# Patient Record
Sex: FEMALE | Race: WHITE | NOT HISPANIC OR LATINO | URBAN - METROPOLITAN AREA
[De-identification: names, ages, dates, MRNs, and addresses within clinical notes are randomized per-mention and may not be internally consistent; named-entity substitution may affect disease eponyms.]

---

## 2017-05-01 ENCOUNTER — OUTPATIENT (OUTPATIENT)
Dept: OUTPATIENT SERVICES | Facility: HOSPITAL | Age: 82
LOS: 1 days | End: 2017-05-01
Payer: MEDICAID

## 2017-05-01 DIAGNOSIS — Z96.651 PRESENCE OF RIGHT ARTIFICIAL KNEE JOINT: Chronic | ICD-10-CM

## 2017-05-01 DIAGNOSIS — Z98.89 OTHER SPECIFIED POSTPROCEDURAL STATES: Chronic | ICD-10-CM

## 2017-05-04 DIAGNOSIS — R69 ILLNESS, UNSPECIFIED: ICD-10-CM

## 2017-07-01 PROCEDURE — G9001: CPT

## 2017-09-17 ENCOUNTER — TRANSCRIPTION ENCOUNTER (OUTPATIENT)
Age: 82
End: 2017-09-17

## 2017-09-20 ENCOUNTER — INPATIENT (INPATIENT)
Facility: HOSPITAL | Age: 82
LOS: 4 days | Discharge: HOME | DRG: 690 | End: 2017-09-25
Attending: INTERNAL MEDICINE | Admitting: INTERNAL MEDICINE
Payer: MEDICARE

## 2017-09-20 VITALS
HEART RATE: 70 BPM | TEMPERATURE: 99 F | RESPIRATION RATE: 22 BRPM | OXYGEN SATURATION: 92 % | DIASTOLIC BLOOD PRESSURE: 70 MMHG | SYSTOLIC BLOOD PRESSURE: 131 MMHG

## 2017-09-20 DIAGNOSIS — I48.91 UNSPECIFIED ATRIAL FIBRILLATION: ICD-10-CM

## 2017-09-20 DIAGNOSIS — N39.0 URINARY TRACT INFECTION, SITE NOT SPECIFIED: ICD-10-CM

## 2017-09-20 DIAGNOSIS — E11.9 TYPE 2 DIABETES MELLITUS WITHOUT COMPLICATIONS: ICD-10-CM

## 2017-09-20 DIAGNOSIS — E03.9 HYPOTHYROIDISM, UNSPECIFIED: ICD-10-CM

## 2017-09-20 DIAGNOSIS — R07.81 PLEURODYNIA: ICD-10-CM

## 2017-09-20 DIAGNOSIS — Z96.651 PRESENCE OF RIGHT ARTIFICIAL KNEE JOINT: Chronic | ICD-10-CM

## 2017-09-20 DIAGNOSIS — J40 BRONCHITIS, NOT SPECIFIED AS ACUTE OR CHRONIC: ICD-10-CM

## 2017-09-20 DIAGNOSIS — Z98.89 OTHER SPECIFIED POSTPROCEDURAL STATES: Chronic | ICD-10-CM

## 2017-09-20 LAB
ALBUMIN SERPL ELPH-MCNC: 4.1 G/DL — SIGNIFICANT CHANGE UP (ref 3.3–5)
ALP SERPL-CCNC: 66 U/L — SIGNIFICANT CHANGE UP (ref 40–120)
ALT FLD-CCNC: 17 U/L RC — SIGNIFICANT CHANGE UP (ref 10–45)
ANION GAP SERPL CALC-SCNC: 13 MMOL/L — SIGNIFICANT CHANGE UP (ref 5–17)
APPEARANCE UR: ABNORMAL
AST SERPL-CCNC: 20 U/L — SIGNIFICANT CHANGE UP (ref 10–40)
BACTERIA # UR AUTO: ABNORMAL /HPF
BASE EXCESS BLDV CALC-SCNC: 0.4 MMOL/L — SIGNIFICANT CHANGE UP (ref -2–2)
BASOPHILS # BLD AUTO: 0 K/UL — SIGNIFICANT CHANGE UP (ref 0–0.2)
BASOPHILS NFR BLD AUTO: 0 % — SIGNIFICANT CHANGE UP (ref 0–2)
BILIRUB SERPL-MCNC: 0.5 MG/DL — SIGNIFICANT CHANGE UP (ref 0.2–1.2)
BILIRUB UR-MCNC: NEGATIVE — SIGNIFICANT CHANGE UP
BUN SERPL-MCNC: 11 MG/DL — SIGNIFICANT CHANGE UP (ref 7–23)
CA-I SERPL-SCNC: 1.27 MMOL/L — SIGNIFICANT CHANGE UP (ref 1.12–1.3)
CALCIUM SERPL-MCNC: 10.4 MG/DL — SIGNIFICANT CHANGE UP (ref 8.4–10.5)
CHLORIDE BLDV-SCNC: 103 MMOL/L — SIGNIFICANT CHANGE UP (ref 96–108)
CHLORIDE SERPL-SCNC: 98 MMOL/L — SIGNIFICANT CHANGE UP (ref 96–108)
CO2 BLDV-SCNC: 27 MMOL/L — SIGNIFICANT CHANGE UP (ref 22–30)
CO2 SERPL-SCNC: 26 MMOL/L — SIGNIFICANT CHANGE UP (ref 22–31)
COLOR SPEC: YELLOW — SIGNIFICANT CHANGE UP
CREAT SERPL-MCNC: 0.92 MG/DL — SIGNIFICANT CHANGE UP (ref 0.5–1.3)
DIFF PNL FLD: ABNORMAL
EOSINOPHIL # BLD AUTO: 0 K/UL — SIGNIFICANT CHANGE UP (ref 0–0.5)
EOSINOPHIL NFR BLD AUTO: 0.6 % — SIGNIFICANT CHANGE UP (ref 0–6)
EPI CELLS # UR: SIGNIFICANT CHANGE UP /HPF
GAS PNL BLDV: 133 MMOL/L — LOW (ref 136–145)
GAS PNL BLDV: SIGNIFICANT CHANGE UP
GAS PNL BLDV: SIGNIFICANT CHANGE UP
GLUCOSE BLDV-MCNC: 136 MG/DL — HIGH (ref 70–99)
GLUCOSE SERPL-MCNC: 138 MG/DL — HIGH (ref 70–99)
GLUCOSE UR QL: NEGATIVE — SIGNIFICANT CHANGE UP
HCO3 BLDV-SCNC: 26 MMOL/L — SIGNIFICANT CHANGE UP (ref 21–29)
HCT VFR BLD CALC: 45.3 % — HIGH (ref 34.5–45)
HCT VFR BLDA CALC: 47 % — SIGNIFICANT CHANGE UP (ref 39–50)
HGB BLD CALC-MCNC: 15.5 G/DL — SIGNIFICANT CHANGE UP (ref 11.5–15.5)
HGB BLD-MCNC: 15.3 G/DL — SIGNIFICANT CHANGE UP (ref 11.5–15.5)
HOROWITZ INDEX BLDV+IHG-RTO: SIGNIFICANT CHANGE UP
KETONES UR-MCNC: NEGATIVE — SIGNIFICANT CHANGE UP
LACTATE BLDV-MCNC: 2.3 MMOL/L — HIGH (ref 0.7–2)
LEUKOCYTE ESTERASE UR-ACNC: ABNORMAL
LYMPHOCYTES # BLD AUTO: 0.4 K/UL — LOW (ref 1–3.3)
LYMPHOCYTES # BLD AUTO: 5.4 % — LOW (ref 13–44)
MCHC RBC-ENTMCNC: 33.1 PG — SIGNIFICANT CHANGE UP (ref 27–34)
MCHC RBC-ENTMCNC: 33.8 GM/DL — SIGNIFICANT CHANGE UP (ref 32–36)
MCV RBC AUTO: 97.7 FL — SIGNIFICANT CHANGE UP (ref 80–100)
MONOCYTES # BLD AUTO: 0.7 K/UL — SIGNIFICANT CHANGE UP (ref 0–0.9)
MONOCYTES NFR BLD AUTO: 9.1 % — SIGNIFICANT CHANGE UP (ref 2–14)
NEUTROPHILS # BLD AUTO: 6.6 K/UL — SIGNIFICANT CHANGE UP (ref 1.8–7.4)
NEUTROPHILS NFR BLD AUTO: 84.8 % — HIGH (ref 43–77)
NITRITE UR-MCNC: NEGATIVE — SIGNIFICANT CHANGE UP
PCO2 BLDV: 46 MMHG — SIGNIFICANT CHANGE UP (ref 35–50)
PH BLDV: 7.36 — SIGNIFICANT CHANGE UP (ref 7.35–7.45)
PH UR: 7.5 — SIGNIFICANT CHANGE UP (ref 5–8)
PLATELET # BLD AUTO: 190 K/UL — SIGNIFICANT CHANGE UP (ref 150–400)
PO2 BLDV: 33 MMHG — SIGNIFICANT CHANGE UP (ref 25–45)
POTASSIUM BLDV-SCNC: 4 MMOL/L — SIGNIFICANT CHANGE UP (ref 3.5–5)
POTASSIUM SERPL-MCNC: 4.4 MMOL/L — SIGNIFICANT CHANGE UP (ref 3.5–5.3)
POTASSIUM SERPL-SCNC: 4.4 MMOL/L — SIGNIFICANT CHANGE UP (ref 3.5–5.3)
PROT SERPL-MCNC: 7.7 G/DL — SIGNIFICANT CHANGE UP (ref 6–8.3)
PROT UR-MCNC: 100 MG/DL
RBC # BLD: 4.64 M/UL — SIGNIFICANT CHANGE UP (ref 3.8–5.2)
RBC # FLD: 12.1 % — SIGNIFICANT CHANGE UP (ref 10.3–14.5)
RBC CASTS # UR COMP ASSIST: ABNORMAL /HPF (ref 0–2)
SAO2 % BLDV: 56 % — LOW (ref 67–88)
SODIUM SERPL-SCNC: 137 MMOL/L — SIGNIFICANT CHANGE UP (ref 135–145)
SP GR SPEC: 1.01 — SIGNIFICANT CHANGE UP (ref 1.01–1.02)
UROBILINOGEN FLD QL: 2
WBC # BLD: 7.8 K/UL — SIGNIFICANT CHANGE UP (ref 3.8–10.5)
WBC # FLD AUTO: 7.8 K/UL — SIGNIFICANT CHANGE UP (ref 3.8–10.5)
WBC UR QL: >50 /HPF (ref 0–5)

## 2017-09-20 PROCEDURE — 99223 1ST HOSP IP/OBS HIGH 75: CPT

## 2017-09-20 PROCEDURE — 70450 CT HEAD/BRAIN W/O DYE: CPT | Mod: 26

## 2017-09-20 PROCEDURE — 71250 CT THORAX DX C-: CPT | Mod: 26

## 2017-09-20 PROCEDURE — 93010 ELECTROCARDIOGRAM REPORT: CPT

## 2017-09-20 PROCEDURE — 99284 EMERGENCY DEPT VISIT MOD MDM: CPT | Mod: 25

## 2017-09-20 PROCEDURE — 72125 CT NECK SPINE W/O DYE: CPT | Mod: 26

## 2017-09-20 RX ORDER — AZITHROMYCIN 500 MG/1
TABLET, FILM COATED ORAL
Qty: 0 | Refills: 0 | Status: DISCONTINUED | OUTPATIENT
Start: 2017-09-20 | End: 2017-09-22

## 2017-09-20 RX ORDER — PREGABALIN 225 MG/1
1000 CAPSULE ORAL DAILY
Qty: 0 | Refills: 0 | Status: DISCONTINUED | OUTPATIENT
Start: 2017-09-20 | End: 2017-09-25

## 2017-09-20 RX ORDER — DEXTROSE 50 % IN WATER 50 %
25 SYRINGE (ML) INTRAVENOUS ONCE
Qty: 0 | Refills: 0 | Status: DISCONTINUED | OUTPATIENT
Start: 2017-09-20 | End: 2017-09-25

## 2017-09-20 RX ORDER — GABAPENTIN 400 MG/1
100 CAPSULE ORAL
Qty: 0 | Refills: 0 | Status: DISCONTINUED | OUTPATIENT
Start: 2017-09-20 | End: 2017-09-25

## 2017-09-20 RX ORDER — SODIUM CHLORIDE 9 MG/ML
1000 INJECTION, SOLUTION INTRAVENOUS
Qty: 0 | Refills: 0 | Status: DISCONTINUED | OUTPATIENT
Start: 2017-09-20 | End: 2017-09-25

## 2017-09-20 RX ORDER — CEFTRIAXONE 500 MG/1
1 INJECTION, POWDER, FOR SOLUTION INTRAMUSCULAR; INTRAVENOUS ONCE
Qty: 0 | Refills: 0 | Status: COMPLETED | OUTPATIENT
Start: 2017-09-20 | End: 2017-09-20

## 2017-09-20 RX ORDER — CHOLECALCIFEROL (VITAMIN D3) 125 MCG
1000 CAPSULE ORAL DAILY
Qty: 0 | Refills: 0 | Status: DISCONTINUED | OUTPATIENT
Start: 2017-09-20 | End: 2017-09-25

## 2017-09-20 RX ORDER — INFLUENZA VIRUS VACCINE 15; 15; 15; 15 UG/.5ML; UG/.5ML; UG/.5ML; UG/.5ML
0.5 SUSPENSION INTRAMUSCULAR ONCE
Qty: 0 | Refills: 0 | Status: DISCONTINUED | OUTPATIENT
Start: 2017-09-20 | End: 2017-09-25

## 2017-09-20 RX ORDER — DEXTROSE 50 % IN WATER 50 %
1 SYRINGE (ML) INTRAVENOUS ONCE
Qty: 0 | Refills: 0 | Status: DISCONTINUED | OUTPATIENT
Start: 2017-09-20 | End: 2017-09-25

## 2017-09-20 RX ORDER — GLUCAGON INJECTION, SOLUTION 0.5 MG/.1ML
1 INJECTION, SOLUTION SUBCUTANEOUS ONCE
Qty: 0 | Refills: 0 | Status: DISCONTINUED | OUTPATIENT
Start: 2017-09-20 | End: 2017-09-25

## 2017-09-20 RX ORDER — AZITHROMYCIN 500 MG/1
500 TABLET, FILM COATED ORAL ONCE
Qty: 0 | Refills: 0 | Status: COMPLETED | OUTPATIENT
Start: 2017-09-20 | End: 2017-09-20

## 2017-09-20 RX ORDER — METOPROLOL TARTRATE 50 MG
25 TABLET ORAL DAILY
Qty: 0 | Refills: 0 | Status: DISCONTINUED | OUTPATIENT
Start: 2017-09-20 | End: 2017-09-25

## 2017-09-20 RX ORDER — AZITHROMYCIN 500 MG/1
500 TABLET, FILM COATED ORAL EVERY 24 HOURS
Qty: 0 | Refills: 0 | Status: DISCONTINUED | OUTPATIENT
Start: 2017-09-21 | End: 2017-09-22

## 2017-09-20 RX ORDER — DEXTROSE 50 % IN WATER 50 %
12.5 SYRINGE (ML) INTRAVENOUS ONCE
Qty: 0 | Refills: 0 | Status: DISCONTINUED | OUTPATIENT
Start: 2017-09-20 | End: 2017-09-25

## 2017-09-20 RX ORDER — CEFTRIAXONE 500 MG/1
1 INJECTION, POWDER, FOR SOLUTION INTRAMUSCULAR; INTRAVENOUS EVERY 24 HOURS
Qty: 0 | Refills: 0 | Status: DISCONTINUED | OUTPATIENT
Start: 2017-09-21 | End: 2017-09-24

## 2017-09-20 RX ORDER — SODIUM CHLORIDE 9 MG/ML
1000 INJECTION INTRAMUSCULAR; INTRAVENOUS; SUBCUTANEOUS
Qty: 0 | Refills: 0 | Status: DISCONTINUED | OUTPATIENT
Start: 2017-09-20 | End: 2017-09-21

## 2017-09-20 RX ORDER — ENOXAPARIN SODIUM 100 MG/ML
40 INJECTION SUBCUTANEOUS EVERY 24 HOURS
Qty: 0 | Refills: 0 | Status: DISCONTINUED | OUTPATIENT
Start: 2017-09-20 | End: 2017-09-25

## 2017-09-20 RX ORDER — ACETAMINOPHEN 500 MG
650 TABLET ORAL EVERY 6 HOURS
Qty: 0 | Refills: 0 | Status: DISCONTINUED | OUTPATIENT
Start: 2017-09-20 | End: 2017-09-25

## 2017-09-20 RX ORDER — INSULIN LISPRO 100/ML
VIAL (ML) SUBCUTANEOUS
Qty: 0 | Refills: 0 | Status: DISCONTINUED | OUTPATIENT
Start: 2017-09-20 | End: 2017-09-24

## 2017-09-20 RX ORDER — LEVOTHYROXINE SODIUM 125 MCG
75 TABLET ORAL DAILY
Qty: 0 | Refills: 0 | Status: DISCONTINUED | OUTPATIENT
Start: 2017-09-20 | End: 2017-09-25

## 2017-09-20 RX ADMIN — AZITHROMYCIN 250 MILLIGRAM(S): 500 TABLET, FILM COATED ORAL at 16:30

## 2017-09-20 RX ADMIN — SODIUM CHLORIDE 75 MILLILITER(S): 9 INJECTION INTRAMUSCULAR; INTRAVENOUS; SUBCUTANEOUS at 17:55

## 2017-09-20 RX ADMIN — SODIUM CHLORIDE 75 MILLILITER(S): 9 INJECTION INTRAMUSCULAR; INTRAVENOUS; SUBCUTANEOUS at 22:07

## 2017-09-20 RX ADMIN — Medication 650 MILLIGRAM(S): at 16:30

## 2017-09-20 RX ADMIN — CEFTRIAXONE 100 GRAM(S): 500 INJECTION, POWDER, FOR SOLUTION INTRAMUSCULAR; INTRAVENOUS at 12:29

## 2017-09-20 RX ADMIN — GABAPENTIN 100 MILLIGRAM(S): 400 CAPSULE ORAL at 22:07

## 2017-09-20 NOTE — ED PROVIDER NOTE - CONSTITUTIONAL, MLM
normal... Patient obese, well nourished, awake, alert, oriented to person, place, time/situation and in mild distress.

## 2017-09-20 NOTE — H&P ADULT - NSHPLABSRESULTS_GEN_ALL_CORE
Labs and diagnostic data personally reviewed. Pertinent findings include:  - WNL CBC including no leukocytosis, WNL CMP  - VBG lactate = 2.3  - UA/micro = turbid urine, 10-25 RBCs, >50WBCs, mod bacteria, large leuk esterase, neg nitrite  - ECG: atrial fibrillation, normal rate  - CT Head as reported = no acute hemorrhage, infarct, or fracture  - CT C-Spine as reported = no acute fracture or traumatic subluxation  - CT Chest = no e/o acute traumatic injury

## 2017-09-20 NOTE — H&P ADULT - PROBLEM SELECTOR PLAN 5
- holding home antihyperglycemics and initiate ISS w/ serial BGMs while hospitalized  - continue home Gabapentin for neuropathy

## 2017-09-20 NOTE — ED ADULT NURSE NOTE - OBJECTIVE STATEMENT
Pt came from assisted living c/o fall. According to the patient's son, pt was with somebody when she fell. Pt denies LOC. Pt denies hitting her head. Pt able to verbalize wants and needs. Son at bedside. Pt came from assisted living c/o fall. According to the patient's son, pt was with somebody when she fell. Pt denies LOC. Pt denies hitting her head. Pt able to verbalize wants and needs. Son at bedside. Pt able to turn to sides and able to move all extremities. Pt very anxious. Emotional support offered. Pt straight cath and 600ml cloudy urine taken out from pt. Pt tolerated straight cath properly. Pt c/o generalized weakness for weeks. Pt stated she is presently taking antibiotics for her bronchitis.  No c/o SOB.

## 2017-09-20 NOTE — H&P ADULT - NEGATIVE OPHTHALMOLOGIC SYMPTOMS
no blurred vision L/no pain R/no loss of vision L/no loss of vision R/no blurred vision R/no pain L/no diplopia

## 2017-09-20 NOTE — ED PROVIDER NOTE - OBJECTIVE STATEMENT
87 year old female w/ h/o afib, htn, dm presents from assisted living s/p fall. Patient was in shower this morning when she fell off shower chair. She states that she has been very weak for the past few days and was diagnoses with bronchitis in assisted living after having symptoms of productive cough. She describes needing to be showered because of urinating on her self. Fall in showered witnesses, patient states she was not able to get up on her own, typically uses wheel chair. She is currently c/o right rib pain. She denies head injury, loc, chest pain abdominal pain, fevers. 87 year old female w/ h/o afib, htn, dm presents from assisted living s/p fall. Patient was in shower this morning when she fell off shower chair. She states that she has been very weak for the past few days and was diagnoses with bronchitis in assisted living after having symptoms of productive cough. She describes needing to be showered because of urinating on her self. Fall in showered witnesses, patient states she was not able to get up on her own, typically uses wheel chair. She is currently c/o right rib pain. She denies head injury, loc, chest pain abdominal pain, fevers.  PMD Ángel Hernandez ([facility])

## 2017-09-20 NOTE — H&P ADULT - PROBLEM SELECTOR PLAN 4
- in Afib, but rate controlled at this time  - not on AC given episode of vaginal bleeding in 2015  - continue home beta blocker

## 2017-09-20 NOTE — H&P ADULT - NSHPSOCIALHISTORY_GEN_ALL_CORE
Patient lives at an assisted living facility. At baseline, pt has been using a wheelchair to get around, and relies on assistance to transfer from one position to another. She is accompanied today by her son/grandson.

## 2017-09-20 NOTE — H&P ADULT - HISTORY OF PRESENT ILLNESS
87yoF w/ PMHx significant for Afib (not on AC since 2015 d/t vaginal bleeding), HTN, NIDDM, hypothyroidism, and recurrent UTIs, who presents from her assisted living facility after having fallen from her shower seat, a witnessed fall where the patient "slid from the seat onto the floor;" no associated LOC, pt did not hit her head, and thereafter pt only complaining of right sided rib pain. Of note, pt had to shower at the time bc she has previously urinated on herself As per the pt, she has been feeling very weak for the past few days, and has been suffering from a productive cough for weeks, which had been labeled as bronchitis and treated w/ Bactrim for the past few days. Patient denies any fever/chills, chest pain, palpitations, HA, vision changes, focal neurologic deficits, abd pain, n/v/d/c, or dysuria, but does have episodes of what she describes as an "overactive bladder." At baseline, pt has been using a wheelchair to get around, and relies on assistance to transfer from one position to another.    ED Presenting Vitals: 99.3F, 70bpm, 131/70, 22br/m, 92% on RA  In the ED pt was given a dose of IV Ceftriaxone 1g x1 after UCx/BCx were drawn

## 2017-09-20 NOTE — ED ADULT NURSE NOTE - PMH
Anemia, Iron Deficiency    Atrial fibrillation    Degenerative Joint Disease    Diabetes mellitus, type II    H/O: GI Bleed    Hiatal hernia    Hypertension    Hypothyroidism    Obesity    Overactive Bladder    Peripheral Neuropathy    Spinal stenosis of lumbar region    Uterine Polyp

## 2017-09-20 NOTE — H&P ADULT - NEGATIVE ENMT SYMPTOMS
no hearing difficulty/no nasal discharge/no sinus symptoms/no post-nasal discharge/no nasal congestion

## 2017-09-20 NOTE — H&P ADULT - PMH
Atrial fibrillation    Degenerative Joint Disease    Diabetes mellitus, type II    H/O: GI Bleed    Hiatal hernia    Hypertension    Hypothyroidism    Obesity    Overactive Bladder    Peripheral Neuropathy    Spinal stenosis of lumbar region    Uterine Polyp

## 2017-09-20 NOTE — ED ADULT NURSE REASSESSMENT NOTE - NS ED NURSE REASSESS COMMENT FT1
Report received from change of shift RN Gerard. Patient resting comfortably admitted to hospital. OK to click RTM. Comfort and safety provided.

## 2017-09-20 NOTE — ED PROVIDER NOTE - ATTENDING CONTRIBUTION TO CARE
attending Pollack: 87yM h/o a fib not on AC, HTN, DM BIBEMS after witnessed fall in shower at assisted living facility, reportedly with no head trauma or LOC. Reports several days of "bronchitis" currently on abx (cannot recall name) and feeling generally weak and unwell. Now with mild R sided ribcage pain. Denies headache, neck pain, nausea/vomiting, diarrhea. On exam, awake and alert, oriented, NCAT, no midline spinal tenderness, equal breath sounds bilaterally, abdomen soft/NT, stable pelvis, FROM bilateral hips, mild R anterior lower thoracic tenderness. Will obtain labs, urinalysis, CT head/Cspine/Chest and reassess.

## 2017-09-20 NOTE — H&P ADULT - PROBLEM SELECTOR PLAN 2
- pt complaining of constant cough productive of sputum despite ongoing treatment w/ PO Bactrim  - will add IV Azithromycin to above Ceftriaxone for broader coverage given symptoms despite no imaging e/o of infiltrate and continue to reassess

## 2017-09-20 NOTE — ED ADULT NURSE REASSESSMENT NOTE - NS ED NURSE REASSESS COMMENT FT1
Right AC PIV red, swollen, and painful after starting azithromycin. IV removed, catheter in tact. Ice pack applied at patients request. Dr. Malone notified. Patient and family educated about infiltrated IVs.  Patient has bed assignment, calling report now.

## 2017-09-20 NOTE — H&P ADULT - ASSESSMENT
87yoF w/ PMHx significant for Afib (not on AC since 2015 d/t vaginal bleeding), HTN, NIDDM, hypothyroidism, and recurrent UTIs, who presents from her assisted living facility after having fallen from her shower seat, a witnessed fall where the patient "slid from the seat onto the floor;" no associated LOC, pt did not hit her head, and thereafter pt only complaining of right sided rib pain. After workup, patient admitted for urinary tract infection.

## 2017-09-20 NOTE — H&P ADULT - PROBLEM SELECTOR PLAN 1
- positive UA/microscopy in the setting of episode of incontinence   - afebrile, no leukocytosis  - s/p 1 dose of Ceftriaxone in the ED, will continue 1g Q24H  - f/u UCx and BCx sent by ED

## 2017-09-20 NOTE — ED PROVIDER NOTE - PROGRESS NOTE DETAILS
attending Kira: patient's IV infiltrated. IV removed. Pt and family very upset about pain and localized swelling from infiltration. Ice pack applied for pain control. New IV placed. attending Kira: patient's IV infiltrated. IV removed. Pt and family very upset about pain and localized swelling from infiltration. Ice pack applied for pain control. New IV placed. Patient's son Googling "nosocomial infections" at bedside.

## 2017-09-20 NOTE — H&P ADULT - NSHPPHYSICALEXAM_GEN_ALL_CORE
Vital Signs Last 24 Hrs  T(F): 99.3 (20 Sep 2017 11:07), Max: 99.3 (20 Sep 2017 11:07)  HR: 72 (20 Sep 2017 12:20) (70 - 72)  BP: 127/70 (20 Sep 2017 12:20) (127/70 - 131/70)  RR: 18 (20 Sep 2017 12:20) (18 - 22)  SpO2: 96% (20 Sep 2017 12:20) (92% - 96%)    GEN: elderly female. sitting up in stretcher, in NAD  SKIN: intact, no e/o rash  EYES: PERRL, anicteric  HEAD: NC/AT  NECK: supple, no e/o elevated JVP appreciated  RESPI: no accessory muscle use, B/L air entry, CTAB  CARDIO: no murmurs appreciated on auscultation, no LE edema B/L  ABD: soft, NT to deep palpation in all quadrants, ND, +BS  NEURO: A&Ox3  EXT: pt able to move all extremities spontaneously   VASC: peripheral pulses palpated B/L

## 2017-09-21 LAB
HBA1C BLD-MCNC: 6.3 % — HIGH (ref 4–5.6)
LACTATE SERPL-SCNC: 1.6 MMOL/L — SIGNIFICANT CHANGE UP (ref 0.7–2)
RAPID RVP RESULT: DETECTED
RV+EV RNA SPEC QL NAA+PROBE: DETECTED

## 2017-09-21 PROCEDURE — 93010 ELECTROCARDIOGRAM REPORT: CPT

## 2017-09-21 RX ORDER — ONDANSETRON 8 MG/1
4 TABLET, FILM COATED ORAL ONCE
Qty: 0 | Refills: 0 | Status: COMPLETED | OUTPATIENT
Start: 2017-09-21 | End: 2017-09-21

## 2017-09-21 RX ORDER — LANOLIN ALCOHOL/MO/W.PET/CERES
3 CREAM (GRAM) TOPICAL ONCE
Qty: 0 | Refills: 0 | Status: COMPLETED | OUTPATIENT
Start: 2017-09-21 | End: 2017-09-21

## 2017-09-21 RX ORDER — FLUTICASONE PROPIONATE 50 MCG
1 SPRAY, SUSPENSION NASAL
Qty: 0 | Refills: 0 | Status: DISCONTINUED | OUTPATIENT
Start: 2017-09-21 | End: 2017-09-25

## 2017-09-21 RX ADMIN — Medication 1000 UNIT(S): at 11:29

## 2017-09-21 RX ADMIN — PREGABALIN 1000 MICROGRAM(S): 225 CAPSULE ORAL at 11:29

## 2017-09-21 RX ADMIN — ENOXAPARIN SODIUM 40 MILLIGRAM(S): 100 INJECTION SUBCUTANEOUS at 17:48

## 2017-09-21 RX ADMIN — Medication 25 MILLIGRAM(S): at 05:54

## 2017-09-21 RX ADMIN — GABAPENTIN 100 MILLIGRAM(S): 400 CAPSULE ORAL at 05:56

## 2017-09-21 RX ADMIN — GABAPENTIN 100 MILLIGRAM(S): 400 CAPSULE ORAL at 17:48

## 2017-09-21 RX ADMIN — Medication 650 MILLIGRAM(S): at 02:05

## 2017-09-21 RX ADMIN — Medication 650 MILLIGRAM(S): at 01:05

## 2017-09-21 RX ADMIN — Medication 75 MICROGRAM(S): at 05:54

## 2017-09-21 RX ADMIN — ONDANSETRON 4 MILLIGRAM(S): 8 TABLET, FILM COATED ORAL at 16:30

## 2017-09-21 RX ADMIN — Medication 3 MILLIGRAM(S): at 21:50

## 2017-09-21 RX ADMIN — CEFTRIAXONE 100 GRAM(S): 500 INJECTION, POWDER, FOR SOLUTION INTRAMUSCULAR; INTRAVENOUS at 15:34

## 2017-09-21 RX ADMIN — AZITHROMYCIN 250 MILLIGRAM(S): 500 TABLET, FILM COATED ORAL at 16:30

## 2017-09-21 NOTE — CONSULT NOTE ADULT - ASSESSMENT
S/P mechanical fall  UACS - upper airway cough syndrome  Doubt pneumonia, yet CT equivoal in LLL - probably atelctasis, cannot exclude infectior/o UTI    REC:    Flonase trial  5 days azithromycin in addition to abx for possible urinary; tract infection  check PCR viral, and legionella ag

## 2017-09-21 NOTE — CONSULT NOTE ADULT - SUBJECTIVE AND OBJECTIVE BOX
PULMONARY CONSULT  Yury Moe MD  [phone]    HPI:  87yoF w/ PMHx significant for Afib (not on AC since  d/t vaginal bleeding), HTN, NIDDM, hypothyroidism, and recurrent UTIs, who presents from her assisted living facility after having fallen from her shower seat, a witnessed fall where the patient "slid from the seat onto the floor;" no associated LOC, pt did not hit her head, and thereafter pt only complaining of right sided rib pain. Of note, pt had to shower at the time bc she has previously urinated on herself As per the pt, she has been feeling very weak for the past few days, and has been suffering from a productive cough for weeks, which had been labeled as bronchitis and treated w/ Bactrim for the past few days. Patient denies any fever/chills, chest pain, palpitations, HA, vision changes, focal neurologic deficits, abd pain, n/v/d/c, or dysuria, but does have episodes of what she describes as an "overactive bladder." At baseline, pt has been using a wheelchair to get around, and relies on assistance to transfer from one position to another.    Pt has history of 1ppd cigarette smoking, DC'd age 50. Sher history of COPD/Asthma. She c/o of "allergies" and chronic nasal congestion and "post nasal drip" - latter associated with cough prod white sputum. Sher difficulty with po intake and denises fever, chills  She uses a nasal inhaler occasionally.      ED Presenting Vitals: 99.3F, 70bpm, 131/70, 22br/m, 92% on RA  In the ED pt was given a dose of IV Ceftriaxone 1g x1 after UCx/BCx were drawn (20 Sep 2017 15:51)    PAST MEDICAL & SURGICAL HISTORY:  Spinal stenosis of lumbar region  Hiatal hernia  Diabetes mellitus, type II  Atrial fibrillation  H/O: GI Bleed  Overactive Bladder  Uterine Polyp  Peripheral Neuropathy  Degenerative Joint Disease  Obesity  Hypothyroidism  Hypertension  S/P rotator cuff repair: right  S/P knee replacement, right    Allergies    aspirin (Unknown)    Intolerances    FAMILY HISTORY:  No pertinent family history in first degree relatives    Review of Systems:  · Negative General Symptoms	no fever; no chills	  · General Symptoms	malaise; fatigue; weakness	  · Negative Skin Symptoms	no rash; no itching; no dryness	  · Negative Ophthalmologic Symptoms	no diplopia; no blurred vision L; no blurred vision R; no pain L; no pain R; no loss of vision L; no loss of vision R	  · Negative ENMT Symptoms	no hearing difficulty; no sinus symptoms; no nasal congestion; no nasal discharge; no post-nasal discharge	  · Respiratory and Thorax	negative	  · Cardiovascular	negative	  · Negative Gastrointestinal Symptoms	no nausea; no vomiting; no diarrhea; no constipation; no change in bowel habits	  · Negative General Genitourinary Symptoms	no hematuria; no flank pain L; no flank pain R; no dysuria	  · General Genitourinary Symptoms	incontinence	  · Negative Musculoskeletal Symptoms	no arthralgia; no arthritis; no neck pain	  · Negative Neurological Symptoms	no syncope; no loss of sensation; no headache; no loss of consciousness; no confusion	  · Psychiatric	negative	  · Hematology/Lymphatics	negative	      Social history:       Medications:  MEDICATIONS  (STANDING):  gabapentin 100 milliGRAM(s) Oral two times a day  metoprolol succinate ER 25 milliGRAM(s) Oral daily  levothyroxine 75 MICROGram(s) Oral daily  cholecalciferol 1000 Unit(s) Oral daily  cyanocobalamin 1000 MICROGram(s) Oral daily  enoxaparin Injectable 40 milliGRAM(s) SubCutaneous every 24 hours  insulin lispro (HumaLOG) corrective regimen sliding scale   SubCutaneous three times a day before meals  dextrose 5%. 1000 milliLiter(s) (50 mL/Hr) IV Continuous <Continuous>  dextrose 50% Injectable 12.5 Gram(s) IV Push once  dextrose 50% Injectable 25 Gram(s) IV Push once  dextrose 50% Injectable 25 Gram(s) IV Push once  azithromycin  IVPB 500 milliGRAM(s) IV Intermittent every 24 hours  sodium chloride 0.9%. 1000 milliLiter(s) (75 mL/Hr) IV Continuous <Continuous>  cefTRIAXone   IVPB 1 Gram(s) IV Intermittent every 24 hours  azithromycin  IVPB      influenza   Vaccine 0.5 milliLiter(s) IntraMuscular once    MEDICATIONS  (PRN):  dextrose Gel 1 Dose(s) Oral once PRN Blood Glucose LESS THAN 70 milliGRAM(s)/deciliter  glucagon  Injectable 1 milliGRAM(s) IntraMuscular once PRN Glucose LESS THAN 70 milligrams/deciliter  acetaminophen   Tablet. 650 milliGRAM(s) Oral every 6 hours PRN Moderate Pain (4 - 6)  guaiFENesin   Syrup  (Sugar-Free) 100 milliGRAM(s) Oral every 6 hours PRN Cough    Vital Signs Last 24 Hrs  T(C): 36.4 (21 Sep 2017 08:32), Max: 36.8 (20 Sep 2017 23:37)  T(F): 97.6 (21 Sep 2017 08:32), Max: 98.2 (20 Sep 2017 23:37)  HR: 68 (21 Sep 2017 08:32) (68 - 116)  BP: 104/75 (21 Sep 2017 08:32) (104/60 - 128/78)  BP(mean): --  RR: 18 (21 Sep 2017 08:32) (18 - 18)  SpO2: 93% (21 Sep 2017 08:32) (92% - 96%)           @ 07:01  -  - @ 07:00  --------------------------------------------------------  IN: 420 mL / OUT: 0 mL / NET: 420 mL      LABS:                        15.3   7.8   )-----------( 190      ( 20 Sep 2017 11:09 )             45.3     09-20    137  |  98  |  11  ----------------------------<  138<H>  4.4   |  26  |  0.92    Ca    10.4      20 Sep 2017 11:09    TPro  7.7  /  Alb  4.1  /  TBili  0.5  /  DBili  x   /  AST  20  /  ALT  17  /  AlkPhos  66  09-20        Urinalysis Basic - ( 20 Sep 2017 11:09 )    Color: Yellow / Appearance: Turbid / S.014 / pH: x  Gluc: x / Ketone: Negative  / Bili: Negative / Urobili: 2   Blood: x / Protein: 100 mg/dL / Nitrite: Negative   Leuk Esterase: Large / RBC: 10-25 /HPF / WBC >50 /HPF   Sq Epi: x / Non Sq Epi: OCC /HPF / Bacteria: Moderate /HPF      CULTURES:    Physical Examination:    General: No acute distress.      HEENT: Pupils equal, reactive to light.  Symmetric.    PULM: Clear to auscultation bilaterally, no significant sputum production    CVS: Regular rate and rhythm, no murmurs, rubs, or gallops    ABD: Soft, nondistended, nontender, normoactive bowel sounds, no masses    EXT: No edema, nontender    SKIN: Warm and well perfused, no rashes noted.    NEURO: Alert, oriented, interactive, nonfocal    RADIOLOGY REVIEWED PERSONALLY  CXR:    CT chest: L>R basilar atelectasis, cannot exclude focal LLL consolidation    TTE: NA

## 2017-09-22 ENCOUNTER — TRANSCRIPTION ENCOUNTER (OUTPATIENT)
Age: 82
End: 2017-09-22

## 2017-09-22 DIAGNOSIS — R11.0 NAUSEA: ICD-10-CM

## 2017-09-22 LAB
-  AMIKACIN: SIGNIFICANT CHANGE UP
-  AMPICILLIN/SULBACTAM: SIGNIFICANT CHANGE UP
-  AMPICILLIN: SIGNIFICANT CHANGE UP
-  AZTREONAM: SIGNIFICANT CHANGE UP
-  CEFAZOLIN: SIGNIFICANT CHANGE UP
-  CEFEPIME: SIGNIFICANT CHANGE UP
-  CEFOXITIN: SIGNIFICANT CHANGE UP
-  CEFTAZIDIME: SIGNIFICANT CHANGE UP
-  CEFTRIAXONE: SIGNIFICANT CHANGE UP
-  CIPROFLOXACIN: SIGNIFICANT CHANGE UP
-  ERTAPENEM: SIGNIFICANT CHANGE UP
-  GENTAMICIN: SIGNIFICANT CHANGE UP
-  IMIPENEM: SIGNIFICANT CHANGE UP
-  LEVOFLOXACIN: SIGNIFICANT CHANGE UP
-  MEROPENEM: SIGNIFICANT CHANGE UP
-  NITROFURANTOIN: SIGNIFICANT CHANGE UP
-  PIPERACILLIN/TAZOBACTAM: SIGNIFICANT CHANGE UP
-  TOBRAMYCIN: SIGNIFICANT CHANGE UP
-  TRIMETHOPRIM/SULFAMETHOXAZOLE: SIGNIFICANT CHANGE UP
ANION GAP SERPL CALC-SCNC: 12 MMOL/L — SIGNIFICANT CHANGE UP (ref 5–17)
BUN SERPL-MCNC: 10 MG/DL — SIGNIFICANT CHANGE UP (ref 7–23)
CALCIUM SERPL-MCNC: 10.1 MG/DL — SIGNIFICANT CHANGE UP (ref 8.4–10.5)
CHLORIDE SERPL-SCNC: 102 MMOL/L — SIGNIFICANT CHANGE UP (ref 96–108)
CO2 SERPL-SCNC: 23 MMOL/L — SIGNIFICANT CHANGE UP (ref 22–31)
CREAT SERPL-MCNC: 0.67 MG/DL — SIGNIFICANT CHANGE UP (ref 0.5–1.3)
CULTURE RESULTS: SIGNIFICANT CHANGE UP
GLUCOSE SERPL-MCNC: 127 MG/DL — HIGH (ref 70–99)
HCT VFR BLD CALC: 43.3 % — SIGNIFICANT CHANGE UP (ref 34.5–45)
HGB BLD-MCNC: 14.8 G/DL — SIGNIFICANT CHANGE UP (ref 11.5–15.5)
MCHC RBC-ENTMCNC: 33.4 PG — SIGNIFICANT CHANGE UP (ref 27–34)
MCHC RBC-ENTMCNC: 34.2 GM/DL — SIGNIFICANT CHANGE UP (ref 32–36)
MCV RBC AUTO: 97.6 FL — SIGNIFICANT CHANGE UP (ref 80–100)
METHOD TYPE: SIGNIFICANT CHANGE UP
ORGANISM # SPEC MICROSCOPIC CNT: SIGNIFICANT CHANGE UP
ORGANISM # SPEC MICROSCOPIC CNT: SIGNIFICANT CHANGE UP
PLATELET # BLD AUTO: 186 K/UL — SIGNIFICANT CHANGE UP (ref 150–400)
POTASSIUM SERPL-MCNC: 4.5 MMOL/L — SIGNIFICANT CHANGE UP (ref 3.5–5.3)
POTASSIUM SERPL-SCNC: 4.5 MMOL/L — SIGNIFICANT CHANGE UP (ref 3.5–5.3)
RBC # BLD: 4.44 M/UL — SIGNIFICANT CHANGE UP (ref 3.8–5.2)
RBC # FLD: 12.1 % — SIGNIFICANT CHANGE UP (ref 10.3–14.5)
SODIUM SERPL-SCNC: 137 MMOL/L — SIGNIFICANT CHANGE UP (ref 135–145)
SPECIMEN SOURCE: SIGNIFICANT CHANGE UP
WBC # BLD: 4.6 K/UL — SIGNIFICANT CHANGE UP (ref 3.8–10.5)
WBC # FLD AUTO: 4.6 K/UL — SIGNIFICANT CHANGE UP (ref 3.8–10.5)

## 2017-09-22 RX ORDER — AZITHROMYCIN 500 MG/1
500 TABLET, FILM COATED ORAL DAILY
Qty: 0 | Refills: 0 | Status: DISCONTINUED | OUTPATIENT
Start: 2017-09-22 | End: 2017-09-23

## 2017-09-22 RX ADMIN — Medication 650 MILLIGRAM(S): at 05:20

## 2017-09-22 RX ADMIN — Medication 1 SPRAY(S): at 17:14

## 2017-09-22 RX ADMIN — Medication 650 MILLIGRAM(S): at 23:10

## 2017-09-22 RX ADMIN — Medication 650 MILLIGRAM(S): at 22:51

## 2017-09-22 RX ADMIN — Medication 650 MILLIGRAM(S): at 06:20

## 2017-09-22 RX ADMIN — AZITHROMYCIN 500 MILLIGRAM(S): 500 TABLET, FILM COATED ORAL at 13:06

## 2017-09-22 RX ADMIN — ENOXAPARIN SODIUM 40 MILLIGRAM(S): 100 INJECTION SUBCUTANEOUS at 17:14

## 2017-09-22 RX ADMIN — Medication 75 MICROGRAM(S): at 05:18

## 2017-09-22 RX ADMIN — PREGABALIN 1000 MICROGRAM(S): 225 CAPSULE ORAL at 13:07

## 2017-09-22 RX ADMIN — GABAPENTIN 100 MILLIGRAM(S): 400 CAPSULE ORAL at 17:14

## 2017-09-22 RX ADMIN — Medication 1000 UNIT(S): at 13:06

## 2017-09-22 RX ADMIN — Medication 650 MILLIGRAM(S): at 16:30

## 2017-09-22 RX ADMIN — CEFTRIAXONE 100 GRAM(S): 500 INJECTION, POWDER, FOR SOLUTION INTRAMUSCULAR; INTRAVENOUS at 14:05

## 2017-09-22 RX ADMIN — GABAPENTIN 100 MILLIGRAM(S): 400 CAPSULE ORAL at 05:17

## 2017-09-22 RX ADMIN — Medication 25 MILLIGRAM(S): at 05:17

## 2017-09-22 RX ADMIN — Medication 1 SPRAY(S): at 05:17

## 2017-09-22 RX ADMIN — Medication 650 MILLIGRAM(S): at 15:30

## 2017-09-22 NOTE — PHYSICAL THERAPY INITIAL EVALUATION ADULT - GENERAL OBSERVATIONS, REHAB EVAL
Pt received supine in bed with PCA for hygiene management, NAD. Pt alert and agreeable to PT eval. Pt's son & daughter arriving to room mid-PT session.

## 2017-09-22 NOTE — PHYSICAL THERAPY INITIAL EVALUATION ADULT - PERTINENT HX OF CURRENT PROBLEM, REHAB EVAL
87yoF w/ PMHx significant for Afib (not on AC since 2015 d/t vaginal bleeding), HTN, NIDDM, hypothyroidism, and recurrent UTIs, who presents from her assisted living facility after having fallen from her shower seat, a witnessed fall where the patient "slid from the seat onto the floor;" no associated LOC, pt did not hit her head, and thereafter pt only complaining of right sided rib pain. 87yoF w/ PMHx significant for Afib (not on AC since 2015 d/t vaginal bleeding), HTN, NIDDM, hypothyroidism, and recurrent UTIs, who presents from her assisted living facility after having fallen from her shower seat, a witnessed fall where the patient "slid from the seat onto the floor;" no associated LOC, pt did not hit her head, and thereafter pt only complaining of right sided rib pain. EKG: afib with PVCs.

## 2017-09-22 NOTE — PHYSICAL THERAPY INITIAL EVALUATION ADULT - TRANSFER SAFETY CONCERNS NOTED: SIT/STAND, REHAB EVAL
TBA stepping too close to front of assistive device/inability to maintain weight-bearing restrictions w/o assist/decreased weight-shifting ability/losing balance/decreased step length/TBA

## 2017-09-22 NOTE — DISCHARGE NOTE ADULT - MEDICATION SUMMARY - MEDICATIONS TO TAKE
I will START or STAY ON the medications listed below when I get home from the hospital:    acetaminophen 325 mg oral tablet  -- 2 tab(s) by mouth every 6 hours, As needed, Moderate Pain (4 - 6)  -- Indication: For Rib pain on right side    gabapentin 100 mg oral capsule  -- 1 cap(s) by mouth 2 times a day  -- Indication: For Neuropathy     metFORMIN 1000 mg oral tablet  -- 1 tab(s) by mouth 2 times a day  -- Indication: For Diabetes mellitus, type II    cetirizine 10 mg oral tablet  -- 1 tab(s) by mouth once a day, As Needed  -- Indication: For Antihistamine     Toprol-XL 25 mg oral tablet, extended release  -- 1 tab(s) by mouth once a day  -- Indication: For Hypertension     nystatin 100,000 units/g topical powder  -- Apply on skin to affected area 2 times a day, As Needed - for rash  -- Indication: For Antifungal     guaiFENesin 200 mg/5 mL oral liquid  -- 5 milliliter(s) by mouth every 6 hours, As needed, Cough  -- Indication: For Cough syrup    Cranberry oral tablet  -- Indication: For Supplement     Slow Fe (as elemental iron) 45 mg oral tablet, extended release  -- 1 tab(s) by mouth once a day  -- Indication: For Supplement     docusate sodium 100 mg oral capsule  -- 1 cap(s) by mouth 3 times a day  -- Indication: For Stool softner    lactulose 10 g/15 mL oral syrup  -- 30 milliliter(s) by mouth once a day, As needed, Constipation  -- Indication: For Stool softner    polyethylene glycol 3350 oral powder for reconstitution  -- 17 gram(s) by mouth once a day  -- Indication: For Stool softner    senna oral tablet  -- 2 tab(s) by mouth once a day (at bedtime)  -- Indication: For Stool softner    fluticasone 50 mcg/inh nasal spray  -- 1 spray(s) into nose 2 times a day  -- Indication: For Nasal spray     levothyroxine 75 mcg (0.075 mg) oral tablet  -- 1 tab(s) by mouth once a day  -- Indication: For Hypothyroidism    Centrum Silver Therapeutic Multiple Vitamins with Minerals oral tablet  -- 1 tab(s) by mouth once a day  -- Indication: For Supplement     Vitamin B12 1000 mcg oral tablet  -- 1 tab(s) by mouth once a day  -- Indication: For Supplement     cholecalciferol oral tablet  -- 1000 unit(s) by mouth once a day  -- Indication: For Supplement

## 2017-09-22 NOTE — DISCHARGE NOTE ADULT - HOSPITAL COURSE
87yr old female w/ PMHx significant for Afib (not on AC since 2015 d/t vaginal bleeding), HTN, NIDDM, hypothyroidism, and recurrent UTIs, who presents from her assisted living facility after having fallen from her shower seat, a witnessed fall where the patient "slid from the seat onto the floor;" no associated LOC, pt did not hit her head, and thereafter pt only complaining of right sided rib pain. After workup, patient admitted for urinary tract infection, urine cx with ecoli, completed 5 day course of IV abx. Continue Flonase for Bronchitis, pulm followed. Pt with  Rib pain on right side - no acute fractures as per CT imaging will continue with pain control w/ Acetaminophen. Patient cleared for discharge to [facility]. Will follow up with PMD when d/c from rehab.

## 2017-09-22 NOTE — PHYSICAL THERAPY INITIAL EVALUATION ADULT - ADDITIONAL COMMENTS
Pt lives at [facility] assisted living [facility]. Pt states that she is non-ambulatory, self-mobilizes in w/c PTA. Pt states that she sometimes completes bed to w/c transfers independently (with no AD), other times needs assist. Pt owns shower chair, w/c, raised toilet seat.

## 2017-09-22 NOTE — DISCHARGE NOTE ADULT - ADDITIONAL INSTRUCTIONS
Please follow up with Dr. Oconnor if symptoms return  Please follow up with PMD within 1-2 weeks of D/C from Rehab.

## 2017-09-22 NOTE — DISCHARGE NOTE ADULT - PLAN OF CARE
Continue off antibiotics Urine culture with Ecoli  Completed 5 days of Ceftriaxone   Please follow up with Urology if symptoms return   HOME CARE INSTRUCTIONS  Drink enough water and fluids to keep your urine clear or pale yellow.  Avoid caffeine, tea, and carbonated beverages. They tend to irritate your bladder.  Empty your bladder often. Avoid holding urine for long periods of time.  Empty your bladder before and after sexual intercourse.  After a bowel movement, women should cleanse from front to back. Use each tissue only once.  SEEK MEDICAL CARE IF:  You have back pain.  You develop a fever.  Your symptoms do not begin to resolve within 3 days.  SEEK IMMEDIATE MEDICAL CARE IF:  You have severe back pain or lower abdominal pain.  You develop chills.  You have nausea or vomiting.  You have continued burning or discomfort with urination. Continue to observe off antibiotics  Continue Flonase twice daily No acute fractures as per CT imaging  Please continue pain control w/ Acetaminophen Chronic Afib, but rate controlled at this time  Continue beta blocker  Atrial fibrillation is the most common heart rhythm problem.  The condition puts you at risk for has stroke and heart attack  It helps if you control your blood pressure, not drink more than 1-2 alcohol drinks per day, cut down on caffeine, getting treatment for over active thyroid gland, and get regular exercise  Call your doctor if you feel your heart racing or beating unusually, chest tightness or pain, lightheaded, faint, shortness of breath especially with exercise  It is important to take your heart medication as prescribed  You may be on anticoagulation which is very important to take as directed - you may need blood work to monitor drug levels HgA1C this admission.  Make sure you get your HgA1c checked every three months.  If you take oral diabetes medications, check your blood glucose two times a day.  If you take insulin, check your blood glucose before meals and at bedtime.  It's important not to skip any meals.  Keep a log of your blood glucose results and always take it with you to your doctor appointments.  Keep a list of your current medications including injectables and over the counter medications and bring this medication list with you to all your doctor appointments.  If you have not seen your ophthalmologist this year call for appointment.  Check your feet daily for redness, sores, or openings. Do not self treat. If no improvement in two days call your primary care physician for an appointment.  Low blood sugar (hypoglycemia) is a blood sugar below 70mg/dl. Check your blood sugar if you feel signs/symptoms of hypoglycemia. If your blood sugar is below 70 take 15 grams of carbohydrates (ex 4 oz of apple juice, 3-4 glucose tablets, or 4-6 oz of regular soda) wait 15 minutes and repeat blood sugar to make sure it comes up above 70.  If your blood sugar is above 70 and you are due for a meal, have a meal.  If you are not due for a meal have a snack.  This snack helps keeps your blood sugar at a safe range. Continue Levothyroxine  You do not make enough thyroid hormone  signs & symptoms of low levels of thyroid hormone - tired, getting cold easily, coarse or thin hair, constipation, shortness of breath, swelling, irregular periods  your doctor will do thyroid hormone blood tests at least once a year to monitor if medication dose is adequate  take your thyroid medicine as directed by your doctor & on empty stomach

## 2017-09-22 NOTE — DISCHARGE NOTE ADULT - CARE PROVIDER_API CALL
Li Oconnor), Urology  [address] [place] [address]  Phone: [phone]  Fax: [phone]

## 2017-09-22 NOTE — DISCHARGE NOTE ADULT - PATIENT PORTAL LINK FT
“You can access the [facility] Patient Portal, offered by [facility], by registering with the following website: [email or web address]”

## 2017-09-22 NOTE — DISCHARGE NOTE ADULT - SECONDARY DIAGNOSIS.
Bronchitis Rib pain on right side Chronic atrial fibrillation Diabetes mellitus, type II Acquired hypothyroidism

## 2017-09-22 NOTE — DISCHARGE NOTE ADULT - CARE PLAN
Principal Discharge DX:	UTI (urinary tract infection)  Goal:	Continue off antibiotics  Instructions for follow-up, activity and diet:	Urine culture with Ecoli  Completed 5 days of Ceftriaxone   Please follow up with Urology if symptoms return   HOME CARE INSTRUCTIONS  Drink enough water and fluids to keep your urine clear or pale yellow.  Avoid caffeine, tea, and carbonated beverages. They tend to irritate your bladder.  Empty your bladder often. Avoid holding urine for long periods of time.  Empty your bladder before and after sexual intercourse.  After a bowel movement, women should cleanse from front to back. Use each tissue only once.  SEEK MEDICAL CARE IF:  You have back pain.  You develop a fever.  Your symptoms do not begin to resolve within 3 days.  SEEK IMMEDIATE MEDICAL CARE IF:  You have severe back pain or lower abdominal pain.  You develop chills.  You have nausea or vomiting.  You have continued burning or discomfort with urination.  Secondary Diagnosis:	Bronchitis  Instructions for follow-up, activity and diet:	Continue to observe off antibiotics  Continue Flonase twice daily  Secondary Diagnosis:	Rib pain on right side  Instructions for follow-up, activity and diet:	No acute fractures as per CT imaging  Please continue pain control w/ Acetaminophen  Secondary Diagnosis:	Chronic atrial fibrillation  Instructions for follow-up, activity and diet:	Chronic Afib, but rate controlled at this time  Continue beta blocker  Atrial fibrillation is the most common heart rhythm problem.  The condition puts you at risk for has stroke and heart attack  It helps if you control your blood pressure, not drink more than 1-2 alcohol drinks per day, cut down on caffeine, getting treatment for over active thyroid gland, and get regular exercise  Call your doctor if you feel your heart racing or beating unusually, chest tightness or pain, lightheaded, faint, shortness of breath especially with exercise  It is important to take your heart medication as prescribed  You may be on anticoagulation which is very important to take as directed - you may need blood work to monitor drug levels  Secondary Diagnosis:	Diabetes mellitus, type II  Instructions for follow-up, activity and diet:	HgA1C this admission.  Make sure you get your HgA1c checked every three months.  If you take oral diabetes medications, check your blood glucose two times a day.  If you take insulin, check your blood glucose before meals and at bedtime.  It's important not to skip any meals.  Keep a log of your blood glucose results and always take it with you to your doctor appointments.  Keep a list of your current medications including injectables and over the counter medications and bring this medication list with you to all your doctor appointments.  If you have not seen your ophthalmologist this year call for appointment.  Check your feet daily for redness, sores, or openings. Do not self treat. If no improvement in two days call your primary care physician for an appointment.  Low blood sugar (hypoglycemia) is a blood sugar below 70mg/dl. Check your blood sugar if you feel signs/symptoms of hypoglycemia. If your blood sugar is below 70 take 15 grams of carbohydrates (ex 4 oz of apple juice, 3-4 glucose tablets, or 4-6 oz of regular soda) wait 15 minutes and repeat blood sugar to make sure it comes up above 70.  If your blood sugar is above 70 and you are due for a meal, have a meal.  If you are not due for a meal have a snack.  This snack helps keeps your blood sugar at a safe range.  Secondary Diagnosis:	Acquired hypothyroidism  Instructions for follow-up, activity and diet:	Continue Levothyroxine  You do not make enough thyroid hormone  signs & symptoms of low levels of thyroid hormone - tired, getting cold easily, coarse or thin hair, constipation, shortness of breath, swelling, irregular periods  your doctor will do thyroid hormone blood tests at least once a year to monitor if medication dose is adequate  take your thyroid medicine as directed by your doctor & on empty stomach

## 2017-09-22 NOTE — DISCHARGE NOTE ADULT - MEDICATION SUMMARY - MEDICATIONS TO STOP TAKING
I will STOP taking the medications listed below when I get home from the hospital:    nitrofurantoin macrocrystals 50 mg oral capsule  -- 1 cap(s) by mouth once a day

## 2017-09-23 LAB
ALBUMIN SERPL ELPH-MCNC: 3.8 G/DL — SIGNIFICANT CHANGE UP (ref 3.3–5)
ALP SERPL-CCNC: 59 U/L — SIGNIFICANT CHANGE UP (ref 40–120)
ALT FLD-CCNC: 20 U/L RC — SIGNIFICANT CHANGE UP (ref 10–45)
ANION GAP SERPL CALC-SCNC: 13 MMOL/L — SIGNIFICANT CHANGE UP (ref 5–17)
AST SERPL-CCNC: 22 U/L — SIGNIFICANT CHANGE UP (ref 10–40)
BASOPHILS # BLD AUTO: 0 K/UL — SIGNIFICANT CHANGE UP (ref 0–0.2)
BASOPHILS NFR BLD AUTO: 1 % — SIGNIFICANT CHANGE UP (ref 0–2)
BILIRUB SERPL-MCNC: 0.4 MG/DL — SIGNIFICANT CHANGE UP (ref 0.2–1.2)
BUN SERPL-MCNC: 9 MG/DL — SIGNIFICANT CHANGE UP (ref 7–23)
CALCIUM SERPL-MCNC: 9.7 MG/DL — SIGNIFICANT CHANGE UP (ref 8.4–10.5)
CHLORIDE SERPL-SCNC: 97 MMOL/L — SIGNIFICANT CHANGE UP (ref 96–108)
CK MB CFR SERPL CALC: 1.3 NG/ML — SIGNIFICANT CHANGE UP (ref 0–3.8)
CK SERPL-CCNC: 25 U/L — SIGNIFICANT CHANGE UP (ref 25–170)
CO2 SERPL-SCNC: 23 MMOL/L — SIGNIFICANT CHANGE UP (ref 22–31)
CREAT SERPL-MCNC: 0.6 MG/DL — SIGNIFICANT CHANGE UP (ref 0.5–1.3)
EOSINOPHIL # BLD AUTO: 0.1 K/UL — SIGNIFICANT CHANGE UP (ref 0–0.5)
EOSINOPHIL NFR BLD AUTO: 1.6 % — SIGNIFICANT CHANGE UP (ref 0–6)
GLUCOSE SERPL-MCNC: 141 MG/DL — HIGH (ref 70–99)
HCT VFR BLD CALC: 42.5 % — SIGNIFICANT CHANGE UP (ref 34.5–45)
HGB BLD-MCNC: 14.9 G/DL — SIGNIFICANT CHANGE UP (ref 11.5–15.5)
LYMPHOCYTES # BLD AUTO: 0.8 K/UL — LOW (ref 1–3.3)
LYMPHOCYTES # BLD AUTO: 17.3 % — SIGNIFICANT CHANGE UP (ref 13–44)
MCHC RBC-ENTMCNC: 33.7 PG — SIGNIFICANT CHANGE UP (ref 27–34)
MCHC RBC-ENTMCNC: 35 GM/DL — SIGNIFICANT CHANGE UP (ref 32–36)
MCV RBC AUTO: 96.3 FL — SIGNIFICANT CHANGE UP (ref 80–100)
MONOCYTES # BLD AUTO: 0.6 K/UL — SIGNIFICANT CHANGE UP (ref 0–0.9)
MONOCYTES NFR BLD AUTO: 11.6 % — SIGNIFICANT CHANGE UP (ref 2–14)
NEUTROPHILS # BLD AUTO: 3.3 K/UL — SIGNIFICANT CHANGE UP (ref 1.8–7.4)
NEUTROPHILS NFR BLD AUTO: 68.4 % — SIGNIFICANT CHANGE UP (ref 43–77)
PLATELET # BLD AUTO: 174 K/UL — SIGNIFICANT CHANGE UP (ref 150–400)
POTASSIUM SERPL-MCNC: 4.6 MMOL/L — SIGNIFICANT CHANGE UP (ref 3.5–5.3)
POTASSIUM SERPL-SCNC: 4.6 MMOL/L — SIGNIFICANT CHANGE UP (ref 3.5–5.3)
PROT SERPL-MCNC: 6.8 G/DL — SIGNIFICANT CHANGE UP (ref 6–8.3)
RBC # BLD: 4.42 M/UL — SIGNIFICANT CHANGE UP (ref 3.8–5.2)
RBC # FLD: 12.1 % — SIGNIFICANT CHANGE UP (ref 10.3–14.5)
SODIUM SERPL-SCNC: 133 MMOL/L — LOW (ref 135–145)
TROPONIN T SERPL-MCNC: <0.01 NG/ML — SIGNIFICANT CHANGE UP (ref 0–0.06)
WBC # BLD: 4.8 K/UL — SIGNIFICANT CHANGE UP (ref 3.8–10.5)
WBC # FLD AUTO: 4.8 K/UL — SIGNIFICANT CHANGE UP (ref 3.8–10.5)

## 2017-09-23 PROCEDURE — 93970 EXTREMITY STUDY: CPT | Mod: 26

## 2017-09-23 PROCEDURE — 74000: CPT | Mod: 26

## 2017-09-23 RX ORDER — SENNA PLUS 8.6 MG/1
2 TABLET ORAL AT BEDTIME
Qty: 0 | Refills: 0 | Status: DISCONTINUED | OUTPATIENT
Start: 2017-09-23 | End: 2017-09-25

## 2017-09-23 RX ORDER — LANOLIN ALCOHOL/MO/W.PET/CERES
3 CREAM (GRAM) TOPICAL ONCE
Qty: 0 | Refills: 0 | Status: COMPLETED | OUTPATIENT
Start: 2017-09-23 | End: 2017-09-24

## 2017-09-23 RX ORDER — DOCUSATE SODIUM 100 MG
100 CAPSULE ORAL THREE TIMES A DAY
Qty: 0 | Refills: 0 | Status: DISCONTINUED | OUTPATIENT
Start: 2017-09-23 | End: 2017-09-25

## 2017-09-23 RX ORDER — POLYETHYLENE GLYCOL 3350 17 G/17G
17 POWDER, FOR SOLUTION ORAL DAILY
Qty: 0 | Refills: 0 | Status: DISCONTINUED | OUTPATIENT
Start: 2017-09-23 | End: 2017-09-23

## 2017-09-23 RX ORDER — POLYETHYLENE GLYCOL 3350 17 G/17G
17 POWDER, FOR SOLUTION ORAL ONCE
Qty: 0 | Refills: 0 | Status: COMPLETED | OUTPATIENT
Start: 2017-09-23 | End: 2017-09-23

## 2017-09-23 RX ORDER — POLYETHYLENE GLYCOL 3350 17 G/17G
17 POWDER, FOR SOLUTION ORAL DAILY
Qty: 0 | Refills: 0 | Status: DISCONTINUED | OUTPATIENT
Start: 2017-09-23 | End: 2017-09-25

## 2017-09-23 RX ORDER — SODIUM CHLORIDE 9 MG/ML
1000 INJECTION INTRAMUSCULAR; INTRAVENOUS; SUBCUTANEOUS
Qty: 0 | Refills: 0 | Status: DISCONTINUED | OUTPATIENT
Start: 2017-09-23 | End: 2017-09-25

## 2017-09-23 RX ADMIN — SODIUM CHLORIDE 100 MILLILITER(S): 9 INJECTION INTRAMUSCULAR; INTRAVENOUS; SUBCUTANEOUS at 12:18

## 2017-09-23 RX ADMIN — Medication 100 MILLIGRAM(S): at 21:40

## 2017-09-23 RX ADMIN — SENNA PLUS 2 TABLET(S): 8.6 TABLET ORAL at 21:40

## 2017-09-23 RX ADMIN — GABAPENTIN 100 MILLIGRAM(S): 400 CAPSULE ORAL at 05:19

## 2017-09-23 RX ADMIN — Medication 1 SPRAY(S): at 05:19

## 2017-09-23 RX ADMIN — Medication 75 MICROGRAM(S): at 05:19

## 2017-09-23 RX ADMIN — GABAPENTIN 100 MILLIGRAM(S): 400 CAPSULE ORAL at 17:17

## 2017-09-23 RX ADMIN — Medication 1000 UNIT(S): at 12:18

## 2017-09-23 RX ADMIN — ENOXAPARIN SODIUM 40 MILLIGRAM(S): 100 INJECTION SUBCUTANEOUS at 17:17

## 2017-09-23 RX ADMIN — Medication 650 MILLIGRAM(S): at 23:49

## 2017-09-23 RX ADMIN — CEFTRIAXONE 100 GRAM(S): 500 INJECTION, POWDER, FOR SOLUTION INTRAMUSCULAR; INTRAVENOUS at 14:49

## 2017-09-23 RX ADMIN — Medication 1 SPRAY(S): at 17:17

## 2017-09-23 RX ADMIN — Medication 25 MILLIGRAM(S): at 05:19

## 2017-09-23 RX ADMIN — POLYETHYLENE GLYCOL 3350 17 GRAM(S): 17 POWDER, FOR SOLUTION ORAL at 18:52

## 2017-09-23 RX ADMIN — PREGABALIN 1000 MICROGRAM(S): 225 CAPSULE ORAL at 12:18

## 2017-09-23 RX ADMIN — Medication 650 MILLIGRAM(S): at 21:40

## 2017-09-24 RX ORDER — LACTULOSE 10 G/15ML
20 SOLUTION ORAL DAILY
Qty: 0 | Refills: 0 | Status: DISCONTINUED | OUTPATIENT
Start: 2017-09-24 | End: 2017-09-25

## 2017-09-24 RX ORDER — MINERAL OIL
30 OIL (ML) MISCELLANEOUS ONCE
Qty: 0 | Refills: 0 | Status: COMPLETED | OUTPATIENT
Start: 2017-09-24 | End: 2017-09-24

## 2017-09-24 RX ORDER — INSULIN LISPRO 100/ML
VIAL (ML) SUBCUTANEOUS
Qty: 0 | Refills: 0 | Status: DISCONTINUED | OUTPATIENT
Start: 2017-09-24 | End: 2017-09-25

## 2017-09-24 RX ADMIN — CEFTRIAXONE 100 GRAM(S): 500 INJECTION, POWDER, FOR SOLUTION INTRAMUSCULAR; INTRAVENOUS at 14:34

## 2017-09-24 RX ADMIN — LACTULOSE 20 GRAM(S): 10 SOLUTION ORAL at 12:37

## 2017-09-24 RX ADMIN — ENOXAPARIN SODIUM 40 MILLIGRAM(S): 100 INJECTION SUBCUTANEOUS at 17:55

## 2017-09-24 RX ADMIN — SODIUM CHLORIDE 100 MILLILITER(S): 9 INJECTION INTRAMUSCULAR; INTRAVENOUS; SUBCUTANEOUS at 17:56

## 2017-09-24 RX ADMIN — Medication 25 MILLIGRAM(S): at 05:49

## 2017-09-24 RX ADMIN — Medication 100 MILLIGRAM(S): at 05:49

## 2017-09-24 RX ADMIN — SENNA PLUS 2 TABLET(S): 8.6 TABLET ORAL at 21:06

## 2017-09-24 RX ADMIN — POLYETHYLENE GLYCOL 3350 17 GRAM(S): 17 POWDER, FOR SOLUTION ORAL at 14:37

## 2017-09-24 RX ADMIN — Medication 1 SPRAY(S): at 17:55

## 2017-09-24 RX ADMIN — Medication 100 MILLIGRAM(S): at 14:34

## 2017-09-24 RX ADMIN — Medication 75 MICROGRAM(S): at 05:49

## 2017-09-24 RX ADMIN — PREGABALIN 1000 MICROGRAM(S): 225 CAPSULE ORAL at 11:28

## 2017-09-24 RX ADMIN — Medication 100 MILLIGRAM(S): at 21:06

## 2017-09-24 RX ADMIN — Medication 3 MILLIGRAM(S): at 21:06

## 2017-09-24 RX ADMIN — Medication 1 SPRAY(S): at 05:49

## 2017-09-24 RX ADMIN — GABAPENTIN 100 MILLIGRAM(S): 400 CAPSULE ORAL at 17:55

## 2017-09-24 RX ADMIN — GABAPENTIN 100 MILLIGRAM(S): 400 CAPSULE ORAL at 05:49

## 2017-09-24 RX ADMIN — Medication 1000 UNIT(S): at 11:28

## 2017-09-25 VITALS
OXYGEN SATURATION: 93 % | HEART RATE: 65 BPM | SYSTOLIC BLOOD PRESSURE: 142 MMHG | TEMPERATURE: 98 F | RESPIRATION RATE: 20 BRPM | DIASTOLIC BLOOD PRESSURE: 81 MMHG

## 2017-09-25 LAB
CULTURE RESULTS: SIGNIFICANT CHANGE UP
CULTURE RESULTS: SIGNIFICANT CHANGE UP
SPECIMEN SOURCE: SIGNIFICANT CHANGE UP
SPECIMEN SOURCE: SIGNIFICANT CHANGE UP

## 2017-09-25 RX ORDER — FLUTICASONE PROPIONATE 50 MCG
1 SPRAY, SUSPENSION NASAL
Qty: 0 | Refills: 0 | COMMUNITY
Start: 2017-09-25

## 2017-09-25 RX ORDER — DOCUSATE SODIUM 100 MG
1 CAPSULE ORAL
Qty: 0 | Refills: 0 | COMMUNITY
Start: 2017-09-25

## 2017-09-25 RX ORDER — SENNA PLUS 8.6 MG/1
2 TABLET ORAL
Qty: 0 | Refills: 0 | COMMUNITY
Start: 2017-09-25

## 2017-09-25 RX ORDER — ACETAMINOPHEN 500 MG
2 TABLET ORAL
Qty: 0 | Refills: 0 | COMMUNITY
Start: 2017-09-25

## 2017-09-25 RX ORDER — POLYETHYLENE GLYCOL 3350 17 G/17G
17 POWDER, FOR SOLUTION ORAL
Qty: 0 | Refills: 0 | COMMUNITY
Start: 2017-09-25

## 2017-09-25 RX ORDER — LACTULOSE 10 G/15ML
30 SOLUTION ORAL
Qty: 0 | Refills: 0 | COMMUNITY
Start: 2017-09-25

## 2017-09-25 RX ORDER — NITROFURANTOIN MACROCRYSTAL 50 MG
1 CAPSULE ORAL
Qty: 0 | Refills: 0 | COMMUNITY

## 2017-09-25 RX ADMIN — GABAPENTIN 100 MILLIGRAM(S): 400 CAPSULE ORAL at 07:07

## 2017-09-25 RX ADMIN — LACTULOSE 20 GRAM(S): 10 SOLUTION ORAL at 09:19

## 2017-09-25 RX ADMIN — Medication 25 MILLIGRAM(S): at 07:07

## 2017-09-25 RX ADMIN — ENOXAPARIN SODIUM 40 MILLIGRAM(S): 100 INJECTION SUBCUTANEOUS at 17:56

## 2017-09-25 RX ADMIN — Medication 650 MILLIGRAM(S): at 03:11

## 2017-09-25 RX ADMIN — Medication 1 SPRAY(S): at 17:56

## 2017-09-25 RX ADMIN — Medication 1000 UNIT(S): at 12:07

## 2017-09-25 RX ADMIN — Medication 650 MILLIGRAM(S): at 10:26

## 2017-09-25 RX ADMIN — Medication 650 MILLIGRAM(S): at 02:49

## 2017-09-25 RX ADMIN — Medication 100 MILLIGRAM(S): at 07:07

## 2017-09-25 RX ADMIN — PREGABALIN 1000 MICROGRAM(S): 225 CAPSULE ORAL at 12:07

## 2017-09-25 RX ADMIN — Medication 75 MICROGRAM(S): at 07:07

## 2017-09-25 RX ADMIN — Medication 650 MILLIGRAM(S): at 09:26

## 2017-09-25 RX ADMIN — GABAPENTIN 100 MILLIGRAM(S): 400 CAPSULE ORAL at 17:56

## 2017-09-25 RX ADMIN — POLYETHYLENE GLYCOL 3350 17 GRAM(S): 17 POWDER, FOR SOLUTION ORAL at 12:08

## 2017-09-25 NOTE — PROGRESS NOTE ADULT - ATTENDING COMMENTS
SAMRA Jang MD([facility])  [phone]
SAMRA Jang MD([facility])  [phone]
Pt eval-  dc planning    S Laine ANDRE([facility])  [phone]
Pt leonides  dc planning    S Laine ANDRE([facility])  [phone]
[facility])  [phone]

## 2017-09-25 NOTE — PROGRESS NOTE ADULT - PROBLEM SELECTOR PLAN 2
- pt complaining of constant cough productive of sputum despite ongoing treatment w/ PO Bactrim  - will add IV Azithromycin to above Ceftriaxone for broader coverage   Pulm c/s f/u recs
RVP+rhino/entero  cont ceft   dc zithro  Pulm c/s appreciated
RVP+rhino/entero  cont ceft   dc zithro  Pulm c/s appreciated
RVP+rhino/entero  off abx
RVP+rhino/entero  cont ceft   dc zithro  Pulm c/s appreciated

## 2017-09-25 NOTE — PROGRESS NOTE ADULT - PROBLEM SELECTOR PROBLEM 5
Diabetes mellitus, type II

## 2017-09-25 NOTE — PROGRESS NOTE ADULT - ASSESSMENT
87yoF w/ PMHx significant for Afib (not on AC since 2015 d/t vaginal bleeding), HTN, NIDDM, hypothyroidism, and recurrent UTIs, who presents from her assisted living facility after having fallen from her shower seat, a witnessed fall where the patient "slid from the seat onto the floor;" no associated LOC, pt did not hit her head, and thereafter pt only complaining of right sided rib pain. After workup, patient admitted for urinary tract infection.
Rhino + bronchitis  UACS  E Coli UTI  No clinical suspicion for pneumonia at this time    REC    Continue ceftriaxone for UTI  AGree with  DC azithromycin  Continue flonase
Rhino + bronchitis  UACS  E Coli UTI  No clinical suspicion for pneumonia at this time    REC    Continue ceftriaxone for UTI  COnsider DC azithromycin  Continue flonase
Rhino + bronchitis  UACS  E Coli UTI  No clinical suspicion for pneumonia at this time    REC    Observe off antibiotics  Clear for DC pulm POV to BONG  Continue flonase
87yoF w/ PMHx significant for Afib (not on AC since 2015 d/t vaginal bleeding), HTN, NIDDM, hypothyroidism, and recurrent UTIs, who presents from her assisted living facility after having fallen from her shower seat, a witnessed fall where the patient "slid from the seat onto the floor;" no associated LOC, pt did not hit her head, and thereafter pt only complaining of right sided rib pain. After workup, patient admitted for urinary tract infection.

## 2017-09-25 NOTE — PROGRESS NOTE ADULT - PROBLEM SELECTOR PLAN 6
- continue home Levothyroxine

## 2017-09-25 NOTE — PROGRESS NOTE ADULT - SUBJECTIVE AND OBJECTIVE BOX
Follow-up Pulm Progress Note  Yury Moe MD  [phone]    No new respiratory events overnight.   AFebrile on ceftriaxone  c/o weakness, some nausea   c/o runny nose  Urine: + E Coli     Vital Signs Last 24 Hrs  T(C): 36.7 (23 Sep 2017 07:14), Max: 36.9 (22 Sep 2017 14:59)  T(F): 98.1 (23 Sep 2017 07:14), Max: 98.5 (22 Sep 2017 22:13)  HR: 71 (23 Sep 2017 07:14) (71 - 80)  BP: 145/82 (23 Sep 2017 07:14) (144/83 - 146/84)  BP(mean): --  RR: 18 (23 Sep 2017 07:14) (18 - 18)  SpO2: 92% (23 Sep 2017 07:14) (92% - 98%)                         14.9   4.8   )-----------( 174      ( 23 Sep 2017 12:07 )             42.5     09-23    133<L>  |  97  |  9   ----------------------------<  141<H>  4.6   |  23  |  0.60    Ca    9.7      23 Sep 2017 12:07    TPro  6.8  /  Alb  3.8  /  TBili  0.4  /  DBili  x   /  AST  22  /  ALT  20  /  AlkPhos  59  09-23    Physical Examination:  PULM: Without wheeze or rhonchi  CVS: Regular rate and rhythm, no murmurs, rubs, or gallops  ABD: Soft, non-tender  EXT:  No clubbing, cyanosis, or edema    RADIOLOGY REVIEWED  CXR:    CT chest:    TTE:
Follow-up Pulm Progress Note  Yury Moe MD  [phone]    No new respiratory events overnight.   AFebrile off antibiotics  Depressed, co neuropathic pain in LE  Without SOB    Vital Signs Last 24 Hrs  T(C): 36.4 (25 Sep 2017 08:17), Max: 36.6 (24 Sep 2017 16:12)  T(F): 97.6 (25 Sep 2017 08:17), Max: 97.8 (24 Sep 2017 16:12)  HR: 62 (25 Sep 2017 08:17) (62 - 78)  BP: 124/77 (25 Sep 2017 08:17) (124/77 - 160/99)  BP(mean): --  RR: 18 (25 Sep 2017 08:17) (18 - 18)  SpO2: 97% (25 Sep 2017 08:17) (94% - 98%)      PULM: Without wheeze or rhonchi  CVS: Regular rate and rhythm, no murmurs, rubs, or gallops  ABD: Soft, non-tender  EXT:  No clubbing, cyanosis, or edema    RADIOLOGY REVIEWED  CXR:    CT chest:    TTE:
Follow-up Pulm Progress Note  Yury Moe MD  [phone]    No new respiratory events overnight.   c/o weakness, feels tired. No SOB  AFebrile on ceftriaxone and azithromycin  PCR + rhino/entero  Urine: + E Coli       Medications:  Vital Signs Last 24 Hrs  T(C): 36.4 (22 Sep 2017 07:50), Max: 37 (21 Sep 2017 21:07)  T(F): 97.6 (22 Sep 2017 07:50), Max: 98.6 (21 Sep 2017 21:07)  HR: 64 (22 Sep 2017 12:16) (64 - 88)  BP: 128/62 (22 Sep 2017 12:16) (110/73 - 164/90)  BP(mean): --  RR: 18 (22 Sep 2017 07:50) (18 - 18)  SpO2: 94% (22 Sep 2017 12:16) (94% - 96%)      09-21 @ 07:01  -  09-22 @ 07:00  --------------------------------------------------------  IN: 360 mL / OUT: 0 mL / NET: 360 mL        LABS:                        14.8   4.6   )-----------( 186      ( 22 Sep 2017 08:51 )             43.3     09-22    137  |  102  |  10  ----------------------------<  127<H>  4.5   |  23  |  0.67    Ca    10.1      22 Sep 2017 08:51    CULTURES:    Physical Examination:  PULM: Without wheeze or rhonchi  CVS: Regular rate and rhythm, no murmurs, rubs, or gallops  ABD: Soft, non-tender  EXT:  No clubbing, cyanosis, or edema    RADIOLOGY REVIEWED  CXR:    CT chest:    TTE:
Patient is a 87y old  Female who presents with a chief complaint of s/p fall from shower chair, weakness, productive cough (20 Sep 2017 15:51)      SUBJECTIVE / OVERNIGHT EVENTS: no eevnts/complaints, cough present with mucoid sputum    MEDICATIONS  (STANDING):  gabapentin 100 milliGRAM(s) Oral two times a day  metoprolol succinate ER 25 milliGRAM(s) Oral daily  levothyroxine 75 MICROGram(s) Oral daily  cholecalciferol 1000 Unit(s) Oral daily  cyanocobalamin 1000 MICROGram(s) Oral daily  enoxaparin Injectable 40 milliGRAM(s) SubCutaneous every 24 hours  insulin lispro (HumaLOG) corrective regimen sliding scale   SubCutaneous three times a day before meals  dextrose 5%. 1000 milliLiter(s) (50 mL/Hr) IV Continuous <Continuous>  dextrose 50% Injectable 12.5 Gram(s) IV Push once  dextrose 50% Injectable 25 Gram(s) IV Push once  dextrose 50% Injectable 25 Gram(s) IV Push once  azithromycin  IVPB 500 milliGRAM(s) IV Intermittent every 24 hours  cefTRIAXone   IVPB 1 Gram(s) IV Intermittent every 24 hours  azithromycin  IVPB      influenza   Vaccine 0.5 milliLiter(s) IntraMuscular once  fluticasone propionate 50 MICROgram(s)/spray Nasal Spray 1 Spray(s) Both Nostrils two times a day    MEDICATIONS  (PRN):  dextrose Gel 1 Dose(s) Oral once PRN Blood Glucose LESS THAN 70 milliGRAM(s)/deciliter  glucagon  Injectable 1 milliGRAM(s) IntraMuscular once PRN Glucose LESS THAN 70 milligrams/deciliter  acetaminophen   Tablet. 650 milliGRAM(s) Oral every 6 hours PRN Moderate Pain (4 - 6)  guaiFENesin   Syrup  (Sugar-Free) 100 milliGRAM(s) Oral every 6 hours PRN Cough  melatonin 3 milliGRAM(s) Oral once PRN Insomnia      Vital Signs Last 24 Hrs  T(C): 36.6 (21 Sep 2017 14:57), Max: 36.8 (20 Sep 2017 23:37)  T(F): 97.8 (21 Sep 2017 14:57), Max: 98.2 (20 Sep 2017 23:37)  HR: 82 (21 Sep 2017 14:57) (68 - 88)  BP: 145/81 (21 Sep 2017 14:57) (104/75 - 145/81)  BP(mean): --  RR: 18 (21 Sep 2017 14:57) (18 - 18)  SpO2: 94% (21 Sep 2017 14:57) (93% - 96%)  CAPILLARY BLOOD GLUCOSE  133 (21 Sep 2017 16:52)  126 (21 Sep 2017 12:12)  127 (21 Sep 2017 08:32)  129 (20 Sep 2017 21:43)        I&O's Summary    20 Sep 2017 07:01  -  21 Sep 2017 07:00  --------------------------------------------------------  IN: 420 mL / OUT: 0 mL / NET: 420 mL        PHYSICAL EXAM:  GENERAL: NAD, obese  HEAD:  Atraumatic, Normocephalic  EYES: EOMI, PERRLA, conjunctiva and sclera clear  NECK: Supple, No JVD  CHEST/LUNG: Clear to auscultation bilaterally; No wheeze  HEART: Regular rate and rhythm; No murmurs, rubs, or gallops  ABDOMEN: Soft, Nontender, Nondistended; Bowel sounds present  EXTREMITIES:  2+ Peripheral Pulses, No clubbing, cyanosis, or edema  PSYCH: AAOx3  NEUROLOGY: non-focal  SKIN: No rashes or lesions    LABS:                        15.3   7.8   )-----------( 190      ( 20 Sep 2017 11:09 )             45.3     09-20    137  |  98  |  11  ----------------------------<  138<H>  4.4   |  26  |  0.92    Ca    10.4      20 Sep 2017 11:09    TPro  7.7  /  Alb  4.1  /  TBili  0.5  /  DBili  x   /  AST  20  /  ALT  17  /  AlkPhos  66  09-20          Urinalysis Basic - ( 20 Sep 2017 11:09 )    Color: Yellow / Appearance: Turbid / S.014 / pH: x  Gluc: x / Ketone: Negative  / Bili: Negative / Urobili: 2   Blood: x / Protein: 100 mg/dL / Nitrite: Negative   Leuk Esterase: Large / RBC: 10-25 /HPF / WBC >50 /HPF   Sq Epi: x / Non Sq Epi: OCC /HPF / Bacteria: Moderate /HPF        RADIOLOGY & ADDITIONAL TESTS:yes    Imaging Personally Reviewed:    Consultant(s) Notes Reviewed:  yes    Care Discussed with Consultants/Other Providers:
Patient is a 87y old  Female who presents with a chief complaint of s/p fall from shower chair, weakness, productive cough (22 Sep 2017 09:32)      SUBJECTIVE / OVERNIGHT EVENTS: events noted, was nauseous and dizzi feeling since yesterday, po intake minimal    MEDICATIONS  (STANDING):  gabapentin 100 milliGRAM(s) Oral two times a day  metoprolol succinate ER 25 milliGRAM(s) Oral daily  levothyroxine 75 MICROGram(s) Oral daily  cholecalciferol 1000 Unit(s) Oral daily  cyanocobalamin 1000 MICROGram(s) Oral daily  enoxaparin Injectable 40 milliGRAM(s) SubCutaneous every 24 hours  insulin lispro (HumaLOG) corrective regimen sliding scale   SubCutaneous three times a day before meals  dextrose 5%. 1000 milliLiter(s) (50 mL/Hr) IV Continuous <Continuous>  dextrose 50% Injectable 12.5 Gram(s) IV Push once  dextrose 50% Injectable 25 Gram(s) IV Push once  dextrose 50% Injectable 25 Gram(s) IV Push once  cefTRIAXone   IVPB 1 Gram(s) IV Intermittent every 24 hours  influenza   Vaccine 0.5 milliLiter(s) IntraMuscular once  fluticasone propionate 50 MICROgram(s)/spray Nasal Spray 1 Spray(s) Both Nostrils two times a day  azithromycin   Tablet 500 milliGRAM(s) Oral daily    MEDICATIONS  (PRN):  dextrose Gel 1 Dose(s) Oral once PRN Blood Glucose LESS THAN 70 milliGRAM(s)/deciliter  glucagon  Injectable 1 milliGRAM(s) IntraMuscular once PRN Glucose LESS THAN 70 milligrams/deciliter  acetaminophen   Tablet. 650 milliGRAM(s) Oral every 6 hours PRN Moderate Pain (4 - 6)  guaiFENesin   Syrup  (Sugar-Free) 100 milliGRAM(s) Oral every 6 hours PRN Cough      Vital Signs Last 24 Hrs  T(C): 36.4 (22 Sep 2017 16:38), Max: 37 (21 Sep 2017 21:07)  T(F): 97.5 (22 Sep 2017 16:38), Max: 98.6 (21 Sep 2017 21:07)  HR: 73 (22 Sep 2017 16:38) (64 - 88)  BP: 144/83 (22 Sep 2017 16:38) (110/73 - 164/90)  BP(mean): --  RR: 18 (22 Sep 2017 16:38) (18 - 18)  SpO2: 98% (22 Sep 2017 16:38) (94% - 98%)  CAPILLARY BLOOD GLUCOSE  126 (22 Sep 2017 16:55)  144 (22 Sep 2017 12:00)  128 (22 Sep 2017 07:50)  109 (21 Sep 2017 21:07)        I&O's Summary    21 Sep 2017 07:01  -  22 Sep 2017 07:00  --------------------------------------------------------  IN: 360 mL / OUT: 0 mL / NET: 360 mL    22 Sep 2017 07:01  -  22 Sep 2017 20:12  --------------------------------------------------------  IN: 350 mL / OUT: 0 mL / NET: 350 mL        PHYSICAL EXAM:  GENERAL: NAD, well-developed  HEAD:  Atraumatic, Normocephalic  EYES: EOMI, PERRLA, conjunctiva and sclera clear  NECK: Supple, No JVD  CHEST/LUNG: Clear to auscultation bilaterally; No wheeze  HEART: Regular rate and rhythm; No murmurs, rubs, or gallops  ABDOMEN: Soft, Nontender, Nondistended; Bowel sounds present  EXTREMITIES:  2+ Peripheral Pulses, No clubbing, cyanosis, or edema  PSYCH: AAOx3  NEUROLOGY: non-focal  SKIN: No rashes or lesions    LABS:                        14.8   4.6   )-----------( 186      ( 22 Sep 2017 08:51 )             43.3     09-22    137  |  102  |  10  ----------------------------<  127<H>  4.5   |  23  |  0.67    Ca    10.1      22 Sep 2017 08:51                RADIOLOGY & ADDITIONAL TESTS:yes    Imaging Personally Reviewed:    Consultant(s) Notes Reviewed:  yes    Care Discussed with Consultants/Other Providers:
Patient is a 87y old  Female who presents with a chief complaint of s/p fall from shower chair, weakness, productive cough (22 Sep 2017 09:32)      SUBJECTIVE / OVERNIGHT EVENTS:feels well    MEDICATIONS  (STANDING):  gabapentin 100 milliGRAM(s) Oral two times a day  metoprolol succinate ER 25 milliGRAM(s) Oral daily  levothyroxine 75 MICROGram(s) Oral daily  cholecalciferol 1000 Unit(s) Oral daily  cyanocobalamin 1000 MICROGram(s) Oral daily  enoxaparin Injectable 40 milliGRAM(s) SubCutaneous every 24 hours  dextrose 5%. 1000 milliLiter(s) (50 mL/Hr) IV Continuous <Continuous>  dextrose 50% Injectable 12.5 Gram(s) IV Push once  dextrose 50% Injectable 25 Gram(s) IV Push once  dextrose 50% Injectable 25 Gram(s) IV Push once  influenza   Vaccine 0.5 milliLiter(s) IntraMuscular once  fluticasone propionate 50 MICROgram(s)/spray Nasal Spray 1 Spray(s) Both Nostrils two times a day  docusate sodium 100 milliGRAM(s) Oral three times a day  senna 2 Tablet(s) Oral at bedtime  polyethylene glycol 3350 17 Gram(s) Oral daily  insulin lispro (HumaLOG) corrective regimen sliding scale   SubCutaneous Before meals and at bedtime    MEDICATIONS  (PRN):  dextrose Gel 1 Dose(s) Oral once PRN Blood Glucose LESS THAN 70 milliGRAM(s)/deciliter  glucagon  Injectable 1 milliGRAM(s) IntraMuscular once PRN Glucose LESS THAN 70 milligrams/deciliter  acetaminophen   Tablet. 650 milliGRAM(s) Oral every 6 hours PRN Moderate Pain (4 - 6)  guaiFENesin   Syrup  (Sugar-Free) 100 milliGRAM(s) Oral every 6 hours PRN Cough  lactulose Syrup 20 Gram(s) Oral daily PRN Constipation      Vital Signs Last 24 Hrs  T(C): 36.6 (25 Sep 2017 16:28), Max: 36.6 (25 Sep 2017 16:28)  T(F): 97.8 (25 Sep 2017 16:28), Max: 97.8 (25 Sep 2017 16:28)  HR: 62 (25 Sep 2017 16:28) (62 - 78)  BP: 134/70 (25 Sep 2017 16:28) (124/77 - 160/99)  BP(mean): --  RR: 20 (25 Sep 2017 16:28) (18 - 20)  SpO2: 93% (25 Sep 2017 16:28) (93% - 98%)  CAPILLARY BLOOD GLUCOSE  123 (25 Sep 2017 17:44)  112 (25 Sep 2017 12:04)  129 (25 Sep 2017 08:17)  118 (24 Sep 2017 21:07)        I&O's Summary    24 Sep 2017 07:01  -  25 Sep 2017 07:00  --------------------------------------------------------  IN: 2560 mL / OUT: 0 mL / NET: 2560 mL    25 Sep 2017 07:01  -  25 Sep 2017 19:07  --------------------------------------------------------  IN: 480 mL / OUT: 0 mL / NET: 480 mL        PHYSICAL EXAM:  GENERAL: NAD, well-developed, obese  HEAD:  Atraumatic, Normocephalic  EYES: EOMI, PERRLA, conjunctiva and sclera clear  NECK: Supple, No JVD  CHEST/LUNG: Clear to auscultation bilaterally; No wheeze  HEART: Regular rate and rhythm; No murmurs, rubs, or gallops  ABDOMEN: Soft, Nontender, Nondistended; Bowel sounds present  EXTREMITIES:  2+ Peripheral Pulses, No clubbing, cyanosis, or edema  PSYCH: AAOx3  NEUROLOGY: non-focal  SKIN: No rashes or lesions    LABS:                    RADIOLOGY & ADDITIONAL TESTS:yes    Imaging Personally Reviewed:    Consultant(s) Notes Reviewed:  yes    Care Discussed with Consultants/Other Providers:
Patient is a 87y old  Female who presents with a chief complaint of s/p fall from shower chair, weakness, productive cough (22 Sep 2017 09:32)      SUBJECTIVE / OVERNIGHT EVENTS:improved nausea and dizziness, tolerating po intae well, no BM yet    MEDICATIONS  (STANDING):  gabapentin 100 milliGRAM(s) Oral two times a day  metoprolol succinate ER 25 milliGRAM(s) Oral daily  levothyroxine 75 MICROGram(s) Oral daily  cholecalciferol 1000 Unit(s) Oral daily  cyanocobalamin 1000 MICROGram(s) Oral daily  enoxaparin Injectable 40 milliGRAM(s) SubCutaneous every 24 hours  dextrose 5%. 1000 milliLiter(s) (50 mL/Hr) IV Continuous <Continuous>  dextrose 50% Injectable 12.5 Gram(s) IV Push once  dextrose 50% Injectable 25 Gram(s) IV Push once  dextrose 50% Injectable 25 Gram(s) IV Push once  influenza   Vaccine 0.5 milliLiter(s) IntraMuscular once  fluticasone propionate 50 MICROgram(s)/spray Nasal Spray 1 Spray(s) Both Nostrils two times a day  sodium chloride 0.9%. 1000 milliLiter(s) (100 mL/Hr) IV Continuous <Continuous>  docusate sodium 100 milliGRAM(s) Oral three times a day  senna 2 Tablet(s) Oral at bedtime  polyethylene glycol 3350 17 Gram(s) Oral daily  insulin lispro (HumaLOG) corrective regimen sliding scale   SubCutaneous Before meals and at bedtime  mineral oil 30 milliLiter(s) Oral once    MEDICATIONS  (PRN):  dextrose Gel 1 Dose(s) Oral once PRN Blood Glucose LESS THAN 70 milliGRAM(s)/deciliter  glucagon  Injectable 1 milliGRAM(s) IntraMuscular once PRN Glucose LESS THAN 70 milligrams/deciliter  acetaminophen   Tablet. 650 milliGRAM(s) Oral every 6 hours PRN Moderate Pain (4 - 6)  guaiFENesin   Syrup  (Sugar-Free) 100 milliGRAM(s) Oral every 6 hours PRN Cough  melatonin 3 milliGRAM(s) Oral once PRN Insomnia  lactulose Syrup 20 Gram(s) Oral daily PRN Constipation      Vital Signs Last 24 Hrs  T(C): 36.6 (24 Sep 2017 16:12), Max: 36.7 (23 Sep 2017 22:02)  T(F): 97.8 (24 Sep 2017 16:12), Max: 98.1 (23 Sep 2017 22:02)  HR: 70 (24 Sep 2017 16:12) (63 - 75)  BP: 145/80 (24 Sep 2017 16:12) (145/77 - 152/59)  BP(mean): --  RR: 18 (24 Sep 2017 16:12) (18 - 18)  SpO2: 94% (24 Sep 2017 16:12) (94% - 97%)  CAPILLARY BLOOD GLUCOSE  129 (24 Sep 2017 17:02)  107 (24 Sep 2017 12:13)  131 (24 Sep 2017 08:20)  122 (23 Sep 2017 22:02)        I&O's Summary    23 Sep 2017 07:01  -  24 Sep 2017 07:00  --------------------------------------------------------  IN: 2180 mL / OUT: 0 mL / NET: 2180 mL    24 Sep 2017 07:01  -  24 Sep 2017 19:01  --------------------------------------------------------  IN: 1900 mL / OUT: 0 mL / NET: 1900 mL        PHYSICAL EXAM:  GENERAL: NAD, well-developed  HEAD:  Atraumatic, Normocephalic  EYES: EOMI, PERRLA, conjunctiva and sclera clear  NECK: Supple, No JVD  CHEST/LUNG: Clear to auscultation bilaterally; No wheeze  HEART: Regular rate and rhythm; No murmurs, rubs, or gallops  ABDOMEN: Soft, Nontender, Nondistended; Bowel sounds present  EXTREMITIES:  2+ Peripheral Pulses, No clubbing, cyanosis, or edema  PSYCH: AAOx3  NEUROLOGY: non-focal  SKIN: No rashes or lesions    LABS:                        14.9   4.8   )-----------( 174      ( 23 Sep 2017 12:07 )             42.5     09-23    133<L>  |  97  |  9   ----------------------------<  141<H>  4.6   |  23  |  0.60    Ca    9.7      23 Sep 2017 12:07    TPro  6.8  /  Alb  3.8  /  TBili  0.4  /  DBili  x   /  AST  22  /  ALT  20  /  AlkPhos  59  09-23      CARDIAC MARKERS ( 23 Sep 2017 12:07 )  x     / <0.01 ng/mL / 25 U/L / x     / 1.3 ng/mL          RADIOLOGY & ADDITIONAL TESTS:yes    Imaging Personally Reviewed:    Consultant(s) Notes Reviewed:  yes    Care Discussed with Consultants/Other Providers:
Patient is a 87y old  Female who presents with a chief complaint of s/p fall from shower chair, weakness, productive cough (22 Sep 2017 09:32)      SUBJECTIVE / OVERNIGHT EVENTS: c/o nausea, dizziness, unable to eat, cough better    MEDICATIONS  (STANDING):  gabapentin 100 milliGRAM(s) Oral two times a day  metoprolol succinate ER 25 milliGRAM(s) Oral daily  levothyroxine 75 MICROGram(s) Oral daily  cholecalciferol 1000 Unit(s) Oral daily  cyanocobalamin 1000 MICROGram(s) Oral daily  enoxaparin Injectable 40 milliGRAM(s) SubCutaneous every 24 hours  insulin lispro (HumaLOG) corrective regimen sliding scale   SubCutaneous three times a day before meals  dextrose 5%. 1000 milliLiter(s) (50 mL/Hr) IV Continuous <Continuous>  dextrose 50% Injectable 12.5 Gram(s) IV Push once  dextrose 50% Injectable 25 Gram(s) IV Push once  dextrose 50% Injectable 25 Gram(s) IV Push once  cefTRIAXone   IVPB 1 Gram(s) IV Intermittent every 24 hours  influenza   Vaccine 0.5 milliLiter(s) IntraMuscular once  fluticasone propionate 50 MICROgram(s)/spray Nasal Spray 1 Spray(s) Both Nostrils two times a day  sodium chloride 0.9%. 1000 milliLiter(s) (100 mL/Hr) IV Continuous <Continuous>  docusate sodium 100 milliGRAM(s) Oral three times a day  senna 2 Tablet(s) Oral at bedtime    MEDICATIONS  (PRN):  dextrose Gel 1 Dose(s) Oral once PRN Blood Glucose LESS THAN 70 milliGRAM(s)/deciliter  glucagon  Injectable 1 milliGRAM(s) IntraMuscular once PRN Glucose LESS THAN 70 milligrams/deciliter  acetaminophen   Tablet. 650 milliGRAM(s) Oral every 6 hours PRN Moderate Pain (4 - 6)  guaiFENesin   Syrup  (Sugar-Free) 100 milliGRAM(s) Oral every 6 hours PRN Cough  polyethylene glycol 3350 17 Gram(s) Oral daily PRN Constipation      Vital Signs Last 24 Hrs  T(C): 36.7 (23 Sep 2017 07:14), Max: 36.9 (22 Sep 2017 22:13)  T(F): 98.1 (23 Sep 2017 07:14), Max: 98.5 (22 Sep 2017 22:13)  HR: 71 (23 Sep 2017 07:14) (71 - 80)  BP: 145/82 (23 Sep 2017 07:14) (145/82 - 146/84)  BP(mean): --  RR: 18 (23 Sep 2017 07:14) (18 - 18)  SpO2: 92% (23 Sep 2017 07:14) (92% - 93%)  CAPILLARY BLOOD GLUCOSE  123 (23 Sep 2017 12:14)  124 (23 Sep 2017 07:59)  115 (22 Sep 2017 21:16)        I&O's Summary    22 Sep 2017 07:01  -  23 Sep 2017 07:00  --------------------------------------------------------  IN: 350 mL / OUT: 0 mL / NET: 350 mL    23 Sep 2017 07:01  -  23 Sep 2017 16:55  --------------------------------------------------------  IN: 500 mL / OUT: 0 mL / NET: 500 mL        PHYSICAL EXAM:  GENERAL: NAD, obese  HEAD:  Atraumatic, Normocephalic  EYES: EOMI, PERRLA, conjunctiva and sclera clear  NECK: Supple, No JVD  CHEST/LUNG: Clear to auscultation bilaterally; No wheeze  HEART: Regular rate and rhythm; No murmurs, rubs, or gallops  ABDOMEN: Soft, Nontender, Nondistended; Bowel sounds present  EXTREMITIES:  2+ Peripheral Pulses, No clubbing, cyanosis, or edema  PSYCH: AAOx3  NEUROLOGY: non-focal  SKIN: No rashes or lesions    LABS:                        14.9   4.8   )-----------( 174      ( 23 Sep 2017 12:07 )             42.5     09-23    133<L>  |  97  |  9   ----------------------------<  141<H>  4.6   |  23  |  0.60    Ca    9.7      23 Sep 2017 12:07    TPro  6.8  /  Alb  3.8  /  TBili  0.4  /  DBili  x   /  AST  22  /  ALT  20  /  AlkPhos  59  09-23      CARDIAC MARKERS ( 23 Sep 2017 12:07 )  x     / <0.01 ng/mL / 25 U/L / x     / 1.3 ng/mL          RADIOLOGY & ADDITIONAL TESTS:    Imaging Personally Reviewed:    Consultant(s) Notes Reviewed:      Care Discussed with Consultants/Other Providers:

## 2017-09-25 NOTE — PROGRESS NOTE ADULT - PROBLEM SELECTOR PLAN 3
- no acute fractures as per CT imaging  - pain control w/ Acetaminophen for now w/ reassessment

## 2017-09-25 NOTE — PROGRESS NOTE ADULT - PROBLEM SELECTOR PLAN 1
- positive UA/microscopy in the setting of episode of incontinence   - afebrile, no leukocytosis  - s/p 1 dose of Ceftriaxone in the ED, will continue 1g Q24H  - f/u UCx and BCx sent by ED
elvin liu  dc ceft 5/5 course completed
elvin liu  dc ceft 5/5 course completed
ucx ecoli  cont ceftriaxone
ucx ecoli  cont ceftriaxone 4/5

## 2017-09-25 NOTE — PROGRESS NOTE ADULT - PROBLEM SELECTOR PLAN 4
- in Afib, but rate controlled at this time  - continue home beta blocker
- in Afib, but rate controlled at this time  - not on AC given episode of vaginal bleeding in 2015  - continue home beta blocker

## 2017-09-25 NOTE — PROGRESS NOTE ADULT - PROVIDER SPECIALTY LIST ADULT
Internal Medicine
Pulmonology
Internal Medicine

## 2017-10-19 PROCEDURE — 82550 ASSAY OF CK (CPK): CPT

## 2017-10-19 PROCEDURE — 87633 RESP VIRUS 12-25 TARGETS: CPT

## 2017-10-19 PROCEDURE — 81001 URINALYSIS AUTO W/SCOPE: CPT

## 2017-10-19 PROCEDURE — 72125 CT NECK SPINE W/O DYE: CPT

## 2017-10-19 PROCEDURE — 99285 EMERGENCY DEPT VISIT HI MDM: CPT | Mod: 25

## 2017-10-19 PROCEDURE — 87486 CHLMYD PNEUM DNA AMP PROBE: CPT

## 2017-10-19 PROCEDURE — 93970 EXTREMITY STUDY: CPT

## 2017-10-19 PROCEDURE — 74018 RADEX ABDOMEN 1 VIEW: CPT

## 2017-10-19 PROCEDURE — 85014 HEMATOCRIT: CPT

## 2017-10-19 PROCEDURE — 84132 ASSAY OF SERUM POTASSIUM: CPT

## 2017-10-19 PROCEDURE — 82947 ASSAY GLUCOSE BLOOD QUANT: CPT

## 2017-10-19 PROCEDURE — 97162 PT EVAL MOD COMPLEX 30 MIN: CPT

## 2017-10-19 PROCEDURE — 87581 M.PNEUMON DNA AMP PROBE: CPT

## 2017-10-19 PROCEDURE — 97110 THERAPEUTIC EXERCISES: CPT

## 2017-10-19 PROCEDURE — 82803 BLOOD GASES ANY COMBINATION: CPT

## 2017-10-19 PROCEDURE — 51701 INSERT BLADDER CATHETER: CPT

## 2017-10-19 PROCEDURE — 82435 ASSAY OF BLOOD CHLORIDE: CPT

## 2017-10-19 PROCEDURE — 96374 THER/PROPH/DIAG INJ IV PUSH: CPT | Mod: XU

## 2017-10-19 PROCEDURE — 83605 ASSAY OF LACTIC ACID: CPT

## 2017-10-19 PROCEDURE — 87040 BLOOD CULTURE FOR BACTERIA: CPT

## 2017-10-19 PROCEDURE — 82330 ASSAY OF CALCIUM: CPT

## 2017-10-19 PROCEDURE — 85027 COMPLETE CBC AUTOMATED: CPT

## 2017-10-19 PROCEDURE — 97116 GAIT TRAINING THERAPY: CPT

## 2017-10-19 PROCEDURE — 84484 ASSAY OF TROPONIN QUANT: CPT

## 2017-10-19 PROCEDURE — 70450 CT HEAD/BRAIN W/O DYE: CPT

## 2017-10-19 PROCEDURE — 84295 ASSAY OF SERUM SODIUM: CPT

## 2017-10-19 PROCEDURE — 87798 DETECT AGENT NOS DNA AMP: CPT

## 2017-10-19 PROCEDURE — 80048 BASIC METABOLIC PNL TOTAL CA: CPT

## 2017-10-19 PROCEDURE — 82553 CREATINE MB FRACTION: CPT

## 2017-10-19 PROCEDURE — 93005 ELECTROCARDIOGRAM TRACING: CPT

## 2017-10-19 PROCEDURE — 83036 HEMOGLOBIN GLYCOSYLATED A1C: CPT

## 2017-10-19 PROCEDURE — 87086 URINE CULTURE/COLONY COUNT: CPT

## 2017-10-19 PROCEDURE — 71250 CT THORAX DX C-: CPT

## 2017-10-19 PROCEDURE — 80053 COMPREHEN METABOLIC PANEL: CPT

## 2017-10-19 PROCEDURE — 87186 SC STD MICRODIL/AGAR DIL: CPT

## 2017-10-19 PROCEDURE — 94640 AIRWAY INHALATION TREATMENT: CPT

## 2018-10-28 ENCOUNTER — INPATIENT (INPATIENT)
Facility: HOSPITAL | Age: 83
LOS: 4 days | Discharge: NURSING, REHAB OR LONG-TERM CARE | DRG: 445 | End: 2018-11-02
Attending: INTERNAL MEDICINE | Admitting: INTERNAL MEDICINE
Payer: MEDICARE

## 2018-10-28 VITALS
TEMPERATURE: 98 F | DIASTOLIC BLOOD PRESSURE: 87 MMHG | SYSTOLIC BLOOD PRESSURE: 158 MMHG | RESPIRATION RATE: 20 BRPM | WEIGHT: 250 LBS | HEART RATE: 97 BPM | OXYGEN SATURATION: 93 % | HEIGHT: 70 IN

## 2018-10-28 DIAGNOSIS — Z98.89 OTHER SPECIFIED POSTPROCEDURAL STATES: Chronic | ICD-10-CM

## 2018-10-28 DIAGNOSIS — Z96.651 PRESENCE OF RIGHT ARTIFICIAL KNEE JOINT: Chronic | ICD-10-CM

## 2018-10-28 LAB
ALBUMIN SERPL ELPH-MCNC: 4.2 G/DL — SIGNIFICANT CHANGE UP (ref 3.3–5)
ALP SERPL-CCNC: 74 U/L — SIGNIFICANT CHANGE UP (ref 40–120)
ALT FLD-CCNC: 12 U/L — SIGNIFICANT CHANGE UP (ref 10–45)
ANION GAP SERPL CALC-SCNC: 16 MMOL/L — SIGNIFICANT CHANGE UP (ref 5–17)
APPEARANCE UR: ABNORMAL
AST SERPL-CCNC: 12 U/L — SIGNIFICANT CHANGE UP (ref 10–40)
BACTERIA # UR AUTO: ABNORMAL
BASOPHILS # BLD AUTO: 0 K/UL — SIGNIFICANT CHANGE UP (ref 0–0.2)
BASOPHILS NFR BLD AUTO: 0.2 % — SIGNIFICANT CHANGE UP (ref 0–2)
BILIRUB SERPL-MCNC: 0.6 MG/DL — SIGNIFICANT CHANGE UP (ref 0.2–1.2)
BILIRUB UR-MCNC: NEGATIVE — SIGNIFICANT CHANGE UP
BUN SERPL-MCNC: 18 MG/DL — SIGNIFICANT CHANGE UP (ref 7–23)
CALCIUM SERPL-MCNC: 11.2 MG/DL — HIGH (ref 8.4–10.5)
CHLORIDE SERPL-SCNC: 100 MMOL/L — SIGNIFICANT CHANGE UP (ref 96–108)
CO2 SERPL-SCNC: 24 MMOL/L — SIGNIFICANT CHANGE UP (ref 22–31)
COLOR SPEC: YELLOW — SIGNIFICANT CHANGE UP
COMMENT - URINE: SIGNIFICANT CHANGE UP
CREAT SERPL-MCNC: 0.72 MG/DL — SIGNIFICANT CHANGE UP (ref 0.5–1.3)
DIFF PNL FLD: ABNORMAL
EOSINOPHIL # BLD AUTO: 0 K/UL — SIGNIFICANT CHANGE UP (ref 0–0.5)
EOSINOPHIL NFR BLD AUTO: 0.2 % — SIGNIFICANT CHANGE UP (ref 0–6)
EPI CELLS # UR: 2 /HPF — SIGNIFICANT CHANGE UP
GAS PNL BLDV: SIGNIFICANT CHANGE UP
GAS PNL BLDV: SIGNIFICANT CHANGE UP
GLUCOSE SERPL-MCNC: 179 MG/DL — HIGH (ref 70–99)
GLUCOSE UR QL: NEGATIVE — SIGNIFICANT CHANGE UP
HCT VFR BLD CALC: 46.6 % — HIGH (ref 34.5–45)
HGB BLD-MCNC: 15.4 G/DL — SIGNIFICANT CHANGE UP (ref 11.5–15.5)
KETONES UR-MCNC: SIGNIFICANT CHANGE UP
LEUKOCYTE ESTERASE UR-ACNC: ABNORMAL
LYMPHOCYTES # BLD AUTO: 1 K/UL — SIGNIFICANT CHANGE UP (ref 1–3.3)
LYMPHOCYTES # BLD AUTO: 7.4 % — LOW (ref 13–44)
MCHC RBC-ENTMCNC: 31.7 PG — SIGNIFICANT CHANGE UP (ref 27–34)
MCHC RBC-ENTMCNC: 33.1 GM/DL — SIGNIFICANT CHANGE UP (ref 32–36)
MCV RBC AUTO: 95.8 FL — SIGNIFICANT CHANGE UP (ref 80–100)
MONOCYTES # BLD AUTO: 0.7 K/UL — SIGNIFICANT CHANGE UP (ref 0–0.9)
MONOCYTES NFR BLD AUTO: 5.2 % — SIGNIFICANT CHANGE UP (ref 2–14)
NEUTROPHILS # BLD AUTO: 11.5 K/UL — HIGH (ref 1.8–7.4)
NEUTROPHILS NFR BLD AUTO: 87 % — HIGH (ref 43–77)
NITRITE UR-MCNC: POSITIVE
PH UR: 7 — SIGNIFICANT CHANGE UP (ref 5–8)
PLATELET # BLD AUTO: 174 K/UL — SIGNIFICANT CHANGE UP (ref 150–400)
POTASSIUM SERPL-MCNC: 4.8 MMOL/L — SIGNIFICANT CHANGE UP (ref 3.5–5.3)
POTASSIUM SERPL-SCNC: 4.8 MMOL/L — SIGNIFICANT CHANGE UP (ref 3.5–5.3)
PROT SERPL-MCNC: 7.9 G/DL — SIGNIFICANT CHANGE UP (ref 6–8.3)
PROT UR-MCNC: ABNORMAL
RBC # BLD: 4.86 M/UL — SIGNIFICANT CHANGE UP (ref 3.8–5.2)
RBC # FLD: 12.7 % — SIGNIFICANT CHANGE UP (ref 10.3–14.5)
RBC CASTS # UR COMP ASSIST: 20 /HPF — HIGH (ref 0–4)
SODIUM SERPL-SCNC: 140 MMOL/L — SIGNIFICANT CHANGE UP (ref 135–145)
SP GR SPEC: >1.05 (ref 1.01–1.02)
TROPONIN T, HIGH SENSITIVITY RESULT: 13 NG/L — SIGNIFICANT CHANGE UP (ref 0–51)
TROPONIN T, HIGH SENSITIVITY RESULT: 14 NG/L — SIGNIFICANT CHANGE UP (ref 0–51)
UROBILINOGEN FLD QL: NEGATIVE — SIGNIFICANT CHANGE UP
WBC # BLD: 13.2 K/UL — HIGH (ref 3.8–10.5)
WBC # FLD AUTO: 13.2 K/UL — HIGH (ref 3.8–10.5)
WBC UR QL: 1810 /HPF — HIGH (ref 0–5)

## 2018-10-28 PROCEDURE — 74174 CTA ABD&PLVS W/CONTRAST: CPT | Mod: 26

## 2018-10-28 PROCEDURE — 99285 EMERGENCY DEPT VISIT HI MDM: CPT | Mod: 25

## 2018-10-28 PROCEDURE — 93010 ELECTROCARDIOGRAM REPORT: CPT

## 2018-10-28 RX ORDER — ONDANSETRON 8 MG/1
4 TABLET, FILM COATED ORAL ONCE
Qty: 0 | Refills: 0 | Status: COMPLETED | OUTPATIENT
Start: 2018-10-28 | End: 2018-10-28

## 2018-10-28 RX ORDER — FAMOTIDINE 10 MG/ML
20 INJECTION INTRAVENOUS ONCE
Qty: 0 | Refills: 0 | Status: COMPLETED | OUTPATIENT
Start: 2018-10-28 | End: 2018-10-28

## 2018-10-28 RX ORDER — ACETAMINOPHEN 500 MG
1000 TABLET ORAL ONCE
Qty: 0 | Refills: 0 | Status: COMPLETED | OUTPATIENT
Start: 2018-10-28 | End: 2018-10-28

## 2018-10-28 RX ORDER — SODIUM CHLORIDE 9 MG/ML
500 INJECTION INTRAMUSCULAR; INTRAVENOUS; SUBCUTANEOUS ONCE
Qty: 0 | Refills: 0 | Status: COMPLETED | OUTPATIENT
Start: 2018-10-28 | End: 2018-10-28

## 2018-10-28 RX ORDER — MORPHINE SULFATE 50 MG/1
4 CAPSULE, EXTENDED RELEASE ORAL ONCE
Qty: 0 | Refills: 0 | Status: DISCONTINUED | OUTPATIENT
Start: 2018-10-28 | End: 2018-10-28

## 2018-10-28 RX ORDER — PIPERACILLIN AND TAZOBACTAM 4; .5 G/20ML; G/20ML
3.38 INJECTION, POWDER, LYOPHILIZED, FOR SOLUTION INTRAVENOUS ONCE
Qty: 0 | Refills: 0 | Status: COMPLETED | OUTPATIENT
Start: 2018-10-28 | End: 2018-10-28

## 2018-10-28 RX ORDER — SODIUM CHLORIDE 9 MG/ML
1000 INJECTION INTRAMUSCULAR; INTRAVENOUS; SUBCUTANEOUS ONCE
Qty: 0 | Refills: 0 | Status: COMPLETED | OUTPATIENT
Start: 2018-10-28 | End: 2018-10-28

## 2018-10-28 RX ADMIN — SODIUM CHLORIDE 500 MILLILITER(S): 9 INJECTION INTRAMUSCULAR; INTRAVENOUS; SUBCUTANEOUS at 19:43

## 2018-10-28 RX ADMIN — SODIUM CHLORIDE 1000 MILLILITER(S): 9 INJECTION INTRAMUSCULAR; INTRAVENOUS; SUBCUTANEOUS at 16:47

## 2018-10-28 RX ADMIN — Medication 1000 MILLIGRAM(S): at 17:02

## 2018-10-28 RX ADMIN — Medication 400 MILLIGRAM(S): at 16:47

## 2018-10-28 RX ADMIN — MORPHINE SULFATE 4 MILLIGRAM(S): 50 CAPSULE, EXTENDED RELEASE ORAL at 19:43

## 2018-10-28 RX ADMIN — FAMOTIDINE 20 MILLIGRAM(S): 10 INJECTION INTRAVENOUS at 16:48

## 2018-10-28 RX ADMIN — PIPERACILLIN AND TAZOBACTAM 200 GRAM(S): 4; .5 INJECTION, POWDER, LYOPHILIZED, FOR SOLUTION INTRAVENOUS at 22:21

## 2018-10-28 RX ADMIN — SODIUM CHLORIDE 1000 MILLILITER(S): 9 INJECTION INTRAMUSCULAR; INTRAVENOUS; SUBCUTANEOUS at 19:41

## 2018-10-28 RX ADMIN — SODIUM CHLORIDE 500 MILLILITER(S): 9 INJECTION INTRAMUSCULAR; INTRAVENOUS; SUBCUTANEOUS at 20:45

## 2018-10-28 RX ADMIN — ONDANSETRON 4 MILLIGRAM(S): 8 TABLET, FILM COATED ORAL at 16:48

## 2018-10-28 RX ADMIN — SODIUM CHLORIDE 500 MILLILITER(S): 9 INJECTION INTRAMUSCULAR; INTRAVENOUS; SUBCUTANEOUS at 18:57

## 2018-10-28 RX ADMIN — SODIUM CHLORIDE 500 MILLILITER(S): 9 INJECTION INTRAMUSCULAR; INTRAVENOUS; SUBCUTANEOUS at 19:05

## 2018-10-28 RX ADMIN — MORPHINE SULFATE 4 MILLIGRAM(S): 50 CAPSULE, EXTENDED RELEASE ORAL at 20:43

## 2018-10-28 NOTE — ED PROVIDER NOTE - PHYSICAL EXAMINATION
GEN: Obese Female in NAD, A&O x3.   EYES: Sclera white w/o injection, PERRLA.   ENT: Head NCAT. Nose without deformity, turbinates without edema or erythema, no DC. No auricular TTP. Mouth and pharynx without erythema or exudates, mucous membranes dry, uvula midline, no tonsillar enlargement. Neck supple FROM.   RESP: No chest wall tenderness, CTA b/l, no wheezes, rales, or rhonchi.   CARDIAC: RRR, clear distinct S1, S2, no S3, S4, murmurs, gallops, or rubs.   ABD: Abdomen protuberant, NABS all 4 quadrants, soft, small umbilical hernia non-tender easily reducible, TTP RUQ mild TTP epigastric area, no rebound or guarding. No CVAT b/l.  VASC: 2+ carotid, radial, and dorsalis pedis pulses b/l. No edema or tenderness of the lower extremities.  SKIN: No rashes on the trunk. GEN: Obese Female in NAD, A&O x3.   EYES: Sclera white w/o injection, PERRLA.   ENT: Head NCAT. Nose without deformity, turbinates without edema or erythema, no DC. No auricular TTP. Mouth and pharynx without erythema or exudates, mucous membranes dry, uvula midline, no tonsillar enlargement. Neck supple FROM.   RESP: No chest wall tenderness, CTA b/l, no wheezes, rales, or rhonchi.   CARDIAC: Irregular rhythm, normal rate, clear distinct S1, S2, no S3, S4, murmurs, gallops, or rubs.   ABD: Abdomen protuberant, NABS all 4 quadrants, soft, small umbilical hernia non-tender easily reducible, TTP RUQ mild TTP epigastric area, no rebound or guarding. No CVAT b/l.  VASC: 2+ carotid, radial, and dorsalis pedis pulses b/l. No edema or tenderness of the lower extremities.  SKIN: No rashes on the trunk.

## 2018-10-28 NOTE — ED ADULT NURSE REASSESSMENT NOTE - NS ED NURSE REASSESS COMMENT FT1
Straight cath performed with 2 RN's present while using sterile technique as per MD order. Bladder drained 300cc clear yellow urine.

## 2018-10-28 NOTE — ED ADULT NURSE NOTE - OBJECTIVE STATEMENT
Patient   is  alert  and oriented  x3.  Color is  good  and  skin warm to touch.   She  is  c/o mid  abdominal  pain  and  nausea.  She  denies  vomiting  or  constipation.

## 2018-10-28 NOTE — ED PROVIDER NOTE - OBJECTIVE STATEMENT
87 y/o F w/ PMHx of Afib (non-compliant with Eliquis), HTN, T2DM (on metformin), hypothyroidism, hiatal hernia, peripheral neuropathy, and recurrent UTIs on chronic daily Nitrofurantoin, who presents from her assisted living facility with son c/o upper abdo pain since last night. Pain began after eating spicy chinese food and ice cream, constant, dull with intermittent sharpness, pt unable to rate on scale of 0-10 but states it is severe, radiates occasional up patients sternum to the throat, a/w nausea and lethargy. Pt states she was feeling better this morning and tried to eat a small breakfast with worsening pain. Pt denies f/c, SOB, palpitations, CP, vomiting, diarrhea, constipation, melena, hematochezia, dysuria, hematuria, rash, prior episodes. Pt is passing gas, still has gallbladder.  PCP/Cardio: Dr. Ángel Hernandez [phone]  GI: Dr. Ivan Reyes [phone] -- Seen previously for reflux has not seen in many year

## 2018-10-28 NOTE — ED ADULT NURSE NOTE - NSIMPLEMENTINTERV_GEN_ALL_ED
Implemented All Fall with Harm Risk Interventions:  [place] to call system. Call bell, personal items and telephone within reach. Instruct patient to call for assistance. Room bathroom lighting operational. Non-slip footwear when patient is off stretcher. Physically safe environment: no spills, clutter or unnecessary equipment. Stretcher in lowest position, wheels locked, appropriate side rails in place. Provide visual cue, wrist band, yellow gown, etc. Monitor gait and stability. Monitor for mental status changes and reorient to person, place, and time. Review medications for side effects contributing to fall risk. Reinforce activity limits and safety measures with patient and family. Provide visual clues: red socks.

## 2018-10-28 NOTE — ED ADULT NURSE NOTE - ED STAT RN HANDOFF DETAILS
report received from Courtney HERRERA. Pt currently resting in bed comfortably, VSS, pending CT results

## 2018-10-28 NOTE — ED PROVIDER NOTE - CARE PLAN
Assessment and plan of treatment:	DO NOT TAKE METFORMIN for 48 hours as you were given IV contrast. Principal Discharge DX:	Acute cholecystitis  Assessment and plan of treatment:	DO NOT TAKE METFORMIN for 48 hours as you were given IV contrast.

## 2018-10-28 NOTE — ED PROVIDER NOTE - MEDICAL DECISION MAKING DETAILS
Attending Jose Rafael Long DO: 89 yo female with complex past medical history presents with epigastric and ruq pain. See HPI. PE: well appearing but uncomfortable due to pain, ab soft, +RUQ and epigastric ttp, no rebound or guarding. A/P: Will obtain labs, analgesia, ct ab/pel, ekg, reevaluate.

## 2018-10-28 NOTE — ED PROVIDER NOTE - PROGRESS NOTE DETAILS
attending Kira: pt signed out to me by Dr. Long at usual time of shift change pending CTA. CTA read still not up- study performed >3 hours ago. Prelim read from radiology resident obtained via phone- prelim negative for mesenteric ischemia. Will repeat vbg and wait for final CT read. attending Kira: surgery consulted for possible acute cholecystitis on CT. Zosyn and RUQ US ordered. attending Kira: pt was at ultrasound but refused study. Lengthy discussion regarding need for study.  Pt has capacity to refuse. Pt and son very frustrated about long wait. pt does not want ultrasound until being seen by surgical team. Surgery made aware. attending Kira: evaluated by surgical resident. pt now agreeable to ultrasound. US tech aware. JOSEPH Pate MD : pt endorsed to me pending US and surg eval - surg saw - pt and family declining surgical intervention at this time. pt has capacity for decision making at this time. surg will continue to follow. will admit to med.

## 2018-10-28 NOTE — ED ADULT NURSE REASSESSMENT NOTE - NS ED NURSE REASSESS COMMENT FT1
RN provided comfort care, a pillow, cleaned patient and changed sheets. RN and MD educated family member and pt on the plan of care. Pt refused ultrasound screaming 'leave me alone, I want to go home, I do not want any more tests'. MD aware.

## 2018-10-29 DIAGNOSIS — E11.9 TYPE 2 DIABETES MELLITUS WITHOUT COMPLICATIONS: ICD-10-CM

## 2018-10-29 DIAGNOSIS — E83.52 HYPERCALCEMIA: ICD-10-CM

## 2018-10-29 DIAGNOSIS — K81.0 ACUTE CHOLECYSTITIS: ICD-10-CM

## 2018-10-29 DIAGNOSIS — E03.9 HYPOTHYROIDISM, UNSPECIFIED: ICD-10-CM

## 2018-10-29 DIAGNOSIS — R82.90 UNSPECIFIED ABNORMAL FINDINGS IN URINE: ICD-10-CM

## 2018-10-29 DIAGNOSIS — I48.91 UNSPECIFIED ATRIAL FIBRILLATION: ICD-10-CM

## 2018-10-29 DIAGNOSIS — I10 ESSENTIAL (PRIMARY) HYPERTENSION: ICD-10-CM

## 2018-10-29 DIAGNOSIS — G62.9 POLYNEUROPATHY, UNSPECIFIED: ICD-10-CM

## 2018-10-29 DIAGNOSIS — Z29.9 ENCOUNTER FOR PROPHYLACTIC MEASURES, UNSPECIFIED: ICD-10-CM

## 2018-10-29 LAB
24R-OH-CALCIDIOL SERPL-MCNC: 35 NG/ML — SIGNIFICANT CHANGE UP (ref 30–80)
ANION GAP SERPL CALC-SCNC: 13 MMOL/L — SIGNIFICANT CHANGE UP (ref 5–17)
APTT BLD: 26.8 SEC — LOW (ref 27.5–37.4)
BLD GP AB SCN SERPL QL: NEGATIVE — SIGNIFICANT CHANGE UP
BUN SERPL-MCNC: 13 MG/DL — SIGNIFICANT CHANGE UP (ref 7–23)
CALCIUM SERPL-MCNC: 9.5 MG/DL — SIGNIFICANT CHANGE UP (ref 8.4–10.5)
CALCIUM SERPL-MCNC: 9.9 MG/DL — SIGNIFICANT CHANGE UP (ref 8.4–10.5)
CHLORIDE SERPL-SCNC: 101 MMOL/L — SIGNIFICANT CHANGE UP (ref 96–108)
CO2 SERPL-SCNC: 23 MMOL/L — SIGNIFICANT CHANGE UP (ref 22–31)
CREAT SERPL-MCNC: 0.64 MG/DL — SIGNIFICANT CHANGE UP (ref 0.5–1.3)
GLUCOSE BLDC GLUCOMTR-MCNC: 153 MG/DL — HIGH (ref 70–99)
GLUCOSE BLDC GLUCOMTR-MCNC: 168 MG/DL — HIGH (ref 70–99)
GLUCOSE SERPL-MCNC: 190 MG/DL — HIGH (ref 70–99)
HCT VFR BLD CALC: 43.5 % — SIGNIFICANT CHANGE UP (ref 34.5–45)
HGB BLD-MCNC: 14.5 G/DL — SIGNIFICANT CHANGE UP (ref 11.5–15.5)
INR BLD: 1.17 RATIO — HIGH (ref 0.88–1.16)
LIDOCAIN IGE QN: 12 U/L — SIGNIFICANT CHANGE UP (ref 7–60)
MCHC RBC-ENTMCNC: 32.6 PG — SIGNIFICANT CHANGE UP (ref 27–34)
MCHC RBC-ENTMCNC: 33.3 GM/DL — SIGNIFICANT CHANGE UP (ref 32–36)
MCV RBC AUTO: 97.8 FL — SIGNIFICANT CHANGE UP (ref 80–100)
PLATELET # BLD AUTO: 239 K/UL — SIGNIFICANT CHANGE UP (ref 150–400)
POTASSIUM SERPL-MCNC: 4 MMOL/L — SIGNIFICANT CHANGE UP (ref 3.5–5.3)
POTASSIUM SERPL-SCNC: 4 MMOL/L — SIGNIFICANT CHANGE UP (ref 3.5–5.3)
PROTHROM AB SERPL-ACNC: 13.3 SEC — HIGH (ref 10–13.1)
PTH-INTACT FLD-MCNC: 93 PG/ML — HIGH (ref 15–65)
RBC # BLD: 4.45 M/UL — SIGNIFICANT CHANGE UP (ref 3.8–5.2)
RBC # FLD: 13.7 % — SIGNIFICANT CHANGE UP (ref 10.3–14.5)
RH IG SCN BLD-IMP: POSITIVE — SIGNIFICANT CHANGE UP
SODIUM SERPL-SCNC: 137 MMOL/L — SIGNIFICANT CHANGE UP (ref 135–145)
WBC # BLD: 16.96 K/UL — HIGH (ref 3.8–10.5)
WBC # FLD AUTO: 16.96 K/UL — HIGH (ref 3.8–10.5)

## 2018-10-29 PROCEDURE — 99223 1ST HOSP IP/OBS HIGH 75: CPT

## 2018-10-29 PROCEDURE — 76705 ECHO EXAM OF ABDOMEN: CPT | Mod: 26,RT

## 2018-10-29 RX ORDER — DEXTROSE 50 % IN WATER 50 %
25 SYRINGE (ML) INTRAVENOUS ONCE
Qty: 0 | Refills: 0 | Status: DISCONTINUED | OUTPATIENT
Start: 2018-10-29 | End: 2018-11-02

## 2018-10-29 RX ORDER — LEVOTHYROXINE SODIUM 125 MCG
75 TABLET ORAL DAILY
Qty: 0 | Refills: 0 | Status: DISCONTINUED | OUTPATIENT
Start: 2018-10-29 | End: 2018-11-02

## 2018-10-29 RX ORDER — GABAPENTIN 400 MG/1
200 CAPSULE ORAL
Qty: 0 | Refills: 0 | Status: DISCONTINUED | OUTPATIENT
Start: 2018-10-29 | End: 2018-11-02

## 2018-10-29 RX ORDER — DEXTROSE 50 % IN WATER 50 %
12.5 SYRINGE (ML) INTRAVENOUS ONCE
Qty: 0 | Refills: 0 | Status: DISCONTINUED | OUTPATIENT
Start: 2018-10-29 | End: 2018-11-02

## 2018-10-29 RX ORDER — SODIUM CHLORIDE 9 MG/ML
1000 INJECTION, SOLUTION INTRAVENOUS
Qty: 0 | Refills: 0 | Status: DISCONTINUED | OUTPATIENT
Start: 2018-10-29 | End: 2018-11-02

## 2018-10-29 RX ORDER — GLUCAGON INJECTION, SOLUTION 0.5 MG/.1ML
1 INJECTION, SOLUTION SUBCUTANEOUS ONCE
Qty: 0 | Refills: 0 | Status: DISCONTINUED | OUTPATIENT
Start: 2018-10-29 | End: 2018-11-02

## 2018-10-29 RX ORDER — PIPERACILLIN AND TAZOBACTAM 4; .5 G/20ML; G/20ML
3.38 INJECTION, POWDER, LYOPHILIZED, FOR SOLUTION INTRAVENOUS EVERY 8 HOURS
Qty: 0 | Refills: 0 | Status: DISCONTINUED | OUTPATIENT
Start: 2018-10-29 | End: 2018-11-02

## 2018-10-29 RX ORDER — INFLUENZA VIRUS VACCINE 15; 15; 15; 15 UG/.5ML; UG/.5ML; UG/.5ML; UG/.5ML
0.5 SUSPENSION INTRAMUSCULAR ONCE
Qty: 0 | Refills: 0 | Status: DISCONTINUED | OUTPATIENT
Start: 2018-10-29 | End: 2018-11-02

## 2018-10-29 RX ORDER — METOPROLOL TARTRATE 50 MG
0.5 TABLET ORAL
Qty: 0 | Refills: 0 | COMMUNITY

## 2018-10-29 RX ORDER — INSULIN LISPRO 100/ML
VIAL (ML) SUBCUTANEOUS EVERY 6 HOURS
Qty: 0 | Refills: 0 | Status: DISCONTINUED | OUTPATIENT
Start: 2018-10-29 | End: 2018-11-02

## 2018-10-29 RX ORDER — ACETAMINOPHEN 500 MG
650 TABLET ORAL EVERY 6 HOURS
Qty: 0 | Refills: 0 | Status: DISCONTINUED | OUTPATIENT
Start: 2018-10-29 | End: 2018-11-02

## 2018-10-29 RX ORDER — SODIUM CHLORIDE 9 MG/ML
1000 INJECTION INTRAMUSCULAR; INTRAVENOUS; SUBCUTANEOUS
Qty: 0 | Refills: 0 | Status: DISCONTINUED | OUTPATIENT
Start: 2018-10-29 | End: 2018-10-31

## 2018-10-29 RX ORDER — METOPROLOL TARTRATE 50 MG
25 TABLET ORAL DAILY
Qty: 0 | Refills: 0 | Status: DISCONTINUED | OUTPATIENT
Start: 2018-10-29 | End: 2018-11-02

## 2018-10-29 RX ORDER — DEXTROSE 50 % IN WATER 50 %
15 SYRINGE (ML) INTRAVENOUS ONCE
Qty: 0 | Refills: 0 | Status: DISCONTINUED | OUTPATIENT
Start: 2018-10-29 | End: 2018-11-02

## 2018-10-29 RX ORDER — PREGABALIN 225 MG/1
1000 CAPSULE ORAL DAILY
Qty: 0 | Refills: 0 | Status: DISCONTINUED | OUTPATIENT
Start: 2018-10-29 | End: 2018-11-02

## 2018-10-29 RX ORDER — CETIRIZINE HYDROCHLORIDE 10 MG/1
1 TABLET ORAL
Qty: 0 | Refills: 0 | COMMUNITY

## 2018-10-29 RX ADMIN — Medication 650 MILLIGRAM(S): at 22:41

## 2018-10-29 RX ADMIN — Medication 75 MICROGRAM(S): at 05:36

## 2018-10-29 RX ADMIN — GABAPENTIN 200 MILLIGRAM(S): 400 CAPSULE ORAL at 05:36

## 2018-10-29 RX ADMIN — SODIUM CHLORIDE 50 MILLILITER(S): 9 INJECTION INTRAMUSCULAR; INTRAVENOUS; SUBCUTANEOUS at 05:36

## 2018-10-29 RX ADMIN — PIPERACILLIN AND TAZOBACTAM 25 GRAM(S): 4; .5 INJECTION, POWDER, LYOPHILIZED, FOR SOLUTION INTRAVENOUS at 13:04

## 2018-10-29 RX ADMIN — GABAPENTIN 200 MILLIGRAM(S): 400 CAPSULE ORAL at 22:01

## 2018-10-29 RX ADMIN — Medication 650 MILLIGRAM(S): at 22:00

## 2018-10-29 RX ADMIN — PIPERACILLIN AND TAZOBACTAM 25 GRAM(S): 4; .5 INJECTION, POWDER, LYOPHILIZED, FOR SOLUTION INTRAVENOUS at 05:36

## 2018-10-29 RX ADMIN — Medication 25 MILLIGRAM(S): at 05:36

## 2018-10-29 RX ADMIN — Medication 650 MILLIGRAM(S): at 07:31

## 2018-10-29 RX ADMIN — Medication 650 MILLIGRAM(S): at 05:35

## 2018-10-29 RX ADMIN — PIPERACILLIN AND TAZOBACTAM 25 GRAM(S): 4; .5 INJECTION, POWDER, LYOPHILIZED, FOR SOLUTION INTRAVENOUS at 22:00

## 2018-10-29 NOTE — H&P ADULT - NSHPREVIEWOFSYSTEMS_GEN_ALL_CORE
Patient declined to provide review of systems because she is angry that she does not have a bed yet.

## 2018-10-29 NOTE — H&P ADULT - NSHPOUTPATIENTPROVIDERS_GEN_ALL_CORE
PCP/cardiologist: Dr. Ángel Hernandez  Endocrinologist: Dr. Christopher Infante  OB Gyn: Dr. Yanet Nelson  Gastroenterologist/Hepatologist: Dr. Ivan Reyes

## 2018-10-29 NOTE — H&P ADULT - PROBLEM SELECTOR PLAN 2
It is unclear why this patient is not on anticoagulation given her CHADSVASC score of 5.   Start home dose of metoprolol tartrate.  Patient is currently rate controlled.

## 2018-10-29 NOTE — H&P ADULT - HISTORY OF PRESENT ILLNESS
This patient is an 87yoF with PMH of afib, htn, T2DM, hypothyroidism, neuropathy, recurrent UTIs who presents to ED from assisted living facility for complaint of abdominal pain. History obtained from previous documentation from ED and surgical team because patient declined to speak to me, yelling that she was angry that she did not have a bed yet. Epigastric abdominal pain started after patient ate spicy Chinese food and ice cream. Pain was constant and radiated upwards. Pain was described as dullness, with intermittent sharpness, and severe. She denied any fever, chills, N/V, diarrhea or constipation. Pt was passing gas.     In the ED T 98.2, HR 97, /87, RR 20, SpO2 93% RA   Patient was given acetaminophen 1g, zosyn 3.375g IV x1, 2L NS. Pt also given famotidine 20mg IVP, morphine 4mg IVP, zofran 4mg IVP.    Patient declined to provide review of systems, family history, social history, and declined physical exam because she is angry that she does not have a bed yet. This patient is an 88yoF with PMH of afib, htn, T2DM, hypothyroidism, neuropathy, recurrent UTIs who presents to ED from assisted living facility for complaint of abdominal pain. History obtained from previous documentation from ED and surgical team because patient declined to speak to me, yelling that she was angry that she did not have a bed yet. Epigastric abdominal pain started after patient ate spicy Chinese food and ice cream. Pain was constant and radiated upwards. Pain was described as dullness, with intermittent sharpness, and severe. She denied any fever, chills, N/V, diarrhea or constipation. Pt was passing gas.     In the ED T 98.2, HR 97, /87, RR 20, SpO2 93% RA   Patient was given acetaminophen 1g, zosyn 3.375g IV x1, 2L NS. Pt also given famotidine 20mg IVP, morphine 4mg IVP, zofran 4mg IVP.    Patient declined to provide review of systems, family history, social history, and declined physical exam because she is angry that she does not have a bed yet.

## 2018-10-29 NOTE — H&P ADULT - PROBLEM SELECTOR PLAN 4
Patient has abnormal UA, and has history of chronic UTIs, with evidence of bladder wall thickening on CT which may represent cystitis.   Patient takes nitrofurantoin 50mg PO qd at home for prophylaxis.  Will hold nitrofurantoin for now while patient is receiving zosyn. Patient takes metformin 1000mg BID at home for T2DM. Will hold oral hypoglycemic agent.  Monitor fingerstick glucose. Start sliding scale insulin q6h while NPO.

## 2018-10-29 NOTE — H&P ADULT - NSHPLABSRESULTS_GEN_ALL_CORE
Labs, EKG and imaging personally reviewed by me.   EKG atrial fibrillation HR 90s, PVCs  LABS:                        15.4   13.2  )-----------( 174      ( 28 Oct 2018 16:39 )             46.6     Hgb Trend: 15.4<--  10-28    140  |  100  |  18  ----------------------------<  179<H>  4.8   |  24  |  0.72    Ca    11.2<H>      28 Oct 2018 16:39    TPro  7.9  /  Alb  4.2  /  TBili  0.6  /  DBili  x   /  AST  12  /  ALT  12  /  AlkPhos  74  10-28    Creatinine Trend: 0.72<--    Urinalysis Basic - ( 28 Oct 2018 22:49 )    Color: Yellow / Appearance: Turbid / SG: >1.050 / pH: x  Gluc: x / Ketone: Trace  / Bili: Negative / Urobili: Negative   Blood: x / Protein: 100 mg/dL / Nitrite: Positive   Leuk Esterase: Large / RBC: 20 /hpf / WBC 1810 /hpf   Sq Epi: x / Non Sq Epi: 2 /hpf / Bacteria: Many    Venous Blood Gas:  10-28 @ 22:12  7.31/58/22/28/28  VBG Lactate: 2.8  Venous Blood Gas:  10-28 @ 16:39  7.36/55/22/31/29  VBG Lactate: 2.8    < from: US Abdomen Upper Quadrant Right (10.29.18 @ 00:59) >  FINDINGS:  Liver: 17.4 cm. Diffusely increased echodensity, likely fatty infiltration.  Bile ducts: Normal caliber. Visualized common bile duct measures 4 mm.   Gallbladder: Cholelithiasis. Gallbladder wall thickening (0.6 cm). Trace pericholecystic fluid. Positive sonographic Snyder's sign.  Pancreas: Poorly visualized due to bowel gas.  Right kidney: 10.6 cm. No hydronephrosis. A 2.1 x 2.0 x 2.0 cm cyst in the interpolar region.  Ascites: None.  IVC: Visualized portions are within normal limits.    IMPRESSION: Sonographic findings of acute cholecystitis. Poorly visualized pancreas.  < end of copied text >    < from: CT Angio Abdomen and Pelvis w/ IV Cont (10.28.18 @ 18:51) >    FINDINGS: Artifact from the patient's arms degrading images.    LOWER CHEST: Subsegmental atelectasis, especially at the left lung base.   Stable heart size. Coronary artery calcification.    LIVER: Heterogeneous arterial enhancement near the gallbladder fossa, which normalizes on the delayed phase.  BILE DUCTS: Normal caliber.  GALLBLADDER: Distended gallbladder with suggestion of wall edema.   SPLEEN: Within normal limits.  PANCREAS: Within normal limits.    ADRENALS: Unremarkable.  KIDNEYS/URETERS: No hydronephrosis, hydroureter or significant perinephric stranding. No obvious radiopaque urinary tract stone. Right renal cysts measuring up to 2.8 x 2.3 cm. Subcentimeter hypodense foci in the kidneys, too small to characterize.  BLADDER: Partially distended. Bladder wall thickening/enhancement.  REPRODUCTIVE ORGANS: Small calcification in the uterus, which may be related to fibroid. No adnexal mass.    BOWEL: Again noted, large hiatal hernia. No bowel obstruction. The appendix not visualized, but no secondary signs of appendicitis. No significant bowel wall thickening or inflammatory change. Colon diverticulosis.  PERITONEUM: No drainable fluid collection or free air.  VESSELS: Atherosclerotic change of the abdominal aorta and its branches with ostial atheromatous plaques and ostial narrowing. Patent mesenteric vessels although distal branches is beyond resolution of routine CTA technique.   RETROPERITONEUM: No lymphadenopathy.    ABDOMINAL WALL/SOFT TISSUES: Right buttock injection granuloma. Small fat-containing umbilical hernia.  BONES: Leftward curvature and degenerative changes of the spine.     IMPRESSION:     Patent mesenteric vessels although distal branches is beyond resolution of routine CTA technique. No secondary signs of mesenteric ischemia.    Distended gallbladder with suggestion of wall edema. Recommend clinical correlation and additional imaging if there is clinical suspicion for acute cholecystitis.    Nonspecific wall thickening/enhancement of the urinary bladder. Recommend correlation with urinalysis to assess urinary tract infection.    Additional findings as described.    < end of copied text >

## 2018-10-29 NOTE — H&P ADULT - PROBLEM SELECTOR PLAN 6
Patient takes metoprolol tartrate 25mg PO qd for htn.   BP is currently WNL.  Will start home dose of metoprolol. Start home dose of levothyroxine 75ug qd.

## 2018-10-29 NOTE — ED ADULT NURSE REASSESSMENT NOTE - NS ED NURSE REASSESS COMMENT FT1
Pt a&ox3, denies pain, states 'I hate you all, got to hell, I want a more comfortable bed'. RN offered patient more pillows and blankets to become more comfortable in bed. Pt screamed profanities at nurse and stated 'this is a horrible hospital, you are horrible, everyone is horrible'. Miladis Perkins RN and Kimi Huerta RN provided pillows, blankets, socks, a boost in bed, and explained plan of care to patient. Pt currently sleeping in bed comfortably with son at bedside.

## 2018-10-29 NOTE — H&P ADULT - PROBLEM SELECTOR PLAN 1
Patient has CT and US evidence of acute cholecystitis. Per surgical team, patient had declined surgical intervention.   Will continue non-operative management at this time.  Will continue zosyn 3.375g IV q8h. Monitor VS q4h  Will start IVNS at 50 cc/hr.   Trend CBC and BMP  Appreciate Green Team Surgery recommendations.   Will perform assessment for medical optimization if patient elects to pursue surgical intervention and permits evaluation.

## 2018-10-29 NOTE — H&P ADULT - PROBLEM SELECTOR PLAN 3
Patient takes metformin 1000mg BID at home for T2DM. Will hold oral hypoglycemic agent.  Monitor fingerstick glucose. Start sliding scale insulin q6h while NPO. Calcium elevated at 11.2.   Repeat calcium level. Check PTH. Calcium elevated at 11.2.   Repeat calcium level. Check PTH and vitamin D level

## 2018-10-29 NOTE — H&P ADULT - PROBLEM SELECTOR PLAN 5
Start home dose of levothyroxine 75ug qd. Patient has abnormal UA, and has history of chronic UTIs, with evidence of bladder wall thickening on CT which may represent cystitis.   Patient takes nitrofurantoin 50mg PO qd at home for prophylaxis.  Will hold nitrofurantoin for now while patient is receiving zosyn.

## 2018-10-29 NOTE — H&P ADULT - PROBLEM SELECTOR PLAN 8
Venodyne boots for now.   Hold pharmacologic prophylaxis for now given that the patient's decision regarding surgery may change in response to changes in clinical status. Patient takes gabapentin 100mg 2 capsules BID at home.   Will start gabapentin at home dose as tolerated.

## 2018-10-29 NOTE — H&P ADULT - PROBLEM SELECTOR PLAN 9
Venodyne boots for now.   Hold pharmacologic prophylaxis for now given that the patient's decision regarding surgery may change in response to changes in clinical status.

## 2018-10-29 NOTE — H&P ADULT - PROBLEM SELECTOR PLAN 7
Patient takes gabapentin 100mg 2 capsules BID at home.   Will start gabapentin at home dose as tolerated. Patient takes metoprolol tartrate 25mg PO qd for htn.   BP is currently WNL.  Will start home dose of metoprolol.

## 2018-10-30 ENCOUNTER — TRANSCRIPTION ENCOUNTER (OUTPATIENT)
Age: 83
End: 2018-10-30

## 2018-10-30 DIAGNOSIS — Z01.810 ENCOUNTER FOR PREPROCEDURAL CARDIOVASCULAR EXAMINATION: ICD-10-CM

## 2018-10-30 LAB
-  AMIKACIN: SIGNIFICANT CHANGE UP
-  AMOXICILLIN/CLAVULANIC ACID: SIGNIFICANT CHANGE UP
-  AMPICILLIN/SULBACTAM: SIGNIFICANT CHANGE UP
-  AMPICILLIN: SIGNIFICANT CHANGE UP
-  AZTREONAM: SIGNIFICANT CHANGE UP
-  CEFAZOLIN: SIGNIFICANT CHANGE UP
-  CEFEPIME: SIGNIFICANT CHANGE UP
-  CEFOXITIN: SIGNIFICANT CHANGE UP
-  CEFTRIAXONE: SIGNIFICANT CHANGE UP
-  CIPROFLOXACIN: SIGNIFICANT CHANGE UP
-  ERTAPENEM: SIGNIFICANT CHANGE UP
-  GENTAMICIN: SIGNIFICANT CHANGE UP
-  IMIPENEM: SIGNIFICANT CHANGE UP
-  LEVOFLOXACIN: SIGNIFICANT CHANGE UP
-  MEROPENEM: SIGNIFICANT CHANGE UP
-  NITROFURANTOIN: SIGNIFICANT CHANGE UP
-  PIPERACILLIN/TAZOBACTAM: SIGNIFICANT CHANGE UP
-  TIGECYCLINE: SIGNIFICANT CHANGE UP
-  TOBRAMYCIN: SIGNIFICANT CHANGE UP
-  TRIMETHOPRIM/SULFAMETHOXAZOLE: SIGNIFICANT CHANGE UP
ANION GAP SERPL CALC-SCNC: 12 MMOL/L — SIGNIFICANT CHANGE UP (ref 5–17)
BASOPHILS # BLD AUTO: 0.03 K/UL — SIGNIFICANT CHANGE UP (ref 0–0.2)
BASOPHILS NFR BLD AUTO: 0.1 % — SIGNIFICANT CHANGE UP (ref 0–2)
BUN SERPL-MCNC: 14 MG/DL — SIGNIFICANT CHANGE UP (ref 7–23)
CALCIUM SERPL-MCNC: 9.7 MG/DL — SIGNIFICANT CHANGE UP (ref 8.4–10.5)
CHLORIDE SERPL-SCNC: 97 MMOL/L — SIGNIFICANT CHANGE UP (ref 96–108)
CO2 SERPL-SCNC: 25 MMOL/L — SIGNIFICANT CHANGE UP (ref 22–31)
CREAT SERPL-MCNC: 0.69 MG/DL — SIGNIFICANT CHANGE UP (ref 0.5–1.3)
CULTURE RESULTS: SIGNIFICANT CHANGE UP
EOSINOPHIL # BLD AUTO: 0.06 K/UL — SIGNIFICANT CHANGE UP (ref 0–0.5)
EOSINOPHIL NFR BLD AUTO: 0.3 % — SIGNIFICANT CHANGE UP (ref 0–6)
GLUCOSE BLDC GLUCOMTR-MCNC: 130 MG/DL — HIGH (ref 70–99)
GLUCOSE BLDC GLUCOMTR-MCNC: 134 MG/DL — HIGH (ref 70–99)
GLUCOSE BLDC GLUCOMTR-MCNC: 142 MG/DL — HIGH (ref 70–99)
GLUCOSE BLDC GLUCOMTR-MCNC: 146 MG/DL — HIGH (ref 70–99)
GLUCOSE BLDC GLUCOMTR-MCNC: 150 MG/DL — HIGH (ref 70–99)
GLUCOSE BLDC GLUCOMTR-MCNC: 158 MG/DL — HIGH (ref 70–99)
GLUCOSE BLDC GLUCOMTR-MCNC: 168 MG/DL — HIGH (ref 70–99)
GLUCOSE SERPL-MCNC: 151 MG/DL — HIGH (ref 70–99)
HBA1C BLD-MCNC: 6.1 % — HIGH (ref 4–5.6)
HCT VFR BLD CALC: 41.7 % — SIGNIFICANT CHANGE UP (ref 34.5–45)
HGB BLD-MCNC: 14.3 G/DL — SIGNIFICANT CHANGE UP (ref 11.5–15.5)
IMM GRANULOCYTES NFR BLD AUTO: 0.6 % — SIGNIFICANT CHANGE UP (ref 0–1.5)
LYMPHOCYTES # BLD AUTO: 0.93 K/UL — LOW (ref 1–3.3)
LYMPHOCYTES # BLD AUTO: 4.6 % — LOW (ref 13–44)
MANUAL SMEAR VERIFICATION: SIGNIFICANT CHANGE UP
MCHC RBC-ENTMCNC: 32.6 PG — SIGNIFICANT CHANGE UP (ref 27–34)
MCHC RBC-ENTMCNC: 34.3 GM/DL — SIGNIFICANT CHANGE UP (ref 32–36)
MCV RBC AUTO: 95.2 FL — SIGNIFICANT CHANGE UP (ref 80–100)
METHOD TYPE: SIGNIFICANT CHANGE UP
MONOCYTES # BLD AUTO: 1.61 K/UL — HIGH (ref 0–0.9)
MONOCYTES NFR BLD AUTO: 8 % — SIGNIFICANT CHANGE UP (ref 2–14)
NEUTROPHILS # BLD AUTO: 17.43 K/UL — HIGH (ref 1.8–7.4)
NEUTROPHILS NFR BLD AUTO: 86.4 % — HIGH (ref 43–77)
ORGANISM # SPEC MICROSCOPIC CNT: SIGNIFICANT CHANGE UP
ORGANISM # SPEC MICROSCOPIC CNT: SIGNIFICANT CHANGE UP
PLAT MORPH BLD: NORMAL — SIGNIFICANT CHANGE UP
PLATELET # BLD AUTO: 191 K/UL — SIGNIFICANT CHANGE UP (ref 150–400)
POTASSIUM SERPL-MCNC: 3.7 MMOL/L — SIGNIFICANT CHANGE UP (ref 3.5–5.3)
POTASSIUM SERPL-SCNC: 3.7 MMOL/L — SIGNIFICANT CHANGE UP (ref 3.5–5.3)
RBC # BLD: 4.38 M/UL — SIGNIFICANT CHANGE UP (ref 3.8–5.2)
RBC # FLD: 14 % — SIGNIFICANT CHANGE UP (ref 10.3–14.5)
RBC BLD AUTO: SIGNIFICANT CHANGE UP
SODIUM SERPL-SCNC: 134 MMOL/L — LOW (ref 135–145)
SPECIMEN SOURCE: SIGNIFICANT CHANGE UP
WBC # BLD: 20.19 K/UL — HIGH (ref 3.8–10.5)
WBC # FLD AUTO: 20.19 K/UL — HIGH (ref 3.8–10.5)

## 2018-10-30 RX ORDER — SODIUM CHLORIDE 9 MG/ML
1000 INJECTION, SOLUTION INTRAVENOUS
Qty: 0 | Refills: 0 | Status: DISCONTINUED | OUTPATIENT
Start: 2018-10-30 | End: 2018-11-02

## 2018-10-30 RX ORDER — LANOLIN ALCOHOL/MO/W.PET/CERES
3 CREAM (GRAM) TOPICAL ONCE
Qty: 0 | Refills: 0 | Status: COMPLETED | OUTPATIENT
Start: 2018-10-30 | End: 2018-10-30

## 2018-10-30 RX ORDER — SIMETHICONE 80 MG/1
80 TABLET, CHEWABLE ORAL ONCE
Qty: 0 | Refills: 0 | Status: COMPLETED | OUTPATIENT
Start: 2018-10-30 | End: 2018-10-30

## 2018-10-30 RX ADMIN — Medication 650 MILLIGRAM(S): at 21:50

## 2018-10-30 RX ADMIN — Medication 75 MICROGRAM(S): at 06:52

## 2018-10-30 RX ADMIN — SODIUM CHLORIDE 50 MILLILITER(S): 9 INJECTION, SOLUTION INTRAVENOUS at 21:18

## 2018-10-30 RX ADMIN — GABAPENTIN 200 MILLIGRAM(S): 400 CAPSULE ORAL at 10:05

## 2018-10-30 RX ADMIN — PIPERACILLIN AND TAZOBACTAM 25 GRAM(S): 4; .5 INJECTION, POWDER, LYOPHILIZED, FOR SOLUTION INTRAVENOUS at 06:52

## 2018-10-30 RX ADMIN — Medication 3 MILLIGRAM(S): at 21:15

## 2018-10-30 RX ADMIN — SIMETHICONE 80 MILLIGRAM(S): 80 TABLET, CHEWABLE ORAL at 22:18

## 2018-10-30 RX ADMIN — Medication 650 MILLIGRAM(S): at 22:20

## 2018-10-30 RX ADMIN — PIPERACILLIN AND TAZOBACTAM 25 GRAM(S): 4; .5 INJECTION, POWDER, LYOPHILIZED, FOR SOLUTION INTRAVENOUS at 15:03

## 2018-10-30 RX ADMIN — PREGABALIN 1000 MICROGRAM(S): 225 CAPSULE ORAL at 11:38

## 2018-10-30 RX ADMIN — GABAPENTIN 200 MILLIGRAM(S): 400 CAPSULE ORAL at 21:16

## 2018-10-30 RX ADMIN — Medication 1 TABLET(S): at 11:37

## 2018-10-30 RX ADMIN — Medication 25 MILLIGRAM(S): at 06:52

## 2018-10-30 RX ADMIN — PIPERACILLIN AND TAZOBACTAM 25 GRAM(S): 4; .5 INJECTION, POWDER, LYOPHILIZED, FOR SOLUTION INTRAVENOUS at 21:16

## 2018-10-30 RX ADMIN — SODIUM CHLORIDE 50 MILLILITER(S): 9 INJECTION, SOLUTION INTRAVENOUS at 03:30

## 2018-10-30 NOTE — DISCHARGE NOTE ADULT - MEDICATION SUMMARY - MEDICATIONS TO TAKE
I will START or STAY ON the medications listed below when I get home from the hospital:    Flagyl 500 mg oral tablet  -- 1 tab(s) by mouth 3 times a day  x 14 days  -- Indication: For Cholecystitis    gabapentin 100 mg oral capsule  -- 2 cap(s) by mouth 2 times a day  -- Indication: For neuropathy    insulin lispro 100 units/mL subcutaneous solution  -- sliding  scale   1 Unit(s) if Glucose 151 - 200  2 Unit(s) if Glucose 201 - 250  3 Unit(s) if Glucose 251 - 300  4 Unit(s) if Glucose 301 - 350  5 Unit(s) if Glucose 351 - 400  6 Unit(s) if Glucose Greater Than 400  -- Indication: For Diabetes mellitus, type II    Lopressor  -- 25 milligram(s) by mouth once a day  -- Indication: For High BP    cranberry oral tablet  -- 2 tab(s) by mouth 2 times a day  -- Indication: For supplement    Slow Release Iron (as elemental iron) 45 mg oral tablet, extended release  -- 1 tab(s) by mouth once a day  -- Indication: For Anemia    ciprofloxacin 500 mg oral tablet  -- 1 tab(s) by mouth every 12 hours  x 14 days  -- Indication: For Cholecystitis    levothyroxine 75 mcg (0.075 mg) oral tablet  -- 1 tab(s) by mouth once a day  -- Indication: For Hypothyroidism    nitrofurantoin macrocrystals 50 mg oral capsule  -- 1 cap(s) by mouth once a day  -- Indication: For UTI prevention    Centrum Silver oral tablet  -- 1 tab(s) by mouth once a day  -- Indication: For supplement    cyanocobalamin 1000 mcg oral tablet  -- 1 tab(s) by mouth once a day  -- Indication: For supplement    Vitamin D3 1000 intl units oral tablet  -- 1 tab(s) by mouth once a day  -- Indication: For supplement

## 2018-10-30 NOTE — DISCHARGE NOTE ADULT - PLAN OF CARE
gall bladder inflammation resolves Cipro and flagyl for 2 week course cont metoprolol tartrate.  Patient is currently rate controlled. HgA1C this admission  6.2  Make sure you get your HgA1c checked every three months.  If you take oral diabetes medications, check your blood glucose two times a day.  If you take insulin, check your blood glucose before meals and at bedtime.  It's important not to skip any meals.  Keep a log of your blood glucose results and always take it with you to your doctor appointments.  Keep a list of your current medications including injectables and over the counter medications and bring this medication list with you to all your doctor appointments.  If you have not seen your ophthalmologist this year call for appointment.  Check your feet daily for redness, sores, or openings. Do not self treat. If no improvement in two days call your primary care physician for an appointment.  Low blood sugar (hypoglycemia) is a blood sugar below 70mg/dl. Check your blood sugar if you feel signs/symptoms of hypoglycemia. If your blood sugar is below 70 take 15 grams of carbohydrates (ex 4 oz of apple juice, 3-4 glucose tablets, or 4-6 oz of regular soda) wait 15 minutes and repeat blood sugar to make sure it comes up above 70.  If your blood sugar is above 70 and you are due for a meal, have a meal.  If you are not due for a meal have a snack.  This snack helps keeps your blood sugar at a safe range. Oupt follow up Patient has abnormal UA, and has history of chronic UTIs, with evidence of bladder wall thickening on CT which may represent cystitis.   Patient takes nitrofurantoin 50mg PO qd at home for prophylaxis. can resume on DC. continue metoprolol continue  Synthroid

## 2018-10-30 NOTE — DISCHARGE NOTE ADULT - SECONDARY DIAGNOSIS.
Chronic atrial fibrillation Diabetes mellitus, type II Hypercalcemia Abnormal urinalysis Essential hypertension Hypothyroidism

## 2018-10-30 NOTE — DISCHARGE NOTE ADULT - MEDICATION SUMMARY - MEDICATIONS TO STOP TAKING
I will STOP taking the medications listed below when I get home from the hospital:    ZyrTEC 10 mg oral tablet  -- 1 tab(s) by mouth once a day    metFORMIN 1000 mg oral tablet  -- 1 tab(s) by mouth 2 times a day

## 2018-10-30 NOTE — DISCHARGE NOTE ADULT - HOSPITAL COURSE
87yoF with PMH of afib, htn, T2DM, hypothyroidism, neuropathy, recurrent UTIs who presents to ED from assisted living facility for complaint of abdominal pain, found to have acute cholecystitis.  : Patient has CT and US evidence of acute cholecystitis. GI, surgery on board; Kept NPO & IV Abx Zosyn. ;  patient continued to change her mind regarding surgical intervention with son present during decision making  regarding options including surgery and drainage and they are refusing both. they are aware of risk of worsening infection, perforation and possible readmission with septic shock that could lead to death.  pt refused endoscopic drainage, son in agreement; as leukocytosis improving and afebrile,  advanced  diet to low fat diet: Changed zosyn to Cipro and flagyl for 2 week course; PT recs STR, Discharged to [facility] with GI and surgery follow up.

## 2018-10-30 NOTE — PROGRESS NOTE ADULT - SUBJECTIVE AND OBJECTIVE BOX
RUQ abdominal soreness.  Afebrile.  No other complaints.    PAST MEDICAL & SURGICAL HISTORY:  Spinal stenosis of lumbar region  Hiatal hernia  Diabetes mellitus, type II  Atrial fibrillation  H/O: GI Bleed  Overactive Bladder  Uterine Polyp  Peripheral Neuropathy  Degenerative Joint Disease  Obesity  Hypothyroidism  Hypertension  S/P rotator cuff repair: right  S/P knee replacement, right      MEDICATIONS  (STANDING):  cyanocobalamin 1000 MICROGram(s) Oral daily  dextrose 5%. 1000 milliLiter(s) (50 mL/Hr) IV Continuous <Continuous>  dextrose 50% Injectable 12.5 Gram(s) IV Push once  dextrose 50% Injectable 25 Gram(s) IV Push once  dextrose 50% Injectable 25 Gram(s) IV Push once  gabapentin 200 milliGRAM(s) Oral two times a day  influenza   Vaccine 0.5 milliLiter(s) IntraMuscular once  insulin lispro (HumaLOG) corrective regimen sliding scale   SubCutaneous every 6 hours  levothyroxine 75 MICROGram(s) Oral daily  metoprolol tartrate 25 milliGRAM(s) Oral daily  multivitamin 1 Tablet(s) Oral daily  piperacillin/tazobactam IVPB. 3.375 Gram(s) IV Intermittent every 8 hours  sodium chloride 0.9%. 1000 milliLiter(s) (50 mL/Hr) IV Continuous <Continuous>    MEDICATIONS  (PRN):  acetaminophen   Tablet .. 650 milliGRAM(s) Oral every 6 hours PRN Mild Pain (1 - 3), Moderate Pain (4 - 6), Severe Pain (7 - 10)  dextrose 40% Gel 15 Gram(s) Oral once PRN Blood Glucose LESS THAN 70 milliGRAM(s)/deciliter  glucagon  Injectable 1 milliGRAM(s) IntraMuscular once PRN Glucose LESS THAN 70 milligrams/deciliter      Allergies    aspirin (Unknown)    Intolerances        Review of Systems:    General:  No wt loss, fevers, chills, night sweats,fatigue,   CV:  No pain, palpitatioins, hypo/hypertension  Resp:  No dyspnea, cough, tachypnea, wheezing  GI:see HPI  :  No pain, bleeding, incontinence, nocturia  Muscle:  No pain, weakness  Neuro:  No weakness, tingling, memory problems  Psych:  No fatigue, insomnia, mood problems, depression  Endocrine:  No polyuria, polydypsia, cold/heat intolerance  Heme:  No petechiae, ecchymosis, easy bruisability  Skin:  No rash, tattoos, scars, edema      Vital Signs Last 24 Hrs  T(C): 37.2 (30 Oct 2018 04:43), Max: 37.2 (30 Oct 2018 04:43)  T(F): 99 (30 Oct 2018 04:43), Max: 99 (30 Oct 2018 04:43)  HR: 96 (30 Oct 2018 04:43) (80 - 96)  BP: 133/84 (30 Oct 2018 04:43) (112/71 - 133/84)  BP(mean): --  RR: 20 (30 Oct 2018 04:43) (20 - 20)  SpO2: 93% (30 Oct 2018 04:43) (93% - 96%)  PHYSICAL EXAM:    Constitutional: NAD, well-developed, obese  Neck: No LAD, supple  Respiratory: CTA and P  Cardiovascular: S1 and S2, RRR, no M  Gastrointestinal: BS+, soft, TTP in upper abdomen  Extremities: No peripheral edema, neg clubing, cyanosis  Vascular: 2+ peripheral pulses  Neurological: A/O x 3, no focal deficits  Psychiatric: Normal mood, normal affect  Skin: No rashes        LABS:                        14.3   20.19 )-----------( 191      ( 30 Oct 2018 08:01 )             41.7     10-30    134<L>  |  97  |  14  ----------------------------<  151<H>  3.7   |  25  |  0.69    Ca    9.7      30 Oct 2018 06:42    TPro  7.9  /  Alb  4.2  /  TBili  0.6  /  DBili  x   /  AST  12  /  ALT  12  /  AlkPhos  74  10-28    LIVER FUNCTIONS - ( 28 Oct 2018 16:39 )  Alb: 4.2 g/dL / Pro: 7.9 g/dL / ALK PHOS: 74 U/L / ALT: 12 U/L / AST: 12 U/L / GGT: x           PT/INR - ( 29 Oct 2018 09:29 )   PT: 13.3 sec;   INR: 1.17 ratio         PTT - ( 29 Oct 2018 09:29 )  PTT:26.8 sec  Urinalysis Basic - ( 28 Oct 2018 22:49 )    Color: Yellow / Appearance: Turbid / SG: >1.050 / pH: x  Gluc: x / Ketone: Trace  / Bili: Negative / Urobili: Negative   Blood: x / Protein: 100 mg/dL / Nitrite: Positive   Leuk Esterase: Large / RBC: 20 /hpf / WBC 1810 /hpf   Sq Epi: x / Non Sq Epi: 2 /hpf / Bacteria: Many                    RADIOLOGY & ADDITIONAL TESTS:

## 2018-10-30 NOTE — PROGRESS NOTE ADULT - ASSESSMENT
88 yo F w/ acute cholecystitis  Agree w/ IV antibiotics and bowel rest  Discussed options with patient including medical mgmt, cholecystectomy, IR cholecystostomy, endoscopic cholecystoduodenostomy.  Patient agreeable to discuss again with surgery for cholecystectomy.  Will follow  [phone]

## 2018-10-30 NOTE — PROGRESS NOTE ADULT - ASSESSMENT
ASSESSMENT: Patient is a 88y old f with acute cholecystitis    PLAN:    - NPO  - IV abx  - Recommended OR for laparoscopic cholecystectomy for definitive treatment of cholecystitis.  Patient and son (Jose Kay, HCP) are currently refusing surgical intervention, and would prefer non-operative management  - Rec. medical assessment for Optimization for OR and risk stratification  - Surgery will follow if patient and son agrees to cholecystectomy.      Mary Lara, PGY-1   Green Team Surgery, #[id number]

## 2018-10-30 NOTE — DISCHARGE NOTE ADULT - CARE PROVIDER_API CALL
Carlos Gonzáles), Internal Medicine  [address] [place] [address]  Phone: [phone]  Fax: [phone]    Young Mccoy), ColonRectal Surgery; Surgery  [address] [facility]  [address] [place] [id number]  Phone: [phone]  Fax: [phone]

## 2018-10-30 NOTE — DISCHARGE NOTE ADULT - CARE PLAN
Principal Discharge DX:	Acute cholecystitis  Goal:	gall bladder inflammation resolves  Assessment and plan of treatment:	Cipro and flagyl for 2 week course  Secondary Diagnosis:	Chronic atrial fibrillation  Assessment and plan of treatment:	cont metoprolol tartrate.  Patient is currently rate controlled.  Secondary Diagnosis:	Diabetes mellitus, type II  Assessment and plan of treatment:	HgA1C this admission  6.2  Make sure you get your HgA1c checked every three months.  If you take oral diabetes medications, check your blood glucose two times a day.  If you take insulin, check your blood glucose before meals and at bedtime.  It's important not to skip any meals.  Keep a log of your blood glucose results and always take it with you to your doctor appointments.  Keep a list of your current medications including injectables and over the counter medications and bring this medication list with you to all your doctor appointments.  If you have not seen your ophthalmologist this year call for appointment.  Check your feet daily for redness, sores, or openings. Do not self treat. If no improvement in two days call your primary care physician for an appointment.  Low blood sugar (hypoglycemia) is a blood sugar below 70mg/dl. Check your blood sugar if you feel signs/symptoms of hypoglycemia. If your blood sugar is below 70 take 15 grams of carbohydrates (ex 4 oz of apple juice, 3-4 glucose tablets, or 4-6 oz of regular soda) wait 15 minutes and repeat blood sugar to make sure it comes up above 70.  If your blood sugar is above 70 and you are due for a meal, have a meal.  If you are not due for a meal have a snack.  This snack helps keeps your blood sugar at a safe range.  Secondary Diagnosis:	Hypercalcemia  Assessment and plan of treatment:	Oupt follow up  Secondary Diagnosis:	Abnormal urinalysis  Assessment and plan of treatment:	Patient has abnormal UA, and has history of chronic UTIs, with evidence of bladder wall thickening on CT which may represent cystitis.   Patient takes nitrofurantoin 50mg PO qd at home for prophylaxis. can resume on DC.  Secondary Diagnosis:	Essential hypertension  Assessment and plan of treatment:	continue metoprolol  Secondary Diagnosis:	Hypothyroidism  Assessment and plan of treatment:	continue  Synthroid

## 2018-10-30 NOTE — PROGRESS NOTE ADULT - ASSESSMENT
This patient is an 87yoF with PMH of afib, htn, T2DM, hypothyroidism, neuropathy, recurrent UTIs who presents to ED from assisted living facility for complaint of abdominal pain, found to have acute cholecystitis, declining surgical intervention.

## 2018-10-30 NOTE — DISCHARGE NOTE ADULT - PATIENT PORTAL LINK FT
You can access the [facility] Patient Portal, offered by [facility], by registering with the following website: [email or web address]

## 2018-10-30 NOTE — CONSULT NOTE ADULT - PROBLEM SELECTOR RECOMMENDATION 9
-For cholecystectomy- patient is considered atleast INTERMEDIATE RISK for MODERATE risk procedure. She would be considered lower risk for percutaneous drainage  -Recommendation is to proceed with any of the procedures as patient is not interested in further risk stratification procedures such as stress testing.   -She also does not want to be on anticoagulation for stroke prevention in setting of atrial fibrillation  -She is otherwise medically optimized from cardiac perspective.     Husam Burrell D.O.  [phone]

## 2018-10-30 NOTE — PROGRESS NOTE ADULT - PROBLEM SELECTOR PLAN 1
Patient has CT and US evidence of acute cholecystitis. Per surgical team, patient had declined surgical intervention.   this AM-pt and son agreeable for Sx  surgery team aware  cont ivf, iv zosyn, npo  cards cs appreciated-pt medically optimized for OR  pt intermediate risk for mod risk procedure

## 2018-10-30 NOTE — PROGRESS NOTE ADULT - SUBJECTIVE AND OBJECTIVE BOX
Surgery Progress Note    S: Patient seen and examined. No acute events overnight. Patient reports that pain has resolved. Denies nausea or vomiting. Passing flatus.     O:  Vital Signs Last 24 Hrs  T(C): 37.2 (30 Oct 2018 04:43), Max: 37.2 (30 Oct 2018 04:43)  T(F): 99 (30 Oct 2018 04:43), Max: 99 (30 Oct 2018 04:43)  HR: 96 (30 Oct 2018 04:43) (80 - 96)  BP: 133/84 (30 Oct 2018 04:43) (112/71 - 133/84)  BP(mean): --  RR: 20 (30 Oct 2018 04:43) (20 - 20)  SpO2: 93% (30 Oct 2018 04:43) (93% - 96%)    I&O's Detail      MEDICATIONS  (STANDING):  cyanocobalamin 1000 MICROGram(s) Oral daily  dextrose 5% + sodium chloride 0.9%. 1000 milliLiter(s) (50 mL/Hr) IV Continuous <Continuous>  dextrose 5%. 1000 milliLiter(s) (50 mL/Hr) IV Continuous <Continuous>  dextrose 50% Injectable 12.5 Gram(s) IV Push once  dextrose 50% Injectable 25 Gram(s) IV Push once  dextrose 50% Injectable 25 Gram(s) IV Push once  gabapentin 200 milliGRAM(s) Oral two times a day  influenza   Vaccine 0.5 milliLiter(s) IntraMuscular once  insulin lispro (HumaLOG) corrective regimen sliding scale   SubCutaneous every 6 hours  levothyroxine 75 MICROGram(s) Oral daily  metoprolol tartrate 25 milliGRAM(s) Oral daily  multivitamin 1 Tablet(s) Oral daily  piperacillin/tazobactam IVPB. 3.375 Gram(s) IV Intermittent every 8 hours  sodium chloride 0.9%. 1000 milliLiter(s) (50 mL/Hr) IV Continuous <Continuous>    MEDICATIONS  (PRN):  acetaminophen   Tablet .. 650 milliGRAM(s) Oral every 6 hours PRN Mild Pain (1 - 3), Moderate Pain (4 - 6), Severe Pain (7 - 10)  dextrose 40% Gel 15 Gram(s) Oral once PRN Blood Glucose LESS THAN 70 milliGRAM(s)/deciliter  glucagon  Injectable 1 milliGRAM(s) IntraMuscular once PRN Glucose LESS THAN 70 milligrams/deciliter                            14.3   20.19 )-----------( 191      ( 30 Oct 2018 08:01 )             41.7       10-30    134<L>  |  97  |  14  ----------------------------<  151<H>  3.7   |  25  |  0.69    Ca    9.7      30 Oct 2018 06:42    TPro  7.9  /  Alb  4.2  /  TBili  0.6  /  DBili  x   /  AST  12  /  ALT  12  /  AlkPhos  74  10-28      Physical Exam:  Gen: Laying in bed, NAD  Resp: Unlabored breathing  Abd: soft, ND, NT, no rebound/guarding   Ext: WWP  Skin: No rashes

## 2018-10-30 NOTE — DISCHARGE NOTE ADULT - NS AS DC FOLLOWUP STROKE INST
Heart Failure/Smoking Cessation/Influenza vaccination (VIS Pub Date: August 7, 2015) Heart Failure/Influenza vaccination (VIS Pub Date: August 7, 2015)

## 2018-10-31 ENCOUNTER — CHART COPY (OUTPATIENT)
Age: 83
End: 2018-10-31

## 2018-10-31 DIAGNOSIS — K81.0 ACUTE CHOLECYSTITIS: ICD-10-CM

## 2018-10-31 LAB
ALBUMIN SERPL ELPH-MCNC: 2.8 G/DL — LOW (ref 3.3–5)
ALP SERPL-CCNC: 134 U/L — HIGH (ref 40–120)
ALT FLD-CCNC: 35 U/L — SIGNIFICANT CHANGE UP (ref 10–45)
ANION GAP SERPL CALC-SCNC: 13 MMOL/L — SIGNIFICANT CHANGE UP (ref 5–17)
AST SERPL-CCNC: 44 U/L — HIGH (ref 10–40)
BILIRUB SERPL-MCNC: 1 MG/DL — SIGNIFICANT CHANGE UP (ref 0.2–1.2)
BLD GP AB SCN SERPL QL: NEGATIVE — SIGNIFICANT CHANGE UP
BUN SERPL-MCNC: 13 MG/DL — SIGNIFICANT CHANGE UP (ref 7–23)
CALCIUM SERPL-MCNC: 9.5 MG/DL — SIGNIFICANT CHANGE UP (ref 8.4–10.5)
CHLORIDE SERPL-SCNC: 102 MMOL/L — SIGNIFICANT CHANGE UP (ref 96–108)
CO2 SERPL-SCNC: 24 MMOL/L — SIGNIFICANT CHANGE UP (ref 22–31)
CREAT SERPL-MCNC: 0.57 MG/DL — SIGNIFICANT CHANGE UP (ref 0.5–1.3)
GLUCOSE BLDC GLUCOMTR-MCNC: 150 MG/DL — HIGH (ref 70–99)
GLUCOSE BLDC GLUCOMTR-MCNC: 153 MG/DL — HIGH (ref 70–99)
GLUCOSE BLDC GLUCOMTR-MCNC: 186 MG/DL — HIGH (ref 70–99)
GLUCOSE SERPL-MCNC: 138 MG/DL — HIGH (ref 70–99)
HCT VFR BLD CALC: 43.6 % — SIGNIFICANT CHANGE UP (ref 34.5–45)
HGB BLD-MCNC: 13.4 G/DL — SIGNIFICANT CHANGE UP (ref 11.5–15.5)
INR BLD: 1.33 RATIO — HIGH (ref 0.88–1.16)
MCHC RBC-ENTMCNC: 29.7 PG — SIGNIFICANT CHANGE UP (ref 27–34)
MCHC RBC-ENTMCNC: 30.7 GM/DL — LOW (ref 32–36)
MCV RBC AUTO: 96.9 FL — SIGNIFICANT CHANGE UP (ref 80–100)
PLATELET # BLD AUTO: 256 K/UL — SIGNIFICANT CHANGE UP (ref 150–400)
POTASSIUM SERPL-MCNC: 3.6 MMOL/L — SIGNIFICANT CHANGE UP (ref 3.5–5.3)
POTASSIUM SERPL-SCNC: 3.6 MMOL/L — SIGNIFICANT CHANGE UP (ref 3.5–5.3)
PROT SERPL-MCNC: 6.5 G/DL — SIGNIFICANT CHANGE UP (ref 6–8.3)
PROTHROM AB SERPL-ACNC: 15.3 SEC — HIGH (ref 10–12.9)
RBC # BLD: 4.5 M/UL — SIGNIFICANT CHANGE UP (ref 3.8–5.2)
RBC # FLD: 12 % — SIGNIFICANT CHANGE UP (ref 10.3–14.5)
RH IG SCN BLD-IMP: POSITIVE — SIGNIFICANT CHANGE UP
SODIUM SERPL-SCNC: 139 MMOL/L — SIGNIFICANT CHANGE UP (ref 135–145)
WBC # BLD: 14.7 K/UL — HIGH (ref 3.8–10.5)
WBC # FLD AUTO: 14.7 K/UL — HIGH (ref 3.8–10.5)

## 2018-10-31 PROCEDURE — 71045 X-RAY EXAM CHEST 1 VIEW: CPT | Mod: 26

## 2018-10-31 RX ORDER — LANOLIN ALCOHOL/MO/W.PET/CERES
3 CREAM (GRAM) TOPICAL ONCE
Qty: 0 | Refills: 0 | Status: COMPLETED | OUTPATIENT
Start: 2018-10-31 | End: 2018-10-31

## 2018-10-31 RX ADMIN — Medication 25 MILLIGRAM(S): at 05:21

## 2018-10-31 RX ADMIN — SODIUM CHLORIDE 50 MILLILITER(S): 9 INJECTION, SOLUTION INTRAVENOUS at 17:27

## 2018-10-31 RX ADMIN — Medication 75 MICROGRAM(S): at 05:21

## 2018-10-31 RX ADMIN — PIPERACILLIN AND TAZOBACTAM 25 GRAM(S): 4; .5 INJECTION, POWDER, LYOPHILIZED, FOR SOLUTION INTRAVENOUS at 05:21

## 2018-10-31 RX ADMIN — PIPERACILLIN AND TAZOBACTAM 25 GRAM(S): 4; .5 INJECTION, POWDER, LYOPHILIZED, FOR SOLUTION INTRAVENOUS at 13:47

## 2018-10-31 RX ADMIN — GABAPENTIN 200 MILLIGRAM(S): 400 CAPSULE ORAL at 12:09

## 2018-10-31 RX ADMIN — PREGABALIN 1000 MICROGRAM(S): 225 CAPSULE ORAL at 12:09

## 2018-10-31 RX ADMIN — Medication 3 MILLIGRAM(S): at 22:05

## 2018-10-31 RX ADMIN — Medication 1: at 17:27

## 2018-10-31 RX ADMIN — SODIUM CHLORIDE 50 MILLILITER(S): 9 INJECTION, SOLUTION INTRAVENOUS at 05:22

## 2018-10-31 RX ADMIN — GABAPENTIN 200 MILLIGRAM(S): 400 CAPSULE ORAL at 21:29

## 2018-10-31 RX ADMIN — SODIUM CHLORIDE 50 MILLILITER(S): 9 INJECTION, SOLUTION INTRAVENOUS at 12:06

## 2018-10-31 RX ADMIN — Medication 1 TABLET(S): at 12:09

## 2018-10-31 RX ADMIN — PIPERACILLIN AND TAZOBACTAM 25 GRAM(S): 4; .5 INJECTION, POWDER, LYOPHILIZED, FOR SOLUTION INTRAVENOUS at 21:29

## 2018-10-31 RX ADMIN — Medication 1: at 08:28

## 2018-10-31 NOTE — PROGRESS NOTE ADULT - SUBJECTIVE AND OBJECTIVE BOX
Feels OK    Vital Signs Last 24 Hrs  T(C): 37.2 (31 Oct 2018 12:40), Max: 37.2 (31 Oct 2018 12:40)  T(F): 99 (31 Oct 2018 12:40), Max: 99 (31 Oct 2018 12:40)  HR: 89 (31 Oct 2018 12:40) (89 - 98)  BP: 132/80 (31 Oct 2018 12:40) (106/62 - 132/80)  BP(mean): --  RR: 18 (31 Oct 2018 12:40) (18 - 20)  SpO2: 93% (31 Oct 2018 12:40) (92% - 94%)    PHYSICAL EXAM:    Constitutional: NAD, well-developed  Neck: No LAD, supple  Respiratory: CTA and P  Cardiovascular: S1 and S2, RRR, no M  Gastrointestinal: BS+, soft, tender to palpation in RUQ  Extremities: No peripheral edema, neg clubing, cyanosis  Vascular: 2+ peripheral pulses  Neurological: A/O x 3, no focal deficits  Psychiatric: Normal mood, normal affect  Skin: No rashes    MEDICATIONS  (STANDING):  cyanocobalamin 1000 MICROGram(s) Oral daily  dextrose 5% + sodium chloride 0.9%. 1000 milliLiter(s) (50 mL/Hr) IV Continuous <Continuous>  dextrose 5%. 1000 milliLiter(s) (50 mL/Hr) IV Continuous <Continuous>  dextrose 50% Injectable 12.5 Gram(s) IV Push once  dextrose 50% Injectable 25 Gram(s) IV Push once  dextrose 50% Injectable 25 Gram(s) IV Push once  gabapentin 200 milliGRAM(s) Oral two times a day  influenza   Vaccine 0.5 milliLiter(s) IntraMuscular once  insulin lispro (HumaLOG) corrective regimen sliding scale   SubCutaneous every 6 hours  levothyroxine 75 MICROGram(s) Oral daily  metoprolol tartrate 25 milliGRAM(s) Oral daily  multivitamin 1 Tablet(s) Oral daily  piperacillin/tazobactam IVPB. 3.375 Gram(s) IV Intermittent every 8 hours  sodium chloride 0.9%. 1000 milliLiter(s) (50 mL/Hr) IV Continuous <Continuous>    MEDICATIONS  (PRN):  acetaminophen   Tablet .. 650 milliGRAM(s) Oral every 6 hours PRN Mild Pain (1 - 3), Moderate Pain (4 - 6), Severe Pain (7 - 10)  dextrose 40% Gel 15 Gram(s) Oral once PRN Blood Glucose LESS THAN 70 milliGRAM(s)/deciliter  glucagon  Injectable 1 milliGRAM(s) IntraMuscular once PRN Glucose LESS THAN 70 milligrams/deciliter      Allergies    aspirin (Unknown)    Intolerances          LABS:                        14.3   20.19 )-----------( 191      ( 30 Oct 2018 08:01 )             41.7                         14.5   16.96 )-----------( 239      ( 29 Oct 2018 07:41 )             43.5                         15.4   13.2  )-----------( 174      ( 28 Oct 2018 16:39 )             46.6     10-30    134<L>  |  97  |  14  ----------------------------<  151<H>  3.7   |  25  |  0.69    Ca    9.7      30 Oct 2018 06:42          LIVER FUNCTIONS - ( 28 Oct 2018 16:39 )  Alb: 4.2 g/dL / Pro: 7.9 g/dL / ALK PHOS: 74 U/L / ALT: 12 U/L / AST: 12 U/L / GGT: x               RADIOLOGY & ADDITIONAL TESTS:

## 2018-10-31 NOTE — PROGRESS NOTE ADULT - SUBJECTIVE AND OBJECTIVE BOX
Surgery Progress Note    S: Patient seen and examined. No acute events overnight. Pain improved. Remains NPO, denies nausea or vomiting. Voiding adequately. Passing flatus.    O:  Vital Signs Last 24 Hrs  T(C): 37 (31 Oct 2018 00:05), Max: 37.2 (30 Oct 2018 04:43)  T(F): 98.6 (31 Oct 2018 00:05), Max: 99 (30 Oct 2018 04:43)  HR: 98 (31 Oct 2018 00:05) (90 - 98)  BP: 106/62 (31 Oct 2018 00:05) (106/62 - 133/84)  BP(mean): --  RR: 18 (31 Oct 2018 00:05) (18 - 20)  SpO2: 92% (31 Oct 2018 00:05) (92% - 94%)    I&O's Detail    30 Oct 2018 07:01  -  31 Oct 2018 01:32  --------------------------------------------------------  IN:    IV PiggyBack: 100 mL    Oral Fluid: 200 mL    sodium chloride 0.9%.: 600 mL  Total IN: 900 mL    OUT:  Total OUT: 0 mL    Total NET: 900 mL          MEDICATIONS  (STANDING):  cyanocobalamin 1000 MICROGram(s) Oral daily  dextrose 5% + sodium chloride 0.9%. 1000 milliLiter(s) (50 mL/Hr) IV Continuous <Continuous>  dextrose 5%. 1000 milliLiter(s) (50 mL/Hr) IV Continuous <Continuous>  dextrose 50% Injectable 12.5 Gram(s) IV Push once  dextrose 50% Injectable 25 Gram(s) IV Push once  dextrose 50% Injectable 25 Gram(s) IV Push once  gabapentin 200 milliGRAM(s) Oral two times a day  influenza   Vaccine 0.5 milliLiter(s) IntraMuscular once  insulin lispro (HumaLOG) corrective regimen sliding scale   SubCutaneous every 6 hours  levothyroxine 75 MICROGram(s) Oral daily  metoprolol tartrate 25 milliGRAM(s) Oral daily  multivitamin 1 Tablet(s) Oral daily  piperacillin/tazobactam IVPB. 3.375 Gram(s) IV Intermittent every 8 hours  sodium chloride 0.9%. 1000 milliLiter(s) (50 mL/Hr) IV Continuous <Continuous>    MEDICATIONS  (PRN):  acetaminophen   Tablet .. 650 milliGRAM(s) Oral every 6 hours PRN Mild Pain (1 - 3), Moderate Pain (4 - 6), Severe Pain (7 - 10)  dextrose 40% Gel 15 Gram(s) Oral once PRN Blood Glucose LESS THAN 70 milliGRAM(s)/deciliter  glucagon  Injectable 1 milliGRAM(s) IntraMuscular once PRN Glucose LESS THAN 70 milligrams/deciliter                            14.3   20.19 )-----------( 191      ( 30 Oct 2018 08:01 )             41.7       10-30    134<L>  |  97  |  14  ----------------------------<  151<H>  3.7   |  25  |  0.69    Ca    9.7      30 Oct 2018 06:42        Physical Exam:  Gen: Laying in bed, NAD  Resp: Unlabored breathing  Abd: soft, ND, NT, no rebound/guarding   Ext: WWP  Skin: No rashes

## 2018-10-31 NOTE — CHART NOTE - NSCHARTNOTEFT_GEN_A_CORE
Green Surgery Pre-operative Note    DAYSI LUZ | [id number]    10-31-18 @ 10:11    - Pre-operative Diagnosis: Acute cholecystitis    - Procedure: Laparoscopic cholecystectomy, Possible open cholecystectomy     - Labs:                        14.3   20.19 )-----------( 191      ( 30 Oct 2018 08:01 )             41.7     10-30    134<L>  |  97  |  14  ----------------------------<  151<H>  3.7   |  25  |  0.69    Ca    9.7      30 Oct 2018 06:42    Type & Screen #1: (10-29-18 @ 06:56)    Type & Screen #2: To be drawn 10-31-18.     - CXR: To be acquired 10-31-18, ordered.     - EKG: (10.28.18 @ 17:02), reviewed.     - Blood: 2u PRBC on hold for OR.     - Pregnancy Test: Not needed.     - Orders:  > NPO at midnight  > IVF at midnight  > Perioperative antibiotics - per protocol   > Morning Labs: CBC, BMP, coags  > Diabetic orders adjusted for NPO period.    - Permits:  > Patient to be consented today after discussion with patient and family.   > Case tentatively scheduled with OR for tomorrow 11/1.     DEB Benson MD, PGY-I  Surgery, Green Team   Pager: [phone]

## 2018-10-31 NOTE — PHYSICAL THERAPY INITIAL EVALUATION ADULT - GENERAL OBSERVATIONS, REHAB EVAL
Pt received semi-supine in bed, (+) IV, NAD. Pt alert, irritable at times, agreeable to PT eval with some encouragement.

## 2018-10-31 NOTE — PROGRESS NOTE ADULT - ASSESSMENT
88 yo F w/ acute cholecystitis  Agree w/ IV antibiotics and bowel rest  Advised patient to consider cholecystectomy as medical managment w/ iv abx and percutaneous drainage would like not offer definitive therapy.   Will follow  [phone]

## 2018-10-31 NOTE — PROGRESS NOTE ADULT - PROBLEM SELECTOR PLAN 1
Patient has CT and US evidence of acute cholecystitis.    patient had declined surgical intervention( lap maddie) initially but now agreeable

## 2018-10-31 NOTE — PHYSICAL THERAPY INITIAL EVALUATION ADULT - ADDITIONAL COMMENTS
Pt lives in long-term, reports she is non-ambulatory, completes bed to w/c transfers independently at baseline, requires partial assist for ADLs.

## 2018-10-31 NOTE — PROGRESS NOTE ADULT - SUBJECTIVE AND OBJECTIVE BOX
PREOPERATIVE NOTE    Date: 10/31/2018  Time: 08:52    Pre-Op Diagnosis: Acute Cholecystitis  Planned Procedure: Laparoscopic Cholecystectomy  Labs/studies:    		14.3  20.19 <H> )-------------( 191      ( 30 Oct 2018 08:01 )             	            41.7    134<L>  |     97    |  10  ----------------------------<  151<H>    ( 30 Oct 2018 06:42 )  4.0         |     25    | 0.69    PT: 13.3 <H>        ( 29 Oct 2018 09:29 )  aPTT: 26.8 <L>  INR: 1.17 <H>    Official CXR reading:   - EKG needed    Official EKG reading: ( 28 Oct 2018 17:02 )  - Undetermined rhythm  - Nonspecific ST and T wave abnormalities  - Abnormal ECG      Blood: Type (O+) and Cross/Screen for ___ units in blood bank    Plan:  - Order EKG  - Keep NPO after MN  - IVF ordered after MN  - Antibiotics ordered on call to OR: Cefotetan 2 g IV    Anesthesia evaluation:    Operative consent: Signed and on chart

## 2018-10-31 NOTE — PHYSICAL THERAPY INITIAL EVALUATION ADULT - PERTINENT HX OF CURRENT PROBLEM, REHAB EVAL
87yoF with PMH of afib, htn, T2DM, hypothyroidism, neuropathy, recurrent UTIs who presents to ED from assisted living facility for complaint of abdominal pain, found to have acute cholecystitis, declining surgical intervention.

## 2018-10-31 NOTE — PROGRESS NOTE ADULT - SUBJECTIVE AND OBJECTIVE BOX
Patient is a 88y old  Female who presents with a chief complaint of abdominal pain (31 Oct 2018 08:52)      INTERVAL HPI/OVERNIGHT EVENTS: noted, feels well, denies any abd pain at rest, no n/v      Vital Signs Last 24 Hrs  T(C): 37.2 (31 Oct 2018 12:40), Max: 37.2 (31 Oct 2018 12:40)  T(F): 99 (31 Oct 2018 12:40), Max: 99 (31 Oct 2018 12:40)  HR: 89 (31 Oct 2018 12:40) (89 - 98)  BP: 132/80 (31 Oct 2018 12:40) (106/62 - 132/80)  BP(mean): --  RR: 18 (31 Oct 2018 12:40) (18 - 20)  SpO2: 93% (31 Oct 2018 12:40) (92% - 94%)    acetaminophen   Tablet .. 650 milliGRAM(s) Oral every 6 hours PRN  cyanocobalamin 1000 MICROGram(s) Oral daily  dextrose 40% Gel 15 Gram(s) Oral once PRN  dextrose 5% + sodium chloride 0.9%. 1000 milliLiter(s) IV Continuous <Continuous>  dextrose 5%. 1000 milliLiter(s) IV Continuous <Continuous>  dextrose 50% Injectable 12.5 Gram(s) IV Push once  dextrose 50% Injectable 25 Gram(s) IV Push once  dextrose 50% Injectable 25 Gram(s) IV Push once  gabapentin 200 milliGRAM(s) Oral two times a day  glucagon  Injectable 1 milliGRAM(s) IntraMuscular once PRN  influenza   Vaccine 0.5 milliLiter(s) IntraMuscular once  insulin lispro (HumaLOG) corrective regimen sliding scale   SubCutaneous every 6 hours  levothyroxine 75 MICROGram(s) Oral daily  metoprolol tartrate 25 milliGRAM(s) Oral daily  multivitamin 1 Tablet(s) Oral daily  piperacillin/tazobactam IVPB. 3.375 Gram(s) IV Intermittent every 8 hours  sodium chloride 0.9%. 1000 milliLiter(s) IV Continuous <Continuous>      PHYSICAL EXAM:  GENERAL: NAD,   EYES: conjunctiva and sclera clear  ENMT: Moist mucous membranes  NECK: Supple, No JVD, Normal thyroid  NERVOUS SYSTEM:  Alert & Oriented X3,   CHEST/LUNG: Clear to auscultation bilaterally; No rales, rhonchi, wheezing, or rubs  HEART: Regular rate and rhythm; No murmurs, rubs, or gallops  ABDOMEN: Soft, ruq tenderness, distended; Bowel sounds present  EXTREMITIES:  2+ Peripheral Pulses, No clubbing, cyanosis, or edema  LYMPH: No lymphadenopathy noted  SKIN: No rashes or lesions    Consultant(s) Notes Reviewed:  [x ] YES  [ ] NO  Care Discussed with Consultants/Other Providers [ x] YES  [ ] NO    LABS:                        14.3   20.19 )-----------( 191      ( 30 Oct 2018 08:01 )             41.7     10-30    134<L>  |  97  |  14  ----------------------------<  151<H>  3.7   |  25  |  0.69    Ca    9.7      30 Oct 2018 06:42          CAPILLARY BLOOD GLUCOSE      POCT Blood Glucose.: 150 mg/dL (31 Oct 2018 11:58)  POCT Blood Glucose.: 186 mg/dL (31 Oct 2018 07:43)  POCT Blood Glucose.: 130 mg/dL (30 Oct 2018 16:56)            RADIOLOGY & ADDITIONAL TESTS:    Imaging Personally Reviewed:  [x ] YES  [ ] NO

## 2018-11-01 ENCOUNTER — APPOINTMENT (OUTPATIENT)
Dept: SURGERY | Facility: HOSPITAL | Age: 83
End: 2018-11-01

## 2018-11-01 DIAGNOSIS — I48.2 CHRONIC ATRIAL FIBRILLATION: ICD-10-CM

## 2018-11-01 LAB
ALBUMIN SERPL ELPH-MCNC: 2.8 G/DL — LOW (ref 3.3–5)
ALP SERPL-CCNC: 151 U/L — HIGH (ref 40–120)
ALT FLD-CCNC: 51 U/L — HIGH (ref 10–45)
ANION GAP SERPL CALC-SCNC: 11 MMOL/L — SIGNIFICANT CHANGE UP (ref 5–17)
AST SERPL-CCNC: 45 U/L — HIGH (ref 10–40)
BILIRUB SERPL-MCNC: 0.9 MG/DL — SIGNIFICANT CHANGE UP (ref 0.2–1.2)
BUN SERPL-MCNC: 12 MG/DL — SIGNIFICANT CHANGE UP (ref 7–23)
CALCIUM SERPL-MCNC: 9.9 MG/DL — SIGNIFICANT CHANGE UP (ref 8.4–10.5)
CHLORIDE SERPL-SCNC: 103 MMOL/L — SIGNIFICANT CHANGE UP (ref 96–108)
CO2 SERPL-SCNC: 26 MMOL/L — SIGNIFICANT CHANGE UP (ref 22–31)
CREAT SERPL-MCNC: 0.57 MG/DL — SIGNIFICANT CHANGE UP (ref 0.5–1.3)
GLUCOSE BLDC GLUCOMTR-MCNC: 143 MG/DL — HIGH (ref 70–99)
GLUCOSE BLDC GLUCOMTR-MCNC: 150 MG/DL — HIGH (ref 70–99)
GLUCOSE BLDC GLUCOMTR-MCNC: 162 MG/DL — HIGH (ref 70–99)
GLUCOSE BLDC GLUCOMTR-MCNC: 176 MG/DL — HIGH (ref 70–99)
GLUCOSE SERPL-MCNC: 156 MG/DL — HIGH (ref 70–99)
HCT VFR BLD CALC: 42.4 % — SIGNIFICANT CHANGE UP (ref 34.5–45)
HGB BLD-MCNC: 14.6 G/DL — SIGNIFICANT CHANGE UP (ref 11.5–15.5)
MAGNESIUM SERPL-MCNC: 1.7 MG/DL — SIGNIFICANT CHANGE UP (ref 1.6–2.6)
MCHC RBC-ENTMCNC: 33.3 PG — SIGNIFICANT CHANGE UP (ref 27–34)
MCHC RBC-ENTMCNC: 34.6 GM/DL — SIGNIFICANT CHANGE UP (ref 32–36)
MCV RBC AUTO: 96.5 FL — SIGNIFICANT CHANGE UP (ref 80–100)
PHOSPHATE SERPL-MCNC: 1.9 MG/DL — LOW (ref 2.5–4.5)
PLATELET # BLD AUTO: 273 K/UL — SIGNIFICANT CHANGE UP (ref 150–400)
POTASSIUM SERPL-MCNC: 3.6 MMOL/L — SIGNIFICANT CHANGE UP (ref 3.5–5.3)
POTASSIUM SERPL-SCNC: 3.6 MMOL/L — SIGNIFICANT CHANGE UP (ref 3.5–5.3)
PROT SERPL-MCNC: 7 G/DL — SIGNIFICANT CHANGE UP (ref 6–8.3)
RBC # BLD: 4.39 M/UL — SIGNIFICANT CHANGE UP (ref 3.8–5.2)
RBC # FLD: 12 % — SIGNIFICANT CHANGE UP (ref 10.3–14.5)
SODIUM SERPL-SCNC: 140 MMOL/L — SIGNIFICANT CHANGE UP (ref 135–145)
WBC # BLD: 12.2 K/UL — HIGH (ref 3.8–10.5)
WBC # FLD AUTO: 12.2 K/UL — HIGH (ref 3.8–10.5)

## 2018-11-01 RX ORDER — LANOLIN ALCOHOL/MO/W.PET/CERES
3 CREAM (GRAM) TOPICAL ONCE
Qty: 0 | Refills: 0 | Status: COMPLETED | OUTPATIENT
Start: 2018-11-01 | End: 2018-11-01

## 2018-11-01 RX ORDER — MAGNESIUM SULFATE 500 MG/ML
1 VIAL (ML) INJECTION ONCE
Qty: 0 | Refills: 0 | Status: COMPLETED | OUTPATIENT
Start: 2018-11-01 | End: 2018-11-01

## 2018-11-01 RX ADMIN — PIPERACILLIN AND TAZOBACTAM 25 GRAM(S): 4; .5 INJECTION, POWDER, LYOPHILIZED, FOR SOLUTION INTRAVENOUS at 05:43

## 2018-11-01 RX ADMIN — PIPERACILLIN AND TAZOBACTAM 25 GRAM(S): 4; .5 INJECTION, POWDER, LYOPHILIZED, FOR SOLUTION INTRAVENOUS at 21:30

## 2018-11-01 RX ADMIN — Medication 75 MICROGRAM(S): at 05:42

## 2018-11-01 RX ADMIN — Medication 25 MILLIGRAM(S): at 05:42

## 2018-11-01 RX ADMIN — PIPERACILLIN AND TAZOBACTAM 25 GRAM(S): 4; .5 INJECTION, POWDER, LYOPHILIZED, FOR SOLUTION INTRAVENOUS at 14:26

## 2018-11-01 RX ADMIN — Medication 1 TABLET(S): at 12:05

## 2018-11-01 RX ADMIN — GABAPENTIN 200 MILLIGRAM(S): 400 CAPSULE ORAL at 21:30

## 2018-11-01 RX ADMIN — Medication 1: at 06:14

## 2018-11-01 RX ADMIN — GABAPENTIN 200 MILLIGRAM(S): 400 CAPSULE ORAL at 11:08

## 2018-11-01 RX ADMIN — Medication 1: at 00:37

## 2018-11-01 RX ADMIN — Medication 100 GRAM(S): at 17:32

## 2018-11-01 RX ADMIN — Medication 3 MILLIGRAM(S): at 21:30

## 2018-11-01 RX ADMIN — PREGABALIN 1000 MICROGRAM(S): 225 CAPSULE ORAL at 11:08

## 2018-11-01 NOTE — PROGRESS NOTE ADULT - ATTENDING COMMENTS
I have seen and examined the patient. I agree with the above surgery resident's note.  a/p acute cholecystitis- pt seen in ED 2 days ago- cholecystectomy recomended- pt refused surgery  continues to have ruq pain and tenderness with palpable mass, wbc 20  I spoke with both pt and son at length  I recommend laparoscopic cholecystectomy- r/b/c (including high chance of needing an open cholecystectomy) explained to pt and son  explained that alternatives include continued medical treatment with abx or percutaneous cholecystectomy - I explained that perc maddie would only be a temporizing measure and that her best chance at long term alleviation of symptoms would be with cholecystectomy  pt and son to consider surgery (consent not signed yet)  OR time held for tomorrow
pt will not agree to surgery  case cancelled  will follow prn
Britany Jang MD   [facility]  
Britany Jang MD  [facility]    Dr Hernandez will be covering me starting  11 / 1 /18  Please page

## 2018-11-01 NOTE — PROGRESS NOTE ADULT - ASSESSMENT
This patient is an 87yoF with PMH of afib, htn, T2DM, hypothyroidism, neuropathy, recurrent UTIs who presents to ED from assisted living facility for complaint of abdominal pain, found to have acute cholecystitis, wavering about surgical intervention.

## 2018-11-01 NOTE — CONSULT NOTE ADULT - ATTENDING COMMENTS
I have seen and examined the patient. I agree with the above surgery resident's note.  acute cholecystitis- not agreeable to surgery  rec npo, iv abx
Agree with above. 88F with acute cholecystitis. Improving with antibiotics. Pt and Pt's son do not wish to pursue cholecystectomy. On imaging, the gallbladder is distended and there appears to be a decent window for endoscopic drainage. Will discuss benefits and risks of procedure with pt and pt's son. Plan discussed with Dr. Surya Beckford and Dr. Neel Wheat. Make NPO after midnight.

## 2018-11-01 NOTE — PROGRESS NOTE ADULT - PROBLEM SELECTOR PLAN 1
-  -For cholecystectomy- patient is considered atleast INTERMEDIATE RISK for MODERATE risk procedure. She would be considered lower risk for percutaneous drainage  -Recommendation is to proceed with any of the procedures as patient is not interested in further risk stratification procedures such as stress testing.   -She also does not want to be on anticoagulation for stroke prevention in setting of atrial fibrillation  -She is otherwise medically optimized from cardiac perspective.

## 2018-11-01 NOTE — PROGRESS NOTE ADULT - SUBJECTIVE AND OBJECTIVE BOX
Surgery Green Team Daily Progress Note     ADYSI LUZ | [id number]    SUBJECTIVE / 24H EVENTS  Patient seen and examined on morning rounds. No acute events overnight.    OBJECTIVE:    VITAL SIGNS:  T(C): 36.6 (18 @ 04:34), Max: 37.2 (10-31-18 @ 12:40)  HR: 93 (18 @ 04:34) (86 - 93)  BP: 132/82 (18 @ 04:34) (110/67 - 132/82)  RR: 18 (18 @ 04:34) (18 - 18)  SpO2: 94% (18 @ 04:34) (93% - 94%)    Daily Weight in k.3 (31 Oct 2018 05:15)  POCT Blood Glucose.: 176 mg/dL (18 @ 00:35)  POCT Blood Glucose.: 153 mg/dL (10-31-18 @ 16:41)  POCT Blood Glucose.: 150 mg/dL (10-31-18 @ 11:58)      PHYSICAL EXAM:  Gen: NAD  LS: Respirations unlabored.  GI: Soft. Tender in RUQ. Nondistended.  Ext: Warm, well perfused      10-30-18 @ 07:01  -  10-31-18 @ 07:00  --------------------------------------------------------  IN:    IV PiggyBack: 100 mL    Oral Fluid: 200 mL    sodium chloride 0.9%: 600 mL  Total IN: 900 mL    OUT:  Total OUT: 0 mL    Total NET: 900 mL      10-31-18 @ 07:01  -  18 @ 05:10  --------------------------------------------------------  IN:  Total IN: 0 mL    OUT:    Stool: 1 mL  Total OUT: 1 mL    Total NET: -1 mL    LAB VALUES:  10-31    139  |  102  |  13  ----------------------------<  138<H>  3.6   |  24  |  0.57    Ca    9.5      31 Oct 2018 15:36    TPro  6.5  /  Alb  2.8<L>  /  TBili  1.0  /  DBili  x   /  AST  44<H>  /  ALT  35  /  AlkPhos  134<H>  10-                               13.4   14.7  )-----------( 256      ( 31 Oct 2018 15:36 )             43.6     LIVER FUNCTIONS - ( 31 Oct 2018 15:36 )  Alb: 2.8 g/dL / Pro: 6.5 g/dL / ALK PHOS: 134 U/L / ALT: 35 U/L / AST: 44 U/L / GGT: x           PT/INR - ( 31 Oct 2018 15:36 )   PT: 15.3 sec;   INR: 1.33 ratio      MICROBIOLOGY:    No new microbiology data for review.     RADIOLOGY:    No new radiographic images for review.    MEDICATIONS  (STANDING):  cyanocobalamin 1000 MICROGram(s) Oral daily  dextrose 5% + sodium chloride 0.9%. 1000 milliLiter(s) (50 mL/Hr) IV Continuous <Continuous>  dextrose 5%. 1000 milliLiter(s) (50 mL/Hr) IV Continuous <Continuous>  dextrose 50% Injectable 12.5 Gram(s) IV Push once  dextrose 50% Injectable 25 Gram(s) IV Push once  dextrose 50% Injectable 25 Gram(s) IV Push once  gabapentin 200 milliGRAM(s) Oral two times a day  influenza   Vaccine 0.5 milliLiter(s) IntraMuscular once  insulin lispro (HumaLOG) corrective regimen sliding scale   SubCutaneous every 6 hours  levothyroxine 75 MICROGram(s) Oral daily  metoprolol tartrate 25 milliGRAM(s) Oral daily  multivitamin 1 Tablet(s) Oral daily  piperacillin/tazobactam IVPB. 3.375 Gram(s) IV Intermittent every 8 hours    MEDICATIONS  (PRN):  acetaminophen   Tablet .. 650 milliGRAM(s) Oral every 6 hours PRN Mild Pain (1 - 3), Moderate Pain (4 - 6), Severe Pain (7 - 10)  dextrose 40% Gel 15 Gram(s) Oral once PRN Blood Glucose LESS THAN 70 milliGRAM(s)/deciliter  glucagon  Injectable 1 milliGRAM(s) IntraMuscular once PRN Glucose LESS THAN 70 milligrams/deciliter

## 2018-11-01 NOTE — CONSULT NOTE ADULT - ASSESSMENT
ASSESSMENT: Patient is a 88y old f with acute cholecystitis    PLAN:    - NPO  - IV abx  - Recommended OR for laparoscopic cholecystectomy for definitive treatment of cholecystitis.  Patient and son (Jose Kay, HCP) are currently refusing surgical intervention, and would prefer non-operative management  - Rec. medical assessment for Optimization for OR and risk stratification  - Surgery will follow if patient and son agrees to cholecystectomy.  - Patient and plan to be discussed with Attending, Dr. Ledesma.      Mei Rojas MD PGY4  [facility][id number]
88 year old obese female with diabetes, atrial fibrillation now with acute cholecystitis.
88 yo F w/ acute cholecystitis  Refusing surgical intervention  Agree w/ IV antibiotics and bowel rest  If patient does not improve with antibiotics and continues to refuse surgery, consider percutaneous or endoscopic drainage of GB  Will follow  [phone]
Impression:  1)Acute cholecystitis- clinical improvement on antibiotics, patient is currently refusing cholecystectomy   2)Abnormal liver enzymes- secondary to gallbladder infection   3)Atrial fibrillation- not on anticoagulation     Recommendations:  -trend liver enzymes  -continue with current antibiotics (Zosyn)  -advanced GI attending will discuss risks/benefits/alternatives for endoscopic drainage of gallbladder  -please make patient NPO past MN in event of procedure tomorrow     Please call with questions  Marcia Salinas  GI Fellow  Pager: [phone]

## 2018-11-01 NOTE — PROGRESS NOTE ADULT - PROBLEM SELECTOR PLAN 1
Patient has CT and US evidence of acute cholecystitis.   patient continues to change her mind regarding surgical intervention  will consult psych regarding capacity Patient has CT and US evidence of acute cholecystitis.   patient continues to change her mind regarding surgical intervention  will consult psych regarding capacity  fu labs

## 2018-11-01 NOTE — PROGRESS NOTE ADULT - SUBJECTIVE AND OBJECTIVE BOX
Patient is a 88y old  Female who presents with a chief complaint of abdominal pain (01 Nov 2018 10:28)      SUBJECTIVE / OVERNIGHT EVENTS:    Patient seen and examined. declined surgery this morning but now states she wants surgery. denies n/v. some abd pain.      Vital Signs Last 24 Hrs  T(C): 36.6 (01 Nov 2018 04:34), Max: 36.7 (31 Oct 2018 20:53)  T(F): 97.9 (01 Nov 2018 04:34), Max: 98.1 (31 Oct 2018 20:53)  HR: 93 (01 Nov 2018 04:34) (86 - 93)  BP: 132/82 (01 Nov 2018 04:34) (110/67 - 132/82)  BP(mean): --  RR: 18 (01 Nov 2018 04:34) (18 - 18)  SpO2: 94% (01 Nov 2018 04:34) (93% - 94%)  I&O's Summary    31 Oct 2018 07:01  -  01 Nov 2018 07:00  --------------------------------------------------------  IN: 0 mL / OUT: 1 mL / NET: -1 mL        PE:  GENERAL: NAD, AAOx3  HEAD:  Atraumatic, Normocephalic  EYES: EOMI, PERRLA, conjunctiva and sclera clear  NECK: Supple, No JVD  CHEST/LUNG: CTABL, No wheeze  HEART: irregular, no murmur  ABDOMEN: Soft, + murphys, Nondistended; Bowel sounds present  EXTREMITIES:  2+ Peripheral Pulses, No clubbing, cyanosis, or edema  SKIN: No rashes or lesions  NEURO: No focal deficits    LABS:                        13.4   14.7  )-----------( 256      ( 31 Oct 2018 15:36 )             43.6     10-31    139  |  102  |  13  ----------------------------<  138<H>  3.6   |  24  |  0.57    Ca    9.5      31 Oct 2018 15:36    TPro  6.5  /  Alb  2.8<L>  /  TBili  1.0  /  DBili  x   /  AST  44<H>  /  ALT  35  /  AlkPhos  134<H>  10-31    PT/INR - ( 31 Oct 2018 15:36 )   PT: 15.3 sec;   INR: 1.33 ratio           CAPILLARY BLOOD GLUCOSE      POCT Blood Glucose.: 150 mg/dL (01 Nov 2018 11:49)  POCT Blood Glucose.: 162 mg/dL (01 Nov 2018 06:01)  POCT Blood Glucose.: 176 mg/dL (01 Nov 2018 00:35)  POCT Blood Glucose.: 153 mg/dL (31 Oct 2018 16:41)            RADIOLOGY & ADDITIONAL TESTS:    Imaging Personally Reviewed:  [x] YES  [ ] NO    Consultant(s) Notes Reviewed:  [x] YES  [ ] NO    MEDICATIONS  (STANDING):  cyanocobalamin 1000 MICROGram(s) Oral daily  dextrose 5% + sodium chloride 0.9%. 1000 milliLiter(s) (50 mL/Hr) IV Continuous <Continuous>  dextrose 5%. 1000 milliLiter(s) (50 mL/Hr) IV Continuous <Continuous>  dextrose 50% Injectable 12.5 Gram(s) IV Push once  dextrose 50% Injectable 25 Gram(s) IV Push once  dextrose 50% Injectable 25 Gram(s) IV Push once  gabapentin 200 milliGRAM(s) Oral two times a day  influenza   Vaccine 0.5 milliLiter(s) IntraMuscular once  insulin lispro (HumaLOG) corrective regimen sliding scale   SubCutaneous every 6 hours  levothyroxine 75 MICROGram(s) Oral daily  metoprolol tartrate 25 milliGRAM(s) Oral daily  multivitamin 1 Tablet(s) Oral daily  piperacillin/tazobactam IVPB. 3.375 Gram(s) IV Intermittent every 8 hours    MEDICATIONS  (PRN):  acetaminophen   Tablet .. 650 milliGRAM(s) Oral every 6 hours PRN Mild Pain (1 - 3), Moderate Pain (4 - 6), Severe Pain (7 - 10)  dextrose 40% Gel 15 Gram(s) Oral once PRN Blood Glucose LESS THAN 70 milliGRAM(s)/deciliter  glucagon  Injectable 1 milliGRAM(s) IntraMuscular once PRN Glucose LESS THAN 70 milligrams/deciliter      Care Discussed with Consultants/Other Providers [x] YES  [ ] NO    HEALTH ISSUES - PROBLEM Dx:  Chronic atrial fibrillation: Chronic atrial fibrillation  Preoperative cardiovascular examination: Preoperative cardiovascular examination  Hypercalcemia: Hypercalcemia  Prophylactic measure: Prophylactic measure  Peripheral Neuropathy: Peripheral Neuropathy  Hypertension: Hypertension  Hypothyroidism: Hypothyroidism  Abnormal urinalysis: Abnormal urinalysis  Diabetes mellitus, type II: Diabetes mellitus, type II  Atrial fibrillation: Atrial fibrillation  Acute cholecystitis: Acute cholecystitis Patient is a 88y old  Female who presents with a chief complaint of abdominal pain (01 Nov 2018 10:28)      SUBJECTIVE / OVERNIGHT EVENTS:    Patient seen and examined. declined surgery this morning but now states she wants surgery. denies n/v. some abd pain. labs from this AM lost.      Vital Signs Last 24 Hrs  T(C): 36.6 (01 Nov 2018 04:34), Max: 36.7 (31 Oct 2018 20:53)  T(F): 97.9 (01 Nov 2018 04:34), Max: 98.1 (31 Oct 2018 20:53)  HR: 93 (01 Nov 2018 04:34) (86 - 93)  BP: 132/82 (01 Nov 2018 04:34) (110/67 - 132/82)  BP(mean): --  RR: 18 (01 Nov 2018 04:34) (18 - 18)  SpO2: 94% (01 Nov 2018 04:34) (93% - 94%)  I&O's Summary    31 Oct 2018 07:01  -  01 Nov 2018 07:00  --------------------------------------------------------  IN: 0 mL / OUT: 1 mL / NET: -1 mL        PE:  GENERAL: NAD, AAOx3  HEAD:  Atraumatic, Normocephalic  EYES: EOMI, PERRLA, conjunctiva and sclera clear  NECK: Supple, No JVD  CHEST/LUNG: CTABL, No wheeze  HEART: irregular, no murmur  ABDOMEN: Soft, + murphys, Nondistended; Bowel sounds present  EXTREMITIES:  2+ Peripheral Pulses, No clubbing, cyanosis, or edema  SKIN: No rashes or lesions  NEURO: No focal deficits    LABS:                        13.4   14.7  )-----------( 256      ( 31 Oct 2018 15:36 )             43.6     10-31    139  |  102  |  13  ----------------------------<  138<H>  3.6   |  24  |  0.57    Ca    9.5      31 Oct 2018 15:36    TPro  6.5  /  Alb  2.8<L>  /  TBili  1.0  /  DBili  x   /  AST  44<H>  /  ALT  35  /  AlkPhos  134<H>  10-31    PT/INR - ( 31 Oct 2018 15:36 )   PT: 15.3 sec;   INR: 1.33 ratio           CAPILLARY BLOOD GLUCOSE      POCT Blood Glucose.: 150 mg/dL (01 Nov 2018 11:49)  POCT Blood Glucose.: 162 mg/dL (01 Nov 2018 06:01)  POCT Blood Glucose.: 176 mg/dL (01 Nov 2018 00:35)  POCT Blood Glucose.: 153 mg/dL (31 Oct 2018 16:41)            RADIOLOGY & ADDITIONAL TESTS:    Imaging Personally Reviewed:  [x] YES  [ ] NO    Consultant(s) Notes Reviewed:  [x] YES  [ ] NO    MEDICATIONS  (STANDING):  cyanocobalamin 1000 MICROGram(s) Oral daily  dextrose 5% + sodium chloride 0.9%. 1000 milliLiter(s) (50 mL/Hr) IV Continuous <Continuous>  dextrose 5%. 1000 milliLiter(s) (50 mL/Hr) IV Continuous <Continuous>  dextrose 50% Injectable 12.5 Gram(s) IV Push once  dextrose 50% Injectable 25 Gram(s) IV Push once  dextrose 50% Injectable 25 Gram(s) IV Push once  gabapentin 200 milliGRAM(s) Oral two times a day  influenza   Vaccine 0.5 milliLiter(s) IntraMuscular once  insulin lispro (HumaLOG) corrective regimen sliding scale   SubCutaneous every 6 hours  levothyroxine 75 MICROGram(s) Oral daily  metoprolol tartrate 25 milliGRAM(s) Oral daily  multivitamin 1 Tablet(s) Oral daily  piperacillin/tazobactam IVPB. 3.375 Gram(s) IV Intermittent every 8 hours    MEDICATIONS  (PRN):  acetaminophen   Tablet .. 650 milliGRAM(s) Oral every 6 hours PRN Mild Pain (1 - 3), Moderate Pain (4 - 6), Severe Pain (7 - 10)  dextrose 40% Gel 15 Gram(s) Oral once PRN Blood Glucose LESS THAN 70 milliGRAM(s)/deciliter  glucagon  Injectable 1 milliGRAM(s) IntraMuscular once PRN Glucose LESS THAN 70 milligrams/deciliter      Care Discussed with Consultants/Other Providers [x] YES  [ ] NO    HEALTH ISSUES - PROBLEM Dx:  Chronic atrial fibrillation: Chronic atrial fibrillation  Preoperative cardiovascular examination: Preoperative cardiovascular examination  Hypercalcemia: Hypercalcemia  Prophylactic measure: Prophylactic measure  Peripheral Neuropathy: Peripheral Neuropathy  Hypertension: Hypertension  Hypothyroidism: Hypothyroidism  Abnormal urinalysis: Abnormal urinalysis  Diabetes mellitus, type II: Diabetes mellitus, type II  Atrial fibrillation: Atrial fibrillation  Acute cholecystitis: Acute cholecystitis

## 2018-11-01 NOTE — PROGRESS NOTE ADULT - ASSESSMENT
Patient is a 88y old f with acute cholecystitis    PLAN:    - NPO  - IV abx  - Extensive time spent with patient and sons at bedside discussing surgical intervention. Patient and family do not want surgical intervention at present time. No further surgical intervention planned for this hospitalization.  - surgery will continue to follow    [place] [facility], #[id number] Patient is a 88y old f with acute cholecystitis    PLAN:    - NPO  - IV abx  - Extensive time spent with patient and sons at bedside discussing surgical intervention. Patient and family do not want surgical intervention at present time. No further surgical intervention planned for this hospitalization, surgery will sign off.   - Please reconsult if patient wishes to undergo surgical intervention.    [facility],   Pager: [phone]

## 2018-11-01 NOTE — CONSULT NOTE ADULT - SUBJECTIVE AND OBJECTIVE BOX
Chief Complaint:  Patient is a 88y old  Female who presents with a chief complaint of abdominal pain (01 Nov 2018 13:37)      HPI:  88F with A. Fib (not on a/c), hypothyroidism, HTN, DM2 (on Metformin) presenting from home with abdominal pain. Currently patient states she is without any abdominal pain. She denies nausea, vomiting, CP, SOB, diarrhea, or dysuria. She is unable to characterize her pain that prompted her admission to hospital. She was found to have Klebsiella UTI (on Zosyn) in addition to GB wall edema and thickening suggestive of acute cholecystitis in setting of abnormal liver enzymes. The patient's WBC is improving on antibiotics, she was planned for cholecystectomy today but refused.     Allergies:  aspirin (Unknown)      Home Medications:  Home Medications:   * Incomplete Medication History as of 29-Oct-2018 04:11 documented in Structured Notes  · 	gabapentin 100 mg oral capsule: 2 cap(s) orally 2 times a day, Last Dose Taken:    · 	ZyrTEC 10 mg oral tablet: 1 tab(s) orally once a day, Last Dose Taken:    · 	metFORMIN 1000 mg oral tablet: 1 tab(s) orally 2 times a day, Last Dose Taken:    · 	levothyroxine 75 mcg (0.075 mg) oral tablet: 1 tab(s) orally once a day, Last Dose Taken:    · 	nitrofurantoin macrocrystals 50 mg oral capsule: 1 cap(s) orally once a day, Last Dose Taken:    · 	Metoprolol Tartrate 25 mg oral tablet: 1 tab(s) orally once a day, Last Dose Taken:      Hospital Medications:  acetaminophen   Tablet .. 650 milliGRAM(s) Oral every 6 hours PRN  cyanocobalamin 1000 MICROGram(s) Oral daily  dextrose 40% Gel 15 Gram(s) Oral once PRN  dextrose 5% + sodium chloride 0.9%. 1000 milliLiter(s) IV Continuous <Continuous>  dextrose 5%. 1000 milliLiter(s) IV Continuous <Continuous>  dextrose 50% Injectable 12.5 Gram(s) IV Push once  dextrose 50% Injectable 25 Gram(s) IV Push once  dextrose 50% Injectable 25 Gram(s) IV Push once  gabapentin 200 milliGRAM(s) Oral two times a day  glucagon  Injectable 1 milliGRAM(s) IntraMuscular once PRN  influenza   Vaccine 0.5 milliLiter(s) IntraMuscular once  insulin lispro (HumaLOG) corrective regimen sliding scale   SubCutaneous every 6 hours  levothyroxine 75 MICROGram(s) Oral daily  magnesium sulfate  IVPB 1 Gram(s) IV Intermittent once  metoprolol tartrate 25 milliGRAM(s) Oral daily  multivitamin 1 Tablet(s) Oral daily  piperacillin/tazobactam IVPB. 3.375 Gram(s) IV Intermittent every 8 hours      PMHX/PSHX:  Vaginal bleeding  Spinal stenosis of lumbar region  Hiatal hernia  Diabetes mellitus, type II  Atrial fibrillation  H/O: GI Bleed  Overactive Bladder  Uterine Polyp  Peripheral Neuropathy  Degenerative Joint Disease  Obesity  Hypothyroidism  Anemia, Iron Deficiency  Hypertension  Diabetes Mellitus  S/P rotator cuff repair  S/P knee replacement, right      Family history:  No pertinent family history in first degree relatives      Social History: no tobacco, no eTOH, no IvDA     ROS:     General:  No wt loss, fevers, chills, night sweats, fatigue,   Eyes:  Good vision, no reported pain  ENT:  No sore throat, pain, runny nose, dysphagia  CV:  No pain, palpitations, hypo/hypertension  Resp:  No dyspnea, cough, tachypnea, wheezing  GI:  No pain, No nausea, No vomiting, No diarrhea, No constipation, No weight loss, No fever, No pruritis, No rectal bleeding, No tarry stools, No dysphagia,  :  No pain, bleeding, incontinence, nocturia  Muscle:  No pain, weakness  Neuro:  No weakness, tingling, memory problems  Psych:  No fatigue, insomnia, mood problems, depression  Endocrine:  No polyuria, polydipsia, cold/heat intolerance  Heme:  No petechiae, ecchymosis, easy bruisability  Skin:  No rash, tattoos, scars, edema      PHYSICAL EXAM:     GENERAL:  Appears stated age, well-groomed, well-nourished, no distress  HEENT:  NC/AT,  conjunctivae clear and pink, no thyromegaly, nodules, adenopathy, no JVD, sclera -anicteric  CHEST:  Full & symmetric excursion, no increased effort, breath sounds clear  HEART:  Regular rhythm, S1, S2, no murmur/rub/S3/S4, no abdominal bruit, no edema  ABDOMEN:  Soft, non-tender, non-distended, normoactive bowel sounds  EXTEREMITIES:  no cyanosis,clubbing or edema  SKIN:  No rash/erythema, intact   NEURO:  Alert, oriented, no asterixis, no tremor, no encephalopathy    Vital Signs:  Vital Signs Last 24 Hrs  T(C): 36.3 (01 Nov 2018 14:07), Max: 36.7 (31 Oct 2018 20:53)  T(F): 97.4 (01 Nov 2018 14:07), Max: 98.1 (31 Oct 2018 20:53)  HR: 86 (01 Nov 2018 14:07) (86 - 93)  BP: 127/73 (01 Nov 2018 14:07) (110/67 - 132/82)  BP(mean): --  RR: 18 (01 Nov 2018 14:07) (18 - 18)  SpO2: 91% (01 Nov 2018 14:07) (91% - 94%)  Daily     Daily     LABS:                        14.6   12.2  )-----------( 273      ( 01 Nov 2018 12:40 )             42.4     Mean Cell Volume: 96.5 fl (11-01-18 @ 12:40)    11-01    140  |  103  |  12  ----------------------------<  156<H>  3.6   |  26  |  0.57    Ca    9.9      01 Nov 2018 12:40  Phos  1.9     11-01  Mg     1.7     11-01    TPro  7.0  /  Alb  2.8<L>  /  TBili  0.9  /  DBili  x   /  AST  45<H>  /  ALT  51<H>  /  AlkPhos  151<H>  11-01    LIVER FUNCTIONS - ( 01 Nov 2018 12:40 )  Alb: 2.8 g/dL / Pro: 7.0 g/dL / ALK PHOS: 151 U/L / ALT: 51 U/L / AST: 45 U/L / GGT: x           PT/INR - ( 31 Oct 2018 15:36 )   PT: 15.3 sec;   INR: 1.33 ratio                                     14.6   12.2  )-----------( 273      ( 01 Nov 2018 12:40 )             42.4                         13.4   14.7  )-----------( 256      ( 31 Oct 2018 15:36 )             43.6                         14.3   20.19 )-----------( 191      ( 30 Oct 2018 08:01 )             41.7     Imaging:  < from: US Abdomen Upper Quadrant Right (10.29.18 @ 00:59) >  EXAM:  US ABDOMEN RT UPR QUADRANT                            PROCEDURE DATE:  10/29/2018            INTERPRETATION:  CLINICAL INFORMATION: Epigastric pain.    TECHNIQUE: Sonography of the right upper quadrant.     COMPARISON: Same day CTA.    FINDINGS:    Liver: 17.4 cm. Diffusely increased echodensity, likely fatty   infiltration.  Bile ducts: Normal caliber. Visualized common bile duct measures 4 mm.   Gallbladder: Cholelithiasis. Gallbladder wall thickening (0.6 cm). Trace   pericholecystic fluid. Positive sonographic Snyder's sign.  Pancreas: Poorly visualized due to bowel gas.  Right kidney: 10.6 cm. No hydronephrosis. A 2.1 x 2.0 x 2.0 cm cyst in   the interpolar region.  Ascites: None.  IVC: Visualized portions are within normal limits.    IMPRESSION:     Sonographic findings of acute cholecystitis. Poorly visualized pancreas.    < end of copied text >      < from: CT Angio Abdomen and Pelvis w/ IV Cont (10.28.18 @ 18:51) >  EXAM:  CT ANGIO ABD PELV (W)AW IC                            PROCEDURE DATE:  10/28/2018            INTERPRETATION:  CLINICAL INFORMATION: Evaluate for mesenteric ischemia.   Epigastric pain.    COMPARISON: Chest CT 9/20/2017.    PROCEDURE:   CT Angiography of the Abdomen and Pelvis with and without intravenous   contrast.  Noncontrast imaging was performed followed by arterial phase and venous   phases.  Intravenous contrast: 90 ml Omnipaque 350. 10 ml discarded.  Oral contrast: None.  Sagittal and coronal reformats were performed as well as 3D (MIP)   reconstructions.    FINDINGS: Artifact from the patient's arms degrading images.    LOWER CHEST: Subsegmental atelectasis, especially at the left lung base.   Stable heart size. Coronary artery calcification.    LIVER: Heterogeneous arterial enhancement near the gallbladder fossa,   which normalizes on the delayed phase.  BILE DUCTS: Normal caliber.  GALLBLADDER: Distended gallbladder with suggestion of wall edema.   SPLEEN: Within normal limits.  PANCREAS: Within normal limits.    ADRENALS: Unremarkable.  KIDNEYS/URETERS: No hydronephrosis, hydroureter or significant   perinephric stranding. No obvious radiopaque urinary tract stone. Right   renal cysts measuring up to 2.8 x 2.3 cm.Subcentimeter hypodense foci in   the kidneys, too small to characterize.  BLADDER: Partially distended. Bladder wall thickening/enhancement.  REPRODUCTIVE ORGANS: Small calcification in the uterus, which may be   related to fibroid. No adnexal mass.    BOWEL: Again noted, large hiatal hernia. No bowel obstruction. The   appendix not visualized, but no secondary signs of appendicitis. No   significant bowel wall thickening or inflammatory change. Colon   diverticulosis.  PERITONEUM: No drainable fluid collection or free air.  VESSELS: Atherosclerotic change of the abdominal aorta and its branches   with ostial atheromatous plaques and ostial narrowing. Patent mesenteric   vessels although distal branches is beyond resolution of routine CTA   technique.   RETROPERITONEUM: No lymphadenopathy.    ABDOMINAL WALL/SOFT TISSUES: Right buttock injection granuloma. Small   fat-containing umbilical hernia.  BONES: Leftward curvature and degenerative changes of the spine.     IMPRESSION:     Patent mesenteric vessels although distal branches is beyond resolution   of routine CTA technique. No secondary signs of mesenteric ischemia.    Distended gallbladder with suggestion of wall edema. Recommend clinical   correlation and additional imaging if there is clinical suspicion for   acute cholecystitis.    Nonspecific wall thickening/enhancement of the urinary bladder. Recommend   correlation with urinalysis to assess urinary tract infection.    Additional findings as described.    < end of copied text >
[facility] Cardiology Consult  _________________________    Patient is a 88y old  Female who presents with a chief complaint of abdominal pain (29 Oct 2018 19:49)      HPI:  This patient is an 88yoF with PMH of afib (refused anticoagulation), htn, T2DM, hypothyroidism, neuropathy, recurrent UTIs who presented for epigastric abdominal pain which started after patient ate spicy Chinese food and ice cream. Pain was constant and radiated upwards. Pain was described as dullness, with intermittent sharpness, and severe. She denied any fever, chills, N/V, diarrhea or constipation.   Patient was found to have acute cholecystitis and recommended to undergo surgery which she refused. She may be undergoing percutaneous or endoscopic drainage of GB. Cardiology consulted for preoperative clearance. Patient denies any chest pains. No shortness of breath. She is limited in walking because of severe spinal stenosis. Denies history of stroke, heart attack.     She is a patient of Dr. Ángel Hernandez and used to be on anticoagulation for stroke prevention in setting of atrial fibrillation but stopped taking it herself because she was anemic. She is not interested in restarting anticoagulation.       PAST MEDICAL & SURGICAL HISTORY:  Spinal stenosis of lumbar region  Hiatal hernia  Diabetes mellitus, type II  Atrial fibrillation  H/O: GI Bleed  Overactive Bladder  Uterine Polyp  Peripheral Neuropathy  Degenerative Joint Disease  Obesity  Hypothyroidism  Hypertension  S/P rotator cuff repair: right  S/P knee replacement, right      MEDICATIONS  (STANDING):  cyanocobalamin 1000 MICROGram(s) Oral daily  dextrose 5% + sodium chloride 0.9%. 1000 milliLiter(s) (50 mL/Hr) IV Continuous <Continuous>  dextrose 5%. 1000 milliLiter(s) (50 mL/Hr) IV Continuous <Continuous>  dextrose 50% Injectable 12.5 Gram(s) IV Push once  dextrose 50% Injectable 25 Gram(s) IV Push once  dextrose 50% Injectable 25 Gram(s) IV Push once  gabapentin 200 milliGRAM(s) Oral two times a day  influenza   Vaccine 0.5 milliLiter(s) IntraMuscular once  insulin lispro (HumaLOG) corrective regimen sliding scale   SubCutaneous every 6 hours  levothyroxine 75 MICROGram(s) Oral daily  metoprolol tartrate 25 milliGRAM(s) Oral daily  multivitamin 1 Tablet(s) Oral daily  piperacillin/tazobactam IVPB. 3.375 Gram(s) IV Intermittent every 8 hours  sodium chloride 0.9%. 1000 milliLiter(s) (50 mL/Hr) IV Continuous <Continuous>    MEDICATIONS  (PRN):  acetaminophen   Tablet .. 650 milliGRAM(s) Oral every 6 hours PRN Mild Pain (1 - 3), Moderate Pain (4 - 6), Severe Pain (7 - 10)  dextrose 40% Gel 15 Gram(s) Oral once PRN Blood Glucose LESS THAN 70 milliGRAM(s)/deciliter  glucagon  Injectable 1 milliGRAM(s) IntraMuscular once PRN Glucose LESS THAN 70 milligrams/deciliter      Allergies    aspirin (Unknown)    Intolerances        Social Histroy: Tobacco- , ETOH-, Illicit Drugs-    T(C): 37.2 (10-30-18 @ 04:43), Max: 37.2 (10-30-18 @ 04:43)  HR: 96 (10-30-18 @ 04:43) (80 - 96)  BP: 133/84 (10-30-18 @ 04:43) (112/71 - 133/84)  RR: 20 (10-30-18 @ 04:43) (20 - 20)  SpO2: 93% (10-30-18 @ 04:43) (93% - 96%)  I&O's Summary      Review of Systems:  Constitutional: [ ] Fever [ ] Chills [ ] Fatigue [ ] Weight change   HEENT: [ ] Blurred vision [ ] Eye Pain [ ] Headache [ ] Runny nose [ ] Sore Throat   Respiratory: [ ] Cough [ ] Wheezing [ ] Shortness of breath  Cardiovascular: [ ] Chest Pain [ ] Palpitations [ ] CUELLAR [ ] PND [ ] Orthopnea  Gastrointestinal: [x] Abdominal Pain [ ] Diarrhea [ ] Constipation [ ] Hemorrhoids [x] Nausea [ ] Vomiting  Genitourinary: [ ] Nocturia [ ] Dysuria [ ] Incontinence  Extremities: [ ] Swelling [ ] Joint Pain  Neurologic: [ ] Focal deficit [ ] Paresthesias [ ] Syncope  Lymphatic: [ ] Swelling [ ] Lymphadenopathy   Skin: [ ] Rash [ ] Ecchymoses [ ] Wounds [ ] Lesions  Psychiatry: [ ] Depression [ ] Suicidal/Homicidal Ideation [ ] Anxiety [ ] Sleep Disturbances  [x] 10 point review of systems is otherwise negative except as mentioned above            [ ]Unable to obtain    PHYSICAL EXAM:  GENERAL: Alert, NAD  NECK: Supple.  CHEST/LUNG: Clear to auscultation bilaterally; No wheezes, rales, or rhonchi  HEART: S1 S2 normal, irregular  ABDOMEN: Soft, tender to deep palpation in the right upper quadrant,   EXTREMITIES:  No LE edema.      LABS:                        14.3   20.19 )-----------( 191      ( 30 Oct 2018 08:01 )             41.7     10-30    134<L>  |  97  |  14  ----------------------------<  151<H>  3.7   |  25  |  0.69    Ca    9.7      30 Oct 2018 06:42    TPro  7.9  /  Alb  4.2  /  TBili  0.6  /  DBili  x   /  AST  12  /  ALT  12  /  AlkPhos  74  10-28    PT/INR - ( 29 Oct 2018 09:29 )   PT: 13.3 sec;   INR: 1.17 ratio         PTT - ( 29 Oct 2018 09:29 )  PTT:26.8 sec          Urinalysis Basic - ( 28 Oct 2018 22:49 )    Color: Yellow / Appearance: Turbid / SG: >1.050 / pH: x  Gluc: x / Ketone: Trace  / Bili: Negative / Urobili: Negative   Blood: x / Protein: 100 mg/dL / Nitrite: Positive   Leuk Esterase: Large / RBC: 20 /hpf / WBC 1810 /hpf   Sq Epi: x / Non Sq Epi: 2 /hpf / Bacteria: Many        MEDICATIONS  (STANDING):  cyanocobalamin 1000 MICROGram(s) Oral daily  dextrose 5% + sodium chloride 0.9%. 1000 milliLiter(s) (50 mL/Hr) IV Continuous <Continuous>  dextrose 5%. 1000 milliLiter(s) (50 mL/Hr) IV Continuous <Continuous>  dextrose 50% Injectable 12.5 Gram(s) IV Push once  dextrose 50% Injectable 25 Gram(s) IV Push once  dextrose 50% Injectable 25 Gram(s) IV Push once  gabapentin 200 milliGRAM(s) Oral two times a day  influenza   Vaccine 0.5 milliLiter(s) IntraMuscular once  insulin lispro (HumaLOG) corrective regimen sliding scale   SubCutaneous every 6 hours  levothyroxine 75 MICROGram(s) Oral daily  metoprolol tartrate 25 milliGRAM(s) Oral daily  multivitamin 1 Tablet(s) Oral daily  piperacillin/tazobactam IVPB. 3.375 Gram(s) IV Intermittent every 8 hours  sodium chloride 0.9%. 1000 milliLiter(s) (50 mL/Hr) IV Continuous <Continuous>    MEDICATIONS  (PRN):  acetaminophen   Tablet .. 650 milliGRAM(s) Oral every 6 hours PRN Mild Pain (1 - 3), Moderate Pain (4 - 6), Severe Pain (7 - 10)  dextrose 40% Gel 15 Gram(s) Oral once PRN Blood Glucose LESS THAN 70 milliGRAM(s)/deciliter  glucagon  Injectable 1 milliGRAM(s) IntraMuscular once PRN Glucose LESS THAN 70 milligrams/deciliter          RADIOLOGY & ADDITIONAL TESTS:    Cardiology testing:  EKG: Atrial fibrillation with pvc.
This patient is an 88yoF with PMH of afib, htn, T2DM, hypothyroidism, neuropathy, recurrent UTIs who presents to ED from assisted living facility for complaint of abdominal pain. Patine presented to ED w/ acute onset of epigastric abdominal pain started after Chinese takeout meal. Pain was constant and radiated upwards.  No fever, chills, N/V, diarrhea or constipation.    CT angio a/p performed to r/o mesenteric ischemia. This showed GB thickening. RUQ sono shows acute cholecystitis.  Patient evaluated by surgical team but refused surgical intervention at this time.  Currently afebrile. States she does not have any pain and wants to go home. Upset when surgery is mentioned as she does not believe she is a candidate due to her age.    PAST MEDICAL & SURGICAL HISTORY:  Spinal stenosis of lumbar region  Hiatal hernia  Diabetes mellitus, type II  Atrial fibrillation  H/O: GI Bleed  Overactive Bladder  Uterine Polyp  Peripheral Neuropathy  Degenerative Joint Disease  Obesity  Hypothyroidism  Hypertension  S/P rotator cuff repair: right  S/P knee replacement, right      MEDICATIONS  (STANDING):  cyanocobalamin 1000 MICROGram(s) Oral daily  dextrose 5%. 1000 milliLiter(s) (50 mL/Hr) IV Continuous <Continuous>  dextrose 50% Injectable 12.5 Gram(s) IV Push once  dextrose 50% Injectable 25 Gram(s) IV Push once  dextrose 50% Injectable 25 Gram(s) IV Push once  gabapentin 200 milliGRAM(s) Oral two times a day  influenza   Vaccine 0.5 milliLiter(s) IntraMuscular once  insulin lispro (HumaLOG) corrective regimen sliding scale   SubCutaneous every 6 hours  levothyroxine 75 MICROGram(s) Oral daily  metoprolol tartrate 25 milliGRAM(s) Oral daily  multivitamin 1 Tablet(s) Oral daily  piperacillin/tazobactam IVPB. 3.375 Gram(s) IV Intermittent every 8 hours  sodium chloride 0.9%. 1000 milliLiter(s) (50 mL/Hr) IV Continuous <Continuous>    MEDICATIONS  (PRN):  acetaminophen   Tablet .. 650 milliGRAM(s) Oral every 6 hours PRN Mild Pain (1 - 3), Moderate Pain (4 - 6), Severe Pain (7 - 10)  dextrose 40% Gel 15 Gram(s) Oral once PRN Blood Glucose LESS THAN 70 milliGRAM(s)/deciliter  glucagon  Injectable 1 milliGRAM(s) IntraMuscular once PRN Glucose LESS THAN 70 milligrams/deciliter      Allergies    aspirin (Unknown)    Intolerances        Review of Systems:    General:  No wt loss, fevers, chills, night sweats,fatigue,   CV:  No pain, palpitatioins, hypo/hypertension  Resp:  No dyspnea, cough, tachypnea, wheezing  GI:  No pain, No nausea, No vomiting, No diarrhea, No constipatiion, No weight loss, No fever, No pruritis, No rectal bleeding, No tarry stools, No dysphagia,  :  No pain, bleeding, incontinence, nocturia  Muscle:  No pain, weakness  Neuro:  No weakness, tingling, memory problems  Psych:  No fatigue, insomnia, mood problems, depression  Endocrine:  No polyuria, polydypsia, cold/heat intolerance  Heme:  No petechiae, ecchymosis, easy bruisability  Skin:  No rash, tattoos, scars, edema      Vital Signs Last 24 Hrs  T(C): 36.7 (29 Oct 2018 18:45), Max: 37.1 (29 Oct 2018 03:49)  T(F): 98 (29 Oct 2018 18:45), Max: 98.7 (29 Oct 2018 03:49)  HR: 94 (29 Oct 2018 18:45) (80 - 100)  BP: 112/71 (29 Oct 2018 18:45) (112/71 - 148/68)  BP(mean): --  RR: 20 (29 Oct 2018 18:45) (18 - 20)  SpO2: 95% (29 Oct 2018 18:45) (88% - 97%)    PHYSICAL EXAM:    Constitutional: NAD, well-developed, obese  Neck: No LAD, supple  Respiratory: CTA and P  Cardiovascular: S1 and S2, RRR, no M  Gastrointestinal: BS+, soft, TTP in upper abdomen  Extremities: No peripheral edema, neg clubing, cyanosis  Vascular: 2+ peripheral pulses  Neurological: A/O x 3, no focal deficits  Psychiatric: Normal mood, normal affect  Skin: No rashes      LABS:                        14.5   16.96 )-----------( 239      ( 29 Oct 2018 07:41 )             43.5                         15.4   13.2  )-----------( 174      ( 28 Oct 2018 16:39 )             46.6     10-29    137  |  101  |  13  ----------------------------<  190<H>  4.0   |  23  |  0.64    Ca    9.9      29 Oct 2018 06:39    TPro  7.9  /  Alb  4.2  /  TBili  0.6  /  DBili  x   /  AST  12  /  ALT  12  /  AlkPhos  74  10-28    PT/INR - ( 29 Oct 2018 09:29 )   PT: 13.3 sec;   INR: 1.17 ratio         PTT - ( 29 Oct 2018 09:29 )  PTT:26.8 sec  Urinalysis Basic - ( 28 Oct 2018 22:49 )    Color: Yellow / Appearance: Turbid / SG: >1.050 / pH: x  Gluc: x / Ketone: Trace  / Bili: Negative / Urobili: Negative   Blood: x / Protein: 100 mg/dL / Nitrite: Positive   Leuk Esterase: Large / RBC: 20 /hpf / WBC 1810 /hpf   Sq Epi: x / Non Sq Epi: 2 /hpf / Bacteria: Many      LIVER FUNCTIONS - ( 28 Oct 2018 16:39 )  Alb: 4.2 g/dL / Pro: 7.9 g/dL / ALK PHOS: 74 U/L / ALT: 12 U/L / AST: 12 U/L / GGT: x           Lipase, Serum: 12 U/L (10-28-18 @ 18:12)        RADIOLOGY & ADDITIONAL TESTS:
GENERAL SURGERY CONSULT NOTE  --------------------------------------------------------------------------------------------    HPI:   Patient is a 88y old  Female who presents with a chief complaint of abdominal pain, nausea, emesis.  Patient started having symptoms since last night, after dinner.  Patient's pain has been constant, severe, and mostly in the upper abdomen.  Patient has had nausea, with 1 episode of small amount of emesis.  She tried to eat breakfast this morning when she felt a little bit better but was unable to complete breakfast as her pain worsened.      Patient states that she has known that she has stones in her gallbladder, and had one "attack" of gallstones about 35-40 years ago.  She refused surgery at that time, and was told if she has another attack she should strongly consider surgery.  Since then, she has had no severe symptoms, but has had a few episodes of mild pain, never needing to go to a hospital or see a doctor.      PCP/cardiologist: Dr. Ángel Hernandez  Endocrinologist: Dr. Christopher Infante  OB Gyn: Dr. Yanet Nelson  Gastroenterologist/Hepatologist: Dr. Ivan Reyes    ROS: 10-system review is otherwise negative except HPI above.      PAST MEDICAL & SURGICAL HISTORY:  Spinal stenosis of lumbar region  Hiatal hernia  Diabetes mellitus, type II  Atrial fibrillation  H/O: GI Bleed  Overactive Bladder  Uterine Polyp  Peripheral Neuropathy  Degenerative Joint Disease  Obesity  Hypothyroidism  Hypertension  S/P rotator cuff repair: right  S/P knee replacement, right  h/o  x1    FAMILY HISTORY:  No pertinent family history in first degree relatives    ALLERGIES: aspirin (Unknown)    HOME MEDICATIONS:   Bactrim  Ceterizine  Gabapentin  Levothyroxine  Metformin  Metoprolol  Nystatin  Centrum  Iron  B12  D3  Cranberry    --------------------------------------------------------------------------------------------    Vitals:   T(C): 36.8 (10-28-18 @ 22:52), Max: 36.8 (10-28-18 @ 14:54)  HR: 85 (10-28-18 @ 22:52) (85 - 97)  BP: 148/68 (10-28-18 @ 22:52) (144/72 - 158/87)  RR: 18 (10-28-18 @ 22:) (18 - 20)  SpO2: 95% (10-28-18 @ :52) (93% - 99%)    Height (cm): 177.8 (10-28 @ 14:54)  Weight (kg): 113.4 (10-28 @ 14:)  BMI (kg/m2): 35.9 (10-28 @ 14:54)  BSA (m2): 2.29 (10-28 @ 14:)    PHYSICAL EXAM:   General: Lying in bed, appears uncomfortable  Neuro: A+Ox3  HEENT: NC/AT, EOMI  Cardio: nml s1/s2, irregular  Resp: decreased effort b/l  GI/Abd: Soft, non-distended, obese, tender in RUQ and epigastrium, no rebound/guarding, no masses palpated, well-healed lower midline incision  Vascular: All 4 extremities warm.  Musculoskeletal: All 4 extremities moving spontaneously, no limitations  --------------------------------------------------------------------------------------------    LABS  CBC (10-28 @ 16:39)                              15.4                           13.2<H>  )----------------(  174        87.0<H>% Neutrophils, 7.4<L>% Lymphocytes, ANC: 11.5<H>                              46.6<H>    BMP (10-28 @ 16:39)             140     |  100     |  18    		Ca++ --      Ca 11.2<H>             ---------------------------------( 179<H>		Mg --                 4.8     |  24      |  0.72  			Ph --        LFTs (10-28 @ 16:39)      TPro 7.9 / Alb 4.2 / TBili 0.6 / DBili -- / AST 12 / ALT 12 / AlkPhos 74          VBG (10-28 @ 22:12)     7.31<L> / 58<H> / 22<L> / 28 / 1.1 / 28<L>%     Lactate: 2.8<H>  VBG (10-28 @ 16:39)     7.36 / 55<H> / 22<L> / 31<H> / 4.1<H> / 29<L>%     Lactate: 2.8<H>    --------------------------------------------------------------------------------------------    MICROBIOLOGY  Urinalysis (10-28 @ 22:49):     Color: Yellow / Appearance: Turbid<!> / SG: >1.050<!> / pH: 7.0 / Gluc: Negative / Ketones: Trace / Bili: Negative / Urobili: Negative / Protein :100 mg/dL<!> / Nitrites: Positive<!> / Leuk.Est: Large<!> / RBC: 20<H> / WBC: 1810<H> / Sq Epi:  / Non Sq Epi: 2 / Bacteria Many<!>   Few Mucus Strands.      --------------------------------------------------------------------------------------------    IMAGING  < from: CT Angio Abdomen and Pelvis w/ IV Cont (10.28.18 @ 18:51) >  IMPRESSION:     Patent mesenteric vessels although distal branches is beyond resolution   of routine CTA technique. No secondary signs of mesenteric ischemia.    Distended gallbladder with suggestion of wall edema. Recommend clinical   correlation and additional imaging if there is clinical suspicion for   acute cholecystitis.    Nonspecific wall thickening/enhancement of the urinary bladder. Recommend   correlation with urinalysis to assess urinary tract infection.    < end of copied text >      < from: US Abdomen Upper Quadrant Right (10.29.18 @ 00:59) >  IMPRESSION:     Sonographic findings of acute cholecystitis. Poorly visualized pancreas.    < end of copied text >      --------------------------------------------------------------------------------------------

## 2018-11-01 NOTE — PROGRESS NOTE ADULT - ASSESSMENT
88 yo F w/ acute cholecystitis  Continue IV abx.  Leukocytosis resolving.  Patient has refused surgery.  Consult placed to Dr Wheat/[facility] to discuss risks/benefits of endoscopic gallbladder drainage with patient and son.  Will follow  [phone]

## 2018-11-01 NOTE — PROGRESS NOTE ADULT - SUBJECTIVE AND OBJECTIVE BOX
No complaints.   Responding well to antibiotics.    Vital Signs Last 24 Hrs  T(C): 36.6 (01 Nov 2018 04:34), Max: 36.7 (31 Oct 2018 20:53)  T(F): 97.9 (01 Nov 2018 04:34), Max: 98.1 (31 Oct 2018 20:53)  HR: 93 (01 Nov 2018 04:34) (86 - 93)  BP: 132/82 (01 Nov 2018 04:34) (110/67 - 132/82)  BP(mean): --  RR: 18 (01 Nov 2018 04:34) (18 - 18)  SpO2: 94% (01 Nov 2018 04:34) (93% - 94%)    PHYSICAL EXAM:    Constitutional: NAD, well-developed  Neck: No LAD, supple  Respiratory: CTA and P  Cardiovascular: S1 and S2, RRR, no M  Gastrointestinal: BS+, soft, tender to palpation in RUQ  Extremities: No peripheral edema, neg clubing, cyanosis  Vascular: 2+ peripheral pulses  Neurological: A/O x 3, no focal deficits  Psychiatric: Normal mood, normal affect  Skin: No rashes    MEDICATIONS  (STANDING):  cyanocobalamin 1000 MICROGram(s) Oral daily  dextrose 5% + sodium chloride 0.9%. 1000 milliLiter(s) (50 mL/Hr) IV Continuous <Continuous>  dextrose 5%. 1000 milliLiter(s) (50 mL/Hr) IV Continuous <Continuous>  dextrose 50% Injectable 12.5 Gram(s) IV Push once  dextrose 50% Injectable 25 Gram(s) IV Push once  dextrose 50% Injectable 25 Gram(s) IV Push once  gabapentin 200 milliGRAM(s) Oral two times a day  influenza   Vaccine 0.5 milliLiter(s) IntraMuscular once  insulin lispro (HumaLOG) corrective regimen sliding scale   SubCutaneous every 6 hours  levothyroxine 75 MICROGram(s) Oral daily  metoprolol tartrate 25 milliGRAM(s) Oral daily  multivitamin 1 Tablet(s) Oral daily  piperacillin/tazobactam IVPB. 3.375 Gram(s) IV Intermittent every 8 hours  sodium chloride 0.9%. 1000 milliLiter(s) (50 mL/Hr) IV Continuous <Continuous>    MEDICATIONS  (PRN):  acetaminophen   Tablet .. 650 milliGRAM(s) Oral every 6 hours PRN Mild Pain (1 - 3), Moderate Pain (4 - 6), Severe Pain (7 - 10)  dextrose 40% Gel 15 Gram(s) Oral once PRN Blood Glucose LESS THAN 70 milliGRAM(s)/deciliter  glucagon  Injectable 1 milliGRAM(s) IntraMuscular once PRN Glucose LESS THAN 70 milligrams/deciliter      Allergies    aspirin (Unknown)    Intolerances      LABS:                        14.6   12.2  )-----------( 273      ( 01 Nov 2018 12:40 )             42.4     11-01    140  |  103  |  12  ----------------------------<  156<H>  3.6   |  26  |  0.57    Ca    9.9      01 Nov 2018 12:40  Phos  1.9     11-01  Mg     1.7     11-01    TPro  7.0  /  Alb  2.8<L>  /  TBili  0.9  /  DBili  x   /  AST  45<H>  /  ALT  51<H>  /  AlkPhos  151<H>  11-01    LIVER FUNCTIONS - ( 01 Nov 2018 12:40 )  Alb: 2.8 g/dL / Pro: 7.0 g/dL / ALK PHOS: 151 U/L / ALT: 51 U/L / AST: 45 U/L / GGT: x           PT/INR - ( 31 Oct 2018 15:36 )   PT: 15.3 sec;   INR: 1.33 ratio          RADIOLOGY & ADDITIONAL TESTS:

## 2018-11-01 NOTE — PROGRESS NOTE ADULT - SUBJECTIVE AND OBJECTIVE BOX
[facility] Cardiology Progress Note  _______________________________    Pt. seen and examined. No new cardiac-related complaints. Patient was scheduled for surgery today but refused this morning. Now possibly being planned for percutaneous drainage.     T(C): 36.6 (11-01-18 @ 04:34), Max: 37.2 (10-31-18 @ 12:40)  HR: 93 (11-01-18 @ 04:34) (86 - 93)  BP: 132/82 (11-01-18 @ 04:34) (110/67 - 132/82)  RR: 18 (11-01-18 @ 04:34) (18 - 18)  SpO2: 94% (11-01-18 @ 04:34) (93% - 94%)  I&O's Summary    31 Oct 2018 07:01  -  01 Nov 2018 07:00  --------------------------------------------------------  IN: 0 mL / OUT: 1 mL / NET: -1 mL        PHYSICAL EXAM:  GENERAL: Alert, NAD.  NECK: Supple  CHEST/LUNG: Clear to auscultation bilaterally; No wheezes, rales, or rhonchi.  HEART: S1 S2 normal, irregular  ABDOMEN: Soft, Nondistended; Bowel sounds present  EXTREMITIES:  No LE edema.      LABS:                        13.4   14.7  )-----------( 256      ( 31 Oct 2018 15:36 )             43.6     10-31    139  |  102  |  13  ----------------------------<  138<H>  3.6   |  24  |  0.57    Ca    9.5      31 Oct 2018 15:36    TPro  6.5  /  Alb  2.8<L>  /  TBili  1.0  /  DBili  x   /  AST  44<H>  /  ALT  35  /  AlkPhos  134<H>  10-31    PT/INR - ( 31 Oct 2018 15:36 )   PT: 15.3 sec;   INR: 1.33 ratio       MEDICATIONS  (STANDING):  cyanocobalamin 1000 MICROGram(s) Oral daily  dextrose 5% + sodium chloride 0.9%. 1000 milliLiter(s) (50 mL/Hr) IV Continuous <Continuous>  dextrose 5%. 1000 milliLiter(s) (50 mL/Hr) IV Continuous <Continuous>  dextrose 50% Injectable 12.5 Gram(s) IV Push once  dextrose 50% Injectable 25 Gram(s) IV Push once  dextrose 50% Injectable 25 Gram(s) IV Push once  gabapentin 200 milliGRAM(s) Oral two times a day  influenza   Vaccine 0.5 milliLiter(s) IntraMuscular once  insulin lispro (HumaLOG) corrective regimen sliding scale   SubCutaneous every 6 hours  levothyroxine 75 MICROGram(s) Oral daily  metoprolol tartrate 25 milliGRAM(s) Oral daily  multivitamin 1 Tablet(s) Oral daily  piperacillin/tazobactam IVPB. 3.375 Gram(s) IV Intermittent every 8 hours    MEDICATIONS  (PRN):  acetaminophen   Tablet .. 650 milliGRAM(s) Oral every 6 hours PRN Mild Pain (1 - 3), Moderate Pain (4 - 6), Severe Pain (7 - 10)  dextrose 40% Gel 15 Gram(s) Oral once PRN Blood Glucose LESS THAN 70 milliGRAM(s)/deciliter  glucagon  Injectable 1 milliGRAM(s) IntraMuscular once PRN Glucose LESS THAN 70 milligrams/deciliter        RADIOLOGY & ADDITIONAL TESTS:

## 2018-11-02 VITALS
TEMPERATURE: 99 F | HEART RATE: 76 BPM | DIASTOLIC BLOOD PRESSURE: 86 MMHG | SYSTOLIC BLOOD PRESSURE: 134 MMHG | RESPIRATION RATE: 20 BRPM | OXYGEN SATURATION: 96 %

## 2018-11-02 LAB
ALBUMIN SERPL ELPH-MCNC: 2.9 G/DL — LOW (ref 3.3–5)
ALP SERPL-CCNC: 145 U/L — HIGH (ref 40–120)
ALT FLD-CCNC: 46 U/L — HIGH (ref 10–45)
ANION GAP SERPL CALC-SCNC: 13 MMOL/L — SIGNIFICANT CHANGE UP (ref 5–17)
AST SERPL-CCNC: 30 U/L — SIGNIFICANT CHANGE UP (ref 10–40)
BILIRUB SERPL-MCNC: 0.7 MG/DL — SIGNIFICANT CHANGE UP (ref 0.2–1.2)
BUN SERPL-MCNC: 9 MG/DL — SIGNIFICANT CHANGE UP (ref 7–23)
CALCIUM SERPL-MCNC: 9.4 MG/DL — SIGNIFICANT CHANGE UP (ref 8.4–10.5)
CHLORIDE SERPL-SCNC: 103 MMOL/L — SIGNIFICANT CHANGE UP (ref 96–108)
CO2 SERPL-SCNC: 23 MMOL/L — SIGNIFICANT CHANGE UP (ref 22–31)
CREAT SERPL-MCNC: 0.51 MG/DL — SIGNIFICANT CHANGE UP (ref 0.5–1.3)
GLUCOSE BLDC GLUCOMTR-MCNC: 148 MG/DL — HIGH (ref 70–99)
GLUCOSE BLDC GLUCOMTR-MCNC: 166 MG/DL — HIGH (ref 70–99)
GLUCOSE BLDC GLUCOMTR-MCNC: 188 MG/DL — HIGH (ref 70–99)
GLUCOSE SERPL-MCNC: 172 MG/DL — HIGH (ref 70–99)
HCT VFR BLD CALC: 37.6 % — SIGNIFICANT CHANGE UP (ref 34.5–45)
HGB BLD-MCNC: 12.9 G/DL — SIGNIFICANT CHANGE UP (ref 11.5–15.5)
MCHC RBC-ENTMCNC: 31.6 PG — SIGNIFICANT CHANGE UP (ref 27–34)
MCHC RBC-ENTMCNC: 34.3 GM/DL — SIGNIFICANT CHANGE UP (ref 32–36)
MCV RBC AUTO: 92.2 FL — SIGNIFICANT CHANGE UP (ref 80–100)
PLATELET # BLD AUTO: 248 K/UL — SIGNIFICANT CHANGE UP (ref 150–400)
POTASSIUM SERPL-MCNC: 3.2 MMOL/L — LOW (ref 3.5–5.3)
POTASSIUM SERPL-SCNC: 3.2 MMOL/L — LOW (ref 3.5–5.3)
PROT SERPL-MCNC: 6.3 G/DL — SIGNIFICANT CHANGE UP (ref 6–8.3)
RBC # BLD: 4.08 M/UL — SIGNIFICANT CHANGE UP (ref 3.8–5.2)
RBC # FLD: 13.4 % — SIGNIFICANT CHANGE UP (ref 10.3–14.5)
SODIUM SERPL-SCNC: 139 MMOL/L — SIGNIFICANT CHANGE UP (ref 135–145)
WBC # BLD: 8.46 K/UL — SIGNIFICANT CHANGE UP (ref 3.8–10.5)
WBC # FLD AUTO: 8.46 K/UL — SIGNIFICANT CHANGE UP (ref 3.8–10.5)

## 2018-11-02 RX ORDER — CHOLECALCIFEROL (VITAMIN D3) 125 MCG
1 CAPSULE ORAL
Qty: 0 | Refills: 0 | COMMUNITY

## 2018-11-02 RX ORDER — CETIRIZINE HYDROCHLORIDE 10 MG/1
1 TABLET ORAL
Qty: 0 | Refills: 0 | COMMUNITY

## 2018-11-02 RX ORDER — POTASSIUM CHLORIDE 20 MEQ
40 PACKET (EA) ORAL ONCE
Qty: 0 | Refills: 0 | Status: COMPLETED | OUTPATIENT
Start: 2018-11-02 | End: 2018-11-02

## 2018-11-02 RX ORDER — GABAPENTIN 400 MG/1
2 CAPSULE ORAL
Qty: 0 | Refills: 0 | COMMUNITY

## 2018-11-02 RX ORDER — POTASSIUM CHLORIDE 20 MEQ
10 PACKET (EA) ORAL
Qty: 0 | Refills: 0 | Status: COMPLETED | OUTPATIENT
Start: 2018-11-02 | End: 2018-11-02

## 2018-11-02 RX ORDER — PREGABALIN 225 MG/1
1 CAPSULE ORAL
Qty: 0 | Refills: 0 | COMMUNITY

## 2018-11-02 RX ORDER — LEVOTHYROXINE SODIUM 125 MCG
1 TABLET ORAL
Qty: 0 | Refills: 0 | COMMUNITY

## 2018-11-02 RX ORDER — NITROFURANTOIN MACROCRYSTAL 50 MG
1 CAPSULE ORAL
Qty: 0 | Refills: 0 | COMMUNITY

## 2018-11-02 RX ORDER — PREGABALIN 225 MG/1
1 CAPSULE ORAL
Qty: 0 | Refills: 0 | DISCHARGE
Start: 2018-11-02

## 2018-11-02 RX ORDER — METRONIDAZOLE 500 MG
1 TABLET ORAL
Qty: 0 | Refills: 0 | COMMUNITY

## 2018-11-02 RX ORDER — CIPROFLOXACIN LACTATE 400MG/40ML
1 VIAL (ML) INTRAVENOUS
Qty: 0 | Refills: 0 | COMMUNITY

## 2018-11-02 RX ORDER — MULTIVIT-MIN/FERROUS GLUCONATE 9 MG/15 ML
1 LIQUID (ML) ORAL
Qty: 0 | Refills: 0 | COMMUNITY

## 2018-11-02 RX ORDER — METFORMIN HYDROCHLORIDE 850 MG/1
1 TABLET ORAL
Qty: 0 | Refills: 0 | COMMUNITY

## 2018-11-02 RX ORDER — FERROUS SULFATE 325(65) MG
1 TABLET ORAL
Qty: 0 | Refills: 0 | COMMUNITY

## 2018-11-02 RX ORDER — LEVOTHYROXINE SODIUM 125 MCG
1 TABLET ORAL
Qty: 0 | Refills: 0 | DISCHARGE
Start: 2018-11-02

## 2018-11-02 RX ORDER — GABAPENTIN 400 MG/1
2 CAPSULE ORAL
Qty: 0 | Refills: 0 | DISCHARGE
Start: 2018-11-02

## 2018-11-02 RX ORDER — METOPROLOL TARTRATE 50 MG
1 TABLET ORAL
Qty: 0 | Refills: 0 | COMMUNITY

## 2018-11-02 RX ADMIN — PIPERACILLIN AND TAZOBACTAM 25 GRAM(S): 4; .5 INJECTION, POWDER, LYOPHILIZED, FOR SOLUTION INTRAVENOUS at 14:33

## 2018-11-02 RX ADMIN — Medication 40 MILLIEQUIVALENT(S): at 16:25

## 2018-11-02 RX ADMIN — PREGABALIN 1000 MICROGRAM(S): 225 CAPSULE ORAL at 12:13

## 2018-11-02 RX ADMIN — GABAPENTIN 200 MILLIGRAM(S): 400 CAPSULE ORAL at 09:10

## 2018-11-02 RX ADMIN — Medication 25 MILLIGRAM(S): at 06:03

## 2018-11-02 RX ADMIN — Medication 100 MILLIEQUIVALENT(S): at 11:09

## 2018-11-02 RX ADMIN — Medication 100 MILLIEQUIVALENT(S): at 09:04

## 2018-11-02 RX ADMIN — Medication 75 MICROGRAM(S): at 06:03

## 2018-11-02 RX ADMIN — Medication 1: at 12:12

## 2018-11-02 RX ADMIN — Medication 1 TABLET(S): at 12:13

## 2018-11-02 RX ADMIN — Medication 1: at 00:40

## 2018-11-02 RX ADMIN — Medication 100 MILLIEQUIVALENT(S): at 13:54

## 2018-11-02 RX ADMIN — PIPERACILLIN AND TAZOBACTAM 25 GRAM(S): 4; .5 INJECTION, POWDER, LYOPHILIZED, FOR SOLUTION INTRAVENOUS at 06:03

## 2018-11-02 NOTE — PROGRESS NOTE ADULT - PROBLEM SELECTOR PROBLEM 4
Diabetes mellitus, type II

## 2018-11-02 NOTE — CHART NOTE - NSCHARTNOTEFT_GEN_A_CORE
Advanced GI Note:    GI Attending Dr. Beckford and son (at request of patient) spoke regarding endoscopic gallbladder drainage.   After extensive discussion on risks, benefits of procedure son refused endoscopic procedure.  f/u with GI (Dr. Gonzáles) and Surgery    Please call with questions  Marcia Salinas  GI Fellow  Pager: [phone]

## 2018-11-02 NOTE — DIETITIAN INITIAL EVALUATION ADULT. - PERTINENT MEDS FT
MEDICATIONS  (STANDING):  cyanocobalamin 1000 MICROGram(s) Oral daily  insulin lispro (HumaLOG) corrective regimen sliding scale   SubCutaneous every 6 hours  levothyroxine 75 MICROGram(s) Oral daily  multivitamin 1 Tablet(s) Oral daily  potassium chloride  10 mEq/100 mL IVPB 10 milliEquivalent(s) IV Intermittent every 1 hour

## 2018-11-02 NOTE — PROGRESS NOTE ADULT - PROBLEM SELECTOR PLAN 1
Patient has CT and US evidence of acute cholecystitis.   patient continues to change her mind regarding surgical intervention  I have discussed with son and patient in depth regarding options including surgery and drainage and they are refusing both. they are aware of risk of worsening infection, perforation and possible readmission with septic shock that could lead to death.  pt refused endoscopic drainage, son in agreement  as leukocytosis improving and afebrile, will advance diet to low far diet  can change zosyn to Cipro and flagyl for 2 week course Patient has CT and US evidence of acute cholecystitis.   patient continues to change her mind regarding surgical intervention with son presents during decision making  I have discussed with son and patient in depth regarding options including surgery and drainage and they are refusing both. they are aware of risk of worsening infection, perforation and possible readmission with septic shock that could lead to death.  pt refused endoscopic drainage, son in agreement  as leukocytosis improving and afebrile, will advance diet to low far diet  can change zosyn to Cipro and flagyl for 2 week course  PT recs STR, pt and son declining

## 2018-11-02 NOTE — PROGRESS NOTE ADULT - SUBJECTIVE AND OBJECTIVE BOX
[facility] Cardiology Progress Note  _______________________________    Pt. seen and examined. No new cardiac-related complaints. Patient very upset about not eating and wants to go home. I paged GI to ask them when she can eat.     T(C): 36.7 (11-02-18 @ 05:08), Max: 36.7 (11-02-18 @ 05:08)  HR: 85 (11-02-18 @ 05:08) (85 - 90)  BP: 141/81 (11-02-18 @ 05:08) (127/73 - 151/72)  RR: 18 (11-02-18 @ 05:08) (18 - 18)  SpO2: 93% (11-02-18 @ 05:08) (91% - 93%)  I&O's Summary    01 Nov 2018 07:01  -  02 Nov 2018 07:00  --------------------------------------------------------  IN: 0 mL / OUT: 0 mL / NET: 0 mL        PHYSICAL EXAM:  GENERAL: Alert, NAD.  NECK: Supple  CHEST/LUNG: Clear to auscultation bilaterally; No wheezes, rales, or rhonchi.  HEART: S1 S2 normal, irregular  ABDOMEN: Soft, Nontender, Nondistended; Bowel sounds present  EXTREMITIES:  No LE edema.      LABS:                        12.9   8.46  )-----------( 248      ( 02 Nov 2018 07:55 )             37.6     11-02    139  |  103  |  9   ----------------------------<  172<H>  3.2<L>   |  23  |  0.51    Ca    9.4      02 Nov 2018 06:40  Phos  1.9     11-01  Mg     1.7     11-01    TPro  6.3  /  Alb  2.9<L>  /  TBili  0.7  /  DBili  x   /  AST  30  /  ALT  46<H>  /  AlkPhos  145<H>  11-02    PT/INR - ( 31 Oct 2018 15:36 )   PT: 15.3 sec;   INR: 1.33 ratio         MEDICATIONS  (STANDING):  cyanocobalamin 1000 MICROGram(s) Oral daily  dextrose 5% + sodium chloride 0.9%. 1000 milliLiter(s) (50 mL/Hr) IV Continuous <Continuous>  dextrose 5%. 1000 milliLiter(s) (50 mL/Hr) IV Continuous <Continuous>  dextrose 50% Injectable 12.5 Gram(s) IV Push once  dextrose 50% Injectable 25 Gram(s) IV Push once  dextrose 50% Injectable 25 Gram(s) IV Push once  gabapentin 200 milliGRAM(s) Oral two times a day  influenza   Vaccine 0.5 milliLiter(s) IntraMuscular once  insulin lispro (HumaLOG) corrective regimen sliding scale   SubCutaneous every 6 hours  levothyroxine 75 MICROGram(s) Oral daily  metoprolol tartrate 25 milliGRAM(s) Oral daily  multivitamin 1 Tablet(s) Oral daily  piperacillin/tazobactam IVPB. 3.375 Gram(s) IV Intermittent every 8 hours  potassium chloride  10 mEq/100 mL IVPB 10 milliEquivalent(s) IV Intermittent every 1 hour    MEDICATIONS  (PRN):  acetaminophen   Tablet .. 650 milliGRAM(s) Oral every 6 hours PRN Mild Pain (1 - 3), Moderate Pain (4 - 6), Severe Pain (7 - 10)  dextrose 40% Gel 15 Gram(s) Oral once PRN Blood Glucose LESS THAN 70 milliGRAM(s)/deciliter  glucagon  Injectable 1 milliGRAM(s) IntraMuscular once PRN Glucose LESS THAN 70 milligrams/deciliter        RADIOLOGY & ADDITIONAL TESTS:

## 2018-11-02 NOTE — PROGRESS NOTE ADULT - PROBLEM SELECTOR PROBLEM 2
Atrial fibrillation
Chronic atrial fibrillation
Chronic atrial fibrillation
Atrial fibrillation

## 2018-11-02 NOTE — DIETITIAN INITIAL EVALUATION ADULT. - NS AS NUTRI INTERV MEALS SNACK
Recommend to start clear liquid diet when medically feasible. Recommend to advance to low fat full liquid diet and then to Consistent Carbohydrate DASH as tolerated. RD remains available.

## 2018-11-02 NOTE — DIETITIAN INITIAL EVALUATION ADULT. - PROBLEM SELECTOR PLAN 8
Patient takes gabapentin 100mg 2 capsules BID at home.   Will start gabapentin at home dose as tolerated.

## 2018-11-02 NOTE — PROGRESS NOTE ADULT - ASSESSMENT
88 yo F w/ acute cholecystitis    Extensive discussion with patient and family over multiple days regarding all procedural options for her cholecystitis including Cholecystectomy, Percutaneous cholecystostomy, and Endoscopic Cholecystoduodenostomy.    She has been advised that with medical therapy alone, her cholecystitis or other biliary related complications, including cholangitis or pancreatitis, are likely to occur.  She has been advised that a subsequent gallstone related event may be more severe and possibly fatal.  Despite this information, she has chosen not to pursue any procedural options in favor of antibiotics alone.  She understands the risks of her decision.  She has been advised to return to the ED with any worsening abdominal pain, fever, chills, nausea, vomiting, change in stool color, dark urine, jaundice.     Cipro/Flagyl 14 days total antibiotics.  Low fat diet. We reviewed a list of high fat foods that she should avoid including, but not limited to, red meat, fried foods and full fat dairy.    D/C planning  Please recall with questions.  [phone]

## 2018-11-02 NOTE — PROGRESS NOTE ADULT - SUBJECTIVE AND OBJECTIVE BOX
Patient is a 88y old  Female who presents with a chief complaint of abdominal pain (02 Nov 2018 10:35)      SUBJECTIVE / OVERNIGHT EVENTS:    Patient seen and examined. I walked into the room and patient starts screaming at me. Pt complains that she has not had food since admission. Patient refuses to be examined. patient refuses to speak with me. Pt asking for nursing administrator.      Vital Signs Last 24 Hrs  T(C): 36.7 (02 Nov 2018 05:08), Max: 36.7 (02 Nov 2018 05:08)  T(F): 98 (02 Nov 2018 05:08), Max: 98 (02 Nov 2018 05:08)  HR: 85 (02 Nov 2018 05:08) (85 - 90)  BP: 141/81 (02 Nov 2018 05:08) (127/73 - 151/72)  BP(mean): --  RR: 18 (02 Nov 2018 05:08) (18 - 18)  SpO2: 93% (02 Nov 2018 05:08) (91% - 93%)  I&O's Summary    01 Nov 2018 07:01  -  02 Nov 2018 07:00  --------------------------------------------------------  IN: 0 mL / OUT: 0 mL / NET: 0 mL        PE:  GENERAL: NAD, AAOx3  HEAD:  Atraumatic, Normocephalic  EYES: EOMI, PERRLA, conjunctiva and sclera clear  NECK: Supple, No JVD  CHEST/LUNG: CTABL, No wheeze  HEART: Regular rate and rhythm; + murmur  ABDOMEN: Soft, Nontender, Nondistended; Bowel sounds present  EXTREMITIES:  2+ Peripheral Pulses, No clubbing, cyanosis, or edema  SKIN: No rashes or lesions  NEURO: No focal deficits    LABS:                        12.9   8.46  )-----------( 248      ( 02 Nov 2018 07:55 )             37.6     11-02    139  |  103  |  9   ----------------------------<  172<H>  3.2<L>   |  23  |  0.51    Ca    9.4      02 Nov 2018 06:40  Phos  1.9     11-01  Mg     1.7     11-01    TPro  6.3  /  Alb  2.9<L>  /  TBili  0.7  /  DBili  x   /  AST  30  /  ALT  46<H>  /  AlkPhos  145<H>  11-02    PT/INR - ( 31 Oct 2018 15:36 )   PT: 15.3 sec;   INR: 1.33 ratio           CAPILLARY BLOOD GLUCOSE      POCT Blood Glucose.: 148 mg/dL (02 Nov 2018 05:54)  POCT Blood Glucose.: 166 mg/dL (02 Nov 2018 00:38)  POCT Blood Glucose.: 143 mg/dL (01 Nov 2018 16:48)  POCT Blood Glucose.: 150 mg/dL (01 Nov 2018 11:49)            RADIOLOGY & ADDITIONAL TESTS:    Imaging Personally Reviewed:  [x] YES  [ ] NO    Consultant(s) Notes Reviewed:  [x] YES  [ ] NO    MEDICATIONS  (STANDING):  cyanocobalamin 1000 MICROGram(s) Oral daily  dextrose 5% + sodium chloride 0.9%. 1000 milliLiter(s) (50 mL/Hr) IV Continuous <Continuous>  dextrose 5%. 1000 milliLiter(s) (50 mL/Hr) IV Continuous <Continuous>  dextrose 50% Injectable 12.5 Gram(s) IV Push once  dextrose 50% Injectable 25 Gram(s) IV Push once  dextrose 50% Injectable 25 Gram(s) IV Push once  gabapentin 200 milliGRAM(s) Oral two times a day  influenza   Vaccine 0.5 milliLiter(s) IntraMuscular once  insulin lispro (HumaLOG) corrective regimen sliding scale   SubCutaneous every 6 hours  levothyroxine 75 MICROGram(s) Oral daily  metoprolol tartrate 25 milliGRAM(s) Oral daily  multivitamin 1 Tablet(s) Oral daily  piperacillin/tazobactam IVPB. 3.375 Gram(s) IV Intermittent every 8 hours  potassium chloride  10 mEq/100 mL IVPB 10 milliEquivalent(s) IV Intermittent every 1 hour    MEDICATIONS  (PRN):  acetaminophen   Tablet .. 650 milliGRAM(s) Oral every 6 hours PRN Mild Pain (1 - 3), Moderate Pain (4 - 6), Severe Pain (7 - 10)  dextrose 40% Gel 15 Gram(s) Oral once PRN Blood Glucose LESS THAN 70 milliGRAM(s)/deciliter  glucagon  Injectable 1 milliGRAM(s) IntraMuscular once PRN Glucose LESS THAN 70 milligrams/deciliter      Care Discussed with Consultants/Other Providers [x] YES  [ ] NO    HEALTH ISSUES - PROBLEM Dx:  Chronic atrial fibrillation: Chronic atrial fibrillation  Preoperative cardiovascular examination: Preoperative cardiovascular examination  Hypercalcemia: Hypercalcemia  Prophylactic measure: Prophylactic measure  Peripheral Neuropathy: Peripheral Neuropathy  Hypertension: Hypertension  Hypothyroidism: Hypothyroidism  Abnormal urinalysis: Abnormal urinalysis  Diabetes mellitus, type II: Diabetes mellitus, type II  Atrial fibrillation: Atrial fibrillation  Acute cholecystitis: Acute cholecystitis Patient is a 88y old  Female who presents with a chief complaint of abdominal pain (02 Nov 2018 10:35)      SUBJECTIVE / OVERNIGHT EVENTS:    Patient seen and examined. I walked into the room and patient starts screaming at me. Pt complains that she has not had food since admission. Patient refuses to be examined. patient refuses to speak with me. Pt asking for nursing administrator.  I returned to the room after son arrived and patient now lets me examined her. denies cp sob nvd. eager to go home.      Vital Signs Last 24 Hrs  T(C): 36.7 (02 Nov 2018 05:08), Max: 36.7 (02 Nov 2018 05:08)  T(F): 98 (02 Nov 2018 05:08), Max: 98 (02 Nov 2018 05:08)  HR: 85 (02 Nov 2018 05:08) (85 - 90)  BP: 141/81 (02 Nov 2018 05:08) (127/73 - 151/72)  BP(mean): --  RR: 18 (02 Nov 2018 05:08) (18 - 18)  SpO2: 93% (02 Nov 2018 05:08) (91% - 93%)  I&O's Summary    01 Nov 2018 07:01  -  02 Nov 2018 07:00  --------------------------------------------------------  IN: 0 mL / OUT: 0 mL / NET: 0 mL        PE:  GENERAL: NAD, AAOx3  HEAD:  Atraumatic, Normocephalic  EYES: EOMI, PERRLA, conjunctiva and sclera clear  NECK: Supple, No JVD  CHEST/LUNG: CTABL, No wheeze  HEART: Regular rate and rhythm; no murmur  ABDOMEN: Soft, mild tenderness, neg duomnt, Nondistended; Bowel sounds present  EXTREMITIES:  2+ Peripheral Pulses, No clubbing, cyanosis, or edema  SKIN: No rashes or lesions  NEURO: No focal deficits    LABS:                        12.9   8.46  )-----------( 248      ( 02 Nov 2018 07:55 )             37.6     11-02    139  |  103  |  9   ----------------------------<  172<H>  3.2<L>   |  23  |  0.51    Ca    9.4      02 Nov 2018 06:40  Phos  1.9     11-01  Mg     1.7     11-01    TPro  6.3  /  Alb  2.9<L>  /  TBili  0.7  /  DBili  x   /  AST  30  /  ALT  46<H>  /  AlkPhos  145<H>  11-02    PT/INR - ( 31 Oct 2018 15:36 )   PT: 15.3 sec;   INR: 1.33 ratio           CAPILLARY BLOOD GLUCOSE      POCT Blood Glucose.: 148 mg/dL (02 Nov 2018 05:54)  POCT Blood Glucose.: 166 mg/dL (02 Nov 2018 00:38)  POCT Blood Glucose.: 143 mg/dL (01 Nov 2018 16:48)  POCT Blood Glucose.: 150 mg/dL (01 Nov 2018 11:49)            RADIOLOGY & ADDITIONAL TESTS:    Imaging Personally Reviewed:  [x] YES  [ ] NO    Consultant(s) Notes Reviewed:  [x] YES  [ ] NO    MEDICATIONS  (STANDING):  cyanocobalamin 1000 MICROGram(s) Oral daily  dextrose 5% + sodium chloride 0.9%. 1000 milliLiter(s) (50 mL/Hr) IV Continuous <Continuous>  dextrose 5%. 1000 milliLiter(s) (50 mL/Hr) IV Continuous <Continuous>  dextrose 50% Injectable 12.5 Gram(s) IV Push once  dextrose 50% Injectable 25 Gram(s) IV Push once  dextrose 50% Injectable 25 Gram(s) IV Push once  gabapentin 200 milliGRAM(s) Oral two times a day  influenza   Vaccine 0.5 milliLiter(s) IntraMuscular once  insulin lispro (HumaLOG) corrective regimen sliding scale   SubCutaneous every 6 hours  levothyroxine 75 MICROGram(s) Oral daily  metoprolol tartrate 25 milliGRAM(s) Oral daily  multivitamin 1 Tablet(s) Oral daily  piperacillin/tazobactam IVPB. 3.375 Gram(s) IV Intermittent every 8 hours  potassium chloride  10 mEq/100 mL IVPB 10 milliEquivalent(s) IV Intermittent every 1 hour    MEDICATIONS  (PRN):  acetaminophen   Tablet .. 650 milliGRAM(s) Oral every 6 hours PRN Mild Pain (1 - 3), Moderate Pain (4 - 6), Severe Pain (7 - 10)  dextrose 40% Gel 15 Gram(s) Oral once PRN Blood Glucose LESS THAN 70 milliGRAM(s)/deciliter  glucagon  Injectable 1 milliGRAM(s) IntraMuscular once PRN Glucose LESS THAN 70 milligrams/deciliter      Care Discussed with Consultants/Other Providers [x] YES  [ ] NO    HEALTH ISSUES - PROBLEM Dx:  Chronic atrial fibrillation: Chronic atrial fibrillation  Preoperative cardiovascular examination: Preoperative cardiovascular examination  Hypercalcemia: Hypercalcemia  Prophylactic measure: Prophylactic measure  Peripheral Neuropathy: Peripheral Neuropathy  Hypertension: Hypertension  Hypothyroidism: Hypothyroidism  Abnormal urinalysis: Abnormal urinalysis  Diabetes mellitus, type II: Diabetes mellitus, type II  Atrial fibrillation: Atrial fibrillation  Acute cholecystitis: Acute cholecystitis

## 2018-11-02 NOTE — PROGRESS NOTE ADULT - PROBLEM SELECTOR PROBLEM 1
Acute cholecystitis
Preoperative cardiovascular examination
Preoperative cardiovascular examination
Acute cholecystitis

## 2018-11-02 NOTE — PROGRESS NOTE ADULT - PROBLEM SELECTOR PLAN 4
Patient takes metformin 1000mg BID at home for T2DM. -held  Monitor fingerstick glucose. Start sliding scale insulin q6h while NPO.
Patient takes metformin 1000mg BID at home for T2DM. -held  Monitor fingerstick glucose. Start sliding scale insulin q6h while NPO.
holding orals  Monitor fingerstick glucose. Start sliding scale insulin q6h while NPO.
holding orals  Monitor fingerstick glucose. Start sliding scale insulin q6h while NPO.

## 2018-11-02 NOTE — PROGRESS NOTE ADULT - PROBLEM SELECTOR PLAN 2
-Current rates acceptable  -Refusing anticoagulation
-Current rates acceptable  -Refusing anticoagulation
Although CHADSVASC score of 5 -pt refused a/c  cont metoprolol tartrate.  Patient is currently rate controlled.

## 2018-11-02 NOTE — PROGRESS NOTE ADULT - PROBLEM SELECTOR PLAN 1
-  -Recommendation is to proceed with any of the procedures as patient is not interested in further risk stratification procedures such as stress testing.   -She also does not want to be on anticoagulation for stroke prevention in setting of atrial fibrillation  -She is otherwise medically optimized from cardiac perspective.

## 2018-11-02 NOTE — PROGRESS NOTE ADULT - PROBLEM SELECTOR PLAN 9
Kandacee boots for now.
Huseyin fletcher for now.    dispo: hyun patient and son at bedside  patient and son refusing surgical and endoscopic intervention  will advance diet and change to PO abx  DC planning  PT
Huseyin fletcher for now.    dispo: quintin patient and family member at bedside, now agreeable to surgery, will have psych evaluate for capacity, if continues to agree for surgery will reconsult surgery, QUINTIN NP
Kandacee boots for now.

## 2018-11-02 NOTE — PROGRESS NOTE ADULT - PROBLEM SELECTOR PLAN 7
metoprolol tartrate 25mg PO qd for htn.

## 2018-11-02 NOTE — PROGRESS NOTE ADULT - PROVIDER SPECIALTY LIST ADULT
Cardiology
Cardiology
Gastroenterology
Internal Medicine
Surgery
Surgery
Gastroenterology
Surgery
Surgery
Internal Medicine

## 2018-11-02 NOTE — DIETITIAN INITIAL EVALUATION ADULT. - PROBLEM SELECTOR PLAN 7
Patient takes metoprolol tartrate 25mg PO qd for htn.   BP is currently WNL.  Will start home dose of metoprolol.

## 2018-11-02 NOTE — DIETITIAN INITIAL EVALUATION ADULT. - ENERGY NEEDS
Height: 70 inches, Weight: 251.9 pounds  BMI: 36 kg/m2 IBW:150 pounds (+/-10%), %IBW: 168%  Pertinent Info: Per chart, 87 y/o female with PMH of T2DM, HTN, Afib, admitted with acute cholecystitis but refusing cholecystectomy pending GI decision for possible po diet per NP. +1 bilateral legs edema, no pressure ulcers noted at this time.

## 2018-11-02 NOTE — PROGRESS NOTE ADULT - PROBLEM SELECTOR PLAN 8
gabapentin 100mg 2 capsules BID at home.

## 2018-11-02 NOTE — CHART NOTE - NSCHARTNOTEFT_GEN_A_CORE
Upon Nutritional Assessment by the Registered Dietitian your patient was determined to meet criteria / has evidence of the following diagnosis/diagnoses:          [ ]  Mild Protein Calorie Malnutrition        [x ]  Moderate/acute Protein Calorie Malnutrition        [ ] Severe Protein Calorie Malnutrition        [ ] Unspecified Protein Calorie Malnutrition        [ ] Underweight / BMI <19        [ ] Morbid Obesity / BMI > 40      Findings as based on:  [x ] Comprehensive nutrition assessment   [ ] Nutrition Focused Physical Exam  [ x] Other: NPO x 5 days      Nutrition Plan/Recommendations:  Recommend to start clear liquid diet when medically feasible. Recommend to advance to low fat full liquid diet and then to Consistent Carbohydrate DASH as tolerated.        PROVIDER Section:     By signing this assessment you are acknowledging and agree with the diagnosis/diagnoses assigned by the Registered Dietitian    Comments:

## 2018-11-02 NOTE — DIETITIAN INITIAL EVALUATION ADULT. - PROBLEM SELECTOR PLAN 4
Patient takes metformin 1000mg BID at home for T2DM. Will hold oral hypoglycemic agent.  Monitor fingerstick glucose. Start sliding scale insulin q6h while NPO.

## 2018-11-02 NOTE — PROGRESS NOTE ADULT - PROBLEM SELECTOR PLAN 3
Calcium elevated on admission, improved w hydration  Check PTH - elevated  r/o early primary hyperpth  outpt monitor
outpt fu
outpt fu
Calcium elevated on admission, improved w hydration  Check PTH - elevated  r/o early primary hyperpth  outpt monitor

## 2018-11-02 NOTE — DIETITIAN INITIAL EVALUATION ADULT. - OTHER INFO
Pt seen for inadequate diet > 4 days. Limited subjective information obtained at this time as pt declined interview. Pt upset at time of visit, states 'I already spoke with a thousand people' asked this writer to leave the room. Per previous RD note, pt wt was documented as 268 pounds (9/25/2014), current wt is 251.9 pounds. Pt has been NPO since 10/29, pending GI decision for possible po diet per NP today. No acute GI distress noted at this time, +BM 10/31. NKFA per previous RD note.

## 2018-11-02 NOTE — DIETITIAN INITIAL EVALUATION ADULT. - PERTINENT LABORATORY DATA
Na 139 [135 - 145], K+ 3.2 [3.5 - 5.3], BUN 9 [7 - 23], Cr 0.51 [0.50 - 1.30],  [70 - 99], Phos --, Alk Phos 145 [40 - 120], AST 30 [10 - 40], ALT 46 [10 - 45], Ca 9.4 [8.4 - 10.5], HbA1c 6.1%

## 2018-11-02 NOTE — PROGRESS NOTE ADULT - SUBJECTIVE AND OBJECTIVE BOX
No complaints.   Responding well to antibiotics.    Vital Signs Last 24 Hrs  T(C): 37 (02 Nov 2018 12:38), Max: 37 (02 Nov 2018 12:38)  T(F): 98.6 (02 Nov 2018 12:38), Max: 98.6 (02 Nov 2018 12:38)  HR: 86 (02 Nov 2018 12:38) (85 - 90)  BP: 180/91 (02 Nov 2018 12:38) (141/81 - 180/91)  BP(mean): --  RR: 18 (02 Nov 2018 12:38) (18 - 18)  SpO2: 95% (02 Nov 2018 12:38) (93% - 95%)    PHYSICAL EXAM:    Constitutional: NAD, well-developed  Neck: No LAD, supple  Respiratory: CTA and P  Cardiovascular: S1 and S2, RRR, no M  Gastrointestinal: BS+, soft, tender to palpation in RUQ  Extremities: No peripheral edema, neg clubing, cyanosis  Vascular: 2+ peripheral pulses  Neurological: A/O x 3, no focal deficits  Psychiatric: Normal mood, normal affect  Skin: No rashes    MEDICATIONS  (STANDING):  cyanocobalamin 1000 MICROGram(s) Oral daily  dextrose 5% + sodium chloride 0.9%. 1000 milliLiter(s) (50 mL/Hr) IV Continuous <Continuous>  dextrose 5%. 1000 milliLiter(s) (50 mL/Hr) IV Continuous <Continuous>  dextrose 50% Injectable 12.5 Gram(s) IV Push once  dextrose 50% Injectable 25 Gram(s) IV Push once  dextrose 50% Injectable 25 Gram(s) IV Push once  gabapentin 200 milliGRAM(s) Oral two times a day  influenza   Vaccine 0.5 milliLiter(s) IntraMuscular once  insulin lispro (HumaLOG) corrective regimen sliding scale   SubCutaneous every 6 hours  levothyroxine 75 MICROGram(s) Oral daily  metoprolol tartrate 25 milliGRAM(s) Oral daily  multivitamin 1 Tablet(s) Oral daily  piperacillin/tazobactam IVPB. 3.375 Gram(s) IV Intermittent every 8 hours  sodium chloride 0.9%. 1000 milliLiter(s) (50 mL/Hr) IV Continuous <Continuous>    MEDICATIONS  (PRN):  acetaminophen   Tablet .. 650 milliGRAM(s) Oral every 6 hours PRN Mild Pain (1 - 3), Moderate Pain (4 - 6), Severe Pain (7 - 10)  dextrose 40% Gel 15 Gram(s) Oral once PRN Blood Glucose LESS THAN 70 milliGRAM(s)/deciliter  glucagon  Injectable 1 milliGRAM(s) IntraMuscular once PRN Glucose LESS THAN 70 milligrams/deciliter      Allergies    aspirin (Unknown)    Intolerances        LABS:                        12.9   8.46  )-----------( 248      ( 02 Nov 2018 07:55 )             37.6     11-02    139  |  103  |  9   ----------------------------<  172<H>  3.2<L>   |  23  |  0.51    Ca    9.4      02 Nov 2018 06:40  Phos  1.9     11-01  Mg     1.7     11-01    TPro  6.3  /  Alb  2.9<L>  /  TBili  0.7  /  DBili  x   /  AST  30  /  ALT  46<H>  /  AlkPhos  145<H>  11-02    LIVER FUNCTIONS - ( 02 Nov 2018 06:40 )  Alb: 2.9 g/dL / Pro: 6.3 g/dL / ALK PHOS: 145 U/L / ALT: 46 U/L / AST: 30 U/L / GGT: x                             RADIOLOGY & ADDITIONAL TESTS:

## 2018-11-11 PROCEDURE — 87186 SC STD MICRODIL/AGAR DIL: CPT

## 2018-11-11 PROCEDURE — 86900 BLOOD TYPING SEROLOGIC ABO: CPT

## 2018-11-11 PROCEDURE — 82435 ASSAY OF BLOOD CHLORIDE: CPT

## 2018-11-11 PROCEDURE — 51701 INSERT BLADDER CATHETER: CPT

## 2018-11-11 PROCEDURE — 76705 ECHO EXAM OF ABDOMEN: CPT

## 2018-11-11 PROCEDURE — 82306 VITAMIN D 25 HYDROXY: CPT

## 2018-11-11 PROCEDURE — 85610 PROTHROMBIN TIME: CPT

## 2018-11-11 PROCEDURE — 96374 THER/PROPH/DIAG INJ IV PUSH: CPT | Mod: XU

## 2018-11-11 PROCEDURE — 86850 RBC ANTIBODY SCREEN: CPT

## 2018-11-11 PROCEDURE — 83605 ASSAY OF LACTIC ACID: CPT

## 2018-11-11 PROCEDURE — 99285 EMERGENCY DEPT VISIT HI MDM: CPT | Mod: 25

## 2018-11-11 PROCEDURE — 86901 BLOOD TYPING SEROLOGIC RH(D): CPT

## 2018-11-11 PROCEDURE — 85027 COMPLETE CBC AUTOMATED: CPT

## 2018-11-11 PROCEDURE — 82947 ASSAY GLUCOSE BLOOD QUANT: CPT

## 2018-11-11 PROCEDURE — 74174 CTA ABD&PLVS W/CONTRAST: CPT

## 2018-11-11 PROCEDURE — 84132 ASSAY OF SERUM POTASSIUM: CPT

## 2018-11-11 PROCEDURE — 83735 ASSAY OF MAGNESIUM: CPT

## 2018-11-11 PROCEDURE — 80048 BASIC METABOLIC PNL TOTAL CA: CPT

## 2018-11-11 PROCEDURE — 97162 PT EVAL MOD COMPLEX 30 MIN: CPT

## 2018-11-11 PROCEDURE — 83690 ASSAY OF LIPASE: CPT

## 2018-11-11 PROCEDURE — 84100 ASSAY OF PHOSPHORUS: CPT

## 2018-11-11 PROCEDURE — 93005 ELECTROCARDIOGRAM TRACING: CPT | Mod: XU

## 2018-11-11 PROCEDURE — 83970 ASSAY OF PARATHORMONE: CPT

## 2018-11-11 PROCEDURE — 82565 ASSAY OF CREATININE: CPT

## 2018-11-11 PROCEDURE — 85730 THROMBOPLASTIN TIME PARTIAL: CPT

## 2018-11-11 PROCEDURE — 82330 ASSAY OF CALCIUM: CPT

## 2018-11-11 PROCEDURE — 87086 URINE CULTURE/COLONY COUNT: CPT

## 2018-11-11 PROCEDURE — 84295 ASSAY OF SERUM SODIUM: CPT

## 2018-11-11 PROCEDURE — 96361 HYDRATE IV INFUSION ADD-ON: CPT

## 2018-11-11 PROCEDURE — 84484 ASSAY OF TROPONIN QUANT: CPT

## 2018-11-11 PROCEDURE — 71045 X-RAY EXAM CHEST 1 VIEW: CPT

## 2018-11-11 PROCEDURE — 80053 COMPREHEN METABOLIC PANEL: CPT

## 2018-11-11 PROCEDURE — 85014 HEMATOCRIT: CPT

## 2018-11-11 PROCEDURE — 96375 TX/PRO/DX INJ NEW DRUG ADDON: CPT | Mod: XU

## 2018-11-11 PROCEDURE — 81001 URINALYSIS AUTO W/SCOPE: CPT

## 2018-11-11 PROCEDURE — 82310 ASSAY OF CALCIUM: CPT

## 2018-11-11 PROCEDURE — 82803 BLOOD GASES ANY COMBINATION: CPT

## 2018-11-11 PROCEDURE — 82962 GLUCOSE BLOOD TEST: CPT

## 2018-11-11 PROCEDURE — 83036 HEMOGLOBIN GLYCOSYLATED A1C: CPT

## 2019-01-03 ENCOUNTER — TRANSCRIPTION ENCOUNTER (OUTPATIENT)
Age: 84
End: 2019-01-03

## 2019-05-06 ENCOUNTER — TRANSCRIPTION ENCOUNTER (OUTPATIENT)
Age: 84
End: 2019-05-06

## 2019-06-12 ENCOUNTER — EMERGENCY (EMERGENCY)
Facility: HOSPITAL | Age: 84
LOS: 1 days | Discharge: HOME | End: 2019-06-12
Attending: EMERGENCY MEDICINE | Admitting: EMERGENCY MEDICINE
Payer: MEDICARE

## 2019-06-12 VITALS
HEIGHT: 69 IN | OXYGEN SATURATION: 95 % | WEIGHT: 229.94 LBS | SYSTOLIC BLOOD PRESSURE: 116 MMHG | DIASTOLIC BLOOD PRESSURE: 78 MMHG | RESPIRATION RATE: 16 BRPM | HEART RATE: 89 BPM | TEMPERATURE: 98 F

## 2019-06-12 VITALS
SYSTOLIC BLOOD PRESSURE: 134 MMHG | RESPIRATION RATE: 16 BRPM | DIASTOLIC BLOOD PRESSURE: 83 MMHG | TEMPERATURE: 98 F | HEART RATE: 81 BPM | OXYGEN SATURATION: 96 %

## 2019-06-12 DIAGNOSIS — Z96.651 PRESENCE OF RIGHT ARTIFICIAL KNEE JOINT: Chronic | ICD-10-CM

## 2019-06-12 DIAGNOSIS — Z98.89 OTHER SPECIFIED POSTPROCEDURAL STATES: Chronic | ICD-10-CM

## 2019-06-12 LAB
ALBUMIN SERPL ELPH-MCNC: 3.9 G/DL — SIGNIFICANT CHANGE UP (ref 3.3–5)
ALP SERPL-CCNC: 59 U/L — SIGNIFICANT CHANGE UP (ref 40–120)
ALT FLD-CCNC: 29 U/L DA — SIGNIFICANT CHANGE UP (ref 10–45)
ANION GAP SERPL CALC-SCNC: 8 MMOL/L — SIGNIFICANT CHANGE UP (ref 5–17)
APPEARANCE UR: SIGNIFICANT CHANGE UP
AST SERPL-CCNC: 31 U/L — SIGNIFICANT CHANGE UP (ref 10–40)
BASOPHILS # BLD AUTO: 0.06 K/UL — SIGNIFICANT CHANGE UP (ref 0–0.2)
BASOPHILS NFR BLD AUTO: 1 % — SIGNIFICANT CHANGE UP (ref 0–2)
BILIRUB SERPL-MCNC: 0.8 MG/DL — SIGNIFICANT CHANGE UP (ref 0.2–1.2)
BILIRUB UR-MCNC: NEGATIVE — SIGNIFICANT CHANGE UP
BUN SERPL-MCNC: 16 MG/DL — SIGNIFICANT CHANGE UP (ref 7–23)
CALCIUM SERPL-MCNC: 10.7 MG/DL — HIGH (ref 8.4–10.5)
CHLORIDE SERPL-SCNC: 101 MMOL/L — SIGNIFICANT CHANGE UP (ref 96–108)
CO2 SERPL-SCNC: 29 MMOL/L — SIGNIFICANT CHANGE UP (ref 22–31)
COLOR SPEC: YELLOW — SIGNIFICANT CHANGE UP
CREAT SERPL-MCNC: 0.82 MG/DL — SIGNIFICANT CHANGE UP (ref 0.5–1.3)
DIFF PNL FLD: ABNORMAL
EOSINOPHIL # BLD AUTO: 0.26 K/UL — SIGNIFICANT CHANGE UP (ref 0–0.5)
EOSINOPHIL NFR BLD AUTO: 4.2 % — SIGNIFICANT CHANGE UP (ref 0–6)
GLUCOSE SERPL-MCNC: 136 MG/DL — HIGH (ref 70–99)
GLUCOSE UR QL: NEGATIVE — SIGNIFICANT CHANGE UP
HCT VFR BLD CALC: 43.1 % — SIGNIFICANT CHANGE UP (ref 34.5–45)
HGB BLD-MCNC: 14.6 G/DL — SIGNIFICANT CHANGE UP (ref 11.5–15.5)
IMM GRANULOCYTES NFR BLD AUTO: 0.3 % — SIGNIFICANT CHANGE UP (ref 0–1.5)
KETONES UR-MCNC: NEGATIVE — SIGNIFICANT CHANGE UP
LEUKOCYTE ESTERASE UR-ACNC: ABNORMAL
LYMPHOCYTES # BLD AUTO: 1.49 K/UL — SIGNIFICANT CHANGE UP (ref 1–3.3)
LYMPHOCYTES # BLD AUTO: 24.3 % — SIGNIFICANT CHANGE UP (ref 13–44)
MCHC RBC-ENTMCNC: 32.9 PG — SIGNIFICANT CHANGE UP (ref 27–34)
MCHC RBC-ENTMCNC: 33.9 GM/DL — SIGNIFICANT CHANGE UP (ref 32–36)
MCV RBC AUTO: 97.1 FL — SIGNIFICANT CHANGE UP (ref 80–100)
MONOCYTES # BLD AUTO: 0.62 K/UL — SIGNIFICANT CHANGE UP (ref 0–0.9)
MONOCYTES NFR BLD AUTO: 10.1 % — SIGNIFICANT CHANGE UP (ref 2–14)
NEUTROPHILS # BLD AUTO: 3.67 K/UL — SIGNIFICANT CHANGE UP (ref 1.8–7.4)
NEUTROPHILS NFR BLD AUTO: 60.1 % — SIGNIFICANT CHANGE UP (ref 43–77)
NITRITE UR-MCNC: POSITIVE
NRBC # BLD: 0 /100 WBCS — SIGNIFICANT CHANGE UP (ref 0–0)
PH UR: 6 — SIGNIFICANT CHANGE UP (ref 5–8)
PLATELET # BLD AUTO: 215 K/UL — SIGNIFICANT CHANGE UP (ref 150–400)
POTASSIUM SERPL-MCNC: 5.1 MMOL/L — SIGNIFICANT CHANGE UP (ref 3.5–5.3)
POTASSIUM SERPL-SCNC: 5.1 MMOL/L — SIGNIFICANT CHANGE UP (ref 3.5–5.3)
PROT SERPL-MCNC: 8.1 G/DL — SIGNIFICANT CHANGE UP (ref 6–8.3)
PROT UR-MCNC: 15
RBC # BLD: 4.44 M/UL — SIGNIFICANT CHANGE UP (ref 3.8–5.2)
RBC # FLD: 12.8 % — SIGNIFICANT CHANGE UP (ref 10.3–14.5)
SODIUM SERPL-SCNC: 138 MMOL/L — SIGNIFICANT CHANGE UP (ref 135–145)
SP GR SPEC: 1.01 — SIGNIFICANT CHANGE UP (ref 1.01–1.02)
UROBILINOGEN FLD QL: NEGATIVE — SIGNIFICANT CHANGE UP
WBC # BLD: 6.12 K/UL — SIGNIFICANT CHANGE UP (ref 3.8–10.5)
WBC # FLD AUTO: 6.12 K/UL — SIGNIFICANT CHANGE UP (ref 3.8–10.5)

## 2019-06-12 PROCEDURE — 85027 COMPLETE CBC AUTOMATED: CPT

## 2019-06-12 PROCEDURE — 81001 URINALYSIS AUTO W/SCOPE: CPT

## 2019-06-12 PROCEDURE — 99284 EMERGENCY DEPT VISIT MOD MDM: CPT

## 2019-06-12 PROCEDURE — 99283 EMERGENCY DEPT VISIT LOW MDM: CPT

## 2019-06-12 PROCEDURE — 87086 URINE CULTURE/COLONY COUNT: CPT

## 2019-06-12 PROCEDURE — 87186 SC STD MICRODIL/AGAR DIL: CPT

## 2019-06-12 PROCEDURE — 80053 COMPREHEN METABOLIC PANEL: CPT

## 2019-06-12 RX ORDER — CEPHALEXIN 500 MG
1 CAPSULE ORAL
Qty: 10 | Refills: 0
Start: 2019-06-12 | End: 2019-06-16

## 2019-06-12 RX ORDER — CEPHALEXIN 500 MG
500 CAPSULE ORAL ONCE
Refills: 0 | Status: COMPLETED | OUTPATIENT
Start: 2019-06-12 | End: 2019-06-12

## 2019-06-12 RX ORDER — SODIUM CHLORIDE 9 MG/ML
500 INJECTION INTRAMUSCULAR; INTRAVENOUS; SUBCUTANEOUS ONCE
Refills: 0 | Status: COMPLETED | OUTPATIENT
Start: 2019-06-12 | End: 2019-06-12

## 2019-06-12 RX ADMIN — SODIUM CHLORIDE 500 MILLILITER(S): 9 INJECTION INTRAMUSCULAR; INTRAVENOUS; SUBCUTANEOUS at 07:23

## 2019-06-12 RX ADMIN — Medication 500 MILLIGRAM(S): at 07:22

## 2019-06-12 NOTE — ED PROVIDER NOTE - OBJECTIVE STATEMENT
88F h/o Afib not on AC, DM, prophylactic Macrobid for UTIs, p/w 1 week of dysuria and urgency. No fevers, chills, nausea, vomiting, abdo pain. C/o generalized weakness. Lives in [facility]. Accompanied by son.     PMD: Dr. Ángel Hernandez.

## 2019-06-12 NOTE — ED PROVIDER NOTE - CLINICAL SUMMARY MEDICAL DECISION MAKING FREE TEXT BOX
Well appearing pt p/w UTI sx, will tx for UTI based on prior UCx results. Pt c/o generalized weakness, will check labs r/o dehydration/obed and will hydrate. Son to take pt home. Well appearing pt p/w UTI sx, will tx for UTI based on prior UCx results. Pt c/o generalized weakness, labs with normal WBC and renal fxn. Pt felt better after IV hydration, results explained to pt and son and pt will be dc'ed on Keflex.

## 2019-06-12 NOTE — ED ADULT NURSE NOTE - CHIEF COMPLAINT QUOTE
"It hurts to pee".  Pt states she feels urgency yet cannot urinate when on the toilet, also c/o incontinence which is unusual for her.  Pt states her bladder and vagina hurt.

## 2019-06-12 NOTE — ED ADULT NURSE NOTE - NSIMPLEMENTINTERV_GEN_ALL_ED
Implemented All Fall with Harm Risk Interventions:  [place] to call system. Call bell, personal items and telephone within reach. Instruct patient to call for assistance. Room bathroom lighting operational. Non-slip footwear when patient is off stretcher. Physically safe environment: no spills, clutter or unnecessary equipment. Stretcher in lowest position, wheels locked, appropriate side rails in place. Provide visual cue, wrist band, yellow gown, etc. Monitor gait and stability. Monitor for mental status changes and reorient to person, place, and time. Review medications for side effects contributing to fall risk. Reinforce activity limits and safety measures with patient and family. Provide visual clues: red socks.

## 2019-06-12 NOTE — ED PROVIDER NOTE - CARE PLAN
Principal Discharge DX:	Acute cystitis without hematuria Principal Discharge DX:	Acute cystitis with hematuria

## 2019-06-12 NOTE — ED PROVIDER NOTE - NSFOLLOWUPINSTRUCTIONS_ED_ALL_ED_FT
1. Keflex 500mg twice a day for 5 days  2. Drink lots of fluids  3. Tylenol 650mg every 4-6 hours as needed for fever or pain  4. Follow up with your doctor in 1-2 days  5. Return to the ED for worsening pain, fevers, vomiting or any concerns    *****  Urinary Tract Infection    A urinary tract infection (UTI) is an infection of any part of the urinary tract, which includes the kidneys, ureters, bladder, and urethra. Risk factors include ignoring your need to urinate, wiping back to front if female, being an uncircumcised male, and having diabetes or a weak immune system. Symptoms include frequent urination, pain or burning with urination, foul smelling urine, cloudy urine, pain in the lower abdomen, blood in the urine, and fever. If you were prescribed an antibiotic medicine, take it as told by your health care provider. Do not stop taking the antibiotic even if you start to feel better.    SEEK IMMEDIATE MEDICAL CARE IF YOU HAVE ANY OF THE FOLLOWING SYMPTOMS: severe back or abdominal pain, fever, inability to keep fluids or medicine down, dizziness/lightheadedness, or a change in mental status.  ****

## 2019-06-12 NOTE — ED ADULT NURSE REASSESSMENT NOTE - NS ED NURSE REASSESS COMMENT FT1
Assume care of pt from JAVIER Tovar. Pt awaiting blood and urine results. Son at bedside. Pt states she feels relief after straight cath.

## 2019-06-14 LAB
-  AMIKACIN: SIGNIFICANT CHANGE UP
-  AMPICILLIN/SULBACTAM: SIGNIFICANT CHANGE UP
-  AMPICILLIN: SIGNIFICANT CHANGE UP
-  AZTREONAM: SIGNIFICANT CHANGE UP
-  CEFEPIME: SIGNIFICANT CHANGE UP
-  CEFOXITIN: SIGNIFICANT CHANGE UP
-  CEFTRIAXONE: SIGNIFICANT CHANGE UP
-  CIPROFLOXACIN: SIGNIFICANT CHANGE UP
-  ERTAPENEM: SIGNIFICANT CHANGE UP
-  GENTAMICIN: SIGNIFICANT CHANGE UP
-  IMIPENEM: SIGNIFICANT CHANGE UP
-  LEVOFLOXACIN: SIGNIFICANT CHANGE UP
-  MEROPENEM: SIGNIFICANT CHANGE UP
-  NITROFURANTOIN: SIGNIFICANT CHANGE UP
-  PIPERACILLIN/TAZOBACTAM: SIGNIFICANT CHANGE UP
-  TIGECYCLINE: SIGNIFICANT CHANGE UP
-  TOBRAMYCIN: SIGNIFICANT CHANGE UP
-  TRIMETHOPRIM/SULFAMETHOXAZOLE: SIGNIFICANT CHANGE UP
CULTURE RESULTS: SIGNIFICANT CHANGE UP
METHOD TYPE: SIGNIFICANT CHANGE UP
ORGANISM # SPEC MICROSCOPIC CNT: SIGNIFICANT CHANGE UP
ORGANISM # SPEC MICROSCOPIC CNT: SIGNIFICANT CHANGE UP
SPECIMEN SOURCE: SIGNIFICANT CHANGE UP

## 2019-07-30 ENCOUNTER — EMERGENCY (EMERGENCY)
Facility: HOSPITAL | Age: 84
LOS: 1 days | Discharge: HOME | End: 2019-07-30
Attending: EMERGENCY MEDICINE | Admitting: EMERGENCY MEDICINE
Payer: MEDICARE

## 2019-07-30 VITALS
TEMPERATURE: 97 F | DIASTOLIC BLOOD PRESSURE: 82 MMHG | WEIGHT: 235.01 LBS | SYSTOLIC BLOOD PRESSURE: 129 MMHG | HEART RATE: 80 BPM | HEIGHT: 70 IN | OXYGEN SATURATION: 98 % | RESPIRATION RATE: 18 BRPM

## 2019-07-30 VITALS
RESPIRATION RATE: 17 BRPM | OXYGEN SATURATION: 97 % | DIASTOLIC BLOOD PRESSURE: 81 MMHG | HEART RATE: 79 BPM | SYSTOLIC BLOOD PRESSURE: 131 MMHG

## 2019-07-30 DIAGNOSIS — Z98.89 OTHER SPECIFIED POSTPROCEDURAL STATES: Chronic | ICD-10-CM

## 2019-07-30 DIAGNOSIS — Z96.651 PRESENCE OF RIGHT ARTIFICIAL KNEE JOINT: Chronic | ICD-10-CM

## 2019-07-30 LAB
APPEARANCE UR: ABNORMAL
BACTERIA # UR AUTO: ABNORMAL /HPF
BILIRUB UR-MCNC: NEGATIVE — SIGNIFICANT CHANGE UP
COLOR SPEC: YELLOW — SIGNIFICANT CHANGE UP
COMMENT - URINE: SIGNIFICANT CHANGE UP
DIFF PNL FLD: ABNORMAL
EPI CELLS # UR: SIGNIFICANT CHANGE UP
GLUCOSE UR QL: NEGATIVE — SIGNIFICANT CHANGE UP
KETONES UR-MCNC: NEGATIVE — SIGNIFICANT CHANGE UP
LEUKOCYTE ESTERASE UR-ACNC: ABNORMAL
NITRITE UR-MCNC: NEGATIVE — SIGNIFICANT CHANGE UP
PH UR: 6.5 — SIGNIFICANT CHANGE UP (ref 5–8)
PROT UR-MCNC: 30 MG/DL
RBC CASTS # UR COMP ASSIST: ABNORMAL /HPF (ref 0–4)
SP GR SPEC: 1.01 — SIGNIFICANT CHANGE UP (ref 1.01–1.02)
UROBILINOGEN FLD QL: NEGATIVE — SIGNIFICANT CHANGE UP
WBC UR QL: >50 /HPF (ref 0–5)

## 2019-07-30 PROCEDURE — 99283 EMERGENCY DEPT VISIT LOW MDM: CPT

## 2019-07-30 RX ORDER — AZTREONAM 2 G
1 VIAL (EA) INJECTION
Qty: 20 | Refills: 0
Start: 2019-07-30 | End: 2019-08-08

## 2019-07-30 NOTE — ED ADULT NURSE REASSESSMENT NOTE - NS ED NURSE REASSESS COMMENT FT1
Pt eating, tolerating food. Awaiting transport to go back to nursing home. Pt denies any pain/complaints. VSS.

## 2019-07-30 NOTE — ED PROVIDER NOTE - GENITOURINARY BLADDER
Patient had on a diaper and 3 sanitary pads within it, fully soaked with foul smelling urine. No blood./non-tender

## 2019-07-30 NOTE — ED PROVIDER NOTE - OBJECTIVE STATEMENT
Patient presents c/o vaginal burning and dysuria. She notes that she frequently has urinary tract infections. She does not have urinary incontinence but does wear depends AND two pads. She says she changes it some time in the afternoon but it does get soaked and remains unchanged for several hours. No fever or back pain. Lower abd cramping at times, she notes. No vomiting. She is wheel chair bound as she has a 'bad back' and is not good on her feet. She notes that she has been here before for her UTI. No constipation or diarrhea. No other complaints.

## 2019-07-30 NOTE — ED PROVIDER NOTE - CLINICAL SUMMARY MEDICAL DECISION MAKING FREE TEXT BOX
Patient presents c/o vaginal burning and dysuria. She notes that she frequently has urinary tract infections. She does not have urinary incontinence but does wear depends AND two pads. She says she changes it some time in the afternoon but it does get soaked and remains unchanged for several hours. No fever or back pain. Lower abd cramping at times, she notes. No vomiting. She is wheel chair bound as she has a 'bad back' and is not good on her feet. She notes that she has been here before for her UTI. No constipation or diarrhea. No other complaints. Patient with soaked foul smelling diaper with 3 pads within it. No sign of blood. Urethra normal. No vaginal discharge. Abd soft and nontender. Pt is nontoxic appearing. Plan for straight cath for urine sample with culture. Previous culture reviewed from June 2019 where sensitivities are available. Will use that for guidance.

## 2019-07-30 NOTE — ED ADULT NURSE REASSESSMENT NOTE - NS ED NURSE REASSESS COMMENT FT1
88 yr old female BIBA for evaluation of vaginal burning and dysuria. Pt has reports "I keep getting urinary track infections and I wear a diaper because I can't urinate in the toilet". Pt denies fever/chills/nausea/vomiting. PMHX: UTI's, Obesity, Afib. UA/C&S obtained via straight catheter, sent. CT

## 2019-07-30 NOTE — ED PROVIDER NOTE - CARE PLAN
Principal Discharge DX:	Dysuria Principal Discharge DX:	Dysuria  Secondary Diagnosis:	Urinary tract infection without hematuria, site unspecified

## 2019-07-31 ENCOUNTER — INPATIENT (INPATIENT)
Facility: HOSPITAL | Age: 84
LOS: 2 days | Discharge: OTHER INSTITUTION | DRG: 871 | End: 2019-08-03
Attending: HOSPITALIST | Admitting: HOSPITALIST
Payer: MEDICARE

## 2019-07-31 VITALS
WEIGHT: 235.01 LBS | RESPIRATION RATE: 17 BRPM | DIASTOLIC BLOOD PRESSURE: 78 MMHG | SYSTOLIC BLOOD PRESSURE: 153 MMHG | HEIGHT: 70 IN | TEMPERATURE: 99 F | OXYGEN SATURATION: 95 % | HEART RATE: 114 BPM

## 2019-07-31 DIAGNOSIS — Z96.651 PRESENCE OF RIGHT ARTIFICIAL KNEE JOINT: Chronic | ICD-10-CM

## 2019-07-31 DIAGNOSIS — Z98.89 OTHER SPECIFIED POSTPROCEDURAL STATES: Chronic | ICD-10-CM

## 2019-07-31 DIAGNOSIS — Z98.891 HISTORY OF UTERINE SCAR FROM PREVIOUS SURGERY: Chronic | ICD-10-CM

## 2019-07-31 DIAGNOSIS — N39.0 URINARY TRACT INFECTION, SITE NOT SPECIFIED: ICD-10-CM

## 2019-07-31 LAB
ALBUMIN SERPL ELPH-MCNC: 3.7 G/DL — SIGNIFICANT CHANGE UP (ref 3.3–5)
ALP SERPL-CCNC: 60 U/L — SIGNIFICANT CHANGE UP (ref 40–120)
ALT FLD-CCNC: 16 U/L DA — SIGNIFICANT CHANGE UP (ref 10–45)
ANION GAP SERPL CALC-SCNC: 10 MMOL/L — SIGNIFICANT CHANGE UP (ref 5–17)
APPEARANCE UR: ABNORMAL
APTT BLD: 28.6 SEC — SIGNIFICANT CHANGE UP (ref 27.5–36.3)
AST SERPL-CCNC: 24 U/L — SIGNIFICANT CHANGE UP (ref 10–40)
BASOPHILS # BLD AUTO: 0.03 K/UL — SIGNIFICANT CHANGE UP (ref 0–0.2)
BASOPHILS NFR BLD AUTO: 0.3 % — SIGNIFICANT CHANGE UP (ref 0–2)
BILIRUB SERPL-MCNC: 0.8 MG/DL — SIGNIFICANT CHANGE UP (ref 0.2–1.2)
BILIRUB UR-MCNC: NEGATIVE — SIGNIFICANT CHANGE UP
BUN SERPL-MCNC: 20 MG/DL — SIGNIFICANT CHANGE UP (ref 7–23)
CALCIUM SERPL-MCNC: 10 MG/DL — SIGNIFICANT CHANGE UP (ref 8.4–10.5)
CHLORIDE SERPL-SCNC: 99 MMOL/L — SIGNIFICANT CHANGE UP (ref 96–108)
CO2 SERPL-SCNC: 27 MMOL/L — SIGNIFICANT CHANGE UP (ref 22–31)
COLOR SPEC: YELLOW — SIGNIFICANT CHANGE UP
CREAT SERPL-MCNC: 1.1 MG/DL — SIGNIFICANT CHANGE UP (ref 0.5–1.3)
DIFF PNL FLD: ABNORMAL
EOSINOPHIL # BLD AUTO: 0.01 K/UL — SIGNIFICANT CHANGE UP (ref 0–0.5)
EOSINOPHIL NFR BLD AUTO: 0.1 % — SIGNIFICANT CHANGE UP (ref 0–6)
GLUCOSE BLDC GLUCOMTR-MCNC: 152 MG/DL — HIGH (ref 70–99)
GLUCOSE BLDC GLUCOMTR-MCNC: 165 MG/DL — HIGH (ref 70–99)
GLUCOSE SERPL-MCNC: 172 MG/DL — HIGH (ref 70–99)
GLUCOSE UR QL: NEGATIVE — SIGNIFICANT CHANGE UP
HCT VFR BLD CALC: 42.7 % — SIGNIFICANT CHANGE UP (ref 34.5–45)
HGB BLD-MCNC: 14.3 G/DL — SIGNIFICANT CHANGE UP (ref 11.5–15.5)
IMM GRANULOCYTES NFR BLD AUTO: 0.5 % — SIGNIFICANT CHANGE UP (ref 0–1.5)
INR BLD: 1.07 RATIO — SIGNIFICANT CHANGE UP (ref 0.88–1.16)
KETONES UR-MCNC: NEGATIVE — SIGNIFICANT CHANGE UP
LACTATE SERPL-SCNC: 2.1 MMOL/L — HIGH (ref 0.7–2)
LACTATE SERPL-SCNC: 2.6 MMOL/L — HIGH (ref 0.7–2)
LACTATE SERPL-SCNC: 2.8 MMOL/L — HIGH (ref 0.7–2)
LACTATE SERPL-SCNC: 4.1 MMOL/L — CRITICAL HIGH (ref 0.7–2)
LEUKOCYTE ESTERASE UR-ACNC: ABNORMAL
LYMPHOCYTES # BLD AUTO: 0.19 K/UL — LOW (ref 1–3.3)
LYMPHOCYTES # BLD AUTO: 1.8 % — LOW (ref 13–44)
MCHC RBC-ENTMCNC: 32.6 PG — SIGNIFICANT CHANGE UP (ref 27–34)
MCHC RBC-ENTMCNC: 33.5 GM/DL — SIGNIFICANT CHANGE UP (ref 32–36)
MCV RBC AUTO: 97.3 FL — SIGNIFICANT CHANGE UP (ref 80–100)
MONOCYTES # BLD AUTO: 0.29 K/UL — SIGNIFICANT CHANGE UP (ref 0–0.9)
MONOCYTES NFR BLD AUTO: 2.7 % — SIGNIFICANT CHANGE UP (ref 2–14)
NEUTROPHILS # BLD AUTO: 10.09 K/UL — HIGH (ref 1.8–7.4)
NEUTROPHILS NFR BLD AUTO: 94.6 % — HIGH (ref 43–77)
NITRITE UR-MCNC: NEGATIVE — SIGNIFICANT CHANGE UP
NRBC # BLD: 0 /100 WBCS — SIGNIFICANT CHANGE UP (ref 0–0)
PH UR: 6 — SIGNIFICANT CHANGE UP (ref 5–8)
PLATELET # BLD AUTO: 180 K/UL — SIGNIFICANT CHANGE UP (ref 150–400)
POTASSIUM SERPL-MCNC: 4.1 MMOL/L — SIGNIFICANT CHANGE UP (ref 3.5–5.3)
POTASSIUM SERPL-SCNC: 4.1 MMOL/L — SIGNIFICANT CHANGE UP (ref 3.5–5.3)
PROT SERPL-MCNC: 7.6 G/DL — SIGNIFICANT CHANGE UP (ref 6–8.3)
PROT UR-MCNC: 30 MG/DL
PROTHROM AB SERPL-ACNC: 12 SEC — SIGNIFICANT CHANGE UP (ref 10–12.9)
RBC # BLD: 4.39 M/UL — SIGNIFICANT CHANGE UP (ref 3.8–5.2)
RBC # FLD: 12.8 % — SIGNIFICANT CHANGE UP (ref 10.3–14.5)
SODIUM SERPL-SCNC: 136 MMOL/L — SIGNIFICANT CHANGE UP (ref 135–145)
SP GR SPEC: 1.01 — SIGNIFICANT CHANGE UP (ref 1.01–1.02)
UROBILINOGEN FLD QL: NEGATIVE — SIGNIFICANT CHANGE UP
WBC # BLD: 10.66 K/UL — HIGH (ref 3.8–10.5)
WBC # FLD AUTO: 10.66 K/UL — HIGH (ref 3.8–10.5)

## 2019-07-31 PROCEDURE — 71045 X-RAY EXAM CHEST 1 VIEW: CPT | Mod: 26

## 2019-07-31 PROCEDURE — 99285 EMERGENCY DEPT VISIT HI MDM: CPT

## 2019-07-31 PROCEDURE — 93010 ELECTROCARDIOGRAM REPORT: CPT

## 2019-07-31 PROCEDURE — 99223 1ST HOSP IP/OBS HIGH 75: CPT

## 2019-07-31 RX ORDER — CIPROFLOXACIN LACTATE 400MG/40ML
1 VIAL (ML) INTRAVENOUS
Qty: 0 | Refills: 0 | DISCHARGE

## 2019-07-31 RX ORDER — METRONIDAZOLE 500 MG
1 TABLET ORAL
Qty: 0 | Refills: 0 | DISCHARGE

## 2019-07-31 RX ORDER — ENOXAPARIN SODIUM 100 MG/ML
40 INJECTION SUBCUTANEOUS DAILY
Refills: 0 | Status: DISCONTINUED | OUTPATIENT
Start: 2019-07-31 | End: 2019-08-03

## 2019-07-31 RX ORDER — DEXTROSE 50 % IN WATER 50 %
25 SYRINGE (ML) INTRAVENOUS ONCE
Refills: 0 | Status: DISCONTINUED | OUTPATIENT
Start: 2019-07-31 | End: 2019-08-03

## 2019-07-31 RX ORDER — INSULIN LISPRO 100/ML
VIAL (ML) SUBCUTANEOUS AT BEDTIME
Refills: 0 | Status: DISCONTINUED | OUTPATIENT
Start: 2019-07-31 | End: 2019-08-03

## 2019-07-31 RX ORDER — SODIUM CHLORIDE 9 MG/ML
1000 INJECTION INTRAMUSCULAR; INTRAVENOUS; SUBCUTANEOUS ONCE
Refills: 0 | Status: COMPLETED | OUTPATIENT
Start: 2019-07-31 | End: 2019-07-31

## 2019-07-31 RX ORDER — SODIUM CHLORIDE 9 MG/ML
2100 INJECTION INTRAMUSCULAR; INTRAVENOUS; SUBCUTANEOUS ONCE
Refills: 0 | Status: COMPLETED | OUTPATIENT
Start: 2019-07-31 | End: 2019-07-31

## 2019-07-31 RX ORDER — CEFTRIAXONE 500 MG/1
1000 INJECTION, POWDER, FOR SOLUTION INTRAMUSCULAR; INTRAVENOUS EVERY 24 HOURS
Refills: 0 | Status: DISCONTINUED | OUTPATIENT
Start: 2019-08-01 | End: 2019-08-03

## 2019-07-31 RX ORDER — KETOROLAC TROMETHAMINE 30 MG/ML
15 SYRINGE (ML) INJECTION ONCE
Refills: 0 | Status: DISCONTINUED | OUTPATIENT
Start: 2019-07-31 | End: 2019-07-31

## 2019-07-31 RX ORDER — DOCUSATE SODIUM 100 MG
100 CAPSULE ORAL THREE TIMES A DAY
Refills: 0 | Status: DISCONTINUED | OUTPATIENT
Start: 2019-07-31 | End: 2019-08-03

## 2019-07-31 RX ORDER — LEVOTHYROXINE SODIUM 125 MCG
75 TABLET ORAL DAILY
Refills: 0 | Status: DISCONTINUED | OUTPATIENT
Start: 2019-07-31 | End: 2019-08-03

## 2019-07-31 RX ORDER — SODIUM CHLORIDE 9 MG/ML
1000 INJECTION, SOLUTION INTRAVENOUS
Refills: 0 | Status: DISCONTINUED | OUTPATIENT
Start: 2019-07-31 | End: 2019-08-03

## 2019-07-31 RX ORDER — ACETAMINOPHEN 500 MG
650 TABLET ORAL EVERY 6 HOURS
Refills: 0 | Status: DISCONTINUED | OUTPATIENT
Start: 2019-07-31 | End: 2019-08-03

## 2019-07-31 RX ORDER — SODIUM CHLORIDE 9 MG/ML
1000 INJECTION INTRAMUSCULAR; INTRAVENOUS; SUBCUTANEOUS
Refills: 0 | Status: DISCONTINUED | OUTPATIENT
Start: 2019-07-31 | End: 2019-08-01

## 2019-07-31 RX ORDER — ACETAMINOPHEN 500 MG
650 TABLET ORAL ONCE
Refills: 0 | Status: COMPLETED | OUTPATIENT
Start: 2019-07-31 | End: 2019-07-31

## 2019-07-31 RX ORDER — DEXTROSE 50 % IN WATER 50 %
15 SYRINGE (ML) INTRAVENOUS ONCE
Refills: 0 | Status: DISCONTINUED | OUTPATIENT
Start: 2019-07-31 | End: 2019-08-03

## 2019-07-31 RX ORDER — CHOLECALCIFEROL (VITAMIN D3) 125 MCG
1000 CAPSULE ORAL DAILY
Refills: 0 | Status: DISCONTINUED | OUTPATIENT
Start: 2019-07-31 | End: 2019-08-03

## 2019-07-31 RX ORDER — PREGABALIN 225 MG/1
1000 CAPSULE ORAL DAILY
Refills: 0 | Status: DISCONTINUED | OUTPATIENT
Start: 2019-07-31 | End: 2019-08-03

## 2019-07-31 RX ORDER — LANOLIN ALCOHOL/MO/W.PET/CERES
3 CREAM (GRAM) TOPICAL AT BEDTIME
Refills: 0 | Status: DISCONTINUED | OUTPATIENT
Start: 2019-07-31 | End: 2019-08-03

## 2019-07-31 RX ORDER — FUROSEMIDE 40 MG
1 TABLET ORAL
Qty: 0 | Refills: 0 | DISCHARGE

## 2019-07-31 RX ORDER — GABAPENTIN 400 MG/1
200 CAPSULE ORAL
Refills: 0 | Status: DISCONTINUED | OUTPATIENT
Start: 2019-07-31 | End: 2019-08-03

## 2019-07-31 RX ORDER — INSULIN LISPRO 100/ML
0 VIAL (ML) SUBCUTANEOUS
Qty: 0 | Refills: 0 | DISCHARGE

## 2019-07-31 RX ORDER — ONDANSETRON 8 MG/1
4 TABLET, FILM COATED ORAL EVERY 8 HOURS
Refills: 0 | Status: DISCONTINUED | OUTPATIENT
Start: 2019-07-31 | End: 2019-08-03

## 2019-07-31 RX ORDER — DEXTROSE 50 % IN WATER 50 %
12.5 SYRINGE (ML) INTRAVENOUS ONCE
Refills: 0 | Status: DISCONTINUED | OUTPATIENT
Start: 2019-07-31 | End: 2019-08-03

## 2019-07-31 RX ORDER — METOPROLOL TARTRATE 50 MG
25 TABLET ORAL DAILY
Refills: 0 | Status: DISCONTINUED | OUTPATIENT
Start: 2019-07-31 | End: 2019-08-03

## 2019-07-31 RX ORDER — MULTIVIT-MIN/FERROUS GLUCONATE 9 MG/15 ML
1 LIQUID (ML) ORAL DAILY
Refills: 0 | Status: DISCONTINUED | OUTPATIENT
Start: 2019-07-31 | End: 2019-08-01

## 2019-07-31 RX ORDER — ONDANSETRON 8 MG/1
4 TABLET, FILM COATED ORAL ONCE
Refills: 0 | Status: COMPLETED | OUTPATIENT
Start: 2019-07-31 | End: 2019-07-31

## 2019-07-31 RX ORDER — CEFTRIAXONE 500 MG/1
1000 INJECTION, POWDER, FOR SOLUTION INTRAMUSCULAR; INTRAVENOUS ONCE
Refills: 0 | Status: COMPLETED | OUTPATIENT
Start: 2019-07-31 | End: 2019-07-31

## 2019-07-31 RX ORDER — SODIUM CHLORIDE 9 MG/ML
1000 INJECTION INTRAMUSCULAR; INTRAVENOUS; SUBCUTANEOUS
Refills: 0 | Status: DISCONTINUED | OUTPATIENT
Start: 2019-07-31 | End: 2019-07-31

## 2019-07-31 RX ORDER — SENNA PLUS 8.6 MG/1
2 TABLET ORAL AT BEDTIME
Refills: 0 | Status: DISCONTINUED | OUTPATIENT
Start: 2019-07-31 | End: 2019-08-03

## 2019-07-31 RX ORDER — GLUCAGON INJECTION, SOLUTION 0.5 MG/.1ML
1 INJECTION, SOLUTION SUBCUTANEOUS ONCE
Refills: 0 | Status: DISCONTINUED | OUTPATIENT
Start: 2019-07-31 | End: 2019-08-03

## 2019-07-31 RX ORDER — INSULIN LISPRO 100/ML
VIAL (ML) SUBCUTANEOUS
Refills: 0 | Status: DISCONTINUED | OUTPATIENT
Start: 2019-07-31 | End: 2019-08-03

## 2019-07-31 RX ORDER — FERROUS SULFATE 325(65) MG
325 TABLET ORAL DAILY
Refills: 0 | Status: DISCONTINUED | OUTPATIENT
Start: 2019-07-31 | End: 2019-08-03

## 2019-07-31 RX ORDER — NITROFURANTOIN MACROCRYSTAL 50 MG
1 CAPSULE ORAL
Qty: 0 | Refills: 0 | DISCHARGE

## 2019-07-31 RX ORDER — FUROSEMIDE 40 MG
40 TABLET ORAL DAILY
Refills: 0 | Status: DISCONTINUED | OUTPATIENT
Start: 2019-07-31 | End: 2019-07-31

## 2019-07-31 RX ADMIN — Medication 650 MILLIGRAM(S): at 14:25

## 2019-07-31 RX ADMIN — SODIUM CHLORIDE 75 MILLILITER(S): 9 INJECTION INTRAMUSCULAR; INTRAVENOUS; SUBCUTANEOUS at 15:46

## 2019-07-31 RX ADMIN — SODIUM CHLORIDE 1000 MILLILITER(S): 9 INJECTION INTRAMUSCULAR; INTRAVENOUS; SUBCUTANEOUS at 17:57

## 2019-07-31 RX ADMIN — SODIUM CHLORIDE 2100 MILLILITER(S): 9 INJECTION INTRAMUSCULAR; INTRAVENOUS; SUBCUTANEOUS at 12:59

## 2019-07-31 RX ADMIN — CEFTRIAXONE 100 MILLIGRAM(S): 500 INJECTION, POWDER, FOR SOLUTION INTRAMUSCULAR; INTRAVENOUS at 12:57

## 2019-07-31 RX ADMIN — Medication 650 MILLIGRAM(S): at 12:56

## 2019-07-31 RX ADMIN — ENOXAPARIN SODIUM 40 MILLIGRAM(S): 100 INJECTION SUBCUTANEOUS at 22:12

## 2019-07-31 RX ADMIN — Medication 1: at 16:34

## 2019-07-31 RX ADMIN — SODIUM CHLORIDE 2000 MILLILITER(S): 9 INJECTION INTRAMUSCULAR; INTRAVENOUS; SUBCUTANEOUS at 20:30

## 2019-07-31 RX ADMIN — SODIUM CHLORIDE 2100 MILLILITER(S): 9 INJECTION INTRAMUSCULAR; INTRAVENOUS; SUBCUTANEOUS at 14:36

## 2019-07-31 RX ADMIN — CEFTRIAXONE 1000 MILLIGRAM(S): 500 INJECTION, POWDER, FOR SOLUTION INTRAMUSCULAR; INTRAVENOUS at 14:25

## 2019-07-31 RX ADMIN — Medication 650 MILLIGRAM(S): at 16:34

## 2019-07-31 RX ADMIN — ONDANSETRON 4 MILLIGRAM(S): 8 TABLET, FILM COATED ORAL at 20:46

## 2019-07-31 NOTE — ED PROVIDER NOTE - CARE PLAN
Principal Discharge DX:	Urinary tract infection without hematuria, site unspecified  Secondary Diagnosis:	Sepsis, due to unspecified organism

## 2019-07-31 NOTE — ED ADULT NURSE NOTE - CHIEF COMPLAINT QUOTE
Pt from the [facility], EMS stated c/o lower abdominal pain, being treated for UTI with Bactrim, son stated noticed pt not" herself" and he thinks it's because of the antibiotic

## 2019-07-31 NOTE — CONSULT NOTE ADULT - SUBJECTIVE AND OBJECTIVE BOX
HPI:   Patient is a 88y female with DM, Afib, HTN, spinal stenosis, neuropathy, wheelchair bound, at assisted living, on suppressive Nitrofurantoin 50 mg for recurrent UTIs- followed by Dr Wang.  Several recent courses of Keflex.  Pt notes difficulty voiding chronically- trouble initiating stream, incontinent with diaper.  With increased pressure/pelvic pain, seen in ED yest AM, sent home wtih Bactrim.  She took one dose Bactrim late yest; as of this AM, more lethargic, weak, sent back to ED.  T 101.9 on arrival, tachycardia, given Ceftriaxone.  Currently feeling better, more alert.  No recent resp or GI Sxs.    REVIEW OF SYSTEMS:  All other review of systems negative (Comprehensive ROS)    PAST MEDICAL & SURGICAL HISTORY:  Spinal stenosis of lumbar region  Hiatal hernia  Diabetes mellitus, type II  Atrial fibrillation  H/O: GI Bleed  Overactive Bladder  Uterine Polyp  Peripheral Neuropathy  Degenerative Joint Disease  Obesity  Hypothyroidism  Hypertension  S/P  section  S/P rotator cuff repair: right  S/P knee replacement, right      Allergies  aspirin (Unknown)    Antimicrobials Day # 2     Other Medications:  acetaminophen   Tablet .. 650 milliGRAM(s) Oral every 6 hours PRN  cholecalciferol 1000 Unit(s) Oral daily  cyanocobalamin 1000 MICROGram(s) Oral daily  dextrose 40% Gel 15 Gram(s) Oral once PRN  dextrose 5%. 1000 milliLiter(s) IV Continuous <Continuous>  dextrose 50% Injectable 12.5 Gram(s) IV Push once  dextrose 50% Injectable 25 Gram(s) IV Push once  dextrose 50% Injectable 25 Gram(s) IV Push once  docusate sodium 100 milliGRAM(s) Oral three times a day  enoxaparin Injectable 40 milliGRAM(s) SubCutaneous daily  ferrous    sulfate 325 milliGRAM(s) Oral daily  furosemide    Tablet 40 milliGRAM(s) Oral daily  gabapentin 200 milliGRAM(s) Oral two times a day  glucagon  Injectable 1 milliGRAM(s) IntraMuscular once PRN  insulin lispro (HumaLOG) corrective regimen sliding scale   SubCutaneous three times a day before meals  insulin lispro (HumaLOG) corrective regimen sliding scale   SubCutaneous at bedtime  levothyroxine 75 MICROGram(s) Oral daily  melatonin 3 milliGRAM(s) Oral at bedtime  metoprolol succinate ER 25 milliGRAM(s) Oral daily  multivitamin/minerals 1 Tablet(s) Oral daily  senna 2 Tablet(s) Oral at bedtime PRN  sodium chloride 0.9%. 1000 milliLiter(s) IV Continuous <Continuous>      FAMILY HISTORY:  NC    SOCIAL HISTORY:  Smoking: remote    ETOH: no     Single     T(F): 101.3 (19 @ 16:32), Max: 101.9 (19 @ 12:38)  HR: 96 (19 @ 15:45)  BP: 111/58 (19 @ 15:45)  RR: 16 (19 @ 15:45)  SpO2: 98% (19 @ 15:45)  Wt(kg): --    PHYSICAL EXAM:  General: alert, no acute distress  Eyes:  anicteric, no conjunctival injection, no discharge  Oropharynx: no lesions or injection 	  Neck: supple, without adenopathy  Lungs: clear to auscultation  Heart: regular rate and rhythm; no murmur, rubs or gallops  Abdomen: soft, nondistended, nontender, without mass or organomegaly  Skin: no lesions  Extremities: minimal edema  Neurologic: alert, oriented, moves all extremities    LAB RESULTS:                        14.3   10.66 )-----------( 180      ( 2019 12:45 )             42.7         136  |  99  |  20  ----------------------------<  172<H>  4.1   |  27  |  1.10    Ca    10.0      2019 12:45    TPro  7.6  /  Alb  3.7  /  TBili  0.8  /  DBili  x   /  AST  24  /  ALT  16  /  AlkPhos  60      Urinalysis Basic - ( 2019 13:40 )    Color: Yellow / Appearance: Slightly Turbid / S.015 / pH: x  Gluc: x / Ketone: Negative  / Bili: Negative / Urobili: Negative   Blood: x / Protein: 30 mg/dL / Nitrite: Negative   Leuk Esterase: Moderate / RBC: >50 /HPF / WBC >50 /HPF   Sq Epi: x / Non Sq Epi: Neg.-Few / Bacteria: Moderate /HPF    MICROBIOLOGY:  RECENT CULTURES:  Culture - Urine (19 @ 09:15)    Specimen Source: .Urine Catheterized    Culture Results:   >100,000 CFU/ml Gram Negative Rods    Culture - Urine (19 @ 07:00)    -  Imipenem: S <=1    -  Levofloxacin: R >4    -  Meropenem: S <=1    -  Nitrofurantoin: R >64 Should not be used to treat pyelonephritis    -  Piperacillin/Tazobactam: S <=16    -  Tigecycline: S <=2    -  Tobramycin: S <=4    -  Trimethoprim/Sulfamethoxazole: S <=2/38    -  Amikacin: S <=16    -  Ampicillin: S <=8 These ampicillin results predict results for amoxicillin    -  Ampicillin/Sulbactam: S <=8/4    -  Aztreonam: S <=4    -  Cefepime: S <=4    -  Cefoxitin: S <=8    -  Ceftriaxone: S <=1 Enterobacter, Citrobacter, and Serratia may develop resistance during prolonged therapy    -  Ciprofloxacin: R >2    -  Ertapenem: S <=1    -  Gentamicin: S <=4    Specimen Source: .Urine Clean Catch (Midstream)    Culture Results:   >100,000 CFU/ml Escherichia coli    Organism Identification: Escherichia coli    Organism: Escherichia coli    Method Type: GONZALO    RADIOLOGY REVIEWED:  Xray Chest 1 View AP/PA (19 @ 13:48) >  No gross acute lung finding at this time. Intrathoracic   stomach again noted.

## 2019-07-31 NOTE — H&P ADULT - NSHPSOCIALHISTORY_GEN_ALL_CORE
Lives at assisted living by self     Denies tobacco use  Denies illicit drug use  Denies etoh use Lives at assisted living by self     Denies tobacco use currently previous smoker quit 40 years prior   Denies illicit drug use  Denies etoh use

## 2019-07-31 NOTE — H&P ADULT - NSHPREVIEWOFSYSTEMS_GEN_ALL_CORE
Review of Systems:  CONSTITUTIONAL: +weakness, + fevers and  +chills  EYES/ENT: No visual changes;  No vertigo or throat pain   NECK: No pain or stiffness  RESPIRATORY: No cough, wheezing, hemoptysis; No shortness of breath,   CARDIOVASCULAR: No chest pain or palpitations  GASTROINTESTINAL: +abdominal pain; No nausea, vomiting, or hematemesis; No diarrhea or constipation.   GENITOURINARY: +dysuria, +frequency;   NEUROLOGICAL: No numbness or weakness  SKIN: No itching, burning, rashes, or lesions   All other review of systems is negative unless indicated above

## 2019-07-31 NOTE — ED ADULT NURSE NOTE - NSIMPLEMENTINTERV_GEN_ALL_ED
Implemented All Fall with Harm Risk Interventions:  [place] to call system. Call bell, personal items and telephone within reach. Instruct patient to call for assistance. Room bathroom lighting operational. Non-slip footwear when patient is off stretcher. Physically safe environment: no spills, clutter or unnecessary equipment. Stretcher in lowest position, wheels locked, appropriate side rails in place. Provide visual cue, wrist band, yellow gown, etc. Monitor gait and stability. Monitor for mental status changes and reorient to person, place, and time. Review medications for side effects contributing to fall risk. Reinforce activity limits and safety measures with patient and family. Provide visual clues: red socks.

## 2019-07-31 NOTE — H&P ADULT - NSICDXPASTMEDICALHX_GEN_ALL_CORE_FT
PAST MEDICAL HISTORY:  Atrial fibrillation     Degenerative Joint Disease     Diabetes mellitus, type II     H/O: GI Bleed     Hiatal hernia     Hypertension     Hypothyroidism     Obesity     Overactive Bladder     Peripheral Neuropathy     Spinal stenosis of lumbar region     Uterine Polyp

## 2019-07-31 NOTE — H&P ADULT - ASSESSMENT
88 year old female as above     #sepsis secondary to uti  - admit to medicine   - s/p keflex outpatient   - ceftriaxone  - ID consult  - urology consult- PMD stating frequent uti previously on nitrofurantoin for years now keflex changed to bactrim   - follow up culture  - s/p ivf bolus in ed now change to normal saline 5cc/hr  - check echo - given rales   - monitor lactate  - monitor labs  - monitor vitals   - tylenol as needed     #leukocytosis   - secondary to above  - monitor wbc    #metabolic encephalopathy  - secondary to above   - monitor     #afib not on a/c (hx of bleed due to vaginal polyps)  - w/ hx of enlarged right upper chamber  - ekg in am   - tele monitor   - metoprolol    # vitamin b12 defc  - b12    # diabetes type 2  - on metformin outpatient will hold while inpatient  - start iss while in patient   - monitor finger sticks    #hypothyroidism  - levothyroxine    #venous insufficiency w/ ?chf   - check echo   - lasix   - elevate legs    #peripheral neuropathy  - gabapentin    #dvt prophylaxis  - lovenox sc given sepsis  IMPROVE VTE Individual Risk Assessment    RISK                                                                Points    [  ] Previous VTE                                                  3    [  ] Thrombophilia                                               2    [  ] Lower limb paralysis                                      2        (unable to hold up >15 seconds)      [  ] Current Cancer                                              2         (within 6 months)    [  ] Immobilization > 24 hrs                                1    [  ] ICU/CCU stay > 24 hours                              1    [x  ] Age > 60                                                      1    IMPROVE VTE Score __1_____    IMPROVE Score 0-1: Low Risk, No VTE prophylaxis required for most patients, encourage ambulation.   IMPROVE Score 2-3: At risk, pharmacologic VTE prophylaxis is indicated for most patients (in the absence of a contraindication)  IMPROVE Score > or = 4: High Risk, pharmacologic VTE prophylaxis is indicated for most patients (in the absence of a contraindication) 88 year old female as above     #sepsis secondary to uti  - admit to medicine   - s/p keflex outpatient   - ceftriaxone  - ID consult  - urology consult- PMD stating frequent uti previously on nitrofurantoin for years now keflex changed to bactrim   - follow up culture  - s/p ivf bolus in ed now change to normal saline 75cc/hr  - check echo - given rales   - monitor lactate  - monitor labs  - monitor vitals   - tylenol as needed     #leukocytosis   - secondary to above  - monitor wbc    #metabolic encephalopathy  - secondary to above   - monitor     #afib not on a/c (hx of bleed due to vaginal polyps)  - w/ hx of enlarged right upper chamber  - ekg in am   - tele monitor   - metoprolol    # vitamin b12 defc  - b12    # diabetes type 2  - on metformin outpatient will hold while inpatient  - start iss while in patient   - monitor finger sticks    #hypothyroidism  - levothyroxine    #venous insufficiency w/ ?chf   - check echo   - lasix   - elevate legs    #peripheral neuropathy  - gabapentin    #dvt prophylaxis  - lovenox sc given sepsis  IMPROVE VTE Individual Risk Assessment    RISK                                                                Points    [  ] Previous VTE                                                  3    [  ] Thrombophilia                                               2    [  ] Lower limb paralysis                                      2        (unable to hold up >15 seconds)      [  ] Current Cancer                                              2         (within 6 months)    [  ] Immobilization > 24 hrs                                1    [  ] ICU/CCU stay > 24 hours                              1    [x  ] Age > 60                                                      1    IMPROVE VTE Score __1_____    IMPROVE Score 0-1: Low Risk, No VTE prophylaxis required for most patients, encourage ambulation.   IMPROVE Score 2-3: At risk, pharmacologic VTE prophylaxis is indicated for most patients (in the absence of a contraindication)  IMPROVE Score > or = 4: High Risk, pharmacologic VTE prophylaxis is indicated for most patients (in the absence of a contraindication)

## 2019-07-31 NOTE — ED PROVIDER NOTE - OBJECTIVE STATEMENT
Dr. Torrez: 88F h/o Afib, DM, HTN, recurrent UTIs, recently finished keflex, seen yesterday in the ED and PA'ed on Bactrim, back today from [facility] with son c/o chills, "feeling out of it" and dysuria. No nausea or vomiting.

## 2019-07-31 NOTE — H&P ADULT - NSICDXPASTSURGICALHX_GEN_ALL_CORE_FT
PAST SURGICAL HISTORY:  S/P  section     S/P knee replacement, right     S/P rotator cuff repair right

## 2019-07-31 NOTE — H&P ADULT - HISTORY OF PRESENT ILLNESS
jasmeet is a 88 year old female with pmh of spinal stenosis, diabetes type 2, dvt,  anemia, neuropathy, afib (w/ hx  of enlarged right upper chamber not on a/c due to vaginal bleeding secondary to polyps), frequent uti (previously on daily macrobid for many years), seasonal allergies, venous insufficiency, gall bladder disease, vaginal polyps, dvt, and uterine wall thickening coming to ER with complaints of not feeling self. Per son bedside states patient has been changed to keflex by outside Doctor and has bene on/off for multiple course over past 3 weeks. states as had abdominal pain came to ED yesterday and was changed to bactrim. however this am patient called sone and stated does not feel like self and wanted to go back to ED. son stating patient more confused then past and not acting like self. baseline patient aaox3 and able to do function independently using wheelchair and aide. Lives at [facility]. +chills and fevers +abdominal pain (non radiating lower area burning in nature 3/10).  also stating increased frequency of urine.     spoke to patient pmd Dr Hernandez- patient not on eliquis as does not follow up with OB to correct polyps. unsure if patient has chf as not seen patient in long time.     patient aaox3 this time however does not remember family history and son bedside does not remember as well.

## 2019-07-31 NOTE — H&P ADULT - NSHPPHYSICALEXAM_GEN_ALL_CORE
Vital Signs Last 24 Hrs  T(F): 101.9 (31 Jul 2019 12:38), Max: 101.9 (31 Jul 2019 12:38)  HR: 98 (31 Jul 2019 14:35) (98 - 114)  BP: 112/54 (31 Jul 2019 14:35) (112/54 - 153/78)  RR: 16 (31 Jul 2019 14:35) (16 - 18)  SpO2: 97% (31 Jul 2019 14:35) (90% - 97%)    Physical Exam:  Constitutional: NAD, awake and alert, well-developed; obese  Neck: Soft and supple, No LAD, No JVD  Respiratory: +rhonchi b/l bases  Cardiovascular: +s1/s2 no edema b/l le  Gastrointestinal: soft nd bs+  Vascular: 2+ peripheral pulses  Neurological: A/O x 3, no focal deficits  Musculoskeletal: 5/5 strength b/l upper and lower extremities  : Contraindicated  Breasts: Contraindicated  Rectal: Contraindicated Vital Signs Last 24 Hrs  T(F): 101.9 (31 Jul 2019 12:38), Max: 101.9 (31 Jul 2019 12:38)  HR: 98 (31 Jul 2019 14:35) (98 - 114)  BP: 112/54 (31 Jul 2019 14:35) (112/54 - 153/78)  RR: 16 (31 Jul 2019 14:35) (16 - 18)  SpO2: 97% (31 Jul 2019 14:35) (90% - 97%)    Physical Exam:  Constitutional: NAD, awake and alert, well-developed; obese  Neck: Soft and supple, No LAD, No JVD  Respiratory: +rhonchi b/l bases  Cardiovascular: +s1/s2 +2 edema b/l le   Gastrointestinal: soft nd bs+  Vascular: 2+ peripheral pulses  Neurological: A/O x 3, no focal deficits  Musculoskeletal: 5/5 strength b/l upper and lower extremities  : Contraindicated  Breasts: Contraindicated  Rectal: Contraindicated

## 2019-07-31 NOTE — H&P ADULT - NSHPOUTPATIENTPROVIDERS_GEN_ALL_CORE
PMD/Cardio- Dr Romi Burkett  OB Dr Nelson  ENT Dr Maegan Dean  POD Dr Peres  Urology Dr Heller  GI Dr Reyes  Optho Dr Oconnor

## 2019-07-31 NOTE — ED PROVIDER NOTE - ATTENDING CONTRIBUTION TO CARE
Dr. Torrez: I performed a face to face bedside interview with patient regarding history of present illness, review of symptoms and past medical history. I completed an independent physical exam.  I have discussed patient's plan of care with PA.   I agree with note as stated above, having amended the EMR as needed to reflect my findings.   This includes HISTORY OF PRESENT ILLNESS, HIV, PAST MEDICAL/SURGICAL/FAMILY/SOCIAL HISTORY, ALLERGIES AND HOME MEDICATIONS, REVIEW OF SYSTEMS, PHYSICAL EXAM, and any PROGRESS NOTES during the time I functioned as the attending physician for this patient.    Dr. Torrez: This H&P has been written by myself in its entirety

## 2019-07-31 NOTE — CONSULT NOTE ADULT - SUBJECTIVE AND OBJECTIVE BOX
Patient is a 88y old  Female who presents with a chief complaint of sepsis secondary to uti (2019 16:33)      BRIEF HOSPITAL COURSE:       88F w/ afib, lower EXT DVT, not on any A/C because of development of severe vaginal bleeding (polys) when A/C was started. Patient is currently admitted to tele floor with speis secondary to UTI    Events last 24 hours:   -consult called today by floor staff as patient had repeat labs showing a wise in her serum lactate from 2.6 --> 4.1.     -on room arrival, patient only complaint at this time is nausea. She is currently receiving a 1L bolus of fluid (ordered by day team when lactate came back elevated). On review of flow sheet, her blood pressure has NOT been low, and actually has been borderline high with SBP in the 150's. Her last heart rate documented was noted at 111 but this is in the face of a fever of 101.5F (has received Tylenol no anti-emetics yet).   -on exam, her belly is soft, not distended (she is obese, but belly not firm). She has been making urine per RN report and son who is bedside.     PAST MEDICAL & SURGICAL HISTORY:  Spinal stenosis of lumbar region  Hiatal hernia  Diabetes mellitus, type II  Atrial fibrillation  H/O: GI Bleed  Overactive Bladder  Uterine Polyp  Peripheral Neuropathy  Degenerative Joint Disease  Obesity  Hypothyroidism  Hypertension  S/P  section  S/P rotator cuff repair: right  S/P knee replacement, right    Allergies    aspirin (Unknown)    Intolerances      FAMILY HISTORY:      Family history otherwise noncontributory.    Social History: ***  Review of Systems:    ALL OTHER REVIEW OF SYSTEMS EXCEPT PER HPI NEGATIVE.      Medications:    metoprolol succinate ER 25 milliGRAM(s) Oral daily      acetaminophen   Tablet .. 650 milliGRAM(s) Oral every 6 hours PRN  gabapentin 200 milliGRAM(s) Oral two times a day  ketorolac   Injectable 15 milliGRAM(s) IV Push once  melatonin 3 milliGRAM(s) Oral at bedtime  ondansetron Injectable 4 milliGRAM(s) IV Push every 8 hours  ondansetron Injectable 4 milliGRAM(s) IV Push once      enoxaparin Injectable 40 milliGRAM(s) SubCutaneous daily    docusate sodium 100 milliGRAM(s) Oral three times a day  senna 2 Tablet(s) Oral at bedtime PRN      dextrose 40% Gel 15 Gram(s) Oral once PRN  dextrose 50% Injectable 12.5 Gram(s) IV Push once  dextrose 50% Injectable 25 Gram(s) IV Push once  dextrose 50% Injectable 25 Gram(s) IV Push once  glucagon  Injectable 1 milliGRAM(s) IntraMuscular once PRN  insulin lispro (HumaLOG) corrective regimen sliding scale   SubCutaneous three times a day before meals  insulin lispro (HumaLOG) corrective regimen sliding scale   SubCutaneous at bedtime  levothyroxine 75 MICROGram(s) Oral daily    cholecalciferol 1000 Unit(s) Oral daily  cyanocobalamin 1000 MICROGram(s) Oral daily  dextrose 5%. 1000 milliLiter(s) IV Continuous <Continuous>  ferrous    sulfate 325 milliGRAM(s) Oral daily  multivitamin/minerals 1 Tablet(s) Oral daily  sodium chloride 0.9% Bolus 1000 milliLiter(s) IV Bolus once  sodium chloride 0.9%. 1000 milliLiter(s) IV Continuous <Continuous>                ICU Vital Signs Last 24 Hrs  T(C): 38.6 (2019 17:58), Max: 38.8 (2019 12:38)  T(F): 101.5 (2019 17:58), Max: 101.9 (2019 12:38)  HR: 111 (2019 17:58) (96 - 114)  BP: 149/83 (2019 17:58) (111/58 - 153/78)  RR: 16 (2019 17:58) (16 - 18)  SpO2: 95% (2019 17:58) (90% - 98%)            I&O's Detail        LABS:                        14.3   10.66 )-----------( 180      ( 2019 12:45 )             42.7     07-31    136  |  99  |  20  ----------------------------<  172<H>  4.1   |  27  |  1.10    Ca    10.0      2019 12:45    TPro  7.6  /  Alb  3.7  /  TBili  0.8  /  DBili  x   /  AST  24  /  ALT  16  /  AlkPhos  60            CAPILLARY BLOOD GLUCOSE      POCT Blood Glucose.: 165 mg/dL (2019 16:31)    PT/INR - ( 2019 12:45 )   PT: 12.0 sec;   INR: 1.07 ratio         PTT - ( 2019 12:45 )  PTT:28.6 sec  Urinalysis Basic - ( 2019 13:40 )    Color: Yellow / Appearance: Slightly Turbid / S.015 / pH: x  Gluc: x / Ketone: Negative  / Bili: Negative / Urobili: Negative   Blood: x / Protein: 30 mg/dL / Nitrite: Negative   Leuk Esterase: Moderate / RBC: >50 /HPF / WBC >50 /HPF   Sq Epi: x / Non Sq Epi: Neg.-Few / Bacteria: Moderate /HPF      CULTURES:  Culture Results:   >100,000 CFU/ml Gram Negative Rods ( @ 09:15)      Physical Examination:    GENERAL: Patient appears uncomfortable because of underlying nausea    EYES: Pupils equal, reactive to light.  Symmetric.    EARS, NOSE, THROAT: Normal; supple neck, no lymphadenopathy; trachea midline    PULM: Clear to auscultation bilaterally, no significant sputum production    CVS: (+) S1/S2, irregularly irregular, no MRG    GI: Soft, nondistended, nontender, normoactive bowel sounds, no masses, no guarding. Obese, protuberant abdomen    EXTREMITIES: 1+ edema b/l lowre ext.       NEURO: Alert, oriented, interactive, nonfocal        RADIOLOGY:     < from: Xray Chest 1 View AP/PA (19 @ 13:48) >  EXAM:  XR CHEST AP OR PA 1V      PROCEDURE DATE:  2019        INTERPRETATION:  AP chest on 2019 at 1:31 PM. Patient has sepsis.    Heart suggest enlargement. Intrathoracic stomach secondary to elevation   of the posterior left diaphragm again noted.    There is no gross sense of lung consolidation or layering effusion.    On  of this year there was a left base infiltrate.    Posttraumatic deformity right upper humerus and surgical staples over the   right shoulder again seen.    IMPRESSION: No gross acute lung finding at this time. Intrathoracic   stomach again noted.                  PETR JIMENEZ M.D., ATTENDING RADIOLOGIST  This document has been electronically signed. 2019  1:58PM    < end of copied text >

## 2019-07-31 NOTE — ED ADULT TRIAGE NOTE - CHIEF COMPLAINT QUOTE
Pt from the [facility], EMS stated c/o lower abdominal pain, being treated for UTI with Bactrim, son stated noticed pt not" herself" and he thinks it's because of the antibiotic

## 2019-08-01 DIAGNOSIS — N39.0 URINARY TRACT INFECTION, SITE NOT SPECIFIED: ICD-10-CM

## 2019-08-01 LAB
-  AMIKACIN: SIGNIFICANT CHANGE UP
-  AMPICILLIN/SULBACTAM: SIGNIFICANT CHANGE UP
-  AMPICILLIN: SIGNIFICANT CHANGE UP
-  AZTREONAM: SIGNIFICANT CHANGE UP
-  CEFAZOLIN: SIGNIFICANT CHANGE UP
-  CEFEPIME: SIGNIFICANT CHANGE UP
-  CEFOXITIN: SIGNIFICANT CHANGE UP
-  CEFTRIAXONE: SIGNIFICANT CHANGE UP
-  CIPROFLOXACIN: SIGNIFICANT CHANGE UP
-  ERTAPENEM: SIGNIFICANT CHANGE UP
-  GENTAMICIN: SIGNIFICANT CHANGE UP
-  IMIPENEM: SIGNIFICANT CHANGE UP
-  LEVOFLOXACIN: SIGNIFICANT CHANGE UP
-  MEROPENEM: SIGNIFICANT CHANGE UP
-  NITROFURANTOIN: SIGNIFICANT CHANGE UP
-  PIPERACILLIN/TAZOBACTAM: SIGNIFICANT CHANGE UP
-  TIGECYCLINE: SIGNIFICANT CHANGE UP
-  TOBRAMYCIN: SIGNIFICANT CHANGE UP
-  TRIMETHOPRIM/SULFAMETHOXAZOLE: SIGNIFICANT CHANGE UP
ANION GAP SERPL CALC-SCNC: 9 MMOL/L — SIGNIFICANT CHANGE UP (ref 5–17)
BASOPHILS # BLD AUTO: 0.01 K/UL — SIGNIFICANT CHANGE UP (ref 0–0.2)
BASOPHILS NFR BLD AUTO: 0.2 % — SIGNIFICANT CHANGE UP (ref 0–2)
BUN SERPL-MCNC: 13 MG/DL — SIGNIFICANT CHANGE UP (ref 7–23)
CALCIUM SERPL-MCNC: 8.4 MG/DL — SIGNIFICANT CHANGE UP (ref 8.4–10.5)
CHLORIDE SERPL-SCNC: 106 MMOL/L — SIGNIFICANT CHANGE UP (ref 96–108)
CO2 SERPL-SCNC: 24 MMOL/L — SIGNIFICANT CHANGE UP (ref 22–31)
CREAT SERPL-MCNC: 0.69 MG/DL — SIGNIFICANT CHANGE UP (ref 0.5–1.3)
CULTURE RESULTS: SIGNIFICANT CHANGE UP
EOSINOPHIL # BLD AUTO: 0 K/UL — SIGNIFICANT CHANGE UP (ref 0–0.5)
EOSINOPHIL NFR BLD AUTO: 0 % — SIGNIFICANT CHANGE UP (ref 0–6)
GLUCOSE BLDC GLUCOMTR-MCNC: 117 MG/DL — HIGH (ref 70–99)
GLUCOSE BLDC GLUCOMTR-MCNC: 122 MG/DL — HIGH (ref 70–99)
GLUCOSE BLDC GLUCOMTR-MCNC: 126 MG/DL — HIGH (ref 70–99)
GLUCOSE BLDC GLUCOMTR-MCNC: 131 MG/DL — HIGH (ref 70–99)
GLUCOSE SERPL-MCNC: 141 MG/DL — HIGH (ref 70–99)
HBA1C BLD-MCNC: 6.1 % — HIGH (ref 4–5.6)
HCT VFR BLD CALC: 35.3 % — SIGNIFICANT CHANGE UP (ref 34.5–45)
HGB BLD-MCNC: 11.8 G/DL — SIGNIFICANT CHANGE UP (ref 11.5–15.5)
IMM GRANULOCYTES NFR BLD AUTO: 0.7 % — SIGNIFICANT CHANGE UP (ref 0–1.5)
LACTATE SERPL-SCNC: 2 MMOL/L — SIGNIFICANT CHANGE UP (ref 0.7–2)
LACTATE SERPL-SCNC: 2.8 MMOL/L — HIGH (ref 0.7–2)
LACTATE SERPL-SCNC: 3.2 MMOL/L — HIGH (ref 0.7–2)
LYMPHOCYTES # BLD AUTO: 0.23 K/UL — LOW (ref 1–3.3)
LYMPHOCYTES # BLD AUTO: 3.9 % — LOW (ref 13–44)
MCHC RBC-ENTMCNC: 32.2 PG — SIGNIFICANT CHANGE UP (ref 27–34)
MCHC RBC-ENTMCNC: 33.4 GM/DL — SIGNIFICANT CHANGE UP (ref 32–36)
MCV RBC AUTO: 96.4 FL — SIGNIFICANT CHANGE UP (ref 80–100)
METHOD TYPE: SIGNIFICANT CHANGE UP
MONOCYTES # BLD AUTO: 0.37 K/UL — SIGNIFICANT CHANGE UP (ref 0–0.9)
MONOCYTES NFR BLD AUTO: 6.2 % — SIGNIFICANT CHANGE UP (ref 2–14)
NEUTROPHILS # BLD AUTO: 5.31 K/UL — SIGNIFICANT CHANGE UP (ref 1.8–7.4)
NEUTROPHILS NFR BLD AUTO: 89 % — HIGH (ref 43–77)
NRBC # BLD: 0 /100 WBCS — SIGNIFICANT CHANGE UP (ref 0–0)
ORGANISM # SPEC MICROSCOPIC CNT: SIGNIFICANT CHANGE UP
ORGANISM # SPEC MICROSCOPIC CNT: SIGNIFICANT CHANGE UP
PLATELET # BLD AUTO: 147 K/UL — LOW (ref 150–400)
POTASSIUM SERPL-MCNC: 3.5 MMOL/L — SIGNIFICANT CHANGE UP (ref 3.5–5.3)
POTASSIUM SERPL-SCNC: 3.5 MMOL/L — SIGNIFICANT CHANGE UP (ref 3.5–5.3)
RBC # BLD: 3.66 M/UL — LOW (ref 3.8–5.2)
RBC # FLD: 13.2 % — SIGNIFICANT CHANGE UP (ref 10.3–14.5)
SODIUM SERPL-SCNC: 139 MMOL/L — SIGNIFICANT CHANGE UP (ref 135–145)
SPECIMEN SOURCE: SIGNIFICANT CHANGE UP
WBC # BLD: 5.96 K/UL — SIGNIFICANT CHANGE UP (ref 3.8–10.5)
WBC # FLD AUTO: 5.96 K/UL — SIGNIFICANT CHANGE UP (ref 3.8–10.5)

## 2019-08-01 PROCEDURE — 93306 TTE W/DOPPLER COMPLETE: CPT | Mod: 26

## 2019-08-01 PROCEDURE — 71045 X-RAY EXAM CHEST 1 VIEW: CPT | Mod: 26

## 2019-08-01 PROCEDURE — 99233 SBSQ HOSP IP/OBS HIGH 50: CPT

## 2019-08-01 RX ORDER — SODIUM CHLORIDE 9 MG/ML
1000 INJECTION INTRAMUSCULAR; INTRAVENOUS; SUBCUTANEOUS
Refills: 0 | Status: DISCONTINUED | OUTPATIENT
Start: 2019-08-01 | End: 2019-08-02

## 2019-08-01 RX ORDER — SODIUM CHLORIDE 9 MG/ML
2 INJECTION INTRAMUSCULAR; INTRAVENOUS; SUBCUTANEOUS ONCE
Refills: 0 | Status: DISCONTINUED | OUTPATIENT
Start: 2019-08-01 | End: 2019-08-01

## 2019-08-01 RX ORDER — SODIUM CHLORIDE 9 MG/ML
2000 INJECTION INTRAMUSCULAR; INTRAVENOUS; SUBCUTANEOUS ONCE
Refills: 0 | Status: COMPLETED | OUTPATIENT
Start: 2019-08-01 | End: 2019-08-01

## 2019-08-01 RX ADMIN — PREGABALIN 1000 MICROGRAM(S): 225 CAPSULE ORAL at 14:22

## 2019-08-01 RX ADMIN — ENOXAPARIN SODIUM 40 MILLIGRAM(S): 100 INJECTION SUBCUTANEOUS at 14:21

## 2019-08-01 RX ADMIN — Medication 1 TABLET(S): at 14:22

## 2019-08-01 RX ADMIN — GABAPENTIN 200 MILLIGRAM(S): 400 CAPSULE ORAL at 17:42

## 2019-08-01 RX ADMIN — SODIUM CHLORIDE 75 MILLILITER(S): 9 INJECTION INTRAMUSCULAR; INTRAVENOUS; SUBCUTANEOUS at 16:42

## 2019-08-01 RX ADMIN — Medication 650 MILLIGRAM(S): at 06:26

## 2019-08-01 RX ADMIN — Medication 100 MILLIGRAM(S): at 05:25

## 2019-08-01 RX ADMIN — Medication 650 MILLIGRAM(S): at 05:26

## 2019-08-01 RX ADMIN — CEFTRIAXONE 100 MILLIGRAM(S): 500 INJECTION, POWDER, FOR SOLUTION INTRAMUSCULAR; INTRAVENOUS at 08:16

## 2019-08-01 RX ADMIN — SODIUM CHLORIDE 500 MILLILITER(S): 9 INJECTION INTRAMUSCULAR; INTRAVENOUS; SUBCUTANEOUS at 12:23

## 2019-08-01 RX ADMIN — Medication 75 MICROGRAM(S): at 05:25

## 2019-08-01 RX ADMIN — SODIUM CHLORIDE 125 MILLILITER(S): 9 INJECTION INTRAMUSCULAR; INTRAVENOUS; SUBCUTANEOUS at 05:28

## 2019-08-01 RX ADMIN — GABAPENTIN 200 MILLIGRAM(S): 400 CAPSULE ORAL at 05:25

## 2019-08-01 RX ADMIN — Medication 25 MILLIGRAM(S): at 05:27

## 2019-08-01 RX ADMIN — ONDANSETRON 4 MILLIGRAM(S): 8 TABLET, FILM COATED ORAL at 15:35

## 2019-08-01 RX ADMIN — Medication 3 MILLIGRAM(S): at 22:02

## 2019-08-01 RX ADMIN — Medication 1000 UNIT(S): at 14:23

## 2019-08-01 RX ADMIN — Medication 325 MILLIGRAM(S): at 14:23

## 2019-08-01 NOTE — CONSULT NOTE ADULT - ATTENDING COMMENTS
patient seen and examined  comfortable, no sob, calm, sat 97% RA no distress, exam pt does have + Rales  ICU consult called for lactic acidosis  d/w Dr. Rojas, d/w Son, d/w Pt    Assessment  Sepsis due to UTI with GNR  - possibly severe sepsis but suspect lactic acidosis can be overlapped with metformin use  Underlying CHF suspected no recent Echo noted, pt has LE edema and mild crackles on exam - awaiting echo  DM2  HTN  Afib    Plan  IVF maintenance with NS    bolus if hypotensive,   f/u cultures, pt being evaluated by ID, Abx as per ID  Hold Metformin,   check CXR, Echo,   at this time pt clinically appears stable and will continue supportive care.   will follow up with patient again in AM, and if clinical status worsens please contact ICU team again.

## 2019-08-01 NOTE — PROGRESS NOTE ADULT - ASSESSMENT
89 yo F, diabetic, wheelchair bound, at assisted living, frequent UTIs, seen in ED with  Sxs, sent home with Bactrim- Urine Cx form yest >100k GNRs.  She returns this AM with altered MS, fever, tachycardia, sepsis.  WBC and Creat normal.  UA pyuria.  Thus, question of persistent influence of UTI, although based on June urine isolate, Bactrim should be effective.  Bactrim hypersensitivity reax also possible, but only took single dose.  Her elevated lactate noted, ? multifactorial.  She may be mildly fluid overloaded  GNR's growing in urine  Consider mcdonough to make sure no retention and to monitor output   Plan:  Agree with Ceftriaxone for UTI, sepsis, while awaiting repeat urine Cx sensitivities  Follow temps and CBC/diff   consider renal/bladder ultrasound.  I would reserve ertapenem for additional findings c/w sepsis.

## 2019-08-01 NOTE — CONSULT NOTE ADULT - SUBJECTIVE AND OBJECTIVE BOX
87 yo F with PMHx of Afib, DM, HTN, recurrent UTIs presents for eval of altered mental status, chills, rigors from [facility]. Patient was seen on multiple occasions for UTI like symptoms within the past few weeks. She was treated with multiple course of Macrobid and Keflex. Patient did have recent ED visit where she was evaluated and antibiotic course was changed to Bactrim. Patient returned last night due to progression of symptoms. Labs from prior ED visit on 19 reveal UA suggestive of UTI and UC growing E Coli that is sensitive to all antibiotics that she has received for treatment thus far. Patient offering no complaints or concerns besides chills. Denies chest pain, abd pain, SOB, rash, lightheadedness/dizziness.     In ED on  patient again had UA suggestive of UTI, this time however accompanied by Lactic of 2.8. Patient repeat lactic after IVF and antibiotics return 4.1 prompting concern for worsening sepsis. IVF fluid increased however patient reports history of leg swelling and primary team became concerned for fluid overload.     ICU consult requested for management of severe sepsis and respiratory distress related to fluid overload.        PAST MEDICAL & SURGICAL HISTORY:  Spinal stenosis of lumbar region  Hiatal hernia  Diabetes mellitus, type II  Atrial fibrillation  H/O: GI Bleed  Overactive Bladder  Uterine Polyp  Peripheral Neuropathy  Degenerative Joint Disease  Obesity  Hypothyroidism  Hypertension  S/P  section  S/P rotator cuff repair: right  S/P knee replacement, right        Home Medications:  Centrum Silver oral tablet: 1 tab(s) orally once a day (2019 14:18)  cyanocobalamin 1000 mcg oral tablet: 1 tab(s) orally once a day (2019 14:18)  furosemide 40 mg oral tablet: 1 tab(s) orally once a day (2019 14:20)  gabapentin 100 mg oral capsule: 2 cap(s) orally 2 times a day (2019 14:18)  levothyroxine 75 mcg (0.075 mg) oral tablet: 1 tab(s) orally once a day (2019 14:18)  Lopressor: 25 milligram(s) orally once a day (2019 14:18)  Melatonin 3 mg oral tablet: 1 tab(s) orally once (at bedtime) (2019 14:18)  metFORMIN 1000 mg oral tablet: 1 tab(s) orally 2 times a day (2019 14:18)  Slow Release Iron (as elemental iron) 45 mg oral tablet, extended release: 1 tab(s) orally once a day (2019 14:18)  Tylenol 500 mg oral tablet: 500 milligram(s) orally 2 times a day, As Needed (2019 14:18)  Vitamin D3 1000 intl units oral tablet: 1 tab(s) orally once a day (2019 14:18)  ZyrTEC 10 mg oral tablet: 1 tab(s) orally once a day, As Needed (2019 14:18)        Review of Systems:   	CONSTITUTIONAL: Reports chills  	ENT: Denies dysphagia, odynophagia, blurred vision, tinnitus, neck stiffness  	RESPIRATORY: Denies cough, wheeze, hemoptysis, shortness of breath  	CARDIOVASCULAR: Denies chest pain, palpitations irregular HR  	GASTROINTESTINAL:  Denies nausea, vomiting, abdominal pain, diarrhea, constipation, melena, BRBPR, food intolerances  	GENITOURINARY: Dysuria, hematuria, urinary frequency, urinary incontinence  	NEUROLOGICAL: Denies headaches, syncope, near syncope, dizziness, lightheadedness, headaches, seizures  	SKIN: Denies rashes, masses, bruising, lesions   	ENDOCRINE: Denies heat or cold intolerance; No hair loss  	MUSCULOSKELETAL: Denies history of OA or gout, joint swelling  	PSYCHIATRIC: Denies depression, anxiety, mood swings, or difficulty sleeping  	HEME: Denies history of DVT/PE, blood transfusion    	All other ROS reviewed and negative except as otherwise stated        Vital Signs Last 24 Hrs  T(C): 36.6 (01 Aug 2019 12:47), Max: 38.6 (2019 17:58)  T(F): 97.9 (01 Aug 2019 12:47), Max: 101.5 (2019 17:58)  HR: 75 (01 Aug 2019 12:47) (66 - 111)  BP: 105/60 (01 Aug 2019 12:48) (99/54 - 157/113)    RR: 15 (01 Aug 2019 12:47) (15 - 18)  SpO2: 93% (01 Aug 2019 12:47) (93% - 98%)    Ins and Outs     19 @ 07:01  -  19 @ 07:00  --------------------------------------------------------  IN: 0 mL / OUT: 700 mL / NET: -700 mL    19 @ 07:01  -  19 @ 13:57  --------------------------------------------------------  IN: 125 mL / OUT: 0 mL / NET: 125 mL        Height (cm): 177.8 (19 @ 12:27), 177.8 (19 @ 08:45)  Weight (kg): 106.6 (19 @ 12:27), 106.6 (19 @ 08:45)  BMI (kg/m2): 33.7 (19 @ 12:27), 33.7 (19 @ 08:45)      Physical Examination:  GENERAL:               Alert, Oriented, No acute distress.    HEENT:                    Pupils equal, reactive to light.  Symmetric. No JVD, Moist MM  PULM:                     Bilateral air entry, faint coarse breath sounds, breathing nonlabored, no significant sputum production, No Wheezing  CVS:                         S1, S2,  No Murmur  ABD:                        Soft, nondistended, nontender, normoactive bowel sounds,   EXT:                        trace edema to bilateral lower extremities, nontender, No Cyanosis or Clubbing   Vascular:                Warm Extremities, Normal Capillary refill, Normal Distal Pulses  SKIN:                       Warm and well perfused, no rashes noted.   NEURO:                  Alert, oriented, interactive, nonfocal, follows commands  PSYC:                      Calm, + Insight.      LABS:                        11.8   5.96  )-----------( 147      ( 01 Aug 2019 07:00 )             35.3     08-01    139  |  106  |  13  ----------------------------<  141<H>  3.5   |  24  |  0.69    Ca    8.4      01 Aug 2019 07:00    TPro  7.6  /  Alb  3.7  /  TBili  0.8  /  DBili  x   /  AST  24  /  ALT  16  /  AlkPhos  60  07-31            PT/INR - ( 2019 12:45 )   PT: 12.0 sec;   INR: 1.07 ratio         PTT - ( 2019 12:45 )  PTT:28.6 sec  Urinalysis Basic - ( 2019 13:40 )    Color: Yellow / Appearance: Slightly Turbid / S.015 / pH: x  Gluc: x / Ketone: Negative  / Bili: Negative / Urobili: Negative   Blood: x / Protein: 30 mg/dL / Nitrite: Negative   Leuk Esterase: Moderate / RBC: >50 /HPF / WBC >50 /HPF   Sq Epi: x / Non Sq Epi: Neg.-Few / Bacteria: Moderate /HPF              CULTURES: (if applicable)    Culture - Urine (collected 19 @ 09:15)  Source: .Urine Catheterized  Preliminary Report (19 @ 15:55):    >100,000 CFU/ml Gram Negative Rods            CAPILLARY BLOOD GLUCOSE      POCT Blood Glucose.: 131 mg/dL (01 Aug 2019 11:35)      RADIOLOGY  CXR:  INTERPRETATION:  AP chest on 2019 at 1:31 PM. Patient has sepsis.    Heart suggest enlargement. Intrathoracic stomach secondary to elevation   of the posterior left diaphragm again noted.    There is no gross sense of lung consolidation or layering effusion.    On  of this year there was a left base infiltrate.    Posttraumatic deformity right upper humerus and surgical staples over the   right shoulder again seen.    IMPRESSION: No gross acute lung finding at this time. Intrathoracic   stomach again noted.        CT:    ECHO: Performed, Results pending 87 yo F with PMHx of Afib not on a/c, DM 2 on metformin, HTN, recurrent UTIs presents for eval of altered mental status, chills, rigors from [facility]. Patient was seen on multiple occasions for UTI like symptoms within the past few weeks. She was treated with multiple course of Macrobid and Keflex. Patient did have recent ED visit where she was evaluated and antibiotic course was changed to Bactrim. Patient returned last night due to progression of symptoms. Labs from prior ED visit on 19 reveal UA suggestive of UTI and UC growing E Coli that is sensitive to all antibiotics that she has received for treatment thus far. Patient offering no complaints or concerns besides chills. Denies chest pain, abd pain, SOB, rash, lightheadedness/dizziness.     In ED on  patient again had UA suggestive of UTI, this time however accompanied by Lactic of 2.8. Patient repeat lactic after IVF and antibiotics return 4.1 prompting concern for worsening sepsis. IVF fluid increased however patient reports history of leg swelling and primary team became concerned for fluid overload.     ICU consult requested for management of severe sepsis and respiratory distress related to fluid overload.        PAST MEDICAL & SURGICAL HISTORY:  Spinal stenosis of lumbar region  Hiatal hernia  Diabetes mellitus, type II  Atrial fibrillation  H/O: GI Bleed  Overactive Bladder  Uterine Polyp  Peripheral Neuropathy  Degenerative Joint Disease  Obesity  Hypothyroidism  Hypertension  S/P  section  S/P rotator cuff repair: right  S/P knee replacement, right        Home Medications:  Centrum Silver oral tablet: 1 tab(s) orally once a day (2019 14:18)  cyanocobalamin 1000 mcg oral tablet: 1 tab(s) orally once a day (2019 14:18)  furosemide 40 mg oral tablet: 1 tab(s) orally once a day (2019 14:20)  gabapentin 100 mg oral capsule: 2 cap(s) orally 2 times a day (2019 14:18)  levothyroxine 75 mcg (0.075 mg) oral tablet: 1 tab(s) orally once a day (2019 14:18)  Lopressor: 25 milligram(s) orally once a day (2019 14:18)  Melatonin 3 mg oral tablet: 1 tab(s) orally once (at bedtime) (2019 14:18)  metFORMIN 1000 mg oral tablet: 1 tab(s) orally 2 times a day (2019 14:18)  Slow Release Iron (as elemental iron) 45 mg oral tablet, extended release: 1 tab(s) orally once a day (2019 14:18)  Tylenol 500 mg oral tablet: 500 milligram(s) orally 2 times a day, As Needed (2019 14:18)  Vitamin D3 1000 intl units oral tablet: 1 tab(s) orally once a day (2019 14:18)  ZyrTEC 10 mg oral tablet: 1 tab(s) orally once a day, As Needed (2019 14:18)        Review of Systems:   	CONSTITUTIONAL: Reports chills  	ENT: Denies dysphagia, odynophagia, blurred vision, tinnitus, neck stiffness  	RESPIRATORY: Denies cough, wheeze, hemoptysis, shortness of breath  	CARDIOVASCULAR: Denies chest pain, palpitations irregular HR  	GASTROINTESTINAL:  Denies nausea, vomiting, abdominal pain, diarrhea, constipation, melena, BRBPR, food intolerances  	GENITOURINARY: Dysuria, hematuria, urinary frequency, urinary incontinence  	NEUROLOGICAL: Denies headaches, syncope, near syncope, dizziness, lightheadedness, headaches, seizures  	SKIN: Denies rashes, masses, bruising, lesions   	ENDOCRINE: Denies heat or cold intolerance; No hair loss  	MUSCULOSKELETAL: Denies history of OA or gout, joint swelling  	PSYCHIATRIC: Denies depression, anxiety, mood swings, or difficulty sleeping  	HEME: Denies history of DVT/PE, blood transfusion    	All other ROS reviewed and negative except as otherwise stated        Vital Signs Last 24 Hrs  T(C): 36.6 (01 Aug 2019 12:47), Max: 38.6 (2019 17:58)  T(F): 97.9 (01 Aug 2019 12:47), Max: 101.5 (2019 17:58)  HR: 75 (01 Aug 2019 12:47) (66 - 111)  BP: 105/60 (01 Aug 2019 12:48) (99/54 - 157/113)    RR: 15 (01 Aug 2019 12:47) (15 - 18)  SpO2: 93% (01 Aug 2019 12:47) (93% - 98%)    Ins and Outs     19 @ 07:01  -  19 @ 07:00  --------------------------------------------------------  IN: 0 mL / OUT: 700 mL / NET: -700 mL    19 @ 07:01  -  19 @ 13:57  --------------------------------------------------------  IN: 125 mL / OUT: 0 mL / NET: 125 mL        Height (cm): 177.8 (19 @ 12:27), 177.8 (19 @ 08:45)  Weight (kg): 106.6 (19 @ 12:27), 106.6 (19 @ 08:45)  BMI (kg/m2): 33.7 (19 @ 12:27), 33.7 (19 08:45)      Physical Examination:  GENERAL:               Alert, Oriented, No acute distress.    HEENT:                    Pupils equal, reactive to light.  Symmetric. No JVD, Moist MM  PULM:                     Bilateral air entry, faint coarse breath sounds, breathing nonlabored, no significant sputum production, No Wheezing  CVS:                         S1, S2,  No Murmur  ABD:                        Soft, nondistended, nontender, normoactive bowel sounds,   EXT:                        trace edema to bilateral lower extremities, nontender, No Cyanosis or Clubbing   Vascular:                Warm Extremities, Normal Capillary refill, Normal Distal Pulses  SKIN:                       Warm and well perfused, no rashes noted.   NEURO:                  Alert, oriented, interactive, nonfocal, follows commands  PSYC:                      Calm, + Insight.      LABS:                        11.8   5.96  )-----------( 147      ( 01 Aug 2019 07:00 )             35.3     08-    139  |  106  |  13  ----------------------------<  141<H>  3.5   |  24  |  0.69    Ca    8.4      01 Aug 2019 07:00    TPro  7.6  /  Alb  3.7  /  TBili  0.8  /  DBili  x   /  AST  24  /  ALT  16  /  AlkPhos  60              PT/INR - ( 2019 12:45 )   PT: 12.0 sec;   INR: 1.07 ratio         PTT - ( 2019 12:45 )  PTT:28.6 sec  Urinalysis Basic - ( 2019 13:40 )    Color: Yellow / Appearance: Slightly Turbid / S.015 / pH: x  Gluc: x / Ketone: Negative  / Bili: Negative / Urobili: Negative   Blood: x / Protein: 30 mg/dL / Nitrite: Negative   Leuk Esterase: Moderate / RBC: >50 /HPF / WBC >50 /HPF   Sq Epi: x / Non Sq Epi: Neg.-Few / Bacteria: Moderate /HPF              CULTURES: (if applicable)    Culture - Urine (collected 19 @ 09:15)  Source: .Urine Catheterized  Preliminary Report (19 @ 15:55):    >100,000 CFU/ml Gram Negative Rods            CAPILLARY BLOOD GLUCOSE      POCT Blood Glucose.: 131 mg/dL (01 Aug 2019 11:35)      RADIOLOGY  CXR:  INTERPRETATION:  AP chest on 2019 at 1:31 PM. Patient has sepsis.    Heart suggest enlargement. Intrathoracic stomach secondary to elevation   of the posterior left diaphragm again noted.    There is no gross sense of lung consolidation or layering effusion.    On  of this year there was a left base infiltrate.    Posttraumatic deformity right upper humerus and surgical staples over the   right shoulder again seen.    IMPRESSION: No gross acute lung finding at this time. Intrathoracic   stomach again noted.        CT:    ECHO: Performed, Results pending

## 2019-08-01 NOTE — CONSULT NOTE ADULT - SUBJECTIVE AND OBJECTIVE BOX
Patient is a 88y old  Female who presents with a chief complaint of sepsis secondary to uti (2019 19:53)      HPI:  jasmeet is a 88 year old female with pmh of spinal stenosis, diabetes type 2, dvt,  anemia, neuropathy, afib (w/ hx  of enlarged right upper chamber not on a/c due to vaginal bleeding secondary to polyps), frequent uti (previously on daily macrobid for many years), seasonal allergies, venous insufficiency, gall bladder disease, vaginal polyps, dvt, and uterine wall thickening coming to ER with complaints of not feeling self. Per son bedside states patient has been changed to keflex by outside Doctor and has bene on/off for multiple course over past 3 weeks. states as had abdominal pain came to ED yesterday and was changed to bactrim. however this am patient called sone and stated does not feel like self and wanted to go back to ED. son stating patient more confused then past and not acting like self. baseline patient aaox3 and able to do function independently using wheelchair and aide. Lives at [facility]. +chills and fevers +abdominal pain (non radiating lower area burning in nature 3/10).  also stating increased frequency of urine.     spoke to patient pmd Dr Hernandez- patient not on eliquis as does not follow up with OB to correct polyps. unsure if patient has chf as not seen patient in long time.     patient aaox3 this time however does not remember family history and son bedside does not remember as well. (2019 14:53)    treated by urologist Dr. Neyda Wang. takes nitrofurantoin suppression. last E. coli UTI resistant to nitro.      PAST MEDICAL & SURGICAL HISTORY:  Spinal stenosis of lumbar region  Hiatal hernia  Diabetes mellitus, type II  Atrial fibrillation  H/O: GI Bleed  Overactive Bladder  Uterine Polyp  Peripheral Neuropathy  Degenerative Joint Disease  Obesity  Hypothyroidism  Hypertension  S/P  section  S/P rotator cuff repair: right  S/P knee replacement, right      REVIEW OF SYSTEMS:    CONSTITUTIONAL:  yes fever and chills  HEENT: No visual changes  ENDO: No sweating  NECK: No pain or stiffness  MUSCULOSKELETAL: No back pain, no joint pain  RESPIRATORY: No shortness of breath  CARDIOVASCULAR: No chest pain  GASTROINTESTINAL: No abdominal or epigastric pain. No nausea, vomiting,  No diarrhea or constipation.   NEUROLOGICAL: No mental status changes  PSYCH: No depression, no mood changes  SKIN: No itching      MEDICATIONS  (STANDING):  cefTRIAXone   IVPB 1000 milliGRAM(s) IV Intermittent every 24 hours  cholecalciferol 1000 Unit(s) Oral daily  cyanocobalamin 1000 MICROGram(s) Oral daily  dextrose 5%. 1000 milliLiter(s) (50 mL/Hr) IV Continuous <Continuous>  dextrose 50% Injectable 12.5 Gram(s) IV Push once  dextrose 50% Injectable 25 Gram(s) IV Push once  dextrose 50% Injectable 25 Gram(s) IV Push once  docusate sodium 100 milliGRAM(s) Oral three times a day  enoxaparin Injectable 40 milliGRAM(s) SubCutaneous daily  ferrous    sulfate 325 milliGRAM(s) Oral daily  gabapentin 200 milliGRAM(s) Oral two times a day  insulin lispro (HumaLOG) corrective regimen sliding scale   SubCutaneous three times a day before meals  insulin lispro (HumaLOG) corrective regimen sliding scale   SubCutaneous at bedtime  levothyroxine 75 MICROGram(s) Oral daily  melatonin 3 milliGRAM(s) Oral at bedtime  metoprolol succinate ER 25 milliGRAM(s) Oral daily  multivitamin/minerals 1 Tablet(s) Oral daily  ondansetron Injectable 4 milliGRAM(s) IV Push every 8 hours  sodium chloride 0.9%. 1000 milliLiter(s) (125 mL/Hr) IV Continuous <Continuous>    MEDICATIONS  (PRN):  acetaminophen   Tablet .. 650 milliGRAM(s) Oral every 6 hours PRN Temp greater or equal to 38C (100.4F), Mild Pain (1 - 3), Moderate Pain (4 - 6)  dextrose 40% Gel 15 Gram(s) Oral once PRN Blood Glucose LESS THAN 70 milliGRAM(s)/deciliter  glucagon  Injectable 1 milliGRAM(s) IntraMuscular once PRN Glucose LESS THAN 70 milligrams/deciliter  senna 2 Tablet(s) Oral at bedtime PRN Constipation      Allergies    aspirin (Unknown)    Intolerances        SOCIAL HISTORY: No illicit drug use    FAMILY HISTORY:        T(C): 37.3 (19 @ 05:30), Max: 38.8 (19 @ 12:38)  T(F): 99.2 (19 @ 05:30), Max: 101.9 (19 @ 12:38)  HR: 110 (19 @ 05:30) (79 - 114)  BP: 157/113 (19 @ 05:30) (111/58 - 157/113)  RR: 15 (19 @ 05:30) (15 - 18)  SpO2: 97% (19 @ 05:30) (90% - 98%)      PHYSICAL EXAM:    Constitutional: alert, no acute distress  Back: No CVA tenderness  Respiratory: No accessory respiratory muscle use  Abd: OBESE, Soft, NT/ND  no organomegaly  no hernia  : no bladder distention  Extremities: no edema  Neurological: A/O x 3  Psychiatric: Normal mood, normal affect  Skin: No rashes        19 @ 07:01  -  19 @ 07:00  --------------------------------------------------------  IN: 0 mL / OUT: 700 mL / NET: -700 mL        Height (cm): 177.8 (19 @ 12:27)  Weight (kg): 106.6 (19 @ 12:27)  BMI (kg/m2): 33.7 (19 @ 12:27)  BSA (m2): 2.24 (19 @ 12:27)    LABS:                        11.8   5.96  )-----------( 147      ( 01 Aug 2019 07:00 )             35.3       PT/INR - ( 2019 12:45 )   PT: 12.0 sec;   INR: 1.07 ratio         PTT - ( 2019 12:45 )  PTT:28.6 sec  Urinalysis Basic - ( 2019 13:40 )    Color: Yellow / Appearance: Slightly Turbid / S.015 / pH: x  Gluc: x / Ketone: Negative  / Bili: Negative / Urobili: Negative   Blood: x / Protein: 30 mg/dL / Nitrite: Negative   Leuk Esterase: Moderate / RBC: >50 /HPF / WBC >50 /HPF   Sq Epi: x / Non Sq Epi: Neg.-Few / Bacteria: Moderate /HPF          Culture - Urine (collected 19 @ 09:15)  Source: .Urine Catheterized  Preliminary Report (19 @ 15:55):    >100,000 CFU/ml Gram Negative Rods        RADIOLOGY & ADDITIONAL STUDIES: prior CT and sono

## 2019-08-01 NOTE — ED POST DISCHARGE NOTE - RESULT SUMMARY
u cx + sensitivity not reported d/c on bactrium u cx + sensitivity not reported, patient admitted to the hosp

## 2019-08-01 NOTE — PROGRESS NOTE ADULT - SUBJECTIVE AND OBJECTIVE BOX
Patient is a 88y old  Female who presents with a chief complaint of sepsis secondary to uti (01 Aug 2019 08:49)    Patient seen and examined at bedside. No overnight events reported.     ALLERGIES:  aspirin (Unknown)    MEDICATIONS  (STANDING):  cefTRIAXone   IVPB 1000 milliGRAM(s) IV Intermittent every 24 hours  cholecalciferol 1000 Unit(s) Oral daily  cyanocobalamin 1000 MICROGram(s) Oral daily  dextrose 5%. 1000 milliLiter(s) (50 mL/Hr) IV Continuous <Continuous>  dextrose 50% Injectable 12.5 Gram(s) IV Push once  dextrose 50% Injectable 25 Gram(s) IV Push once  dextrose 50% Injectable 25 Gram(s) IV Push once  docusate sodium 100 milliGRAM(s) Oral three times a day  enoxaparin Injectable 40 milliGRAM(s) SubCutaneous daily  ferrous    sulfate 325 milliGRAM(s) Oral daily  gabapentin 200 milliGRAM(s) Oral two times a day  insulin lispro (HumaLOG) corrective regimen sliding scale   SubCutaneous three times a day before meals  insulin lispro (HumaLOG) corrective regimen sliding scale   SubCutaneous at bedtime  levothyroxine 75 MICROGram(s) Oral daily  melatonin 3 milliGRAM(s) Oral at bedtime  metoprolol succinate ER 25 milliGRAM(s) Oral daily  multivitamin 1 Tablet(s) Oral daily  ondansetron Injectable 4 milliGRAM(s) IV Push every 8 hours    MEDICATIONS  (PRN):  acetaminophen   Tablet .. 650 milliGRAM(s) Oral every 6 hours PRN Temp greater or equal to 38C (100.4F), Mild Pain (1 - 3), Moderate Pain (4 - 6)  dextrose 40% Gel 15 Gram(s) Oral once PRN Blood Glucose LESS THAN 70 milliGRAM(s)/deciliter  glucagon  Injectable 1 milliGRAM(s) IntraMuscular once PRN Glucose LESS THAN 70 milligrams/deciliter  senna 2 Tablet(s) Oral at bedtime PRN Constipation    Vital Signs Last 24 Hrs  T(F): 97.9 (01 Aug 2019 12:47), Max: 101.5 (2019 17:58)  HR: 75 (01 Aug 2019 12:47) (66 - 111)  BP: 105/60 (01 Aug 2019 12:48) (99/54 - 157/113)  RR: 15 (01 Aug 2019 12:47) (15 - 18)  SpO2: 93% (01 Aug 2019 12:47) (93% - 98%)    Physical Exam:  Constitutional: NAD, awake and alert, well-developed; obese  Neck: Soft and supple, No LAD, No JVD  Respiratory: +rhonchi b/l bases  Cardiovascular: +s1/s2 +2 edema b/l le   Gastrointestinal: soft nd bs+  Vascular: 2+ peripheral pulses  Neurological: A/O x 3, no focal deficits  : Contraindicated  Breasts: Contraindicated  Rectal: Contraindicated    I&O's Summary    2019 07:  -  01 Aug 2019 07:00  --------------------------------------------------------  IN: 0 mL / OUT: 700 mL / NET: -700 mL    01 Aug 2019 07:  -  01 Aug 2019 13:40  --------------------------------------------------------  IN: 125 mL / OUT: 0 mL / NET: 125 mL      LABS:                        11.8   5.96  )-----------( 147      ( 01 Aug 2019 07:00 )             35.3     08    139  |  106  |  13  ----------------------------<  141  3.5   |  24  |  0.69    Ca    8.4      01 Aug 2019 07:00    TPro  7.6  /  Alb  3.7  /  TBili  0.8  /  DBili  x   /  AST  24  /  ALT  16  /  AlkPhos  60      eGFR if Non African American: 78 mL/min/1.73M2 (19 @ 07:00)  eGFR if African American: 90 mL/min/1.73M2 (19 @ 07:00)    PT/INR - ( 2019 12:45 )   PT: 12.0 sec;   INR: 1.07 ratio         PTT - ( 2019 12:45 )  PTT:28.6 sec  Lactate, Blood: 2.8 mmol/L ( @ 09:20)  Lactate, Blood: 2.1 mmol/L ( @ 21:10)  Lactate, Blood: 4.1 mmol/L ( @ 16:50)  Lactate, Blood: 2.6 mmol/L ( @ 14:46)  Lactate, Blood: 2.8 mmol/L ( @ 12:45)    Glucosse  POCT Blood Glucose.: 131 mg/dL (01 Aug 2019 11:35)  POCT Blood Glucose.: 126 mg/dL (01 Aug 2019 07:14)  POCT Blood Glucose.: 152 mg/dL (2019 20:30)  POCT Blood Glucose.: 165 mg/dL (2019 16:31)     [id number] 6.1    Urinalysis Basic - ( 2019 13:40 )  Color: Yellow / Appearance: Slightly Turbid / S.015 / pH: x  Gluc: x / Ketone: Negative  / Bili: Negative / Urobili: Negative   Blood: x / Protein: 30 mg/dL / Nitrite: Negative   Leuk Esterase: Moderate / RBC: >50 /HPF / WBC >50 /HPF   Sq Epi: x / Non Sq Epi: Neg.-Few / Bacteria: Moderate /HPF      Culture   Culture - Urine (collected 2019 09:15)  Source: .Urine Catheterized  Preliminary Report (2019 15:55):    >100,000 CFU/ml Gram Negative Rods      RADIOLOGY & ADDITIONAL TESTS:  < from: Xray Chest 1 View AP/PA (19 @ 13:48) >  IMPRESSION: No gross acute lung finding at this time. Intrathoracic   stomach again noted.    < end of copied text >      Care Discussed with Consultants/Other Providers: Patient is a 88y old  Female who presents with a chief complaint of sepsis secondary to uti (01 Aug 2019 08:49)    Patient seen and examined at bedside. No overnight events reported.     ALLERGIES:  aspirin (Unknown)    MEDICATIONS  (STANDING):  cefTRIAXone   IVPB 1000 milliGRAM(s) IV Intermittent every 24 hours  cholecalciferol 1000 Unit(s) Oral daily  cyanocobalamin 1000 MICROGram(s) Oral daily  dextrose 5%. 1000 milliLiter(s) (50 mL/Hr) IV Continuous <Continuous>  dextrose 50% Injectable 12.5 Gram(s) IV Push once  dextrose 50% Injectable 25 Gram(s) IV Push once  dextrose 50% Injectable 25 Gram(s) IV Push once  docusate sodium 100 milliGRAM(s) Oral three times a day  enoxaparin Injectable 40 milliGRAM(s) SubCutaneous daily  ferrous    sulfate 325 milliGRAM(s) Oral daily  gabapentin 200 milliGRAM(s) Oral two times a day  insulin lispro (HumaLOG) corrective regimen sliding scale   SubCutaneous three times a day before meals  insulin lispro (HumaLOG) corrective regimen sliding scale   SubCutaneous at bedtime  levothyroxine 75 MICROGram(s) Oral daily  melatonin 3 milliGRAM(s) Oral at bedtime  metoprolol succinate ER 25 milliGRAM(s) Oral daily  multivitamin 1 Tablet(s) Oral daily  ondansetron Injectable 4 milliGRAM(s) IV Push every 8 hours    MEDICATIONS  (PRN):  acetaminophen   Tablet .. 650 milliGRAM(s) Oral every 6 hours PRN Temp greater or equal to 38C (100.4F), Mild Pain (1 - 3), Moderate Pain (4 - 6)  dextrose 40% Gel 15 Gram(s) Oral once PRN Blood Glucose LESS THAN 70 milliGRAM(s)/deciliter  glucagon  Injectable 1 milliGRAM(s) IntraMuscular once PRN Glucose LESS THAN 70 milligrams/deciliter  senna 2 Tablet(s) Oral at bedtime PRN Constipation    Vital Signs Last 24 Hrs  T(F): 97.9 (01 Aug 2019 12:47), Max: 101.5 (2019 17:58)  HR: 75 (01 Aug 2019 12:47) (66 - 111)  BP: 105/60 (01 Aug 2019 12:48) (99/54 - 157/113)  RR: 15 (01 Aug 2019 12:47) (15 - 18)  SpO2: 93% (01 Aug 2019 12:47) (93% - 98%)    Physical Exam:  Constitutional: NAD, awake and alert, well-developed; obese  Neck: Soft and supple, No LAD, No JVD  Respiratory: +rhonchi b/l bases  Cardiovascular: +s1/s2 +2 edema b/l le   Gastrointestinal: soft nd bs+  Vascular: 2+ peripheral pulses  Neurological: A/O x 3, no focal deficits  : Contraindicated  Breasts: Contraindicated  Rectal: Contraindicated    I&O's Summary    2019 07:  -  01 Aug 2019 07:00  --------------------------------------------------------  IN: 0 mL / OUT: 700 mL / NET: -700 mL    01 Aug 2019 07:  -  01 Aug 2019 13:40  --------------------------------------------------------  IN: 125 mL / OUT: 0 mL / NET: 125 mL      LABS:                        11.8   5.96  )-----------( 147      ( 01 Aug 2019 07:00 )             35.3     08    139  |  106  |  13  ----------------------------<  141  3.5   |  24  |  0.69    Ca    8.4      01 Aug 2019 07:00    TPro  7.6  /  Alb  3.7  /  TBili  0.8  /  DBili  x   /  AST  24  /  ALT  16  /  AlkPhos  60      eGFR if Non African American: 78 mL/min/1.73M2 (19 @ 07:00)  eGFR if African American: 90 mL/min/1.73M2 (19 @ 07:00)    PT/INR - ( 2019 12:45 )   PT: 12.0 sec;   INR: 1.07 ratio         PTT - ( 2019 12:45 )  PTT:28.6 sec  Lactate, Blood: 2.8 mmol/L ( @ 09:20)  Lactate, Blood: 2.1 mmol/L ( @ 21:10)  Lactate, Blood: 4.1 mmol/L ( @ 16:50)  Lactate, Blood: 2.6 mmol/L ( @ 14:46)  Lactate, Blood: 2.8 mmol/L ( @ 12:45)    Glucosse  POCT Blood Glucose.: 131 mg/dL (01 Aug 2019 11:35)  POCT Blood Glucose.: 126 mg/dL (01 Aug 2019 07:14)  POCT Blood Glucose.: 152 mg/dL (2019 20:30)  POCT Blood Glucose.: 165 mg/dL (2019 16:31)     [id number] 6.1    Urinalysis Basic - ( 2019 13:40 )  Color: Yellow / Appearance: Slightly Turbid / S.015 / pH: x  Gluc: x / Ketone: Negative  / Bili: Negative / Urobili: Negative   Blood: x / Protein: 30 mg/dL / Nitrite: Negative   Leuk Esterase: Moderate / RBC: >50 /HPF / WBC >50 /HPF   Sq Epi: x / Non Sq Epi: Neg.-Few / Bacteria: Moderate /HPF      Culture   Culture - Urine (collected 2019 09:15)  Source: .Urine Catheterized  Preliminary Report (2019 15:55):    >100,000 CFU/ml Gram Negative Rods      RADIOLOGY & ADDITIONAL TESTS:  < from: Xray Chest 1 View AP/PA (19 @ 13:48) >  IMPRESSION: No gross acute lung finding at this time. Intrathoracic   stomach again noted.    < end of copied text >      Care Discussed with Consultants/Other Providers:   ID, Critical Care Patient is a 88y old  Female who presents with a chief complaint of sepsis secondary to uti (01 Aug 2019 08:49)    Patient seen and examined at bedside. spoke to ICU and ID continue abx. patient clinically hemodynamically stable. continue to monitor vitals if hypotensive bolus further fluids. mcdonough to monitor i and o; trend lactate      ALLERGIES:  aspirin (Unknown)    MEDICATIONS  (STANDING):  cefTRIAXone   IVPB 1000 milliGRAM(s) IV Intermittent every 24 hours  cholecalciferol 1000 Unit(s) Oral daily  cyanocobalamin 1000 MICROGram(s) Oral daily  dextrose 5%. 1000 milliLiter(s) (50 mL/Hr) IV Continuous <Continuous>  dextrose 50% Injectable 12.5 Gram(s) IV Push once  dextrose 50% Injectable 25 Gram(s) IV Push once  dextrose 50% Injectable 25 Gram(s) IV Push once  docusate sodium 100 milliGRAM(s) Oral three times a day  enoxaparin Injectable 40 milliGRAM(s) SubCutaneous daily  ferrous    sulfate 325 milliGRAM(s) Oral daily  gabapentin 200 milliGRAM(s) Oral two times a day  insulin lispro (HumaLOG) corrective regimen sliding scale   SubCutaneous three times a day before meals  insulin lispro (HumaLOG) corrective regimen sliding scale   SubCutaneous at bedtime  levothyroxine 75 MICROGram(s) Oral daily  melatonin 3 milliGRAM(s) Oral at bedtime  metoprolol succinate ER 25 milliGRAM(s) Oral daily  multivitamin 1 Tablet(s) Oral daily  ondansetron Injectable 4 milliGRAM(s) IV Push every 8 hours    MEDICATIONS  (PRN):  acetaminophen   Tablet .. 650 milliGRAM(s) Oral every 6 hours PRN Temp greater or equal to 38C (100.4F), Mild Pain (1 - 3), Moderate Pain (4 - 6)  dextrose 40% Gel 15 Gram(s) Oral once PRN Blood Glucose LESS THAN 70 milliGRAM(s)/deciliter  glucagon  Injectable 1 milliGRAM(s) IntraMuscular once PRN Glucose LESS THAN 70 milligrams/deciliter  senna 2 Tablet(s) Oral at bedtime PRN Constipation    Vital Signs Last 24 Hrs  T(F): 97.9 (01 Aug 2019 12:47), Max: 101.5 (2019 17:58)  HR: 75 (01 Aug 2019 12:47) (66 - 111)  BP: 105/60 (01 Aug 2019 12:48) (99/54 - 157/113)  RR: 15 (01 Aug 2019 12:47) (15 - 18)  SpO2: 93% (01 Aug 2019 12:47) (93% - 98%)    Physical Exam:  Constitutional: NAD, awake and alert, well-developed; obese  Neck: Soft and supple, No LAD, No JVD  Respiratory: +rhonchi b/l bases  Cardiovascular: +s1/s2 +2 edema b/l le   Gastrointestinal: soft nd bs+  Vascular: 2+ peripheral pulses  Neurological: A/O x 3, no focal deficits  : Contraindicated  Breasts: Contraindicated  Rectal: Contraindicated    I&O's Summary    2019 07:  -  01 Aug 2019 07:00  --------------------------------------------------------  IN: 0 mL / OUT: 700 mL / NET: -700 mL    01 Aug 2019 07:  -  01 Aug 2019 13:40  --------------------------------------------------------  IN: 125 mL / OUT: 0 mL / NET: 125 mL      LABS:                        11.8   5.96  )-----------( 147      ( 01 Aug 2019 07:00 )             35.3         139  |  106  |  13  ----------------------------<  141  3.5   |  24  |  0.69    Ca    8.4      01 Aug 2019 07:00    TPro  7.6  /  Alb  3.7  /  TBili  0.8  /  DBili  x   /  AST  24  /  ALT  16  /  AlkPhos  60      eGFR if Non African American: 78 mL/min/1.73M2 (19 @ 07:00)  eGFR if African American: 90 mL/min/1.73M2 (19 @ 07:00)    PT/INR - ( 2019 12:45 )   PT: 12.0 sec;   INR: 1.07 ratio         PTT - ( 2019 12:45 )  PTT:28.6 sec  Lactate, Blood: 2.8 mmol/L ( @ 09:20)  Lactate, Blood: 2.1 mmol/L ( @ 21:10)  Lactate, Blood: 4.1 mmol/L ( @ 16:50)  Lactate, Blood: 2.6 mmol/L ( @ 14:46)  Lactate, Blood: 2.8 mmol/L ( @ 12:45)    Glucosse  POCT Blood Glucose.: 131 mg/dL (01 Aug 2019 11:35)  POCT Blood Glucose.: 126 mg/dL (01 Aug 2019 07:14)  POCT Blood Glucose.: 152 mg/dL (2019 20:30)  POCT Blood Glucose.: 165 mg/dL (2019 16:31)     [id number] 6.1    Urinalysis Basic - ( 2019 13:40 )  Color: Yellow / Appearance: Slightly Turbid / S.015 / pH: x  Gluc: x / Ketone: Negative  / Bili: Negative / Urobili: Negative   Blood: x / Protein: 30 mg/dL / Nitrite: Negative   Leuk Esterase: Moderate / RBC: >50 /HPF / WBC >50 /HPF   Sq Epi: x / Non Sq Epi: Neg.-Few / Bacteria: Moderate /HPF      Culture   Culture - Urine (collected 2019 09:15)  Source: .Urine Catheterized  Preliminary Report (2019 15:55):    >100,000 CFU/ml Gram Negative Rods      RADIOLOGY & ADDITIONAL TESTS:  < from: Xray Chest 1 View AP/PA (19 @ 13:48) >  IMPRESSION: No gross acute lung finding at this time. Intrathoracic   stomach again noted.    < end of copied text >      Care Discussed with Consultants/Other Providers:   ID, Critical Care Patient is a 88y old  Female who presents with a chief complaint of sepsis secondary to uti (01 Aug 2019 08:49)    Patient seen and examined at bedside. spoke to ICU and ID continue abx. patient clinically hemodynamically stable. continue to monitor vitals if hypotensive bolus further fluids. mcdonough to monitor i and o; trend lactate  ; check cxr and rectal temp     ALLERGIES:  aspirin (Unknown)    MEDICATIONS  (STANDING):  cefTRIAXone   IVPB 1000 milliGRAM(s) IV Intermittent every 24 hours  cholecalciferol 1000 Unit(s) Oral daily  cyanocobalamin 1000 MICROGram(s) Oral daily  dextrose 5%. 1000 milliLiter(s) (50 mL/Hr) IV Continuous <Continuous>  dextrose 50% Injectable 12.5 Gram(s) IV Push once  dextrose 50% Injectable 25 Gram(s) IV Push once  dextrose 50% Injectable 25 Gram(s) IV Push once  docusate sodium 100 milliGRAM(s) Oral three times a day  enoxaparin Injectable 40 milliGRAM(s) SubCutaneous daily  ferrous    sulfate 325 milliGRAM(s) Oral daily  gabapentin 200 milliGRAM(s) Oral two times a day  insulin lispro (HumaLOG) corrective regimen sliding scale   SubCutaneous three times a day before meals  insulin lispro (HumaLOG) corrective regimen sliding scale   SubCutaneous at bedtime  levothyroxine 75 MICROGram(s) Oral daily  melatonin 3 milliGRAM(s) Oral at bedtime  metoprolol succinate ER 25 milliGRAM(s) Oral daily  multivitamin 1 Tablet(s) Oral daily  ondansetron Injectable 4 milliGRAM(s) IV Push every 8 hours    MEDICATIONS  (PRN):  acetaminophen   Tablet .. 650 milliGRAM(s) Oral every 6 hours PRN Temp greater or equal to 38C (100.4F), Mild Pain (1 - 3), Moderate Pain (4 - 6)  dextrose 40% Gel 15 Gram(s) Oral once PRN Blood Glucose LESS THAN 70 milliGRAM(s)/deciliter  glucagon  Injectable 1 milliGRAM(s) IntraMuscular once PRN Glucose LESS THAN 70 milligrams/deciliter  senna 2 Tablet(s) Oral at bedtime PRN Constipation    Vital Signs Last 24 Hrs  T(F): 97.9 (01 Aug 2019 12:47), Max: 101.5 (2019 17:58)  HR: 75 (01 Aug 2019 12:47) (66 - 111)  BP: 105/60 (01 Aug 2019 12:48) (99/54 - 157/113)  RR: 15 (01 Aug 2019 12:47) (15 - 18)  SpO2: 93% (01 Aug 2019 12:47) (93% - 98%)    Physical Exam:  Constitutional: NAD, awake and alert, well-developed; obese  Neck: Soft and supple, No LAD, No JVD  Respiratory: +rhonchi b/l bases  Cardiovascular: +s1/s2 +2 edema b/l le   Gastrointestinal: soft nd bs+  Vascular: 2+ peripheral pulses  Neurological: A/O x 3, no focal deficits  : Contraindicated  Breasts: Contraindicated  Rectal: Contraindicated    I&O's Summary    2019 07:  -  01 Aug 2019 07:00  --------------------------------------------------------  IN: 0 mL / OUT: 700 mL / NET: -700 mL    01 Aug 2019 07:  -  01 Aug 2019 13:40  --------------------------------------------------------  IN: 125 mL / OUT: 0 mL / NET: 125 mL      LABS:                        11.8   5.96  )-----------( 147      ( 01 Aug 2019 07:00 )             35.3         139  |  106  |  13  ----------------------------<  141  3.5   |  24  |  0.69    Ca    8.4      01 Aug 2019 07:00    TPro  7.6  /  Alb  3.7  /  TBili  0.8  /  DBili  x   /  AST  24  /  ALT  16  /  AlkPhos  60      eGFR if Non African American: 78 mL/min/1.73M2 (19 @ 07:00)  eGFR if African American: 90 mL/min/1.73M2 (19 @ 07:00)    PT/INR - ( 2019 12:45 )   PT: 12.0 sec;   INR: 1.07 ratio         PTT - ( 2019 12:45 )  PTT:28.6 sec  Lactate, Blood: 2.8 mmol/L ( @ 09:20)  Lactate, Blood: 2.1 mmol/L ( @ 21:10)  Lactate, Blood: 4.1 mmol/L ( @ 16:50)  Lactate, Blood: 2.6 mmol/L ( @ 14:46)  Lactate, Blood: 2.8 mmol/L ( @ 12:45)    Glucosse  POCT Blood Glucose.: 131 mg/dL (01 Aug 2019 11:35)  POCT Blood Glucose.: 126 mg/dL (01 Aug 2019 07:14)  POCT Blood Glucose.: 152 mg/dL (2019 20:30)  POCT Blood Glucose.: 165 mg/dL (2019 16:31)     [id number] 6.1    Urinalysis Basic - ( 2019 13:40 )  Color: Yellow / Appearance: Slightly Turbid / S.015 / pH: x  Gluc: x / Ketone: Negative  / Bili: Negative / Urobili: Negative   Blood: x / Protein: 30 mg/dL / Nitrite: Negative   Leuk Esterase: Moderate / RBC: >50 /HPF / WBC >50 /HPF   Sq Epi: x / Non Sq Epi: Neg.-Few / Bacteria: Moderate /HPF      Culture   Culture - Urine (collected 2019 09:15)  Source: .Urine Catheterized  Preliminary Report (2019 15:55):    >100,000 CFU/ml Gram Negative Rods      RADIOLOGY & ADDITIONAL TESTS:  < from: Xray Chest 1 View AP/PA (19 @ 13:48) >  IMPRESSION: No gross acute lung finding at this time. Intrathoracic   stomach again noted.    < end of copied text >      Care Discussed with Consultants/Other Providers:   ID, Critical Care

## 2019-08-01 NOTE — PROGRESS NOTE ADULT - ASSESSMENT
88 year old female as above     #sepsis secondary to uti  - s/p keflex outpatient   - ceftriaxone  - ID consult appreciated  - urology consult- appreciated  - follow up culture for sensitivities  - s/p ivf bolus in ed and floor now change to normal saline 75cc/hr  - check echo    - monitor lactate  - monitor labs  - monitor vitals   - tylenol as needed   - will place mcdonough catheter to maintain strict i and o as patient with rales and received multiple bolus of ivf and now on maintenance fluids     #leukocytosis   - improved  - secondary to above  - monitor wbc    #metabolic encephalopathy  - improved  - secondary to above   - monitor     #afib not on a/c (hx of bleed due to vaginal polyps)  - w/ hx of enlarged right upper chamber  - tele monitor   - metoprolol    # vitamin b12 defc  - b12    # diabetes type 2  - on metformin outpatient will hold while inpatient  - iss while in patient   - monitor finger sticks    #hypothyroidism  - levothyroxine    #venous insufficiency w/ ?chf   - check echo   - elevate legs    #peripheral neuropathy  - gabapentin    #dvt prophylaxis  - lovenox sc 88 year old female as above     #sepsis secondary to uti  - s/p keflex outpatient   - ceftriaxone  - ID consult appreciated  - urology consult- appreciated  - follow up culture for sensitivities  - s/p ivf bolus in ed and floor now change to normal saline 75cc/hr  - check echo    - monitor lactate  - monitor labs  - monitor vitals   - tylenol as needed   - will place mcdonough catheter to maintain strict i and o as patient with rales and received multiple bolus of ivf and now on maintenance fluids     #leukocytosis   - improved- may be a dilutional drop given ivf use  - secondary to above  - monitor wbc    #metabolic encephalopathy  - improved  - secondary to above   - monitor     #afib not on a/c (hx of bleed due to vaginal polyps)  - w/ hx of enlarged right upper chamber  - tele monitor   - metoprolol    # vitamin b12 defc  - b12    # diabetes type 2  - on metformin outpatient will hold while inpatient  - iss while in patient   - monitor finger sticks    #hypothyroidism  - levothyroxine    #venous insufficiency w/ ?chf   - check echo   - elevate legs    #peripheral neuropathy  - gabapentin    #dvt prophylaxis  - lovenox sc

## 2019-08-02 DIAGNOSIS — R32 UNSPECIFIED URINARY INCONTINENCE: ICD-10-CM

## 2019-08-02 LAB
ANION GAP SERPL CALC-SCNC: 9 MMOL/L — SIGNIFICANT CHANGE UP (ref 5–17)
BUN SERPL-MCNC: 10 MG/DL — SIGNIFICANT CHANGE UP (ref 7–23)
CALCIUM SERPL-MCNC: 8.1 MG/DL — LOW (ref 8.4–10.5)
CHLORIDE SERPL-SCNC: 106 MMOL/L — SIGNIFICANT CHANGE UP (ref 96–108)
CO2 SERPL-SCNC: 24 MMOL/L — SIGNIFICANT CHANGE UP (ref 22–31)
CREAT SERPL-MCNC: 0.72 MG/DL — SIGNIFICANT CHANGE UP (ref 0.5–1.3)
CULTURE RESULTS: SIGNIFICANT CHANGE UP
GLUCOSE BLDC GLUCOMTR-MCNC: 124 MG/DL — HIGH (ref 70–99)
GLUCOSE BLDC GLUCOMTR-MCNC: 129 MG/DL — HIGH (ref 70–99)
GLUCOSE BLDC GLUCOMTR-MCNC: 133 MG/DL — HIGH (ref 70–99)
GLUCOSE BLDC GLUCOMTR-MCNC: 147 MG/DL — HIGH (ref 70–99)
GLUCOSE SERPL-MCNC: 128 MG/DL — HIGH (ref 70–99)
HCT VFR BLD CALC: 35.8 % — SIGNIFICANT CHANGE UP (ref 34.5–45)
HGB BLD-MCNC: 12 G/DL — SIGNIFICANT CHANGE UP (ref 11.5–15.5)
MCHC RBC-ENTMCNC: 33.1 PG — SIGNIFICANT CHANGE UP (ref 27–34)
MCHC RBC-ENTMCNC: 33.5 GM/DL — SIGNIFICANT CHANGE UP (ref 32–36)
MCV RBC AUTO: 98.9 FL — SIGNIFICANT CHANGE UP (ref 80–100)
NRBC # BLD: 0 /100 WBCS — SIGNIFICANT CHANGE UP (ref 0–0)
PLATELET # BLD AUTO: 144 K/UL — LOW (ref 150–400)
POTASSIUM SERPL-MCNC: 3.8 MMOL/L — SIGNIFICANT CHANGE UP (ref 3.5–5.3)
POTASSIUM SERPL-SCNC: 3.8 MMOL/L — SIGNIFICANT CHANGE UP (ref 3.5–5.3)
RBC # BLD: 3.62 M/UL — LOW (ref 3.8–5.2)
RBC # FLD: 13.3 % — SIGNIFICANT CHANGE UP (ref 10.3–14.5)
SODIUM SERPL-SCNC: 139 MMOL/L — SIGNIFICANT CHANGE UP (ref 135–145)
SPECIMEN SOURCE: SIGNIFICANT CHANGE UP
WBC # BLD: 4.74 K/UL — SIGNIFICANT CHANGE UP (ref 3.8–10.5)
WBC # FLD AUTO: 4.74 K/UL — SIGNIFICANT CHANGE UP (ref 3.8–10.5)

## 2019-08-02 PROCEDURE — 71045 X-RAY EXAM CHEST 1 VIEW: CPT | Mod: 26

## 2019-08-02 PROCEDURE — 99233 SBSQ HOSP IP/OBS HIGH 50: CPT

## 2019-08-02 RX ADMIN — GABAPENTIN 200 MILLIGRAM(S): 400 CAPSULE ORAL at 06:01

## 2019-08-02 RX ADMIN — Medication 325 MILLIGRAM(S): at 12:51

## 2019-08-02 RX ADMIN — GABAPENTIN 200 MILLIGRAM(S): 400 CAPSULE ORAL at 18:09

## 2019-08-02 RX ADMIN — ENOXAPARIN SODIUM 40 MILLIGRAM(S): 100 INJECTION SUBCUTANEOUS at 13:00

## 2019-08-02 RX ADMIN — CEFTRIAXONE 100 MILLIGRAM(S): 500 INJECTION, POWDER, FOR SOLUTION INTRAMUSCULAR; INTRAVENOUS at 08:39

## 2019-08-02 RX ADMIN — Medication 1 TABLET(S): at 12:51

## 2019-08-02 RX ADMIN — ONDANSETRON 4 MILLIGRAM(S): 8 TABLET, FILM COATED ORAL at 08:39

## 2019-08-02 RX ADMIN — Medication 100 MILLIGRAM(S): at 13:07

## 2019-08-02 RX ADMIN — PREGABALIN 1000 MICROGRAM(S): 225 CAPSULE ORAL at 12:51

## 2019-08-02 RX ADMIN — Medication 3 MILLIGRAM(S): at 22:20

## 2019-08-02 RX ADMIN — SODIUM CHLORIDE 75 MILLILITER(S): 9 INJECTION INTRAMUSCULAR; INTRAVENOUS; SUBCUTANEOUS at 06:07

## 2019-08-02 RX ADMIN — Medication 75 MICROGRAM(S): at 06:01

## 2019-08-02 RX ADMIN — Medication 25 MILLIGRAM(S): at 06:01

## 2019-08-02 RX ADMIN — Medication 100 MILLIGRAM(S): at 06:01

## 2019-08-02 RX ADMIN — Medication 1000 UNIT(S): at 12:51

## 2019-08-02 NOTE — PROGRESS NOTE ADULT - SUBJECTIVE AND OBJECTIVE BOX
Follow-up Critical Care Progress Note  Chief Complaint : Urinary tract infection    pt feels well  shivering resolved  comfortable  no sob, complaints today      Allergies :aspirin (Unknown)      PAST MEDICAL & SURGICAL HISTORY:  Spinal stenosis of lumbar region  Hiatal hernia  Diabetes mellitus, type II  Atrial fibrillation  H/O: GI Bleed  Overactive Bladder  Uterine Polyp  Peripheral Neuropathy  Degenerative Joint Disease  Obesity  Hypothyroidism  Hypertension  S/P  section  S/P rotator cuff repair: right  S/P knee replacement, right      Medications:  MEDICATIONS  (STANDING):  cefTRIAXone   IVPB 1000 milliGRAM(s) IV Intermittent every 24 hours  cholecalciferol 1000 Unit(s) Oral daily  cyanocobalamin 1000 MICROGram(s) Oral daily  dextrose 5%. 1000 milliLiter(s) (50 mL/Hr) IV Continuous <Continuous>  dextrose 50% Injectable 12.5 Gram(s) IV Push once  dextrose 50% Injectable 25 Gram(s) IV Push once  dextrose 50% Injectable 25 Gram(s) IV Push once  docusate sodium 100 milliGRAM(s) Oral three times a day  enoxaparin Injectable 40 milliGRAM(s) SubCutaneous daily  ferrous    sulfate 325 milliGRAM(s) Oral daily  gabapentin 200 milliGRAM(s) Oral two times a day  insulin lispro (HumaLOG) corrective regimen sliding scale   SubCutaneous three times a day before meals  insulin lispro (HumaLOG) corrective regimen sliding scale   SubCutaneous at bedtime  levothyroxine 75 MICROGram(s) Oral daily  melatonin 3 milliGRAM(s) Oral at bedtime  metoprolol succinate ER 25 milliGRAM(s) Oral daily  multivitamin 1 Tablet(s) Oral daily  ondansetron Injectable 4 milliGRAM(s) IV Push every 8 hours    MEDICATIONS  (PRN):  acetaminophen   Tablet .. 650 milliGRAM(s) Oral every 6 hours PRN Temp greater or equal to 38C (100.4F), Mild Pain (1 - 3), Moderate Pain (4 - 6)  dextrose 40% Gel 15 Gram(s) Oral once PRN Blood Glucose LESS THAN 70 milliGRAM(s)/deciliter  glucagon  Injectable 1 milliGRAM(s) IntraMuscular once PRN Glucose LESS THAN 70 milligrams/deciliter  senna 2 Tablet(s) Oral at bedtime PRN Constipation      LABS:                        12.0   4.74  )-----------( 144      ( 02 Aug 2019 06:30 )             35.8     08-02    139  |  106  |  10  ----------------------------<  128<H>  3.8   |  24  |  0.72    Ca    8.1<L>      02 Aug 2019 06:30    TPro  7.6  /  Alb  3.7  /  TBili  0.8  /  DBili  x   /  AST  24  /  ALT  16  /  AlkPhos  60  07-31            PT/INR - ( 2019 12:45 )   PT: 12.0 sec;   INR: 1.07 ratio         PTT - ( 2019 12:45 )  PTT:28.6 sec  Urinalysis Basic - ( 2019 13:40 )    Color: Yellow / Appearance: Slightly Turbid / S.015 / pH: x  Gluc: x / Ketone: Negative  / Bili: Negative / Urobili: Negative   Blood: x / Protein: 30 mg/dL / Nitrite: Negative   Leuk Esterase: Moderate / RBC: >50 /HPF / WBC >50 /HPF   Sq Epi: x / Non Sq Epi: Neg.-Few / Bacteria: Moderate /HPF              CULTURES: (if applicable)    Culture - Urine (collected 19 @ 13:40)  Source: .Urine Clean Catch (Midstream)  Final Report (19 @ 01:49):    <10,000 CFU/mL Normal Urogenital Kamilla    Culture - Blood (collected 19 @ 12:45)  Source: .Blood Blood-Peripheral  Preliminary Report (19 @ 21:01):    No growth to date.    Culture - Blood (collected 19 @ 12:45)  Source: .Blood Blood-Peripheral  Preliminary Report (19 @ 21:01):    No growth to date.    Culture - Urine (collected 19 @ 09:15)  Source: .Urine Catheterized  Final Report (19 @ 17:24):    >100,000 CFU/ml Morganella morganii  Organism: Morganella morganii (19 @ 17:24)  Organism: Morganella morganii (19 @ 17:24)      -  Amikacin: S <=16      -  Ampicillin: R >16 These ampicillin results predict results for amoxicillin      -  Ampicillin/Sulbactam: R >16/8 Enterobacter, Citrobacter, and Serratia may develop resistance during prolonged therapy (3-4 days)      -  Aztreonam: S <=4      -  Cefazolin: R >16      -  Cefepime: S <=4      -  Cefoxitin: R >16      -  Ceftriaxone: S <=1 Enterobacter, Citrobacter, and Serratia may develop resistance during prolonged therapy      -  Ciprofloxacin: S <=1      -  Ertapenem: S <=1      -  Gentamicin: S <=4      -  Imipenem: S <=1      -  Levofloxacin: S <=2      -  Meropenem: S <=1      -  Nitrofurantoin: R 64 Should not be used to treat pyelonephritis      -  Piperacillin/Tazobactam: S <=16      -  Tigecycline: R 4      -  Tobramycin: S <=4      -  Trimethoprim/Sulfamethoxazole: S <=2/38      Method Type: GONZALO      Lactic Acid Trend  19 @ 18:20   -   2.0  19 @ 14:15   -   3.2<H>  19 @ 09:20   -   2.8<H>  19 @ 21:10   -   2.1<H>  19 @ 16:50   -   4.1<HH>  19 @ 14:46   -   2.6<H>      WBC Trend  19 @ 06:30   -  4.74  19 @ 07:00   -  5.96  19 @ 12:45   -  10.66<H>        CAPILLARY BLOOD GLUCOSE      POCT Blood Glucose.: 129 mg/dL (02 Aug 2019 08:36)      RADIOLOGY  CXR:  < from: Xray Chest 1 View- PORTABLE-Urgent (19 @ 15:13) >    IMPRESSION: Unchanged chest as above.    < end of copied text >      ECHO:    < from: TTE Echo Complete w/Doppler (19 @ 11:45) >  Summary:   1. Left ventricular ejection fraction, by visual estimation, is 47%.   2. Mildly decreased global left ventricular systolic function.   3. Spectral Doppler shows restrictive pattern of left ventricular myocardial filling (Grade III diastolic dysfunction).   4. Borderline abnormal left ventricular systolic function.   5. Mild mitral annular calcification.   6. Mild thickening and calcification of the anterior and posterior mitral valve leaflets.   7. Moderate tricuspid regurgitation.   8. Estimated pulmonary artery systolic pressure is 55.0 mmHg assuming a right atrial pressure of 10 mmHg, which is consistent with moderate pulmonary hypertension.   9. Pulmonary hypertension is present.  10. Care discussed with dr coon.  11. Increased relative wall thickness with normal mass index consistent with left ventricular concentric remodeling.  12. LA volume Index is 43.4 ml/m² ml/m2.  13. Peak transaortic gradient equals 8.9 mmHg, mean transaortic gradient equals 5.4 mmHg, the calculated aortic valve area equals 1.37 cm² by the continuity equation consistent with moderate aortic stenosis.    < end of copied text >    VITALS:  T(C): 36.5 (19 @ 10:53), Max: 38.1 (19 @ 13:32)  T(F): 97.7 (19 @ 10:53), Max: 100.6 (19 @ 13:32)  HR: 64 (19 @ 10:53) (64 - 86)  BP: 119/59 (19 @ 10:53) (99/54 - 123/71)  BP(mean): --  ABP: --  ABP(mean): --  RR: 16 (19 @ 10:53) (15 - 16)  SpO2: 94% (19 @ 10:53) (93% - 94%)  CVP(mm Hg): --  CVP(cm H2O): --    Ins and Outs     19 @ 07:01  -  19 @ 07:00  --------------------------------------------------------  IN: 2350 mL / OUT: 600 mL / NET: 1750 mL    19 @ 07:01  -  19 @ 11:42  --------------------------------------------------------  IN: 200 mL / OUT: 0 mL / NET: 200 mL        Height (cm): 177.8 (19 @ 12:27)  Weight (kg): 106.6 (19 @ 12:27)  BMI (kg/m2): 33.7 (19 @ 12:27)        I&O's Detail    01 Aug 2019 07:01  -  02 Aug 2019 07:00  --------------------------------------------------------  IN:    sodium chloride 0.9%: 125 mL    sodium chloride 0.9%: 225 mL    Sodium Chloride 0.9% IV Bolus: 2000 mL  Total IN: 2350 mL    OUT:    Voided: 600 mL  Total OUT: 600 mL    Total NET: 1750 mL      02 Aug 2019 07:  -  02 Aug 2019 11:42  --------------------------------------------------------  IN:    Oral Fluid: 200 mL  Total IN: 200 mL    OUT:  Total OUT: 0 mL    Total NET: 200 mL

## 2019-08-02 NOTE — PROGRESS NOTE ADULT - PROBLEM SELECTOR PLAN 1
repeat culture neg. ID note appreciated. has had ultrasound and CT scan in past year. ordered PVR to check bladder emptying

## 2019-08-02 NOTE — PROGRESS NOTE ADULT - SUBJECTIVE AND OBJECTIVE BOX
Patient is a 88y old  Female who presents with a chief complaint of sepsis secondary to uti (01 Aug 2019 13:53)    HPI:  jasmeet is a 88 year old female with pmh of spinal stenosis, diabetes type 2, dvt,  anemia, neuropathy, afib (w/ hx  of enlarged right upper chamber not on a/c due to vaginal bleeding secondary to polyps), frequent uti (previously on daily macrobid for many years), seasonal allergies, venous insufficiency, gall bladder disease, vaginal polyps, dvt, and uterine wall thickening coming to ER with complaints of not feeling self. Per son bedside states patient has been changed to keflex by outside Doctor and has bene on/off for multiple course over past 3 weeks. states as had abdominal pain came to ED yesterday and was changed to bactrim. however this am patient called sone and stated does not feel like self and wanted to go back to ED. son stating patient more confused then past and not acting like self. baseline patient aaox3 and able to do function independently using wheelchair and aide. Lives at [facility]. +chills and fevers +abdominal pain (non radiating lower area burning in nature 3/10).  also stating increased frequency of urine.     spoke to patient pmd Dr Hernandez- patient not on eliquis as does not follow up with OB to correct polyps. unsure if patient has chf as not seen patient in long time.     patient aaox3 this time however does not remember family history and son bedside does not remember as well. (2019 14:53)    incontinent with diaper.    Interval Events:  Patient seen and examined at bedside.    MEDICATIONS:  MEDICATIONS  (STANDING):  cefTRIAXone   IVPB 1000 milliGRAM(s) IV Intermittent every 24 hours  cholecalciferol 1000 Unit(s) Oral daily  cyanocobalamin 1000 MICROGram(s) Oral daily  dextrose 5%. 1000 milliLiter(s) (50 mL/Hr) IV Continuous <Continuous>  dextrose 50% Injectable 12.5 Gram(s) IV Push once  dextrose 50% Injectable 25 Gram(s) IV Push once  dextrose 50% Injectable 25 Gram(s) IV Push once  docusate sodium 100 milliGRAM(s) Oral three times a day  enoxaparin Injectable 40 milliGRAM(s) SubCutaneous daily  ferrous    sulfate 325 milliGRAM(s) Oral daily  gabapentin 200 milliGRAM(s) Oral two times a day  insulin lispro (HumaLOG) corrective regimen sliding scale   SubCutaneous three times a day before meals  insulin lispro (HumaLOG) corrective regimen sliding scale   SubCutaneous at bedtime  levothyroxine 75 MICROGram(s) Oral daily  melatonin 3 milliGRAM(s) Oral at bedtime  metoprolol succinate ER 25 milliGRAM(s) Oral daily  multivitamin 1 Tablet(s) Oral daily  ondansetron Injectable 4 milliGRAM(s) IV Push every 8 hours  sodium chloride 0.9%. 1000 milliLiter(s) (75 mL/Hr) IV Continuous <Continuous>    MEDICATIONS  (PRN):  acetaminophen   Tablet .. 650 milliGRAM(s) Oral every 6 hours PRN Temp greater or equal to 38C (100.4F), Mild Pain (1 - 3), Moderate Pain (4 - 6)  dextrose 40% Gel 15 Gram(s) Oral once PRN Blood Glucose LESS THAN 70 milliGRAM(s)/deciliter  glucagon  Injectable 1 milliGRAM(s) IntraMuscular once PRN Glucose LESS THAN 70 milligrams/deciliter  senna 2 Tablet(s) Oral at bedtime PRN Constipation      Allergies    aspirin (Unknown)    Intolerances        T(C): 36.8 (19 @ 05:09), Max: 38.8 (19 @ 12:38)  T(F): 98.2 (19 @ 05:09), Max: 101.9 (19 @ 12:38)  HR: 86 (19 @ 05:09) (66 - 114)  BP: 123/71 (19 @ 05:09) (99/54 - 157/113)  RR: 16 (19 @ 05:09) (15 - 18)  SpO2: 94% (19 @ 05:09) (90% - 98%)              LABS:      CBC Full  -  ( 02 Aug 2019 06:30 )  WBC Count : 4.74 K/uL  RBC Count : 3.62 M/uL  Hemoglobin : 12.0 g/dL  Hematocrit : 35.8 %  Platelet Count - Automated : 144 K/uL  Mean Cell Volume : 98.9 fl  Mean Cell Hemoglobin : 33.1 pg  Mean Cell Hemoglobin Concentration : 33.5 gm/dL  Auto Neutrophil # : x  Auto Lymphocyte # : x  Auto Monocyte # : x  Auto Eosinophil # : x  Auto Basophil # : x  Auto Neutrophil % : x  Auto Lymphocyte % : x  Auto Monocyte % : x  Auto Eosinophil % : x  Auto Basophil % : x        139  |  106  |  10  ----------------------------<  128<H>  3.8   |  24  |  0.72    Ca    8.1<L>      02 Aug 2019 06:30    TPro  7.6  /  Alb  3.7  /  TBili  0.8  /  DBili  x   /  AST  24  /  ALT  16  /  AlkPhos  60      PT/INR - ( 2019 12:45 )   PT: 12.0 sec;   INR: 1.07 ratio         PTT - ( 2019 12:45 )  PTT:28.6 sec    Urinalysis Basic - ( 2019 13:40 )    Color: Yellow / Appearance: Slightly Turbid / S.015 / pH: x  Gluc: x / Ketone: Negative  / Bili: Negative / Urobili: Negative   Blood: x / Protein: 30 mg/dL / Nitrite: Negative   Leuk Esterase: Moderate / RBC: >50 /HPF / WBC >50 /HPF   Sq Epi: x / Non Sq Epi: Neg.-Few / Bacteria: Moderate /HPF            Culture - Urine (collected 19 @ 13:40)  Source: .Urine Clean Catch (Midstream)  Final Report (19 @ 01:49):    <10,000 CFU/mL Normal Urogenital Kamilla    Culture - Urine (collected 19 @ 09:15)  Source: .Urine Catheterized  Final Report (19 @ 17:24):    >100,000 CFU/ml Morganella morganii  Organism: Morganella morganii (19 @ 17:24)  Organism: Morganella morganii (19 @ 17:24)      -  Amikacin: S <=16      -  Ampicillin: R >16 These ampicillin results predict results for amoxicillin      -  Ampicillin/Sulbactam: R >16/8 Enterobacter, Citrobacter, and Serratia may develop resistance during prolonged therapy (3-4 days)      -  Aztreonam: S <=4      -  Cefazolin: R >16      -  Cefepime: S <=4      -  Cefoxitin: R >16      -  Ceftriaxone: S <=1 Enterobacter, Citrobacter, and Serratia may develop resistance during prolonged therapy      -  Ciprofloxacin: S <=1      -  Ertapenem: S <=1      -  Gentamicin: S <=4      -  Imipenem: S <=1      -  Levofloxacin: S <=2      -  Meropenem: S <=1      -  Nitrofurantoin: R 64 Should not be used to treat pyelonephritis      -  Piperacillin/Tazobactam: S <=16      -  Tigecycline: R 4      -  Tobramycin: S <=4      -  Trimethoprim/Sulfamethoxazole: S <=2/38      Method Type: GONZALO        Physical Exam    Constitutional: alert, no acute distress    Abdomen: soft, nontender, nondistended, no HSM    Genitourinary: no bladder distention

## 2019-08-02 NOTE — DIETITIAN INITIAL EVALUATION ADULT. - OTHER INFO
88 year old female admitted with sepsis 2/2 UTI, Tolerating diet with good po, Skin is intact, No edema noted, Last noted BM (7/31). POCT testing noted , patient reports fair compliance with diet takes Metformin at home. Tried to review diet with patient however not receptive.

## 2019-08-02 NOTE — DIETITIAN INITIAL EVALUATION ADULT. - PERTINENT MEDS FT
Rocephin, Lispro, MVI, Tylenol, VitD3, Cyanocobalamin, Colace, Neurontin, Synthroid, Melatonin, Zofran, Senna

## 2019-08-02 NOTE — PROGRESS NOTE ADULT - ASSESSMENT
Physical Examination:  GENERAL:               Alert, Oriented, No acute distress.    HEENT:                    Pupils equal, reactive to light.  Symmetric. No JVD, Moist MM  PULM:                     Bilateral air entry, faint coarse breath sounds, breathing nonlabored, no significant sputum production, No Wheezing  CVS:                         S1, S2,  No Murmur  ABD:                        Soft, nondistended, nontender, normoactive bowel sounds,   EXT:                        trace edema to bilateral lower extremities, nontender, No Cyanosis or Clubbing   NEURO:                  Alert, oriented, interactive, nonfocal, follows commands  PSYC:                      Calm, + Insight.        Assessment  Sepsis due to UTI with GNR  - possibly severe sepsis but suspect lactic acidosis can be overlapped with metformin use  Chronic Diastolic  CHF   DM2  HTN  Afib    Plan  continue abx as per ID  f/u cultures  pt clinically improving  no active hemodynamic or respiratory issues noted, reconsult icu as needed d/w dr. Ace

## 2019-08-02 NOTE — PROGRESS NOTE ADULT - SUBJECTIVE AND OBJECTIVE BOX
CC: f/u for morganella UTI    Patient reports: she is without chills today, no fever, appears comfortable    REVIEW OF SYSTEMS:  All other review of systems negative (Comprehensive ROS)    Antimicrobials Day #  :day 3  cefTRIAXone   IVPB 1000 milliGRAM(s) IV Intermittent every 24 hours    Other Medications Reviewed    T(F): 98.2 (08-02-19 @ 16:44), Max: 99 (08-01-19 @ 22:42)  HR: 60 (08-02-19 @ 16:44)  BP: 120/70 (08-02-19 @ 16:44)  RR: 15 (08-02-19 @ 16:44)  SpO2: 94% (08-02-19 @ 16:44)  Wt(kg): --    PHYSICAL EXAM:  General: alert, no acute distress  Eyes:  anicteric, no conjunctival injection, no discharge  Oropharynx: no lesions or injection 	  Neck: supple, without adenopathy  Lungs: clear to auscultation  Heart: regular rate and rhythm; no murmur, rubs or gallops  Abdomen: soft, nondistended, nontender, without mass or organomegaly  Skin: no lesions  Extremities: no clubbing, cyanosis, or edema  Neurologic: alert, oriented, moves all extremities    LAB RESULTS:                        12.0   4.74  )-----------( 144      ( 02 Aug 2019 06:30 )             35.8     08-02    139  |  106  |  10  ----------------------------<  128<H>  3.8   |  24  |  0.72    Ca    8.1<L>      02 Aug 2019 06:30          MICROBIOLOGY:  RECENT CULTURES:  07-31 @ 13:40 .Urine Clean Catch (Midstream)     <10,000 CFU/mL Normal Urogenital Kamilla      07-31 @ 12:45 .Blood Blood-Peripheral     No growth to date.      07-30 @ 09:15 .Urine Catheterized Morganella morganii    >100,000 CFU/ml Morganella morganii          RADIOLOGY REVIEWED:    < from: Xray Chest 1 View- PORTABLE-Urgent (08.02.19 @ 12:19) >  IMPRESSION:   No acute process.    < end of copied text >

## 2019-08-02 NOTE — PROGRESS NOTE ADULT - SUBJECTIVE AND OBJECTIVE BOX
Patient is a 88y old  Female who presents with a chief complaint of sepsis secondary to uti (02 Aug 2019 11:41)      Interval HPI/ Overnight events: No events overnight    Patient seen and examined at bedside. Pt feels tired but denies chills, chest pain, SOB, abdominal pain, n/v. She has chronic back pain 2/2 spinal stenosis, pain severity unchanged.    REVIEW OF SYSTEMS:    CONSTITUTIONAL: Pt feels tired but denies fevers, chills  EYES/ENT: No visual changes;  No vertigo or throat pain   NECK: No pain or stiffness  RESPIRATORY: No cough, wheezing, hemoptysis; No shortness of breath  CARDIOVASCULAR: No chest pain or palpitations  GASTROINTESTINAL: No abdominal or epigastric pain. No nausea, vomiting, or hematemesis; No diarrhea or constipation. No melena or hematochezia.  GENITOURINARY: No dysuria, frequency or hematuria  NEUROLOGICAL: No numbness or weakness  SKIN: No itching, burning, rashes, or lesions   MSK: no joint pain, no joint swelling  All other review of systems is negative unless indicated above.      ALLERGIES:  aspirin (Unknown)    MEDICATIONS  (STANDING):  artificial tears (preservative free) Ophthalmic Solution 1 Drop(s) Both EYES three times a day  cefTRIAXone   IVPB 1000 milliGRAM(s) IV Intermittent every 24 hours  cholecalciferol 1000 Unit(s) Oral daily  cyanocobalamin 1000 MICROGram(s) Oral daily  dextrose 5%. 1000 milliLiter(s) (50 mL/Hr) IV Continuous <Continuous>  dextrose 50% Injectable 12.5 Gram(s) IV Push once  dextrose 50% Injectable 25 Gram(s) IV Push once  dextrose 50% Injectable 25 Gram(s) IV Push once  docusate sodium 100 milliGRAM(s) Oral three times a day  enoxaparin Injectable 40 milliGRAM(s) SubCutaneous daily  ferrous    sulfate 325 milliGRAM(s) Oral daily  gabapentin 200 milliGRAM(s) Oral two times a day  insulin lispro (HumaLOG) corrective regimen sliding scale   SubCutaneous three times a day before meals  insulin lispro (HumaLOG) corrective regimen sliding scale   SubCutaneous at bedtime  levothyroxine 75 MICROGram(s) Oral daily  melatonin 3 milliGRAM(s) Oral at bedtime  metoprolol succinate ER 25 milliGRAM(s) Oral daily  multivitamin 1 Tablet(s) Oral daily  ondansetron Injectable 4 milliGRAM(s) IV Push every 8 hours    MEDICATIONS  (PRN):  acetaminophen   Tablet .. 650 milliGRAM(s) Oral every 6 hours PRN Temp greater or equal to 38C (100.4F), Mild Pain (1 - 3), Moderate Pain (4 - 6)  dextrose 40% Gel 15 Gram(s) Oral once PRN Blood Glucose LESS THAN 70 milliGRAM(s)/deciliter  glucagon  Injectable 1 milliGRAM(s) IntraMuscular once PRN Glucose LESS THAN 70 milligrams/deciliter  senna 2 Tablet(s) Oral at bedtime PRN Constipation    Vital Signs Last 24 Hrs  T(F): 97.7 (02 Aug 2019 10:53), Max: 99.1 (01 Aug 2019 16:44)  HR: 64 (02 Aug 2019 10:53) (64 - 86)  BP: 119/59 (02 Aug 2019 10:53) (105/54 - 123/71)  RR: 16 (02 Aug 2019 10:53) (16 - 16)  SpO2: 94% (02 Aug 2019 10:53) (94% - 94%)  I&O's Summary    01 Aug 2019 07:01  -  02 Aug 2019 07:00  --------------------------------------------------------  IN: 2350 mL / OUT: 600 mL / NET: 1750 mL    02 Aug 2019 07:01  -  02 Aug 2019 16:32  --------------------------------------------------------  IN: 450 mL / OUT: 300 mL / NET: 150 mL        PHYSICAL EXAM:  General: NAD, obese elderly female  Head: NT/AC  ENT: MMM, no oropharyngeal erythema or exudates  Neck: Supple, No JVD  Lungs: Clear to auscultation bilaterally, no wheezing, rales, or rhonchi, no accessory muscle use  Cardio: RRR, normal S1/S2, No M/R/G  Abdomen: Soft, Nontender, Nondistended; Bowel sounds present, no organomegaly  Extremities: No calf tenderness, no clubbing or  cyanosis  Vascular: nonpitting edema b/l  Lymph: no cervical LAD  Skin: warm and dry  Neuro: AAOx3, answers all questions appropriately, moves all extremities    LABS:                        12.0   4.74  )-----------( 144      ( 02 Aug 2019 06:30 )             35.8         139  |  106  |  10  ----------------------------<  128  3.8   |  24  |  0.72    Ca    8.1      02 Aug 2019 06:30    TPro  7.6  /  Alb  3.7  /  TBili  0.8  /  DBili  x   /  AST  24  /  ALT  16  /  AlkPhos  60      eGFR if Non African American: 75 mL/min/1.73M2 (19 @ 06:30)  eGFR if : 87 mL/min/1.73M2 (19 @ 06:30)    PT/INR - ( 2019 12:45 )   PT: 12.0 sec;   INR: 1.07 ratio         PTT - ( 2019 12:45 )  PTT:28.6 sec  Lactate, Blood: 2.0 mmol/L ( @ 18:20)  Lactate, Blood: 3.2 mmol/L ( @ 14:15)  Lactate, Blood: 2.8 mmol/L ( @ 09:20)  Lactate, Blood: 2.1 mmol/L ( @ 21:10)  Lactate, Blood: 4.1 mmol/L ( @ 16:50)                      POCT Blood Glucose.: 124 mg/dL (02 Aug 2019 12:09)  POCT Blood Glucose.: 129 mg/dL (02 Aug 2019 08:36)  POCT Blood Glucose.: 117 mg/dL (01 Aug 2019 22:00)  POCT Blood Glucose.: 122 mg/dL (01 Aug 2019 17:03)     [id number] 6.1    Urinalysis Basic - ( 2019 13:40 )    Color: Yellow / Appearance: Slightly Turbid / S.015 / pH: x  Gluc: x / Ketone: Negative  / Bili: Negative / Urobili: Negative   Blood: x / Protein: 30 mg/dL / Nitrite: Negative   Leuk Esterase: Moderate / RBC: >50 /HPF / WBC >50 /HPF   Sq Epi: x / Non Sq Epi: Neg.-Few / Bacteria: Moderate /HPF        Culture - Urine (collected 2019 13:40)  Source: .Urine Clean Catch (Midstream)  Final Report (02 Aug 2019 01:49):    <10,000 CFU/mL Normal Urogenital Kamilla    Culture - Blood (collected 2019 12:45)  Source: .Blood Blood-Peripheral  Preliminary Report (01 Aug 2019 21:01):    No growth to date.    Culture - Blood (collected 2019 12:45)  Source: .Blood Blood-Peripheral  Preliminary Report (01 Aug 2019 21:01):    No growth to date.    Culture - Urine (collected 2019 09:15)  Source: .Urine Catheterized  Final Report (01 Aug 2019 17:24):    >100,000 CFU/ml Morganella morganii  Organism: Morganella morganii (01 Aug 2019 17:24)  Organism: Morganella morganii (01 Aug 2019 17:24)      -  Amikacin: S <=16      -  Ampicillin: R >16 These ampicillin results predict results for amoxicillin      -  Ampicillin/Sulbactam: R >16/8 Enterobacter, Citrobacter, and Serratia may develop resistance during prolonged therapy (3-4 days)      -  Aztreonam: S <=4      -  Cefazolin: R >16      -  Cefepime: S <=4      -  Cefoxitin: R >16      -  Ceftriaxone: S <=1 Enterobacter, Citrobacter, and Serratia may develop resistance during prolonged therapy      -  Ciprofloxacin: S <=1      -  Ertapenem: S <=1      -  Gentamicin: S <=4      -  Imipenem: S <=1      -  Levofloxacin: S <=2      -  Meropenem: S <=1      -  Nitrofurantoin: R 64 Should not be used to treat pyelonephritis      -  Piperacillin/Tazobactam: S <=16      -  Tigecycline: R 4      -  Tobramycin: S <=4      -  Trimethoprim/Sulfamethoxazole: S <=2/38      Method Type: GONZALO        RADIOLOGY & ADDITIONAL TESTS:  < from: Xray Chest 1 View- PORTABLE-Urgent (19 @ 12:19) >    EXAM:  XR CHEST PORTABLE URGENT 1V      PROCEDURE DATE:  2019        INTERPRETATION:    DATE OF STUDY: 2019    PRIOR:2019    CLINICAL INDICATION: Atrial fibrillation; fluid overload.    TECHNIQUE: portable chest - done erect.    FINDINGS:   Stable cardiomegaly.  Fluid-filled, intrathoracic stomach seen in left retrocardiac region -   unchanged.  Slightly increased interstitial pattern again noted.  No focal consolidations. No significant pleural effusion. No pneumothorax.  No freesubdiaphragmatic air.  No acute bony findings.    IMPRESSION:   No acute process.    < end of copied text >      Care Discussed with Consultants/Other Providers:

## 2019-08-02 NOTE — PROGRESS NOTE ADULT - ASSESSMENT
87 yo F with PMHx of Afib (not on AC due to vaginal bleeding 2/2 polyps), chronic back pain 2/2 spinal stenosis, well-controlled DM2 on metformin, HTN, recurrent UTIs, chronic venous insufficiency presents with chills and rigors, admitted for sepsis 2/2 UTI    #Sepsis 2/2 UTI: sepsis resolved. Lactate previously elevated despite IVF but now lactate level 2.0, questionable metformin induced lactic acidosis.     #Recurrent UTIs: per family at bedside, pt wears pull ups and covered with multiple pads, only cahnges her pull-ups once a day. Pt was previously on Macrobid, then Keflex for Abx suppressive therapy. Urine Cx from 07/30 grew E.Coli sensitive to  s/p keflex outpatient   - ceftriaxone  - ID consult appreciated  - urology consult- appreciated  - follow up culture for sensitivities  - s/p ivf bolus in ed and floor now change to normal saline 75cc/hr  - check echo    - monitor lactate  - monitor labs  - monitor vitals   - tylenol as needed   - will place mcdonough catheter to maintain strict i and o as patient with rales and received multiple bolus of ivf and now on maintenance fluids     #leukocytosis   - improved- may be a dilutional drop given ivf use  - secondary to above  - monitor wbc    #metabolic encephalopathy  - improved  - secondary to above   - monitor     #afib not on a/c (hx of bleed due to vaginal polyps)  - w/ hx of enlarged right upper chamber  - tele monitor   - metoprolol    # vitamin b12 defc  - b12    # diabetes type 2  - on metformin outpatient will hold while inpatient  - iss while in patient   - monitor finger sticks    #hypothyroidism  - levothyroxine    #venous insufficiency w/ ?chf   - check echo   - elevate legs    #peripheral neuropathy  - gabapentin    #dvt prophylaxis  - lovenox sc 89 yo F with PMHx of Afib (not on AC due to vaginal bleeding 2/2 polyps), chronic back pain 2/2 spinal stenosis, well-controlled DM2 on metformin, HTN, recurrent UTIs, chronic venous insufficiency presents with chills and rigors, admitted for sepsis 2/2 UTI    #Sepsis 2/2 UTI: sepsis resolved. Lactate previously elevated despite IVF but now lactate level 2.0, questionable metformin induced lactic acidosis.     #Recurrent UTIs: per family at bedside, pt wears pull ups and covered with multiple pads, only cahnges her pull-ups once a day. Pt was previously on Macrobid, then Keflex for Abx suppressive therapy. Urine Cx from  grew E.Coli sensitive to B-lactam, resistant to Floroquinolones and Macrobid,  Urine Cx from  grows Morganella resistant to Cefoxitin and macrobid.  Most recent Urine cx from  shows normal roque <10K CFU  - continue Ceftriaxone  - f/u ID recs regarding role of suppressive Abx  - Pt and family educated regarding frequent diaper change to avoid recurrent infections. CT A/P in Oct 2018 shows no structural abnormalities that would predispose her to UTIs. PVR 20cc      #Atrial Fibrillation: not on a/c (hx of bleed due to vaginal polyps)  - continue Toprol 25mg daily    #DM type 2: only on Metformin at home, concern for possible lactic acidosis 2/2 metformin. HbA1C 6.1%  - would discontinue Metformin on discharge, consider alternative agents or observe off meds outpt  - ISS while in patient     #Hypothyroidism  - continue levothyroxine    #Spinal stenosis  - continue home gabapentin    #DVT prophylaxis:   - Lovenox and SCDs    CAPRINI SCORE [CLOT]    AGE RELATED RISK FACTORS                                                       MOBILITY RELATED FACTORS  [ ] Age 41-60 years                                            (1 Point)                  [ ] Bed rest                                                        (1 Point)  [ ] Age: 61-74 years                                           (2 Points)                 [ ] Plaster cast                                                   (2 Points)  [x ] Age= 75 years                                              (3 Points)                 [x] Bed bound for more than 72 hours                 (2 Points)    DISEASE RELATED RISK FACTORS                                               GENDER SPECIFIC FACTORS  [x ] Edema in the lower extremities                       (1 Point)                  [ ] Pregnancy                                                     (1 Point)  [ ] Varicose veins                                               (1 Point)                  [ ] Post-partum < 6 weeks                                   (1 Point)             [ x] BMI > 25 Kg/m2                                            (1 Point)                  [ ] Hormonal therapy  or oral contraception          (1 Point)                 [ x] Sepsis (in the previous month)                        (1 Point)                  [ ] History of pregnancy complications                 (1 point)  [ ] Pneumonia or serious lung disease                                               [ ] Unexplained or recurrent                     (1 Point)           (in the previous month)                               (1 Point)  [ ] Abnormal pulmonary function test                     (1 Point)                 SURGERY RELATED RISK FACTORS  [ ] Acute myocardial infarction                              (1 Point)                 [ ]  Section                                             (1 Point)  [ ] Congestive heart failure (in the previous month)  (1 Point)               [ ] Minor surgery                                                  (1 Point)   [ ] Inflammatory bowel disease                             (1 Point)                 [ ] Arthroscopic surgery                                        (2 Points)  [ ] Central venous access                                      (2 Points)                [ ] General surgery lasting more than 45 minutes   (2 Points)       [ ] Stroke (in the previous month)                          (5 Points)               [ ] Elective arthroplasty                                         (5 Points)                                                                                                                                               HEMATOLOGY RELATED FACTORS                                                 TRAUMA RELATED RISK FACTORS  [ ] Prior episodes of VTE                                     (3 Points)                 [ ] Fracture of the hip, pelvis, or leg                       (5 Points)  [ ] Positive family history for VTE                         (3 Points)                 [ ] Acute spinal cord injury (in the previous month)  (5 Points)  [ ] Prothrombin 76935 A                                     (3 Points)                 [ ] Paralysis  (less than 1 month)                             (5 Points)  [ ] Factor V Leiden                                             (3 Points)                  [ ] Multiple Trauma within 1 month                        (5 Points)  [ ] Lupus anticoagulants                                     (3 Points)                                                           [ ] Anticardiolipin antibodies                               (3 Points)                                                       [ ] High homocysteine in the blood                      (3 Points)                                             [ ] Other congenital or acquired thrombophilia      (3 Points)                                                [ ] Heparin induced thrombocytopenia                  (3 Points)                                          Total Score [   8       ]

## 2019-08-02 NOTE — PROGRESS NOTE ADULT - ASSESSMENT
87 yo F, diabetic, wheelchair bound, at assisted living, frequent UTIs, seen in ED with  Sxs, sent home with Bactrim- Urine Cx form yest >100k GNRs.  She returns this AM with altered MS, fever, tachycardia, sepsis.  WBC and Creat normal.  UA pyuria.  Thus, question of persistent influence of UTI, although based on June urine isolate, Bactrim should be effective.  Bactrim hypersensitivity reax also possible, but only took single dose.  Her elevated lactate noted, ? multifactorial.  She may be mildly fluid overloaded  GNR's growing in urine , isolate is a morganella, sensitive to bactrim and CTX  Appreciate urology and medical F/U, PVR only 20 and prior imaging studies without hydro/retention  Plan:  1.consider switch to ceftin 500 BID in AM to complete 7 days of therapy  2.repeat urine culture negative, I do wonder if she had a bactrim hypersensitivity reaction  3.There are no clear effective suppressive antibiotics, ? trial of mandelamine  4.Case reviewed with 2 sons at bedside, antibiotics will not cure her problem, they should be reserved for systemic infection.She may benefit from f/u with her Urogynecologist

## 2019-08-03 ENCOUNTER — TRANSCRIPTION ENCOUNTER (OUTPATIENT)
Age: 84
End: 2019-08-03

## 2019-08-03 VITALS
DIASTOLIC BLOOD PRESSURE: 53 MMHG | TEMPERATURE: 99 F | SYSTOLIC BLOOD PRESSURE: 139 MMHG | RESPIRATION RATE: 15 BRPM | OXYGEN SATURATION: 93 % | HEART RATE: 75 BPM

## 2019-08-03 LAB
GLUCOSE BLDC GLUCOMTR-MCNC: 137 MG/DL — HIGH (ref 70–99)
GLUCOSE BLDC GLUCOMTR-MCNC: 143 MG/DL — HIGH (ref 70–99)

## 2019-08-03 PROCEDURE — 93005 ELECTROCARDIOGRAM TRACING: CPT

## 2019-08-03 PROCEDURE — 85027 COMPLETE CBC AUTOMATED: CPT

## 2019-08-03 PROCEDURE — 36415 COLL VENOUS BLD VENIPUNCTURE: CPT

## 2019-08-03 PROCEDURE — 85610 PROTHROMBIN TIME: CPT

## 2019-08-03 PROCEDURE — 96365 THER/PROPH/DIAG IV INF INIT: CPT

## 2019-08-03 PROCEDURE — 99283 EMERGENCY DEPT VISIT LOW MDM: CPT

## 2019-08-03 PROCEDURE — 93306 TTE W/DOPPLER COMPLETE: CPT

## 2019-08-03 PROCEDURE — 82962 GLUCOSE BLOOD TEST: CPT

## 2019-08-03 PROCEDURE — 81001 URINALYSIS AUTO W/SCOPE: CPT

## 2019-08-03 PROCEDURE — 87186 SC STD MICRODIL/AGAR DIL: CPT

## 2019-08-03 PROCEDURE — 85730 THROMBOPLASTIN TIME PARTIAL: CPT

## 2019-08-03 PROCEDURE — 80053 COMPREHEN METABOLIC PANEL: CPT

## 2019-08-03 PROCEDURE — 83605 ASSAY OF LACTIC ACID: CPT

## 2019-08-03 PROCEDURE — 83036 HEMOGLOBIN GLYCOSYLATED A1C: CPT

## 2019-08-03 PROCEDURE — 99285 EMERGENCY DEPT VISIT HI MDM: CPT | Mod: 25

## 2019-08-03 PROCEDURE — 87086 URINE CULTURE/COLONY COUNT: CPT

## 2019-08-03 PROCEDURE — 80048 BASIC METABOLIC PNL TOTAL CA: CPT

## 2019-08-03 PROCEDURE — 99239 HOSP IP/OBS DSCHRG MGMT >30: CPT

## 2019-08-03 PROCEDURE — 87040 BLOOD CULTURE FOR BACTERIA: CPT

## 2019-08-03 PROCEDURE — 71045 X-RAY EXAM CHEST 1 VIEW: CPT

## 2019-08-03 RX ORDER — CEFUROXIME AXETIL 250 MG
500 TABLET ORAL EVERY 12 HOURS
Refills: 0 | Status: DISCONTINUED | OUTPATIENT
Start: 2019-08-03 | End: 2019-08-03

## 2019-08-03 RX ORDER — METFORMIN HYDROCHLORIDE 850 MG/1
1 TABLET ORAL
Qty: 0 | Refills: 0 | DISCHARGE

## 2019-08-03 RX ORDER — CEFUROXIME AXETIL 250 MG
1 TABLET ORAL
Qty: 7 | Refills: 0
Start: 2019-08-03 | End: 2019-08-06

## 2019-08-03 RX ORDER — METOPROLOL TARTRATE 50 MG
1 TABLET ORAL
Qty: 0 | Refills: 0 | DISCHARGE
Start: 2019-08-03

## 2019-08-03 RX ORDER — METOPROLOL TARTRATE 50 MG
25 TABLET ORAL
Qty: 0 | Refills: 0 | DISCHARGE

## 2019-08-03 RX ADMIN — GABAPENTIN 200 MILLIGRAM(S): 400 CAPSULE ORAL at 05:21

## 2019-08-03 RX ADMIN — Medication 1 DROP(S): at 15:59

## 2019-08-03 RX ADMIN — ENOXAPARIN SODIUM 40 MILLIGRAM(S): 100 INJECTION SUBCUTANEOUS at 12:12

## 2019-08-03 RX ADMIN — Medication 325 MILLIGRAM(S): at 12:11

## 2019-08-03 RX ADMIN — Medication 75 MICROGRAM(S): at 05:21

## 2019-08-03 RX ADMIN — Medication 100 MILLIGRAM(S): at 15:58

## 2019-08-03 RX ADMIN — Medication 1 TABLET(S): at 12:11

## 2019-08-03 RX ADMIN — Medication 1000 UNIT(S): at 12:10

## 2019-08-03 RX ADMIN — Medication 500 MILLIGRAM(S): at 12:11

## 2019-08-03 RX ADMIN — Medication 25 MILLIGRAM(S): at 05:21

## 2019-08-03 RX ADMIN — Medication 650 MILLIGRAM(S): at 06:20

## 2019-08-03 RX ADMIN — Medication 650 MILLIGRAM(S): at 05:20

## 2019-08-03 RX ADMIN — PREGABALIN 1000 MICROGRAM(S): 225 CAPSULE ORAL at 12:11

## 2019-08-03 NOTE — PROGRESS NOTE ADULT - SUBJECTIVE AND OBJECTIVE BOX
CC: f/u for morganella UTI    Patient reports: anxiety over going home, fearful of additional infections    REVIEW OF SYSTEMS:  All other review of systems negative (Comprehensive ROS)Weak, no fever or chills, no dysuria    Antimicrobials Day #  day 4:  cefuroxime   Tablet 500 milliGRAM(s) Oral every 12 hours    Other Medications Reviewed    T(F): 98.7 (08-03-19 @ 05:50), Max: 98.7 (08-03-19 @ 05:50)  HR: 80 (08-03-19 @ 05:50)  BP: 137/72 (08-03-19 @ 05:50)  RR: 15 (08-03-19 @ 05:50)  SpO2: 93% (08-03-19 @ 05:50)  Wt(kg): --    PHYSICAL EXAM:  General: alert, no acute distress  Eyes:  anicteric, no conjunctival injection, no discharge  Oropharynx: no lesions or injection 	  Neck: supple, without adenopathy  Lungs: clear to auscultation  Heart: regular rate and rhythm; no murmur, rubs or gallops  Abdomen: soft, nondistended, nontender, without mass or organomegaly  Skin: no lesions  Extremities: no clubbing, cyanosis, or edema  Neurologic: alert, oriented, moves all extremities, legs very weak    LAB RESULTS:                        12.0   4.74  )-----------( 144      ( 02 Aug 2019 06:30 )             35.8     08-02    139  |  106  |  10  ----------------------------<  128<H>  3.8   |  24  |  0.72    Ca    8.1<L>      02 Aug 2019 06:30          MICROBIOLOGY:  RECENT CULTURES:  07-31 @ 13:40 .Urine Clean Catch (Midstream)     <10,000 CFU/mL Normal Urogenital Kamilla      07-31 @ 12:45 .Blood Blood-Peripheral     No growth to date.      07-30 @ 09:15 .Urine Catheterized Morganella morganii    >100,000 CFU/ml Morganella morganii          RADIOLOGY REVIEWED:

## 2019-08-03 NOTE — DISCHARGE NOTE PROVIDER - HOSPITAL COURSE
87 yo F with PMHx of Afib (not on AC due to vaginal bleeding 2/2 polyps), chronic back pain 2/2 spinal stenosis, well-controlled DM2 on metformin, HTN, recurrent UTIs, chronic venous insufficiency presents with chills and rigors, admitted for sepsis 2/2 UTI. Pt was treated with Ceftriaxone, remained hemodynamically stable for discharge on Ceftin for total of 7 day course. Pt was evaluated by ID, not recommending suppressive therapy. Pt and family were instructed on maintain hygiene and frequent diaper change to avoid future UTIs.

## 2019-08-03 NOTE — PROGRESS NOTE ADULT - ASSESSMENT
89 yo F, diabetic, wheelchair bound, at assisted living, frequent UTIs, seen in ED with  Sxs, sent home with Bactrim- Urine Cx form yest >100k GNRs.  She returns this AM with altered MS, fever, tachycardia, sepsis.  WBC and Creat normal.  UA pyuria.  Thus, question of persistent influence of UTI, although based on June urine isolate, Bactrim should be effective.  Bactrim hypersensitivity reax also possible, but only took single dose.  Her elevated lactate noted, ? multifactorial.  She may be mildly fluid overloaded  GNR's growing in urine , isolate is a morganella, sensitive to bactrim and CTX  Appreciate urology and medical F/U, PVR only 20 and prior imaging studies without hydro/retention  Plan:  1.ceftin 500 BID in AM to complete 7 days of therapy  2.repeat urine culture negative, I do wonder if she had a bactrim hypersensitivity reaction  3.There are no clear effective suppressive antibiotics, ? trial of mandelamine  4.No Id objection to discharge planning

## 2019-08-03 NOTE — DISCHARGE NOTE PROVIDER - NSDCCPCAREPLAN_GEN_ALL_CORE_FT
PRINCIPAL DISCHARGE DIAGNOSIS  Diagnosis: Urinary tract infection without hematuria, site unspecified  Assessment and Plan of Treatment: You were treated with Ceftriaxone for 3 days and transitioned to an oral antibiotics called Ceftin. Please continue to take Ceftin twice a day through August 6. Please change your diaper/ pull-ups after every time you urinate and hower daily to maintain hygiene to avoid further urinary tract infections.      SECONDARY DISCHARGE DIAGNOSES  Diagnosis: Sepsis, due to unspecified organism  Assessment and Plan of Treatment: PRINCIPAL DISCHARGE DIAGNOSIS  Diagnosis: Urinary tract infection without hematuria, site unspecified  Assessment and Plan of Treatment: You were treated with Ceftriaxone for 3 days and transitioned to an oral antibiotics called Ceftin. Please continue to take Ceftin twice a day through August 6. Please change your diaper/ pull-ups after every time you urinate and hower daily to maintain hygiene to avoid further urinary tract infections.      SECONDARY DISCHARGE DIAGNOSES  Diagnosis: Type 2 diabetes mellitus  Assessment and Plan of Treatment: Your medication Metformin was stopped while in the hospital due to concern of lactic acidosis. Your HbA1C was 6.1% indicating that your diabetes is well controlled. Please follow up with your primary care doctor for further management of your diabetes.    Diagnosis: Sepsis, due to unspecified organism  Assessment and Plan of Treatment:

## 2019-08-03 NOTE — DISCHARGE NOTE PROVIDER - CARE PROVIDER_API CALL
Neyda Wang)  Urology  [address] [place] [address]  Phone: [phone]  Fax: [phone]  Follow Up Time:

## 2019-08-03 NOTE — PROGRESS NOTE ADULT - SUBJECTIVE AND OBJECTIVE BOX
Patient is a 88y old  Female who presents with a chief complaint of sepsis secondary to uti (03 Aug 2019 12:04)    HPI:  ataugustus is a 88 year old female with pmh of spinal stenosis, diabetes type 2, dvt,  anemia, neuropathy, afib (w/ hx  of enlarged right upper chamber not on a/c due to vaginal bleeding secondary to polyps), frequent uti (previously on daily macrobid for many years), seasonal allergies, venous insufficiency, gall bladder disease, vaginal polyps, dvt, and uterine wall thickening coming to ER with complaints of not feeling self. Per son bedside states patient has been changed to keflex by outside Doctor and has bene on/off for multiple course over past 3 weeks. states as had abdominal pain came to ED yesterday and was changed to bactrim. however this am patient called sone and stated does not feel like self and wanted to go back to ED. son stating patient more confused then past and not acting like self. baseline patient aaox3 and able to do function independently using wheelchair and aide. Lives at [facility]. +chills and fevers +abdominal pain (non radiating lower area burning in nature 3/10).  also stating increased frequency of urine.     spoke to patient pmd Dr Hernandez- patient not on eliquis as does not follow up with OB to correct polyps. unsure if patient has chf as not seen patient in long time.     patient aaox3 this time however does not remember family history and son bedside does not remember as well. (31 Jul 2019 14:53)    Interval Events:  Patient seen and examined at bedside.    MEDICATIONS:  MEDICATIONS  (STANDING):  artificial tears (preservative free) Ophthalmic Solution 1 Drop(s) Both EYES three times a day  cefuroxime   Tablet 500 milliGRAM(s) Oral every 12 hours  cholecalciferol 1000 Unit(s) Oral daily  cyanocobalamin 1000 MICROGram(s) Oral daily  dextrose 5%. 1000 milliLiter(s) (50 mL/Hr) IV Continuous <Continuous>  dextrose 50% Injectable 12.5 Gram(s) IV Push once  dextrose 50% Injectable 25 Gram(s) IV Push once  dextrose 50% Injectable 25 Gram(s) IV Push once  docusate sodium 100 milliGRAM(s) Oral three times a day  enoxaparin Injectable 40 milliGRAM(s) SubCutaneous daily  ferrous    sulfate 325 milliGRAM(s) Oral daily  gabapentin 200 milliGRAM(s) Oral two times a day  insulin lispro (HumaLOG) corrective regimen sliding scale   SubCutaneous three times a day before meals  insulin lispro (HumaLOG) corrective regimen sliding scale   SubCutaneous at bedtime  levothyroxine 75 MICROGram(s) Oral daily  melatonin 3 milliGRAM(s) Oral at bedtime  metoprolol succinate ER 25 milliGRAM(s) Oral daily  multivitamin 1 Tablet(s) Oral daily  ondansetron Injectable 4 milliGRAM(s) IV Push every 8 hours    MEDICATIONS  (PRN):  acetaminophen   Tablet .. 650 milliGRAM(s) Oral every 6 hours PRN Temp greater or equal to 38C (100.4F), Mild Pain (1 - 3), Moderate Pain (4 - 6)  dextrose 40% Gel 15 Gram(s) Oral once PRN Blood Glucose LESS THAN 70 milliGRAM(s)/deciliter  glucagon  Injectable 1 milliGRAM(s) IntraMuscular once PRN Glucose LESS THAN 70 milligrams/deciliter  senna 2 Tablet(s) Oral at bedtime PRN Constipation      Allergies    aspirin (Unknown)    Intolerances        T(C): 36.9 (08-03-19 @ 11:06), Max: 37.3 (08-01-19 @ 16:44)  T(F): 98.5 (08-03-19 @ 11:06), Max: 99.1 (08-01-19 @ 16:44)  HR: 76 (08-03-19 @ 11:06) (60 - 86)  BP: 122/71 (08-03-19 @ 11:06) (105/54 - 137/72)  RR: 15 (08-03-19 @ 11:06) (15 - 16)  SpO2: 92% (08-03-19 @ 11:06) (92% - 94%)              LABS:      CBC Full  -  ( 02 Aug 2019 06:30 )  WBC Count : 4.74 K/uL  RBC Count : 3.62 M/uL  Hemoglobin : 12.0 g/dL  Hematocrit : 35.8 %  Platelet Count - Automated : 144 K/uL  Mean Cell Volume : 98.9 fl  Mean Cell Hemoglobin : 33.1 pg  Mean Cell Hemoglobin Concentration : 33.5 gm/dL  Auto Neutrophil # : x  Auto Lymphocyte # : x  Auto Monocyte # : x  Auto Eosinophil # : x  Auto Basophil # : x  Auto Neutrophil % : x  Auto Lymphocyte % : x  Auto Monocyte % : x  Auto Eosinophil % : x  Auto Basophil % : x    08-02    139  |  106  |  10  ----------------------------<  128<H>  3.8   |  24  |  0.72    Ca    8.1<L>      02 Aug 2019 06:30                    Physical Exam    Constitutional: alert, no acute distress    Abdomen: soft, nontender, nondistended, no HSM    Genitourinary: no bladder distention

## 2019-08-03 NOTE — PROGRESS NOTE ADULT - ATTENDING COMMENTS
Pt is medically stable for discharge on Ceftin 500mg BID for total 7 day course, no suppressive therapy, focus on conservative management with frequent diaper change and shows to maintain hygiene.    Time spent on discharge 35 minutes

## 2019-08-03 NOTE — PROGRESS NOTE ADULT - SUBJECTIVE AND OBJECTIVE BOX
Patient is a 88y old  Female who presents with a chief complaint of sepsis secondary to uti (03 Aug 2019 11:41)      Interval HPI/ Overnight events: No overnight events    Patient seen and examined at bedside.    REVIEW OF SYSTEMS:    CONSTITUTIONAL: No weakness, fevers or chills  EYES/ENT: No visual changes;  No vertigo or throat pain   NECK: No pain or stiffness  RESPIRATORY: No cough, wheezing, hemoptysis; No shortness of breath  CARDIOVASCULAR: No chest pain or palpitations  GASTROINTESTINAL: No abdominal or epigastric pain. No nausea, vomiting, or hematemesis; No diarrhea or constipation. No melena or hematochezia.  GENITOURINARY: No dysuria, frequency or hematuria  NEUROLOGICAL: No numbness or weakness  SKIN: No itching, burning, rashes, or lesions   MSK: no joint pain, no joint swelling  All other review of systems is negative unless indicated above.      ALLERGIES:  aspirin (Unknown)    MEDICATIONS  (STANDING):  artificial tears (preservative free) Ophthalmic Solution 1 Drop(s) Both EYES three times a day  cefuroxime   Tablet 500 milliGRAM(s) Oral every 12 hours  cholecalciferol 1000 Unit(s) Oral daily  cyanocobalamin 1000 MICROGram(s) Oral daily  dextrose 5%. 1000 milliLiter(s) (50 mL/Hr) IV Continuous <Continuous>  dextrose 50% Injectable 12.5 Gram(s) IV Push once  dextrose 50% Injectable 25 Gram(s) IV Push once  dextrose 50% Injectable 25 Gram(s) IV Push once  docusate sodium 100 milliGRAM(s) Oral three times a day  enoxaparin Injectable 40 milliGRAM(s) SubCutaneous daily  ferrous    sulfate 325 milliGRAM(s) Oral daily  gabapentin 200 milliGRAM(s) Oral two times a day  insulin lispro (HumaLOG) corrective regimen sliding scale   SubCutaneous three times a day before meals  insulin lispro (HumaLOG) corrective regimen sliding scale   SubCutaneous at bedtime  levothyroxine 75 MICROGram(s) Oral daily  melatonin 3 milliGRAM(s) Oral at bedtime  metoprolol succinate ER 25 milliGRAM(s) Oral daily  multivitamin 1 Tablet(s) Oral daily  ondansetron Injectable 4 milliGRAM(s) IV Push every 8 hours    MEDICATIONS  (PRN):  acetaminophen   Tablet .. 650 milliGRAM(s) Oral every 6 hours PRN Temp greater or equal to 38C (100.4F), Mild Pain (1 - 3), Moderate Pain (4 - 6)  dextrose 40% Gel 15 Gram(s) Oral once PRN Blood Glucose LESS THAN 70 milliGRAM(s)/deciliter  glucagon  Injectable 1 milliGRAM(s) IntraMuscular once PRN Glucose LESS THAN 70 milligrams/deciliter  senna 2 Tablet(s) Oral at bedtime PRN Constipation    Vital Signs Last 24 Hrs  T(F): 98.5 (03 Aug 2019 11:06), Max: 98.7 (03 Aug 2019 05:50)  HR: 76 (03 Aug 2019 11:06) (60 - 80)  BP: 122/71 (03 Aug 2019 11:06) (120/70 - 137/72)  RR: 15 (03 Aug 2019 11:06) (15 - 16)  SpO2: 92% (03 Aug 2019 11:06) (92% - 94%)  I&O's Summary    02 Aug 2019 07:01  -  03 Aug 2019 07:00  --------------------------------------------------------  IN: 1395 mL / OUT: 400 mL / NET: 995 mL    03 Aug 2019 07:01  -  03 Aug 2019 12:09  --------------------------------------------------------  IN: 240 mL / OUT: 0 mL / NET: 240 mL      PHYSICAL EXAM:  General: NAD, obese elderly female  Head: NT/AC  ENT: MMM, no oropharyngeal erythema or exudates  Neck: Supple, No JVD  Lungs: Clear to auscultation bilaterally, no wheezing, rales, or rhonchi, no accessory muscle use  Cardio: RRR, normal S1/S2, No M/R/G  Abdomen: Soft, Nontender, Nondistended; Bowel sounds present, no organomegaly  Extremities: No calf tenderness, no clubbing or  cyanosis  Vascular: nonpitting edema b/l  Lymph: no cervical LAD  Skin: warm and dry  Neuro: AAOx3, answers all questions appropriately, moves all extremities    LABS:                        12.0   4.74  )-----------( 144      ( 02 Aug 2019 06:30 )             35.8         139  |  106  |  10  ----------------------------<  128  3.8   |  24  |  0.72    Ca    8.1      02 Aug 2019 06:30    TPro  7.6  /  Alb  3.7  /  TBili  0.8  /  DBili  x   /  AST  24  /  ALT  16  /  AlkPhos  60      eGFR if Non African American: 75 mL/min/1.73M2 (19 @ 06:30)  eGFR if : 87 mL/min/1.73M2 (19 @ 06:30)    PT/INR - ( 2019 12:45 )   PT: 12.0 sec;   INR: 1.07 ratio         PTT - ( 2019 12:45 )  PTT:28.6 sec  Lactate, Blood: 2.0 mmol/L ( @ 18:20)  Lactate, Blood: 3.2 mmol/L ( @ 14:15)  Lactate, Blood: 2.8 mmol/L ( @ 09:20)  Lactate, Blood: 2.1 mmol/L ( @ 21:10)  Lactate, Blood: 4.1 mmol/L ( @ 16:50)                      POCT Blood Glucose.: 137 mg/dL (03 Aug 2019 08:09)  POCT Blood Glucose.: 133 mg/dL (02 Aug 2019 22:12)  POCT Blood Glucose.: 147 mg/dL (02 Aug 2019 16:54)     [id number] 6.1    Urinalysis Basic - ( 2019 13:40 )    Color: Yellow / Appearance: Slightly Turbid / S.015 / pH: x  Gluc: x / Ketone: Negative  / Bili: Negative / Urobili: Negative   Blood: x / Protein: 30 mg/dL / Nitrite: Negative   Leuk Esterase: Moderate / RBC: >50 /HPF / WBC >50 /HPF   Sq Epi: x / Non Sq Epi: Neg.-Few / Bacteria: Moderate /HPF        Culture - Urine (collected 2019 13:40)  Source: .Urine Clean Catch (Midstream)  Final Report (02 Aug 2019 01:49):    <10,000 CFU/mL Normal Urogenital Kamilla    Culture - Blood (collected 2019 12:45)  Source: .Blood Blood-Peripheral  Preliminary Report (01 Aug 2019 21:01):    No growth to date.    Culture - Blood (collected 2019 12:45)  Source: .Blood Blood-Peripheral  Preliminary Report (01 Aug 2019 21:01):    No growth to date.    Culture - Urine (collected 2019 09:15)  Source: .Urine Catheterized  Final Report (01 Aug 2019 17:24):    >100,000 CFU/ml Morganella morganii  Organism: Morganella morganii (01 Aug 2019 17:24)  Organism: Morganella morganii (01 Aug 2019 17:24)      -  Amikacin: S <=16      -  Ampicillin: R >16 These ampicillin results predict results for amoxicillin      -  Ampicillin/Sulbactam: R >16/8 Enterobacter, Citrobacter, and Serratia may develop resistance during prolonged therapy (3-4 days)      -  Aztreonam: S <=4      -  Cefazolin: R >16      -  Cefepime: S <=4      -  Cefoxitin: R >16      -  Ceftriaxone: S <=1 Enterobacter, Citrobacter, and Serratia may develop resistance during prolonged therapy      -  Ciprofloxacin: S <=1      -  Ertapenem: S <=1      -  Gentamicin: S <=4      -  Imipenem: S <=1      -  Levofloxacin: S <=2      -  Meropenem: S <=1      -  Nitrofurantoin: R 64 Should not be used to treat pyelonephritis      -  Piperacillin/Tazobactam: S <=16      -  Tigecycline: R 4      -  Tobramycin: S <=4      -  Trimethoprim/Sulfamethoxazole: S <=2/38      Method Type: GONZALO        RADIOLOGY & ADDITIONAL TESTS:    Care Discussed with Consultants/Other Providers:

## 2019-08-03 NOTE — PROGRESS NOTE ADULT - ASSESSMENT
89 yo F with PMHx of Afib (not on AC due to vaginal bleeding 2/2 polyps), chronic back pain 2/2 spinal stenosis, well-controlled DM2 on metformin, HTN, recurrent UTIs, chronic venous insufficiency presents with chills and rigors, admitted for sepsis 2/2 UTI      #Recurrent UTIs:   -  switch to Ceftin 500mg BID, total of 7 day course  - no suppressive therapy recommended, discussed with ID (Dr. Smyth)  - Pt and family educated regarding frequent diaper change and daily showers to avoid recurrent infections    #Atrial Fibrillation: not on a/c (hx of bleed due to vaginal polyps)  - continue Toprol 25mg daily    #DM type 2: only on Metformin at home, concern for possible lactic acidosis 2/2 metformin. HbA1C 6.1%  - discontinue Metformin on discharge, consider alternative agents or observe off meds outpt  - ISS while in patient     #Hypothyroidism  - continue levothyroxine    #Spinal stenosis  - continue home gabapentin    #DVT prophylaxis:   - Lovenox and SCDs    CAPRINI SCORE [CLOT]    AGE RELATED RISK FACTORS                                                       MOBILITY RELATED FACTORS  [ ] Age 41-60 years                                            (1 Point)                  [ ] Bed rest                                                        (1 Point)  [ ] Age: 61-74 years                                           (2 Points)                 [ ] Plaster cast                                                   (2 Points)  [x ] Age= 75 years                                              (3 Points)                 [x] Bed bound for more than 72 hours                 (2 Points)    DISEASE RELATED RISK FACTORS                                               GENDER SPECIFIC FACTORS  [x ] Edema in the lower extremities                       (1 Point)                  [ ] Pregnancy                                                     (1 Point)  [ ] Varicose veins                                               (1 Point)                  [ ] Post-partum < 6 weeks                                   (1 Point)             [ x] BMI > 25 Kg/m2                                            (1 Point)                  [ ] Hormonal therapy  or oral contraception          (1 Point)                 [ x] Sepsis (in the previous month)                        (1 Point)                  [ ] History of pregnancy complications                 (1 point)  [ ] Pneumonia or serious lung disease                                               [ ] Unexplained or recurrent                     (1 Point)           (in the previous month)                               (1 Point)  [ ] Abnormal pulmonary function test                     (1 Point)                 SURGERY RELATED RISK FACTORS  [ ] Acute myocardial infarction                              (1 Point)                 [ ]  Section                                             (1 Point)  [ ] Congestive heart failure (in the previous month)  (1 Point)               [ ] Minor surgery                                                  (1 Point)   [ ] Inflammatory bowel disease                             (1 Point)                 [ ] Arthroscopic surgery                                        (2 Points)  [ ] Central venous access                                      (2 Points)                [ ] General surgery lasting more than 45 minutes   (2 Points)       [ ] Stroke (in the previous month)                          (5 Points)               [ ] Elective arthroplasty                                         (5 Points)                                                                                                                                               HEMATOLOGY RELATED FACTORS                                                 TRAUMA RELATED RISK FACTORS  [ ] Prior episodes of VTE                                     (3 Points)                 [ ] Fracture of the hip, pelvis, or leg                       (5 Points)  [ ] Positive family history for VTE                         (3 Points)                 [ ] Acute spinal cord injury (in the previous month)  (5 Points)  [ ] Prothrombin 18211 A                                     (3 Points)                 [ ] Paralysis  (less than 1 month)                             (5 Points)  [ ] Factor V Leiden                                             (3 Points)                  [ ] Multiple Trauma within 1 month                        (5 Points)  [ ] Lupus anticoagulants                                     (3 Points)                                                           [ ] Anticardiolipin antibodies                               (3 Points)                                                       [ ] High homocysteine in the blood                      (3 Points)                                             [ ] Other congenital or acquired thrombophilia      (3 Points)                                                [ ] Heparin induced thrombocytopenia                  (3 Points)                                          Total Score [   8       ]

## 2019-08-03 NOTE — PROGRESS NOTE ADULT - PROVIDER SPECIALTY LIST ADULT
Critical Care
Hospitalist
Hospitalist
Infectious Disease
Urology
Urology
Hospitalist

## 2019-08-03 NOTE — PROGRESS NOTE ADULT - REASON FOR ADMISSION
sepsis secondary to uti

## 2019-08-03 NOTE — DISCHARGE NOTE NURSING/CASE MANAGEMENT/SOCIAL WORK - NSDCDPATPORTLINK_GEN_ALL_CORE
You can access the [facility] Patient Portal, offered by [facility], by registering with the following website: [email or web address]

## 2019-08-04 RX ORDER — DOCUSATE SODIUM 100 MG
1 CAPSULE ORAL
Qty: 30 | Refills: 0
Start: 2019-08-04 | End: 2019-09-02

## 2019-08-04 RX ORDER — SENNA PLUS 8.6 MG/1
2 TABLET ORAL
Qty: 60 | Refills: 0
Start: 2019-08-04 | End: 2019-09-02

## 2019-08-04 RX ORDER — POLYETHYLENE GLYCOL 3350 17 G/17G
17 POWDER, FOR SOLUTION ORAL
Qty: 30 | Refills: 0
Start: 2019-08-04 | End: 2019-09-02

## 2019-08-09 ENCOUNTER — EMERGENCY (EMERGENCY)
Facility: HOSPITAL | Age: 84
LOS: 1 days | Discharge: HOME | End: 2019-08-09
Attending: EMERGENCY MEDICINE | Admitting: EMERGENCY MEDICINE
Payer: MEDICARE

## 2019-08-09 VITALS
OXYGEN SATURATION: 94 % | WEIGHT: 250 LBS | HEART RATE: 74 BPM | HEIGHT: 70 IN | RESPIRATION RATE: 19 BRPM | DIASTOLIC BLOOD PRESSURE: 97 MMHG | SYSTOLIC BLOOD PRESSURE: 164 MMHG | TEMPERATURE: 98 F

## 2019-08-09 VITALS — TEMPERATURE: 99 F

## 2019-08-09 DIAGNOSIS — Z98.891 HISTORY OF UTERINE SCAR FROM PREVIOUS SURGERY: Chronic | ICD-10-CM

## 2019-08-09 DIAGNOSIS — Z96.651 PRESENCE OF RIGHT ARTIFICIAL KNEE JOINT: Chronic | ICD-10-CM

## 2019-08-09 DIAGNOSIS — Z98.89 OTHER SPECIFIED POSTPROCEDURAL STATES: Chronic | ICD-10-CM

## 2019-08-09 LAB
ALBUMIN SERPL ELPH-MCNC: 3.2 G/DL — LOW (ref 3.3–5)
ALP SERPL-CCNC: 65 U/L — SIGNIFICANT CHANGE UP (ref 40–120)
ALT FLD-CCNC: 23 U/L DA — SIGNIFICANT CHANGE UP (ref 10–45)
ANION GAP SERPL CALC-SCNC: 7 MMOL/L — SIGNIFICANT CHANGE UP (ref 5–17)
APPEARANCE UR: CLEAR — SIGNIFICANT CHANGE UP
AST SERPL-CCNC: 17 U/L — SIGNIFICANT CHANGE UP (ref 10–40)
BACTERIA # UR AUTO: NEGATIVE /HPF — SIGNIFICANT CHANGE UP
BASOPHILS # BLD AUTO: 0.06 K/UL — SIGNIFICANT CHANGE UP (ref 0–0.2)
BASOPHILS NFR BLD AUTO: 0.7 % — SIGNIFICANT CHANGE UP (ref 0–2)
BILIRUB SERPL-MCNC: 0.5 MG/DL — SIGNIFICANT CHANGE UP (ref 0.2–1.2)
BILIRUB UR-MCNC: NEGATIVE — SIGNIFICANT CHANGE UP
BUN SERPL-MCNC: 8 MG/DL — SIGNIFICANT CHANGE UP (ref 7–23)
CALCIUM SERPL-MCNC: 9.7 MG/DL — SIGNIFICANT CHANGE UP (ref 8.4–10.5)
CHLORIDE SERPL-SCNC: 105 MMOL/L — SIGNIFICANT CHANGE UP (ref 96–108)
CO2 SERPL-SCNC: 29 MMOL/L — SIGNIFICANT CHANGE UP (ref 22–31)
COLOR SPEC: YELLOW — SIGNIFICANT CHANGE UP
CREAT SERPL-MCNC: 0.77 MG/DL — SIGNIFICANT CHANGE UP (ref 0.5–1.3)
DIFF PNL FLD: ABNORMAL
EOSINOPHIL # BLD AUTO: 0.2 K/UL — SIGNIFICANT CHANGE UP (ref 0–0.5)
EOSINOPHIL NFR BLD AUTO: 2.3 % — SIGNIFICANT CHANGE UP (ref 0–6)
EPI CELLS # UR: SIGNIFICANT CHANGE UP
GLUCOSE SERPL-MCNC: 115 MG/DL — HIGH (ref 70–99)
GLUCOSE UR QL: NEGATIVE — SIGNIFICANT CHANGE UP
HCT VFR BLD CALC: 37.7 % — SIGNIFICANT CHANGE UP (ref 34.5–45)
HGB BLD-MCNC: 12.5 G/DL — SIGNIFICANT CHANGE UP (ref 11.5–15.5)
HYALINE CASTS # UR AUTO: NEGATIVE — SIGNIFICANT CHANGE UP (ref 0–7)
IMM GRANULOCYTES NFR BLD AUTO: 1.3 % — SIGNIFICANT CHANGE UP (ref 0–1.5)
KETONES UR-MCNC: NEGATIVE — SIGNIFICANT CHANGE UP
LEUKOCYTE ESTERASE UR-ACNC: ABNORMAL
LYMPHOCYTES # BLD AUTO: 1.12 K/UL — SIGNIFICANT CHANGE UP (ref 1–3.3)
LYMPHOCYTES # BLD AUTO: 13.1 % — SIGNIFICANT CHANGE UP (ref 13–44)
MCHC RBC-ENTMCNC: 32.5 PG — SIGNIFICANT CHANGE UP (ref 27–34)
MCHC RBC-ENTMCNC: 33.2 GM/DL — SIGNIFICANT CHANGE UP (ref 32–36)
MCV RBC AUTO: 97.9 FL — SIGNIFICANT CHANGE UP (ref 80–100)
MONOCYTES # BLD AUTO: 0.82 K/UL — SIGNIFICANT CHANGE UP (ref 0–0.9)
MONOCYTES NFR BLD AUTO: 9.6 % — SIGNIFICANT CHANGE UP (ref 2–14)
NEUTROPHILS # BLD AUTO: 6.23 K/UL — SIGNIFICANT CHANGE UP (ref 1.8–7.4)
NEUTROPHILS NFR BLD AUTO: 73 % — SIGNIFICANT CHANGE UP (ref 43–77)
NITRITE UR-MCNC: NEGATIVE — SIGNIFICANT CHANGE UP
NRBC # BLD: 0 /100 WBCS — SIGNIFICANT CHANGE UP (ref 0–0)
PH UR: 6 — SIGNIFICANT CHANGE UP (ref 5–8)
PLATELET # BLD AUTO: 273 K/UL — SIGNIFICANT CHANGE UP (ref 150–400)
POTASSIUM SERPL-MCNC: 3.9 MMOL/L — SIGNIFICANT CHANGE UP (ref 3.5–5.3)
POTASSIUM SERPL-SCNC: 3.9 MMOL/L — SIGNIFICANT CHANGE UP (ref 3.5–5.3)
PROT SERPL-MCNC: 7 G/DL — SIGNIFICANT CHANGE UP (ref 6–8.3)
PROT UR-MCNC: 30 MG/DL
RBC # BLD: 3.85 M/UL — SIGNIFICANT CHANGE UP (ref 3.8–5.2)
RBC # FLD: 13.3 % — SIGNIFICANT CHANGE UP (ref 10.3–14.5)
RBC CASTS # UR COMP ASSIST: ABNORMAL /HPF (ref 0–4)
SODIUM SERPL-SCNC: 141 MMOL/L — SIGNIFICANT CHANGE UP (ref 135–145)
SP GR SPEC: 1.01 — SIGNIFICANT CHANGE UP (ref 1.01–1.02)
UROBILINOGEN FLD QL: NEGATIVE — SIGNIFICANT CHANGE UP
WBC # BLD: 8.54 K/UL — SIGNIFICANT CHANGE UP (ref 3.8–10.5)
WBC # FLD AUTO: 8.54 K/UL — SIGNIFICANT CHANGE UP (ref 3.8–10.5)
WBC UR QL: NEGATIVE /HPF — SIGNIFICANT CHANGE UP (ref 0–5)

## 2019-08-09 PROCEDURE — 85027 COMPLETE CBC AUTOMATED: CPT

## 2019-08-09 PROCEDURE — 74176 CT ABD & PELVIS W/O CONTRAST: CPT

## 2019-08-09 PROCEDURE — 81001 URINALYSIS AUTO W/SCOPE: CPT

## 2019-08-09 PROCEDURE — 87086 URINE CULTURE/COLONY COUNT: CPT

## 2019-08-09 PROCEDURE — 99284 EMERGENCY DEPT VISIT MOD MDM: CPT

## 2019-08-09 PROCEDURE — 74176 CT ABD & PELVIS W/O CONTRAST: CPT | Mod: 26

## 2019-08-09 PROCEDURE — 36415 COLL VENOUS BLD VENIPUNCTURE: CPT

## 2019-08-09 PROCEDURE — 99284 EMERGENCY DEPT VISIT MOD MDM: CPT | Mod: 25

## 2019-08-09 PROCEDURE — 80053 COMPREHEN METABOLIC PANEL: CPT

## 2019-08-09 RX ORDER — ACETAMINOPHEN 500 MG
650 TABLET ORAL ONCE
Refills: 0 | Status: COMPLETED | OUTPATIENT
Start: 2019-08-09 | End: 2019-08-09

## 2019-08-09 NOTE — ED PROVIDER NOTE - OBJECTIVE STATEMENT
87 y/o F with PMH of freq UTIs 87 y/o F with PMH of freq UTIs, Afib, HTN, DM presents to the ED with c/o lower abdominal pressure x today. Patient wears a diaper. She was recently admitted and discharged for sepsis 2/2 UTI, and recently finished her abx course. She denies dysuria, freq urination, n/v/d, f/c, CP, SOB or all other complaints.

## 2019-08-09 NOTE — ED PROVIDER NOTE - CARE PROVIDERS DIRECT ADDRESSES
[email or web address]

## 2019-08-09 NOTE — ED PROVIDER NOTE - NSFOLLOWUPINSTRUCTIONS_ED_ALL_ED_FT
Follow up your test results with your the urologist within 2-3 days. We will follow your urine culture and call if the results are positive     Stay hydrated    Return to the ER if your symptoms worsen, high fevers, severe pain or for any other medical emergencies Follow up your test results with your the urologist within 2-3 days. We will follow your urine culture and call if the results are positive     Follow up with the urologist as discussed    Stay hydrated    Return to the ER if your symptoms worsen, high fevers, severe pain or for any other medical emergencies

## 2019-08-09 NOTE — ED PROVIDER NOTE - ATTENDING CONTRIBUTION TO CARE
leonides with LORRAINE Bean. 87 y/o F with PMH of freq UTIs, Afib, HTN, DM presents to the ED with c/o lower abdominal pressure x today. Patient wears a diaper. She was recently admitted and discharged for sepsis 2/2 UTI, and recently finished her abx course. She denies dysuria, freq urination, n/v/d, f/c, CP, SOB or all other complaints. PE as noted above. labs reviewed, UA results reviewed, urine culture sent. Abd CT results reviewed- bladder wall thickening. Will follow cultures given patient just finished as course of abx. will refer to urology for freq utis.   Patient son asking if she needs rehab. He had an opportunity to d/w Social work and case management while here, he says but he didn't think they were helpful. Pt has chronic UTI. She wears depends and pads and sits in soaked urine. Advised to change more often.  I performed a face to face bedside interview with patient regarding history of present illness, review of symptoms and past medical history. I completed an independent physical exam.  I have discussed the patient's plan of care with Physician Assistant (PA). I agree with note as stated above, having amended the EMR as needed to reflect my findings.   This includes History of Present Illness, HIV, Past Medical/Surgical/Family/Social History, Allergies and Home Medications, Review of Systems, Physical Exam, and any Progress Notes during the time I functioned as the attending physician for this patient.

## 2019-08-09 NOTE — ED PROVIDER NOTE - CLINICAL SUMMARY MEDICAL DECISION MAKING FREE TEXT BOX
UA results reviewed, urine culture sent. Abd CT results reviewed- bladder wall thickening. Will follow cultures given patient just finished as course of abx 89 y/o F with PMH of freq UTIs, Afib, HTN, DM presents to the ED with c/o lower abdominal pressure x today. Patient wears a diaper. She was recently admitted and discharged for sepsis 2/2 UTI, and recently finished her abx course. She denies dysuria, freq urination, n/v/d, f/c, CP, SOB or all other complaints. PE as noted above. labs reviewed, UA results reviewed, urine culture sent. Abd CT results reviewed- bladder wall thickening. Will follow cultures given patient just finished as course of abx. will refer to urology for freq utis

## 2019-08-09 NOTE — ED ADULT NURSE NOTE - CHPI ED NUR SYMPTOMS NEG
no nausea/no abdominal distension/no blood in stool/no chills/no diarrhea/no dysuria/no fever/no hematuria

## 2019-08-09 NOTE — ED PROVIDER NOTE - CARE PROVIDER_API CALL
Waqas Ascencio)  Urology  [address] [place] [address], [address]  [place], [place] [id number]  Phone: [phone]  Fax: [phone]  Follow Up Time:     Nadir Grimm)  Urology  [address] [place] [address], [address]  [place], [place] [id number]  Phone: [phone]  Fax: [phone]  Follow Up Time:

## 2019-08-09 NOTE — ED ADULT NURSE NOTE - OBJECTIVE STATEMENT
Pt presents to ED from the [facility] for abdominal pressure and incontinence. Pt was recently admitted for UTI and states she has not been taking her lasix, but claims that she did take it this morning. B/L LE pitting edema noted. Pt denies any fever, n/v/d. Son reports that the patient is wheelchair bound and takes care of her own personal hygiene, which she has not been diligent about.

## 2019-08-10 LAB
CULTURE RESULTS: SIGNIFICANT CHANGE UP
SPECIMEN SOURCE: SIGNIFICANT CHANGE UP

## 2019-08-14 ENCOUNTER — APPOINTMENT (OUTPATIENT)
Dept: UROLOGY | Facility: CLINIC | Age: 84
End: 2019-08-14

## 2019-08-28 ENCOUNTER — APPOINTMENT (OUTPATIENT)
Dept: UROLOGY | Facility: CLINIC | Age: 84
End: 2019-08-28
Payer: MEDICARE

## 2019-08-28 VITALS
TEMPERATURE: 97.5 F | HEIGHT: 70 IN | BODY MASS INDEX: 35.79 KG/M2 | WEIGHT: 250 LBS | DIASTOLIC BLOOD PRESSURE: 81 MMHG | RESPIRATION RATE: 16 BRPM | HEART RATE: 73 BPM | SYSTOLIC BLOOD PRESSURE: 138 MMHG

## 2019-08-28 DIAGNOSIS — Z87.440 PERSONAL HISTORY OF URINARY (TRACT) INFECTIONS: ICD-10-CM

## 2019-08-28 PROCEDURE — 99204 OFFICE O/P NEW MOD 45 MIN: CPT

## 2019-08-28 NOTE — REVIEW OF SYSTEMS
[Dry Eyes] : dryness of the eyes [Palpitations] : palpitations [Shortness Of Breath] : shortness of breath [Abdominal Pain] : abdominal pain [Constipation] : constipation [Heartburn] : heartburn [Painful Alamillo] : painful Alamillo [Seen by urologist before (Name)  ___] : Previously seen by a urologist: [unfilled] [Genital bacterial infection] : genital bacterial infection [Pain during urination] : pain during urination [Urine Infection (bladder/kidney)] : bladder/kidney infection [Urine retention] : urine retention [Bladder pressure] : experiences bladder pressure [Strain or push to urinate] : strain or push to urinate [Slow urine stream] : slow urine stream [Leakage of urine with urgency] : leakage of urine with urgency [Leakage of urine with straining, coughing, laughing] : leakage of urine with straining, coughing, laughing [Unaware of when urine is leaking] : unaware of when urine is leaking [Limb Swelling] : limb swelling [Feelings Of Weakness] : feelings of weakness [Itching] : itching [Negative] : Heme/Lymph [Fever] : fever [Chills] : chills [Feeling Tired] : feeling tired

## 2019-09-04 NOTE — ASSESSMENT
[FreeTextEntry1] : --COnt more increased diaper change\par --Timed voiding. NO strain\par --Bowel regimen\par --Increase fluid intake\par --Increase diaper changes\par --begin estrace\par --RTC in 3mo

## 2019-09-04 NOTE — PHYSICAL EXAM
[Exaggerated Use Of Accessory Muscles For Inspiration] : no accessory muscle use [Abdomen Soft] : soft [Abdomen Tenderness] : non-tender [Costovertebral Angle Tenderness] : no ~M costovertebral angle tenderness [Skin Color & Pigmentation] : normal skin color and pigmentation [No Focal Deficits] : no focal deficits [Oriented To Time, Place, And Person] : oriented to person, place, and time [Cervical Lymph Nodes Enlarged Anterior Bilaterally] : anterior cervical [Supraclavicular Lymph Nodes Enlarged Bilaterally] : supraclavicular [General Appearance - Well Developed] : well developed [General Appearance - Well Nourished] : well nourished [General Appearance - In No Acute Distress] : no acute distress [FreeTextEntry1] : in wheelchair, can transfer on own

## 2019-09-04 NOTE — HISTORY OF PRESENT ILLNESS
[FreeTextEntry1] : 88 year old F with cc of recurrent UTI. Pt reports that she has had issues over the last 2-3y with recurrent infection. She has been seeing Dr Wang for this. SHe has been managed with macrobid ppx for several years. Sx during an infection include dysuria, increased frequency and feelings of incomplete emptying and odor. Appears to be culture proven. Last on abx completed 2-3 week ago. Has been hospitalized for UTI in the past. Last on abx in June with keflex. Had EColi in urine (R-cipro, macrobid). Felt better but then went back with sx endo fo July. Was tx with bactrim and then developed fevers next day and admitted for sepsis (UCx showed Morganella (R-amp, cefazolin, cefoxitin, macrobid). BCx negative. UCx negative again early this month. \par \par Pt reports that she is only able to void into a diaper. SHe can not void on toilet. Had CT that showed no concerning findings.

## 2019-09-24 ENCOUNTER — EMERGENCY (EMERGENCY)
Facility: HOSPITAL | Age: 84
LOS: 1 days | Discharge: HOME | End: 2019-09-24
Attending: EMERGENCY MEDICINE | Admitting: EMERGENCY MEDICINE
Payer: MEDICARE

## 2019-09-24 VITALS
HEIGHT: 69.69 IN | HEART RATE: 91 BPM | RESPIRATION RATE: 18 BRPM | WEIGHT: 202.38 LBS | SYSTOLIC BLOOD PRESSURE: 129 MMHG | TEMPERATURE: 97 F | DIASTOLIC BLOOD PRESSURE: 79 MMHG | OXYGEN SATURATION: 95 %

## 2019-09-24 DIAGNOSIS — Z98.891 HISTORY OF UTERINE SCAR FROM PREVIOUS SURGERY: Chronic | ICD-10-CM

## 2019-09-24 DIAGNOSIS — Z96.651 PRESENCE OF RIGHT ARTIFICIAL KNEE JOINT: Chronic | ICD-10-CM

## 2019-09-24 DIAGNOSIS — Z98.89 OTHER SPECIFIED POSTPROCEDURAL STATES: Chronic | ICD-10-CM

## 2019-09-24 LAB
APPEARANCE UR: ABNORMAL
BILIRUB UR-MCNC: NEGATIVE — SIGNIFICANT CHANGE UP
COLOR SPEC: YELLOW — SIGNIFICANT CHANGE UP
DIFF PNL FLD: ABNORMAL
GLUCOSE UR QL: NEGATIVE — SIGNIFICANT CHANGE UP
KETONES UR-MCNC: NEGATIVE — SIGNIFICANT CHANGE UP
LEUKOCYTE ESTERASE UR-ACNC: ABNORMAL
NITRITE UR-MCNC: POSITIVE
PH UR: 6.5 — SIGNIFICANT CHANGE UP (ref 5–8)
PROT UR-MCNC: 100
SP GR SPEC: 1.01 — SIGNIFICANT CHANGE UP (ref 1.01–1.02)
UROBILINOGEN FLD QL: NEGATIVE — SIGNIFICANT CHANGE UP

## 2019-09-24 PROCEDURE — 99283 EMERGENCY DEPT VISIT LOW MDM: CPT

## 2019-09-24 PROCEDURE — 81001 URINALYSIS AUTO W/SCOPE: CPT

## 2019-09-24 PROCEDURE — 87186 SC STD MICRODIL/AGAR DIL: CPT

## 2019-09-24 PROCEDURE — 87086 URINE CULTURE/COLONY COUNT: CPT

## 2019-09-24 RX ORDER — AZTREONAM 2 G
160 VIAL (EA) INJECTION
Qty: 2240 | Refills: 0
Start: 2019-09-24 | End: 2019-09-30

## 2019-09-24 RX ORDER — MOXIFLOXACIN HYDROCHLORIDE TABLETS, 400 MG 400 MG/1
1 TABLET, FILM COATED ORAL
Qty: 14 | Refills: 0
Start: 2019-09-24 | End: 2019-09-30

## 2019-09-24 RX ORDER — CIPROFLOXACIN LACTATE 400MG/40ML
500 VIAL (ML) INTRAVENOUS ONCE
Refills: 0 | Status: COMPLETED | OUTPATIENT
Start: 2019-09-24 | End: 2019-09-24

## 2019-09-24 RX ADMIN — Medication 500 MILLIGRAM(S): at 13:43

## 2019-09-24 NOTE — ED PROVIDER NOTE - NSFOLLOWUPINSTRUCTIONS_ED_ALL_ED_FT
PLEASE DRINK PLENTY OF FLUIDS, TAKE THE ANTIBIOTICS AS PRESCRIBED - FINISH THE FULL COURSE EVEN IF YOU START FEELING BETTER. IF THE ANTIBIOTICS DON'T WORK AGAINST THE BACTERIA IN URINE, WE WILL GIVE YOU A CALL AND CHANGE THEM. FOLLOW UP WITH YOUR DOCTOR WITHIN NEXT 4 DAYS. RETURN TO ER FOR FEVER, ABDOMINAL/LOWER BACK PAIN, NAUSEA, VOMITING, FEELING SICK.  Urinary Tract Infection, Adult  Image   A urinary tract infection (UTI) is an infection of any part of the urinary tract, which includes the kidneys, ureters, bladder, and urethra. These organs make, store, and get rid of urine in the body. An upper UTI affects the ureters and kidneys (pyelonephritis), and a lower UTI affects the bladder (cystitis) and urethra (urethritis).    What are the causes?  Most urinary tract infections are caused by bacteria in your genital area, around the entrance to your urinary tract (urethra). These bacteria grow and cause irritation and inflammation of your urinary tract.    What increases the risk?  You are more likely to develop this condition if:  You have a urinary catheter that stays in place (indwelling).  You are not able to control when you urinate or have a bowel movement (you have incontinence).  You are female.  You have certain genes that increase your risk (genetics).  You are sexually active.  You are female and use a spermicide or diaphragm for birth control.  You take antibiotic medicines.  You have a situation that causes your flow of urine to slow down, such as:  An enlarged prostate.  Blockage in your urethra (stricture).  A kidney stone.  A neurogenic bladder.  Not getting enough to drink, or not urinating often.  You have certain medical conditions, such as:  Diabetes.  A weak disease-fighting system (immunesystem).  Estrogen deficiency.  Sickle cell disease.  Gout.  Spinal cord injury.  Pregnancy.  What are the signs or symptoms?  Symptoms of this condition include:  Needing to urinate right away (urgently).  Frequent urination or passing small amounts of urine frequently.  Pain or burning with urination.  Blood in the urine.  Urine that smells bad or unusual.  Trouble urinating.  Cloudy urine.  Vaginal discharge, if you are female.  Pain in the abdomen or the lower back.  You may also have:  Vomiting or a decreased appetite.  Confusion.  Irritability or tiredness.  A fever.  Diarrhea.  Older adults may not have any symptoms until the infection has worsened.    How is this diagnosed?  This condition is diagnosed with a medical history and physical exam. You will also need to provide a urine sample to test your urine. Other tests may be done, including:  Blood tests.  Sexually transmitted disease (STD) testing.  If you have had more than one UTI, a cystoscopy or imaging studies may be done to determine the cause of the infections.    How is this treated?  Treatment for this condition includes:  Antibiotic medicine.  Over-the-counter medicines to treat discomfort.  Drinking enough water to stay hydrated.  If you have frequent infections or have other conditions such as a kidney stone, you may need to see a health care provider who specializes in the urinary tract (urologist).    Some urinary tract infections that have worsened (sepsis) are treated in the hospital with IV antibiotics.    Follow these instructions at home:  Image   Take over-the-counter and prescription medicines only as told by your health care provider.  If you were prescribed an antibiotic, take it as told by your health care provider. Do not stop taking the antibiotic even if you start to feel better.  Drink enough fluid to keep your urine pale yellow. For most people, this is 6–10 glasses of water per day.  Keep all follow-up visits as told by your health care provider. This is important.  Make sure you:  Empty your bladder often and completely. Do not hold urine for long periods of time.  Empty your bladder after sex.  Wipe from front to back after a bowel movement if you are female. Use each tissue one time when you wipe.  Contact a health care provider if:  Your symptoms do not get better after 1–2 days.  Your symptoms go away and then return.  Get help right away if you have:  Severe pain in your back or your lower abdomen.  A fever.  Nausea or vomiting.  Summary  A urinary tract infection (UTI) is an infection of any part of the urinary tract, which includes the kidneys, ureters, bladder, and urethra.  Most urinary tract infections are caused by bacteria in your genital area, around the entrance to your urinary tract (urethra).  Treatment for this condition includes antibiotic medicines.  If you were prescribed an antibiotic, take it as told by your health care provider. Do not stop taking the antibiotic even if you start to feel better.

## 2019-09-24 NOTE — ED PROVIDER NOTE - OBJECTIVE STATEMENT
Dr. Torrez: 88F h/o freq UTIs, Afib, HTN, DM p/w urinary urgency x 2-3 days, dysuria. No nausea, vomiting, fevers, chills, constipation or diarrhea. No vag bleeding. Pt otherwise feels very well. Son at bedside.

## 2019-09-24 NOTE — ED PROVIDER NOTE - PROGRESS NOTE DETAILS
LORRAINE Aquino - pt with pos urine, cx pending, based on old sensitivities pt had cx sensitive to bactrim; LORRAINE Gunderson pt refused bactrim, will switch to cipro as the sensitivity is good as well;

## 2019-09-24 NOTE — ED PROVIDER NOTE - PATIENT PORTAL LINK FT
You can access the FollowMyHealth Patient Portal offered by [facility] by registering at the following website: [email or web address]. By joining [employer]’s FollowMyHealth portal, you will also be able to view your health information using other applications (apps) compatible with our system.

## 2019-09-24 NOTE — ED ADULT TRIAGE NOTE - CHIEF COMPLAINT QUOTE
Per EMS " Here for possible UTI, burning with urination and also feet are swollen, she stop taking her Lasix 2 weeks ago "

## 2019-09-24 NOTE — ED ADULT NURSE NOTE - OBJECTIVE STATEMENT
Pt presents to ED with c/o dysuria. Pt states vaginal pain with urination. Denies fever. Denies n/v/d. Patient incontinent of urine upon assessment and scant amount of stool on pad.

## 2019-09-24 NOTE — ED ADULT NURSE NOTE - NSIMPLEMENTINTERV_GEN_ALL_ED
Implemented All Universal Safety Interventions:  [place] to call system. Call bell, personal items and telephone within reach. Instruct patient to call for assistance. Room bathroom lighting operational. Non-slip footwear when patient is off stretcher. Physically safe environment: no spills, clutter or unnecessary equipment. Stretcher in lowest position, wheels locked, appropriate side rails in place.

## 2019-09-29 DIAGNOSIS — N39.0 URINARY TRACT INFECTION, SITE NOT SPECIFIED: ICD-10-CM

## 2020-01-12 ENCOUNTER — EMERGENCY (EMERGENCY)
Facility: HOSPITAL | Age: 85
LOS: 1 days | Discharge: HOME | End: 2020-01-12
Attending: EMERGENCY MEDICINE | Admitting: EMERGENCY MEDICINE
Payer: MEDICARE

## 2020-01-12 VITALS
DIASTOLIC BLOOD PRESSURE: 73 MMHG | HEART RATE: 68 BPM | RESPIRATION RATE: 16 BRPM | SYSTOLIC BLOOD PRESSURE: 137 MMHG | OXYGEN SATURATION: 98 %

## 2020-01-12 VITALS
HEIGHT: 69 IN | SYSTOLIC BLOOD PRESSURE: 136 MMHG | TEMPERATURE: 97 F | OXYGEN SATURATION: 95 % | WEIGHT: 210.1 LBS | RESPIRATION RATE: 17 BRPM | DIASTOLIC BLOOD PRESSURE: 83 MMHG | HEART RATE: 66 BPM

## 2020-01-12 DIAGNOSIS — Z98.89 OTHER SPECIFIED POSTPROCEDURAL STATES: Chronic | ICD-10-CM

## 2020-01-12 DIAGNOSIS — Z96.651 PRESENCE OF RIGHT ARTIFICIAL KNEE JOINT: Chronic | ICD-10-CM

## 2020-01-12 DIAGNOSIS — Z98.891 HISTORY OF UTERINE SCAR FROM PREVIOUS SURGERY: Chronic | ICD-10-CM

## 2020-01-12 LAB
ALBUMIN SERPL ELPH-MCNC: 3.4 G/DL — SIGNIFICANT CHANGE UP (ref 3.3–5)
ALP SERPL-CCNC: 59 U/L — SIGNIFICANT CHANGE UP (ref 40–120)
ALT FLD-CCNC: 19 U/L — SIGNIFICANT CHANGE UP (ref 10–45)
ANION GAP SERPL CALC-SCNC: 8 MMOL/L — SIGNIFICANT CHANGE UP (ref 5–17)
AST SERPL-CCNC: 19 U/L — SIGNIFICANT CHANGE UP (ref 10–40)
BASOPHILS # BLD AUTO: 0.06 K/UL — SIGNIFICANT CHANGE UP (ref 0–0.2)
BASOPHILS NFR BLD AUTO: 1 % — SIGNIFICANT CHANGE UP (ref 0–2)
BILIRUB SERPL-MCNC: 0.5 MG/DL — SIGNIFICANT CHANGE UP (ref 0.2–1.2)
BLD GP AB SCN SERPL QL: SIGNIFICANT CHANGE UP
BUN SERPL-MCNC: 17 MG/DL — SIGNIFICANT CHANGE UP (ref 7–23)
CALCIUM SERPL-MCNC: 10 MG/DL — SIGNIFICANT CHANGE UP (ref 8.4–10.5)
CHLORIDE SERPL-SCNC: 103 MMOL/L — SIGNIFICANT CHANGE UP (ref 96–108)
CO2 SERPL-SCNC: 28 MMOL/L — SIGNIFICANT CHANGE UP (ref 22–31)
CREAT SERPL-MCNC: 0.73 MG/DL — SIGNIFICANT CHANGE UP (ref 0.5–1.3)
EOSINOPHIL # BLD AUTO: 0.26 K/UL — SIGNIFICANT CHANGE UP (ref 0–0.5)
EOSINOPHIL NFR BLD AUTO: 4.5 % — SIGNIFICANT CHANGE UP (ref 0–6)
GLUCOSE SERPL-MCNC: 109 MG/DL — HIGH (ref 70–99)
HCT VFR BLD CALC: 40.7 % — SIGNIFICANT CHANGE UP (ref 34.5–45)
HGB BLD-MCNC: 13.6 G/DL — SIGNIFICANT CHANGE UP (ref 11.5–15.5)
IMM GRANULOCYTES NFR BLD AUTO: 0.2 % — SIGNIFICANT CHANGE UP (ref 0–1.5)
LIDOCAIN IGE QN: 76 U/L — SIGNIFICANT CHANGE UP (ref 73–393)
LYMPHOCYTES # BLD AUTO: 1.53 K/UL — SIGNIFICANT CHANGE UP (ref 1–3.3)
LYMPHOCYTES # BLD AUTO: 26.4 % — SIGNIFICANT CHANGE UP (ref 13–44)
MCHC RBC-ENTMCNC: 32.5 PG — SIGNIFICANT CHANGE UP (ref 27–34)
MCHC RBC-ENTMCNC: 33.4 GM/DL — SIGNIFICANT CHANGE UP (ref 32–36)
MCV RBC AUTO: 97.1 FL — SIGNIFICANT CHANGE UP (ref 80–100)
MONOCYTES # BLD AUTO: 0.56 K/UL — SIGNIFICANT CHANGE UP (ref 0–0.9)
MONOCYTES NFR BLD AUTO: 9.7 % — SIGNIFICANT CHANGE UP (ref 2–14)
NEUTROPHILS # BLD AUTO: 3.37 K/UL — SIGNIFICANT CHANGE UP (ref 1.8–7.4)
NEUTROPHILS NFR BLD AUTO: 58.2 % — SIGNIFICANT CHANGE UP (ref 43–77)
NRBC # BLD: 0 /100 WBCS — SIGNIFICANT CHANGE UP (ref 0–0)
OB PNL STL: POSITIVE
PLATELET # BLD AUTO: 181 K/UL — SIGNIFICANT CHANGE UP (ref 150–400)
POTASSIUM SERPL-MCNC: 4.3 MMOL/L — SIGNIFICANT CHANGE UP (ref 3.5–5.3)
POTASSIUM SERPL-SCNC: 4.3 MMOL/L — SIGNIFICANT CHANGE UP (ref 3.5–5.3)
PROT SERPL-MCNC: 7 G/DL — SIGNIFICANT CHANGE UP (ref 6–8.3)
RBC # BLD: 4.19 M/UL — SIGNIFICANT CHANGE UP (ref 3.8–5.2)
RBC # FLD: 14 % — SIGNIFICANT CHANGE UP (ref 10.3–14.5)
SODIUM SERPL-SCNC: 139 MMOL/L — SIGNIFICANT CHANGE UP (ref 135–145)
WBC # BLD: 5.79 K/UL — SIGNIFICANT CHANGE UP (ref 3.8–10.5)
WBC # FLD AUTO: 5.79 K/UL — SIGNIFICANT CHANGE UP (ref 3.8–10.5)

## 2020-01-12 PROCEDURE — 86901 BLOOD TYPING SEROLOGIC RH(D): CPT

## 2020-01-12 PROCEDURE — 36415 COLL VENOUS BLD VENIPUNCTURE: CPT

## 2020-01-12 PROCEDURE — 86850 RBC ANTIBODY SCREEN: CPT

## 2020-01-12 PROCEDURE — 86900 BLOOD TYPING SEROLOGIC ABO: CPT

## 2020-01-12 PROCEDURE — 80053 COMPREHEN METABOLIC PANEL: CPT

## 2020-01-12 PROCEDURE — 99284 EMERGENCY DEPT VISIT MOD MDM: CPT

## 2020-01-12 PROCEDURE — 99283 EMERGENCY DEPT VISIT LOW MDM: CPT

## 2020-01-12 PROCEDURE — 82272 OCCULT BLD FECES 1-3 TESTS: CPT

## 2020-01-12 PROCEDURE — 83690 ASSAY OF LIPASE: CPT

## 2020-01-12 PROCEDURE — 85027 COMPLETE CBC AUTOMATED: CPT

## 2020-01-12 RX ORDER — IOHEXOL 300 MG/ML
30 INJECTION, SOLUTION INTRAVENOUS ONCE
Refills: 0 | Status: DISCONTINUED | OUTPATIENT
Start: 2020-01-12 | End: 2020-02-02

## 2020-01-12 RX ORDER — METFORMIN HYDROCHLORIDE 850 MG/1
1 TABLET ORAL
Qty: 0 | Refills: 0 | DISCHARGE

## 2020-01-12 NOTE — ED PROVIDER NOTE - CLINICAL SUMMARY MEDICAL DECISION MAKING FREE TEXT BOX
89 year old female with h/o GI bleed, Afib, HTN presents to the ED for rectal bleeding. She says she stuck her finger into her rectum to help stool pass and then she noted blood. Patient is without abd pain. She does not know if she is on blood thinners. The charting does not demonstrate it. No dizziness or lightheadedness. She has h/o anemia due to GI bleed needing a transfusion in the past.     Maroon colored stool on exam. No visibility of hemorrhoidal bleeding. Advised CT to r/o diverticular bleed. She refuses. Son and daughter at bedside who agree that if there is worsening or concern they will return with her. She has a proctologist and they can have her f/u. Labs WNL. No sign of anemia.

## 2020-01-12 NOTE — ED PROVIDER NOTE - PATIENT PORTAL LINK FT
You can access the FollowMyHealth Patient Portal offered by [facility] by registering at the following website: [email or web address]. By joining [employer]’s FollowMyHealth portal, you will also be able to view your health information using other applications (apps) compatible with our system.

## 2020-01-12 NOTE — ED PROVIDER NOTE - NSFOLLOWUPINSTRUCTIONS_ED_ALL_ED_FT
RECTAL BLEEDING - AfterCare(R) Instructions(ER/ED)     Rectal Bleeding    WHAT YOU NEED TO KNOW:    Rectal bleeding can be caused by constipation, hemorrhoids, or anal fissures. It may also be caused by polyps, tumors, or medical conditions, such as colitis or diverticulitis.    DISCHARGE INSTRUCTIONS:    We intended to perform a CT scan of the abdomen to rule out colitis or diverticulitis, however you refused.   You have been discharged with instruction to follow up with your doctor and to return if there is worsening or concerns or new symptoms.     Follow up with your healthcare provider as directed: Write down your questions so you remember to ask them during your visits.     Drink liquids as directed: Ask your healthcare provider how much liquid to drink each day and which liquids are best for you. This will help prevent dehydration and constipation.    Contact your healthcare provider if:     You have a fever.      Your rectal bleeding stopped for a time, but has started again.       You have nausea.      You have cold, sweaty, pale skin.      You have changes in your bowel movements, such as diarrhea.      You have questions or concerns about your condition or care.    Return to the emergency department if:     You are breathing faster than usual.      You are dizzy, lightheaded, or feel faint.      You are confused or cannot think clearly.      You urinate less than usual or not at all.      Your rectal bleeding is constant or heavy.      You have severe abdominal pain or cramping.         Do not put your finger into the rectum.     Thank you.

## 2020-01-12 NOTE — ED PROVIDER NOTE - OBJECTIVE STATEMENT
89 year old female with h/o GI bleed, Afib, HTN presents to the ED for rectal bleeding. She says she stuck her finger into her rectum to help stool pass and then she noted blood. Patient is without abd pain. She does not know if she is on blood thinners. The charting does not demonstrate it. No dizziness or lightheadedness. She has h/o anemia due to GI bleed needing a transfusion in the past.

## 2020-01-12 NOTE — ED ADULT NURSE NOTE - OBJECTIVE STATEMENT
Pt arrives to ER c/o bright red blood from her rectum. Pt states she had one episode last night and one this morning. Pt reports she has an external hemorrhoid and she put her finger in her anus last night to help her move her stools and saw bright red blood in the toilet bowl. Pt denies abd pain, denies n/v/d, denies fever/chills, neg chest pain or sob.

## 2020-01-12 NOTE — ED ADULT NURSE REASSESSMENT NOTE - NS ED NURSE REASSESS COMMENT FT1
Pt is refusing cat scan and is requesting discharge, Dr Roth aware and will discuss with patient. Family at bedside. Will continue to monitor.

## 2020-01-19 DIAGNOSIS — K62.5 HEMORRHAGE OF ANUS AND RECTUM: ICD-10-CM

## 2020-02-10 NOTE — PATIENT PROFILE ADULT - PATIENT REPRESENTATIVE: ( YOU CAN CHOOSE ANY PERSON THAT CAN ASSIST YOU WITH YOUR HEALTH CARE PREFERENCES, DOES NOT HAVE TO BE A SPOUSE, IMMEDIATE FAMILY OR SIGNIFICANT OTHER/PARTNER)
Post-Care Instructions: I reviewed with the patient in detail post-care instructions. Patient is to wash the biopsy site with gentle soap and water, cover with vaseline and bandaid (changing daily) until well healed. yes

## 2020-02-13 NOTE — DISCHARGE NOTE ADULT - NS AS DC HF EDUCATION INSTRUCTIONS
Low salt diet/Activities as tolerated/Monitor Weight Daily/Report weight gain of 2 or more pounds in one day or 3 or more pounds in one week, worsening shortness of breath, fatigue, weakness, increased swelling of hands and feet to primary care provider/Call Primary Care Provider for follow-up after discharge
no

## 2020-03-05 NOTE — ED ADULT NURSE NOTE - NSHOSCREENINGQ1_ED_ALL_ED
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  He denies any worsening cardiopulmonary complaints.  He's not expressing chest pain, shortness of breath or palpitations.  His main limitation stools his back and he's been taking muscle relaxers to help him sleep at night.  He denies any PND, orthopnea or lower edema.  He's not expressing dizziness, presyncope or syncope.  Says he goes to the gym 4 times a week with his wife.  Otherwise been in his usual state of health.     Here for follow-up of coronary artery disease.  He is here for follow-up as well to obtain preop clearance for back procedure including nerve block and possible sympathectomy by pain management.  He denies any cardiopulmonary complaints.  He has experienced no chest pain, shortness of breath or palpitations.  He denies any PND, orthopnea or lower extremity edema. He has not experienced any dizziness, presyncope or syncope.      PET stress: 7-17  CONCLUSIONS: NORMAL MYOCARDIAL PERFUSION PET STRESS TEST  1. The perfusion scan is free of evidence for myocardial ischemia or injury.   2. Resting wall motion is physiologic. Stress wall motion is physiologic.   3. LV function is normal at rest and stress.  (normal is >= 51%)  4. The ventricular volumes are normal at rest and stress.   5. The extracardiac distribution of radioactivity is normal.   6. There was no previous study available to compare.     Echocardiogram:  CONCLUSIONS     1 - Normal left ventricular systolic function (EF 60-65%).     2 - No wall motion abnormalities.     3 - Concentric remodeling.     4 - Impaired LV relaxation, elevated LAP (grade 2 diastolic dysfunction).     5 - Trivial mitral regurgitation.      Holter within normal limits    3/5/20 Recently having some sharp left sided CP -  different from angina prior to CABG  EKG NSR 1st degree AVB - otherwise ok      Review of Systems   Constitution: Negative for decreased appetite.   HENT: Negative for ear discharge.    Eyes: Negative for blurred vision.   Endocrine: Negative for polyphagia.   Skin: Negative for nail changes.   Genitourinary: Negative for bladder incontinence.   Neurological: Negative for aphonia.   Psychiatric/Behavioral: Negative for hallucinations.   Allergic/Immunologic: Negative for hives.        Objective:    Physical Exam   Constitutional: He is oriented to person, place, and time. He appears well-developed and well-nourished. No distress.   HENT:   Head: Normocephalic and atraumatic.   Eyes: Pupils are equal, round, and reactive to light. Conjunctivae and EOM are normal.   Neck: Normal range of motion. Neck supple. No thyromegaly present.   Cardiovascular: Normal rate, regular rhythm and normal heart sounds.   No murmur heard.  Pulmonary/Chest: Effort normal and breath sounds normal. No respiratory distress. He has no wheezes. He has no rales. He exhibits no tenderness.   Abdominal: Soft. Bowel sounds are normal.   Musculoskeletal: He exhibits no edema.   Neurological: He is alert and oriented to person, place, and time.   Skin: Skin is warm and dry.   Psychiatric: He has a normal mood and affect. His behavior is normal.         Assessment:       1. Essential hypertension    2. Chest pain, atypical    3. Hx of CABG    4. Mixed hyperlipidemia    5. Diabetes mellitus with neurological manifestations, uncontrolled    6. Coronary artery disease involving native coronary artery of native heart without angina pectoris         Plan:       Echo and lexiscan myoview for CP and known CAD             No

## 2020-03-30 ENCOUNTER — EMERGENCY (EMERGENCY)
Facility: HOSPITAL | Age: 85
LOS: 1 days | Discharge: HOME | End: 2020-03-30
Attending: EMERGENCY MEDICINE
Payer: MEDICARE

## 2020-03-30 VITALS
TEMPERATURE: 98 F | SYSTOLIC BLOOD PRESSURE: 130 MMHG | OXYGEN SATURATION: 96 % | DIASTOLIC BLOOD PRESSURE: 88 MMHG | HEART RATE: 82 BPM | RESPIRATION RATE: 18 BRPM | HEIGHT: 70 IN | WEIGHT: 220.02 LBS

## 2020-03-30 VITALS
TEMPERATURE: 98 F | DIASTOLIC BLOOD PRESSURE: 74 MMHG | HEART RATE: 78 BPM | SYSTOLIC BLOOD PRESSURE: 122 MMHG | OXYGEN SATURATION: 100 % | RESPIRATION RATE: 20 BRPM

## 2020-03-30 DIAGNOSIS — Z98.89 OTHER SPECIFIED POSTPROCEDURAL STATES: Chronic | ICD-10-CM

## 2020-03-30 DIAGNOSIS — Z96.651 PRESENCE OF RIGHT ARTIFICIAL KNEE JOINT: Chronic | ICD-10-CM

## 2020-03-30 DIAGNOSIS — Z98.891 HISTORY OF UTERINE SCAR FROM PREVIOUS SURGERY: Chronic | ICD-10-CM

## 2020-03-30 LAB
ALBUMIN SERPL ELPH-MCNC: 3.9 G/DL — SIGNIFICANT CHANGE UP (ref 3.3–5)
ALP SERPL-CCNC: 64 U/L — SIGNIFICANT CHANGE UP (ref 40–120)
ALT FLD-CCNC: 11 U/L — SIGNIFICANT CHANGE UP (ref 10–45)
ANION GAP SERPL CALC-SCNC: 13 MMOL/L — SIGNIFICANT CHANGE UP (ref 5–17)
AST SERPL-CCNC: 14 U/L — SIGNIFICANT CHANGE UP (ref 10–40)
BASOPHILS # BLD AUTO: 0.04 K/UL — SIGNIFICANT CHANGE UP (ref 0–0.2)
BASOPHILS NFR BLD AUTO: 0.6 % — SIGNIFICANT CHANGE UP (ref 0–2)
BILIRUB SERPL-MCNC: 0.5 MG/DL — SIGNIFICANT CHANGE UP (ref 0.2–1.2)
BLD GP AB SCN SERPL QL: NEGATIVE — SIGNIFICANT CHANGE UP
BUN SERPL-MCNC: 16 MG/DL — SIGNIFICANT CHANGE UP (ref 7–23)
CALCIUM SERPL-MCNC: 10.3 MG/DL — SIGNIFICANT CHANGE UP (ref 8.4–10.5)
CHLORIDE SERPL-SCNC: 103 MMOL/L — SIGNIFICANT CHANGE UP (ref 96–108)
CO2 SERPL-SCNC: 25 MMOL/L — SIGNIFICANT CHANGE UP (ref 22–31)
CREAT SERPL-MCNC: 0.65 MG/DL — SIGNIFICANT CHANGE UP (ref 0.5–1.3)
EOSINOPHIL # BLD AUTO: 0.2 K/UL — SIGNIFICANT CHANGE UP (ref 0–0.5)
EOSINOPHIL NFR BLD AUTO: 2.8 % — SIGNIFICANT CHANGE UP (ref 0–6)
GLUCOSE SERPL-MCNC: 130 MG/DL — HIGH (ref 70–99)
HCT VFR BLD CALC: 44.4 % — SIGNIFICANT CHANGE UP (ref 34.5–45)
HGB BLD-MCNC: 14 G/DL — SIGNIFICANT CHANGE UP (ref 11.5–15.5)
IMM GRANULOCYTES NFR BLD AUTO: 0.4 % — SIGNIFICANT CHANGE UP (ref 0–1.5)
LYMPHOCYTES # BLD AUTO: 1.4 K/UL — SIGNIFICANT CHANGE UP (ref 1–3.3)
LYMPHOCYTES # BLD AUTO: 19.9 % — SIGNIFICANT CHANGE UP (ref 13–44)
MCHC RBC-ENTMCNC: 31.2 PG — SIGNIFICANT CHANGE UP (ref 27–34)
MCHC RBC-ENTMCNC: 31.5 GM/DL — LOW (ref 32–36)
MCV RBC AUTO: 98.9 FL — SIGNIFICANT CHANGE UP (ref 80–100)
MONOCYTES # BLD AUTO: 0.63 K/UL — SIGNIFICANT CHANGE UP (ref 0–0.9)
MONOCYTES NFR BLD AUTO: 8.9 % — SIGNIFICANT CHANGE UP (ref 2–14)
NEUTROPHILS # BLD AUTO: 4.74 K/UL — SIGNIFICANT CHANGE UP (ref 1.8–7.4)
NEUTROPHILS NFR BLD AUTO: 67.4 % — SIGNIFICANT CHANGE UP (ref 43–77)
NRBC # BLD: 0 /100 WBCS — SIGNIFICANT CHANGE UP (ref 0–0)
PLATELET # BLD AUTO: 218 K/UL — SIGNIFICANT CHANGE UP (ref 150–400)
POTASSIUM SERPL-MCNC: 4.3 MMOL/L — SIGNIFICANT CHANGE UP (ref 3.5–5.3)
POTASSIUM SERPL-SCNC: 4.3 MMOL/L — SIGNIFICANT CHANGE UP (ref 3.5–5.3)
PROT SERPL-MCNC: 7.7 G/DL — SIGNIFICANT CHANGE UP (ref 6–8.3)
RBC # BLD: 4.49 M/UL — SIGNIFICANT CHANGE UP (ref 3.8–5.2)
RBC # FLD: 13.3 % — SIGNIFICANT CHANGE UP (ref 10.3–14.5)
RH IG SCN BLD-IMP: POSITIVE — SIGNIFICANT CHANGE UP
SODIUM SERPL-SCNC: 141 MMOL/L — SIGNIFICANT CHANGE UP (ref 135–145)
WBC # BLD: 7.04 K/UL — SIGNIFICANT CHANGE UP (ref 3.8–10.5)
WBC # FLD AUTO: 7.04 K/UL — SIGNIFICANT CHANGE UP (ref 3.8–10.5)

## 2020-03-30 PROCEDURE — 86850 RBC ANTIBODY SCREEN: CPT

## 2020-03-30 PROCEDURE — 86901 BLOOD TYPING SEROLOGIC RH(D): CPT

## 2020-03-30 PROCEDURE — 85027 COMPLETE CBC AUTOMATED: CPT

## 2020-03-30 PROCEDURE — 86900 BLOOD TYPING SEROLOGIC ABO: CPT

## 2020-03-30 PROCEDURE — 99284 EMERGENCY DEPT VISIT MOD MDM: CPT

## 2020-03-30 PROCEDURE — 80053 COMPREHEN METABOLIC PANEL: CPT

## 2020-03-30 RX ORDER — NITROFURANTOIN MACROCRYSTAL 50 MG
1 CAPSULE ORAL
Qty: 10 | Refills: 0
Start: 2020-03-30 | End: 2020-04-03

## 2020-03-30 NOTE — ED PROVIDER NOTE - CLINICAL SUMMARY MEDICAL DECISION MAKING FREE TEXT BOX
89 y/o F with PMH Afib, GI bleed, HTN, DM, no anticoagulation with 1 day vaginal bleeding. Minimal blood per vault. No active bleeding, dizziness, abdominal pain. CBC, CMP, type and screen. Likely DC.

## 2020-03-30 NOTE — ED PROVIDER NOTE - PHYSICAL EXAMINATION
GEN: Well Appearing, Nontoxic, NAD  HEENT: NC/AT, Symm Facies.   CV: +S1S2, RRR w/o m/g/r  RESP: CTAB w/o w/r/r  ABD: Soft, nt/nd  EXT/MSK: +b/l lower extremity edema (baseline) No calf tenderness.   Neuro: Grossly intact, AOX3 with normal speech  External Genitalia: No obvious lesions   Speculum Exam: No obvious lesion, minimal blood although no active bleeding   Bimanual exam: Nontender, no adnexal bleeding, no chandelier sign    Chaperon: Azeb

## 2020-03-30 NOTE — ED PROVIDER NOTE - PATIENT PORTAL LINK FT
You can access the FollowMyHealth Patient Portal offered by [facility] by registering at the following website: [email or web address]. By joining [employer]’s FollowMyHealth portal, you will also be able to view your health information using other applications (apps) compatible with our system.

## 2020-03-30 NOTE — ED PROVIDER NOTE - NS ED ROS FT
Constitutional: No fever or chills  CV: No chest pain +b/l lower extremity edema  Resp: No SOB no cough  GI: No abd pain. No nausea or vomiting.   : No complaints   MSK: No musculoskeletal pain  Skin: No rash  Psych: No complaints   Neuro: No headache. No dizziness. No weakness.  +vaginal bleeding

## 2020-03-30 NOTE — ED PROVIDER NOTE - PROGRESS NOTE DETAILS
Will give patient Macrobid, as patient has had history of vaginal itchiness with a UTI in the past causing bleeding. +Urinary urgency Denies hematuria, dysuria. Patient's son to bring her back to living facility. - Loretta Taylor PA-C

## 2020-03-30 NOTE — ED PROVIDER NOTE - NSFOLLOWUPINSTRUCTIONS_ED_ALL_ED_FT
1. Rest. Stay hydrated.   2. Continue your current home medications.  3. Follow-up with your medical doctor/OBGYN in 2-3 days.  Bring your results with you for follow-up.  4. Return to the ER if you have any new or worsening symptoms, dizziness, increased bleeding, abdominal pain, chest pain, difficulty breathing, fevers, chills, weakness, or any other concerns. 1. Rest. Stay hydrated.   2. Continue your current home medications. Please take Macrobid twice a day for 5 days.   3. Follow-up with your medical doctor/OBGYN in 2-3 days.  Bring your results with you for follow-up.  4. Return to the ER if you have any new or worsening symptoms, dizziness, increased bleeding, abdominal pain, chest pain, difficulty breathing, fevers, chills, weakness, or any other concerns.

## 2020-03-30 NOTE — ED PROVIDER NOTE - ATTENDING CONTRIBUTION TO CARE
I, Anjel Burrell, performed a history and physical exam of the patient and discussed their management with the resident and /or advanced care provider. I reviewed the resident and /or ACP's note and agree with the documented findings and plan of care. I was present and available for all procedures.  Patient stable.  Will evaluate CBC and provide appropriate f/u for dysfunctional uterine bleeding as indicated.

## 2020-03-30 NOTE — ED ADULT NURSE NOTE - NSIMPLEMENTINTERV_GEN_ALL_ED
Implemented All Universal Safety Interventions:  [place] to call system. Call bell, personal items and telephone within reach. Instruct patient to call for assistance. Room bathroom lighting operational. Non-slip footwear when patient is off stretcher. Physically safe environment: no spills, clutter or unnecessary equipment. Stretcher in lowest position, wheels locked, appropriate side rails in place.

## 2020-03-30 NOTE — ED ADULT NURSE NOTE - OBJECTIVE STATEMENT
89 year old female presented to ED via EMS from home with c/o of intermittent vaginal bleeding since 7am this morning. pt denies being on blood thinners. hx Afib on metoprolol, HTN, DM type II, GI bleed, uterine polyp. pt reports vaginal blood being bright red, soaked through her pad. pt denies CP, SOB, nausea/vomiting, numbness/tingling, fever, cough, chills, dizziness, headache, blurred vision, neuro intact. pt a&ox3, lung sounds clear, heart rate regular, abdomen soft nontender nondistended to palp. skin intact. IV in right AC 20G and patent. 89 year old female presented to ED via EMS from home with c/o of intermittent vaginal bleeding since 7am this morning. pt denies being on blood thinners. hx Afib on metoprolol, HTN, DM type II, GI bleed, uterine polyp. pt reports vaginal blood being bright red, soaked through her pad. pt denies CP, SOB, nausea/vomiting, numbness/tingling, fever, cough, chills, dizziness, headache, blurred vision, neuro intact. pt a&ox3, lung sounds clear, heart rate regular, abdomen soft nontender nondistended to palp. skin intact. IV in right AC 20G and patent. Will continue to monitor and assess while offering support and reassurance.

## 2020-03-30 NOTE — ED ADULT NURSE NOTE - CHPI ED NUR SYMPTOMS NEG
no dizziness/no decreased eating/drinking/no fever/no nausea/no pain/no vomiting/no tingling/no chills/no weakness

## 2020-03-30 NOTE — ED PROVIDER NOTE - OBJECTIVE STATEMENT
89 y/o F with PMH DM, Afib, GI bleeds, uterine polyp, Hypothyroidism, HTN, denies blood thinner use presents to ED with vaginal bleeding since 7am this morning. Patient states that this morning she saw bright red blood per vagina that soaked her wheelchair. Patient states that she has not had this happen before.  Denies dizziness, fever, chest pain, SOB, abdominal pain, vomiting.  At baseline patient ambulates with a wheelchair.  Lives at [facility] in [place].

## 2020-04-02 RX ORDER — CEPHALEXIN 500 MG/1
500 CAPSULE ORAL TWICE DAILY
Qty: 6 | Refills: 0 | Status: ACTIVE | COMMUNITY
Start: 2020-04-02 | End: 1900-01-01

## 2020-04-14 ENCOUNTER — RX CHANGE (OUTPATIENT)
Age: 85
End: 2020-04-14

## 2020-10-22 NOTE — ED ADULT NURSE NOTE - AREA
COVID-19 Virtual Care Program     We're partnering with the [facility] to help people recover from COVID-19 safely at home. The program is available to all residents over 12 months of age with known exposure to COVID-19 or experiencing COVID-like symptoms. The program is free and there are no insurance requirements.    The program offers:  · Daily virtual check-ins via our [employer] or [employer] website for a period of 14 days  · Education information  · Symptom self-management and support  · A free home monitoring kit for those who qualify    If you're an [place] resident, call [phone] to enroll.   mid

## 2020-11-10 RX ORDER — ESTRADIOL 0.1 MG/G
0.1 CREAM VAGINAL
Qty: 1 | Refills: 5 | Status: ACTIVE | COMMUNITY
Start: 2019-08-28 | End: 1900-01-01

## 2021-01-16 ENCOUNTER — INPATIENT (INPATIENT)
Facility: HOSPITAL | Age: 86
LOS: 21 days | Discharge: NURSING, REHAB OR LONG-TERM CARE | DRG: 177 | End: 2021-02-07
Attending: STUDENT IN AN ORGANIZED HEALTH CARE EDUCATION/TRAINING PROGRAM | Admitting: STUDENT IN AN ORGANIZED HEALTH CARE EDUCATION/TRAINING PROGRAM
Payer: MEDICARE

## 2021-01-16 VITALS
TEMPERATURE: 99 F | OXYGEN SATURATION: 95 % | RESPIRATION RATE: 17 BRPM | HEIGHT: 70 IN | WEIGHT: 225.09 LBS | SYSTOLIC BLOOD PRESSURE: 156 MMHG | HEART RATE: 103 BPM | DIASTOLIC BLOOD PRESSURE: 89 MMHG

## 2021-01-16 DIAGNOSIS — Z98.89 OTHER SPECIFIED POSTPROCEDURAL STATES: Chronic | ICD-10-CM

## 2021-01-16 DIAGNOSIS — U07.1 COVID-19: ICD-10-CM

## 2021-01-16 DIAGNOSIS — Z96.651 PRESENCE OF RIGHT ARTIFICIAL KNEE JOINT: Chronic | ICD-10-CM

## 2021-01-16 DIAGNOSIS — Z98.891 HISTORY OF UTERINE SCAR FROM PREVIOUS SURGERY: Chronic | ICD-10-CM

## 2021-01-16 LAB
ALBUMIN SERPL ELPH-MCNC: 3.7 G/DL — SIGNIFICANT CHANGE UP (ref 3.3–5)
ALP SERPL-CCNC: 62 U/L — SIGNIFICANT CHANGE UP (ref 40–120)
ALT FLD-CCNC: 26 U/L — SIGNIFICANT CHANGE UP (ref 10–45)
ANION GAP SERPL CALC-SCNC: 9 MMOL/L — SIGNIFICANT CHANGE UP (ref 5–17)
AST SERPL-CCNC: 32 U/L — SIGNIFICANT CHANGE UP (ref 10–40)
BASOPHILS # BLD AUTO: 0.02 K/UL — SIGNIFICANT CHANGE UP (ref 0–0.2)
BASOPHILS NFR BLD AUTO: 0.4 % — SIGNIFICANT CHANGE UP (ref 0–2)
BILIRUB SERPL-MCNC: 0.6 MG/DL — SIGNIFICANT CHANGE UP (ref 0.2–1.2)
BUN SERPL-MCNC: 14 MG/DL — SIGNIFICANT CHANGE UP (ref 7–23)
CALCIUM SERPL-MCNC: 10.1 MG/DL — SIGNIFICANT CHANGE UP (ref 8.4–10.5)
CHLORIDE SERPL-SCNC: 100 MMOL/L — SIGNIFICANT CHANGE UP (ref 96–108)
CO2 SERPL-SCNC: 26 MMOL/L — SIGNIFICANT CHANGE UP (ref 22–31)
CREAT SERPL-MCNC: 0.76 MG/DL — SIGNIFICANT CHANGE UP (ref 0.5–1.3)
EOSINOPHIL # BLD AUTO: 0.04 K/UL — SIGNIFICANT CHANGE UP (ref 0–0.5)
EOSINOPHIL NFR BLD AUTO: 0.8 % — SIGNIFICANT CHANGE UP (ref 0–6)
GLUCOSE SERPL-MCNC: 108 MG/DL — HIGH (ref 70–99)
HCT VFR BLD CALC: 44.6 % — SIGNIFICANT CHANGE UP (ref 34.5–45)
HGB BLD-MCNC: 14.7 G/DL — SIGNIFICANT CHANGE UP (ref 11.5–15.5)
IMM GRANULOCYTES NFR BLD AUTO: 0.4 % — SIGNIFICANT CHANGE UP (ref 0–1.5)
LYMPHOCYTES # BLD AUTO: 0.99 K/UL — LOW (ref 1–3.3)
LYMPHOCYTES # BLD AUTO: 19.7 % — SIGNIFICANT CHANGE UP (ref 13–44)
MCHC RBC-ENTMCNC: 31.7 PG — SIGNIFICANT CHANGE UP (ref 27–34)
MCHC RBC-ENTMCNC: 33 GM/DL — SIGNIFICANT CHANGE UP (ref 32–36)
MCV RBC AUTO: 96.3 FL — SIGNIFICANT CHANGE UP (ref 80–100)
MONOCYTES # BLD AUTO: 0.61 K/UL — SIGNIFICANT CHANGE UP (ref 0–0.9)
MONOCYTES NFR BLD AUTO: 12.1 % — SIGNIFICANT CHANGE UP (ref 2–14)
NEUTROPHILS # BLD AUTO: 3.35 K/UL — SIGNIFICANT CHANGE UP (ref 1.8–7.4)
NEUTROPHILS NFR BLD AUTO: 66.6 % — SIGNIFICANT CHANGE UP (ref 43–77)
NRBC # BLD: 0 /100 WBCS — SIGNIFICANT CHANGE UP (ref 0–0)
PLATELET # BLD AUTO: 173 K/UL — SIGNIFICANT CHANGE UP (ref 150–400)
POTASSIUM SERPL-MCNC: 4.7 MMOL/L — SIGNIFICANT CHANGE UP (ref 3.5–5.3)
POTASSIUM SERPL-SCNC: 4.7 MMOL/L — SIGNIFICANT CHANGE UP (ref 3.5–5.3)
PROT SERPL-MCNC: 8.1 G/DL — SIGNIFICANT CHANGE UP (ref 6–8.3)
RBC # BLD: 4.63 M/UL — SIGNIFICANT CHANGE UP (ref 3.8–5.2)
RBC # FLD: 13.3 % — SIGNIFICANT CHANGE UP (ref 10.3–14.5)
SARS-COV-2 RNA SPEC QL NAA+PROBE: DETECTED
SODIUM SERPL-SCNC: 135 MMOL/L — SIGNIFICANT CHANGE UP (ref 135–145)
WBC # BLD: 5.03 K/UL — SIGNIFICANT CHANGE UP (ref 3.8–10.5)
WBC # FLD AUTO: 5.03 K/UL — SIGNIFICANT CHANGE UP (ref 3.8–10.5)

## 2021-01-16 PROCEDURE — 93971 EXTREMITY STUDY: CPT | Mod: 26,LT

## 2021-01-16 PROCEDURE — 99285 EMERGENCY DEPT VISIT HI MDM: CPT | Mod: CS

## 2021-01-16 PROCEDURE — 99223 1ST HOSP IP/OBS HIGH 75: CPT | Mod: CS

## 2021-01-16 RX ORDER — INSULIN LISPRO 100/ML
VIAL (ML) SUBCUTANEOUS AT BEDTIME
Refills: 0 | Status: DISCONTINUED | OUTPATIENT
Start: 2021-01-16 | End: 2021-01-17

## 2021-01-16 RX ORDER — FERROUS SULFATE 325(65) MG
325 TABLET ORAL DAILY
Refills: 0 | Status: DISCONTINUED | OUTPATIENT
Start: 2021-01-16 | End: 2021-02-07

## 2021-01-16 RX ORDER — ENOXAPARIN SODIUM 100 MG/ML
40 INJECTION SUBCUTANEOUS AT BEDTIME
Refills: 0 | Status: DISCONTINUED | OUTPATIENT
Start: 2021-01-16 | End: 2021-02-07

## 2021-01-16 RX ORDER — GLUCAGON INJECTION, SOLUTION 0.5 MG/.1ML
1 INJECTION, SOLUTION SUBCUTANEOUS ONCE
Refills: 0 | Status: DISCONTINUED | OUTPATIENT
Start: 2021-01-16 | End: 2021-02-07

## 2021-01-16 RX ORDER — MULTIVIT-MIN/FERROUS GLUCONATE 9 MG/15 ML
1 LIQUID (ML) ORAL DAILY
Refills: 0 | Status: DISCONTINUED | OUTPATIENT
Start: 2021-01-16 | End: 2021-01-16

## 2021-01-16 RX ORDER — FUROSEMIDE 40 MG
20 TABLET ORAL DAILY
Refills: 0 | Status: DISCONTINUED | OUTPATIENT
Start: 2021-01-17 | End: 2021-01-22

## 2021-01-16 RX ORDER — CHOLECALCIFEROL (VITAMIN D3) 125 MCG
1000 CAPSULE ORAL DAILY
Refills: 0 | Status: DISCONTINUED | OUTPATIENT
Start: 2021-01-16 | End: 2021-02-07

## 2021-01-16 RX ORDER — LEVOTHYROXINE SODIUM 125 MCG
75 TABLET ORAL DAILY
Refills: 0 | Status: DISCONTINUED | OUTPATIENT
Start: 2021-01-16 | End: 2021-02-07

## 2021-01-16 RX ORDER — ACETAMINOPHEN 500 MG
325 TABLET ORAL EVERY 6 HOURS
Refills: 0 | Status: DISCONTINUED | OUTPATIENT
Start: 2021-01-16 | End: 2021-02-07

## 2021-01-16 RX ORDER — METOPROLOL TARTRATE 50 MG
25 TABLET ORAL DAILY
Refills: 0 | Status: DISCONTINUED | OUTPATIENT
Start: 2021-01-16 | End: 2021-01-18

## 2021-01-16 RX ORDER — DEXTROSE 50 % IN WATER 50 %
15 SYRINGE (ML) INTRAVENOUS ONCE
Refills: 0 | Status: DISCONTINUED | OUTPATIENT
Start: 2021-01-16 | End: 2021-02-07

## 2021-01-16 RX ORDER — LANOLIN ALCOHOL/MO/W.PET/CERES
3 CREAM (GRAM) TOPICAL AT BEDTIME
Refills: 0 | Status: DISCONTINUED | OUTPATIENT
Start: 2021-01-16 | End: 2021-02-07

## 2021-01-16 RX ORDER — PREGABALIN 225 MG/1
1000 CAPSULE ORAL DAILY
Refills: 0 | Status: DISCONTINUED | OUTPATIENT
Start: 2021-01-16 | End: 2021-02-07

## 2021-01-16 RX ORDER — DEXTROSE 50 % IN WATER 50 %
25 SYRINGE (ML) INTRAVENOUS ONCE
Refills: 0 | Status: DISCONTINUED | OUTPATIENT
Start: 2021-01-16 | End: 2021-02-07

## 2021-01-16 RX ORDER — FUROSEMIDE 40 MG
1 TABLET ORAL
Qty: 0 | Refills: 0 | DISCHARGE

## 2021-01-16 RX ORDER — ACETAMINOPHEN 500 MG
650 TABLET ORAL EVERY 4 HOURS
Refills: 0 | Status: DISCONTINUED | OUTPATIENT
Start: 2021-01-16 | End: 2021-02-07

## 2021-01-16 RX ORDER — DEXTROSE 50 % IN WATER 50 %
12.5 SYRINGE (ML) INTRAVENOUS ONCE
Refills: 0 | Status: DISCONTINUED | OUTPATIENT
Start: 2021-01-16 | End: 2021-02-07

## 2021-01-16 RX ORDER — INSULIN LISPRO 100/ML
VIAL (ML) SUBCUTANEOUS
Refills: 0 | Status: DISCONTINUED | OUTPATIENT
Start: 2021-01-16 | End: 2021-01-17

## 2021-01-16 RX ORDER — SODIUM CHLORIDE 9 MG/ML
1000 INJECTION INTRAMUSCULAR; INTRAVENOUS; SUBCUTANEOUS ONCE
Refills: 0 | Status: COMPLETED | OUTPATIENT
Start: 2021-01-16 | End: 2021-01-16

## 2021-01-16 RX ORDER — GABAPENTIN 400 MG/1
200 CAPSULE ORAL
Refills: 0 | Status: DISCONTINUED | OUTPATIENT
Start: 2021-01-16 | End: 2021-02-07

## 2021-01-16 RX ORDER — SODIUM CHLORIDE 9 MG/ML
1000 INJECTION, SOLUTION INTRAVENOUS
Refills: 0 | Status: DISCONTINUED | OUTPATIENT
Start: 2021-01-16 | End: 2021-02-07

## 2021-01-16 RX ORDER — ASCORBIC ACID 60 MG
500 TABLET,CHEWABLE ORAL DAILY
Refills: 0 | Status: DISCONTINUED | OUTPATIENT
Start: 2021-01-16 | End: 2021-02-07

## 2021-01-16 RX ADMIN — SODIUM CHLORIDE 1000 MILLILITER(S): 9 INJECTION INTRAMUSCULAR; INTRAVENOUS; SUBCUTANEOUS at 15:33

## 2021-01-16 RX ADMIN — Medication 3 MILLIGRAM(S): at 22:45

## 2021-01-16 RX ADMIN — Medication 325 MILLIGRAM(S): at 22:45

## 2021-01-16 NOTE — ED ADULT TRIAGE NOTE - CHIEF COMPLAINT QUOTE
BIB EMS from [facility] for LLE swelling (patient denies pain). Patient also verbalizes decreased po intake because of diarrhea.

## 2021-01-16 NOTE — ED PROVIDER NOTE - PHYSICAL EXAMINATION
Gen:  alert, awake, no acute distress, obese  Head:  atraumatic, normocephalic  HEENT: PERRLA, EOMI, normal nose, dry oropharynx  CV:  rrr, nl S1, S2, no m/r/g  Pulm:  lungs CTA b/l  Abd: s/nt/nd, +BS  MSK:  moving all extremities, no back midline ttp, no stepoffs, no cva TTP; mild right leg edema 2+pitting, increased left leg edema  Neuro:  grossly intact, no focal deficits  Skin:  clear, dry, intact  Psych: AOx3, normal affect, no apparent risk to self or others

## 2021-01-16 NOTE — H&P ADULT - NSHPLABSRESULTS_GEN_ALL_CORE
LABS:                        14.7   5.03  )-----------( 173      ( 16 Jan 2021 14:00 )             44.6     01-16    135  |  100  |  14  ----------------------------<  108<H>  4.7   |  26  |  0.76    Ca    10.1      16 Jan 2021 14:00    TPro  8.1  /  Alb  3.7  /  TBili  0.6  /  DBili  x   /  AST  32  /  ALT  26  /  AlkPhos  62  01-16       CAPILLARY BLOOD GLUCOSE    RADIOLOGY & ADDITIONAL TESTS:  < from:  Duplex Venous Lower Ext Ltd, Left (01.16.21 @ 15:27) >  IMPRESSION:  No evidence of left lower extremity deep venous thrombosis.  < end of copied text >    Consultant(s) Notes Reviewed:  [x ] YES  [ ] NO  Care Discussed with Consultants/Other Providers [ x] YES  [ ] NO  Imaging Personally Reviewed:  [X ] YES  [ ] NO

## 2021-01-16 NOTE — PATIENT PROFILE ADULT - CONTRAINDICATIONS & PRECAUTIONS (SELECT ALL THAT APPLY)
Patient/surrogate refused vaccine... Surgeon/Pathologist Verbiage (Will Incorporate Name Of Surgeon From Intro If Not Blank): operated in two distinct and integrated capacities as the surgeon and pathologist.

## 2021-01-16 NOTE — ED PROVIDER NOTE - NS ED ROS FT
Except as otherwise indicated in HPI:  CONSTITUTIONAL: Neg  HEENT: neg  CV: neg  Resp: neg  GI: diarrhea  : Neg  MSK: leg swelling  SKIN: Neg  NEURO: Neg  PSYCHIATRIC: Neg  Heme/Onc: Neg

## 2021-01-16 NOTE — H&P ADULT - NSHPREVIEWOFSYSTEMS_GEN_ALL_CORE
Constitutional: no fevers or chills. No headache.  CV: no CP or palp  Resp: negative. no sob or cough.   GI: + one episode voluminous stool a day x 1 week. No n/v  : no dysuria.   Integumentary: denies having any rashes. Constitutional: no fevers or chills. No headache.  CV: no CP or palp  Resp: negative. no sob or cough.   GI: + one episode voluminous stool a day x 1 week. No n/v. denies hematochezia.   : no dysuria.   Integumentary: denies having any rashes.

## 2021-01-16 NOTE — ED PROVIDER NOTE - ATTENDING CONTRIBUTION TO CARE
Dr. Castellanos: I performed a face to face bedside interview with patient regarding history of present illness, review of symptoms and past medical history. I completed an independent physical exam.  I have discussed patient's plan of care with PA.   I agree with note as stated above, having amended the EMR as needed to reflect my findings.   This includes HISTORY OF PRESENT ILLNESS, HIV, PAST MEDICAL/SURGICAL/FAMILY/SOCIAL HISTORY, ALLERGIES AND HOME MEDICATIONS, REVIEW OF SYSTEMS, PHYSICAL EXAM, and any PROGRESS NOTES during the time I functioned as the attending physician for this patient.    dr castellanos: Pt from [facility]. Hx afib, dm, htn. Pt with greater than one month of left leg swelling, painless. no injury. also diarrhea, approx 1 week, non bloody. had last episode this am, has been taking immodium. no abd pain. no dysuria. no fever no chills. no cp, no sob. notes decreased po intake  cbc, bmp, ua, dvt study, iv fluids, re-assess Dr. Castellanos: I performed a face to face bedside interview with patient regarding history of present illness, review of symptoms and past medical history. I completed an independent physical exam.  I have discussed patient's plan of care with PA.   I agree with note as stated above, having amended the EMR as needed to reflect my findings.   This includes HISTORY OF PRESENT ILLNESS, HIV, PAST MEDICAL/SURGICAL/FAMILY/SOCIAL HISTORY, ALLERGIES AND HOME MEDICATIONS, REVIEW OF SYSTEMS, PHYSICAL EXAM, and any PROGRESS NOTES during the time I functioned as the attending physician for this patient.    dr castellanos: Pt from [facility] assisted living. Hx afib, dm, htn. Pt with greater than one month of left leg swelling, painless. no injury. also diarrhea, approx 1 week, non bloody. had last episode this am, has been taking immodium. no abd pain. no dysuria. no fever no chills. no cp, no sob. notes decreased po intake  cbc, bmp, ua, dvt study, iv fluids, re-assess  +covid, cannot return to [facility], admission

## 2021-01-16 NOTE — H&P ADULT - NSHPPHYSICALEXAM_GEN_ALL_CORE
T(C): 37.2 (01-16-21 @ 13:21), Max: 37.2 (01-16-21 @ 13:21)  HR: 103 (01-16-21 @ 13:21) (103 - 103)  BP: 156/89 (01-16-21 @ 13:21) (156/89 - 156/89)  RR: 17 (01-16-21 @ 13:21) (17 - 17)  SpO2: 95% (01-16-21 @ 13:21) (95% - 95%)  Wt(kg): --Vital Signs Last 24 Hrs  T(C): 37.2 (16 Jan 2021 13:21), Max: 37.2 (16 Jan 2021 13:21)  T(F): 98.9 (16 Jan 2021 13:21), Max: 98.9 (16 Jan 2021 13:21)  HR: 103 (16 Jan 2021 13:21) (103 - 103)  BP: 156/89 (16 Jan 2021 13:21) (156/89 - 156/89)  BP(mean): --  RR: 17 (16 Jan 2021 13:21) (17 - 17)  SpO2: 95% (16 Jan 2021 13:21) (95% - 95%)    PHYSICAL EXAM:  GENERAL: obese female, NAD, well-groomed, well-developed  HEAD:  Atraumatic, Normocephalic  EYES: EOMI, PERRLA, conjunctiva and sclera clear  ENMT: No tonsillar erythema, exudates, or enlargement; Moist mucous membranes, Good dentition, No lesions  NECK: Supple, No JVD, Normal thyroid  NERVOUS SYSTEM:  Alert & Oriented X3, Good concentration; Motor Strength 5/5 B/L upper and lower extremities; DTRs 2+ intact and symmetric  CHEST/LUNG: Clear to percussion bilaterally; No rales, rhonchi, wheezing, or rubs. on room air.   HEART: Regular rate and rhythm; No murmurs, rubs, or gallops  ABDOMEN: Soft, Nontender, Nondistended; Bowel sounds present  EXTREMITIES:  2+ Peripheral Pulses, No clubbing, cyanosis  LYMPH: No lymphadenopathy noted  SKIN: No rashes or lesions; Mild erythema from venous stasis. No warmth.   Psych: agitated, not cooperative. T(C): 37.2 (01-16-21 @ 13:21), Max: 37.2 (01-16-21 @ 13:21)  HR: 103 (01-16-21 @ 13:21) (103 - 103)  BP: 156/89 (01-16-21 @ 13:21) (156/89 - 156/89)  RR: 17 (01-16-21 @ 13:21) (17 - 17)  SpO2: 95% (01-16-21 @ 13:21) (95% - 95%)  Wt(kg): --Vital Signs Last 24 Hrs  T(C): 37.2 (16 Jan 2021 13:21), Max: 37.2 (16 Jan 2021 13:21)  T(F): 98.9 (16 Jan 2021 13:21), Max: 98.9 (16 Jan 2021 13:21)  HR: 103 (16 Jan 2021 13:21) (103 - 103)  BP: 156/89 (16 Jan 2021 13:21) (156/89 - 156/89)  BP(mean): --  RR: 17 (16 Jan 2021 13:21) (17 - 17)  SpO2: 95% (16 Jan 2021 13:21) (95% - 95%)    PHYSICAL EXAM:  GENERAL: obese female, NAD, well-groomed, well-developed  HEAD:  Atraumatic, Normocephalic  EYES: obese female, EOMI, PERRLA, conjunctiva and sclera clear  ENMT: No tonsillar erythema, exudates, or enlargement; Moist mucous membranes, Poor dentition, No lesions  NECK: Supple, No JVD, Normal thyroid  NERVOUS SYSTEM:  Alert & Oriented X3, Good concentration; Motor Strength 4/5 B/L upper and lower extremities.  CHEST/LUNG: Clear to auscultation bilaterally; No rales, rhonchi, wheezing, or rubs. on room air. not using respiratory muscles to breathe.   HEART: Regular rate and rhythm; No murmurs, rubs, or gallops  ABDOMEN: Soft, Nontender, Nondistended; Bowel sounds present  EXTREMITIES:  2+ Peripheral Pulses, No clubbing, cyanosis. 2+ pitting edema in B/L LEs  LYMPH: No lymphadenopathy noted  SKIN: No rashes or lesions; lower extremities w/ Mild erythema from venous stasis. No warmth.   Psych: agitated, not cooperative.

## 2021-01-16 NOTE — ED ADULT NURSE NOTE - CHIEF COMPLAINT QUOTE
BIB EMS from [facility] for LLE swelling (patient denies pain). Patient also verbalizes decreased po intake because of diarrhea.

## 2021-01-16 NOTE — H&P ADULT - ASSESSMENT
#Atrial Fibrillation: not on a/c (hx of bleed due to vaginal polyps)  - continue Toprol 25mg daily    #DM type 2: only on Metformin at home, concern for possible lactic acidosis 2/2 metformin. HbA1C 6.1%  - discontinue Metformin on discharge, consider alternative agents or observe off meds outpt  - ISS while in patient     #Hypothyroidism  - continue levothyroxine    #Spinal stenosis  - continue home gabapentin    #DVT prophylaxis:   - Lovenox and SCDs    CAPRINI SCORE [CLOT]    AGE RELATED RISK FACTORS                                                       MOBILITY RELATED FACTORS  [ ] Age 41-60 years                                            (1 Point)                  [ ] Bed rest                                                        (1 Point)  [ ] Age: 61-74 years                                           (2 Points)                 [ ] Plaster cast                                                   (2 Points)  [x ] Age= 75 years                                              (3 Points)                 [x] Bed bound for more than 72 hours                 (2 Points)    DISEASE RELATED RISK FACTORS                                               GENDER SPECIFIC FACTORS  [x ] Edema in the lower extremities                       (1 Point)                  [ ] Pregnancy                                                     (1 Point)  [ ] Varicose veins                                               (1 Point)                  [ ] Post-partum < 6 weeks                                   (1 Point)             [ x] BMI > 25 Kg/m2                                            (1 Point)                  [ ] Hormonal therapy  or oral contraception          (1 Point)                 [ x] Sepsis (in the previous month)                        (1 Point)                  [ ] History of pregnancy complications                 (1 point)  [ ] Pneumonia or serious lung disease                                               [ ] Unexplained or recurrent                     (1 Point)           (in the previous month)                               (1 Point)  [ ] Abnormal pulmonary function test                     (1 Point)                 SURGERY RELATED RISK FACTORS  [ ] Acute myocardial infarction                              (1 Point)                 [ ]  Section                                             (1 Point)  [ ] Congestive heart failure (in the previous month)  (1 Point)               [ ] Minor surgery                                                  (1 Point)   [ ] Inflammatory bowel disease                             (1 Point)                 [ ] Arthroscopic surgery                                        (2 Points)  [ ] Central venous access                                      (2 Points)                [ ] General surgery lasting more than 45 minutes   (2 Points)       [ ] Stroke (in the previous month)                          (5 Points)               [ ] Elective arthroplasty                                         (5 Points)                                                                                                                                               HEMATOLOGY RELATED FACTORS                                                 TRAUMA RELATED RISK FACTORS  [ ] Prior episodes of VTE                                     (3 Points)                 [ ] Fracture of the hip, pelvis, or leg                       (5 Points)  [ ] Positive family history for VTE                         (3 Points)                 [ ] Acute spinal cord injury (in the previous month)  (5 Points)  [ ] Prothrombin 11207 A                                     (3 Points)                 [ ] Paralysis  (less than 1 month)                             (5 Points)  [ ] Factor V Leiden                                             (3 Points)                  [ ] Multiple Trauma within 1 month                        (5 Points)  [ ] Lupus anticoagulants                                     (3 Points)                                                           [ ] Anticardiolipin antibodies                               (3 Points)                                                       [ ] High homocysteine in the blood                      (3 Points)                                             [ ] Other congenital or acquired thrombophilia      (3 Points)                                                [ ] Heparin induced thrombocytopenia                  (3 Points)                                          Total Score [   8       ]   # Diarrhea likely due to COVID positivity  - satting >94% on room air. Denies respiratory sx.  - does not meet criteria for remdesivir or decadron.  - monitor respiratory status    # Bilateral lower extremity edema due to chronic venous stasis in wheelchair bound patient  Appears chronic. Both legs appear symmetric to me on exam. No evidence of DVT on left sided duplex.   - trial lasix 20 mg qd    #Atrial Fibrillation:   - per chart review, not on a/c (hx of bleed due to vaginal polyps)  - continue Toprol 25mg daily    #DM type 2:   - on metformin 1000 mg BID at home.   - hold oral hypoglycemic agents as inpatient  - ISS.   - AM hgba1c    #Hypothyroidism  - continue levothyroxine    #Spinal stenosis  - continue home gabapentin    #DVT prophylaxis:   - Lovenox and SCDs    Contact: Jose Kay (son) [phone]    CAPRINI SCORE [CLOT]    AGE RELATED RISK FACTORS                                                       MOBILITY RELATED FACTORS  [ ] Age 41-60 years                                            (1 Point)                  [ ] Bed rest                                                        (1 Point)  [ ] Age: 61-74 years                                           (2 Points)                 [ ] Plaster cast                                                   (2 Points)  [x ] Age= 75 years                                              (3 Points)                 [x] Bed bound for more than 72 hours                 (2 Points)    DISEASE RELATED RISK FACTORS                                               GENDER SPECIFIC FACTORS  [x ] Edema in the lower extremities                       (1 Point)                  [ ] Pregnancy                                                     (1 Point)  [ ] Varicose veins                                               (1 Point)                  [ ] Post-partum < 6 weeks                                   (1 Point)             [ x] BMI > 25 Kg/m2                                            (1 Point)                  [ ] Hormonal therapy  or oral contraception          (1 Point)                 [ x] Sepsis (in the previous month)                        (1 Point)                  [ ] History of pregnancy complications                 (1 point)  [ ] Pneumonia or serious lung disease                                               [ ] Unexplained or recurrent                     (1 Point)           (in the previous month)                               (1 Point)  [ ] Abnormal pulmonary function test                     (1 Point)                 SURGERY RELATED RISK FACTORS  [ ] Acute myocardial infarction                              (1 Point)                 [ ]  Section                                             (1 Point)  [ ] Congestive heart failure (in the previous month)  (1 Point)               [ ] Minor surgery                                                  (1 Point)   [ ] Inflammatory bowel disease                             (1 Point)                 [ ] Arthroscopic surgery                                        (2 Points)  [ ] Central venous access                                      (2 Points)                [ ] General surgery lasting more than 45 minutes   (2 Points)       [ ] Stroke (in the previous month)                          (5 Points)               [ ] Elective arthroplasty                                         (5 Points)                                                                                                                                               HEMATOLOGY RELATED FACTORS                                                 TRAUMA RELATED RISK FACTORS  [ ] Prior episodes of VTE                                     (3 Points)                 [ ] Fracture of the hip, pelvis, or leg                       (5 Points)  [ ] Positive family history for VTE                         (3 Points)                 [ ] Acute spinal cord injury (in the previous month)  (5 Points)  [ ] Prothrombin 80640 A                                     (3 Points)                 [ ] Paralysis  (less than 1 month)                             (5 Points)  [ ] Factor V Leiden                                             (3 Points)                  [ ] Multiple Trauma within 1 month                        (5 Points)  [ ] Lupus anticoagulants                                     (3 Points)                                                           [ ] Anticardiolipin antibodies                               (3 Points)                                                       [ ] High homocysteine in the blood                      (3 Points)                                             [ ] Other congenital or acquired thrombophilia      (3 Points)                                                [ ] Heparin induced thrombocytopenia                  (3 Points)                                          Total Score [   8       ]   89 yo F with PMHx of Afib (not on AC due to vaginal bleeding 2/2 polyps), chronic back pain 2/2 spinal stenosis,  DM2 on metformin, HTN, recurrent UTIs, chronic venous insufficiency. Pt sent by [facility] to rule out left lower extremity DVT due to swelling and because of diarrhea. They will not take pt back because of diarrhea (difficult to clean because she is immobile) and because of COVID positivity. PT ordered.      # Diarrhea likely due to COVID positivity  - pt has not had diarrhea since being admitted. No leukocytosis and no fever. Start imodium if needed.   - satting >94% on room air. Denies respiratory sx.  - does not meet criteria for remdesivir or decadron.  - monitor respiratory status    # Bilateral lower extremity edema due to chronic venous insufficiency in wheelchair bound patient  Chronic. Both legs appear symmetric on exam. No evidence of DVT on left sided duplex.   - trial lasix 20 mg qd. HOLD if patient is having continued diarrhea in order to minimize risk of dehydration.     #Atrial Fibrillation:   - per chart review, not on a/c (hx of bleed due to vaginal polyps)  - continue metoprolol succinate 25mg daily    #DM type 2:   - on metformin 1000 mg BID at home.   - hold oral hypoglycemic agents as inpatient  - ISS.   - AM hgba1c    #Hypothyroidism  - continue levothyroxine 75 mcg qd    #Spinal stenosis  - continue home gabapentin 200 mg BID    # seasonal allergies  - start home cetirizine 10 mg qd if needed    # Immobility due to severe physical disability or frailty   - PT ordered for disposition    #Obesity  BMI  32  Encourage weight loss    #DVT prophylaxis- Lovenox and SCDs  Contact: Jose Kay (son) [phone]. Spoke with son, Gal, to update him on mother's clinical status.   FULL CODE    CAPRINI SCORE [CLOT]    AGE RELATED RISK FACTORS                                                       MOBILITY RELATED FACTORS  [ ] Age 41-60 years                                            (1 Point)                  [ ] Bed rest                                                        (1 Point)  [ ] Age: 61-74 years                                           (2 Points)                 [ ] Plaster cast                                                   (2 Points)  [x ] Age= 75 years                                              (3 Points)                 [x] Bed bound for more than 72 hours                 (2 Points)    DISEASE RELATED RISK FACTORS                                               GENDER SPECIFIC FACTORS  [x ] Edema in the lower extremities                       (1 Point)                  [ ] Pregnancy                                                     (1 Point)  [ ] Varicose veins                                               (1 Point)                  [ ] Post-partum < 6 weeks                                   (1 Point)             [ x] BMI > 25 Kg/m2                                            (1 Point)                  [ ] Hormonal therapy  or oral contraception          (1 Point)                 [ x] Sepsis (in the previous month)                        (1 Point)                  [ ] History of pregnancy complications                 (1 point)  [ ] Pneumonia or serious lung disease                                               [ ] Unexplained or recurrent                     (1 Point)           (in the previous month)                               (1 Point)  [ ] Abnormal pulmonary function test                     (1 Point)                 SURGERY RELATED RISK FACTORS  [ ] Acute myocardial infarction                              (1 Point)                 [ ]  Section                                             (1 Point)  [ ] Congestive heart failure (in the previous month)  (1 Point)               [ ] Minor surgery                                                  (1 Point)   [ ] Inflammatory bowel disease                             (1 Point)                 [ ] Arthroscopic surgery                                        (2 Points)  [ ] Central venous access                                      (2 Points)                [ ] General surgery lasting more than 45 minutes   (2 Points)       [ ] Stroke (in the previous month)                          (5 Points)               [ ] Elective arthroplasty                                         (5 Points)                                                                                                                                               HEMATOLOGY RELATED FACTORS                                                 TRAUMA RELATED RISK FACTORS  [ ] Prior episodes of VTE                                     (3 Points)                 [ ] Fracture of the hip, pelvis, or leg                       (5 Points)  [ ] Positive family history for VTE                         (3 Points)                 [ ] Acute spinal cord injury (in the previous month)  (5 Points)  [ ] Prothrombin 03567 A                                     (3 Points)                 [ ] Paralysis  (less than 1 month)                             (5 Points)  [ ] Factor V Leiden                                             (3 Points)                  [ ] Multiple Trauma within 1 month                        (5 Points)  [ ] Lupus anticoagulants                                     (3 Points)                                                           [ ] Anticardiolipin antibodies                               (3 Points)                                                       [ ] High homocysteine in the blood                      (3 Points)                                             [ ] Other congenital or acquired thrombophilia      (3 Points)                                                [ ] Heparin induced thrombocytopenia                  (3 Points)                                          Total Score [   8       ]

## 2021-01-16 NOTE — ED PROVIDER NOTE - CLINICAL SUMMARY MEDICAL DECISION MAKING FREE TEXT BOX
Pt from [facility] assisted living. Hx afib, dm, htn. Pt with greater than one month of left leg swelling, painless. no injury. also diarrhea, approx 1 week, non bloody. had last episode this am, has been taking immodium. no abd pain. no dysuria. no fever no chills. no cp, no sob. notes decreased po intake  cbc, bmp, ua, dvt study, iv fluids, re-assess

## 2021-01-16 NOTE — ED PROVIDER NOTE - PROGRESS NOTE DETAILS
LORRAINE Richey: I participated In the care/evaluation of this patient LORRAINE Richey: I participated In the care/evaluation of this patient. pt is covid+. duplex neg. RN Eloy spoke with RN supervisor Tori at the [facility], they can not take patient back given she is COVID+ and has diarrhea, they are unable to care for her LORRAINE Richey: pt refusing admission CXR and EKG

## 2021-01-16 NOTE — ED ADULT NURSE REASSESSMENT NOTE - GENERAL PATIENT STATE
back from ultrasound. patient placed on isolation/comfortable appearance/cooperative/resting/sleeping

## 2021-01-16 NOTE — H&P ADULT - HISTORY OF PRESENT ILLNESS
89 yo F with PMHx of Afib (not on AC due to vaginal bleeding 2/2 polyps), chronic back pain 2/2 spinal stenosis, well-controlled DM2 on metformin, HTN, recurrent UTIs, chronic venous insufficiency. The patient has been at [facility]. She was sent to the [place] ER to evaluate for DVT due to left lower extremity edema. Pt has had decreased PO intake and diarrhea as well.  89 yo F with PMHx of Afib (not on AC due to vaginal bleeding 2/2 polyps), chronic back pain 2/2 spinal stenosis, well-controlled DM2 on metformin, HTN, recurrent UTIs, chronic venous insufficiency. The patient has been at [facility]. She was sent to the [place] ER to evaluate for DVT due to left lower extremity edema. Pt has had decreased PO intake and diarrhea as well.     Patient reports that diarrhea started one week ago, and has been large in amount and only once a day. She denies having any associated fevers. She says lower extremity swelling is chronic and "everyone at the [facility] has it." She is wheelchair bound at baseline. She is agitated that she was sent to the hospital because she feels totally fine. She does not believe she has COVID. She denies having any shortness of breath, cough, nasal congestion, sore throat, chest pain, palpitations, abdominal pain, nausea, or vomiting.     [facility] will not take pt back because of COVID positivity status and diarrhea, which makes it difficult to clean her since she is overall bed bound.    ER course:  Pt found to be covid +  LLE duplex performed and no e/o DVT.  89 yo F with PMHx of Afib (not on AC due to vaginal bleeding 2/2 polyps), chronic back pain 2/2 spinal stenosis,  DM2 on metformin, HTN, recurrent UTIs, chronic venous insufficiency. The patient has been living at [facility]. She was sent to the [place] ER to evaluate for DVT due to left lower extremity edema. Pt reportedly has had decreased PO intake and diarrhea as well.     Patient reports that diarrhea started one week ago- it's been once a day,  but voluminous. She denies having any associated fevers. She says lower extremity swelling is chronic and "everyone at the [facility] has it." She is wheelchair bound at baseline. She is agitated that she was sent to the hospital because she feels totally fine. She does not believe she has COVID. She denies having any shortness of breath, cough, nasal congestion, sore throat, chest pain, palpitations, abdominal pain, nausea, or vomiting.     [facility] will not take pt back because of COVID positivity status and diarrhea, which makes it difficult to clean her since she is overall bed bound.    ER course:  Pt found to be covid +  LLE duplex performed and no e/o DVT.

## 2021-01-16 NOTE — ED PROVIDER NOTE - OBJECTIVE STATEMENT
Pt from [facility] assisted living. Hx afib, dm, htn. Pt with greater than one month of left leg swelling, painless. no injury. also diarrhea, approx 1 week, non bloody. had last episode this am, has been taking immodium. no abd pain. no dysuria. no fever no chills. no cp, no sob. +decreased intake Pt from [facility] assisted living. Hx afib, dm, htn. Pt with greater than one month of left leg swelling, painless. no injury. also diarrhea, approx 1 week, non bloody. had last episode this am, has been taking imodium. no abd pain. no dysuria. no fever no chills. no cp, no sob. +decreased intake

## 2021-01-17 LAB
A1C WITH ESTIMATED AVERAGE GLUCOSE RESULT: 6.1 % — HIGH (ref 4–5.6)
ESTIMATED AVERAGE GLUCOSE: 128 MG/DL — HIGH (ref 68–114)
GLUCOSE BLDC GLUCOMTR-MCNC: 114 MG/DL — HIGH (ref 70–99)
GLUCOSE BLDC GLUCOMTR-MCNC: 117 MG/DL — HIGH (ref 70–99)
GLUCOSE BLDC GLUCOMTR-MCNC: 150 MG/DL — HIGH (ref 70–99)
SARS-COV-2 IGG SERPL QL IA: NEGATIVE — SIGNIFICANT CHANGE UP
SARS-COV-2 IGM SERPL IA-ACNC: <0.1 INDEX — SIGNIFICANT CHANGE UP

## 2021-01-17 PROCEDURE — 99232 SBSQ HOSP IP/OBS MODERATE 35: CPT | Mod: CS

## 2021-01-17 RX ORDER — ONDANSETRON 8 MG/1
4 TABLET, FILM COATED ORAL ONCE
Refills: 0 | Status: DISCONTINUED | OUTPATIENT
Start: 2021-01-17 | End: 2021-01-17

## 2021-01-17 RX ORDER — METOCLOPRAMIDE HCL 10 MG
10 TABLET ORAL ONCE
Refills: 0 | Status: COMPLETED | OUTPATIENT
Start: 2021-01-17 | End: 2021-01-17

## 2021-01-17 RX ORDER — INSULIN LISPRO 100/ML
VIAL (ML) SUBCUTANEOUS
Refills: 0 | Status: DISCONTINUED | OUTPATIENT
Start: 2021-01-17 | End: 2021-01-22

## 2021-01-17 RX ORDER — METOPROLOL TARTRATE 50 MG
50 TABLET ORAL ONCE
Refills: 0 | Status: DISCONTINUED | OUTPATIENT
Start: 2021-01-17 | End: 2021-01-17

## 2021-01-17 RX ORDER — METOPROLOL TARTRATE 50 MG
25 TABLET ORAL ONCE
Refills: 0 | Status: COMPLETED | OUTPATIENT
Start: 2021-01-17 | End: 2021-01-17

## 2021-01-17 RX ORDER — ONDANSETRON 8 MG/1
4 TABLET, FILM COATED ORAL EVERY 6 HOURS
Refills: 0 | Status: DISCONTINUED | OUTPATIENT
Start: 2021-01-17 | End: 2021-02-07

## 2021-01-17 RX ADMIN — Medication 500 MILLIGRAM(S): at 13:05

## 2021-01-17 RX ADMIN — Medication 1 TABLET(S): at 13:05

## 2021-01-17 RX ADMIN — Medication 25 MILLIGRAM(S): at 23:17

## 2021-01-17 RX ADMIN — Medication 650 MILLIGRAM(S): at 23:24

## 2021-01-17 RX ADMIN — Medication 3 MILLIGRAM(S): at 23:15

## 2021-01-17 RX ADMIN — PREGABALIN 1000 MICROGRAM(S): 225 CAPSULE ORAL at 13:05

## 2021-01-17 RX ADMIN — Medication 20 MILLIGRAM(S): at 05:18

## 2021-01-17 RX ADMIN — Medication 1000 UNIT(S): at 13:05

## 2021-01-17 RX ADMIN — Medication 10 MILLIGRAM(S): at 23:17

## 2021-01-17 RX ADMIN — ENOXAPARIN SODIUM 40 MILLIGRAM(S): 100 INJECTION SUBCUTANEOUS at 23:15

## 2021-01-17 RX ADMIN — Medication 650 MILLIGRAM(S): at 01:40

## 2021-01-17 RX ADMIN — Medication 325 MILLIGRAM(S): at 13:05

## 2021-01-17 RX ADMIN — GABAPENTIN 200 MILLIGRAM(S): 400 CAPSULE ORAL at 05:19

## 2021-01-17 RX ADMIN — Medication 75 MICROGRAM(S): at 05:18

## 2021-01-17 RX ADMIN — Medication 25 MILLIGRAM(S): at 05:18

## 2021-01-17 NOTE — CHART NOTE - NSCHARTNOTEFT_GEN_A_CORE
Event note:  Reported by RN that pt bp177/90, . She c/o feeling nauseous but refuses to take po zofran which was already ordered.       PAST MEDICAL & SURGICAL HISTORY:  Spinal stenosis of lumbar region    Hiatal hernia    Diabetes mellitus, type II    Atrial fibrillation    H/O: GI Bleed    Overactive Bladder    Uterine Polyp    Peripheral Neuropathy    Degenerative Joint Disease    Obesity    Hypothyroidism    Hypertension    S/P  section    S/P rotator cuff repair  right    S/P knee replacement, right      Allergies    aspirin (Unknown)  penicillin (Unknown)    Intolerances      FAMILY HISTORY:  No pertinent family history in first degree relatives        Review of Systems:  CONSTITUTIONAL: No fever, chills, or fatigue  EYES: No eye pain, visual disturbances, or discharge  ENMT:  No difficulty hearing, tinnitus, vertigo; No sinus or throat pain  NECK: No pain or stiffness  RESPIRATORY: No cough, wheezing, chills or hemoptysis; No shortness of breath  CARDIOVASCULAR: No chest pain, palpitations, dizziness, or leg swelling  GASTROINTESTINAL: No abdominal or epigastric pain. No nausea, vomiting, or hematemesis; No diarrhea or constipation. No melena or hematochezia.  GENITOURINARY: No dysuria, frequency, hematuria, or incontinence  NEUROLOGICAL: No headaches, memory loss, loss of strength, numbness, or tremors  SKIN: No itching, burning, rashes, or lesions   MUSCULOSKELETAL: No joint pain or swelling; No muscle, back, or extremity pain  PSYCHIATRIC: No depression, anxiety, mood swings, or difficulty sleeping      Medications:    furosemide    Tablet 20 milliGRAM(s) Oral daily  metoprolol succinate ER 25 milliGRAM(s) Oral daily  metoprolol tartrate 25 milliGRAM(s) Oral once    guaiFENesin  milliGRAM(s) Oral every 12 hours PRN    acetaminophen   Tablet .. 650 milliGRAM(s) Oral every 4 hours PRN  acetaminophen   Tablet .. 325 milliGRAM(s) Oral every 6 hours PRN  gabapentin 200 milliGRAM(s) Oral two times a day  melatonin 3 milliGRAM(s) Oral at bedtime  metoclopramide Injectable 10 milliGRAM(s) IntraMuscular once  ondansetron   Disintegrating Tablet 4 milliGRAM(s) Oral every 6 hours PRN      enoxaparin Injectable 40 milliGRAM(s) SubCutaneous at bedtime        dextrose 40% Gel 15 Gram(s) Oral once  dextrose 50% Injectable 25 Gram(s) IV Push once  dextrose 50% Injectable 12.5 Gram(s) IV Push once  dextrose 50% Injectable 25 Gram(s) IV Push once  glucagon  Injectable 1 milliGRAM(s) IntraMuscular once  insulin lispro (ADMELOG) corrective regimen sliding scale   SubCutaneous two times a day with meals  levothyroxine 75 MICROGram(s) Oral daily    ascorbic acid 500 milliGRAM(s) Oral daily  cholecalciferol 1000 Unit(s) Oral daily  cyanocobalamin 1000 MICROGram(s) Oral daily  dextrose 5%. 1000 milliLiter(s) IV Continuous <Continuous>  dextrose 5%. 1000 milliLiter(s) IV Continuous <Continuous>  ferrous    sulfate 325 milliGRAM(s) Oral daily  multivitamin 1 Tablet(s) Oral daily            Vital Signs Last 24 Hrs  T(C): 36.4 (2021 18:42), Max: 36.4 (2021 21:03)  T(F): 97.6 (2021 18:42), Max: 97.6 (2021 21:03)  HR: 100 (2021 18:42) (100 - 103)  BP: 174/99 (2021 18:42) (141/76 - 174/99)  BP(mean): --  RR: 18 (2021 18:42) (18 - 18)  SpO2: 92% (2021 18:42) (92% - 93%)      I&O's Detail        LABS:                        14.7   5.03  )-----------( 173      ( 2021 14:00 )             44.6     -    135  |  100  |  14  ----------------------------<  108<H>  4.7   |  26  |  0.76    Ca    10.1      2021 14:00    TPro  8.1  /  Alb  3.7  /  TBili  0.6  /  DBili  x   /  AST  32  /  ALT  26  /  AlkPhos  62  01-16          CAPILLARY BLOOD GLUCOSE      POCT Blood Glucose.: 150 mg/dL (2021 18:36)        CULTURES:      Physical Examination:    General: No acute distress.  Alert, oriented, interactive, nonfocal    HEENT: Pupils equal, reactive to light.  Symmetric.    PULM: Clear to auscultation bilaterally    CVS: S1S2, IRIR, no murmurs, rubs, or gallops    ABD: Soft, nondistended, nontender, normoactive bowel sounds, no masses    EXT: +b/l edema, nontender    SKIN: Warm and well perfused, no rashes noted.    Neuro: Alert and awake, follows command, move all four extremities    A/P:   89F with PMH of Afib not on AC, DM2, HTN, Chronic back pain a/w COVID and diarrhea.    #HTN  -will give lopressor 25mg once now  -C/w metoprolol 50 ER daily, consider to titrate up if bp still remains elevated.  -Monitor BP    #Nausea  -Will order reglan 10mg IM once since pt does not have IV access now  -C/w zofran 4mg po prn if pt able to take po  -Monitor I&O    #COVID  -Keep O2 >92%, currently on RA  -C/w lovenox qhs for ppx  -C/w vitamin C, D, b12. iron supplement

## 2021-01-17 NOTE — PROGRESS NOTE ADULT - SUBJECTIVE AND OBJECTIVE BOX
HPI:  89 yo F with PMHx of Afib (not on AC due to vaginal bleeding 2/2 polyps), chronic back pain 2/2 spinal stenosis,  DM2 on metformin, HTN, recurrent UTIs, chronic venous insufficiency. The patient has been living at [facility]. She was sent to the [place] ER to evaluate for DVT due to left lower extremity edema. Pt reportedly has had decreased PO intake and diarrhea as well.     Patient reports that diarrhea started one week ago- it's been once a day,  but voluminous. She denies having any associated fevers. She says lower extremity swelling is chronic and "everyone at the [facility] has it." She is wheelchair bound at baseline. She is agitated that she was sent to the hospital because she feels totally fine. She does not believe she has COVID. She denies having any shortness of breath, cough, nasal congestion, sore throat, chest pain, palpitations, abdominal pain, nausea, or vomiting.     [facility] will not take pt back because of COVID positivity status and diarrhea, which makes it difficult to clean her since she is overall bed bound.    ER course:  Pt found to be covid +  LLE duplex performed and no e/o DVT.  (2021 16:50)      Subjective    No further diarrhea.   "I don't believe I have covid"      PAST MEDICAL & SURGICAL HISTORY:  Spinal stenosis of lumbar region    Hiatal hernia    Diabetes mellitus, type II    Atrial fibrillation    H/O: GI Bleed    Overactive Bladder    Uterine Polyp    Peripheral Neuropathy    Degenerative Joint Disease    Obesity    Hypothyroidism    Hypertension    S/P  section    S/P rotator cuff repair  right    S/P knee replacement, right        MedsMEDICATIONS  (STANDING):  ascorbic acid 500 milliGRAM(s) Oral daily  cholecalciferol 1000 Unit(s) Oral daily  cyanocobalamin 1000 MICROGram(s) Oral daily  dextrose 40% Gel 15 Gram(s) Oral once  dextrose 5%. 1000 milliLiter(s) (50 mL/Hr) IV Continuous <Continuous>  dextrose 5%. 1000 milliLiter(s) (100 mL/Hr) IV Continuous <Continuous>  dextrose 50% Injectable 25 Gram(s) IV Push once  dextrose 50% Injectable 12.5 Gram(s) IV Push once  dextrose 50% Injectable 25 Gram(s) IV Push once  enoxaparin Injectable 40 milliGRAM(s) SubCutaneous at bedtime  ferrous    sulfate 325 milliGRAM(s) Oral daily  furosemide    Tablet 20 milliGRAM(s) Oral daily  gabapentin 200 milliGRAM(s) Oral two times a day  glucagon  Injectable 1 milliGRAM(s) IntraMuscular once  insulin lispro (ADMELOG) corrective regimen sliding scale   SubCutaneous three times a day before meals  insulin lispro (ADMELOG) corrective regimen sliding scale   SubCutaneous at bedtime  levothyroxine 75 MICROGram(s) Oral daily  melatonin 3 milliGRAM(s) Oral at bedtime  metoprolol succinate ER 25 milliGRAM(s) Oral daily  multivitamin 1 Tablet(s) Oral daily    MEDICATIONS  (PRN):  acetaminophen   Tablet .. 650 milliGRAM(s) Oral every 4 hours PRN Temp greater or equal to 38C (100.4F), Moderate Pain (4 - 6)  acetaminophen   Tablet .. 325 milliGRAM(s) Oral every 6 hours PRN Mild Pain (1 - 3)  guaiFENesin  milliGRAM(s) Oral every 12 hours PRN Cough      Vital Signs Last 24 Hrs  T(C): 36.4 (2021 05:21), Max: 37.2 (2021 13:21)  T(F): 97.6 (2021 05:21), Max: 98.9 (2021 13:21)  HR: 101 (2021 05:21) (84 - 106)  BP: 161/78 (2021 05:21) (138/64 - 161/78)  BP(mean): --  RR: 18 (2021 05:21) (17 - 18)  SpO2: 93% (2021 05:21) (91% - 97%)  I&O's Summary      PHYSICAL EXAM:  GENERAL: NAD  NECK: Supple  NERVOUS SYSTEM:  awake and alert  HEART: S1s2 NL , Irreg Irreg  CHEST/LUNG: Clear to percussion bilaterally  ABDOMEN: Soft, Nontender, Nondistended; Bowel sounds present  EXTREMITIES:  pos edema      LABS:( @ 14:00)                      14.7  5.03 )-----------( 173                 44.6    Neutrophils = 3.35 (66.6%)  Lymphocytes = 0.99 (19.7%)  Eosinophils = 0.04 (0.8%)  Basophils = 0.02 (0.4%)  Monocytes = 0.61 (12.1%)  Bands = --%        135  |  100  |  14  ----------------------------<  108<H>  4.7   |  26  |  0.76    Ca    10.1      2021 14:00    TPro  8.1  /  Alb  3.7  /  TBili  0.6  /  DBili  x   /  AST  32  /  ALT  26  /  AlkPhos  62            RVP:          Tox:           CAPILLARY BLOOD GLUCOSE      POCT Blood Glucose.: 117 mg/dL (2021 08:00)      Imaging Personally Reviewed:  [ ] YES  [ ] NO        Care Discussed with Consultants/Other Providers [ x] YES  [ ] NO

## 2021-01-17 NOTE — PROGRESS NOTE ADULT - ASSESSMENT
89 yo F with PMHx of Afib (not on AC due to vaginal bleeding 2/2 polyps), chronic back pain 2/2 spinal stenosis,  DM2 on metformin, HTN, recurrent UTIs, chronic venous insufficiency. Pt sent by [facility] to rule out left lower extremity DVT due to swelling and because of diarrhea. They will not take pt back because of diarrhea (difficult to clean because she is immobile) and because of COVID positivity.     Diarrhea likely due to COVID positivity  Satting well on RA  CXR ordered yesterday in ED but pt refused the test     Bilateral lower extremity edema due to chronic venous insufficiency in wheelchair bound patient  Chronic. Both legs appear symmetric on exam. No evidence of DVT on left sided duplex.   Lasix     Atrial Fibrillation, not on AC sec to bleed  Metoprolol    DM2  lispro while inpt    Contact: Jose Kay (son) [phone]. who also resides with another son,  Gal  Spoke with Gal -1/17-updated  FULL CODE

## 2021-01-18 LAB
ANION GAP SERPL CALC-SCNC: 10 MMOL/L — SIGNIFICANT CHANGE UP (ref 5–17)
BASOPHILS # BLD AUTO: 0.01 K/UL — SIGNIFICANT CHANGE UP (ref 0–0.2)
BASOPHILS NFR BLD AUTO: 0.2 % — SIGNIFICANT CHANGE UP (ref 0–2)
BUN SERPL-MCNC: 9 MG/DL — SIGNIFICANT CHANGE UP (ref 7–23)
CALCIUM SERPL-MCNC: 9.7 MG/DL — SIGNIFICANT CHANGE UP (ref 8.4–10.5)
CHLORIDE SERPL-SCNC: 100 MMOL/L — SIGNIFICANT CHANGE UP (ref 96–108)
CO2 SERPL-SCNC: 26 MMOL/L — SIGNIFICANT CHANGE UP (ref 22–31)
CREAT SERPL-MCNC: 0.8 MG/DL — SIGNIFICANT CHANGE UP (ref 0.5–1.3)
EOSINOPHIL # BLD AUTO: 0.01 K/UL — SIGNIFICANT CHANGE UP (ref 0–0.5)
EOSINOPHIL NFR BLD AUTO: 0.2 % — SIGNIFICANT CHANGE UP (ref 0–6)
GLUCOSE BLDC GLUCOMTR-MCNC: 121 MG/DL — HIGH (ref 70–99)
GLUCOSE BLDC GLUCOMTR-MCNC: 133 MG/DL — HIGH (ref 70–99)
GLUCOSE BLDC GLUCOMTR-MCNC: 145 MG/DL — HIGH (ref 70–99)
GLUCOSE BLDC GLUCOMTR-MCNC: 146 MG/DL — HIGH (ref 70–99)
GLUCOSE SERPL-MCNC: 134 MG/DL — HIGH (ref 70–99)
HCT VFR BLD CALC: 43.1 % — SIGNIFICANT CHANGE UP (ref 34.5–45)
HGB BLD-MCNC: 14.4 G/DL — SIGNIFICANT CHANGE UP (ref 11.5–15.5)
IMM GRANULOCYTES NFR BLD AUTO: 0.2 % — SIGNIFICANT CHANGE UP (ref 0–1.5)
LYMPHOCYTES # BLD AUTO: 0.8 K/UL — LOW (ref 1–3.3)
LYMPHOCYTES # BLD AUTO: 19.7 % — SIGNIFICANT CHANGE UP (ref 13–44)
MCHC RBC-ENTMCNC: 31.6 PG — SIGNIFICANT CHANGE UP (ref 27–34)
MCHC RBC-ENTMCNC: 33.4 GM/DL — SIGNIFICANT CHANGE UP (ref 32–36)
MCV RBC AUTO: 94.5 FL — SIGNIFICANT CHANGE UP (ref 80–100)
MONOCYTES # BLD AUTO: 0.52 K/UL — SIGNIFICANT CHANGE UP (ref 0–0.9)
MONOCYTES NFR BLD AUTO: 12.8 % — SIGNIFICANT CHANGE UP (ref 2–14)
NEUTROPHILS # BLD AUTO: 2.71 K/UL — SIGNIFICANT CHANGE UP (ref 1.8–7.4)
NEUTROPHILS NFR BLD AUTO: 66.9 % — SIGNIFICANT CHANGE UP (ref 43–77)
NRBC # BLD: 0 /100 WBCS — SIGNIFICANT CHANGE UP (ref 0–0)
PLATELET # BLD AUTO: 141 K/UL — LOW (ref 150–400)
POTASSIUM SERPL-MCNC: 3.9 MMOL/L — SIGNIFICANT CHANGE UP (ref 3.5–5.3)
POTASSIUM SERPL-SCNC: 3.9 MMOL/L — SIGNIFICANT CHANGE UP (ref 3.5–5.3)
RBC # BLD: 4.56 M/UL — SIGNIFICANT CHANGE UP (ref 3.8–5.2)
RBC # FLD: 13.3 % — SIGNIFICANT CHANGE UP (ref 10.3–14.5)
SARS-COV-2 RNA SPEC QL NAA+PROBE: DETECTED
SODIUM SERPL-SCNC: 136 MMOL/L — SIGNIFICANT CHANGE UP (ref 135–145)
WBC # BLD: 4.06 K/UL — SIGNIFICANT CHANGE UP (ref 3.8–10.5)
WBC # FLD AUTO: 4.06 K/UL — SIGNIFICANT CHANGE UP (ref 3.8–10.5)

## 2021-01-18 PROCEDURE — 99233 SBSQ HOSP IP/OBS HIGH 50: CPT | Mod: CS

## 2021-01-18 RX ORDER — METOPROLOL TARTRATE 50 MG
25 TABLET ORAL EVERY 12 HOURS
Refills: 0 | Status: DISCONTINUED | OUTPATIENT
Start: 2021-01-18 | End: 2021-01-21

## 2021-01-18 RX ADMIN — GABAPENTIN 200 MILLIGRAM(S): 400 CAPSULE ORAL at 17:08

## 2021-01-18 RX ADMIN — Medication 500 MILLIGRAM(S): at 11:28

## 2021-01-18 RX ADMIN — PREGABALIN 1000 MICROGRAM(S): 225 CAPSULE ORAL at 11:27

## 2021-01-18 RX ADMIN — Medication 1 TABLET(S): at 11:27

## 2021-01-18 RX ADMIN — Medication 1000 UNIT(S): at 11:26

## 2021-01-18 RX ADMIN — Medication 325 MILLIGRAM(S): at 11:28

## 2021-01-18 RX ADMIN — Medication 25 MILLIGRAM(S): at 07:09

## 2021-01-18 RX ADMIN — ENOXAPARIN SODIUM 40 MILLIGRAM(S): 100 INJECTION SUBCUTANEOUS at 21:47

## 2021-01-18 RX ADMIN — GABAPENTIN 200 MILLIGRAM(S): 400 CAPSULE ORAL at 07:08

## 2021-01-18 RX ADMIN — Medication 25 MILLIGRAM(S): at 17:07

## 2021-01-18 RX ADMIN — Medication 3 MILLIGRAM(S): at 21:47

## 2021-01-18 RX ADMIN — Medication 75 MICROGRAM(S): at 07:08

## 2021-01-18 RX ADMIN — Medication 20 MILLIGRAM(S): at 07:08

## 2021-01-18 NOTE — PROGRESS NOTE ADULT - SUBJECTIVE AND OBJECTIVE BOX
Medicine Progress Note    Patient is a 89y old  Female who presents with a chief complaint of Diarrhea in a bed bound patient, COVID positivity status (17 Jan 2021 10:50)      SUBJECTIVE / OVERNIGHT EVENTS:  seen and examined  Chart reviewed  overnight BP was high. hydralazine was given    no complaints  feeling better.  on RA  BP high    ADDITIONAL REVIEW OF SYSTEMS:    MEDICATIONS  (STANDING):  ascorbic acid 500 milliGRAM(s) Oral daily  cholecalciferol 1000 Unit(s) Oral daily  cyanocobalamin 1000 MICROGram(s) Oral daily  dextrose 40% Gel 15 Gram(s) Oral once  dextrose 5%. 1000 milliLiter(s) (50 mL/Hr) IV Continuous <Continuous>  dextrose 5%. 1000 milliLiter(s) (100 mL/Hr) IV Continuous <Continuous>  dextrose 50% Injectable 25 Gram(s) IV Push once  dextrose 50% Injectable 12.5 Gram(s) IV Push once  dextrose 50% Injectable 25 Gram(s) IV Push once  enoxaparin Injectable 40 milliGRAM(s) SubCutaneous at bedtime  ferrous    sulfate 325 milliGRAM(s) Oral daily  furosemide    Tablet 20 milliGRAM(s) Oral daily  gabapentin 200 milliGRAM(s) Oral two times a day  glucagon  Injectable 1 milliGRAM(s) IntraMuscular once  insulin lispro (ADMELOG) corrective regimen sliding scale   SubCutaneous two times a day with meals  levothyroxine 75 MICROGram(s) Oral daily  melatonin 3 milliGRAM(s) Oral at bedtime  metoprolol succinate ER 25 milliGRAM(s) Oral every 12 hours  multivitamin 1 Tablet(s) Oral daily    MEDICATIONS  (PRN):  acetaminophen   Tablet .. 650 milliGRAM(s) Oral every 4 hours PRN Temp greater or equal to 38C (100.4F), Moderate Pain (4 - 6)  acetaminophen   Tablet .. 325 milliGRAM(s) Oral every 6 hours PRN Mild Pain (1 - 3)  guaiFENesin  milliGRAM(s) Oral every 12 hours PRN Cough  ondansetron   Disintegrating Tablet 4 milliGRAM(s) Oral every 6 hours PRN Nausea and/or Vomiting    CAPILLARY BLOOD GLUCOSE      POCT Blood Glucose.: 146 mg/dL (18 Jan 2021 11:47)  POCT Blood Glucose.: 121 mg/dL (18 Jan 2021 08:22)  POCT Blood Glucose.: 150 mg/dL (17 Jan 2021 18:36)    I&O's Summary      PHYSICAL EXAM:  Vital Signs Last 24 Hrs  T(C): 36.8 (18 Jan 2021 06:44), Max: 36.8 (18 Jan 2021 06:44)  T(F): 98.2 (18 Jan 2021 06:44), Max: 98.2 (18 Jan 2021 06:44)  HR: 71 (18 Jan 2021 06:44) (71 - 100)  BP: 162/88 (18 Jan 2021 06:44) (160/88 - 174/99)  BP(mean): --  RR: 17 (18 Jan 2021 06:44) (17 - 18)  SpO2: 98% (18 Jan 2021 06:44) (92% - 98%)    CONSTITUTIONAL: not in distress  ENMT: Moist oral mucosa,  RESPIRATORY: Normal respiratory effort; lungs are clear to auscultation bilaterally  CARDIOVASCULAR: Regular rate and rhythm, normal S1 and S2, no murmur/rub/gallop;   ABDOMEN: Nontender to palpation, normoactive bowel sounds, no rebound/guarding;   Ext: No lower extremity edema; Peripheral pulses are 2+ bilaterally. no leg or calf TP  PSYCH: A+O to person, place, and time; affect appropriate  NEUROLOGY: CN 2-12 are intact and symmetric; no gross sensory deficits   SKIN: No rashes; no palpable lesions    LABS:                        14.4   4.06  )-----------( 141      ( 18 Jan 2021 06:51 )             43.1     01-18    136  |  100  |  9   ----------------------------<  134<H>  3.9   |  26  |  0.80    Ca    9.7      18 Jan 2021 06:51    TPro  8.1  /  Alb  3.7  /  TBili  0.6  /  DBili  x   /  AST  32  /  ALT  26  /  AlkPhos  62  01-16              COVID-19 PCR: Detected (16 Jan 2021 14:00)      RADIOLOGY & ADDITIONAL TESTS:  Imaging from Last 24 Hours:    Electrocardiogram/QTc Interval:    COORDINATION OF CARE:  Care Discussed with Consultants/Other Providers:

## 2021-01-18 NOTE — PROGRESS NOTE ADULT - ASSESSMENT
89 yo F with PMHx of Afib (not on AC due to vaginal bleeding 2/2 polyps), chronic back pain 2/2 spinal stenosis,  DM2 on metformin, HTN, recurrent UTIs, chronic venous insufficiency. Pt sent by [facility] to rule out left lower extremity DVT due to swelling and because of diarrhea. They will not take pt back because of diarrhea (difficult to clean because she is immobile) and because of COVID positivity.     Diarrhea likely due to COVID positivity  Satting well on RA  CXR ordered in ED but pt refused the test  monitor electrolytes  repeat covid test pending 1/18     Bilateral lower extremity edema due to chronic venous insufficiency in wheelchair bound patient  Chronic. Both legs appear symmetric on exam. No evidence of DVT on left sided duplex.   c/w Lasix   elevate legs    Atrial Fibrillation, not on AC sec to bleed  Metoprolol ER 25 daily increased to BID    HTN:  - uncontrolled  - increase metoprolol ER to BID,   - monitor BP and adjuts dose  - DASH/TLC diet    DM2  ISS, accucheck  A1c 6.1    Contact: Jose Kay (son) [phone]. who also resides with another son,  Gal  Spoke with Gal -1/18-updated  FULL CODE    Dispo: pending covid negativity. repeat pending today. seen by PT. patient is bedbound and wheelchair bound at baseline. possible home once stable. from [facility]

## 2021-01-18 NOTE — ED ADULT TRIAGE NOTE - ACCOMPANIED BY
EMT/paramedic Graft Donor Site Bandage (Optional-Leave Blank If You Don't Want In Note): Steri-strips and a pressure bandage were applied to the donor site.

## 2021-01-19 LAB
GLUCOSE BLDC GLUCOMTR-MCNC: 133 MG/DL — HIGH (ref 70–99)
GLUCOSE BLDC GLUCOMTR-MCNC: 147 MG/DL — HIGH (ref 70–99)

## 2021-01-19 PROCEDURE — 99232 SBSQ HOSP IP/OBS MODERATE 35: CPT | Mod: CS

## 2021-01-19 RX ADMIN — Medication 500 MILLIGRAM(S): at 12:22

## 2021-01-19 RX ADMIN — PREGABALIN 1000 MICROGRAM(S): 225 CAPSULE ORAL at 12:22

## 2021-01-19 RX ADMIN — Medication 75 MICROGRAM(S): at 05:52

## 2021-01-19 RX ADMIN — ENOXAPARIN SODIUM 40 MILLIGRAM(S): 100 INJECTION SUBCUTANEOUS at 21:52

## 2021-01-19 RX ADMIN — GABAPENTIN 200 MILLIGRAM(S): 400 CAPSULE ORAL at 17:32

## 2021-01-19 RX ADMIN — Medication 1 TABLET(S): at 12:22

## 2021-01-19 RX ADMIN — Medication 1000 UNIT(S): at 12:22

## 2021-01-19 RX ADMIN — Medication 325 MILLIGRAM(S): at 12:22

## 2021-01-19 RX ADMIN — Medication 25 MILLIGRAM(S): at 05:52

## 2021-01-19 RX ADMIN — GABAPENTIN 200 MILLIGRAM(S): 400 CAPSULE ORAL at 05:51

## 2021-01-19 RX ADMIN — Medication 25 MILLIGRAM(S): at 17:32

## 2021-01-19 RX ADMIN — Medication 3 MILLIGRAM(S): at 21:52

## 2021-01-19 RX ADMIN — Medication 20 MILLIGRAM(S): at 05:51

## 2021-01-19 NOTE — PROGRESS NOTE ADULT - ASSESSMENT
89 yo F with PMHx of Afib (not on AC due to vaginal bleeding 2/2 polyps), chronic back pain 2/2 spinal stenosis,  DM2 on metformin, HTN, recurrent UTIs, chronic venous insufficiency. Pt sent by [facility] to rule out left lower extremity DVT due to swelling and because of diarrhea. They will not take pt back because of diarrhea (difficult to clean because she is immobile) and because of COVID positivity.     Diarrhea likely due to COVID positivity; improved  Satting well on RA  CXR ordered in ED but pt refused the test  monitor electrolytes  repeat covid test + on 1/18     Bilateral lower extremity edema due to chronic venous insufficiency in wheelchair bound patient  Chronic. Both legs appear symmetric on exam. No evidence of DVT on left sided duplex.   c/w Lasix   elevate legs    Atrial Fibrillation, not on AC sec to bleed  Metoprolol ER 25 daily increased to BID    HTN:  - uncontrolled  - increase metoprolol ER to BID,   - monitor BP and adjuts dose  - DASH/TLC diet    DM2  ISS, accucheck  A1c 6.1    Contact: Jose Kay (son) [phone]. who also resides with another sonGal  updated  FULL CODE    Dispo: pending covid negativity. repeat pending today. seen by PT. patient is bedbound and wheelchair bound at baseline. possible home once stable. from [facility]     89 yo F with PMHx of Afib (not on AC due to vaginal bleeding 2/2 polyps), chronic back pain 2/2 spinal stenosis,  DM2 on metformin, HTN, recurrent UTIs, chronic venous insufficiency. Pt sent by [facility] to rule out left lower extremity DVT due to swelling and because of diarrhea. They will not take pt back because of diarrhea (difficult to clean because she is immobile) and because of COVID positivity.     Diarrhea likely due to COVID positivity; improved  Satting well on RA  CXR ordered in ED but pt refused the test  monitor electrolytes  repeat covid test + on 1/18     Bilateral lower extremity edema due to chronic venous insufficiency in wheelchair bound patient  Chronic. Both legs appear symmetric on exam. No evidence of DVT on left sided duplex.   c/w Lasix   elevate legs    Atrial Fibrillation, not on AC sec to bleed  Metoprolol ER 25 daily increased to BID    HTN:  - uncontrolled  - increase metoprolol ER to BID,   - monitor BP and adjuts dose  - DASH/TLC diet    DM2  ISS, accucheck  A1c 6.1    Contact: Jose Kay (son) [phone]. who also resides with another sonGal  updated  FULL CODE    Dispo: pending covid negativity. repeat pending today. seen by PT. patient is bedbound and wheelchair bound at baseline. possible home once stable. from [facility]. stable for [place]  Last covid test: Jan 18: positive. repeat ordered for tomorrow am

## 2021-01-19 NOTE — PROGRESS NOTE ADULT - SUBJECTIVE AND OBJECTIVE BOX
Medicine Progress Note    Patient is a 89y old  Female who presents with a chief complaint of Diarrhea in a bed bound patient, COVID positivity status (17 Jan 2021 10:50)      SUBJECTIVE / OVERNIGHT EVENTS:  seen and examined  Chart reviewed  no overnight events    no complaints  feeling better.  on RA.  DC pending     ADDITIONAL REVIEW OF SYSTEMS:  no fever/chills/CP/sob/palpitation/dizziness/ abd pain/nausea/vomiting/diarrhea/constipation/headaches. all other ROS neg    MEDICATIONS  (STANDING):  ascorbic acid 500 milliGRAM(s) Oral daily  cholecalciferol 1000 Unit(s) Oral daily  cyanocobalamin 1000 MICROGram(s) Oral daily  dextrose 40% Gel 15 Gram(s) Oral once  dextrose 5%. 1000 milliLiter(s) (50 mL/Hr) IV Continuous <Continuous>  dextrose 5%. 1000 milliLiter(s) (100 mL/Hr) IV Continuous <Continuous>  dextrose 50% Injectable 25 Gram(s) IV Push once  dextrose 50% Injectable 12.5 Gram(s) IV Push once  dextrose 50% Injectable 25 Gram(s) IV Push once  enoxaparin Injectable 40 milliGRAM(s) SubCutaneous at bedtime  ferrous    sulfate 325 milliGRAM(s) Oral daily  furosemide    Tablet 20 milliGRAM(s) Oral daily  gabapentin 200 milliGRAM(s) Oral two times a day  glucagon  Injectable 1 milliGRAM(s) IntraMuscular once  insulin lispro (ADMELOG) corrective regimen sliding scale   SubCutaneous two times a day with meals  levothyroxine 75 MICROGram(s) Oral daily  melatonin 3 milliGRAM(s) Oral at bedtime  metoprolol succinate ER 25 milliGRAM(s) Oral every 12 hours  multivitamin 1 Tablet(s) Oral daily    MEDICATIONS  (PRN):  acetaminophen   Tablet .. 650 milliGRAM(s) Oral every 4 hours PRN Temp greater or equal to 38C (100.4F), Moderate Pain (4 - 6)  acetaminophen   Tablet .. 325 milliGRAM(s) Oral every 6 hours PRN Mild Pain (1 - 3)  guaiFENesin  milliGRAM(s) Oral every 12 hours PRN Cough  ondansetron   Disintegrating Tablet 4 milliGRAM(s) Oral every 6 hours PRN Nausea and/or Vomiting        PHYSICAL EXAM:    Vital Signs Last 24 Hrs  T(C): 37.2 (18 Jan 2021 21:48), Max: 37.2 (18 Jan 2021 21:48)  T(F): 99 (18 Jan 2021 21:48), Max: 99 (18 Jan 2021 21:48)  HR: 80 (19 Jan 2021 08:13) (76 - 80)  BP: 152/81 (19 Jan 2021 08:13) (133/88 - 152/81)  BP(mean): --  RR: 18 (19 Jan 2021 08:13) (18 - 18)  SpO2: 96% (19 Jan 2021 08:13) (95% - 96%)    CAPILLARY BLOOD GLUCOSE      POCT Blood Glucose.: 147 mg/dL (19 Jan 2021 08:24)  POCT Blood Glucose.: 145 mg/dL (18 Jan 2021 21:35)  POCT Blood Glucose.: 133 mg/dL (18 Jan 2021 17:03)    I&O's Detail      CONSTITUTIONAL: not in distress  ENMT: Moist oral mucosa,  RESPIRATORY: Normal respiratory effort; lungs are clear to auscultation bilaterally  CARDIOVASCULAR: Regular rate and rhythm, normal S1 and S2, no murmur/rub/gallop;   ABDOMEN: Nontender to palpation, normoactive bowel sounds, no rebound/guarding;   Ext: No lower extremity edema; Peripheral pulses are 2+ bilaterally. no leg or calf TP  PSYCH: A+O to person, place, and time; affect appropriate  NEUROLOGY: grossly intact    LABS:                                   14.4   4.06  )-----------( 141      ( 18 Jan 2021 06:51 )             43.1       01-18    136  |  100  |  9   ----------------------------<  134<H>  3.9   |  26  |  0.80    Ca    9.7      18 Jan 2021 06:51                  COVID-19 PCR: Detected (16 Jan 2021 14:00)      RADIOLOGY & ADDITIONAL TESTS:  Imaging from Last 24 Hours:    Electrocardiogram/QTc Interval:    COORDINATION OF CARE:  Care Discussed with Consultants/Other Providers:

## 2021-01-20 LAB
GLUCOSE BLDC GLUCOMTR-MCNC: 150 MG/DL — HIGH (ref 70–99)
GLUCOSE BLDC GLUCOMTR-MCNC: 160 MG/DL — HIGH (ref 70–99)
GLUCOSE BLDC GLUCOMTR-MCNC: 245 MG/DL — HIGH (ref 70–99)
SARS-COV-2 RNA SPEC QL NAA+PROBE: DETECTED

## 2021-01-20 PROCEDURE — 99233 SBSQ HOSP IP/OBS HIGH 50: CPT | Mod: CS

## 2021-01-20 RX ADMIN — Medication 25 MILLIGRAM(S): at 17:19

## 2021-01-20 RX ADMIN — Medication 25 MILLIGRAM(S): at 06:07

## 2021-01-20 RX ADMIN — GABAPENTIN 200 MILLIGRAM(S): 400 CAPSULE ORAL at 06:08

## 2021-01-20 RX ADMIN — PREGABALIN 1000 MICROGRAM(S): 225 CAPSULE ORAL at 12:06

## 2021-01-20 RX ADMIN — GABAPENTIN 200 MILLIGRAM(S): 400 CAPSULE ORAL at 17:19

## 2021-01-20 RX ADMIN — Medication 20 MILLIGRAM(S): at 06:07

## 2021-01-20 RX ADMIN — Medication 500 MILLIGRAM(S): at 12:06

## 2021-01-20 RX ADMIN — Medication 1 TABLET(S): at 12:06

## 2021-01-20 RX ADMIN — Medication 325 MILLIGRAM(S): at 12:06

## 2021-01-20 RX ADMIN — Medication 75 MICROGRAM(S): at 06:08

## 2021-01-20 RX ADMIN — Medication 1000 UNIT(S): at 12:06

## 2021-01-20 RX ADMIN — Medication 2: at 12:08

## 2021-01-20 RX ADMIN — Medication 3 MILLIGRAM(S): at 21:19

## 2021-01-20 RX ADMIN — ENOXAPARIN SODIUM 40 MILLIGRAM(S): 100 INJECTION SUBCUTANEOUS at 21:19

## 2021-01-20 NOTE — PROGRESS NOTE ADULT - SUBJECTIVE AND OBJECTIVE BOX
Patient is a 89y old  Female who presents with a chief complaint of Diarrhea in a bed bound patient, COVID positivity status (19 Jan 2021 12:07)      Patient seen and examined at bedside. No overnight events reported.     ALLERGIES:  aspirin (Unknown)  penicillin (Unknown)    MEDICATIONS  (STANDING):  ascorbic acid 500 milliGRAM(s) Oral daily  cholecalciferol 1000 Unit(s) Oral daily  cyanocobalamin 1000 MICROGram(s) Oral daily  dextrose 40% Gel 15 Gram(s) Oral once  dextrose 5%. 1000 milliLiter(s) (50 mL/Hr) IV Continuous <Continuous>  dextrose 5%. 1000 milliLiter(s) (100 mL/Hr) IV Continuous <Continuous>  dextrose 50% Injectable 25 Gram(s) IV Push once  dextrose 50% Injectable 12.5 Gram(s) IV Push once  dextrose 50% Injectable 25 Gram(s) IV Push once  enoxaparin Injectable 40 milliGRAM(s) SubCutaneous at bedtime  ferrous    sulfate 325 milliGRAM(s) Oral daily  furosemide    Tablet 20 milliGRAM(s) Oral daily  gabapentin 200 milliGRAM(s) Oral two times a day  glucagon  Injectable 1 milliGRAM(s) IntraMuscular once  insulin lispro (ADMELOG) corrective regimen sliding scale   SubCutaneous two times a day with meals  levothyroxine 75 MICROGram(s) Oral daily  melatonin 3 milliGRAM(s) Oral at bedtime  metoprolol succinate ER 25 milliGRAM(s) Oral every 12 hours  multivitamin 1 Tablet(s) Oral daily    MEDICATIONS  (PRN):  acetaminophen   Tablet .. 650 milliGRAM(s) Oral every 4 hours PRN Temp greater or equal to 38C (100.4F), Moderate Pain (4 - 6)  acetaminophen   Tablet .. 325 milliGRAM(s) Oral every 6 hours PRN Mild Pain (1 - 3)  guaiFENesin  milliGRAM(s) Oral every 12 hours PRN Cough  ondansetron   Disintegrating Tablet 4 milliGRAM(s) Oral every 6 hours PRN Nausea and/or Vomiting    Vital Signs Last 24 Hrs  T(F): 98.4 (20 Jan 2021 11:12), Max: 98.4 (20 Jan 2021 11:12)  HR: 68 (20 Jan 2021 17:15) (68 - 79)  BP: 152/79 (20 Jan 2021 17:15) (131/71 - 153/88)  RR: 18 (20 Jan 2021 11:12) (18 - 18)  SpO2: 94% (20 Jan 2021 17:15) (94% - 94%)  I&O's Summary    19 Jan 2021 07:01  -  20 Jan 2021 07:00  --------------------------------------------------------  IN: 601 mL / OUT: 1 mL / NET: 600 mL    20 Jan 2021 07:01  -  20 Jan 2021 19:59  --------------------------------------------------------  IN: 900 mL / OUT: 0 mL / NET: 900 mL      PHYSICAL EXAM:  General: NAD, A/O x 3  ENT: Moist mucous membranes, no thrush  Neck: Supple, No JVD  Lungs: Clear to auscultation bilaterally, good air entry, non-labored breathing  Cardio: IRRR, S1/S2  Abdomen: Soft, Nontender, Nondistended; Bowel sounds present  Extremities: No calf tenderness, No pitting edema    LABS:                        14.4   4.06  )-----------( 141      ( 18 Jan 2021 06:51 )             43.1     01-18    136  |  100  |  9   ----------------------------<  134  3.9   |  26  |  0.80    Ca    9.7      18 Jan 2021 06:51          eGFR if Non African American: 65 mL/min/1.73M2 (01-18-21 @ 06:51)  eGFR if : 75 mL/min/1.73M2 (01-18-21 @ 06:51)    POCT Blood Glucose.: 150 mg/dL (20 Jan 2021 17:13)  POCT Blood Glucose.: 245 mg/dL (20 Jan 2021 12:05)  POCT Blood Glucose.: 160 mg/dL (20 Jan 2021 08:03)

## 2021-01-20 NOTE — PROGRESS NOTE ADULT - ASSESSMENT
89F with Afib (not on AC due to vaginal bleeding 2/2 polyps), chronic back pain 2/2 spinal stenosis,  DM2 on metformin, HTN, recurrent UTIs, chronic venous insufficiency, presents from [facility] for diarrhea, found to have COVID+ infection    #Diarrhea likely due to #COVID+ infection without signs of pneumonia   -Satting well on RA  -CXR ordered in ED but pt refused the test  -Repeat COVID test scheduled for tomorrow, last positive on 1/18    #Bilateral chronic venous insufficiency  -No evidence of DVT on Left Lower Extremity    -c/w Lasix   -elevate legs    #Chronic Atrial Fibrillation, not on anticoagulation secondary to bleed  -c/w beta blocker - if rate control becomes an issue, can consider short-acting formulation  -c/w ASA    Essential HTN:  - uncontrolled  - now on metoprolol ER 25 mg BID (was increased to bid during this hospital course)    - DASH/TLC diet    DM2  ISS, accucheck  A1c 6.1    Dispo: D/C back to [facility] pending two negative COVID tests. PT follow-up. Per patient's son, patient is bedbound and wheelchair bound but she does transfer with assistance to the wheelchair. Request PT follow-up. Repeat COVID test in AM.     Case d/w Jose Kay (son) [phone]  - he wants to make sure patient is OOB to chair, and wants to make sure patient is eating.     FULL CODE

## 2021-01-21 LAB
ANION GAP SERPL CALC-SCNC: 10 MMOL/L — SIGNIFICANT CHANGE UP (ref 5–17)
BASOPHILS NFR BLD AUTO: 1 % — SIGNIFICANT CHANGE UP (ref 0–2)
BUN SERPL-MCNC: 14 MG/DL — SIGNIFICANT CHANGE UP (ref 7–23)
CALCIUM SERPL-MCNC: 9.6 MG/DL — SIGNIFICANT CHANGE UP (ref 8.4–10.5)
CHLORIDE SERPL-SCNC: 95 MMOL/L — LOW (ref 96–108)
CO2 SERPL-SCNC: 27 MMOL/L — SIGNIFICANT CHANGE UP (ref 22–31)
CREAT SERPL-MCNC: 0.66 MG/DL — SIGNIFICANT CHANGE UP (ref 0.5–1.3)
EOSINOPHIL NFR BLD AUTO: 0 % — SIGNIFICANT CHANGE UP (ref 0–6)
GLUCOSE BLDC GLUCOMTR-MCNC: 161 MG/DL — HIGH (ref 70–99)
GLUCOSE BLDC GLUCOMTR-MCNC: 164 MG/DL — HIGH (ref 70–99)
GLUCOSE SERPL-MCNC: 152 MG/DL — HIGH (ref 70–99)
HCT VFR BLD CALC: 46.2 % — HIGH (ref 34.5–45)
HGB BLD-MCNC: 15.6 G/DL — HIGH (ref 11.5–15.5)
LYMPHOCYTES # BLD AUTO: 20 % — SIGNIFICANT CHANGE UP (ref 13–44)
MAGNESIUM SERPL-MCNC: 1.4 MG/DL — LOW (ref 1.6–2.6)
MCHC RBC-ENTMCNC: 32.3 PG — SIGNIFICANT CHANGE UP (ref 27–34)
MCHC RBC-ENTMCNC: 33.8 GM/DL — SIGNIFICANT CHANGE UP (ref 32–36)
MCV RBC AUTO: 95.7 FL — SIGNIFICANT CHANGE UP (ref 80–100)
MONOCYTES NFR BLD AUTO: 16 % — HIGH (ref 2–14)
NEUTROPHILS NFR BLD AUTO: 63 % — SIGNIFICANT CHANGE UP (ref 43–77)
PHOSPHATE SERPL-MCNC: 2.3 MG/DL — LOW (ref 2.5–4.5)
PLATELET # BLD AUTO: 138 K/UL — LOW (ref 150–400)
POTASSIUM SERPL-MCNC: 3.4 MMOL/L — LOW (ref 3.5–5.3)
POTASSIUM SERPL-SCNC: 3.4 MMOL/L — LOW (ref 3.5–5.3)
RBC # BLD: 4.83 M/UL — SIGNIFICANT CHANGE UP (ref 3.8–5.2)
RBC # FLD: 12.7 % — SIGNIFICANT CHANGE UP (ref 10.3–14.5)
SODIUM SERPL-SCNC: 132 MMOL/L — LOW (ref 135–145)
WBC # BLD: 4 K/UL — SIGNIFICANT CHANGE UP (ref 3.8–10.5)
WBC # FLD AUTO: 4 K/UL — SIGNIFICANT CHANGE UP (ref 3.8–10.5)

## 2021-01-21 PROCEDURE — 99232 SBSQ HOSP IP/OBS MODERATE 35: CPT | Mod: CS

## 2021-01-21 RX ORDER — SENNA PLUS 8.6 MG/1
2 TABLET ORAL AT BEDTIME
Refills: 0 | Status: DISCONTINUED | OUTPATIENT
Start: 2021-01-21 | End: 2021-02-07

## 2021-01-21 RX ORDER — SODIUM,POTASSIUM PHOSPHATES 278-250MG
1 POWDER IN PACKET (EA) ORAL THREE TIMES A DAY
Refills: 0 | Status: COMPLETED | OUTPATIENT
Start: 2021-01-21 | End: 2021-01-23

## 2021-01-21 RX ORDER — POTASSIUM CHLORIDE 20 MEQ
20 PACKET (EA) ORAL
Refills: 0 | Status: COMPLETED | OUTPATIENT
Start: 2021-01-21 | End: 2021-01-21

## 2021-01-21 RX ORDER — MAGNESIUM SULFATE 500 MG/ML
2 VIAL (ML) INJECTION ONCE
Refills: 0 | Status: COMPLETED | OUTPATIENT
Start: 2021-01-21 | End: 2021-01-21

## 2021-01-21 RX ORDER — POLYETHYLENE GLYCOL 3350 17 G/17G
17 POWDER, FOR SOLUTION ORAL DAILY
Refills: 0 | Status: DISCONTINUED | OUTPATIENT
Start: 2021-01-21 | End: 2021-02-07

## 2021-01-21 RX ORDER — METOPROLOL TARTRATE 50 MG
25 TABLET ORAL DAILY
Refills: 0 | Status: DISCONTINUED | OUTPATIENT
Start: 2021-01-21 | End: 2021-02-07

## 2021-01-21 RX ADMIN — Medication 325 MILLIGRAM(S): at 12:51

## 2021-01-21 RX ADMIN — Medication 1000 UNIT(S): at 12:51

## 2021-01-21 RX ADMIN — Medication 20 MILLIEQUIVALENT(S): at 22:39

## 2021-01-21 RX ADMIN — Medication 1 TABLET(S): at 12:52

## 2021-01-21 RX ADMIN — ENOXAPARIN SODIUM 40 MILLIGRAM(S): 100 INJECTION SUBCUTANEOUS at 22:39

## 2021-01-21 RX ADMIN — Medication 75 MICROGRAM(S): at 05:12

## 2021-01-21 RX ADMIN — Medication 20 MILLIEQUIVALENT(S): at 18:34

## 2021-01-21 RX ADMIN — Medication 3 MILLIGRAM(S): at 22:40

## 2021-01-21 RX ADMIN — PREGABALIN 1000 MICROGRAM(S): 225 CAPSULE ORAL at 12:51

## 2021-01-21 RX ADMIN — Medication 25 MILLIGRAM(S): at 05:12

## 2021-01-21 RX ADMIN — POLYETHYLENE GLYCOL 3350 17 GRAM(S): 17 POWDER, FOR SOLUTION ORAL at 18:39

## 2021-01-21 RX ADMIN — GABAPENTIN 200 MILLIGRAM(S): 400 CAPSULE ORAL at 18:38

## 2021-01-21 RX ADMIN — Medication 1 PACKET(S): at 22:40

## 2021-01-21 RX ADMIN — GABAPENTIN 200 MILLIGRAM(S): 400 CAPSULE ORAL at 05:12

## 2021-01-21 RX ADMIN — Medication 50 GRAM(S): at 18:28

## 2021-01-21 RX ADMIN — SENNA PLUS 2 TABLET(S): 8.6 TABLET ORAL at 22:40

## 2021-01-21 RX ADMIN — Medication 1: at 08:36

## 2021-01-21 RX ADMIN — Medication 20 MILLIEQUIVALENT(S): at 22:41

## 2021-01-21 RX ADMIN — Medication 500 MILLIGRAM(S): at 12:53

## 2021-01-21 RX ADMIN — Medication 20 MILLIGRAM(S): at 05:12

## 2021-01-21 RX ADMIN — Medication 1 PACKET(S): at 18:32

## 2021-01-21 NOTE — DIETITIAN INITIAL EVALUATION ADULT. - OTHER INFO
89 yo F with PMHx of Afib (not on AC due to vaginal bleeding 2/2 polyps), chronic back pain 2/2 spinal stenosis,  DM2 on metformin, HTN, recurrent UTIs, chronic venous insufficiency. Patient noted with diarrhea PTA , last BM (1/17) fecal incontinence noted . A1c 6.1% , no current indication for DM restriction due to poor appetite. 89 yo F with PMHx of Afib (not on AC due to vaginal bleeding 2/2 polyps), chronic back pain 2/2 spinal stenosis,  DM2 on metformin, HTN, recurrent UTIs, chronic venous insufficiency. Patient noted with diarrhea PTA , last BM (1/17) fecal incontinence noted . A1c 6.1% , no current indication for DM restriction due to poor appetite. Patient is not receptive to po snack or supplements at present , POCT noted , insulin regimen to be modified as per MD , Patient was noted tolerating diet & consumed % of meals yesterday (1/20) as per flow sheet . Skin noted blanchable, diet education not appropriate at present due to current medical status. Patient reports she is not concerned about not having a appetite "because I am overweight & I could lose a few pounds" as per patient

## 2021-01-21 NOTE — PROGRESS NOTE ADULT - ASSESSMENT
89F with Afib (not on AC due to vaginal bleeding 2/2 polyps), chronic back pain 2/2 spinal stenosis,  DM2 on metformin, HTN, recurrent UTIs, chronic venous insufficiency, presents from [facility] for diarrhea, found to have COVID+ infection    #Diarrhea likely due to #COVID+ infection without signs of pneumonia   -Satting well on RA  -CXR ordered in ED but pt refused the test  -Repeat COVID test done 1/20 - still positive, will repeat on 1/23     #Bilateral chronic venous insufficiency  -No evidence of DVT on Left Lower Extremity    -c/w Lasix - monitor electrolytes closely  -elevate legs    #Chronic Atrial Fibrillation, not on anticoagulation secondary to bleed  -c/w beta blocker - if rate control becomes an issue, can consider short-acting formulation  -c/w ASA    #Essential HTN:  - uncontrolled  - now on metoprolol ER 25 mg BID (was increased to bid during this hospital course)    - DASH/TLC diet    #DM2  ISS, accucheck  A1c 6.1    #Hypomagensemia: Replete  #Hypokalemia: Replete  #Hypophosphatemia: Replete  - All of these electrolyte abnormalities could be affecting the patient's appetite and/or energy levels. Replete today.    Dispo: D/C back to [facility] pending two negative COVID tests. PT follow-up. Per patient's son, patient is bedbound and wheelchair bound but she does transfer with assistance to the wheelchair. Request PT follow-up.     Left voicemail with patient's son, Jose [facility] - [phone]     FULL CODE 89F with Afib (not on AC due to vaginal bleeding 2/2 polyps), chronic back pain 2/2 spinal stenosis,  DM2 on metformin, HTN, recurrent UTIs, chronic venous insufficiency, presents from [facility] for diarrhea, found to have COVID+ infection    #Diarrhea likely due to #COVID+ infection without signs of pneumonia   -Satting well on RA  -CXR ordered in ED but pt refused the test  -Repeat COVID test done 1/20 - still positive, will repeat tomorrow AM    #Bilateral chronic venous insufficiency  -No evidence of DVT on Left Lower Extremity    -c/w Lasix - monitor electrolytes closely  -elevate legs    #Chronic Atrial Fibrillation, not on anticoagulation secondary to bleed  -c/w beta blocker - if rate control becomes an issue, can consider short-acting formulation  -c/w ASA    #Essential HTN:  - uncontrolled  - now on metoprolol ER 25 mg BID (was increased to bid during this hospital course)    - DASH/TLC diet    #DM2  ISS, accucheck  A1c 6.1    #Hypomagensemia: Replete  #Hypokalemia: Replete  #Hypophosphatemia: Replete  - All of these electrolyte abnormalities could be affecting the patient's appetite and/or energy levels. Replete today.    Dispo: D/C back to [facility] pending two negative COVID tests. PT follow-up. Per patient's son, patient is bedbound and wheelchair bound but she does transfer with assistance to the wheelchair. Request PT follow-up.     Left voicemail with patient's son, Jose [facility] - [phone]     FULL CODE 89F with Afib (not on AC due to vaginal bleeding 2/2 polyps), chronic back pain 2/2 spinal stenosis,  DM2 on metformin, HTN, recurrent UTIs, chronic venous insufficiency, presents from [facility] for diarrhea, found to have COVID+ infection    #Diarrhea likely due to #COVID+ infection without signs of pneumonia   -Satting well on RA  -CXR ordered in ED but pt refused the test  -Repeat COVID test done 1/20 - still positive, will repeat 1/23    #Bilateral chronic venous insufficiency  -No evidence of DVT on Left Lower Extremity    -c/w Lasix - monitor electrolytes closely  -elevate legs    #Chronic Atrial Fibrillation, not on anticoagulation secondary to bleed  -c/w beta blocker - if rate control becomes an issue, can consider short-acting formulation  -c/w ASA    #Essential HTN:  - uncontrolled  - now on metoprolol ER 25 mg BID (was increased to bid during this hospital course)    - DASH/TLC diet    #DM2  ISS, accucheck  A1c 6.1    #Hypomagensemia: Replete  #Hypokalemia: Replete  #Hypophosphatemia: Replete  - All of these electrolyte abnormalities could be affecting the patient's appetite and/or energy levels. Replete today.    Dispo: D/C back to [facility] pending two negative COVID tests. PT follow-up. Per patient's son, patient is bedbound and wheelchair bound but she does transfer with assistance to the wheelchair. Request PT follow-up.     Left voicemail with patient's son, Jose [facility] - [phone]     FULL CODE

## 2021-01-21 NOTE — PROGRESS NOTE ADULT - SUBJECTIVE AND OBJECTIVE BOX
Patient is a 89y old  Female who presents with a chief complaint of Diarrhea in a bed bound patient, COVID positivity status (21 Jan 2021 09:11)    Patient seen and examined at bedside. No overnight events reported. Patient states she ate her breakfast today, although does feel tired.    ALLERGIES:  aspirin (Unknown)  penicillin (Unknown)    MEDICATIONS  (STANDING):  ascorbic acid 500 milliGRAM(s) Oral daily  cholecalciferol 1000 Unit(s) Oral daily  cyanocobalamin 1000 MICROGram(s) Oral daily  dextrose 40% Gel 15 Gram(s) Oral once  dextrose 5%. 1000 milliLiter(s) (50 mL/Hr) IV Continuous <Continuous>  dextrose 5%. 1000 milliLiter(s) (100 mL/Hr) IV Continuous <Continuous>  dextrose 50% Injectable 25 Gram(s) IV Push once  dextrose 50% Injectable 12.5 Gram(s) IV Push once  dextrose 50% Injectable 25 Gram(s) IV Push once  enoxaparin Injectable 40 milliGRAM(s) SubCutaneous at bedtime  ferrous    sulfate 325 milliGRAM(s) Oral daily  furosemide    Tablet 20 milliGRAM(s) Oral daily  gabapentin 200 milliGRAM(s) Oral two times a day  glucagon  Injectable 1 milliGRAM(s) IntraMuscular once  insulin lispro (ADMELOG) corrective regimen sliding scale   SubCutaneous two times a day with meals  levothyroxine 75 MICROGram(s) Oral daily  magnesium sulfate  IVPB 2 Gram(s) IV Intermittent once  melatonin 3 milliGRAM(s) Oral at bedtime  metoprolol succinate ER 25 milliGRAM(s) Oral every 12 hours  multivitamin 1 Tablet(s) Oral daily  potassium chloride    Tablet ER 20 milliEquivalent(s) Oral every 2 hours  potassium phosphate / sodium phosphate Powder (PHOS-NaK) 1 Packet(s) Oral three times a day    MEDICATIONS  (PRN):  acetaminophen   Tablet .. 650 milliGRAM(s) Oral every 4 hours PRN Temp greater or equal to 38C (100.4F), Moderate Pain (4 - 6)  acetaminophen   Tablet .. 325 milliGRAM(s) Oral every 6 hours PRN Mild Pain (1 - 3)  guaiFENesin  milliGRAM(s) Oral every 12 hours PRN Cough  ondansetron   Disintegrating Tablet 4 milliGRAM(s) Oral every 6 hours PRN Nausea and/or Vomiting    Vital Signs Last 24 Hrs  T(F): 97.8 (21 Jan 2021 08:00), Max: 98.6 (20 Jan 2021 20:47)  HR: 74 (21 Jan 2021 08:00) (68 - 82)  BP: 120/70 (21 Jan 2021 08:00) (120/70 - 154/76)  RR: 17 (21 Jan 2021 08:32) (16 - 17)  SpO2: 95% (21 Jan 2021 08:32) (88% - 98%)  I&O's Summary    20 Jan 2021 07:01  -  21 Jan 2021 07:00  --------------------------------------------------------  IN: 900 mL / OUT: 100 mL / NET: 800 mL      PHYSICAL EXAM:  General: NAD, A/O x 3  ENT: Moist mucous membranes, no thrush  Neck: Supple, No JVD  Lungs: Clear to auscultation bilaterally, good air entry, non-labored breathing  Cardio: IRRR, S1/S2  Abdomen: Soft, Nontender, Nondistended; Bowel sounds present  Extremities: No calf tenderness, No pitting edema      LABS:                        15.6   4.00  )-----------( 138      ( 21 Jan 2021 08:40 )             46.2     01-21    132  |  95  |  14  ----------------------------<  152  3.4   |  27  |  0.66    Ca    9.6      21 Jan 2021 08:40  Phos  2.3     01-21  Mg     1.4     01-21          eGFR if Non African American: 78 mL/min/1.73M2 (01-21-21 @ 08:40)  eGFR if : 90 mL/min/1.73M2 (01-21-21 @ 08:40)    POCT Blood Glucose.: 161 mg/dL (21 Jan 2021 08:32)  POCT Blood Glucose.: 150 mg/dL (20 Jan 2021 17:13)

## 2021-01-21 NOTE — DIETITIAN INITIAL EVALUATION ADULT. - PERTINENT MEDS FT
Lovenox, Admelog, Toprol XL, Lasix, Zofran, Tylenol, Vit C , Vit D3, Cyanocobalamin, Ferrous Sulfate, Neurontin, Mucinex, Synthroid ,Melatonin, MVI, ,

## 2021-01-22 LAB
ANION GAP SERPL CALC-SCNC: 9 MMOL/L — SIGNIFICANT CHANGE UP (ref 5–17)
BASOPHILS # BLD AUTO: 0.01 K/UL — SIGNIFICANT CHANGE UP (ref 0–0.2)
BASOPHILS NFR BLD AUTO: 0.3 % — SIGNIFICANT CHANGE UP (ref 0–2)
BUN SERPL-MCNC: 14 MG/DL — SIGNIFICANT CHANGE UP (ref 7–23)
CALCIUM SERPL-MCNC: 9.5 MG/DL — SIGNIFICANT CHANGE UP (ref 8.4–10.5)
CHLORIDE SERPL-SCNC: 96 MMOL/L — SIGNIFICANT CHANGE UP (ref 96–108)
CO2 SERPL-SCNC: 26 MMOL/L — SIGNIFICANT CHANGE UP (ref 22–31)
CREAT SERPL-MCNC: 0.64 MG/DL — SIGNIFICANT CHANGE UP (ref 0.5–1.3)
EOSINOPHIL # BLD AUTO: 0.01 K/UL — SIGNIFICANT CHANGE UP (ref 0–0.5)
EOSINOPHIL NFR BLD AUTO: 0.3 % — SIGNIFICANT CHANGE UP (ref 0–6)
GLUCOSE BLDC GLUCOMTR-MCNC: 155 MG/DL — HIGH (ref 70–99)
GLUCOSE BLDC GLUCOMTR-MCNC: 162 MG/DL — HIGH (ref 70–99)
GLUCOSE BLDC GLUCOMTR-MCNC: 162 MG/DL — HIGH (ref 70–99)
GLUCOSE BLDC GLUCOMTR-MCNC: 173 MG/DL — HIGH (ref 70–99)
GLUCOSE SERPL-MCNC: 147 MG/DL — HIGH (ref 70–99)
HCT VFR BLD CALC: 44.9 % — SIGNIFICANT CHANGE UP (ref 34.5–45)
HGB BLD-MCNC: 15 G/DL — SIGNIFICANT CHANGE UP (ref 11.5–15.5)
IMM GRANULOCYTES NFR BLD AUTO: 0.8 % — SIGNIFICANT CHANGE UP (ref 0–1.5)
LYMPHOCYTES # BLD AUTO: 0.69 K/UL — LOW (ref 1–3.3)
LYMPHOCYTES # BLD AUTO: 17.3 % — SIGNIFICANT CHANGE UP (ref 13–44)
MAGNESIUM SERPL-MCNC: 1.7 MG/DL — SIGNIFICANT CHANGE UP (ref 1.6–2.6)
MCHC RBC-ENTMCNC: 31.2 PG — SIGNIFICANT CHANGE UP (ref 27–34)
MCHC RBC-ENTMCNC: 33.4 GM/DL — SIGNIFICANT CHANGE UP (ref 32–36)
MCV RBC AUTO: 93.3 FL — SIGNIFICANT CHANGE UP (ref 80–100)
MONOCYTES # BLD AUTO: 0.63 K/UL — SIGNIFICANT CHANGE UP (ref 0–0.9)
MONOCYTES NFR BLD AUTO: 15.8 % — HIGH (ref 2–14)
NEUTROPHILS # BLD AUTO: 2.61 K/UL — SIGNIFICANT CHANGE UP (ref 1.8–7.4)
NEUTROPHILS NFR BLD AUTO: 65.5 % — SIGNIFICANT CHANGE UP (ref 43–77)
NRBC # BLD: 0 /100 WBCS — SIGNIFICANT CHANGE UP (ref 0–0)
PHOSPHATE SERPL-MCNC: 2.4 MG/DL — LOW (ref 2.5–4.5)
PLATELET # BLD AUTO: 137 K/UL — LOW (ref 150–400)
POTASSIUM SERPL-MCNC: 4 MMOL/L — SIGNIFICANT CHANGE UP (ref 3.5–5.3)
POTASSIUM SERPL-SCNC: 4 MMOL/L — SIGNIFICANT CHANGE UP (ref 3.5–5.3)
RBC # BLD: 4.81 M/UL — SIGNIFICANT CHANGE UP (ref 3.8–5.2)
RBC # FLD: 12.7 % — SIGNIFICANT CHANGE UP (ref 10.3–14.5)
SODIUM SERPL-SCNC: 131 MMOL/L — LOW (ref 135–145)
WBC # BLD: 3.86 K/UL — SIGNIFICANT CHANGE UP (ref 3.8–10.5)
WBC # FLD AUTO: 3.86 K/UL — SIGNIFICANT CHANGE UP (ref 3.8–10.5)

## 2021-01-22 PROCEDURE — 99233 SBSQ HOSP IP/OBS HIGH 50: CPT | Mod: CS

## 2021-01-22 PROCEDURE — 71045 X-RAY EXAM CHEST 1 VIEW: CPT | Mod: 26

## 2021-01-22 RX ORDER — DEXAMETHASONE 0.5 MG/5ML
6 ELIXIR ORAL DAILY
Refills: 0 | Status: COMPLETED | OUTPATIENT
Start: 2021-01-22 | End: 2021-02-01

## 2021-01-22 RX ORDER — SODIUM CHLORIDE 9 MG/ML
1000 INJECTION INTRAMUSCULAR; INTRAVENOUS; SUBCUTANEOUS
Refills: 0 | Status: DISCONTINUED | OUTPATIENT
Start: 2021-01-22 | End: 2021-01-26

## 2021-01-22 RX ORDER — INSULIN LISPRO 100/ML
VIAL (ML) SUBCUTANEOUS AT BEDTIME
Refills: 0 | Status: DISCONTINUED | OUTPATIENT
Start: 2021-01-22 | End: 2021-02-07

## 2021-01-22 RX ORDER — REMDESIVIR 5 MG/ML
100 INJECTION INTRAVENOUS EVERY 24 HOURS
Refills: 0 | Status: COMPLETED | OUTPATIENT
Start: 2021-01-23 | End: 2021-01-26

## 2021-01-22 RX ORDER — REMDESIVIR 5 MG/ML
200 INJECTION INTRAVENOUS EVERY 24 HOURS
Refills: 0 | Status: COMPLETED | OUTPATIENT
Start: 2021-01-22 | End: 2021-01-22

## 2021-01-22 RX ORDER — SODIUM CHLORIDE 9 MG/ML
1000 INJECTION INTRAMUSCULAR; INTRAVENOUS; SUBCUTANEOUS
Refills: 0 | Status: DISCONTINUED | OUTPATIENT
Start: 2021-01-22 | End: 2021-01-22

## 2021-01-22 RX ORDER — REMDESIVIR 5 MG/ML
INJECTION INTRAVENOUS
Refills: 0 | Status: COMPLETED | OUTPATIENT
Start: 2021-01-22 | End: 2021-01-26

## 2021-01-22 RX ORDER — INSULIN LISPRO 100/ML
VIAL (ML) SUBCUTANEOUS
Refills: 0 | Status: DISCONTINUED | OUTPATIENT
Start: 2021-01-22 | End: 2021-02-07

## 2021-01-22 RX ORDER — MAGNESIUM OXIDE 400 MG ORAL TABLET 241.3 MG
400 TABLET ORAL
Refills: 0 | Status: COMPLETED | OUTPATIENT
Start: 2021-01-22 | End: 2021-01-22

## 2021-01-22 RX ADMIN — Medication 6 MILLIGRAM(S): at 15:17

## 2021-01-22 RX ADMIN — SODIUM CHLORIDE 80 MILLILITER(S): 9 INJECTION INTRAMUSCULAR; INTRAVENOUS; SUBCUTANEOUS at 12:16

## 2021-01-22 RX ADMIN — MAGNESIUM OXIDE 400 MG ORAL TABLET 400 MILLIGRAM(S): 241.3 TABLET ORAL at 18:24

## 2021-01-22 RX ADMIN — ENOXAPARIN SODIUM 40 MILLIGRAM(S): 100 INJECTION SUBCUTANEOUS at 20:38

## 2021-01-22 RX ADMIN — Medication 1 TABLET(S): at 12:15

## 2021-01-22 RX ADMIN — POLYETHYLENE GLYCOL 3350 17 GRAM(S): 17 POWDER, FOR SOLUTION ORAL at 12:15

## 2021-01-22 RX ADMIN — Medication 1000 UNIT(S): at 12:16

## 2021-01-22 RX ADMIN — Medication 1 PACKET(S): at 06:27

## 2021-01-22 RX ADMIN — Medication 2: at 17:23

## 2021-01-22 RX ADMIN — Medication 1: at 08:15

## 2021-01-22 RX ADMIN — Medication 500 MILLIGRAM(S): at 12:15

## 2021-01-22 RX ADMIN — REMDESIVIR 500 MILLIGRAM(S): 5 INJECTION INTRAVENOUS at 15:17

## 2021-01-22 RX ADMIN — GABAPENTIN 200 MILLIGRAM(S): 400 CAPSULE ORAL at 06:27

## 2021-01-22 RX ADMIN — SENNA PLUS 2 TABLET(S): 8.6 TABLET ORAL at 20:38

## 2021-01-22 RX ADMIN — MAGNESIUM OXIDE 400 MG ORAL TABLET 400 MILLIGRAM(S): 241.3 TABLET ORAL at 12:15

## 2021-01-22 RX ADMIN — PREGABALIN 1000 MICROGRAM(S): 225 CAPSULE ORAL at 12:15

## 2021-01-22 RX ADMIN — Medication 3 MILLIGRAM(S): at 20:38

## 2021-01-22 RX ADMIN — Medication 75 MICROGRAM(S): at 06:27

## 2021-01-22 RX ADMIN — Medication 1 PACKET(S): at 12:16

## 2021-01-22 RX ADMIN — Medication 1 PACKET(S): at 20:38

## 2021-01-22 RX ADMIN — Medication 25 MILLIGRAM(S): at 06:27

## 2021-01-22 RX ADMIN — Medication 325 MILLIGRAM(S): at 12:15

## 2021-01-22 RX ADMIN — Medication 20 MILLIGRAM(S): at 06:27

## 2021-01-22 RX ADMIN — Medication 1: at 12:17

## 2021-01-22 RX ADMIN — MAGNESIUM OXIDE 400 MG ORAL TABLET 400 MILLIGRAM(S): 241.3 TABLET ORAL at 09:10

## 2021-01-22 NOTE — PROGRESS NOTE ADULT - ASSESSMENT
89F with Afib (not on AC due to vaginal bleeding 2/2 polyps), chronic back pain 2/2 spinal stenosis,  DM2 on metformin, HTN, recurrent UTIs, chronic venous insufficiency, presents from [facility] for diarrhea, found to have COVID+ infection    #Acute hypoxic respiratory failure - O2 documented to be 88% on RA in the past 24 hours  #COVID19 pneumonia - now confirmed on CXR  #Diarrhea likely due to #COVID+ which has resolved  -Will start Decadron today due to the hypoxia   -Will start Remdesivir today due to the confirmed pneumonia   -Check inflammatory markers in AM    #Bilateral chronic venous insufficiency  -No evidence of DVT on Left Lower Extremity    -hold Lasix for now as patient clinically dehydrated   -elevate legs as able    #Chronic Atrial Fibrillation, not on anticoagulation secondary to history of vaginal bleeding  -c/w beta blocker - if rate control becomes an issue, can consider short-acting formulation  -c/w ASA    #Essential HTN:  - Patient's beta blocker was increased during this hospital course from metoprolol ER 25 mg daily to BID due to HTN ..... but now back down to once daily due to bradycardia  - Monitor BP closely and If need to add additional anti HTN medications, would start Norvasc    #DM2  ISS, accucheck  A1c 6.1  Monitor sugars now that steroids to begin     #Hypomagensemia: Repleted  #Hypokalemia: Repleted  #Hypophosphatemia: Replete  #Hyponatremia: When corrected for glucose, ~132. Check urine Osm, urine Na. Patient clinically dehydrated - will give gentle IVF NS x 1L     Dispo: D/C back to [facility] pending two negative COVID tests, once Remdesivir completed. Would repeat next COVID test when patient is on day 4 of Remdesivir (1/25).  PT follow-up. Per patient's son, patient is bedbound and wheelchair bound but she does transfer with assistance to the wheelchair.    Gal Kay, son, - [phone] - UPDATED     FULL CODE

## 2021-01-22 NOTE — PROGRESS NOTE ADULT - SUBJECTIVE AND OBJECTIVE BOX
Patient is a 89y old  Female who presents with a chief complaint of Diarrhea in a bed bound patient, COVID positivity status (21 Jan 2021 13:25)    Patient seen and examined at bedside.     ALLERGIES:  aspirin (Unknown)  penicillin (Unknown)    MEDICATIONS  (STANDING):  ascorbic acid 500 milliGRAM(s) Oral daily  cholecalciferol 1000 Unit(s) Oral daily  cyanocobalamin 1000 MICROGram(s) Oral daily  dextrose 40% Gel 15 Gram(s) Oral once  dextrose 5%. 1000 milliLiter(s) (50 mL/Hr) IV Continuous <Continuous>  dextrose 5%. 1000 milliLiter(s) (100 mL/Hr) IV Continuous <Continuous>  dextrose 50% Injectable 25 Gram(s) IV Push once  dextrose 50% Injectable 12.5 Gram(s) IV Push once  dextrose 50% Injectable 25 Gram(s) IV Push once  enoxaparin Injectable 40 milliGRAM(s) SubCutaneous at bedtime  ferrous    sulfate 325 milliGRAM(s) Oral daily  furosemide    Tablet 20 milliGRAM(s) Oral daily  gabapentin 200 milliGRAM(s) Oral two times a day  glucagon  Injectable 1 milliGRAM(s) IntraMuscular once  insulin lispro (ADMELOG) corrective regimen sliding scale   SubCutaneous two times a day with meals  levothyroxine 75 MICROGram(s) Oral daily  melatonin 3 milliGRAM(s) Oral at bedtime  metoprolol succinate ER 25 milliGRAM(s) Oral daily  multivitamin 1 Tablet(s) Oral daily  polyethylene glycol 3350 17 Gram(s) Oral daily  potassium phosphate / sodium phosphate Powder (PHOS-NaK) 1 Packet(s) Oral three times a day  senna 2 Tablet(s) Oral at bedtime    MEDICATIONS  (PRN):  acetaminophen   Tablet .. 650 milliGRAM(s) Oral every 4 hours PRN Temp greater or equal to 38C (100.4F), Moderate Pain (4 - 6)  acetaminophen   Tablet .. 325 milliGRAM(s) Oral every 6 hours PRN Mild Pain (1 - 3)  guaiFENesin  milliGRAM(s) Oral every 12 hours PRN Cough  ondansetron   Disintegrating Tablet 4 milliGRAM(s) Oral every 6 hours PRN Nausea and/or Vomiting    Vital Signs Last 24 Hrs  T(F): 98 (22 Jan 2021 06:14), Max: 98.2 (21 Jan 2021 22:00)  HR: 98 (22 Jan 2021 06:14) (71 - 98)  BP: 148/858 (22 Jan 2021 06:14) (120/70 - 148/858)  RR: 19 (22 Jan 2021 06:14) (16 - 19)  SpO2: 98% (22 Jan 2021 06:14) (88% - 100%)  I&O's Summary    PHYSICAL EXAM:  General: NAD, A/O x 3  ENT: Moist mucous membranes, no thrush  Neck: Supple, No JVD  Lungs: Clear to auscultation bilaterally, good air entry, non-labored breathing  Cardio: IRRR, S1/S2  Abdomen: Soft, Nontender, Nondistended; Bowel sounds present  Extremities: No calf tenderness, No pitting edema      LABS:                        15.0   x     )-----------( 137      ( 22 Jan 2021 05:30 )             44.9     01-22    131  |  96  |  14  ----------------------------<  147  4.0   |  26  |  0.64    Ca    9.5      22 Jan 2021 05:30  Phos  2.4     01-22  Mg     1.7     01-22    eGFR if Non African American: 79 mL/min/1.73M2 (01-22-21 @ 05:30)  eGFR if : 91 mL/min/1.73M2 (01-22-21 @ 05:30)    POCT Blood Glucose.: 164 mg/dL (21 Jan 2021 17:14)  POCT Blood Glucose.: 161 mg/dL (21 Jan 2021 08:32)

## 2021-01-22 NOTE — PROGRESS NOTE ADULT - SUBJECTIVE AND OBJECTIVE BOX
Patient is a 89y old  Female who presents with a chief complaint of Diarrhea in a bed bound patient, COVID positivity status (22 Jan 2021 07:25)      Patient seen and examined at bedside. No overnight events reported.     ALLERGIES:  aspirin (Unknown)  penicillin (Unknown)    MEDICATIONS  (STANDING):  ascorbic acid 500 milliGRAM(s) Oral daily  cholecalciferol 1000 Unit(s) Oral daily  cyanocobalamin 1000 MICROGram(s) Oral daily  dextrose 40% Gel 15 Gram(s) Oral once  dextrose 5%. 1000 milliLiter(s) (50 mL/Hr) IV Continuous <Continuous>  dextrose 5%. 1000 milliLiter(s) (100 mL/Hr) IV Continuous <Continuous>  dextrose 50% Injectable 25 Gram(s) IV Push once  dextrose 50% Injectable 12.5 Gram(s) IV Push once  dextrose 50% Injectable 25 Gram(s) IV Push once  enoxaparin Injectable 40 milliGRAM(s) SubCutaneous at bedtime  ferrous    sulfate 325 milliGRAM(s) Oral daily  furosemide    Tablet 20 milliGRAM(s) Oral daily  gabapentin 200 milliGRAM(s) Oral two times a day  glucagon  Injectable 1 milliGRAM(s) IntraMuscular once  insulin lispro (ADMELOG) corrective regimen sliding scale   SubCutaneous two times a day with meals  levothyroxine 75 MICROGram(s) Oral daily  magnesium oxide 400 milliGRAM(s) Oral three times a day with meals  melatonin 3 milliGRAM(s) Oral at bedtime  metoprolol succinate ER 25 milliGRAM(s) Oral daily  multivitamin 1 Tablet(s) Oral daily  polyethylene glycol 3350 17 Gram(s) Oral daily  potassium phosphate / sodium phosphate Powder (PHOS-NaK) 1 Packet(s) Oral three times a day  senna 2 Tablet(s) Oral at bedtime  sodium chloride 0.9%. 1000 milliLiter(s) (80 mL/Hr) IV Continuous <Continuous>    MEDICATIONS  (PRN):  acetaminophen   Tablet .. 650 milliGRAM(s) Oral every 4 hours PRN Temp greater or equal to 38C (100.4F), Moderate Pain (4 - 6)  acetaminophen   Tablet .. 325 milliGRAM(s) Oral every 6 hours PRN Mild Pain (1 - 3)  guaiFENesin  milliGRAM(s) Oral every 12 hours PRN Cough  ondansetron   Disintegrating Tablet 4 milliGRAM(s) Oral every 6 hours PRN Nausea and/or Vomiting    Vital Signs Last 24 Hrs  T(F): 98.5 (22 Jan 2021 10:02), Max: 98.5 (22 Jan 2021 10:02)  HR: 86 (22 Jan 2021 10:02) (71 - 98)  BP: 140/76 (22 Jan 2021 10:02) (140/76 - 148/858)  RR: 20 (22 Jan 2021 10:02) (17 - 20)  SpO2: 97% (22 Jan 2021 10:02) (97% - 100%)  I&O's Summary    PHYSICAL EXAM:  General: NAD, more lethargic today, does respond to commands  ENT: DRY mucous membranes, no thrush  Neck: Supple, No JVD  Lungs: Grossly clear to ausculation limited to poor effort, good air entry, non-labored breathing  Cardio: IRRR, S1/S2  Abdomen: Soft, Nontender, Nondistended; Bowel sounds present  Extremities: No calf tenderness, No pitting edema; + skin tenting    LABS:                        15.0   3.86  )-----------( 137      ( 22 Jan 2021 05:30 )             44.9     01-22    131  |  96  |  14  ----------------------------<  147  4.0   |  26  |  0.64    Ca    9.5      22 Jan 2021 05:30  Phos  2.4     01-22  Mg     1.7     01-22    eGFR if Non African American: 79 mL/min/1.73M2 (01-22-21 @ 05:30)  eGFR if : 91 mL/min/1.73M2 (01-22-21 @ 05:30)    POCT Blood Glucose.: 155 mg/dL (22 Jan 2021 08:03)  POCT Blood Glucose.: 164 mg/dL (21 Jan 2021 17:14)    RADIOLOGY & ADDITIONAL TESTS:  < from: Xray Chest 1 View AP/PA (01.22.21 @ 10:34) >  IMPRESSION:  New airspace opacities right midlung zone.    No pleural effusion    Heart size cannot be accurately assessed in this projection.    Right shoulder suture anchors noted    < end of copied text >       Patient is a 89y old  Female who presents with a chief complaint of Diarrhea in a bed bound patient, COVID positivity status (22 Jan 2021 07:25)      Patient seen and examined at bedside. Patient's oxygen dropped to 88% on RA    ALLERGIES:  aspirin (Unknown)  penicillin (Unknown)    MEDICATIONS  (STANDING):  ascorbic acid 500 milliGRAM(s) Oral daily  cholecalciferol 1000 Unit(s) Oral daily  cyanocobalamin 1000 MICROGram(s) Oral daily  dextrose 40% Gel 15 Gram(s) Oral once  dextrose 5%. 1000 milliLiter(s) (50 mL/Hr) IV Continuous <Continuous>  dextrose 5%. 1000 milliLiter(s) (100 mL/Hr) IV Continuous <Continuous>  dextrose 50% Injectable 25 Gram(s) IV Push once  dextrose 50% Injectable 12.5 Gram(s) IV Push once  dextrose 50% Injectable 25 Gram(s) IV Push once  enoxaparin Injectable 40 milliGRAM(s) SubCutaneous at bedtime  ferrous    sulfate 325 milliGRAM(s) Oral daily  furosemide    Tablet 20 milliGRAM(s) Oral daily  gabapentin 200 milliGRAM(s) Oral two times a day  glucagon  Injectable 1 milliGRAM(s) IntraMuscular once  insulin lispro (ADMELOG) corrective regimen sliding scale   SubCutaneous two times a day with meals  levothyroxine 75 MICROGram(s) Oral daily  magnesium oxide 400 milliGRAM(s) Oral three times a day with meals  melatonin 3 milliGRAM(s) Oral at bedtime  metoprolol succinate ER 25 milliGRAM(s) Oral daily  multivitamin 1 Tablet(s) Oral daily  polyethylene glycol 3350 17 Gram(s) Oral daily  potassium phosphate / sodium phosphate Powder (PHOS-NaK) 1 Packet(s) Oral three times a day  senna 2 Tablet(s) Oral at bedtime  sodium chloride 0.9%. 1000 milliLiter(s) (80 mL/Hr) IV Continuous <Continuous>    MEDICATIONS  (PRN):  acetaminophen   Tablet .. 650 milliGRAM(s) Oral every 4 hours PRN Temp greater or equal to 38C (100.4F), Moderate Pain (4 - 6)  acetaminophen   Tablet .. 325 milliGRAM(s) Oral every 6 hours PRN Mild Pain (1 - 3)  guaiFENesin  milliGRAM(s) Oral every 12 hours PRN Cough  ondansetron   Disintegrating Tablet 4 milliGRAM(s) Oral every 6 hours PRN Nausea and/or Vomiting    Vital Signs Last 24 Hrs  T(F): 98.5 (22 Jan 2021 10:02), Max: 98.5 (22 Jan 2021 10:02)  HR: 86 (22 Jan 2021 10:02) (71 - 98)  BP: 140/76 (22 Jan 2021 10:02) (140/76 - 148/858)  RR: 20 (22 Jan 2021 10:02) (17 - 20)  SpO2: 97% (22 Jan 2021 10:02) (97% - 100%)  I&O's Summary    PHYSICAL EXAM:  General: NAD, more lethargic today, does respond to commands  ENT: DRY mucous membranes, no thrush  Neck: Supple, No JVD  Lungs: Grossly clear to ausculation limited to poor effort, good air entry, non-labored breathing  Cardio: IRRR, S1/S2  Abdomen: Soft, Nontender, Nondistended; Bowel sounds present  Extremities: No calf tenderness, No pitting edema; + skin tenting    LABS:                        15.0   3.86  )-----------( 137      ( 22 Jan 2021 05:30 )             44.9     01-22    131  |  96  |  14  ----------------------------<  147  4.0   |  26  |  0.64    Ca    9.5      22 Jan 2021 05:30  Phos  2.4     01-22  Mg     1.7     01-22    eGFR if Non African American: 79 mL/min/1.73M2 (01-22-21 @ 05:30)  eGFR if : 91 mL/min/1.73M2 (01-22-21 @ 05:30)    POCT Blood Glucose.: 155 mg/dL (22 Jan 2021 08:03)  POCT Blood Glucose.: 164 mg/dL (21 Jan 2021 17:14)    RADIOLOGY & ADDITIONAL TESTS:  < from: Xray Chest 1 View AP/PA (01.22.21 @ 10:34) >  IMPRESSION:  New airspace opacities right midlung zone.    No pleural effusion    Heart size cannot be accurately assessed in this projection.    Right shoulder suture anchors noted    < end of copied text >

## 2021-01-22 NOTE — PROGRESS NOTE ADULT - ASSESSMENT
89F with Afib (not on AC due to vaginal bleeding 2/2 polyps), chronic back pain 2/2 spinal stenosis,  DM2 on metformin, HTN, recurrent UTIs, chronic venous insufficiency, presents from [facility] for diarrhea, found to have COVID+ infection    #Acute hypoxic respiratory failure - O2 documented to be 88% on RA in the past 24 hours  #Possible COVID19 pneumonia, but does not endorse subjective respiratory symptoms   #Diarrhea likely due to #COVID+ which has resolved  -CXR ordered in ED but pt refused the test - will order a CXR for today due to patient's transient drop in oxygen... if signs of pneumonia present, can consider Remdesivir  -Repeat COVID test done 1/20 - still positive, will repeat on 1/23   -Check inflammatory markers in AM    #Bilateral chronic venous insufficiency  -No evidence of DVT on Left Lower Extremity    -c/w Lasix - monitor electrolytes closely  -elevate legs as able    #Chronic Atrial Fibrillation, not on anticoagulation secondary to history of vaginal bleeding  -c/w beta blocker - if rate control becomes an issue, can consider short-acting formulation  -c/w ASA    #Essential HTN:  - was on metoprolol ER 25 mg BID (was increased to bid during this hospital course) but now back down to once daily due to bradycardia  - If need to add supplemental anti HTN medications, would start Norvasc    #DM2  ISS, accucheck  A1c 6.1    #Hypomagensemia: Repleted  #Hypokalemia: Repleted  #Hypophosphatemia: Replete  #Hyponatremia: When corrected for glucose, ~132. Check urine Osm, urine Na. If sodium drops further, would give gentle NS ... repeat in AM, encouraged PO.     Dispo: D/C back to [facility] pending two negative COVID tests. PT follow-up. Per patient's son, patient is bedbound and wheelchair bound but she does transfer with assistance to the wheelchair. Request PT follow-up.     Jose Kay, son, - [phone] - UPDATED     FULL CODE 89F with Afib (not on AC due to vaginal bleeding 2/2 polyps), chronic back pain 2/2 spinal stenosis,  DM2 on metformin, HTN, recurrent UTIs, chronic venous insufficiency, presents from [facility] for diarrhea, found to have COVID+ infection    #Acute hypoxic respiratory failure - O2 documented to be 88% on RA in the past 24 hours  #Possible COVID19 pneumonia, but does not endorse subjective respiratory symptoms   #Diarrhea likely due to #COVID+ which has resolved  -CXR ordered in ED but pt refused the test - will order a CXR for today due to patient's transient drop in oxygen... if signs of pneumonia present, can consider Remdesivir  -Repeat COVID test done 1/20 - still positive, will repeat tomorrow AM  -Check inflammatory markers in AM    #Bilateral chronic venous insufficiency  -No evidence of DVT on Left Lower Extremity    -c/w Lasix - monitor electrolytes closely  -elevate legs as able    #Chronic Atrial Fibrillation, not on anticoagulation secondary to history of vaginal bleeding  -c/w beta blocker - if rate control becomes an issue, can consider short-acting formulation  -c/w ASA    #Essential HTN:  - was on metoprolol ER 25 mg BID (was increased to bid during this hospital course) but now back down to once daily due to bradycardia  - If need to add supplemental anti HTN medications, would start Norvasc    #DM2  ISS, accucheck  A1c 6.1    #Hypomagensemia: Repleted  #Hypokalemia: Repleted  #Hypophosphatemia: Replete  #Hyponatremia: When corrected for glucose, ~132. Check urine Osm, urine Na. If sodium drops further, would give gentle NS ... repeat in AM, encouraged PO.     Dispo: D/C back to [facility] pending two negative COVID tests. PT follow-up. Per patient's son, patient is bedbound and wheelchair bound but she does transfer with assistance to the wheelchair. Request PT follow-up.     Jose Kay, son, - [phone] - UPDATED     FULL CODE

## 2021-01-23 LAB
ALBUMIN SERPL ELPH-MCNC: 2.9 G/DL — LOW (ref 3.3–5)
ALP SERPL-CCNC: 60 U/L — SIGNIFICANT CHANGE UP (ref 40–120)
ALT FLD-CCNC: 32 U/L — SIGNIFICANT CHANGE UP (ref 10–45)
ANION GAP SERPL CALC-SCNC: 10 MMOL/L — SIGNIFICANT CHANGE UP (ref 5–17)
AST SERPL-CCNC: 39 U/L — SIGNIFICANT CHANGE UP (ref 10–40)
BASOPHILS # BLD AUTO: 0.01 K/UL — SIGNIFICANT CHANGE UP (ref 0–0.2)
BASOPHILS NFR BLD AUTO: 0.2 % — SIGNIFICANT CHANGE UP (ref 0–2)
BILIRUB DIRECT SERPL-MCNC: 0.2 MG/DL — SIGNIFICANT CHANGE UP (ref 0–0.2)
BILIRUB INDIRECT FLD-MCNC: 0.7 MG/DL — SIGNIFICANT CHANGE UP (ref 0.2–1)
BILIRUB SERPL-MCNC: 0.9 MG/DL — SIGNIFICANT CHANGE UP (ref 0.2–1.2)
BUN SERPL-MCNC: 14 MG/DL — SIGNIFICANT CHANGE UP (ref 7–23)
CALCIUM SERPL-MCNC: 9.4 MG/DL — SIGNIFICANT CHANGE UP (ref 8.4–10.5)
CHLORIDE SERPL-SCNC: 97 MMOL/L — SIGNIFICANT CHANGE UP (ref 96–108)
CO2 SERPL-SCNC: 25 MMOL/L — SIGNIFICANT CHANGE UP (ref 22–31)
CREAT SERPL-MCNC: 0.58 MG/DL — SIGNIFICANT CHANGE UP (ref 0.5–1.3)
CRP SERPL-MCNC: 7.14 MG/DL — HIGH (ref 0–0.4)
D DIMER BLD IA.RAPID-MCNC: 241 NG/ML DDU — HIGH
EOSINOPHIL # BLD AUTO: 0 K/UL — SIGNIFICANT CHANGE UP (ref 0–0.5)
EOSINOPHIL NFR BLD AUTO: 0 % — SIGNIFICANT CHANGE UP (ref 0–6)
FERRITIN SERPL-MCNC: 384 NG/ML — HIGH (ref 15–150)
GLUCOSE BLDC GLUCOMTR-MCNC: 131 MG/DL — HIGH (ref 70–99)
GLUCOSE BLDC GLUCOMTR-MCNC: 173 MG/DL — HIGH (ref 70–99)
GLUCOSE BLDC GLUCOMTR-MCNC: 174 MG/DL — HIGH (ref 70–99)
GLUCOSE BLDC GLUCOMTR-MCNC: 178 MG/DL — HIGH (ref 70–99)
GLUCOSE SERPL-MCNC: 135 MG/DL — HIGH (ref 70–99)
HCT VFR BLD CALC: 46.4 % — HIGH (ref 34.5–45)
HGB BLD-MCNC: 15.7 G/DL — HIGH (ref 11.5–15.5)
IMM GRANULOCYTES NFR BLD AUTO: 0.5 % — SIGNIFICANT CHANGE UP (ref 0–1.5)
LYMPHOCYTES # BLD AUTO: 0.71 K/UL — LOW (ref 1–3.3)
LYMPHOCYTES # BLD AUTO: 16.7 % — SIGNIFICANT CHANGE UP (ref 13–44)
MAGNESIUM SERPL-MCNC: 1.7 MG/DL — SIGNIFICANT CHANGE UP (ref 1.6–2.6)
MCHC RBC-ENTMCNC: 31.9 PG — SIGNIFICANT CHANGE UP (ref 27–34)
MCHC RBC-ENTMCNC: 33.8 GM/DL — SIGNIFICANT CHANGE UP (ref 32–36)
MCV RBC AUTO: 94.3 FL — SIGNIFICANT CHANGE UP (ref 80–100)
MONOCYTES # BLD AUTO: 0.74 K/UL — SIGNIFICANT CHANGE UP (ref 0–0.9)
MONOCYTES NFR BLD AUTO: 17.4 % — HIGH (ref 2–14)
NEUTROPHILS # BLD AUTO: 2.78 K/UL — SIGNIFICANT CHANGE UP (ref 1.8–7.4)
NEUTROPHILS NFR BLD AUTO: 65.2 % — SIGNIFICANT CHANGE UP (ref 43–77)
NRBC # BLD: 0 /100 WBCS — SIGNIFICANT CHANGE UP (ref 0–0)
PHOSPHATE SERPL-MCNC: 2.5 MG/DL — SIGNIFICANT CHANGE UP (ref 2.5–4.5)
PLATELET # BLD AUTO: 154 K/UL — SIGNIFICANT CHANGE UP (ref 150–400)
POTASSIUM SERPL-MCNC: 3.9 MMOL/L — SIGNIFICANT CHANGE UP (ref 3.5–5.3)
POTASSIUM SERPL-SCNC: 3.9 MMOL/L — SIGNIFICANT CHANGE UP (ref 3.5–5.3)
PROT SERPL-MCNC: 7.8 G/DL — SIGNIFICANT CHANGE UP (ref 6–8.3)
RBC # BLD: 4.92 M/UL — SIGNIFICANT CHANGE UP (ref 3.8–5.2)
RBC # FLD: 12.4 % — SIGNIFICANT CHANGE UP (ref 10.3–14.5)
SODIUM SERPL-SCNC: 132 MMOL/L — LOW (ref 135–145)
WBC # BLD: 4.26 K/UL — SIGNIFICANT CHANGE UP (ref 3.8–10.5)
WBC # FLD AUTO: 4.26 K/UL — SIGNIFICANT CHANGE UP (ref 3.8–10.5)

## 2021-01-23 PROCEDURE — 99232 SBSQ HOSP IP/OBS MODERATE 35: CPT | Mod: CS

## 2021-01-23 RX ADMIN — Medication 1 PACKET(S): at 05:25

## 2021-01-23 RX ADMIN — Medication 75 MICROGRAM(S): at 05:25

## 2021-01-23 RX ADMIN — Medication 2: at 12:43

## 2021-01-23 RX ADMIN — Medication 325 MILLIGRAM(S): at 11:38

## 2021-01-23 RX ADMIN — GABAPENTIN 200 MILLIGRAM(S): 400 CAPSULE ORAL at 05:26

## 2021-01-23 RX ADMIN — REMDESIVIR 500 MILLIGRAM(S): 5 INJECTION INTRAVENOUS at 17:02

## 2021-01-23 RX ADMIN — Medication 3 MILLIGRAM(S): at 20:09

## 2021-01-23 RX ADMIN — Medication 25 MILLIGRAM(S): at 05:25

## 2021-01-23 RX ADMIN — Medication 6 MILLIGRAM(S): at 05:25

## 2021-01-23 RX ADMIN — Medication 1000 UNIT(S): at 11:38

## 2021-01-23 RX ADMIN — SENNA PLUS 2 TABLET(S): 8.6 TABLET ORAL at 20:09

## 2021-01-23 RX ADMIN — PREGABALIN 1000 MICROGRAM(S): 225 CAPSULE ORAL at 12:43

## 2021-01-23 RX ADMIN — ENOXAPARIN SODIUM 40 MILLIGRAM(S): 100 INJECTION SUBCUTANEOUS at 20:09

## 2021-01-23 RX ADMIN — Medication 1 TABLET(S): at 11:38

## 2021-01-23 RX ADMIN — POLYETHYLENE GLYCOL 3350 17 GRAM(S): 17 POWDER, FOR SOLUTION ORAL at 11:38

## 2021-01-23 RX ADMIN — Medication 500 MILLIGRAM(S): at 11:38

## 2021-01-23 NOTE — PROGRESS NOTE ADULT - SUBJECTIVE AND OBJECTIVE BOX
HPI:  89 yo F with PMHx of Afib (not on AC due to vaginal bleeding 2/2 polyps), chronic back pain 2/2 spinal stenosis,  DM2 on metformin, HTN, recurrent UTIs, chronic venous insufficiency. The patient has been living at [facility]. She was sent to the [place] ER to evaluate for DVT due to left lower extremity edema. Pt reportedly has had decreased PO intake and diarrhea as well.     Patient reports that diarrhea started one week ago- it's been once a day,  but voluminous. She denies having any associated fevers. She says lower extremity swelling is chronic and "everyone at the [facility] has it." She is wheelchair bound at baseline. She is agitated that she was sent to the hospital because she feels totally fine. She does not believe she has COVID. She denies having any shortness of breath, cough, nasal congestion, sore throat, chest pain, palpitations, abdominal pain, nausea, or vomiting.     [facility] will not take pt back because of COVID positivity status and diarrhea, which makes it difficult to clean her since she is overall bed bound.    ER course:  Pt found to be covid +  LLE duplex performed and no e/o DVT.  (2021 16:50)      Subjective    No new complaints.   Denies cough, SOb, diarrhea.  Poor appetite.       PAST MEDICAL & SURGICAL HISTORY:  Spinal stenosis of lumbar region    Hiatal hernia    Diabetes mellitus, type II    Atrial fibrillation    H/O: GI Bleed    Overactive Bladder    Uterine Polyp    Peripheral Neuropathy    Degenerative Joint Disease    Obesity    Hypothyroidism    Hypertension    S/P  section    S/P rotator cuff repair  right    S/P knee replacement, right        MedsMEDICATIONS  (STANDING):  ascorbic acid 500 milliGRAM(s) Oral daily  cholecalciferol 1000 Unit(s) Oral daily  cyanocobalamin 1000 MICROGram(s) Oral daily  dexAMETHasone     Tablet 6 milliGRAM(s) Oral daily  dextrose 40% Gel 15 Gram(s) Oral once  dextrose 5%. 1000 milliLiter(s) (50 mL/Hr) IV Continuous <Continuous>  dextrose 5%. 1000 milliLiter(s) (100 mL/Hr) IV Continuous <Continuous>  dextrose 50% Injectable 25 Gram(s) IV Push once  dextrose 50% Injectable 12.5 Gram(s) IV Push once  dextrose 50% Injectable 25 Gram(s) IV Push once  enoxaparin Injectable 40 milliGRAM(s) SubCutaneous at bedtime  ferrous    sulfate 325 milliGRAM(s) Oral daily  gabapentin 200 milliGRAM(s) Oral two times a day  glucagon  Injectable 1 milliGRAM(s) IntraMuscular once  insulin lispro (ADMELOG) corrective regimen sliding scale   SubCutaneous three times a day before meals  insulin lispro (ADMELOG) corrective regimen sliding scale   SubCutaneous at bedtime  levothyroxine 75 MICROGram(s) Oral daily  melatonin 3 milliGRAM(s) Oral at bedtime  metoprolol succinate ER 25 milliGRAM(s) Oral daily  multivitamin 1 Tablet(s) Oral daily  polyethylene glycol 3350 17 Gram(s) Oral daily  remdesivir  IVPB 100 milliGRAM(s) IV Intermittent every 24 hours  remdesivir  IVPB   IV Intermittent   senna 2 Tablet(s) Oral at bedtime  sodium chloride 0.9%. 1000 milliLiter(s) (80 mL/Hr) IV Continuous <Continuous>    MEDICATIONS  (PRN):  acetaminophen   Tablet .. 650 milliGRAM(s) Oral every 4 hours PRN Temp greater or equal to 38C (100.4F), Moderate Pain (4 - 6)  acetaminophen   Tablet .. 325 milliGRAM(s) Oral every 6 hours PRN Mild Pain (1 - 3)  guaiFENesin  milliGRAM(s) Oral every 12 hours PRN Cough  ondansetron   Disintegrating Tablet 4 milliGRAM(s) Oral every 6 hours PRN Nausea and/or Vomiting      Vital Signs Last 24 Hrs  T(C): 36.7 (2021 20:13), Max: 36.7 (2021 20:13)  T(F): 98.1 (2021 20:13), Max: 98.1 (2021 20:13)  HR: 81 (2021 08:09) (81 - 83)  BP: 156/86 (2021 05:24) (153/74 - 156/86)  BP(mean): --  RR: 17 (2021 05:24) (17 - 17)  SpO2: 95% (2021 08:09) (94% - 98%)  I&O's Summary    2021 07:01  -  2021 07:00  --------------------------------------------------------  IN: 560 mL / OUT: 0 mL / NET: 560 mL        PHYSICAL EXAM:  GENERAL: NAD  NECK: Supple  NERVOUS SYSTEM:  awake and alert  HEART: S1s2 NL , Irreg Irreg   CHEST/LUNG: Clear to percussion bilaterally  ABDOMEN: Soft, Nontender, Nondistended; Bowel sounds present  EXTREMITIES:  Pos edema      LABS:( @ 07:08)                      15.7  4.26 )-----------( 154                 46.4    Neutrophils = 2.78 (65.2%)  Lymphocytes = 0.71 (16.7%)  Eosinophils = 0.00 (0.0%)  Basophils = 0.01 (0.2%)  Monocytes = 0.74 (17.4%)  Bands = --%        132<L>  |  97  |  14  ----------------------------<  135<H>  3.9   |  25  |  0.58    Ca    9.4      2021 07:08  Phos  2.5       Mg     1.7         TPro  7.8  /  Alb  2.9<L>  /  TBili  0.9  /  DBili  0.2  /  AST  39  /  ALT  32  /  AlkPhos  60            RVP:          Tox:           CAPILLARY BLOOD GLUCOSE      POCT Blood Glucose.: 131 mg/dL (2021 07:57)  POCT Blood Glucose.: 173 mg/dL (2021 20:08)  POCT Blood Glucose.: 162 mg/dL (2021 17:18)  POCT Blood Glucose.: 162 mg/dL (2021 12:13)      Imaging Personally Reviewed:  [ ] YES  [ ] NO        Care Discussed with Consultants/Other Providers [ x] YES  [ ] NO

## 2021-01-23 NOTE — PROGRESS NOTE ADULT - ASSESSMENT
89F with Afib (not on AC due to vaginal bleeding 2/2 polyps), chronic back pain 2/2 spinal stenosis,  DM2 on metformin, HTN, recurrent UTIs, chronic venous insufficiency, presents from [facility] for diarrhea, found to have COVID+ infection    #Acute hypoxic respiratory failure sec to COVID-19 Pneumonia - O2 documented to be 88% on RA.   Pt currently o 2L NC  Decadron Day #2  Remdesivir Day #2. Monitor renal/liver function.     Bilateral chronic venous insufficiency  No DVT  Hold Lasix for now as pt poor po intake    Chronic Atrial Fibrillation, not on anticoagulation secondary to history of vaginal bleeding, HTN  Metoprolol    DM2, a1c 6.1  Lispro    Hypomagensemia/Hypokalemia/Hypophosphatemia  Resolved    Hyponatremia  urine Osm, urine Na pending  Monitor for now    Dispo: D/C back to [facility] pending two negative COVID tests, once Remdesivir completed. Would repeat next COVID test when patient is on day 4 of Remdesivir (1/25).  PT follow-up. Per patient's son, patient is bedbound and wheelchair bound but she does transfer with assistance to the wheelchair.    Gal/Edgard Kay, Both sons live [facility], - [phone] -Updated 1/23

## 2021-01-24 LAB
ALBUMIN SERPL ELPH-MCNC: 3 G/DL — LOW (ref 3.3–5)
ALP SERPL-CCNC: 62 U/L — SIGNIFICANT CHANGE UP (ref 40–120)
ALT FLD-CCNC: 29 U/L — SIGNIFICANT CHANGE UP (ref 10–45)
ANION GAP SERPL CALC-SCNC: 10 MMOL/L — SIGNIFICANT CHANGE UP (ref 5–17)
AST SERPL-CCNC: 34 U/L — SIGNIFICANT CHANGE UP (ref 10–40)
BILIRUB SERPL-MCNC: 0.9 MG/DL — SIGNIFICANT CHANGE UP (ref 0.2–1.2)
BUN SERPL-MCNC: 18 MG/DL — SIGNIFICANT CHANGE UP (ref 7–23)
CALCIUM SERPL-MCNC: 9.8 MG/DL — SIGNIFICANT CHANGE UP (ref 8.4–10.5)
CHLORIDE SERPL-SCNC: 97 MMOL/L — SIGNIFICANT CHANGE UP (ref 96–108)
CO2 SERPL-SCNC: 27 MMOL/L — SIGNIFICANT CHANGE UP (ref 22–31)
CREAT SERPL-MCNC: 0.65 MG/DL — SIGNIFICANT CHANGE UP (ref 0.5–1.3)
GLUCOSE BLDC GLUCOMTR-MCNC: 147 MG/DL — HIGH (ref 70–99)
GLUCOSE BLDC GLUCOMTR-MCNC: 178 MG/DL — HIGH (ref 70–99)
GLUCOSE BLDC GLUCOMTR-MCNC: 178 MG/DL — HIGH (ref 70–99)
GLUCOSE SERPL-MCNC: 156 MG/DL — HIGH (ref 70–99)
POTASSIUM SERPL-MCNC: 4 MMOL/L — SIGNIFICANT CHANGE UP (ref 3.5–5.3)
POTASSIUM SERPL-SCNC: 4 MMOL/L — SIGNIFICANT CHANGE UP (ref 3.5–5.3)
PROT SERPL-MCNC: 7.7 G/DL — SIGNIFICANT CHANGE UP (ref 6–8.3)
SODIUM SERPL-SCNC: 134 MMOL/L — LOW (ref 135–145)

## 2021-01-24 PROCEDURE — 99232 SBSQ HOSP IP/OBS MODERATE 35: CPT | Mod: CS

## 2021-01-24 RX ADMIN — Medication 25 MILLIGRAM(S): at 08:50

## 2021-01-24 RX ADMIN — Medication 3 MILLIGRAM(S): at 21:06

## 2021-01-24 RX ADMIN — REMDESIVIR 500 MILLIGRAM(S): 5 INJECTION INTRAVENOUS at 17:04

## 2021-01-24 RX ADMIN — ENOXAPARIN SODIUM 40 MILLIGRAM(S): 100 INJECTION SUBCUTANEOUS at 21:06

## 2021-01-24 RX ADMIN — Medication 2: at 09:32

## 2021-01-24 RX ADMIN — Medication 6 MILLIGRAM(S): at 08:50

## 2021-01-24 RX ADMIN — GABAPENTIN 200 MILLIGRAM(S): 400 CAPSULE ORAL at 05:10

## 2021-01-24 NOTE — PROGRESS NOTE ADULT - SUBJECTIVE AND OBJECTIVE BOX
HPI:  89 yo F with PMHx of Afib (not on AC due to vaginal bleeding 2/2 polyps), chronic back pain 2/2 spinal stenosis,  DM2 on metformin, HTN, recurrent UTIs, chronic venous insufficiency. The patient has been living at [facility]. She was sent to the [place] ER to evaluate for DVT due to left lower extremity edema. Pt reportedly has had decreased PO intake and diarrhea as well.     Patient reports that diarrhea started one week ago- it's been once a day,  but voluminous. She denies having any associated fevers. She says lower extremity swelling is chronic and "everyone at the [facility] has it." She is wheelchair bound at baseline. She is agitated that she was sent to the hospital because she feels totally fine. She does not believe she has COVID. She denies having any shortness of breath, cough, nasal congestion, sore throat, chest pain, palpitations, abdominal pain, nausea, or vomiting.     [facility] will not take pt back because of COVID positivity status and diarrhea, which makes it difficult to clean her since she is overall bed bound.    ER course:  Pt found to be covid +  LLE duplex performed and no e/o DVT.  (2021 16:50)      Subjective    Poor po intake.         PAST MEDICAL & SURGICAL HISTORY:  Spinal stenosis of lumbar region    Hiatal hernia    Diabetes mellitus, type II    Atrial fibrillation    H/O: GI Bleed    Overactive Bladder    Uterine Polyp    Peripheral Neuropathy    Degenerative Joint Disease    Obesity    Hypothyroidism    Hypertension    S/P  section    S/P rotator cuff repair  right    S/P knee replacement, right        MedsMEDICATIONS  (STANDING):  ascorbic acid 500 milliGRAM(s) Oral daily  cholecalciferol 1000 Unit(s) Oral daily  cyanocobalamin 1000 MICROGram(s) Oral daily  dexAMETHasone     Tablet 6 milliGRAM(s) Oral daily  dextrose 40% Gel 15 Gram(s) Oral once  dextrose 5%. 1000 milliLiter(s) (50 mL/Hr) IV Continuous <Continuous>  dextrose 5%. 1000 milliLiter(s) (100 mL/Hr) IV Continuous <Continuous>  dextrose 50% Injectable 25 Gram(s) IV Push once  dextrose 50% Injectable 12.5 Gram(s) IV Push once  dextrose 50% Injectable 25 Gram(s) IV Push once  enoxaparin Injectable 40 milliGRAM(s) SubCutaneous at bedtime  ferrous    sulfate 325 milliGRAM(s) Oral daily  gabapentin 200 milliGRAM(s) Oral two times a day  glucagon  Injectable 1 milliGRAM(s) IntraMuscular once  insulin lispro (ADMELOG) corrective regimen sliding scale   SubCutaneous three times a day before meals  insulin lispro (ADMELOG) corrective regimen sliding scale   SubCutaneous at bedtime  levothyroxine 75 MICROGram(s) Oral daily  melatonin 3 milliGRAM(s) Oral at bedtime  metoprolol succinate ER 25 milliGRAM(s) Oral daily  multivitamin 1 Tablet(s) Oral daily  polyethylene glycol 3350 17 Gram(s) Oral daily  remdesivir  IVPB 100 milliGRAM(s) IV Intermittent every 24 hours  remdesivir  IVPB   IV Intermittent   senna 2 Tablet(s) Oral at bedtime  sodium chloride 0.9%. 1000 milliLiter(s) (80 mL/Hr) IV Continuous <Continuous>    MEDICATIONS  (PRN):  acetaminophen   Tablet .. 650 milliGRAM(s) Oral every 4 hours PRN Temp greater or equal to 38C (100.4F), Moderate Pain (4 - 6)  acetaminophen   Tablet .. 325 milliGRAM(s) Oral every 6 hours PRN Mild Pain (1 - 3)  guaiFENesin  milliGRAM(s) Oral every 12 hours PRN Cough  ondansetron   Disintegrating Tablet 4 milliGRAM(s) Oral every 6 hours PRN Nausea and/or Vomiting      Vital Signs Last 24 Hrs  T(C): 36.4 (2021 08:31), Max: 36.6 (2021 19:56)  T(F): 97.6 (2021 08:31), Max: 97.9 (2021 19:56)  HR: 96 (2021 08:31) (89 - 97)  BP: 160/71 (2021 08:31) (120/70 - 160/71)  BP(mean): --  RR: 18 (2021 08:31) (16 - 18)  SpO2: 99% (2021 08:31) (93% - 99%)  I&O's Summary      PHYSICAL EXAM:  GENERAL: NAD  NECK: Supple  NERVOUS SYSTEM:  awake and alert  HEART: S1s2 NL , irreg irreg  CHEST/LUNG: Clear to percussion bilaterally  ABDOMEN: Soft, Nontender, Nondistended; Bowel sounds present  EXTREMITIES:  pos edema      LABS:( @ 07:08)                      15.7  4.26 )-----------( 154                 46.4    Neutrophils = 2.78 (65.2%)  Lymphocytes = 0.71 (16.7%)  Eosinophils = 0.00 (0.0%)  Basophils = 0.01 (0.2%)  Monocytes = 0.74 (17.4%)  Bands = --%        134<L>  |  97  |  18  ----------------------------<  156<H>  4.0   |  27  |  0.65    Ca    9.8      2021 06:00  Phos  2.5       Mg     1.7         TPro  7.7  /  Alb  3.0<L>  /  TBili  0.9  /  DBili  x   /  AST  34  /  ALT  29  /  AlkPhos  62        COVID-19 PCR: Detected (2021 06:00)  COVID-19 PCR: Detected (2021 08:00)  COVID-19 PCR: Detected (2021 14:00)      CAPILLARY BLOOD GLUCOSE      POCT Blood Glucose.: 178 mg/dL (2021 08:56)  POCT Blood Glucose.: 173 mg/dL (2021 20:32)  POCT Blood Glucose.: 178 mg/dL (2021 17:16)  POCT Blood Glucose.: 174 mg/dL (2021 11:49)      Imaging Personally Reviewed:  [ ] YES  [ ] NO        Care Discussed with Consultants/Other Providers [ x] YES  [ ] NO

## 2021-01-24 NOTE — CHART NOTE - NSCHARTNOTEFT_GEN_A_CORE
Brief Nutrition Note: pt eating poorly as per discussion with MD. Pt typically enjoys sweets/desserts per family. Recommend adding Ensure Enlive TID + will provide variety of desserts with each meals (cake, ice cream, puddings). RD to remain available/follow up per protocol. Raquel Colby RDN Pager #[phone].

## 2021-01-24 NOTE — PROGRESS NOTE ADULT - ASSESSMENT
89F with Afib (not on AC due to vaginal bleeding 2/2 polyps), chronic back pain 2/2 spinal stenosis,  DM2 on metformin, HTN, recurrent UTIs, chronic venous insufficiency, presents from [facility] for diarrhea, found to have COVID+ infection    #Acute hypoxic respiratory failure sec to COVID-19 Pneumonia - O2 documented to be 88% on RA.   Decadron Day #3  Remdesivir Day #3. Monitor renal/liver function.     Bilateral chronic venous insufficiency  No DVT  Hold Lasix for now as pt poor po intake    Chronic Atrial Fibrillation, not on anticoagulation secondary to history of vaginal bleeding, HTN  Metoprolol    DM2, a1c 6.1  Lispro    Hypomagensemia/Hypokalemia/Hypophosphatemia  Resolved    Hyponatremia  Improving  urine Osm, urine Na pending  Monitor for now    Dispo: D/C back to [facility] pending two negative COVID tests, once Remdesivir completed. Would repeat next COVID test when patient is on day 4 of Remdesivir (1/25).  PT follow-up. Per patient's son, patient is bedbound and wheelchair bound but she does transfer with assistance to the wheelchair.    Gal/Jose Kay, Both sons live [facility], - [phone] -Updated 1/24  Per Gal, pt has sweet tooth, and likes to eat cakes. Will try to order cakes/desserts, informed nursing staff         89F with Afib (not on AC due to vaginal bleeding 2/2 polyps), chronic back pain 2/2 spinal stenosis,  DM2 on metformin, HTN, recurrent UTIs, chronic venous insufficiency, presents from [facility] for diarrhea, found to have COVID+ infection    #Acute hypoxic respiratory failure sec to COVID-19 Pneumonia - O2 documented to be 88% on RA.   Decadron Day #3  Remdesivir Day #3. Monitor renal/liver function.     Bilateral chronic venous insufficiency  No DVT  Hold Lasix for now as pt poor po intake    Chronic Atrial Fibrillation, not on anticoagulation secondary to history of vaginal bleeding, HTN  Metoprolol    DM2, a1c 6.1  Lispro    Hypomagensemia/Hypokalemia/Hypophosphatemia  Resolved    Hyponatremia  Improving  urine Osm, urine Na pending  Monitor for now    Dispo: D/C back to [facility] pending two negative COVID tests, once Remdesivir completed. Would repeat next COVID test when patient is on day 4 of Remdesivir (1/25).  PT follow-up. Per patient's son, patient is bedbound and wheelchair bound but she does transfer with assistance to the wheelchair.    Gal/Jose Kay, Both sons live [facility], - [phone] -Updated 1/24  Per Gal, pt has sweet tooth, and likes to eat cakes.  Add ensure  D/w Nutritionist who will have kitchen to add some cakes.

## 2021-01-25 LAB
ALBUMIN SERPL ELPH-MCNC: 2.8 G/DL — LOW (ref 3.3–5)
ALP SERPL-CCNC: 61 U/L — SIGNIFICANT CHANGE UP (ref 40–120)
ALT FLD-CCNC: 22 U/L — SIGNIFICANT CHANGE UP (ref 10–45)
ANION GAP SERPL CALC-SCNC: 8 MMOL/L — SIGNIFICANT CHANGE UP (ref 5–17)
AST SERPL-CCNC: 29 U/L — SIGNIFICANT CHANGE UP (ref 10–40)
BASOPHILS # BLD AUTO: 0 K/UL — SIGNIFICANT CHANGE UP (ref 0–0.2)
BASOPHILS NFR BLD AUTO: 0 % — SIGNIFICANT CHANGE UP (ref 0–2)
BILIRUB SERPL-MCNC: 1.2 MG/DL — SIGNIFICANT CHANGE UP (ref 0.2–1.2)
BUN SERPL-MCNC: 14 MG/DL — SIGNIFICANT CHANGE UP (ref 7–23)
CALCIUM SERPL-MCNC: 9.3 MG/DL — SIGNIFICANT CHANGE UP (ref 8.4–10.5)
CHLORIDE SERPL-SCNC: 98 MMOL/L — SIGNIFICANT CHANGE UP (ref 96–108)
CO2 SERPL-SCNC: 27 MMOL/L — SIGNIFICANT CHANGE UP (ref 22–31)
CREAT SERPL-MCNC: 0.6 MG/DL — SIGNIFICANT CHANGE UP (ref 0.5–1.3)
EOSINOPHIL # BLD AUTO: 0 K/UL — SIGNIFICANT CHANGE UP (ref 0–0.5)
EOSINOPHIL NFR BLD AUTO: 0 % — SIGNIFICANT CHANGE UP (ref 0–6)
GLUCOSE BLDC GLUCOMTR-MCNC: 196 MG/DL — HIGH (ref 70–99)
GLUCOSE BLDC GLUCOMTR-MCNC: 198 MG/DL — HIGH (ref 70–99)
GLUCOSE BLDC GLUCOMTR-MCNC: 237 MG/DL — HIGH (ref 70–99)
GLUCOSE BLDC GLUCOMTR-MCNC: 240 MG/DL — HIGH (ref 70–99)
GLUCOSE SERPL-MCNC: 198 MG/DL — HIGH (ref 70–99)
HCT VFR BLD CALC: 43.9 % — SIGNIFICANT CHANGE UP (ref 34.5–45)
HGB BLD-MCNC: 14.9 G/DL — SIGNIFICANT CHANGE UP (ref 11.5–15.5)
LYMPHOCYTES # BLD AUTO: 0.36 K/UL — LOW (ref 1–3.3)
LYMPHOCYTES # BLD AUTO: 7 % — LOW (ref 13–44)
MANUAL SMEAR VERIFICATION: SIGNIFICANT CHANGE UP
MCHC RBC-ENTMCNC: 31 PG — SIGNIFICANT CHANGE UP (ref 27–34)
MCHC RBC-ENTMCNC: 33.9 GM/DL — SIGNIFICANT CHANGE UP (ref 32–36)
MCV RBC AUTO: 91.5 FL — SIGNIFICANT CHANGE UP (ref 80–100)
MONOCYTES # BLD AUTO: 0.31 K/UL — SIGNIFICANT CHANGE UP (ref 0–0.9)
MONOCYTES NFR BLD AUTO: 6 % — SIGNIFICANT CHANGE UP (ref 2–14)
NEUTROPHILS # BLD AUTO: 4.32 K/UL — SIGNIFICANT CHANGE UP (ref 1.8–7.4)
NEUTROPHILS NFR BLD AUTO: 83 % — HIGH (ref 43–77)
NRBC # BLD: 0 /100 — SIGNIFICANT CHANGE UP (ref 0–0)
PLAT MORPH BLD: NORMAL — SIGNIFICANT CHANGE UP
PLATELET # BLD AUTO: 158 K/UL — SIGNIFICANT CHANGE UP (ref 150–400)
POTASSIUM SERPL-MCNC: 3.7 MMOL/L — SIGNIFICANT CHANGE UP (ref 3.5–5.3)
POTASSIUM SERPL-SCNC: 3.7 MMOL/L — SIGNIFICANT CHANGE UP (ref 3.5–5.3)
PROT SERPL-MCNC: 7.1 G/DL — SIGNIFICANT CHANGE UP (ref 6–8.3)
RBC # BLD: 4.8 M/UL — SIGNIFICANT CHANGE UP (ref 3.8–5.2)
RBC # FLD: 12.6 % — SIGNIFICANT CHANGE UP (ref 10.3–14.5)
RBC BLD AUTO: NORMAL — SIGNIFICANT CHANGE UP
SARS-COV-2 RNA SPEC QL NAA+PROBE: DETECTED
SODIUM SERPL-SCNC: 133 MMOL/L — LOW (ref 135–145)
VARIANT LYMPHS # BLD: 4 % — SIGNIFICANT CHANGE UP (ref 0–6)
WBC # BLD: 5.21 K/UL — SIGNIFICANT CHANGE UP (ref 3.8–10.5)
WBC # FLD AUTO: 5.21 K/UL — SIGNIFICANT CHANGE UP (ref 3.8–10.5)

## 2021-01-25 PROCEDURE — 99233 SBSQ HOSP IP/OBS HIGH 50: CPT | Mod: CS

## 2021-01-25 PROCEDURE — 99223 1ST HOSP IP/OBS HIGH 75: CPT

## 2021-01-25 RX ORDER — SODIUM CHLORIDE 9 MG/ML
500 INJECTION INTRAMUSCULAR; INTRAVENOUS; SUBCUTANEOUS
Refills: 0 | Status: DISCONTINUED | OUTPATIENT
Start: 2021-01-25 | End: 2021-01-26

## 2021-01-25 RX ORDER — AMLODIPINE BESYLATE 2.5 MG/1
5 TABLET ORAL DAILY
Refills: 0 | Status: DISCONTINUED | OUTPATIENT
Start: 2021-01-25 | End: 2021-01-27

## 2021-01-25 RX ADMIN — Medication 75 MICROGRAM(S): at 05:25

## 2021-01-25 RX ADMIN — SENNA PLUS 2 TABLET(S): 8.6 TABLET ORAL at 21:00

## 2021-01-25 RX ADMIN — REMDESIVIR 500 MILLIGRAM(S): 5 INJECTION INTRAVENOUS at 19:00

## 2021-01-25 RX ADMIN — ENOXAPARIN SODIUM 40 MILLIGRAM(S): 100 INJECTION SUBCUTANEOUS at 20:59

## 2021-01-25 RX ADMIN — Medication 1 TABLET(S): at 13:17

## 2021-01-25 RX ADMIN — Medication 0.25 MILLIGRAM(S): at 20:44

## 2021-01-25 RX ADMIN — GABAPENTIN 200 MILLIGRAM(S): 400 CAPSULE ORAL at 05:25

## 2021-01-25 RX ADMIN — Medication 25 MILLIGRAM(S): at 05:25

## 2021-01-25 RX ADMIN — Medication 2: at 08:36

## 2021-01-25 RX ADMIN — POLYETHYLENE GLYCOL 3350 17 GRAM(S): 17 POWDER, FOR SOLUTION ORAL at 13:16

## 2021-01-25 RX ADMIN — Medication 3 MILLIGRAM(S): at 20:59

## 2021-01-25 RX ADMIN — Medication 6 MILLIGRAM(S): at 05:25

## 2021-01-25 NOTE — PROGRESS NOTE ADULT - SUBJECTIVE AND OBJECTIVE BOX
Patient is a 89y old  Female who presents with a chief complaint of Diarrhea in a bed bound patient, COVID positivity status (24 Jan 2021 10:53)    Patient seen and examined at bedside. Patient upset this morning. Only wants scrambled eggs. Does not like her breakfast/food. Does not want to be in the hospital.     ALLERGIES:  aspirin (Unknown)  penicillin (Unknown)    MEDICATIONS  (STANDING):  ascorbic acid 500 milliGRAM(s) Oral daily  cholecalciferol 1000 Unit(s) Oral daily  cyanocobalamin 1000 MICROGram(s) Oral daily  dexAMETHasone     Tablet 6 milliGRAM(s) Oral daily  dextrose 40% Gel 15 Gram(s) Oral once  dextrose 5%. 1000 milliLiter(s) (50 mL/Hr) IV Continuous <Continuous>  dextrose 5%. 1000 milliLiter(s) (100 mL/Hr) IV Continuous <Continuous>  dextrose 50% Injectable 25 Gram(s) IV Push once  dextrose 50% Injectable 12.5 Gram(s) IV Push once  dextrose 50% Injectable 25 Gram(s) IV Push once  enoxaparin Injectable 40 milliGRAM(s) SubCutaneous at bedtime  ferrous    sulfate 325 milliGRAM(s) Oral daily  gabapentin 200 milliGRAM(s) Oral two times a day  glucagon  Injectable 1 milliGRAM(s) IntraMuscular once  insulin lispro (ADMELOG) corrective regimen sliding scale   SubCutaneous three times a day before meals  insulin lispro (ADMELOG) corrective regimen sliding scale   SubCutaneous at bedtime  levothyroxine 75 MICROGram(s) Oral daily  melatonin 3 milliGRAM(s) Oral at bedtime  metoprolol succinate ER 25 milliGRAM(s) Oral daily  multivitamin 1 Tablet(s) Oral daily  polyethylene glycol 3350 17 Gram(s) Oral daily  remdesivir  IVPB 100 milliGRAM(s) IV Intermittent every 24 hours  remdesivir  IVPB   IV Intermittent   senna 2 Tablet(s) Oral at bedtime  sodium chloride 0.9%. 1000 milliLiter(s) (80 mL/Hr) IV Continuous <Continuous>    MEDICATIONS  (PRN):  acetaminophen   Tablet .. 650 milliGRAM(s) Oral every 4 hours PRN Temp greater or equal to 38C (100.4F), Moderate Pain (4 - 6)  acetaminophen   Tablet .. 325 milliGRAM(s) Oral every 6 hours PRN Mild Pain (1 - 3)  guaiFENesin  milliGRAM(s) Oral every 12 hours PRN Cough  ondansetron   Disintegrating Tablet 4 milliGRAM(s) Oral every 6 hours PRN Nausea and/or Vomiting    Vital Signs Last 24 Hrs  T(F): 97 (25 Jan 2021 09:42), Max: 98.3 (24 Jan 2021 20:42)  HR: 99 (25 Jan 2021 09:42) (94 - 99)  BP: 153/119 (25 Jan 2021 09:42) (151/85 - 166/70)  RR: 18 (25 Jan 2021 09:42) (18 - 18)  SpO2: 92% (25 Jan 2021 09:42) (92% - 97%)  I&O's Summary    24 Jan 2021 07:01  -  25 Jan 2021 07:00  --------------------------------------------------------  IN: 0 mL / OUT: 300 mL / NET: -300 mL      PHYSICAL EXAM:  General: NAD, follows commands, more agitated this morning, fair concentration  Neck: Supple, No JVD  Lungs: Clear to auscultation bilaterally limited to poor effort, non-labored breathing  Cardio: IRRR, S1/S2  Abdomen: Soft, Nontender, Nondistended; Bowel sounds present  Extremities: No calf tenderness, 1+ pitting edema    LABS:                        14.9   5.21  )-----------( 158      ( 25 Jan 2021 08:20 )             43.9     01-25    133  |  98  |  14  ----------------------------<  198  3.7   |  27  |  0.60    Ca    9.3      25 Jan 2021 08:20  Phos  2.5     01-23  Mg     1.7     01-23    TPro  7.1  /  Alb  2.8  /  TBili  1.2  /  DBili  x   /  AST  29  /  ALT  22  /  AlkPhos  61  01-25        eGFR if Non African American: 80 mL/min/1.73M2 (01-25-21 @ 08:20)  eGFR if : 93 mL/min/1.73M2 (01-25-21 @ 08:20)        POCT Blood Glucose.: 240 mg/dL (25 Jan 2021 11:32)  POCT Blood Glucose.: 198 mg/dL (25 Jan 2021 08:22)  POCT Blood Glucose.: 147 mg/dL (24 Jan 2021 20:54)    COVID-19 PCR: Detected (20 Jan 2021 06:00)  COVID-19 PCR: Detected (18 Jan 2021 08:00)  COVID-19 PCR: Detected (16 Jan 2021 14:00)

## 2021-01-25 NOTE — CONSULT NOTE ADULT - CONVERSATION DETAILS
We discussed advanced directives.  Son is aware that all she wants to do is go home.  Son states that that is her normal personality.  He will review advanced directives with family

## 2021-01-25 NOTE — CHART NOTE - NSCHARTNOTEFT_GEN_A_CORE
Nutrition Follow Up Note  Hospital Course (Per Electronic Medical Record):   Source: Medical Record [X] Nursing Staff [X]     Diet: Regular , Ensure Enlive TID    Patient noted with declining po intake since admission , patient reported poor appetite , po supplements provided , however currently  patient only consuming ~10% of meal & reusing some meals .  Patient noted to enjoy " sweets" will provide additional menu items with meal , Ensure Enlive provided , ? consumption , will encourage intake .    Current Weight: (1/25) 238.5/108,2kg , ? admit weight of 225lbs on admission due to recorded follow up weights .     Pertinent Medications: MEDICATIONS  (STANDING):  ascorbic acid 500 milliGRAM(s) Oral daily  cholecalciferol 1000 Unit(s) Oral daily  cyanocobalamin 1000 MICROGram(s) Oral daily  dexAMETHasone     Tablet 6 milliGRAM(s) Oral daily  dextrose 40% Gel 15 Gram(s) Oral once  dextrose 5%. 1000 milliLiter(s) (50 mL/Hr) IV Continuous <Continuous>  dextrose 5%. 1000 milliLiter(s) (100 mL/Hr) IV Continuous <Continuous>  dextrose 50% Injectable 25 Gram(s) IV Push once  dextrose 50% Injectable 12.5 Gram(s) IV Push once  dextrose 50% Injectable 25 Gram(s) IV Push once  enoxaparin Injectable 40 milliGRAM(s) SubCutaneous at bedtime  ferrous    sulfate 325 milliGRAM(s) Oral daily  gabapentin 200 milliGRAM(s) Oral two times a day  glucagon  Injectable 1 milliGRAM(s) IntraMuscular once  insulin lispro (ADMELOG) corrective regimen sliding scale   SubCutaneous three times a day before meals  insulin lispro (ADMELOG) corrective regimen sliding scale   SubCutaneous at bedtime  levothyroxine 75 MICROGram(s) Oral daily  melatonin 3 milliGRAM(s) Oral at bedtime  metoprolol succinate ER 25 milliGRAM(s) Oral daily  multivitamin 1 Tablet(s) Oral daily  polyethylene glycol 3350 17 Gram(s) Oral daily  remdesivir  IVPB 100 milliGRAM(s) IV Intermittent every 24 hours  remdesivir  IVPB   IV Intermittent   senna 2 Tablet(s) Oral at bedtime  sodium chloride 0.9%. 1000 milliLiter(s) (80 mL/Hr) IV Continuous <Continuous>    MEDICATIONS  (PRN):  acetaminophen   Tablet .. 650 milliGRAM(s) Oral every 4 hours PRN Temp greater or equal to 38C (100.4F), Moderate Pain (4 - 6)  acetaminophen   Tablet .. 325 milliGRAM(s) Oral every 6 hours PRN Mild Pain (1 - 3)  guaiFENesin  milliGRAM(s) Oral every 12 hours PRN Cough  ondansetron   Disintegrating Tablet 4 milliGRAM(s) Oral every 6 hours PRN Nausea and/or Vomiting      Pertinent Labs:  01-25 Na133 mmol/L<L> Glu 198 mg/dL<H> K+ 3.7 mmol/L Cr  0.60 mg/dL BUN 14 mg/dL 01-23 Phos 2.5 mg/dL 01-25 Alb 2.8 g/dL<L>        Skin: skin intact     Edema: (+1) generalized edema     Last BM: on (1/17) , fecal incontinence noted , bowel meds noted .     Estimated Needs:   [X] No Change since Previous Assessment  [X] Recalculated:     Previous Nutrition Diagnosis: Inadequate Oral Nutrition Intake     Nutrition Diagnosis is [X] Ongoing       New Nutrition Diagnosis: [X] Not Applicable    Interventions:   1. Recommend continue current diet       Monitoring & Evaluation: will monitor:  [X] Weights   [X] PO Intake   [X] Follow Up (Per Protocol)  [X] Tolerance to Diet Prescription       RD to follow as per Nutrition protocol  Corry Warren RDN

## 2021-01-25 NOTE — CHART NOTE - NSCHARTNOTEFT_GEN_A_CORE
Patient is a 89y old  Female who presents with a chief complaint of Diarrhea in a bed bound patient, COVID positivity status (2021 14:12)      BRIEF HOSPITAL COURSE: 89F with Afib (not on AC due to vaginal bleeding 2/2 polyps), chronic back pain 2/2 spinal stenosis,  DM2 on metformin, HTN, recurrent UTIs, chronic venous insufficiency admitted for Acute hypoxic respiratory failure 2/2  COVID+ infection.     Events last 24 hours: Called to bedside by RN as pt noted to be very agitated, cursing at staff. Pt began to start screaming at staff after she was told she could not have her window open in her room. Pt also not compliant with keeping NC on, causing her O2 to drop down to 88% on RA. Upon my arrival, pt began to curse stating "open the f'ing window," "this hospital sucks," and "go grab your ass." As per chart, pt has been noted to be agitated/angry/upset and not compliant with her O2 supplementation. Pt assessed at bedside - refusing to answer questions in regard to ROS.     PAST MEDICAL & SURGICAL HISTORY:  Spinal stenosis of lumbar region    Hiatal hernia    Diabetes mellitus, type II    Atrial fibrillation    H/O: GI Bleed    Overactive Bladder    Uterine Polyp    Peripheral Neuropathy    Degenerative Joint Disease    Obesity    Hypothyroidism    Hypertension    S/P  section    S/P rotator cuff repair  right    S/P knee replacement, right      SOCIAL HISTORY:  denies tobacco, alcohol, and drug use.  As per chart       Review of Systems:  Unable to assess as pt refusing to answer questions.      Medications:  remdesivir  IVPB 100 milliGRAM(s) IV Intermittent every 24 hours  remdesivir  IVPB   IV Intermittent     amLODIPine   Tablet 5 milliGRAM(s) Oral daily  metoprolol succinate ER 25 milliGRAM(s) Oral daily    guaiFENesin  milliGRAM(s) Oral every 12 hours PRN    acetaminophen   Tablet .. 650 milliGRAM(s) Oral every 4 hours PRN  acetaminophen   Tablet .. 325 milliGRAM(s) Oral every 6 hours PRN  gabapentin 200 milliGRAM(s) Oral two times a day  melatonin 3 milliGRAM(s) Oral at bedtime  ondansetron   Disintegrating Tablet 4 milliGRAM(s) Oral every 6 hours PRN      enoxaparin Injectable 40 milliGRAM(s) SubCutaneous at bedtime    polyethylene glycol 3350 17 Gram(s) Oral daily  senna 2 Tablet(s) Oral at bedtime      dexAMETHasone     Tablet 6 milliGRAM(s) Oral daily  dextrose 40% Gel 15 Gram(s) Oral once  dextrose 50% Injectable 25 Gram(s) IV Push once  dextrose 50% Injectable 12.5 Gram(s) IV Push once  dextrose 50% Injectable 25 Gram(s) IV Push once  glucagon  Injectable 1 milliGRAM(s) IntraMuscular once  insulin lispro (ADMELOG) corrective regimen sliding scale   SubCutaneous three times a day before meals  insulin lispro (ADMELOG) corrective regimen sliding scale   SubCutaneous at bedtime  levothyroxine 75 MICROGram(s) Oral daily    ascorbic acid 500 milliGRAM(s) Oral daily  cholecalciferol 1000 Unit(s) Oral daily  cyanocobalamin 1000 MICROGram(s) Oral daily  dextrose 5%. 1000 milliLiter(s) IV Continuous <Continuous>  dextrose 5%. 1000 milliLiter(s) IV Continuous <Continuous>  ferrous    sulfate 325 milliGRAM(s) Oral daily  multivitamin 1 Tablet(s) Oral daily  sodium chloride 0.9%. 1000 milliLiter(s) IV Continuous <Continuous>  sodium chloride 0.9%. 500 milliLiter(s) IV Continuous <Continuous>                ICU Vital Signs Last 24 Hrs  T(C): 36.4 (2021 19:36), Max: 36.4 (2021 19:36)  T(F): 97.6 (2021 19:36), Max: 97.6 (2021 19:36)  HR: 89 (2021 19:36) (89 - 99)  BP: 118/72 (2021 19:36) (118/72 - 153/119)  BP(mean): --  ABP: --  ABP(mean): --  RR: 18 (2021 19:36) (18 - 18)  SpO2: 97% (2021 19:36) (92% - 97%)          I&O's Detail    2021 07:01  -  2021 07:00  --------------------------------------------------------  IN:  Total IN: 0 mL    OUT:    Voided (mL): 300 mL  Total OUT: 300 mL    Total NET: -300 mL      2021 07:01  -  2021 21:04  --------------------------------------------------------  IN:  Total IN: 0 mL    OUT:    Voided (mL): 1 mL  Total OUT: 1 mL    Total NET: -1 mL            LABS:                        14.9   5.21  )-----------( 158      ( 2021 08:20 )             43.9         133<L>  |  98  |  14  ----------------------------<  198<H>  3.7   |  27  |  0.60    Ca    9.3      2021 08:20    TPro  7.1  /  Alb  2.8<L>  /  TBili  1.2  /  DBili  x   /  AST  29  /  ALT  22  /  AlkPhos  61            CAPILLARY BLOOD GLUCOSE      POCT Blood Glucose.: 240 mg/dL (2021 11:32)        CULTURES:      Physical Examination:    General: Agitated, yelling/cursing at staff.     PULM: Clear to auscultation bilaterally, non-labored breathing.     CVS: s1/s2.    ABD: Soft, nondistended, nontender, normoactive bowel sounds.      RADIOLOGY:   < from: Xray Chest 1 View AP/PA (21 @ 10:34) >  EXAM:  XR CHEST AP OR PA 1V   PROCEDURE DATE:  2021    INTERPRETATION:  CLINICAL STATEMENT: Follow-up chest pain.  TECHNIQUE: AP view of the chest.  COMPARISON: 2019  FINDINGS/  IMPRESSION:  New airspace opacities right midlung zone.  No pleural effusion  Heart size cannot be accurately assessed in this projection.  Right shoulder suture anchors noted  MICHAEL DISLA MD; Attending Radiologist  This document has been electronically signed. 2021 10:37AM    < from: US Duplex Venous Lower Ext Ltd, Left (21 @ 15:27) >  EXAM:  US DPLX LWR EXT VEINS LTD LT    PROCEDURE DATE:  2021    INTERPRETATION:  CLINICAL INFORMATION: Left lower extremity swelling  COMPARISON: Lower extremity venous Doppler ultrasound 2017  TECHNIQUE: Duplex sonography of the LEFT LOWER extremity veins with color and spectral Doppler, with and without compression.  FINDINGS:  There is normal compressibility of the left common femoral, femoral and popliteal veins.  The contralateral common femoral vein is patent.  Doppler examination shows normal spontaneous and phasic flow.  No calf vein thrombosis is detected.  IMPRESSION:  No evidence of left lower extremity deep venous thrombosis.  NATE CARMONA MD; Attending Radiologist  This document has been electronically signed. 2021  3:30PM      89F with Afib (not on AC due to vaginal bleeding 2/2 polyps), chronic back pain 2/2 spinal stenosis,  DM2 on metformin, HTN, recurrent UTIs, chronic venous insufficiency admitted for Acute hypoxic respiratory failure 2/2  COVID+ infection.   Assessment:  1. Agitation  2. COVID19  Plan:  1. Patient's chart reviewed and examined at bedside. Ativan 0.25mg IVP given for agitation. When pt calm/resting in bed/not yelling at staff and compliant with NC, O2sat >92%. Will continue to monitor and redose if agitation/noncompliance with NC continues.   2. Continue O2 supplementation to maintain O2sat >92%. Continue Decadron, Remdesivir. Incentive spirometry. Continue isolation precautions, PPE.   Will monitor/reassess and determine if any additional management needed. Rest of care as per primary team.     TIME SPENT: 36 minutes   Evaluating/treating patient, reviewing data/labs/imaging, discussing case with multidisciplinary team, discussing plan/goals of care with patient/family. Non-inclusive of procedure time.

## 2021-01-25 NOTE — PROGRESS NOTE ADULT - ASSESSMENT
89F with Afib (not on AC due to vaginal bleeding 2/2 polyps), chronic back pain 2/2 spinal stenosis,  DM2 on metformin, HTN, recurrent UTIs, chronic venous insufficiency, presents from [facility] for diarrhea, found to have COVID+ infection    #Acute hypoxic respiratory failure - O2 dips to 88% on RA (still) - patient non-compliant with keeping oxygen in place  #COVID19 pneumonia  #Diarrhea likely due to #COVID+ which has resolved  -Decadron day 4  -Remdesivir day 4     #Bilateral chronic venous insufficiency  -No evidence of DVT on Left Lower Extremity    -hold Lasix for now as patient clinically dehydrated   -elevate legs as able    #Chronic Atrial Fibrillation, not on anticoagulation secondary to history of vaginal bleeding  -c/w beta blocker - if rate control becomes an issue, can consider short-acting formulation  -c/w ASA    #Essential HTN:  - Start Norvasc for uncontrolled BP   - c/w metoprolol ER 25 mg daily     #DM2  ISS, accucheck  A1c 6.1  Monitor sugars now that steroids to begin     #Hypomagensemia: Repleted  #Hypokalemia: Repleted  #Hypophosphatemia: Repleted  #Hyponatremia: When corrected for glucose, ~134-135. Suspect from intravascular depletion. Will give gentle IVF NS x 500 cc and continue to encourage PO intake     Dispo: D/C back to [facility] pending two negative COVID tests, once Remdesivir completed. Repeat COVID test sent today. Will get palliative care consult    Per patient's son, patient is bedbound and wheelchair bound but she does transfer with assistance to the wheelchair.    Gal Kay, son, - [phone]    FULL CODE   89F with Afib (not on AC due to vaginal bleeding 2/2 polyps), chronic back pain 2/2 spinal stenosis,  DM2 on metformin, HTN, recurrent UTIs, chronic venous insufficiency, presents from [facility] for diarrhea, found to have COVID+ infection    #Acute hypoxic respiratory failure - O2 dips to 88% on RA (still) - patient non-compliant with keeping oxygen in place  #COVID19 pneumonia  #Diarrhea likely due to #COVID+ which has resolved  -Decadron day 4  -Remdesivir day 4     #Bilateral chronic venous insufficiency  -No evidence of DVT on Left Lower Extremity    -hold Lasix for now as patient clinically dehydrated   -elevate legs as able    #Chronic Atrial Fibrillation, not on anticoagulation secondary to history of vaginal bleeding  -c/w beta blocker - if rate control becomes an issue, can consider short-acting formulation  -c/w ASA    #Essential HTN:  - Start Norvasc for uncontrolled BP   - c/w metoprolol ER 25 mg daily     #DM2  ISS, accucheck  A1c 6.1  Monitor sugars now that steroids to begin     #Hypomagensemia: Repleted  #Hypokalemia: Repleted  #Hypophosphatemia: Repleted  #Hyponatremia: When corrected for glucose, ~134-135. Suspect from intravascular depletion. Will give gentle IVF NS x 500 cc and continue to encourage PO intake     Dispo: D/C back to [facility] pending two negative COVID tests, once Remdesivir completed. Repeat COVID test sent today. Will get palliative care consult    Per patient's son, patient is bedbound and wheelchair bound but she does transfer with assistance to the wheelchair.    Gal Kay, son,  [phone] - updated. I attempted to inquire about use of restraints (if needed) to keep oxygen in place, and was hoping to further discuss the use of enteral feeding (if needed) and to re-address code status. He requests that I call back after 230 pm when his brother is available to talk.    FULL CODE

## 2021-01-25 NOTE — CONSULT NOTE ADULT - CONVERSATION/DISCUSSION
Patient Education       Pharyngitis     BASIC INFORMATION  Description  Inflammation or infection of the pharynx. The pharynx is the hollow passage at the back of the throat. It is made up of the nasopharynx, which leads to the nose and oropharynx which leads to the mouth. The larynx (voice box) is located below the pharynx. Pharyngitis occurs in all age groups, but most often affects children.    Frequent Signs and Symptoms  · Sore throat  · Swallowing difficulty  · Tickle or \"lump\" in the throat.  · Fever  · Swollen glands in the neck (sometimes)  · Throat maybe red or covered with a white or grayish membrane (sometimes)  · Body aches (sometimes)    Causes  Viral infection (most common cause) or bacterial infection (such as streptococcus). The germs are spread by person-to-person contact. Rarely, there may be other causes, such as irritation.    Risk increases with  · Common cold, flu, or seasonal allergies.  · Weak immune system due to illness or drugs.  · Smoking or second-hand smoke.  · Chronic illness, such as diabetes.  · Close quarters, such as with  recruits, in schools, and  centers.    Preventive Measures  · Avoid close contact with anyone with a sore throat.  · Avoid germs. Wash hands often, especially children.    Expected Outcomes  Most cases of viral infection clear up on their own in a week. Antibiotic drugs can successfully treat bacterial infections. Complications are rare.    Possible Complications  · Airways may become blocked.  · Abscess (pus-filled area of infection)   · Rheumatic fever, scarlet fever, or glomerulonephritis, if pharyngitis is caused by streptococcal bacteria and does not receive adequate antibiotic treatment.     DIAGNOSIS & TREATMENT  General Measures  · Your health care provider will do an exam of the throat, ears, nose, neck, and lungs. Medical tests may include blood study and throat culture (from a swab of the throat), or rapid strep test find the type of  infection.   · Treatment will include self-care measures and antibiotic drugs for bacterial infections. Antibiotics will not help viral infections.  · To relieve the sore throat, gargle frequently with warm or cold double-strength tea or warm salt water (mix on-half teaspoon of salt in one cup of water).   · Wash hands often to help prevent the spread of germs to other family members. Avoid kissing or sharing cups or other utensils.     Medications  · For minor discomfort, you may use nonprescription drugs such as ibuprofen. Don't give aspirin to a child.   · Nonprescription throat lozenges (for patients over age 3) may help ease discomfort.  · Antibiotic drugs are usually prescribed for bacterial infection. Finish entire course of drugs even if symptoms improve.    Activity  Return to normal activities as symptoms improve. A person can no longer spread the germs if they have taken antibiotic drug for at least 24 hours.    Diet  Drink plenty of fluids. If swallowing solid food is painful, try a liquid or soft diet for a few days.    NOTIFY OUR OFFICE IF  · You or a family member has symptoms of pharyngitis  · The following occur during treatment  · Breathing/swallowing difficulty or chest pain.  · Fever worsens or severe headache develops.  · Thick mucous drainage from the nose.  · Cough that produces colored or bloody sputum.  · Skin rash.  · Dark urine.     Copyright © 2005 by [employer]. All rights reserved  MEDICATION: AMOXICILLIN  You have been prescribed amoxicillin. Brand names for this drug include: Amoxil and Trimox. This is an antibiotic related to penicillin.  DIRECTIONS FOR USE:  Amoxicillin may be taken with food to prevent upset stomach. Take the medicine at regular intervals. If the label says \"every 8 hours\", this means 3 times per day. Doses do not have to be exactly eight hours apart, but should be spaced out evenly three times a day.  Take all of the medicine until it is gone, even if you are  feeling better. This will ensure that the infection is completely cleared up.  WHAT TO WATCH FOR:  POSSIBLE SIDE EFFECTS: Nausea, diarrhea, abdominal pain (contact your doctor if any of these symptoms persist or become severe). Vaginal itching or discharge (contact your doctor).  ALLERGIC REACTION: Rash, itching, swelling, trouble breathing or swallowing (contact your doctor or return to this facility promptly).  MEDICAL CONDITIONS: Before starting this medicine, be sure your doctor knows if you have any of the following conditions:  · Allergic reaction to cephalosporin or penicillin-type drugs in the past  · Current infection with mononucleosis  · Breastfeeding  DRUG INTERACTION: If you are already taking any of the following medicines, be sure to notify your doctor before taking this drug:  · Allopurinol, methotrexate, birth control pills (see below)  WARNING: In rare cases, this medicine may block the effect of birth control pills. Therefore, if you are a woman using birth control pills, you should use another form of contraception during the menstrual cycle(s) in which you are taking this medicine. Do not stop taking your birth control pills.  [NOTE: This information topic may not include all directions, precautions, medical conditions, drug/food interactions and warnings for this drug. Check with your doctor, nurse, or pharmacist for any questions that you may have.]  © 3334-5369 [facility], [address] [place] [address], [place] [address]. All rights reserved. This information is not intended as a substitute for professional medical care. Always follow your healthcare professional's instructions.      Diagnosis/Prognosis/MOLST Discussed

## 2021-01-25 NOTE — CONSULT NOTE ADULT - SUBJECTIVE AND OBJECTIVE BOX
HPI:  89 yo F with PMHx of Afib (not on AC due to vaginal bleeding 2/2 polyps), chronic back pain 2/2 spinal stenosis,  DM2 on metformin, HTN, recurrent UTIs, chronic venous insufficiency. The patient has been living at [facility]. She was sent to the [place] ER to evaluate for DVT due to left lower extremity edema. Pt reportedly has had decreased PO intake and diarrhea as well.     Patient reports that diarrhea started one week ago- it's been once a day,  but voluminous. She denies having any associated fevers. She says lower extremity swelling is chronic and "everyone at the [facility] has it." She is wheelchair bound at baseline. She is agitated that she was sent to the hospital because she feels totally fine. She does not believe she has COVID. She denies having any shortness of breath, cough, nasal congestion, sore throat, chest pain, palpitations, abdominal pain, nausea, or vomiting.     [facility] will not take pt back because of COVID positivity status and diarrhea, which makes it difficult to clean her since she is overall bed bound.    ER course:  Pt found to be covid +  LLE duplex performed and no e/o DVT.  (2021 16:50)  Palliative:  Pt just wants to leave hospital and does not want to discuss anything else    PAST MEDICAL & SURGICAL HISTORY:  Spinal stenosis of lumbar region    Hiatal hernia    Diabetes mellitus, type II    Atrial fibrillation    H/O: GI Bleed    Overactive Bladder    Uterine Polyp    Peripheral Neuropathy    Degenerative Joint Disease    Obesity    Hypothyroidism    Hypertension    S/P  section    S/P rotator cuff repair  right    S/P knee replacement, right        SOCIAL HISTORY:    Admitted from:  assisted living    Substance abuse history:   no:      Surrogate/HCP/Guardian:  Jose         Phone#:    FAMILY HISTORY:  No pertinent family history in first degree relatives      Baseline ADLs (prior to admission): wheelchair bound    Allergies    aspirin (Unknown)  penicillin (Unknown)    Intolerances      Present Symptoms:   Unable to obtain due to poor mentation]    MEDICATIONS  (STANDING):  amLODIPine   Tablet 5 milliGRAM(s) Oral daily  ascorbic acid 500 milliGRAM(s) Oral daily  cholecalciferol 1000 Unit(s) Oral daily  cyanocobalamin 1000 MICROGram(s) Oral daily  dexAMETHasone     Tablet 6 milliGRAM(s) Oral daily  dextrose 40% Gel 15 Gram(s) Oral once  dextrose 5%. 1000 milliLiter(s) (50 mL/Hr) IV Continuous <Continuous>  dextrose 5%. 1000 milliLiter(s) (100 mL/Hr) IV Continuous <Continuous>  dextrose 50% Injectable 25 Gram(s) IV Push once  dextrose 50% Injectable 12.5 Gram(s) IV Push once  dextrose 50% Injectable 25 Gram(s) IV Push once  enoxaparin Injectable 40 milliGRAM(s) SubCutaneous at bedtime  ferrous    sulfate 325 milliGRAM(s) Oral daily  gabapentin 200 milliGRAM(s) Oral two times a day  glucagon  Injectable 1 milliGRAM(s) IntraMuscular once  insulin lispro (ADMELOG) corrective regimen sliding scale   SubCutaneous three times a day before meals  insulin lispro (ADMELOG) corrective regimen sliding scale   SubCutaneous at bedtime  levothyroxine 75 MICROGram(s) Oral daily  melatonin 3 milliGRAM(s) Oral at bedtime  metoprolol succinate ER 25 milliGRAM(s) Oral daily  multivitamin 1 Tablet(s) Oral daily  polyethylene glycol 3350 17 Gram(s) Oral daily  remdesivir  IVPB 100 milliGRAM(s) IV Intermittent every 24 hours  remdesivir  IVPB   IV Intermittent   senna 2 Tablet(s) Oral at bedtime  sodium chloride 0.9%. 1000 milliLiter(s) (80 mL/Hr) IV Continuous <Continuous>  sodium chloride 0.9%. 500 milliLiter(s) (50 mL/Hr) IV Continuous <Continuous>    MEDICATIONS  (PRN):  acetaminophen   Tablet .. 650 milliGRAM(s) Oral every 4 hours PRN Temp greater or equal to 38C (100.4F), Moderate Pain (4 - 6)  acetaminophen   Tablet .. 325 milliGRAM(s) Oral every 6 hours PRN Mild Pain (1 - 3)  guaiFENesin  milliGRAM(s) Oral every 12 hours PRN Cough  ondansetron   Disintegrating Tablet 4 milliGRAM(s) Oral every 6 hours PRN Nausea and/or Vomiting      PHYSICAL EXAM:    Vital Signs Last 24 Hrs  T(C): 36.1 (2021 09:42), Max: 36.8 (2021 20:42)  T(F): 97 (2021 09:42), Max: 98.3 (2021 20:42)  HR: 99 (2021 09:42) (94 - 99)  BP: 153/119 (2021 09:42) (151/85 - 166/70)  BP(mean): --  RR: 18 (2021 09:42) (18 - 18)  SpO2: 92% (2021 09:42) (92% - 97%)    General: alert  oriented x _1__     No distress  Oral intake ability: unable/only mouth care [minimal moderate full capability]  Diet: [NPO]    LABS:                        14.9   5.21  )-----------( 158      ( 2021 08:20 )             43.9         133<L>  |  98  |  14  ----------------------------<  198<H>  3.7   |  27  |  0.60    Ca    9.3      2021 08:20    TPro  7.1  /  Alb  2.8<L>  /  TBili  1.2  /  DBili  x   /  AST  29  /  ALT  22  /  AlkPhos  61          RADIOLOGY & ADDITIONAL STUDIES:    ADVANCE DIRECTIVES:   Advanced Care Planning discussion total time spent:

## 2021-01-26 LAB
ANION GAP SERPL CALC-SCNC: 8 MMOL/L — SIGNIFICANT CHANGE UP (ref 5–17)
BUN SERPL-MCNC: 18 MG/DL — SIGNIFICANT CHANGE UP (ref 7–23)
CALCIUM SERPL-MCNC: 9.6 MG/DL — SIGNIFICANT CHANGE UP (ref 8.4–10.5)
CHLORIDE SERPL-SCNC: 100 MMOL/L — SIGNIFICANT CHANGE UP (ref 96–108)
CO2 SERPL-SCNC: 28 MMOL/L — SIGNIFICANT CHANGE UP (ref 22–31)
CREAT SERPL-MCNC: 0.72 MG/DL — SIGNIFICANT CHANGE UP (ref 0.5–1.3)
GLUCOSE BLDC GLUCOMTR-MCNC: 169 MG/DL — HIGH (ref 70–99)
GLUCOSE BLDC GLUCOMTR-MCNC: 176 MG/DL — HIGH (ref 70–99)
GLUCOSE BLDC GLUCOMTR-MCNC: 194 MG/DL — HIGH (ref 70–99)
GLUCOSE BLDC GLUCOMTR-MCNC: 214 MG/DL — HIGH (ref 70–99)
GLUCOSE BLDC GLUCOMTR-MCNC: 215 MG/DL — HIGH (ref 70–99)
GLUCOSE SERPL-MCNC: 179 MG/DL — HIGH (ref 70–99)
MAGNESIUM SERPL-MCNC: 1.7 MG/DL — SIGNIFICANT CHANGE UP (ref 1.6–2.6)
PHOSPHATE SERPL-MCNC: 2.1 MG/DL — LOW (ref 2.5–4.5)
POTASSIUM SERPL-MCNC: 3.4 MMOL/L — LOW (ref 3.5–5.3)
POTASSIUM SERPL-SCNC: 3.4 MMOL/L — LOW (ref 3.5–5.3)
SODIUM SERPL-SCNC: 136 MMOL/L — SIGNIFICANT CHANGE UP (ref 135–145)

## 2021-01-26 PROCEDURE — 99233 SBSQ HOSP IP/OBS HIGH 50: CPT

## 2021-01-26 PROCEDURE — 99233 SBSQ HOSP IP/OBS HIGH 50: CPT | Mod: CS

## 2021-01-26 RX ORDER — POTASSIUM CHLORIDE 20 MEQ
40 PACKET (EA) ORAL ONCE
Refills: 0 | Status: COMPLETED | OUTPATIENT
Start: 2021-01-26 | End: 2021-01-26

## 2021-01-26 RX ORDER — INSULIN GLARGINE 100 [IU]/ML
5 INJECTION, SOLUTION SUBCUTANEOUS ONCE
Refills: 0 | Status: COMPLETED | OUTPATIENT
Start: 2021-01-26 | End: 2021-01-26

## 2021-01-26 RX ORDER — INSULIN GLARGINE 100 [IU]/ML
5 INJECTION, SOLUTION SUBCUTANEOUS EVERY MORNING
Refills: 0 | Status: DISCONTINUED | OUTPATIENT
Start: 2021-01-27 | End: 2021-01-27

## 2021-01-26 RX ORDER — POTASSIUM PHOSPHATE, MONOBASIC POTASSIUM PHOSPHATE, DIBASIC 236; 224 MG/ML; MG/ML
15 INJECTION, SOLUTION INTRAVENOUS ONCE
Refills: 0 | Status: COMPLETED | OUTPATIENT
Start: 2021-01-26 | End: 2021-01-26

## 2021-01-26 RX ADMIN — Medication 325 MILLIGRAM(S): at 12:41

## 2021-01-26 RX ADMIN — Medication 25 MILLIGRAM(S): at 05:48

## 2021-01-26 RX ADMIN — SENNA PLUS 2 TABLET(S): 8.6 TABLET ORAL at 20:23

## 2021-01-26 RX ADMIN — REMDESIVIR 500 MILLIGRAM(S): 5 INJECTION INTRAVENOUS at 17:58

## 2021-01-26 RX ADMIN — Medication 500 MILLIGRAM(S): at 12:41

## 2021-01-26 RX ADMIN — Medication 75 MICROGRAM(S): at 05:47

## 2021-01-26 RX ADMIN — AMLODIPINE BESYLATE 5 MILLIGRAM(S): 2.5 TABLET ORAL at 05:47

## 2021-01-26 RX ADMIN — POLYETHYLENE GLYCOL 3350 17 GRAM(S): 17 POWDER, FOR SOLUTION ORAL at 12:41

## 2021-01-26 RX ADMIN — Medication 1000 UNIT(S): at 12:41

## 2021-01-26 RX ADMIN — GABAPENTIN 200 MILLIGRAM(S): 400 CAPSULE ORAL at 17:39

## 2021-01-26 RX ADMIN — Medication 2: at 08:47

## 2021-01-26 RX ADMIN — POTASSIUM PHOSPHATE, MONOBASIC POTASSIUM PHOSPHATE, DIBASIC 62.5 MILLIMOLE(S): 236; 224 INJECTION, SOLUTION INTRAVENOUS at 21:47

## 2021-01-26 RX ADMIN — PREGABALIN 1000 MICROGRAM(S): 225 CAPSULE ORAL at 12:41

## 2021-01-26 RX ADMIN — Medication 6 MILLIGRAM(S): at 05:47

## 2021-01-26 RX ADMIN — Medication 3 MILLIGRAM(S): at 20:23

## 2021-01-26 RX ADMIN — ENOXAPARIN SODIUM 40 MILLIGRAM(S): 100 INJECTION SUBCUTANEOUS at 20:23

## 2021-01-26 RX ADMIN — GABAPENTIN 200 MILLIGRAM(S): 400 CAPSULE ORAL at 05:50

## 2021-01-26 RX ADMIN — Medication 4: at 17:40

## 2021-01-26 RX ADMIN — Medication 1 TABLET(S): at 12:41

## 2021-01-26 RX ADMIN — Medication 2: at 12:42

## 2021-01-26 NOTE — PROGRESS NOTE ADULT - ASSESSMENT
89F with Afib (not on AC due to vaginal bleeding 2/2 polyps), chronic back pain 2/2 spinal stenosis,  DM2 on metformin, HTN, recurrent UTIs, chronic venous insufficiency, presents from [facility] for diarrhea, found to have COVID+ infection    #Acute hypoxic respiratory failure - O2 dips to 88% on RA (still) - patient non-compliant with keeping oxygen in place  #COVID19 pneumonia  #Diarrhea likely due to #COVID+ which has resolved  -Decadron day 5/10  -Remdesivir day 5/5   -follow-up palliative care    #Bilateral chronic venous insufficiency  -No evidence of DVT on Left Lower Extremity    -hold Lasix for now as patient clinically dehydrated   -elevate legs as able    #Chronic Atrial Fibrillation, not on anticoagulation secondary to history of vaginal bleeding  -c/w beta blocker - if rate control becomes an issue, can consider short-acting formulation  -c/w ASA    #Essential HTN:  - improved with initiation of Norvasc  - c/w metoprolol ER 25 mg daily     #DM2  ISS, accucheck  A1c 6.1  Monitor sugars now that steroids to begin     #Steroid-induced hyperglycemia:  -Lantus started    #Hypomagensemia: Repleted  #Hypokalemia: Replete today  #Hypophosphatemia: Replete today  #Hyponatremia: improved with IVF, monitor off IVF    COVID-19 PCR: Detected (25 Jan 2021 09:15)  COVID-19 PCR: Detected (20 Jan 2021 06:00)  COVID-19 PCR: Detected (18 Jan 2021 08:00)  COVID-19 PCR: Detected (16 Jan 2021 14:00)    Dispo: D/C back to [facility] pending two negative COVID tests, once Remdesivir completed. Repeat COVID test 1/28.    Per patient's son, patient is bedbound and wheelchair bound but she does transfer with assistance to the wheelchair.    Jose Kay, son,  [phone] - updated. Requested that for any issues that arise during the day, to contact patient's daughter, Salma Kebede, [phone]. In the evening, to contact Jose.     Patient is now DNR/DNI - will follow-up with palliative as to the details of the conversation with the family. Palliative care assistance greatly appreciated.

## 2021-01-26 NOTE — PROGRESS NOTE ADULT - SUBJECTIVE AND OBJECTIVE BOX
Follow-up Pall Progress Note    Brendon Lau MD  [phone]    No new events overnight.  Poor apetite    Medications:  Vital Signs Last 24 Hrs  T(C): 36.3 (26 Jan 2021 16:21), Max: 36.7 (26 Jan 2021 05:42)  T(F): 97.3 (26 Jan 2021 16:21), Max: 98 (26 Jan 2021 05:42)  HR: 98 (26 Jan 2021 16:21) (72 - 99)  BP: 112/82 (26 Jan 2021 16:21) (112/82 - 143/78)  BP(mean): --  RR: 18 (26 Jan 2021 16:21) (17 - 18)  SpO2: 100% (26 Jan 2021 16:21) (93% - 100%)          01-25 @ 07:01  -  01-26 @ 07:00  --------------------------------------------------------  IN: 0 mL / OUT: 2 mL / NET: -2 mL        LABS:                        14.9   5.21  )-----------( 158      ( 25 Jan 2021 08:20 )             43.9     01-26    136  |  100  |  18  ----------------------------<  179<H>  3.4<L>   |  28  |  0.72    Ca    9.6      26 Jan 2021 07:02  Phos  2.1     01-26  Mg     1.7     01-26    TPro  7.1  /  Alb  2.8<L>  /  TBili  1.2  /  DBili  x   /  AST  29  /  ALT  22  /  AlkPhos  61  01-25              CULTURES:        Physical Examination:      RADIOLOGY REVIEWED  CXR:    CT chest:    TTE:

## 2021-01-26 NOTE — PROGRESS NOTE ADULT - ASSESSMENT
89F with Afib (not on AC due to vaginal bleeding 2/2 polyps), chronic back pain 2/2 spinal stenosis,  DM2 on metformin, HTN, recurrent UTIs, chronic venous insufficiency, presents from [facility] for diarrhea, found to have COVID+ infection    #Acute hypoxic respiratory failure - O2 dips to 88% on RA (still) - patient non-compliant with keeping oxygen in place  #COVID19 pneumonia  #Diarrhea likely due to #COVID+ which has resolved  -Decadron day 5/10  -Remdesivir day 5/5   -follow-up palliative care    #Bilateral chronic venous insufficiency  -No evidence of DVT on Left Lower Extremity    -hold Lasix for now as patient clinically dehydrated   -elevate legs as able    #Chronic Atrial Fibrillation, not on anticoagulation secondary to history of vaginal bleeding  -c/w beta blocker - if rate control becomes an issue, can consider short-acting formulation  -c/w ASA    #Essential HTN:  - improved with initiation of Norvasc  - c/w metoprolol ER 25 mg daily     #DM2  ISS, accucheck  A1c 6.1  Monitor sugars now that steroids to begin     #Steroid-induced hyperglycemia:  -Lantus started    #Hypomagensemia: Repleted  #Hypokalemia: Replete today  #Hypophosphatemia: Replete today  #Hyponatremia: improved with IVF, monitor off IVF    COVID-19 PCR: Detected (25 Jan 2021 09:15)  COVID-19 PCR: Detected (20 Jan 2021 06:00)  COVID-19 PCR: Detected (18 Jan 2021 08:00)  COVID-19 PCR: Detected (16 Jan 2021 14:00)    Dispo: D/C back to [facility] pending two negative COVID tests, once Remdesivir completed. Repeat COVID test 1/28.    Per patient's son, patient is bedbound and wheelchair bound but she does transfer with assistance to the wheelchair.    Jose Kay, son,  [phone] - updated. DEAN rrevkaterined [facility] done.  dnr/i no feeding tube.  Copy of mol emailed to son.     Patient is now DNR/DNI - will follow-up with palliative as to the details of the conversation with the family. Palliative care assistance greatly appreciated.

## 2021-01-26 NOTE — PROGRESS NOTE ADULT - SUBJECTIVE AND OBJECTIVE BOX
Patient is a 89y old  Female who presents with a chief complaint of Diarrhea in a bed bound patient, COVID positivity status (25 Jan 2021 14:12)    Patient seen and examined at bedside. Overnight events reviewed. Patient was agitated and required a small dose of IV Ativan. Patient was very alert and calm this morning.     ALLERGIES:  aspirin (Unknown)  penicillin (Unknown)    MEDICATIONS  (STANDING):  amLODIPine   Tablet 5 milliGRAM(s) Oral daily  ascorbic acid 500 milliGRAM(s) Oral daily  cholecalciferol 1000 Unit(s) Oral daily  cyanocobalamin 1000 MICROGram(s) Oral daily  dexAMETHasone     Tablet 6 milliGRAM(s) Oral daily  dextrose 40% Gel 15 Gram(s) Oral once  dextrose 5%. 1000 milliLiter(s) (50 mL/Hr) IV Continuous <Continuous>  dextrose 5%. 1000 milliLiter(s) (100 mL/Hr) IV Continuous <Continuous>  dextrose 50% Injectable 25 Gram(s) IV Push once  dextrose 50% Injectable 12.5 Gram(s) IV Push once  dextrose 50% Injectable 25 Gram(s) IV Push once  enoxaparin Injectable 40 milliGRAM(s) SubCutaneous at bedtime  ferrous    sulfate 325 milliGRAM(s) Oral daily  gabapentin 200 milliGRAM(s) Oral two times a day  glucagon  Injectable 1 milliGRAM(s) IntraMuscular once  insulin glargine Injectable (LANTUS) 5 Unit(s) SubCutaneous once  insulin lispro (ADMELOG) corrective regimen sliding scale   SubCutaneous three times a day before meals  insulin lispro (ADMELOG) corrective regimen sliding scale   SubCutaneous at bedtime  levothyroxine 75 MICROGram(s) Oral daily  melatonin 3 milliGRAM(s) Oral at bedtime  metoprolol succinate ER 25 milliGRAM(s) Oral daily  multivitamin 1 Tablet(s) Oral daily  polyethylene glycol 3350 17 Gram(s) Oral daily  potassium chloride    Tablet ER 40 milliEquivalent(s) Oral once  potassium phosphate IVPB 15 milliMole(s) IV Intermittent once  remdesivir  IVPB   IV Intermittent   remdesivir  IVPB 100 milliGRAM(s) IV Intermittent every 24 hours  senna 2 Tablet(s) Oral at bedtime  sodium chloride 0.9%. 1000 milliLiter(s) (80 mL/Hr) IV Continuous <Continuous>  sodium chloride 0.9%. 500 milliLiter(s) (50 mL/Hr) IV Continuous <Continuous>    MEDICATIONS  (PRN):  acetaminophen   Tablet .. 650 milliGRAM(s) Oral every 4 hours PRN Temp greater or equal to 38C (100.4F), Moderate Pain (4 - 6)  acetaminophen   Tablet .. 325 milliGRAM(s) Oral every 6 hours PRN Mild Pain (1 - 3)  guaiFENesin  milliGRAM(s) Oral every 12 hours PRN Cough  ondansetron   Disintegrating Tablet 4 milliGRAM(s) Oral every 6 hours PRN Nausea and/or Vomiting    Vital Signs Last 24 Hrs  T(F): 97.7 (26 Jan 2021 08:16), Max: 98 (26 Jan 2021 05:42)  HR: 99 (26 Jan 2021 08:16) (72 - 99)  BP: 142/98 (26 Jan 2021 08:16) (118/72 - 143/78)  RR: 17 (26 Jan 2021 08:16) (17 - 18)  SpO2: 93% (26 Jan 2021 08:16) (93% - 97%)  I&O's Summary    25 Jan 2021 07:01  -  26 Jan 2021 07:00  --------------------------------------------------------  IN: 0 mL / OUT: 2 mL / NET: -2 mL      PHYSICAL EXAM:  General: NAD, oriented x 2-3, follows commands, more calm this morning  ENT: Dry mucous membranes, no thrush  Neck: Supple, No JVD  Lungs: Clear to auscultation bilaterally, non-labored breathing  Cardio: IRRR, S1/S2  Abdomen: Soft, Nontender, Nondistended; Bowel sounds present  Extremities: No calf tenderness, 1+ pitting edema, + skin tenting          LABS:                        14.9   5.21  )-----------( 158      ( 25 Jan 2021 08:20 )             43.9     01-26    136  |  100  |  18  ----------------------------<  179  3.4   |  28  |  0.72    Ca    9.6      26 Jan 2021 07:02  Phos  2.1     01-26  Mg     1.7     01-26    TPro  7.1  /  Alb  2.8  /  TBili  1.2  /  DBili  x   /  AST  29  /  ALT  22  /  AlkPhos  61  01-25        eGFR if African American: 86 mL/min/1.73M2 (01-26-21 @ 07:02)  eGFR if Non African American: 74 mL/min/1.73M2 (01-26-21 @ 07:02)    POCT Blood Glucose.: 194 mg/dL (26 Jan 2021 12:11)  POCT Blood Glucose.: 169 mg/dL (26 Jan 2021 08:35)  POCT Blood Glucose.: 237 mg/dL (25 Jan 2021 21:17)  POCT Blood Glucose.: 196 mg/dL (25 Jan 2021 21:07)

## 2021-01-27 LAB
ANION GAP SERPL CALC-SCNC: 7 MMOL/L — SIGNIFICANT CHANGE UP (ref 5–17)
BUN SERPL-MCNC: 15 MG/DL — SIGNIFICANT CHANGE UP (ref 7–23)
CALCIUM SERPL-MCNC: 9.6 MG/DL — SIGNIFICANT CHANGE UP (ref 8.4–10.5)
CHLORIDE SERPL-SCNC: 102 MMOL/L — SIGNIFICANT CHANGE UP (ref 96–108)
CO2 SERPL-SCNC: 27 MMOL/L — SIGNIFICANT CHANGE UP (ref 22–31)
CREAT SERPL-MCNC: 0.59 MG/DL — SIGNIFICANT CHANGE UP (ref 0.5–1.3)
GLUCOSE BLDC GLUCOMTR-MCNC: 172 MG/DL — HIGH (ref 70–99)
GLUCOSE BLDC GLUCOMTR-MCNC: 185 MG/DL — HIGH (ref 70–99)
GLUCOSE BLDC GLUCOMTR-MCNC: 200 MG/DL — HIGH (ref 70–99)
GLUCOSE BLDC GLUCOMTR-MCNC: 203 MG/DL — HIGH (ref 70–99)
GLUCOSE SERPL-MCNC: 176 MG/DL — HIGH (ref 70–99)
PHOSPHATE SERPL-MCNC: 2.7 MG/DL — SIGNIFICANT CHANGE UP (ref 2.5–4.5)
POTASSIUM SERPL-MCNC: 3.4 MMOL/L — LOW (ref 3.5–5.3)
POTASSIUM SERPL-SCNC: 3.4 MMOL/L — LOW (ref 3.5–5.3)
SODIUM SERPL-SCNC: 136 MMOL/L — SIGNIFICANT CHANGE UP (ref 135–145)

## 2021-01-27 PROCEDURE — 99233 SBSQ HOSP IP/OBS HIGH 50: CPT | Mod: CS

## 2021-01-27 PROCEDURE — 99233 SBSQ HOSP IP/OBS HIGH 50: CPT

## 2021-01-27 RX ORDER — POTASSIUM CHLORIDE 20 MEQ
10 PACKET (EA) ORAL
Refills: 0 | Status: COMPLETED | OUTPATIENT
Start: 2021-01-27 | End: 2021-01-27

## 2021-01-27 RX ORDER — AMLODIPINE BESYLATE 2.5 MG/1
10 TABLET ORAL DAILY
Refills: 0 | Status: DISCONTINUED | OUTPATIENT
Start: 2021-01-28 | End: 2021-02-07

## 2021-01-27 RX ORDER — INSULIN GLARGINE 100 [IU]/ML
8 INJECTION, SOLUTION SUBCUTANEOUS EVERY MORNING
Refills: 0 | Status: DISCONTINUED | OUTPATIENT
Start: 2021-01-28 | End: 2021-01-28

## 2021-01-27 RX ORDER — AMLODIPINE BESYLATE 2.5 MG/1
5 TABLET ORAL ONCE
Refills: 0 | Status: COMPLETED | OUTPATIENT
Start: 2021-01-27 | End: 2021-01-27

## 2021-01-27 RX ADMIN — PREGABALIN 1000 MICROGRAM(S): 225 CAPSULE ORAL at 11:24

## 2021-01-27 RX ADMIN — SENNA PLUS 2 TABLET(S): 8.6 TABLET ORAL at 20:47

## 2021-01-27 RX ADMIN — Medication 100 MILLIEQUIVALENT(S): at 11:25

## 2021-01-27 RX ADMIN — Medication 325 MILLIGRAM(S): at 11:24

## 2021-01-27 RX ADMIN — Medication 4: at 11:10

## 2021-01-27 RX ADMIN — INSULIN GLARGINE 5 UNIT(S): 100 INJECTION, SOLUTION SUBCUTANEOUS at 08:24

## 2021-01-27 RX ADMIN — POLYETHYLENE GLYCOL 3350 17 GRAM(S): 17 POWDER, FOR SOLUTION ORAL at 11:24

## 2021-01-27 RX ADMIN — Medication 500 MILLIGRAM(S): at 11:24

## 2021-01-27 RX ADMIN — Medication 2: at 08:24

## 2021-01-27 RX ADMIN — ENOXAPARIN SODIUM 40 MILLIGRAM(S): 100 INJECTION SUBCUTANEOUS at 20:47

## 2021-01-27 RX ADMIN — GABAPENTIN 200 MILLIGRAM(S): 400 CAPSULE ORAL at 06:26

## 2021-01-27 RX ADMIN — AMLODIPINE BESYLATE 5 MILLIGRAM(S): 2.5 TABLET ORAL at 06:26

## 2021-01-27 RX ADMIN — AMLODIPINE BESYLATE 5 MILLIGRAM(S): 2.5 TABLET ORAL at 16:28

## 2021-01-27 RX ADMIN — GABAPENTIN 200 MILLIGRAM(S): 400 CAPSULE ORAL at 16:29

## 2021-01-27 RX ADMIN — Medication 1000 UNIT(S): at 11:24

## 2021-01-27 RX ADMIN — Medication 3 MILLIGRAM(S): at 20:47

## 2021-01-27 RX ADMIN — Medication 1 TABLET(S): at 11:24

## 2021-01-27 RX ADMIN — Medication 75 MICROGRAM(S): at 06:26

## 2021-01-27 RX ADMIN — Medication 6 MILLIGRAM(S): at 06:26

## 2021-01-27 RX ADMIN — Medication 100 MILLIEQUIVALENT(S): at 10:15

## 2021-01-27 RX ADMIN — Medication 25 MILLIGRAM(S): at 06:26

## 2021-01-27 RX ADMIN — Medication 2: at 16:20

## 2021-01-27 RX ADMIN — Medication 100 MILLIEQUIVALENT(S): at 08:52

## 2021-01-27 NOTE — PROGRESS NOTE ADULT - SUBJECTIVE AND OBJECTIVE BOX
Follow-up Pall Progress Note    Brendon Lau MD  [phone]    Continues with poor po intake    Medications:  Vital Signs Last 24 Hrs  T(C): 36.4 (27 Jan 2021 08:08), Max: 36.7 (27 Jan 2021 04:56)  T(F): 97.6 (27 Jan 2021 08:08), Max: 98.1 (27 Jan 2021 04:56)  HR: 110 (27 Jan 2021 08:08) (87 - 110)  BP: 138/102 (27 Jan 2021 08:08) (112/82 - 160/92)  BP(mean): --  RR: 17 (27 Jan 2021 08:08) (12 - 18)  SpO2: 92% (27 Jan 2021 08:08) (92% - 100%)          01-26 @ 07:01  -  01-27 @ 07:00  --------------------------------------------------------  IN: 1 mL / OUT: 3 mL / NET: -2 mL        LABS:    01-27    136  |  102  |  15  ----------------------------<  176<H>  3.4<L>   |  27  |  0.59    Ca    9.6      27 Jan 2021 07:35  Phos  2.7     01-27  Mg     1.7     01-26                CULTURES:        Physical Examination:      RADIOLOGY REVIEWED  CXR:< from: Xray Chest 1 View AP/PA (01.22.21 @ 10:34) >  EXAM:  XR CHEST AP OR PA 1V      PROCEDURE DATE:  01/22/2021        INTERPRETATION:  CLINICAL STATEMENT: Follow-up chest pain.    TECHNIQUE: AP view of the chest.    COMPARISON: 8/2/2019    FINDINGS/  IMPRESSION:  New airspace opacities right midlung zone.    No pleural effusion    Heart size cannot be accurately assessed in this projection.    Right shoulder suture anchors noted                MICHAEL DISLA MD; Attending Radiologist  This document has been electronically signed. Jan 22 2021 10:37AM    < end of copied text >      CT chest:    TTE:

## 2021-01-27 NOTE — PROGRESS NOTE ADULT - ASSESSMENT
Palliative:  Covid + with mild hypoxemia and poor po intake.  Pt generally refusing supplemental oxygen.  Patient may do better with nutrition if returned to her home([place]).  Patient has been very comfortable without supplemental oxygen.  I would consider dcing her home with supplemental oxygen prn.

## 2021-01-27 NOTE — PROGRESS NOTE ADULT - ASSESSMENT
89F with Afib (not on AC due to vaginal bleeding 2/2 polyps), chronic back pain 2/2 spinal stenosis,  DM2 on metformin, HTN, recurrent UTIs, chronic venous insufficiency, presents from [facility] for diarrhea, found to have COVID+ infection    #Acute hypoxic respiratory failure - O2 dips to 87-88% on RA (still) - patient non-compliant with keeping oxygen in place  #COVID19 pneumonia  #Diarrhea likely due to #COVID+ which has resolved  -Decadron day 6/10  -Remdesivir day 5/5   -follow-up palliative care    #Bilateral chronic venous insufficiency  -No evidence of DVT on Left Lower Extremity    -hold Lasix for now as patient clinically dehydrated   -elevate legs as able    #Chronic Atrial Fibrillation, not on anticoagulation secondary to history of vaginal bleeding  -c/w beta blocker - if rate control becomes an issue, can consider short-acting formulation  -c/w ASA    #Essential HTN:  - improved with initiation of Norvasc, but will increase Norvasc dose further to 10 mg (with hold parameters)  - c/w metoprolol ER 25 mg daily     #DM2  ISS, accucheck  A1c 6.1  Monitor sugars now that steroids to begin     #Steroid-induced hyperglycemia:  -Lantus started    #Hypomagensemia: Repleted  #Hypokalemia: Repleted   #Hypophosphatemia: Repleted  #Hyponatremia: improved     COVID-19 PCR: Detected (25 Jan 2021 09:15)  COVID-19 PCR: Detected (20 Jan 2021 06:00)  COVID-19 PCR: Detected (18 Jan 2021 08:00)  COVID-19 PCR: Detected (16 Jan 2021 14:00)    REPEAT COVID19 PCR TOMORROW    Dispo: D/C back to [facility] pending two negative COVID tests.    Per patient's son, patient is bedbound and wheelchair bound but she does transfer with assistance to the wheelchair.    For any issues that arise during the day, please contact patient's daughter, Salma Kebede, [phone]. In the evening, please contact Jose Kay, son, [phone]    Case d/w Salma Yandy - 1/27

## 2021-01-27 NOTE — CHART NOTE - NSCHARTNOTEFT_GEN_A_CORE
Nutrition Follow Up Note  Hospital Course (Per Electronic Medical Record):   Source: Medical Record [X] Patient [X]  Nursing Staff [X]     Diet: Regular , Ensure Enlive TID     Patient's po intake remains poor consuming ~25% of meal po supplements provided however patient refusing , Spoke with daughter regarding food preferences, providing to enhance po intake . Patient reports that she has no appetite & " wants to die"      Current Weight: (1/25) 238.5/108.2kg , stable weight admission    Pertinent Medications: MEDICATIONS  (STANDING):  amLODIPine   Tablet 5 milliGRAM(s) Oral daily  ascorbic acid 500 milliGRAM(s) Oral daily  cholecalciferol 1000 Unit(s) Oral daily  cyanocobalamin 1000 MICROGram(s) Oral daily  dexAMETHasone     Tablet 6 milliGRAM(s) Oral daily  dextrose 40% Gel 15 Gram(s) Oral once  dextrose 5%. 1000 milliLiter(s) (50 mL/Hr) IV Continuous <Continuous>  dextrose 5%. 1000 milliLiter(s) (100 mL/Hr) IV Continuous <Continuous>  dextrose 50% Injectable 25 Gram(s) IV Push once  dextrose 50% Injectable 12.5 Gram(s) IV Push once  dextrose 50% Injectable 25 Gram(s) IV Push once  enoxaparin Injectable 40 milliGRAM(s) SubCutaneous at bedtime  ferrous    sulfate 325 milliGRAM(s) Oral daily  gabapentin 200 milliGRAM(s) Oral two times a day  glucagon  Injectable 1 milliGRAM(s) IntraMuscular once  insulin glargine Injectable (LANTUS) 5 Unit(s) SubCutaneous every morning  insulin lispro (ADMELOG) corrective regimen sliding scale   SubCutaneous three times a day before meals  insulin lispro (ADMELOG) corrective regimen sliding scale   SubCutaneous at bedtime  levothyroxine 75 MICROGram(s) Oral daily  melatonin 3 milliGRAM(s) Oral at bedtime  metoprolol succinate ER 25 milliGRAM(s) Oral daily  multivitamin 1 Tablet(s) Oral daily  polyethylene glycol 3350 17 Gram(s) Oral daily  potassium chloride  10 mEq/100 mL IVPB 10 milliEquivalent(s) IV Intermittent every 1 hour  senna 2 Tablet(s) Oral at bedtime    MEDICATIONS  (PRN):  acetaminophen   Tablet .. 650 milliGRAM(s) Oral every 4 hours PRN Temp greater or equal to 38C (100.4F), Moderate Pain (4 - 6)  acetaminophen   Tablet .. 325 milliGRAM(s) Oral every 6 hours PRN Mild Pain (1 - 3)  guaiFENesin  milliGRAM(s) Oral every 12 hours PRN Cough  ondansetron   Disintegrating Tablet 4 milliGRAM(s) Oral every 6 hours PRN Nausea and/or Vomiting      Pertinent Labs:  01-27 Na136 mmol/L Glu 176 mg/dL<H> K+ 3.4 mmol/L<L> Cr  0.59 mg/dL BUN 15 mg/dL 01-27 Phos 2.7 mg/dL 01-25 Alb 2.8 g/dL<L>  MAPO097,176,214,215      Skin: ecchymotic    Edema: (+2) foot edema     Last BM: (1/17) fecal incontinence noted    Estimated Needs:   [X] No Change since Previous Assessment    Previous Nutrition Diagnosis: Inadequate Nutrition intake     Nutrition Diagnosis is [X] Ongoing         New Nutrition Diagnosis:   Severe Malnutrition  related to inadequate nutrient intake as evidence by  consuming <50% of abscessed needs> 5 days & (+) fluid accumulation    Interventions:   1. discontinue PO Ensure supplements   2. Provide food preferences     Monitoring & Evaluation: will monitor:  [X] Weights   [X] PO Intake   [X] Follow Up (Per Protocol)  [X] Tolerance to Diet Prescription       RD to follow as per Nutrition protocol  Corry Warren RDN

## 2021-01-27 NOTE — PROGRESS NOTE ADULT - SUBJECTIVE AND OBJECTIVE BOX
Patient is a 89y old  Female who presents with a chief complaint of Diarrhea in a bed bound patient, COVID positivity status (27 Jan 2021 12:28)    Patient seen and examined at bedside. Does not consistent take PO at this time. Does not keep her oxygen on. "I just want to go home."     ALLERGIES:  aspirin (Unknown)  penicillin (Unknown)    MEDICATIONS  (STANDING):  amLODIPine   Tablet 5 milliGRAM(s) Oral daily  ascorbic acid 500 milliGRAM(s) Oral daily  cholecalciferol 1000 Unit(s) Oral daily  cyanocobalamin 1000 MICROGram(s) Oral daily  dexAMETHasone     Tablet 6 milliGRAM(s) Oral daily  dextrose 40% Gel 15 Gram(s) Oral once  dextrose 5%. 1000 milliLiter(s) (50 mL/Hr) IV Continuous <Continuous>  dextrose 5%. 1000 milliLiter(s) (100 mL/Hr) IV Continuous <Continuous>  dextrose 50% Injectable 25 Gram(s) IV Push once  dextrose 50% Injectable 12.5 Gram(s) IV Push once  dextrose 50% Injectable 25 Gram(s) IV Push once  enoxaparin Injectable 40 milliGRAM(s) SubCutaneous at bedtime  ferrous    sulfate 325 milliGRAM(s) Oral daily  gabapentin 200 milliGRAM(s) Oral two times a day  glucagon  Injectable 1 milliGRAM(s) IntraMuscular once  insulin glargine Injectable (LANTUS) 5 Unit(s) SubCutaneous every morning  insulin lispro (ADMELOG) corrective regimen sliding scale   SubCutaneous three times a day before meals  insulin lispro (ADMELOG) corrective regimen sliding scale   SubCutaneous at bedtime  levothyroxine 75 MICROGram(s) Oral daily  melatonin 3 milliGRAM(s) Oral at bedtime  metoprolol succinate ER 25 milliGRAM(s) Oral daily  multivitamin 1 Tablet(s) Oral daily  polyethylene glycol 3350 17 Gram(s) Oral daily  senna 2 Tablet(s) Oral at bedtime    MEDICATIONS  (PRN):  acetaminophen   Tablet .. 650 milliGRAM(s) Oral every 4 hours PRN Temp greater or equal to 38C (100.4F), Moderate Pain (4 - 6)  acetaminophen   Tablet .. 325 milliGRAM(s) Oral every 6 hours PRN Mild Pain (1 - 3)  guaiFENesin  milliGRAM(s) Oral every 12 hours PRN Cough  ondansetron   Disintegrating Tablet 4 milliGRAM(s) Oral every 6 hours PRN Nausea and/or Vomiting    Vital Signs Last 24 Hrs  T(F): 97.6 (27 Jan 2021 08:08), Max: 98.1 (27 Jan 2021 04:56)  HR: 110 (27 Jan 2021 08:08) (87 - 110)  BP: 138/102 (27 Jan 2021 08:08) (112/82 - 160/92)  RR: 17 (27 Jan 2021 08:08) (12 - 18)  SpO2: 92% (27 Jan 2021 08:08) (92% - 100%)  I&O's Summary    26 Jan 2021 07:01  -  27 Jan 2021 07:00  --------------------------------------------------------  IN: 1 mL / OUT: 3 mL / NET: -2 mL    27 Jan 2021 07:01  -  27 Jan 2021 14:03  --------------------------------------------------------  IN: 300 mL / OUT: 0 mL / NET: 300 mL      PHYSICAL EXAM:  General: NAD, oriented x 2-3, follows commands, more calm this morning  ENT: Dry mucous membranes, no thrush  Neck: Supple, No JVD  Lungs: Clear to auscultation bilaterally, non-labored breathing  Cardio: IRRR, S1/S2  Abdomen: Soft, Nontender, Nondistended; Bowel sounds present  Extremities: No calf tenderness, 1+ pitting edema, + skin tenting        LABS:                        14.9   5.21  )-----------( 158      ( 25 Jan 2021 08:20 )             43.9     01-27    136  |  102  |  15  ----------------------------<  176  3.4   |  27  |  0.59    Ca    9.6      27 Jan 2021 07:35  Phos  2.7     01-27  Mg     1.7     01-26    TPro  7.1  /  Alb  2.8  /  TBili  1.2  /  DBili  x   /  AST  29  /  ALT  22  /  AlkPhos  61  01-25        eGFR if Non African American: 81 mL/min/1.73M2 (01-27-21 @ 07:35)  eGFR if : 93 mL/min/1.73M2 (01-27-21 @ 07:35)      POCT Blood Glucose.: 203 mg/dL (27 Jan 2021 11:05)  POCT Blood Glucose.: 172 mg/dL (27 Jan 2021 08:15)  POCT Blood Glucose.: 176 mg/dL (26 Jan 2021 20:21)  POCT Blood Glucose.: 214 mg/dL (26 Jan 2021 17:10)  POCT Blood Glucose.: 215 mg/dL (26 Jan 2021 14:34)

## 2021-01-28 LAB
ANION GAP SERPL CALC-SCNC: 8 MMOL/L — SIGNIFICANT CHANGE UP (ref 5–17)
BASOPHILS # BLD AUTO: 0.02 K/UL — SIGNIFICANT CHANGE UP (ref 0–0.2)
BASOPHILS NFR BLD AUTO: 0.2 % — SIGNIFICANT CHANGE UP (ref 0–2)
BUN SERPL-MCNC: 14 MG/DL — SIGNIFICANT CHANGE UP (ref 7–23)
CALCIUM SERPL-MCNC: 10 MG/DL — SIGNIFICANT CHANGE UP (ref 8.4–10.5)
CHLORIDE SERPL-SCNC: 100 MMOL/L — SIGNIFICANT CHANGE UP (ref 96–108)
CO2 SERPL-SCNC: 27 MMOL/L — SIGNIFICANT CHANGE UP (ref 22–31)
CREAT SERPL-MCNC: 0.61 MG/DL — SIGNIFICANT CHANGE UP (ref 0.5–1.3)
EOSINOPHIL # BLD AUTO: 0.14 K/UL — SIGNIFICANT CHANGE UP (ref 0–0.5)
EOSINOPHIL NFR BLD AUTO: 1.7 % — SIGNIFICANT CHANGE UP (ref 0–6)
GLUCOSE BLDC GLUCOMTR-MCNC: 193 MG/DL — HIGH (ref 70–99)
GLUCOSE BLDC GLUCOMTR-MCNC: 199 MG/DL — HIGH (ref 70–99)
GLUCOSE BLDC GLUCOMTR-MCNC: 241 MG/DL — HIGH (ref 70–99)
GLUCOSE BLDC GLUCOMTR-MCNC: 252 MG/DL — HIGH (ref 70–99)
GLUCOSE BLDC GLUCOMTR-MCNC: 282 MG/DL — HIGH (ref 70–99)
GLUCOSE SERPL-MCNC: 172 MG/DL — HIGH (ref 70–99)
HCT VFR BLD CALC: 45.6 % — HIGH (ref 34.5–45)
HGB BLD-MCNC: 15.5 G/DL — SIGNIFICANT CHANGE UP (ref 11.5–15.5)
IMM GRANULOCYTES NFR BLD AUTO: 0.7 % — SIGNIFICANT CHANGE UP (ref 0–1.5)
LYMPHOCYTES # BLD AUTO: 1.08 K/UL — SIGNIFICANT CHANGE UP (ref 1–3.3)
LYMPHOCYTES # BLD AUTO: 13.3 % — SIGNIFICANT CHANGE UP (ref 13–44)
MAGNESIUM SERPL-MCNC: 1.8 MG/DL — SIGNIFICANT CHANGE UP (ref 1.6–2.6)
MCHC RBC-ENTMCNC: 31.3 PG — SIGNIFICANT CHANGE UP (ref 27–34)
MCHC RBC-ENTMCNC: 34 GM/DL — SIGNIFICANT CHANGE UP (ref 32–36)
MCV RBC AUTO: 91.9 FL — SIGNIFICANT CHANGE UP (ref 80–100)
MONOCYTES # BLD AUTO: 0.74 K/UL — SIGNIFICANT CHANGE UP (ref 0–0.9)
MONOCYTES NFR BLD AUTO: 9.1 % — SIGNIFICANT CHANGE UP (ref 2–14)
NEUTROPHILS # BLD AUTO: 6.08 K/UL — SIGNIFICANT CHANGE UP (ref 1.8–7.4)
NEUTROPHILS NFR BLD AUTO: 75 % — SIGNIFICANT CHANGE UP (ref 43–77)
NRBC # BLD: 0 /100 WBCS — SIGNIFICANT CHANGE UP (ref 0–0)
PHOSPHATE SERPL-MCNC: 2.8 MG/DL — SIGNIFICANT CHANGE UP (ref 2.5–4.5)
PLATELET # BLD AUTO: 227 K/UL — SIGNIFICANT CHANGE UP (ref 150–400)
POTASSIUM SERPL-MCNC: 4 MMOL/L — SIGNIFICANT CHANGE UP (ref 3.5–5.3)
POTASSIUM SERPL-SCNC: 4 MMOL/L — SIGNIFICANT CHANGE UP (ref 3.5–5.3)
RBC # BLD: 4.96 M/UL — SIGNIFICANT CHANGE UP (ref 3.8–5.2)
RBC # FLD: 12.5 % — SIGNIFICANT CHANGE UP (ref 10.3–14.5)
SARS-COV-2 RNA SPEC QL NAA+PROBE: DETECTED
SODIUM SERPL-SCNC: 135 MMOL/L — SIGNIFICANT CHANGE UP (ref 135–145)
WBC # BLD: 8.12 K/UL — SIGNIFICANT CHANGE UP (ref 3.8–10.5)
WBC # FLD AUTO: 8.12 K/UL — SIGNIFICANT CHANGE UP (ref 3.8–10.5)

## 2021-01-28 PROCEDURE — 99233 SBSQ HOSP IP/OBS HIGH 50: CPT | Mod: CS

## 2021-01-28 PROCEDURE — 71045 X-RAY EXAM CHEST 1 VIEW: CPT | Mod: 26

## 2021-01-28 PROCEDURE — 99233 SBSQ HOSP IP/OBS HIGH 50: CPT

## 2021-01-28 RX ORDER — INSULIN GLARGINE 100 [IU]/ML
10 INJECTION, SOLUTION SUBCUTANEOUS EVERY MORNING
Refills: 0 | Status: DISCONTINUED | OUTPATIENT
Start: 2021-01-29 | End: 2021-01-30

## 2021-01-28 RX ORDER — INSULIN GLARGINE 100 [IU]/ML
10 INJECTION, SOLUTION SUBCUTANEOUS ONCE
Refills: 0 | Status: COMPLETED | OUTPATIENT
Start: 2021-01-28 | End: 2021-01-28

## 2021-01-28 RX ADMIN — Medication 2: at 17:51

## 2021-01-28 RX ADMIN — AMLODIPINE BESYLATE 10 MILLIGRAM(S): 2.5 TABLET ORAL at 05:06

## 2021-01-28 RX ADMIN — Medication 325 MILLIGRAM(S): at 12:32

## 2021-01-28 RX ADMIN — Medication 1 TABLET(S): at 12:32

## 2021-01-28 RX ADMIN — PREGABALIN 1000 MICROGRAM(S): 225 CAPSULE ORAL at 12:32

## 2021-01-28 RX ADMIN — Medication 500 MILLIGRAM(S): at 12:33

## 2021-01-28 RX ADMIN — Medication 6: at 12:29

## 2021-01-28 RX ADMIN — ENOXAPARIN SODIUM 40 MILLIGRAM(S): 100 INJECTION SUBCUTANEOUS at 21:49

## 2021-01-28 RX ADMIN — Medication 6: at 10:38

## 2021-01-28 RX ADMIN — Medication 25 MILLIGRAM(S): at 05:06

## 2021-01-28 RX ADMIN — GABAPENTIN 200 MILLIGRAM(S): 400 CAPSULE ORAL at 05:11

## 2021-01-28 RX ADMIN — INSULIN GLARGINE 10 UNIT(S): 100 INJECTION, SOLUTION SUBCUTANEOUS at 10:39

## 2021-01-28 RX ADMIN — Medication 3 MILLIGRAM(S): at 21:50

## 2021-01-28 RX ADMIN — GABAPENTIN 200 MILLIGRAM(S): 400 CAPSULE ORAL at 17:54

## 2021-01-28 RX ADMIN — POLYETHYLENE GLYCOL 3350 17 GRAM(S): 17 POWDER, FOR SOLUTION ORAL at 12:31

## 2021-01-28 RX ADMIN — Medication 6 MILLIGRAM(S): at 05:06

## 2021-01-28 RX ADMIN — Medication 1000 UNIT(S): at 12:32

## 2021-01-28 RX ADMIN — Medication 75 MICROGRAM(S): at 05:06

## 2021-01-28 RX ADMIN — Medication 600 MILLIGRAM(S): at 12:31

## 2021-01-28 RX ADMIN — SENNA PLUS 2 TABLET(S): 8.6 TABLET ORAL at 21:50

## 2021-01-28 NOTE — PROGRESS NOTE ADULT - SUBJECTIVE AND OBJECTIVE BOX
Follow-up Pulm Progress Note    Brendon Lau MD  [phone]    No new respiratory events overnight.  Denies SOB/CP.     Medications:  Vital Signs Last 24 Hrs  T(C): 36.9 (28 Jan 2021 08:17), Max: 36.9 (28 Jan 2021 05:03)  T(F): 98.4 (28 Jan 2021 08:17), Max: 98.4 (28 Jan 2021 05:03)  HR: 82 (28 Jan 2021 08:17) (72 - 82)  BP: 152/778 (28 Jan 2021 08:17) (135/69 - 160/82)  BP(mean): --  RR: 18 (28 Jan 2021 08:17) (18 - 18)  SpO2: 94% (28 Jan 2021 08:17) (93% - 95%)          01-27 @ 07:01  -  01-28 @ 07:00  --------------------------------------------------------  IN: 300 mL / OUT: 1700 mL / NET: -1400 mL        LABS:                        15.5   8.12  )-----------( 227      ( 28 Jan 2021 06:24 )             45.6     01-28    135  |  100  |  14  ----------------------------<  172<H>  4.0   |  27  |  0.61    Ca    10.0      28 Jan 2021 06:24  Phos  2.8     01-28  Mg     1.8     01-28                CULTURES:        Physical Examination:  PULM: Clear to auscultation bilaterally, no significant sputum production  CVS: Regular rate and rhythm, no murmurs, rubs, or gallops  ABD: Soft, non-tender  EXT:  No clubbing, cyanosis, or edema    RADIOLOGY REVIEWED  CXR:    CT chest:    TTE:

## 2021-01-28 NOTE — PROGRESS NOTE ADULT - ASSESSMENT
89F with Afib (not on AC due to vaginal bleeding 2/2 polyps), chronic back pain 2/2 spinal stenosis, DM2 on metformin, HTN, recurrent UTIs, chronic venous insufficiency, presents from [facility] for diarrhea, found to have COVID+ infection    #Acute hypoxic respiratory failure  #COVID19 pneumonia  #Diarrhea likely due to #COVID+ which has resolved  -Decadron day 7/10  -s/p Remdesivir  -O2 requirements seem to have increased today, she's up to 12L on wider gauge nasal cannula, will monitor closely for further respiratory deterioration   -follow-up palliative care recommendations  -this tx plan was formulated utilizing my clinical judgement, currently available local/regional anecdotal information, organizational treatment recommendations with COVID-19 specific considerations given rapidly changing information available     #Bilateral chronic venous insufficiency  -No evidence of DVT on Left Lower Extremity    -hold Lasix for now as patient clinically dehydrated   -elevate legs as able    #Chronic Atrial Fibrillation, not on anticoagulation secondary to history of vaginal bleeding  -c/w beta blocker - if rate control becomes an issue, can consider short-acting formulation  -c/w ASA    #Essential HTN:  - improved with initiation of Norvasc which was increased to 10mg q daily  - c/w metoprolol ER 25 mg daily     #DM2  ISS, accucheck  A1c 6.1  Monitor sugars now that steroids to begin     #Steroid-induced hyperglycemia:  -Lantus started    #Hypomagensemia: Repleted  #Hypokalemia: Repleted   #Hypophosphatemia: Repleted  #Hyponatremia: improved     COVID-19 PCR: Detected (25 Jan 2021 09:15)  COVID-19 PCR: Detected (20 Jan 2021 06:00)  COVID-19 PCR: Detected (18 Jan 2021 08:00)  COVID-19 PCR: Detected (16 Jan 2021 14:00)    REPEAT COVID19 PCR TOMORROW    Dispo: D/C back to [facility] pending two negative COVID tests.    Per patient's son, patient is bedbound and wheelchair bound but she does transfer with assistance to the wheelchair.    For any issues that arise during the day, please contact patient's daughter, Salma Kebede, [phone]. In the evening, please contact Jose Kay, son, [phone]    Case d/w Salma Yandy - 1/27   89F with Afib (not on AC due to vaginal bleeding 2/2 polyps), chronic back pain 2/2 spinal stenosis, DM2 on metformin, HTN, recurrent UTIs, chronic venous insufficiency, presents from [facility] for diarrhea, found to have COVID+ infection    #Acute hypoxic respiratory failure  #COVID19 pneumonia  #Diarrhea likely due to #COVID+ which has resolved  -Decadron day 7/10  -s/p Remdesivir  -O2 requirements seem to have increased today, she's up to 12L on wider gauge nasal cannula, will monitor closely for further respiratory deterioration   -follow-up palliative care recommendations - I reviewed MOLST form wishes w/ Salma. Reviewed options with family including high flow nasal cannula. If O2 sats worsen significantly, the only appropriate intervention would be nonrebreather vs. high flow nasal cannula. Not a candidate for BiPAP from my perspective. Consider transition to comfort measures and home hospice if pt deteriorates, f/u palliative recommendations  -this tx plan was formulated utilizing my clinical judgement, currently available local/regional anecdotal information, organizational treatment recommendations with COVID-19 specific considerations given rapidly changing information available   -repeat COVID-19 PCR is PENDING    #Bilateral chronic venous insufficiency  -No evidence of DVT on Left Lower Extremity    -hold Lasix for now as patient clinically dehydrated   -elevate legs as able    #Chronic Atrial Fibrillation, not on anticoagulation secondary to history of vaginal bleeding  -c/w beta blocker - if rate control becomes an issue, can consider short-acting formulation  -c/w ASA    #Essential HTN:  - improved with initiation of Norvasc which was increased to 10mg q daily  - c/w metoprolol ER 25 mg daily     #DM2  ISS, accucheck  A1c 6.1  Monitor sugars, on a liberalized regular diet    #Steroid-induced hyperglycemia:  -Lantus started    #Hypomagensemia: Repleted  #Hypokalemia: Repleted   #Hypophosphatemia: Repleted  #Hyponatremia: improved     COVID-19 PCR: Detected (25 Jan 2021 09:15)  COVID-19 PCR: Detected (20 Jan 2021 06:00)  COVID-19 PCR: Detected (18 Jan 2021 08:00)  COVID-19 PCR: Detected (16 Jan 2021 14:00)    Dispo: D/C back to [facility] pending two negative COVID tests.    Per patient's son, patient is bedbound and wheelchair bound but she does transfer with assistance to the wheelchair.    For any issues that arise during the day, please contact patient's daughter, Salma Kebede, [phone]. In the evening, please contact Jose Kay, son, [phone]  - Spoke w/ Salma Kebede today 1/28

## 2021-01-28 NOTE — PROGRESS NOTE ADULT - SUBJECTIVE AND OBJECTIVE BOX
Follow-up Pall Progress Note    Brendon Lau MD  [phone]    Possible episode of desaturation overnight.  Repeat evaluation showed sats at 88% with high flow oxygen on her forehead.  Now oxy sat 88 - 90% with no oxygen on.  No distress noted.  rr 18    Medications:  Vital Signs Last 24 Hrs  T(C): 36.9 (28 Jan 2021 08:17), Max: 36.9 (28 Jan 2021 05:03)  T(F): 98.4 (28 Jan 2021 08:17), Max: 98.4 (28 Jan 2021 05:03)  HR: 82 (28 Jan 2021 08:17) (72 - 82)  BP: 152/778 (28 Jan 2021 08:17) (135/69 - 160/82)  BP(mean): --  RR: 18 (28 Jan 2021 08:17) (18 - 18)  SpO2: 94% (28 Jan 2021 08:17) (93% - 95%)          01-27 @ 07:01  -  01-28 @ 07:00  --------------------------------------------------------  IN: 300 mL / OUT: 1700 mL / NET: -1400 mL        LABS:                        15.5   8.12  )-----------( 227      ( 28 Jan 2021 06:24 )             45.6     01-28    135  |  100  |  14  ----------------------------<  172<H>  4.0   |  27  |  0.61    Ca    10.0      28 Jan 2021 06:24  Phos  2.8     01-28  Mg     1.8     01-28                CULTURES:        Physical Examination:  PULM: Clear to auscultation bilaterally, no significant sputum production  CVS: Regular rate and rhythm, no murmurs, rubs, or gallops  ABD: Soft, non-tender  Neuro:  patient more cooperative than normal    RADIOLOGY REVIEWED  CXR: ordered    CT chest:    TTE:

## 2021-01-28 NOTE — PROGRESS NOTE ADULT - ASSESSMENT
89F with Afib (not on AC due to vaginal bleeding 2/2 polyps), chronic back pain 2/2 spinal stenosis, DM2 on metformin, HTN, recurrent UTIs, chronic venous insufficiency, presents from [facility] for diarrhea, found to have COVID+ infection    #Acute hypoxic respiratory failure  #COVID19 pneumonia  #Diarrhea likely due to #COVID+ which has resolved  -Decadron day 7/10  -s/p Remdesivir  -O2 requirements seem to have increased today, was up to 12L on wider gauge nasal cannula but oxygen was nopt in place and saturation was 88% on room air(which is her baseline  -follow-up palliative care recommendations -repeat COVID-19 PCR, cxr.  Call placed to son Jose to review possibility of hospice at home.    #Bilateral chronic venous insufficiency  -No evidence of DVT on Left Lower Extremity    -hold Lasix for now as patient clinically dehydrated   -elevate legs as able    #Chronic Atrial Fibrillation, not on anticoagulation secondary to history of vaginal bleeding  -c/w beta blocker - if rate control becomes an issue, can consider short-acting formulation  -c/w ASA    #Essential HTN:  - improved with initiation of Norvasc which was increased to 10mg q daily  - c/w metoprolol ER 25 mg daily     #DM2  ISS, accucheck  A1c 6.1  Monitor sugars, on a liberalized regular diet    #Steroid-induced hyperglycemia:  -Lantus started    #Hypomagensemia: Repleted  #Hypokalemia: Repleted   #Hypophosphatemia: Repleted  #Hyponatremia: improved     COVID-19 PCR: Detected (25 Jan 2021 09:15)  COVID-19 PCR: Detected (20 Jan 2021 06:00)  COVID-19 PCR: Detected (18 Jan 2021 08:00)  COVID-19 PCR: Detected (16 Jan 2021 14:00)    Dispo: D/C back to [facility] pending two negative COVID tests.    Per patient's son, patient is bedbound and wheelchair bound but she does transfer with assistance to the wheelchair.    For any issues that arise during the day, please contact patient's daughter, Salma Kebede, [phone]. In the evening, please contact Jose Kay, son, [phone]  - Spoke w/ Salma Kebede today 1/28

## 2021-01-28 NOTE — CHART NOTE - NSCHARTNOTEFT_GEN_A_CORE
Interval events: Called by RN to evaluate pt for hypoxia.  Pt with episode of SpO2 down to 82%, with nasal cannula in place according to staff, pt was then placed on cooled high flow by respiratory. Patient laying in bed without high flow NC in nostrils with spo2 87-88% during time of evaluation. She is complaining of not feeling well, however denying symptoms such as sob, cp, abdominal pain, n/v. Encouraged patient to lay on side with spo2 increasing to 94%.      Review of Systems:  Constitutional: No fever, chills, fatigue  Neuro: No headache, numbness, weakness  Resp: No cough, wheezing, shortness of breath  CVS: No chest pain, palpitations, leg swelling  GI: No abdominal pain, nausea, vomiting  : No dysuria, frequency, incontinence    T(F): 98.4 (01-28-21 @ 05:03), Max: 98.4 (01-28-21 @ 05:03)  HR: 79 (01-28-21 @ 05:03) (72 - 110)  BP: 160/82 (01-28-21 @ 05:03) (112/113 - 160/82)  RR: 18 (01-28-21 @ 05:03) (17 - 18)  SpO2: 95% (01-28-21 @ 05:03) (92% - 95%)  Wt(kg): --    POCT Blood Glucose.: 185 mg/dL (27 Jan 2021 20:37)    I&O's Summary  26 Jan 2021 07:01  -  27 Jan 2021 07:00  --------------------------------------------------------  IN: 1 mL / OUT: 3 mL / NET: -2 mL    27 Jan 2021 07:01  -  28 Jan 2021 05:50  --------------------------------------------------------  IN: 300 mL / OUT: 900 mL / NET: -600 mL    Physical Exam:   Gen: nad, comfortable  Neuro: A&Ox2, year 2019  HEENT: MMM  Resp: nonlabored breathing, dimished breath sounds b/l bases  CVS: S1S2+  Abd: nontender  Ext: no calf tenderness    Meds:  amLODIPine   Tablet Oral  metoprolol succinate ER Oral  dexAMETHasone     Tablet Oral  dextrose 40% Gel Oral  dextrose 50% Injectable IV Push  dextrose 50% Injectable IV Push  dextrose 50% Injectable IV Push  glucagon  Injectable IntraMuscular  insulin glargine Injectable (LANTUS) SubCutaneous  insulin lispro (ADMELOG) corrective regimen sliding scale SubCutaneous  insulin lispro (ADMELOG) corrective regimen sliding scale SubCutaneous  levothyroxine Oral  guaiFENesin ER Oral PRN  acetaminophen   Tablet .. Oral PRN  acetaminophen   Tablet .. Oral PRN  gabapentin Oral  melatonin Oral  ondansetron   Disintegrating Tablet Oral PRN  enoxaparin Injectable SubCutaneous  polyethylene glycol 3350 Oral  senna Oral  ascorbic acid Oral  cholecalciferol Oral  cyanocobalamin Oral  dextrose 5%. IV Continuous  dextrose 5%. IV Continuous  ferrous    sulfate Oral  multivitamin Oral  01-27  136  |  102  |  15  ----------------------------<  176<H>  3.4<L>   |  27  |  0.59    Ca    9.6      27 Jan 2021 07:35  Phos  2.7     01-27  Mg     1.7     01-26    Radiology:  < from: Xray Chest 1 View AP/PA (01.22.21 @ 10:34) >  IMPRESSION:  New airspace opacities right midlung zone.  No pleural effusion  Heart size cannot be accurately assessed in this projection.  Right shoulder suture anchors noted  < end of copied text >    < from: US Duplex Venous Lower Ext Ltd, Left (01.16.21 @ 15:27) >  IMPRESSION:  No evidence of left lower extremity deep venous thrombosis.  < end of copied text >    A&P: 89 year old female with PMHx of Afib (not on AC due to vaginal bleeding 2/2 polyps), chronic back pain 2/2 spinal stenosis,  DM2 on metformin, HTN, recurrent UTIs, chronic venous insufficiency, presents from [facility] for diarrhea, found to have COVID+ infection  Acute hypoxic respiratory failure due to covid-19  -cont supplemental O2, currently on 10L cooled high flow  -cont decadron, remdesivir course completed.  -proning encouraged  -cont multivitamin, vitamin c  -Rest of care per primary team    Case d/w nocturnist Dr. Anderson, agrees with plan.

## 2021-01-28 NOTE — PROGRESS NOTE ADULT - SUBJECTIVE AND OBJECTIVE BOX
Name: DAYSI LUZ  MRN: [id number]  LOCATION: [place] [address]    ----  Patient is a 89y old  Female who presents with a chief complaint of Diarrhea in a bed bound patient, COVID positivity status (2021 14:03)      FROM ADMISSION H+P:   HPI:  89 yo F with PMHx of Afib (not on AC due to vaginal bleeding 2/2 polyps), chronic back pain 2/2 spinal stenosis,  DM2 on metformin, HTN, recurrent UTIs, chronic venous insufficiency. The patient has been living at [facility]. She was sent to the [place] ER to evaluate for DVT due to left lower extremity edema. Pt reportedly has had decreased PO intake and diarrhea as well.     ----  INTERVAL HPI/OVERNIGHT EVENTS: Pt seen and evaluated at the bedside. No acute overnight events occurred. The patient was sobbing when I entered. She looked up and said "please send me home, I want to go home". She feels weak, tired and does not want to continue being in the hospital. The patient has no other acute complaints at this time. Denies CP. Denies abd pain or diarrhea. She admits feeling a little bloating in her abdomen. Denies joint pains. Admits feeling stiff but doesn't want to participate w/ PT.     ----  PAST MEDICAL & SURGICAL HISTORY:  Spinal stenosis of lumbar region    Hiatal hernia    Diabetes mellitus, type II    Atrial fibrillation    H/O: GI Bleed    Overactive Bladder    Uterine Polyp    Peripheral Neuropathy    Degenerative Joint Disease    Obesity    Hypothyroidism    Hypertension    S/P  section    S/P rotator cuff repair  right    S/P knee replacement, right        FAMILY HISTORY:  No pertinent family history in first degree relatives        Allergies    aspirin (Unknown)  penicillin (Unknown)    Intolerances        ----  ANTIMICROBIALS:    CARDIOVASCULAR:  amLODIPine   Tablet 10 milliGRAM(s) Oral daily  metoprolol succinate ER 25 milliGRAM(s) Oral daily    GASTROINTESTINAL:  polyethylene glycol 3350 17 Gram(s) Oral daily  senna 2 Tablet(s) Oral at bedtime    PULMONARY:  guaiFENesin  milliGRAM(s) Oral every 12 hours PRN      ----  REVIEW OF SYSTEMS:  comprehensive ROS as per HPI     ----  PHYSICAL EXAM:  GENERAL: patient appears uncomfortable, down, depressed  ENMT: oropharynx clear without erythema, dry mucous membranes  LUNGS: reduced air entry, soft rhonchi in b/l bases, no accessory muscle use or tachypnea  HEART: soft S1/S2, regular rate and rhythm, no murmurs noted, trace nonpitting edema to b/l LE  GASTROINTESTINAL: abdomen is soft, nontender, mild distension noted in b/l lower quadrants, normoactive bowel sounds, no palpable masses  INTEGUMENT: mild pallor noted  MUSCULOSKELETAL: no clubbing or cyanosis, no obvious deformity   NEUROLOGIC: awake, alert, readily interactive     T(C): 36.9 (21 @ 05:03), Max: 36.9 (21 @ 05:03)  HR: 79 (21 @ 05:03) (72 - 79)  BP: 160/82 (21 @ 05:03) (112/113 - 160/82)  RR: 18 (21 @ 05:03) (18 - 18)  SpO2: 95% (21 @ 05:03) (93% - 95%)  Wt(kg): --    ----  INTAKE & OUTPUT:  I&O's Summary    2021 07:01  -  2021 07:00  --------------------------------------------------------  IN: 300 mL / OUT: 1700 mL / NET: -1400 mL        LABS:                        15.5   8.12  )-----------( 227      ( 2021 06:24 )             45.6         135  |  100  |  14  ----------------------------<  172<H>  4.0   |  27  |  0.61    Ca    10.0      2021 06:24  Phos  2.8       Mg     1.8               CAPILLARY BLOOD GLUCOSE      POCT Blood Glucose.: 252 mg/dL (2021 10:22)  POCT Blood Glucose.: 241 mg/dL (2021 08:45)  POCT Blood Glucose.: 185 mg/dL (2021 20:37)  POCT Blood Glucose.: 200 mg/dL (2021 16:12)            ----  COVID specific labs:    Auto Neutrophil #: 6.08 K/uL (21 @ 06:24)  Auto Neutrophil #: 4.32 K/uL (21 @ 08:20)  Auto Neutrophil #: 2.78 K/uL (21 @ 07:08)  Auto Neutrophil #: 2.61 K/uL (21 @ 05:30)    Auto Lymphocyte #: 1.08 K/uL (21 @ 06:24)  Auto Lymphocyte #: 0.36 K/uL (21 @ 08:20)  Auto Lymphocyte #: 0.71 K/uL (21 @ 07:08)  Auto Lymphocyte #: 0.69 K/uL (21 @ 05:30)    NLR ~6      Ferritin, Serum: 384 ng/mL (21 @ 07:08)      D-Dimer Assay, Quantitative: 241 ng/mL DDU (21 @ 07:08)                        ----  Personally reviewed:  Vital sign trends: [ x ] yes    [  ] no     [  ] n/a  Laboratory results: [ x ] yes    [  ] no     [  ] n/a  Radiology results: [x  ] yes    [  ] no     [  ] n/a - cxr personally reviewed  Microbio results: [ x ] yes    [  ] no     [  ] n/a -  COVID positive  Consultant recommendations: [ x ] yes    [  ] no     [  ] n/a

## 2021-01-29 LAB
ALBUMIN SERPL ELPH-MCNC: 2.9 G/DL — LOW (ref 3.3–5)
ALP SERPL-CCNC: 64 U/L — SIGNIFICANT CHANGE UP (ref 40–120)
ALT FLD-CCNC: 24 U/L — SIGNIFICANT CHANGE UP (ref 10–45)
ANION GAP SERPL CALC-SCNC: 6 MMOL/L — SIGNIFICANT CHANGE UP (ref 5–17)
AST SERPL-CCNC: 21 U/L — SIGNIFICANT CHANGE UP (ref 10–40)
BASOPHILS # BLD AUTO: 0.03 K/UL — SIGNIFICANT CHANGE UP (ref 0–0.2)
BASOPHILS NFR BLD AUTO: 0.3 % — SIGNIFICANT CHANGE UP (ref 0–2)
BILIRUB SERPL-MCNC: 0.9 MG/DL — SIGNIFICANT CHANGE UP (ref 0.2–1.2)
BUN SERPL-MCNC: 19 MG/DL — SIGNIFICANT CHANGE UP (ref 7–23)
CALCIUM SERPL-MCNC: 9.9 MG/DL — SIGNIFICANT CHANGE UP (ref 8.4–10.5)
CHLORIDE SERPL-SCNC: 100 MMOL/L — SIGNIFICANT CHANGE UP (ref 96–108)
CO2 SERPL-SCNC: 30 MMOL/L — SIGNIFICANT CHANGE UP (ref 22–31)
CREAT SERPL-MCNC: 0.75 MG/DL — SIGNIFICANT CHANGE UP (ref 0.5–1.3)
CRP SERPL-MCNC: 0.92 MG/DL — HIGH (ref 0–0.4)
D DIMER BLD IA.RAPID-MCNC: 206 NG/ML DDU — SIGNIFICANT CHANGE UP
EOSINOPHIL # BLD AUTO: 0.11 K/UL — SIGNIFICANT CHANGE UP (ref 0–0.5)
EOSINOPHIL NFR BLD AUTO: 1.2 % — SIGNIFICANT CHANGE UP (ref 0–6)
FERRITIN SERPL-MCNC: 265 NG/ML — HIGH (ref 15–150)
GLUCOSE BLDC GLUCOMTR-MCNC: 173 MG/DL — HIGH (ref 70–99)
GLUCOSE BLDC GLUCOMTR-MCNC: 179 MG/DL — HIGH (ref 70–99)
GLUCOSE BLDC GLUCOMTR-MCNC: 275 MG/DL — HIGH (ref 70–99)
GLUCOSE BLDC GLUCOMTR-MCNC: 311 MG/DL — HIGH (ref 70–99)
GLUCOSE SERPL-MCNC: 189 MG/DL — HIGH (ref 70–99)
HCT VFR BLD CALC: 46.9 % — HIGH (ref 34.5–45)
HGB BLD-MCNC: 16.2 G/DL — HIGH (ref 11.5–15.5)
IMM GRANULOCYTES NFR BLD AUTO: 1.4 % — SIGNIFICANT CHANGE UP (ref 0–1.5)
LYMPHOCYTES # BLD AUTO: 0.95 K/UL — LOW (ref 1–3.3)
LYMPHOCYTES # BLD AUTO: 10 % — LOW (ref 13–44)
MAGNESIUM SERPL-MCNC: 1.8 MG/DL — SIGNIFICANT CHANGE UP (ref 1.6–2.6)
MCHC RBC-ENTMCNC: 32 PG — SIGNIFICANT CHANGE UP (ref 27–34)
MCHC RBC-ENTMCNC: 34.5 GM/DL — SIGNIFICANT CHANGE UP (ref 32–36)
MCV RBC AUTO: 92.5 FL — SIGNIFICANT CHANGE UP (ref 80–100)
MONOCYTES # BLD AUTO: 0.71 K/UL — SIGNIFICANT CHANGE UP (ref 0–0.9)
MONOCYTES NFR BLD AUTO: 7.5 % — SIGNIFICANT CHANGE UP (ref 2–14)
NEUTROPHILS # BLD AUTO: 7.55 K/UL — HIGH (ref 1.8–7.4)
NEUTROPHILS NFR BLD AUTO: 79.6 % — HIGH (ref 43–77)
NRBC # BLD: 0 /100 WBCS — SIGNIFICANT CHANGE UP (ref 0–0)
PLATELET # BLD AUTO: 267 K/UL — SIGNIFICANT CHANGE UP (ref 150–400)
POTASSIUM SERPL-MCNC: 4.2 MMOL/L — SIGNIFICANT CHANGE UP (ref 3.5–5.3)
POTASSIUM SERPL-SCNC: 4.2 MMOL/L — SIGNIFICANT CHANGE UP (ref 3.5–5.3)
PROCALCITONIN SERPL-MCNC: 0.38 NG/ML — HIGH
PROT SERPL-MCNC: 7.2 G/DL — SIGNIFICANT CHANGE UP (ref 6–8.3)
RBC # BLD: 5.07 M/UL — SIGNIFICANT CHANGE UP (ref 3.8–5.2)
RBC # FLD: 12.6 % — SIGNIFICANT CHANGE UP (ref 10.3–14.5)
SODIUM SERPL-SCNC: 136 MMOL/L — SIGNIFICANT CHANGE UP (ref 135–145)
WBC # BLD: 9.48 K/UL — SIGNIFICANT CHANGE UP (ref 3.8–10.5)
WBC # FLD AUTO: 9.48 K/UL — SIGNIFICANT CHANGE UP (ref 3.8–10.5)

## 2021-01-29 PROCEDURE — 99232 SBSQ HOSP IP/OBS MODERATE 35: CPT | Mod: CS

## 2021-01-29 PROCEDURE — 99233 SBSQ HOSP IP/OBS HIGH 50: CPT

## 2021-01-29 RX ADMIN — Medication 500 MILLIGRAM(S): at 11:42

## 2021-01-29 RX ADMIN — Medication 8: at 17:31

## 2021-01-29 RX ADMIN — INSULIN GLARGINE 10 UNIT(S): 100 INJECTION, SOLUTION SUBCUTANEOUS at 08:32

## 2021-01-29 RX ADMIN — Medication 3 MILLIGRAM(S): at 21:39

## 2021-01-29 RX ADMIN — POLYETHYLENE GLYCOL 3350 17 GRAM(S): 17 POWDER, FOR SOLUTION ORAL at 11:43

## 2021-01-29 RX ADMIN — PREGABALIN 1000 MICROGRAM(S): 225 CAPSULE ORAL at 11:42

## 2021-01-29 RX ADMIN — Medication 1 TABLET(S): at 11:42

## 2021-01-29 RX ADMIN — ENOXAPARIN SODIUM 40 MILLIGRAM(S): 100 INJECTION SUBCUTANEOUS at 21:37

## 2021-01-29 RX ADMIN — Medication 75 MICROGRAM(S): at 06:30

## 2021-01-29 RX ADMIN — Medication 25 MILLIGRAM(S): at 06:29

## 2021-01-29 RX ADMIN — Medication 6 MILLIGRAM(S): at 06:29

## 2021-01-29 RX ADMIN — GABAPENTIN 200 MILLIGRAM(S): 400 CAPSULE ORAL at 06:29

## 2021-01-29 RX ADMIN — Medication 325 MILLIGRAM(S): at 11:42

## 2021-01-29 RX ADMIN — AMLODIPINE BESYLATE 10 MILLIGRAM(S): 2.5 TABLET ORAL at 06:30

## 2021-01-29 RX ADMIN — Medication 1000 UNIT(S): at 11:43

## 2021-01-29 RX ADMIN — GABAPENTIN 200 MILLIGRAM(S): 400 CAPSULE ORAL at 17:31

## 2021-01-29 RX ADMIN — Medication 2: at 08:33

## 2021-01-29 RX ADMIN — SENNA PLUS 2 TABLET(S): 8.6 TABLET ORAL at 21:37

## 2021-01-29 RX ADMIN — Medication 6: at 11:40

## 2021-01-29 NOTE — PROGRESS NOTE ADULT - ATTENDING COMMENTS
The x plan was discussed in detail with the patient and family members over the phone. Their questions and concerns were addressed to the best of my ability. They are in agreement with the plan as detailed above. They demonstrated adequate understanding of the counseling which I have provided.
The admission plan was discussed in detail with the patient and family members over the phone. Their questions and concerns were addressed to the best of my ability. They are in agreement with the plan as detailed above. They demonstrated adequate understanding of the counseling which I have provided.

## 2021-01-29 NOTE — PROGRESS NOTE ADULT - NSHPATTENDINGPLANDISCUSS_GEN_ALL_CORE
pt, SW, CM, RN, palliative care team - re: tx plan, disposition planning, above detailed plan
pt, SW, CM, RN, family - re: tx plan, disposition planning, above detailed plan

## 2021-01-29 NOTE — PROGRESS NOTE ADULT - ASSESSMENT
89F with Afib (not on AC due to vaginal bleeding 2/2 polyps), chronic back pain 2/2 spinal stenosis, DM2 on metformin, HTN, recurrent UTIs, chronic venous insufficiency, presents from [facility] for diarrhea, found to have COVID+ infection    #Acute hypoxic respiratory failure  #COVID19 pneumonia  #Diarrhea likely due to #COVID+ which has resolved  -Decadron day 7/10  -s/p Remdesivir  -O2 requirements seem to have increased today, was up to 12L on wider gauge nasal cannula but oxygen was nopt in place and saturation was 88% on room air(which is her baseline  -follow-up palliative care recommendations -repeat COVID-19 PCR, cxr.  Call placed to son Jose to review possibility of hospice at home.    #Bilateral chronic venous insufficiency  -No evidence of DVT on Left Lower Extremity    -hold Lasix for now as patient clinically dehydrated   -elevate legs as able    #Chronic Atrial Fibrillation, not on anticoagulation secondary to history of vaginal bleeding  -c/w beta blocker - if rate control becomes an issue, can consider short-acting formulation  -c/w ASA    #Essential HTN:  - improved with initiation of Norvasc which was increased to 10mg q daily  - c/w metoprolol ER 25 mg daily     #DM2  ISS, accucheck  A1c 6.1  Monitor sugars, on a liberalized regular diet    #Steroid-induced hyperglycemia:  -Lantus started    #Hypomagensemia: Repleted  #Hypokalemia: Repleted   #Hypophosphatemia: Repleted  #Hyponatremia: improved     COVID-19 PCR: Detected (25 Jan 2021 09:15)  COVID-19 PCR: Detected (20 Jan 2021 06:00)  COVID-19 PCR: Detected (18 Jan 2021 08:00)  COVID-19 PCR: Detected (16 Jan 2021 14:00)    Dispo: D/C back to [facility] pending two negative COVID tests.    Per patient's son, patient is bedbound and wheelchair bound but she does transfer with assistance to the wheelchair.    Palliative:  clinically stable.arron considering hospice at home

## 2021-01-29 NOTE — PROGRESS NOTE ADULT - SUBJECTIVE AND OBJECTIVE BOX
Name: DAYSI LUZ  MRN: [id number]  LOCATION: [place] [id number]     ----  Patient is a 89y old  Female who presents with a chief complaint of Diarrhea in a bed bound patient, COVID positivity status (2021 14:32)      FROM ADMISSION H+P:   HPI:  89 yo F with PMHx of Afib (not on AC due to vaginal bleeding 2/2 polyps), chronic back pain 2/2 spinal stenosis,  DM2 on metformin, HTN, recurrent UTIs, chronic venous insufficiency. The patient has been living at [facility]. She was sent to the [place] ER to evaluate for DVT due to left lower extremity edema. Pt reportedly has had decreased PO intake and diarrhea as well.     ----  INTERVAL HPI/OVERNIGHT EVENTS: Pt seen and evaluated at the bedside. No acute overnight events occurred. Pt is agitated that she didn't receive american cheese on toast this morning. She was weaned to 3L NC and is maintaining O2 sats all throughout the day today. No other new events. She has limited insight into her medical conditions and repeatedly informed me of my incompetance regarding cheese sandwich.     ----  PAST MEDICAL & SURGICAL HISTORY:  Spinal stenosis of lumbar region    Hiatal hernia    Diabetes mellitus, type II    Atrial fibrillation    H/O: GI Bleed    Overactive Bladder    Uterine Polyp    Peripheral Neuropathy    Degenerative Joint Disease    Obesity    Hypothyroidism    Hypertension    S/P  section    S/P rotator cuff repair  right    S/P knee replacement, right        FAMILY HISTORY:  No pertinent family history in first degree relatives        Allergies    aspirin (Unknown)  penicillin (Unknown)    Intolerances        ----  ANTIMICROBIALS:    CARDIOVASCULAR:  amLODIPine   Tablet 10 milliGRAM(s) Oral daily  metoprolol succinate ER 25 milliGRAM(s) Oral daily    GASTROINTESTINAL:  polyethylene glycol 3350 17 Gram(s) Oral daily  senna 2 Tablet(s) Oral at bedtime    PULMONARY:  guaiFENesin  milliGRAM(s) Oral every 12 hours PRN      ----  REVIEW OF SYSTEMS:  comprehensive ROS as per HPI     ----  PHYSICAL EXAM:  GENERAL: patient appears agitated, nontoxic, yelling loudly, animated  ENMT: oropharynx clear without erythema, dry mucous membranes  LUNGS: good inspiratory effort after yelling, no rhonchi  HEART: soft S1/S2, regular rate and rhythm, no murmurs noted, trace nonpitting edema to b/l LE  GASTROINTESTINAL: abdomen is soft, nontender, mild distension noted in b/l lower quadrants, normoactive bowel sounds, no palpable masses  INTEGUMENT: no pallor today  MUSCULOSKELETAL: no clubbing or cyanosis, no obvious deformity   NEUROLOGIC: awake, alert, readily interactive, poor insight    T(C): 36.3 (21 @ 08:53), Max: 36.6 (21 @ 06:00)  HR: 75 (21 @ 08:53) (73 - 79)  BP: 147/97 (21 @ 08:53) (135/82 - 159/92)  RR: 17 (21 @ 08:53) (17 - 18)  SpO2: 95% (21 @ 08:53) (92% - 96%)  Wt(kg): --    ----  INTAKE & OUTPUT:  I&O's Summary      LABS:                        16.2   9.48  )-----------( 267      ( 2021 08:50 )             46.9         136  |  100  |  19  ----------------------------<  189<H>  4.2   |  30  |  0.75    Ca    9.9      2021 08:50  Phos  2.8       Mg     1.8         TPro  7.2  /  Alb  2.9<L>  /  TBili  0.9  /  DBili  x   /  AST  21  /  ALT  24  /  AlkPhos  64          CAPILLARY BLOOD GLUCOSE      POCT Blood Glucose.: 275 mg/dL (2021 11:32)  POCT Blood Glucose.: 173 mg/dL (2021 08:03)  POCT Blood Glucose.: 199 mg/dL (2021 22:12)  POCT Blood Glucose.: 193 mg/dL (2021 17:32)            ----  COVID specific labs:    Auto Neutrophil #: 7.55 K/uL (21 @ 08:50)  Auto Neutrophil #: 6.08 K/uL (21 @ 06:24)  Auto Neutrophil #: 4.32 K/uL (21 @ 08:20)  Auto Neutrophil #: 2.78 K/uL (21 @ 07:08)  Auto Neutrophil #: 2.61 K/uL (21 @ 05:30)    Auto Lymphocyte #: 0.95 K/uL (21 @ 08:50)  Auto Lymphocyte #: 1.08 K/uL (21 @ 06:24)  Auto Lymphocyte #: 0.36 K/uL (21 @ 08:20)  Auto Lymphocyte #: 0.71 K/uL (21 @ 07:08)  Auto Lymphocyte #: 0.69 K/uL (21 @ 05:30)    Procalcitonin, Serum: 0.38 ng/mL (21 @ 08:50)    Ferritin, Serum: 265 ng/mL (21 @ 08:50)  Ferritin, Serum: 384 ng/mL (21 @ 07:08)      D-Dimer Assay, Quantitative: 206 ng/mL DDU (21 @ 08:50)  D-Dimer Assay, Quantitative: 241 ng/mL DDU (21 @ 07:08)

## 2021-01-29 NOTE — PROGRESS NOTE ADULT - ASSESSMENT
89F with Afib (not on AC due to vaginal bleeding 2/2 polyps), chronic back pain 2/2 spinal stenosis, DM2 on metformin, HTN, recurrent UTIs, chronic venous insufficiency, presents from [facility] for diarrhea, found to have COVID+ infection    #Acute hypoxic respiratory failure  #COVID19 pneumonia  #Diarrhea likely due to #COVID+ which has resolved  -Decadron day 8/10  -s/p Remdesivir  -back on 3L NC and tolerating well  -follow-up palliative care recommendations   -on 1/28: I reviewed MOLST form wishes w/ Salma. Reviewed options with family including high flow nasal cannula. If O2 sats worsen significantly, the only appropriate intervention would be nonrebreather vs. high flow nasal cannula. Not a candidate for BiPAP from my perspective. Consider transition to comfort measures and home hospice if pt deteriorates  -this tx plan was formulated utilizing my clinical judgement, currently available local/regional anecdotal information, organizational treatment recommendations with COVID-19 specific considerations given rapidly changing information available   -repeat COVID-19 PCR is POSITIVE on 1/28, repeat on Sunday    #Bilateral chronic venous insufficiency  -No evidence of DVT on Left Lower Extremity    -hold Lasix for now as patient clinically dehydrated   -elevate legs as able    #Chronic Atrial Fibrillation, not on anticoagulation secondary to history of vaginal bleeding  -c/w beta blocker - if rate control becomes an issue, can consider short-acting formulation  -c/w ASA    #Essential HTN:  - improved with initiation of Norvasc which was increased to 10mg q daily  - c/w metoprolol ER 25 mg daily     #DM2  ISS, accucheck  A1c 6.1  Monitor sugars, on a liberalized regular diet    #Steroid-induced hyperglycemia:  -Lantus started    #Hypomagensemia: Repleted  #Hypokalemia: Repleted   #Hypophosphatemia: Repleted  #Hyponatremia: improved     COVID-19 PCR: Detected (25 Jan 2021 09:15)  COVID-19 PCR: Detected (20 Jan 2021 06:00)  COVID-19 PCR: Detected (18 Jan 2021 08:00)  COVID-19 PCR: Detected (16 Jan 2021 14:00)    Dispo: D/C back to [facility] pending two negative COVID tests.    Per patient's son, patient is bedbound and wheelchair bound but she does transfer with assistance to the wheelchair.    For any issues that arise during the day, please contact patient's daughter, Salma Kebede, [phone]. In the evening, please contact Jose Kay, son, [phone]  - Mpnb message on machine for Salma on 1/29/21, we spoke at length yesterday

## 2021-01-29 NOTE — ED ADULT NURSE NOTE - NS ED NOTE  TALK SOMEONE YN
Final Anesthesia Post-op Assessment    Patient: Amber Pettit  Procedure(s) Performed: FLAP CLOSURE NOSE  AFTER MOHS SURGERY  Anesthesia type: MAC    Vitals Value Taken Time   Temp 36.3 °C (97.3 °F) 01/29/21 1500   Pulse 65 01/29/21 1500   Resp 16 01/29/21 1500   SpO2 98 % 01/29/21 1500   /68 01/29/21 1500         Patient Location: Phase II  Post-op Vital Signs:stable  Level of Consciousness: awake and alert  Respiratory Status: spontaneous ventilation  Cardiovascular stable  Hydration: euvolemic  Pain Management: adequately controlled  Handoff: Handoff to receiving nurse was performed and questions were answered  Vomiting: none   Nausea: None  Airway Patency:patent  Post-op Assessment: no complications and patient tolerated procedure well with no complications      No complications documented.   
No

## 2021-01-29 NOTE — PROGRESS NOTE ADULT - SUBJECTIVE AND OBJECTIVE BOX
Follow-up Pall Progress Note    Brendon Lau MD  [phone]    No new respiratory events overnight.  Denies SOB/CP. Pt tolerating room air.  No new complaints    Medications:  Vital Signs Last 24 Hrs  T(C): 36.3 (29 Jan 2021 08:53), Max: 36.6 (29 Jan 2021 06:00)  T(F): 97.3 (29 Jan 2021 08:53), Max: 97.8 (29 Jan 2021 06:00)  HR: 75 (29 Jan 2021 08:53) (73 - 79)  BP: 147/97 (29 Jan 2021 08:53) (135/82 - 159/92)  BP(mean): --  RR: 17 (29 Jan 2021 08:53) (17 - 18)  SpO2: 95% (29 Jan 2021 08:53) (92% - 96%)            LABS:                        16.2   9.48  )-----------( 267      ( 29 Jan 2021 08:50 )             46.9     01-29    136  |  100  |  19  ----------------------------<  189<H>  4.2   |  30  |  0.75    Ca    9.9      29 Jan 2021 08:50  Phos  2.8     01-28  Mg     1.8     01-29    TPro  7.2  /  Alb  2.9<L>  /  TBili  0.9  /  DBili  x   /  AST  21  /  ALT  24  /  AlkPhos  64  01-29        Procalcitonin, Serum: 0.38 ng/mL (01-29-21 @ 08:50)        CULTURES:        Physical Examination:      RADIOLOGY REVIEWED  CXR:    CT chest:    TTE:

## 2021-01-30 LAB
GLUCOSE BLDC GLUCOMTR-MCNC: 271 MG/DL — HIGH (ref 70–99)
GLUCOSE BLDC GLUCOMTR-MCNC: 273 MG/DL — HIGH (ref 70–99)
GLUCOSE BLDC GLUCOMTR-MCNC: 284 MG/DL — HIGH (ref 70–99)
GLUCOSE BLDC GLUCOMTR-MCNC: 295 MG/DL — HIGH (ref 70–99)

## 2021-01-30 PROCEDURE — 99232 SBSQ HOSP IP/OBS MODERATE 35: CPT | Mod: CS

## 2021-01-30 RX ORDER — REMDESIVIR 5 MG/ML
100 INJECTION INTRAVENOUS EVERY 24 HOURS
Refills: 0 | Status: DISCONTINUED | OUTPATIENT
Start: 2021-01-30 | End: 2021-02-03

## 2021-01-30 RX ORDER — INSULIN GLARGINE 100 [IU]/ML
12 INJECTION, SOLUTION SUBCUTANEOUS ONCE
Refills: 0 | Status: COMPLETED | OUTPATIENT
Start: 2021-01-30 | End: 2021-01-30

## 2021-01-30 RX ORDER — INSULIN GLARGINE 100 [IU]/ML
12 INJECTION, SOLUTION SUBCUTANEOUS EVERY MORNING
Refills: 0 | Status: DISCONTINUED | OUTPATIENT
Start: 2021-01-31 | End: 2021-01-31

## 2021-01-30 RX ADMIN — INSULIN GLARGINE 12 UNIT(S): 100 INJECTION, SOLUTION SUBCUTANEOUS at 10:27

## 2021-01-30 RX ADMIN — Medication 6 MILLIGRAM(S): at 06:12

## 2021-01-30 RX ADMIN — Medication 75 MICROGRAM(S): at 06:11

## 2021-01-30 RX ADMIN — Medication 3 MILLIGRAM(S): at 21:54

## 2021-01-30 RX ADMIN — GABAPENTIN 200 MILLIGRAM(S): 400 CAPSULE ORAL at 06:11

## 2021-01-30 RX ADMIN — AMLODIPINE BESYLATE 10 MILLIGRAM(S): 2.5 TABLET ORAL at 06:11

## 2021-01-30 RX ADMIN — Medication 6: at 17:13

## 2021-01-30 RX ADMIN — Medication 325 MILLIGRAM(S): at 12:52

## 2021-01-30 RX ADMIN — Medication 2: at 21:49

## 2021-01-30 RX ADMIN — Medication 1000 UNIT(S): at 12:52

## 2021-01-30 RX ADMIN — Medication 6: at 13:30

## 2021-01-30 RX ADMIN — GABAPENTIN 200 MILLIGRAM(S): 400 CAPSULE ORAL at 17:12

## 2021-01-30 RX ADMIN — Medication 500 MILLIGRAM(S): at 12:52

## 2021-01-30 RX ADMIN — ENOXAPARIN SODIUM 40 MILLIGRAM(S): 100 INJECTION SUBCUTANEOUS at 21:47

## 2021-01-30 RX ADMIN — PREGABALIN 1000 MICROGRAM(S): 225 CAPSULE ORAL at 12:56

## 2021-01-30 RX ADMIN — Medication 1 TABLET(S): at 12:56

## 2021-01-30 RX ADMIN — SENNA PLUS 2 TABLET(S): 8.6 TABLET ORAL at 21:54

## 2021-01-30 RX ADMIN — Medication 25 MILLIGRAM(S): at 06:11

## 2021-01-30 RX ADMIN — REMDESIVIR 500 MILLIGRAM(S): 5 INJECTION INTRAVENOUS at 13:31

## 2021-01-30 RX ADMIN — POLYETHYLENE GLYCOL 3350 17 GRAM(S): 17 POWDER, FOR SOLUTION ORAL at 12:56

## 2021-01-30 NOTE — PROGRESS NOTE ADULT - ASSESSMENT
89F with Afib (not on AC due to vaginal bleeding 2/2 polyps), chronic back pain 2/2 spinal stenosis,  DM2 on metformin, HTN, recurrent UTIs, chronic venous insufficiency, presents from [facility] for diarrhea, found to have COVID+ infection    #Acute hypoxic respiratory failure sec to COVID-19 Pneumonia - O2 documented to be 88% on RA.   Decadron Day #9  Remdesivir-5 day completed on 1/26. Pt required inc oxygen on 1/28(upto 12L NC). Pt currently on 5~6L  Restart redemsivir(another course up to 5days depending on clincial status) as pt was clinically worsening. Monitor renal/liver functions    Bilateral chronic venous insufficiency  No DVT  Hold Lasix for now    Chronic Atrial Fibrillation, not on anticoagulation secondary to history of vaginal bleeding, HTN  Metoprolol    DM2, a1c 6.1  Lispro    HTN  Norvasc/Metoprolol    Hyponatremia/Hypomagensemia/Hypokalemia/Hypophosphatemia  Resolved    Gal/Jose Kay, Both sons live together, - [phone]-updated Jose 1/30  daughter, Salma Kebede, [phone](Monday-Friday During the days)

## 2021-01-30 NOTE — CHART NOTE - NSCHARTNOTEFT_GEN_A_CORE
Nutrition Follow Up Note  Hospital Course (Per Electronic Medical Record):   Source: Medical Record [X] Nursing Staff [X]     Diet: Regular     Patient's po intake appears to vary from poor to good , refusing some meals & consuming ~70% of others , patient is not receptive to po snacks/supplements , Providing foods obtained from patient's daughter .     Recent Weight: (1/25) 238.5/108.2kg , (+) edema noted on (1/26)     Pertinent Medications: MEDICATIONS  (STANDING):  amLODIPine   Tablet 10 milliGRAM(s) Oral daily  ascorbic acid 500 milliGRAM(s) Oral daily  cholecalciferol 1000 Unit(s) Oral daily  cyanocobalamin 1000 MICROGram(s) Oral daily  dexAMETHasone     Tablet 6 milliGRAM(s) Oral daily  dextrose 40% Gel 15 Gram(s) Oral once  dextrose 5%. 1000 milliLiter(s) (50 mL/Hr) IV Continuous <Continuous>  dextrose 5%. 1000 milliLiter(s) (100 mL/Hr) IV Continuous <Continuous>  dextrose 50% Injectable 25 Gram(s) IV Push once  dextrose 50% Injectable 12.5 Gram(s) IV Push once  dextrose 50% Injectable 25 Gram(s) IV Push once  enoxaparin Injectable 40 milliGRAM(s) SubCutaneous at bedtime  ferrous    sulfate 325 milliGRAM(s) Oral daily  gabapentin 200 milliGRAM(s) Oral two times a day  glucagon  Injectable 1 milliGRAM(s) IntraMuscular once  insulin lispro (ADMELOG) corrective regimen sliding scale   SubCutaneous three times a day before meals  insulin lispro (ADMELOG) corrective regimen sliding scale   SubCutaneous at bedtime  levothyroxine 75 MICROGram(s) Oral daily  melatonin 3 milliGRAM(s) Oral at bedtime  metoprolol succinate ER 25 milliGRAM(s) Oral daily  multivitamin 1 Tablet(s) Oral daily  polyethylene glycol 3350 17 Gram(s) Oral daily  senna 2 Tablet(s) Oral at bedtime    MEDICATIONS  (PRN):  acetaminophen   Tablet .. 650 milliGRAM(s) Oral every 4 hours PRN Temp greater or equal to 38C (100.4F), Moderate Pain (4 - 6)  acetaminophen   Tablet .. 325 milliGRAM(s) Oral every 6 hours PRN Mild Pain (1 - 3)  guaiFENesin  milliGRAM(s) Oral every 12 hours PRN Cough  ondansetron   Disintegrating Tablet 4 milliGRAM(s) Oral every 6 hours PRN Nausea and/or Vomiting      Pertinent Labs:  01-29 Na136 mmol/L Glu 189 mg/dL<H> K+ 4.2 mmol/L Cr  0.75 mg/dL BUN 19 mg/dL 01-28 Phos 2.8 mg/dL 01-29 Alb 2.9 g/dL<L>        Skin: ecchymotic     Edema: (+2) foot edema (1/26)     Last BM: on last documented was(1/17) , fecal incontinence noted , Miralax noted     Estimated Needs:   [X] No Change since Previous Assessment     Previous Nutrition Diagnosis: Severe Malnutrition     Nutrition Diagnosis is [X] Ongoing     New Nutrition Diagnosis: [X] Not Applicable    Interventions:   1. Recommend continue to provide patient's preferences to aid in enhancing po intake       Monitoring & Evaluation: will monitor:  [X] Weights   [X] PO Intake   [X] Follow Up (Per Protocol)  [X] Tolerance to Diet Prescription       RD to follow as per Nutrition protocol  Corry Warren RDN

## 2021-01-30 NOTE — PROGRESS NOTE ADULT - SUBJECTIVE AND OBJECTIVE BOX
HPI:  89 yo F with PMHx of Afib (not on AC due to vaginal bleeding 2/2 polyps), chronic back pain 2/2 spinal stenosis,  DM2 on metformin, HTN, recurrent UTIs, chronic venous insufficiency. The patient has been living at [facility]. She was sent to the [place] ER to evaluate for DVT due to left lower extremity edema. Pt reportedly has had decreased PO intake and diarrhea as well.     Patient reports that diarrhea started one week ago- it's been once a day,  but voluminous. She denies having any associated fevers. She says lower extremity swelling is chronic and "everyone at the [facility] has it." She is wheelchair bound at baseline. She is agitated that she was sent to the hospital because she feels totally fine. She does not believe she has COVID. She denies having any shortness of breath, cough, nasal congestion, sore throat, chest pain, palpitations, abdominal pain, nausea, or vomiting.     [facility] will not take pt back because of COVID positivity status and diarrhea, which makes it difficult to clean her since she is overall bed bound.    ER course:  Pt found to be covid +  LLE duplex performed and no e/o DVT.  (2021 16:50)      Subjective    Wants to eat american cheese.         PAST MEDICAL & SURGICAL HISTORY:  Spinal stenosis of lumbar region    Hiatal hernia    Diabetes mellitus, type II    Atrial fibrillation    H/O: GI Bleed    Overactive Bladder    Uterine Polyp    Peripheral Neuropathy    Degenerative Joint Disease    Obesity    Hypothyroidism    Hypertension    S/P  section    S/P rotator cuff repair  right    S/P knee replacement, right        MedsMEDICATIONS  (STANDING):  amLODIPine   Tablet 10 milliGRAM(s) Oral daily  ascorbic acid 500 milliGRAM(s) Oral daily  cholecalciferol 1000 Unit(s) Oral daily  cyanocobalamin 1000 MICROGram(s) Oral daily  dexAMETHasone     Tablet 6 milliGRAM(s) Oral daily  dextrose 40% Gel 15 Gram(s) Oral once  dextrose 5%. 1000 milliLiter(s) (50 mL/Hr) IV Continuous <Continuous>  dextrose 5%. 1000 milliLiter(s) (100 mL/Hr) IV Continuous <Continuous>  dextrose 50% Injectable 25 Gram(s) IV Push once  dextrose 50% Injectable 12.5 Gram(s) IV Push once  dextrose 50% Injectable 25 Gram(s) IV Push once  enoxaparin Injectable 40 milliGRAM(s) SubCutaneous at bedtime  ferrous    sulfate 325 milliGRAM(s) Oral daily  gabapentin 200 milliGRAM(s) Oral two times a day  glucagon  Injectable 1 milliGRAM(s) IntraMuscular once  insulin lispro (ADMELOG) corrective regimen sliding scale   SubCutaneous three times a day before meals  insulin lispro (ADMELOG) corrective regimen sliding scale   SubCutaneous at bedtime  levothyroxine 75 MICROGram(s) Oral daily  melatonin 3 milliGRAM(s) Oral at bedtime  metoprolol succinate ER 25 milliGRAM(s) Oral daily  multivitamin 1 Tablet(s) Oral daily  polyethylene glycol 3350 17 Gram(s) Oral daily  senna 2 Tablet(s) Oral at bedtime    MEDICATIONS  (PRN):  acetaminophen   Tablet .. 650 milliGRAM(s) Oral every 4 hours PRN Temp greater or equal to 38C (100.4F), Moderate Pain (4 - 6)  acetaminophen   Tablet .. 325 milliGRAM(s) Oral every 6 hours PRN Mild Pain (1 - 3)  guaiFENesin  milliGRAM(s) Oral every 12 hours PRN Cough  ondansetron   Disintegrating Tablet 4 milliGRAM(s) Oral every 6 hours PRN Nausea and/or Vomiting      Vital Signs Last 24 Hrs  T(C): 36.3 (2021 06:05), Max: 36.4 (2021 16:24)  T(F): 97.3 (2021 06:05), Max: 97.6 (2021 16:24)  HR: 78 (2021 06:05) (78 - 96)  BP: 144/87 (2021 06:05) (135/87 - 144/87)  BP(mean): --  RR: 18 (2021 06:05) (17 - 18)  SpO2: 93% (2021 06:05) (91% - 93%)  I&O's Summary    2021 07:01  -  2021 07:00  --------------------------------------------------------  IN: 500 mL / OUT: 1400 mL / NET: -900 mL        PHYSICAL EXAM:  GENERAL: NAD  NECK: Supple  NERVOUS SYSTEM:  awake   HEART: S1s2 NL , Irreg Irreg  CHEST/LUNG: Clear to percussion bilaterally  ABDOMEN: Soft, Nontender, Nondistended; Bowel sounds present  EXTREMITIES:  pos edema      LABS:( @ 08:50)                      16.2  9.48 )-----------( 267                 46.9    Neutrophils = 7.55 (79.6%)  Lymphocytes = 0.95 (10.0%)  Eosinophils = 0.11 (1.2%)  Basophils = 0.03 (0.3%)  Monocytes = 0.71 (7.5%)  Bands = --%        136  |  100  |  19  ----------------------------<  189<H>  4.2   |  30  |  0.75    Ca    9.9      2021 08:50  Mg     1.8         TPro  7.2  /  Alb  2.9<L>  /  TBili  0.9  /  DBili  x   /  AST  21  /  ALT  24  /  AlkPhos  64        COVID-19 PCR: Detected (2021 10:30)  COVID-19 PCR: Detected (2021 09:15)  COVID-19 PCR: Detected (2021 06:00)  COVID-19 PCR: Detected (2021 08:00)  COVID-19 PCR: Detected (2021 14:00)  CAPILLARY BLOOD GLUCOSE      POCT Blood Glucose.: 284 mg/dL (2021 10:26)  POCT Blood Glucose.: 179 mg/dL (2021 21:47)  POCT Blood Glucose.: 311 mg/dL (2021 17:12)      Imaging Personally Reviewed:  [ ] YES  [ ] NO        Care Discussed with Consultants/Other Providers [ x] YES  [ ] NO

## 2021-01-31 LAB
ALBUMIN SERPL ELPH-MCNC: 3 G/DL — LOW (ref 3.3–5)
ALP SERPL-CCNC: 72 U/L — SIGNIFICANT CHANGE UP (ref 40–120)
ALT FLD-CCNC: 22 U/L — SIGNIFICANT CHANGE UP (ref 10–45)
ANION GAP SERPL CALC-SCNC: 7 MMOL/L — SIGNIFICANT CHANGE UP (ref 5–17)
AST SERPL-CCNC: 14 U/L — SIGNIFICANT CHANGE UP (ref 10–40)
BILIRUB SERPL-MCNC: 0.9 MG/DL — SIGNIFICANT CHANGE UP (ref 0.2–1.2)
BUN SERPL-MCNC: 22 MG/DL — SIGNIFICANT CHANGE UP (ref 7–23)
CALCIUM SERPL-MCNC: 9.5 MG/DL — SIGNIFICANT CHANGE UP (ref 8.4–10.5)
CHLORIDE SERPL-SCNC: 100 MMOL/L — SIGNIFICANT CHANGE UP (ref 96–108)
CO2 SERPL-SCNC: 27 MMOL/L — SIGNIFICANT CHANGE UP (ref 22–31)
CREAT SERPL-MCNC: 0.79 MG/DL — SIGNIFICANT CHANGE UP (ref 0.5–1.3)
GLUCOSE BLDC GLUCOMTR-MCNC: 190 MG/DL — HIGH (ref 70–99)
GLUCOSE BLDC GLUCOMTR-MCNC: 215 MG/DL — HIGH (ref 70–99)
GLUCOSE BLDC GLUCOMTR-MCNC: 237 MG/DL — HIGH (ref 70–99)
GLUCOSE BLDC GLUCOMTR-MCNC: 293 MG/DL — HIGH (ref 70–99)
GLUCOSE SERPL-MCNC: 138 MG/DL — HIGH (ref 70–99)
POTASSIUM SERPL-MCNC: 3.9 MMOL/L — SIGNIFICANT CHANGE UP (ref 3.5–5.3)
POTASSIUM SERPL-SCNC: 3.9 MMOL/L — SIGNIFICANT CHANGE UP (ref 3.5–5.3)
PROT SERPL-MCNC: 7.3 G/DL — SIGNIFICANT CHANGE UP (ref 6–8.3)
SODIUM SERPL-SCNC: 134 MMOL/L — LOW (ref 135–145)

## 2021-01-31 PROCEDURE — 99232 SBSQ HOSP IP/OBS MODERATE 35: CPT | Mod: CS

## 2021-01-31 RX ORDER — INSULIN GLARGINE 100 [IU]/ML
15 INJECTION, SOLUTION SUBCUTANEOUS EVERY MORNING
Refills: 0 | Status: DISCONTINUED | OUTPATIENT
Start: 2021-02-01 | End: 2021-02-07

## 2021-01-31 RX ORDER — INSULIN GLARGINE 100 [IU]/ML
4 INJECTION, SOLUTION SUBCUTANEOUS ONCE
Refills: 0 | Status: COMPLETED | OUTPATIENT
Start: 2021-01-31 | End: 2021-01-31

## 2021-01-31 RX ADMIN — Medication 25 MILLIGRAM(S): at 05:40

## 2021-01-31 RX ADMIN — GABAPENTIN 200 MILLIGRAM(S): 400 CAPSULE ORAL at 05:40

## 2021-01-31 RX ADMIN — INSULIN GLARGINE 12 UNIT(S): 100 INJECTION, SOLUTION SUBCUTANEOUS at 09:54

## 2021-01-31 RX ADMIN — SENNA PLUS 2 TABLET(S): 8.6 TABLET ORAL at 21:18

## 2021-01-31 RX ADMIN — Medication 4: at 11:46

## 2021-01-31 RX ADMIN — REMDESIVIR 500 MILLIGRAM(S): 5 INJECTION INTRAVENOUS at 18:48

## 2021-01-31 RX ADMIN — AMLODIPINE BESYLATE 10 MILLIGRAM(S): 2.5 TABLET ORAL at 05:40

## 2021-01-31 RX ADMIN — ENOXAPARIN SODIUM 40 MILLIGRAM(S): 100 INJECTION SUBCUTANEOUS at 21:06

## 2021-01-31 RX ADMIN — Medication 3 MILLIGRAM(S): at 21:12

## 2021-01-31 RX ADMIN — INSULIN GLARGINE 4 UNIT(S): 100 INJECTION, SOLUTION SUBCUTANEOUS at 13:17

## 2021-01-31 RX ADMIN — Medication 75 MICROGRAM(S): at 05:40

## 2021-01-31 RX ADMIN — Medication 4: at 18:47

## 2021-01-31 RX ADMIN — Medication 6 MILLIGRAM(S): at 05:40

## 2021-01-31 RX ADMIN — Medication 2: at 09:55

## 2021-01-31 NOTE — PROGRESS NOTE ADULT - SUBJECTIVE AND OBJECTIVE BOX
HPI:  89 yo F with PMHx of Afib (not on AC due to vaginal bleeding 2/2 polyps), chronic back pain 2/2 spinal stenosis,  DM2 on metformin, HTN, recurrent UTIs, chronic venous insufficiency. The patient has been living at [facility]. She was sent to the [place] ER to evaluate for DVT due to left lower extremity edema. Pt reportedly has had decreased PO intake and diarrhea as well.     Patient reports that diarrhea started one week ago- it's been once a day,  but voluminous. She denies having any associated fevers. She says lower extremity swelling is chronic and "everyone at the [facility] has it." She is wheelchair bound at baseline. She is agitated that she was sent to the hospital because she feels totally fine. She does not believe she has COVID. She denies having any shortness of breath, cough, nasal congestion, sore throat, chest pain, palpitations, abdominal pain, nausea, or vomiting.     [facility] will not take pt back because of COVID positivity status and diarrhea, which makes it difficult to clean her since she is overall bed bound.    ER course:  Pt found to be covid +  LLE duplex performed and no e/o DVT.  (2021 16:50)      Subjective    Requiring oxygen upt o 5 L at times.   Pt not happy with her breakfast      PAST MEDICAL & SURGICAL HISTORY:  Spinal stenosis of lumbar region    Hiatal hernia    Diabetes mellitus, type II    Atrial fibrillation    H/O: GI Bleed    Overactive Bladder    Uterine Polyp    Peripheral Neuropathy    Degenerative Joint Disease    Obesity    Hypothyroidism    Hypertension    S/P  section    S/P rotator cuff repair  right    S/P knee replacement, right        MedsMEDICATIONS  (STANDING):  amLODIPine   Tablet 10 milliGRAM(s) Oral daily  ascorbic acid 500 milliGRAM(s) Oral daily  cholecalciferol 1000 Unit(s) Oral daily  cyanocobalamin 1000 MICROGram(s) Oral daily  dexAMETHasone     Tablet 6 milliGRAM(s) Oral daily  dextrose 40% Gel 15 Gram(s) Oral once  dextrose 5%. 1000 milliLiter(s) (50 mL/Hr) IV Continuous <Continuous>  dextrose 5%. 1000 milliLiter(s) (100 mL/Hr) IV Continuous <Continuous>  dextrose 50% Injectable 25 Gram(s) IV Push once  dextrose 50% Injectable 12.5 Gram(s) IV Push once  dextrose 50% Injectable 25 Gram(s) IV Push once  enoxaparin Injectable 40 milliGRAM(s) SubCutaneous at bedtime  ferrous    sulfate 325 milliGRAM(s) Oral daily  gabapentin 200 milliGRAM(s) Oral two times a day  glucagon  Injectable 1 milliGRAM(s) IntraMuscular once  insulin glargine Injectable (LANTUS) 12 Unit(s) SubCutaneous every morning  insulin lispro (ADMELOG) corrective regimen sliding scale   SubCutaneous three times a day before meals  insulin lispro (ADMELOG) corrective regimen sliding scale   SubCutaneous at bedtime  levothyroxine 75 MICROGram(s) Oral daily  melatonin 3 milliGRAM(s) Oral at bedtime  metoprolol succinate ER 25 milliGRAM(s) Oral daily  multivitamin 1 Tablet(s) Oral daily  polyethylene glycol 3350 17 Gram(s) Oral daily  remdesivir  IVPB 100 milliGRAM(s) IV Intermittent every 24 hours  senna 2 Tablet(s) Oral at bedtime    MEDICATIONS  (PRN):  acetaminophen   Tablet .. 325 milliGRAM(s) Oral every 6 hours PRN Mild Pain (1 - 3)  acetaminophen   Tablet .. 650 milliGRAM(s) Oral every 4 hours PRN Temp greater or equal to 38C (100.4F), Moderate Pain (4 - 6)  guaiFENesin  milliGRAM(s) Oral every 12 hours PRN Cough  ondansetron   Disintegrating Tablet 4 milliGRAM(s) Oral every 6 hours PRN Nausea and/or Vomiting      Vital Signs Last 24 Hrs  T(C): 37.1 (2021 08:43), Max: 37.1 (2021 08:43)  T(F): 98.7 (2021 08:43), Max: 98.7 (2021 08:43)  HR: 74 (2021 08:43) (74 - 92)  BP: 134/61 (2021 08:43) (134/61 - 135/83)  BP(mean): --  RR: 18 (2021 08:43) (18 - 18)  SpO2: 93% (2021 08:43) (92% - 93%)  I&O's Summary      PHYSICAL EXAM:  GENERAL: NAD  NECK: Supple  NERVOUS SYSTEM:  awake  HEART: S1s2 NL , Irreg Irreg  CHEST/LUNG: dec bs b/l   ABDOMEN: Soft, Nontender, Nondistended; Bowel sounds present  EXTREMITIES:  pos edema      LABS:      134<L>  |  100  |  22  ----------------------------<  138<H>  3.9   |  27  |  0.79    Ca    9.5      2021 06:25    TPro  7.3  /  Alb  3.0<L>  /  TBili  0.9  /  DBili  x   /  AST  14  /  ALT  22  /  AlkPhos  72        COVID-19 PCR: Detected (2021 10:30)  COVID-19 PCR: Detected (2021 09:15)  COVID-19 PCR: Detected (2021 06:00)  COVID-19 PCR: Detected (2021 08:00)  COVID-19 PCR: Detected (2021 14:00)    CAPILLARY BLOOD GLUCOSE      POCT Blood Glucose.: 190 mg/dL (2021 09:15)  POCT Blood Glucose.: 271 mg/dL (2021 21:17)  POCT Blood Glucose.: 273 mg/dL (2021 16:59)  POCT Blood Glucose.: 295 mg/dL (2021 13:08)      Imaging Personally Reviewed:  [ ] YES  [ ] NO        Care Discussed with Consultants/Other Providers [ x] YES  [ ] NO

## 2021-01-31 NOTE — PROGRESS NOTE ADULT - ASSESSMENT
89F with Afib (not on AC due to vaginal bleeding 2/2 polyps), chronic back pain 2/2 spinal stenosis,  DM2 on metformin, HTN, recurrent UTIs, chronic venous insufficiency, presents from [facility] for diarrhea, found to have COVID+ infection    #Acute hypoxic respiratory failure sec to COVID-19 Pneumonia - O2 documented to be 88% on RA.   Decadron Day #10  Remdesivir-5 day completed on 1/26. Pt required inc oxygen on 1/28(upto 12L NC). Pt currently on upto 5L.   Restarted redemsivir on 1/30-duration depends on clinical status.  Monitor renal/liver functions    Bilateral chronic venous insufficiency  No DVT  Hold Lasix for now    Chronic Atrial Fibrillation, not on anticoagulation secondary to history of vaginal bleeding, HTN  Metoprolol    DM2, a1c 6.1  Lispro    HTN  Norvasc/Metoprolol    Hyponatremia  Mild, monitor for now    Gal/Jose Kay, Both sons live together, - [phone]-updated Gal 1/31  daughter, Salma Kebede, [phone](Monday-Friday During the days)

## 2021-02-01 LAB
ALBUMIN SERPL ELPH-MCNC: 2.8 G/DL — LOW (ref 3.3–5)
ALP SERPL-CCNC: 68 U/L — SIGNIFICANT CHANGE UP (ref 40–120)
ALT FLD-CCNC: 18 U/L — SIGNIFICANT CHANGE UP (ref 10–45)
ANION GAP SERPL CALC-SCNC: 8 MMOL/L — SIGNIFICANT CHANGE UP (ref 5–17)
AST SERPL-CCNC: 16 U/L — SIGNIFICANT CHANGE UP (ref 10–40)
BASOPHILS # BLD AUTO: 0.02 K/UL — SIGNIFICANT CHANGE UP (ref 0–0.2)
BASOPHILS NFR BLD AUTO: 0.2 % — SIGNIFICANT CHANGE UP (ref 0–2)
BILIRUB SERPL-MCNC: 0.9 MG/DL — SIGNIFICANT CHANGE UP (ref 0.2–1.2)
BUN SERPL-MCNC: 25 MG/DL — HIGH (ref 7–23)
CALCIUM SERPL-MCNC: 9.6 MG/DL — SIGNIFICANT CHANGE UP (ref 8.4–10.5)
CHLORIDE SERPL-SCNC: 101 MMOL/L — SIGNIFICANT CHANGE UP (ref 96–108)
CO2 SERPL-SCNC: 25 MMOL/L — SIGNIFICANT CHANGE UP (ref 22–31)
CREAT SERPL-MCNC: 0.68 MG/DL — SIGNIFICANT CHANGE UP (ref 0.5–1.3)
EOSINOPHIL # BLD AUTO: 0.09 K/UL — SIGNIFICANT CHANGE UP (ref 0–0.5)
EOSINOPHIL NFR BLD AUTO: 0.8 % — SIGNIFICANT CHANGE UP (ref 0–6)
GLUCOSE BLDC GLUCOMTR-MCNC: 172 MG/DL — HIGH (ref 70–99)
GLUCOSE BLDC GLUCOMTR-MCNC: 255 MG/DL — HIGH (ref 70–99)
GLUCOSE BLDC GLUCOMTR-MCNC: 265 MG/DL — HIGH (ref 70–99)
GLUCOSE BLDC GLUCOMTR-MCNC: 314 MG/DL — HIGH (ref 70–99)
GLUCOSE SERPL-MCNC: 185 MG/DL — HIGH (ref 70–99)
HCT VFR BLD CALC: 47 % — HIGH (ref 34.5–45)
HGB BLD-MCNC: 15.8 G/DL — HIGH (ref 11.5–15.5)
IMM GRANULOCYTES NFR BLD AUTO: 1.6 % — HIGH (ref 0–1.5)
LYMPHOCYTES # BLD AUTO: 1.01 K/UL — SIGNIFICANT CHANGE UP (ref 1–3.3)
LYMPHOCYTES # BLD AUTO: 8.6 % — LOW (ref 13–44)
MCHC RBC-ENTMCNC: 31.2 PG — SIGNIFICANT CHANGE UP (ref 27–34)
MCHC RBC-ENTMCNC: 33.6 GM/DL — SIGNIFICANT CHANGE UP (ref 32–36)
MCV RBC AUTO: 92.9 FL — SIGNIFICANT CHANGE UP (ref 80–100)
MONOCYTES # BLD AUTO: 0.91 K/UL — HIGH (ref 0–0.9)
MONOCYTES NFR BLD AUTO: 7.8 % — SIGNIFICANT CHANGE UP (ref 2–14)
NEUTROPHILS # BLD AUTO: 9.46 K/UL — HIGH (ref 1.8–7.4)
NEUTROPHILS NFR BLD AUTO: 81 % — HIGH (ref 43–77)
NRBC # BLD: 0 /100 WBCS — SIGNIFICANT CHANGE UP (ref 0–0)
PLATELET # BLD AUTO: 281 K/UL — SIGNIFICANT CHANGE UP (ref 150–400)
POTASSIUM SERPL-MCNC: 4.2 MMOL/L — SIGNIFICANT CHANGE UP (ref 3.5–5.3)
POTASSIUM SERPL-SCNC: 4.2 MMOL/L — SIGNIFICANT CHANGE UP (ref 3.5–5.3)
PROT SERPL-MCNC: 7 G/DL — SIGNIFICANT CHANGE UP (ref 6–8.3)
RBC # BLD: 5.06 M/UL — SIGNIFICANT CHANGE UP (ref 3.8–5.2)
RBC # FLD: 13 % — SIGNIFICANT CHANGE UP (ref 10.3–14.5)
SARS-COV-2 RNA SPEC QL NAA+PROBE: DETECTED
SODIUM SERPL-SCNC: 134 MMOL/L — LOW (ref 135–145)
WBC # BLD: 11.68 K/UL — HIGH (ref 3.8–10.5)
WBC # FLD AUTO: 11.68 K/UL — HIGH (ref 3.8–10.5)

## 2021-02-01 PROCEDURE — 99232 SBSQ HOSP IP/OBS MODERATE 35: CPT | Mod: CS

## 2021-02-01 PROCEDURE — 99233 SBSQ HOSP IP/OBS HIGH 50: CPT

## 2021-02-01 RX ADMIN — Medication 325 MILLIGRAM(S): at 12:15

## 2021-02-01 RX ADMIN — ENOXAPARIN SODIUM 40 MILLIGRAM(S): 100 INJECTION SUBCUTANEOUS at 21:12

## 2021-02-01 RX ADMIN — Medication 1 TABLET(S): at 11:54

## 2021-02-01 RX ADMIN — INSULIN GLARGINE 15 UNIT(S): 100 INJECTION, SOLUTION SUBCUTANEOUS at 16:01

## 2021-02-01 RX ADMIN — Medication 3 MILLIGRAM(S): at 22:46

## 2021-02-01 RX ADMIN — GABAPENTIN 200 MILLIGRAM(S): 400 CAPSULE ORAL at 04:09

## 2021-02-01 RX ADMIN — Medication 8: at 12:21

## 2021-02-01 RX ADMIN — REMDESIVIR 500 MILLIGRAM(S): 5 INJECTION INTRAVENOUS at 15:04

## 2021-02-01 RX ADMIN — Medication 500 MILLIGRAM(S): at 11:54

## 2021-02-01 RX ADMIN — Medication 25 MILLIGRAM(S): at 04:09

## 2021-02-01 RX ADMIN — SENNA PLUS 2 TABLET(S): 8.6 TABLET ORAL at 21:13

## 2021-02-01 RX ADMIN — Medication 6 MILLIGRAM(S): at 04:09

## 2021-02-01 RX ADMIN — Medication 1000 UNIT(S): at 11:54

## 2021-02-01 RX ADMIN — Medication 2: at 21:47

## 2021-02-01 RX ADMIN — Medication 75 MICROGRAM(S): at 04:09

## 2021-02-01 RX ADMIN — GABAPENTIN 200 MILLIGRAM(S): 400 CAPSULE ORAL at 17:34

## 2021-02-01 RX ADMIN — PREGABALIN 1000 MICROGRAM(S): 225 CAPSULE ORAL at 11:54

## 2021-02-01 RX ADMIN — AMLODIPINE BESYLATE 10 MILLIGRAM(S): 2.5 TABLET ORAL at 04:09

## 2021-02-01 RX ADMIN — POLYETHYLENE GLYCOL 3350 17 GRAM(S): 17 POWDER, FOR SOLUTION ORAL at 12:27

## 2021-02-01 RX ADMIN — Medication 6: at 18:00

## 2021-02-01 NOTE — PROGRESS NOTE ADULT - SUBJECTIVE AND OBJECTIVE BOX
HPI:  89 yo F with PMHx of Afib (not on AC due to vaginal bleeding 2/2 polyps), chronic back pain 2/2 spinal stenosis,  DM2 on metformin, HTN, recurrent UTIs, chronic venous insufficiency. The patient has been living at [facility]. She was sent to the [place] ER to evaluate for DVT due to left lower extremity edema. Pt reportedly has had decreased PO intake and diarrhea as well.     Patient reports that diarrhea started one week ago- it's been once a day,  but voluminous. She denies having any associated fevers. She says lower extremity swelling is chronic and "everyone at the [facility] has it." She is wheelchair bound at baseline. She is agitated that she was sent to the hospital because she feels totally fine. She does not believe she has COVID. She denies having any shortness of breath, cough, nasal congestion, sore throat, chest pain, palpitations, abdominal pain, nausea, or vomiting.     [facility] will not take pt back because of COVID positivity status and diarrhea, which makes it difficult to clean her since she is overall bed bound.    ER course:  Pt found to be covid +  LLE duplex performed and no e/o DVT.  (16 Jan 2021 16:50)    Today:  Patient Seen and Examined  No overnight events reported  Patient very upset this AM does not want to be at the hospital  She wants to go home    acetaminophen   Tablet .. 325 milliGRAM(s) Oral every 6 hours PRN  acetaminophen   Tablet .. 650 milliGRAM(s) Oral every 4 hours PRN  amLODIPine   Tablet 10 milliGRAM(s) Oral daily  ascorbic acid 500 milliGRAM(s) Oral daily  cholecalciferol 1000 Unit(s) Oral daily  cyanocobalamin 1000 MICROGram(s) Oral daily  dextrose 40% Gel 15 Gram(s) Oral once  dextrose 5%. 1000 milliLiter(s) IV Continuous <Continuous>  dextrose 5%. 1000 milliLiter(s) IV Continuous <Continuous>  dextrose 50% Injectable 25 Gram(s) IV Push once  dextrose 50% Injectable 12.5 Gram(s) IV Push once  dextrose 50% Injectable 25 Gram(s) IV Push once  enoxaparin Injectable 40 milliGRAM(s) SubCutaneous at bedtime  ferrous    sulfate 325 milliGRAM(s) Oral daily  gabapentin 200 milliGRAM(s) Oral two times a day  glucagon  Injectable 1 milliGRAM(s) IntraMuscular once  guaiFENesin  milliGRAM(s) Oral every 12 hours PRN  insulin glargine Injectable (LANTUS) 15 Unit(s) SubCutaneous every morning  insulin lispro (ADMELOG) corrective regimen sliding scale   SubCutaneous three times a day before meals  insulin lispro (ADMELOG) corrective regimen sliding scale   SubCutaneous at bedtime  levothyroxine 75 MICROGram(s) Oral daily  melatonin 3 milliGRAM(s) Oral at bedtime  metoprolol succinate ER 25 milliGRAM(s) Oral daily  multivitamin 1 Tablet(s) Oral daily  ondansetron   Disintegrating Tablet 4 milliGRAM(s) Oral every 6 hours PRN  polyethylene glycol 3350 17 Gram(s) Oral daily  remdesivir  IVPB 100 milliGRAM(s) IV Intermittent every 24 hours  senna 2 Tablet(s) Oral at bedtime      T(C): --  HR: 99 (01-31-21 @ 21:15) (99 - 99)  BP: 129/99 (01-31-21 @ 21:15) (129/99 - 129/99)  RR: 18 (01-31-21 @ 21:15) (18 - 18)  SpO2: 94% (01-31-21 @ 21:15) (94% - 94%)  Wt(kg): --Vital Signs Last 24 Hrs  T(C): --  T(F): --  HR: 99 (31 Jan 2021 21:15) (99 - 99)  BP: 129/99 (31 Jan 2021 21:15) (129/99 - 129/99)  BP(mean): --  RR: 18 (31 Jan 2021 21:15) (18 - 18)  SpO2: 94% (31 Jan 2021 21:15) (94% - 94%)    PHYSICAL EXAM:  GENERAL: NAD  HENT:  Atraumatic, Normocephalic; No tonsillar erythema, exudates, ulcers, or enlargement; Moist mucous membranes  EYES: EOMI, PERRLA, conjunctiva and sclera clear; no lid-lag  NECK: Supple, No JVD, Normal thyroid  NERVOUS SYSTEM:  Motor Strength 5/5 B/L upper and lower extremities; CN II-XII intact  CHEST/LUNG: Clear to percussion bilaterally; No rales, rhonchi, wheezing, or rubs; normal respiratory effort, no intercostal retractions  HEART: Regular rate and rhythm; No murmurs, rubs, or gallops  ABDOMEN: Soft, Nontender, Nondistended; Bowel sounds present; No HSM  EXTREMITIES:  2+ Peripheral Pulses, No clubbing, cyanosis, or peripheral edema  LYMPH: No lymphadenopathy noted  SKIN: No rashes or lesions; normal temperature and turgor   PSYCH: Appropriate affect; Alert & Oriented x 3    LABS:                        15.8   11.68 )-----------( 281      ( 01 Feb 2021 09:07 )             47.0     01-31    134<L>  |  100  |  22  ----------------------------<  138<H>  3.9   |  27  |  0.79    Ca    9.5      31 Jan 2021 06:25    TPro  7.3  /  Alb  3.0<L>  /  TBili  0.9  /  DBili  x   /  AST  14  /  ALT  22  /  AlkPhos  72  01-31        CAPILLARY BLOOD GLUCOSE  POCT Blood Glucose.: 172 mg/dL (01 Feb 2021 09:12)  POCT Blood Glucose.: 215 mg/dL (31 Jan 2021 21:24)  POCT Blood Glucose.: 237 mg/dL (31 Jan 2021 18:25)  POCT Blood Glucose.: 293 mg/dL (31 Jan 2021 12:24)      RADIOLOGY & ADDITIONAL TESTS:  Consultant(s) Notes Reviewed:  [x ] YES  [ ] NO  Care Discussed with Consultants/Other Providers [ x] YES  [ ] NO   HPI:  89 yo F with PMHx of Afib (not on AC due to vaginal bleeding 2/2 polyps), chronic back pain 2/2 spinal stenosis,  DM2 on metformin, HTN, recurrent UTIs, chronic venous insufficiency. The patient has been living at [facility]. She was sent to the [place] ER to evaluate for DVT due to left lower extremity edema. Pt reportedly has had decreased PO intake and diarrhea as well.     Patient reports that diarrhea started one week ago- it's been once a day,  but voluminous. She denies having any associated fevers. She says lower extremity swelling is chronic and "everyone at the [facility] has it." She is wheelchair bound at baseline. She is agitated that she was sent to the hospital because she feels totally fine. She does not believe she has COVID. She denies having any shortness of breath, cough, nasal congestion, sore throat, chest pain, palpitations, abdominal pain, nausea, or vomiting.     [facility] will not take pt back because of COVID positivity status and diarrhea, which makes it difficult to clean her since she is overall bed bound.    ER course:  Pt found to be covid +  LLE duplex performed and no e/o DVT.  (16 Jan 2021 16:50)    Today:  Patient Seen and Examined  No overnight events reported  Patient very upset this AM does not want to be at the hospital  She wants to go home    acetaminophen   Tablet .. 325 milliGRAM(s) Oral every 6 hours PRN  acetaminophen   Tablet .. 650 milliGRAM(s) Oral every 4 hours PRN  amLODIPine   Tablet 10 milliGRAM(s) Oral daily  ascorbic acid 500 milliGRAM(s) Oral daily  cholecalciferol 1000 Unit(s) Oral daily  cyanocobalamin 1000 MICROGram(s) Oral daily  dextrose 40% Gel 15 Gram(s) Oral once  dextrose 5%. 1000 milliLiter(s) IV Continuous <Continuous>  dextrose 5%. 1000 milliLiter(s) IV Continuous <Continuous>  dextrose 50% Injectable 25 Gram(s) IV Push once  dextrose 50% Injectable 12.5 Gram(s) IV Push once  dextrose 50% Injectable 25 Gram(s) IV Push once  enoxaparin Injectable 40 milliGRAM(s) SubCutaneous at bedtime  ferrous    sulfate 325 milliGRAM(s) Oral daily  gabapentin 200 milliGRAM(s) Oral two times a day  glucagon  Injectable 1 milliGRAM(s) IntraMuscular once  guaiFENesin  milliGRAM(s) Oral every 12 hours PRN  insulin glargine Injectable (LANTUS) 15 Unit(s) SubCutaneous every morning  insulin lispro (ADMELOG) corrective regimen sliding scale   SubCutaneous three times a day before meals  insulin lispro (ADMELOG) corrective regimen sliding scale   SubCutaneous at bedtime  levothyroxine 75 MICROGram(s) Oral daily  melatonin 3 milliGRAM(s) Oral at bedtime  metoprolol succinate ER 25 milliGRAM(s) Oral daily  multivitamin 1 Tablet(s) Oral daily  ondansetron   Disintegrating Tablet 4 milliGRAM(s) Oral every 6 hours PRN  polyethylene glycol 3350 17 Gram(s) Oral daily  remdesivir  IVPB 100 milliGRAM(s) IV Intermittent every 24 hours  senna 2 Tablet(s) Oral at bedtime      T(C): --  HR: 99 (01-31-21 @ 21:15) (99 - 99)  BP: 129/99 (01-31-21 @ 21:15) (129/99 - 129/99)  RR: 18 (01-31-21 @ 21:15) (18 - 18)  SpO2: 94% (01-31-21 @ 21:15) (94% - 94%)  Wt(kg): --Vital Signs Last 24 Hrs  T(C): --  T(F): --  HR: 99 (31 Jan 2021 21:15) (99 - 99)  BP: 129/99 (31 Jan 2021 21:15) (129/99 - 129/99)  BP(mean): --  RR: 18 (31 Jan 2021 21:15) (18 - 18)  SpO2: 94% (31 Jan 2021 21:15) (94% - 94%)    PHYSICAL EXAM:  GENERAL: NAD  HENT:  Atraumatic, Normocephalic; No tonsillar erythema, exudates, ulcers, or enlargement; Moist mucous membranes  EYES: EOMI, PERRLA, conjunctiva and sclera clear; no lid-lag  NECK: Supple, No JVD, Normal thyroid  NERVOUS SYSTEM:  Motor Strength noted  CHEST/LUNG: Clear to percussion bilaterally; No rales, rhonchi, wheezing, or rubs; normal respiratory effort, no intercostal retractions  HEART: Regular rate and rhythm; No murmurs, rubs, or gallops  ABDOMEN: Soft, Nontender, Nondistended; Bowel sounds present; No HSM  EXTREMITIES:  2+ Peripheral Pulses, No clubbing, cyanosis, or peripheral edema  SKIN: No rashes or lesions; normal temperature and turgor   PSYCH: Appropriate affect; Alert & Oriented x 2-3    LABS:                        15.8   11.68 )-----------( 281      ( 01 Feb 2021 09:07 )             47.0     01-31    134<L>  |  100  |  22  ----------------------------<  138<H>  3.9   |  27  |  0.79    Ca    9.5      31 Jan 2021 06:25    TPro  7.3  /  Alb  3.0<L>  /  TBili  0.9  /  DBili  x   /  AST  14  /  ALT  22  /  AlkPhos  72  01-31      CAPILLARY BLOOD GLUCOSE  POCT Blood Glucose.: 172 mg/dL (01 Feb 2021 09:12)  POCT Blood Glucose.: 215 mg/dL (31 Jan 2021 21:24)  POCT Blood Glucose.: 237 mg/dL (31 Jan 2021 18:25)  POCT Blood Glucose.: 293 mg/dL (31 Jan 2021 12:24)      RADIOLOGY & ADDITIONAL TESTS:  Consultant(s) Notes Reviewed:  [x ] YES  [ ] NO  Care Discussed with Consultants/Other Providers [ x] YES  [ ] NO

## 2021-02-01 NOTE — PROGRESS NOTE ADULT - ASSESSMENT
89F with Afib (not on AC due to vaginal bleeding 2/2 polyps), chronic back pain 2/2 spinal stenosis,  DM2 on metformin, HTN, recurrent UTIs, chronic venous insufficiency, presents from [facility] for diarrhea, found to have COVID+ infection    #Acute hypoxic respiratory failure sec to COVID-19 Pneumonia - O2 documented to be 88% on RA.   Decadron Completed (leukocytosis likely related to decadron)  Remdesivir-5 day completed on 1/26. Pt required inc oxygen on 1/28 (upto 12L NC). Pt currently titrated down and on RA  Restarted redemsivir on 1/30-duration depends on clinical status.    Monitor renal/liver functions  sending COVID swab today  D/C back to [facility] pending two negative COVID tests. (Possible Home hospice) Will F/U    Bilateral chronic venous insufficiency  No DVT  Hold Lasix for now    Chronic Atrial Fibrillation, not on anticoagulation secondary to history of vaginal bleeding, HTN  Metoprolol    DM2, a1c 6.1  Continue with Lantus  Continue with Lispro  Completed decadron. FS should be trending downwards    HTN  Norvasc/Metoprolol    Hyponatremia  Mild, monitor for now    Gal/Jose Kay, Both sons live together, - [phone]-updated Gal 1/31  daughter, Salma Kebede, [phone](Monday-Friday During the days)  Will call Salma longo 89F with Afib (not on AC due to vaginal bleeding 2/2 polyps), chronic back pain 2/2 spinal stenosis,  DM2 on metformin, HTN, recurrent UTIs, chronic venous insufficiency, presents from [facility] for diarrhea, found to have COVID+ infection    #Acute hypoxic respiratory failure sec to COVID-19 Pneumonia - O2 documented to be 88% on RA.   Decadron Completed (leukocytosis likely related to decadron)  Remdesivir-5 day completed on 1/26. Pt required inc oxygen on 1/28 (upto 12L NC). Pt currently titrated down and on RA  Restarted redemsivir on 1/30-duration depends on clinical status.    Monitor renal/liver functions  sending COVID swab today  D/C back to [facility] pending two negative COVID tests. (Possible Home hospice) Will F/U    Bilateral chronic venous insufficiency  No DVT  Hold Lasix for now    Chronic Atrial Fibrillation, not on anticoagulation secondary to history of vaginal bleeding, HTN  Metoprolol    DM2, a1c 6.1  Continue with Lantus  Continue with Lispro  Completed decadron. FS should be trending downwards    HTN  Norvasc/Metoprolol    Hyponatremia  Mild, monitor for now    Gal/Jose Kay, Both sons live together, - [phone]  daughter, Salma Kebede, [phone](Monday-Friday During the days)  Called Nini today

## 2021-02-01 NOTE — PROGRESS NOTE ADULT - SUBJECTIVE AND OBJECTIVE BOX
Follow-up Pall Progress Note    Brendon Lau MD  [phone]    No new  events overnight.  sat on room air 94%    Medications:  Vital Signs Last 24 Hrs  T(C): --  T(F): --  HR: 99 (31 Jan 2021 21:15) (99 - 99)  BP: 129/99 (31 Jan 2021 21:15) (129/99 - 129/99)  BP(mean): --  RR: 18 (31 Jan 2021 21:15) (18 - 18)  SpO2: 94% (31 Jan 2021 21:15) (94% - 94%)            LABS:                        15.8   11.68 )-----------( 281      ( 01 Feb 2021 09:07 )             47.0     02-01    134<L>  |  101  |  25<H>  ----------------------------<  185<H>  4.2   |  25  |  0.68    Ca    9.6      01 Feb 2021 09:07    TPro  7.0  /  Alb  2.8<L>  /  TBili  0.9  /  DBili  x   /  AST  16  /  ALT  18  /  AlkPhos  68  02-01              CULTURES:        Physical Examination:      RADIOLOGY REVIEWED  CXR:    CT chest:    TTE:

## 2021-02-02 LAB
ALBUMIN SERPL ELPH-MCNC: 2.9 G/DL — LOW (ref 3.3–5)
ALP SERPL-CCNC: 65 U/L — SIGNIFICANT CHANGE UP (ref 40–120)
ALT FLD-CCNC: 21 U/L — SIGNIFICANT CHANGE UP (ref 10–45)
ANION GAP SERPL CALC-SCNC: 11 MMOL/L — SIGNIFICANT CHANGE UP (ref 5–17)
AST SERPL-CCNC: 15 U/L — SIGNIFICANT CHANGE UP (ref 10–40)
BASOPHILS # BLD AUTO: 0.04 K/UL — SIGNIFICANT CHANGE UP (ref 0–0.2)
BASOPHILS NFR BLD AUTO: 0.4 % — SIGNIFICANT CHANGE UP (ref 0–2)
BILIRUB SERPL-MCNC: 0.9 MG/DL — SIGNIFICANT CHANGE UP (ref 0.2–1.2)
BUN SERPL-MCNC: 24 MG/DL — HIGH (ref 7–23)
CALCIUM SERPL-MCNC: 9.8 MG/DL — SIGNIFICANT CHANGE UP (ref 8.4–10.5)
CHLORIDE SERPL-SCNC: 101 MMOL/L — SIGNIFICANT CHANGE UP (ref 96–108)
CO2 SERPL-SCNC: 25 MMOL/L — SIGNIFICANT CHANGE UP (ref 22–31)
CREAT SERPL-MCNC: 0.58 MG/DL — SIGNIFICANT CHANGE UP (ref 0.5–1.3)
CRP SERPL-MCNC: 0.28 MG/DL — SIGNIFICANT CHANGE UP (ref 0–0.4)
D DIMER BLD IA.RAPID-MCNC: 182 NG/ML DDU — SIGNIFICANT CHANGE UP
EOSINOPHIL # BLD AUTO: 0.12 K/UL — SIGNIFICANT CHANGE UP (ref 0–0.5)
EOSINOPHIL NFR BLD AUTO: 1.1 % — SIGNIFICANT CHANGE UP (ref 0–6)
FERRITIN SERPL-MCNC: 288 NG/ML — HIGH (ref 15–150)
GLUCOSE BLDC GLUCOMTR-MCNC: 135 MG/DL — HIGH (ref 70–99)
GLUCOSE BLDC GLUCOMTR-MCNC: 155 MG/DL — HIGH (ref 70–99)
GLUCOSE BLDC GLUCOMTR-MCNC: 155 MG/DL — HIGH (ref 70–99)
GLUCOSE SERPL-MCNC: 145 MG/DL — HIGH (ref 70–99)
HCT VFR BLD CALC: 47.6 % — HIGH (ref 34.5–45)
HGB BLD-MCNC: 16.5 G/DL — HIGH (ref 11.5–15.5)
IMM GRANULOCYTES NFR BLD AUTO: 1.6 % — HIGH (ref 0–1.5)
LYMPHOCYTES # BLD AUTO: 1.53 K/UL — SIGNIFICANT CHANGE UP (ref 1–3.3)
LYMPHOCYTES # BLD AUTO: 14.1 % — SIGNIFICANT CHANGE UP (ref 13–44)
MAGNESIUM SERPL-MCNC: 1.7 MG/DL — SIGNIFICANT CHANGE UP (ref 1.6–2.6)
MCHC RBC-ENTMCNC: 32 PG — SIGNIFICANT CHANGE UP (ref 27–34)
MCHC RBC-ENTMCNC: 34.7 GM/DL — SIGNIFICANT CHANGE UP (ref 32–36)
MCV RBC AUTO: 92.4 FL — SIGNIFICANT CHANGE UP (ref 80–100)
MONOCYTES # BLD AUTO: 1.15 K/UL — HIGH (ref 0–0.9)
MONOCYTES NFR BLD AUTO: 10.6 % — SIGNIFICANT CHANGE UP (ref 2–14)
NEUTROPHILS # BLD AUTO: 7.86 K/UL — HIGH (ref 1.8–7.4)
NEUTROPHILS NFR BLD AUTO: 72.2 % — SIGNIFICANT CHANGE UP (ref 43–77)
NRBC # BLD: 0 /100 WBCS — SIGNIFICANT CHANGE UP (ref 0–0)
PHOSPHATE SERPL-MCNC: 2.4 MG/DL — LOW (ref 2.5–4.5)
PLATELET # BLD AUTO: 287 K/UL — SIGNIFICANT CHANGE UP (ref 150–400)
POTASSIUM SERPL-MCNC: 4.2 MMOL/L — SIGNIFICANT CHANGE UP (ref 3.5–5.3)
POTASSIUM SERPL-SCNC: 4.2 MMOL/L — SIGNIFICANT CHANGE UP (ref 3.5–5.3)
PROCALCITONIN SERPL-MCNC: 0.04 NG/ML — SIGNIFICANT CHANGE UP
PROT SERPL-MCNC: 6.9 G/DL — SIGNIFICANT CHANGE UP (ref 6–8.3)
RBC # BLD: 5.15 M/UL — SIGNIFICANT CHANGE UP (ref 3.8–5.2)
RBC # FLD: 12.8 % — SIGNIFICANT CHANGE UP (ref 10.3–14.5)
SODIUM SERPL-SCNC: 137 MMOL/L — SIGNIFICANT CHANGE UP (ref 135–145)
WBC # BLD: 10.87 K/UL — HIGH (ref 3.8–10.5)
WBC # FLD AUTO: 10.87 K/UL — HIGH (ref 3.8–10.5)

## 2021-02-02 PROCEDURE — 99233 SBSQ HOSP IP/OBS HIGH 50: CPT

## 2021-02-02 PROCEDURE — 99232 SBSQ HOSP IP/OBS MODERATE 35: CPT | Mod: CS

## 2021-02-02 RX ORDER — BENZOCAINE 10 %
1 GEL (GRAM) MUCOUS MEMBRANE
Refills: 0 | Status: DISCONTINUED | OUTPATIENT
Start: 2021-02-02 | End: 2021-02-07

## 2021-02-02 RX ADMIN — Medication 25 MILLIGRAM(S): at 05:35

## 2021-02-02 RX ADMIN — Medication 75 MICROGRAM(S): at 05:22

## 2021-02-02 RX ADMIN — GABAPENTIN 200 MILLIGRAM(S): 400 CAPSULE ORAL at 19:01

## 2021-02-02 RX ADMIN — REMDESIVIR 500 MILLIGRAM(S): 5 INJECTION INTRAVENOUS at 13:05

## 2021-02-02 RX ADMIN — Medication 650 MILLIGRAM(S): at 19:01

## 2021-02-02 RX ADMIN — Medication 2: at 12:10

## 2021-02-02 RX ADMIN — INSULIN GLARGINE 15 UNIT(S): 100 INJECTION, SOLUTION SUBCUTANEOUS at 08:18

## 2021-02-02 RX ADMIN — AMLODIPINE BESYLATE 10 MILLIGRAM(S): 2.5 TABLET ORAL at 05:35

## 2021-02-02 RX ADMIN — GABAPENTIN 200 MILLIGRAM(S): 400 CAPSULE ORAL at 05:34

## 2021-02-02 RX ADMIN — Medication 650 MILLIGRAM(S): at 08:27

## 2021-02-02 NOTE — PROGRESS NOTE ADULT - SUBJECTIVE AND OBJECTIVE BOX
Patient is a 89y old  Female who presents with a chief complaint of Diarrhea in a bed bound patient, COVID positivity status (01 Feb 2021 13:30)      Patient seen and examined at bedside.  No overnight events  Patient is upset and wants to go home    ALLERGIES:  aspirin (Unknown)  penicillin (Unknown)    MEDICATIONS  (STANDING):  amLODIPine   Tablet 10 milliGRAM(s) Oral daily  ascorbic acid 500 milliGRAM(s) Oral daily  cholecalciferol 1000 Unit(s) Oral daily  cyanocobalamin 1000 MICROGram(s) Oral daily  dextrose 40% Gel 15 Gram(s) Oral once  dextrose 5%. 1000 milliLiter(s) (50 mL/Hr) IV Continuous <Continuous>  dextrose 5%. 1000 milliLiter(s) (100 mL/Hr) IV Continuous <Continuous>  dextrose 50% Injectable 25 Gram(s) IV Push once  dextrose 50% Injectable 12.5 Gram(s) IV Push once  dextrose 50% Injectable 25 Gram(s) IV Push once  enoxaparin Injectable 40 milliGRAM(s) SubCutaneous at bedtime  ferrous    sulfate 325 milliGRAM(s) Oral daily  gabapentin 200 milliGRAM(s) Oral two times a day  glucagon  Injectable 1 milliGRAM(s) IntraMuscular once  insulin glargine Injectable (LANTUS) 15 Unit(s) SubCutaneous every morning  insulin lispro (ADMELOG) corrective regimen sliding scale   SubCutaneous three times a day before meals  insulin lispro (ADMELOG) corrective regimen sliding scale   SubCutaneous at bedtime  levothyroxine 75 MICROGram(s) Oral daily  melatonin 3 milliGRAM(s) Oral at bedtime  metoprolol succinate ER 25 milliGRAM(s) Oral daily  multivitamin 1 Tablet(s) Oral daily  polyethylene glycol 3350 17 Gram(s) Oral daily  remdesivir  IVPB 100 milliGRAM(s) IV Intermittent every 24 hours  senna 2 Tablet(s) Oral at bedtime    MEDICATIONS  (PRN):  acetaminophen   Tablet .. 650 milliGRAM(s) Oral every 4 hours PRN Temp greater or equal to 38C (100.4F), Moderate Pain (4 - 6)  acetaminophen   Tablet .. 325 milliGRAM(s) Oral every 6 hours PRN Mild Pain (1 - 3)  guaiFENesin  milliGRAM(s) Oral every 12 hours PRN Cough  ondansetron   Disintegrating Tablet 4 milliGRAM(s) Oral every 6 hours PRN Nausea and/or Vomiting    Vital Signs Last 24 Hrs  T(F): --  HR: 69 (01 Feb 2021 16:19) (69 - 69)  BP: 147/90 (01 Feb 2021 16:19) (147/90 - 147/90)  RR: 16 (01 Feb 2021 16:19) (16 - 16)  SpO2: 99% (01 Feb 2021 16:19) (99% - 99%)  I&O's Summary    01 Feb 2021 07:01  -  02 Feb 2021 07:00  --------------------------------------------------------  IN: 0 mL / OUT: 1 mL / NET: -1 mL      PHYSICAL EXAM:  GENERAL: NAD  HENT:  Atraumatic, Normocephalic; No tonsillar erythema, exudates, or enlargement; Moist mucous membranes;   EYES: EOMI, PERRLA, conjunctiva and sclera clear, no lid-lag  NECK: Supple, No JVD, Normal thyroid  CHEST/LUNG: Clear to percussion bilaterally; No rales, rhonchi, wheezing, or rubs; normal respiratory effort, no intercostal retractions  HEART: Regular rate and rhythm; No murmurs, rubs, or gallops  ABDOMEN: Soft, Nontender, Nondistended; Bowel sounds present; No HSM  MUSCULOSKELETAL/EXTREMITIES:  2+ Peripheral Pulses, No clubbing, cyanosis, or peripheral edema; No digital cyanosis  SKIN: No rashes or lesions; normal texture and temperature  PSYCH: Appropriate affect, Alert & Oriented x 2-3    LABS:                        16.5   10.87 )-----------( 287      ( 02 Feb 2021 06:00 )             47.6       02-01    134  |  101  |  25  ----------------------------<  185  4.2   |  25  |  0.68    Ca    9.6      01 Feb 2021 09:07    TPro  7.0  /  Alb  2.8  /  TBili  0.9  /  DBili  x   /  AST  16  /  ALT  18  /  AlkPhos  68  02-01     eGFR if Non African American: 77 mL/min/1.73M2 (02-01-21 @ 09:07)  eGFR if : 89 mL/min/1.73M2 (02-01-21 @ 09:07)       COVID-19 PCR: Detected (01 Feb 2021 13:35)  COVID-19 PCR: Detected (28 Jan 2021 10:30)  COVID-19 PCR: Detected (25 Jan 2021 09:15)  COVID-19 PCR: Detected (20 Jan 2021 06:00)  COVID-19 PCR: Detected (18 Jan 2021 08:00)  COVID-19 PCR: Detected (16 Jan 2021 14:00)      POCT Blood Glucose.: 155 mg/dL (02 Feb 2021 05:26)  POCT Blood Glucose.: 255 mg/dL (01 Feb 2021 21:41)  POCT Blood Glucose.: 265 mg/dL (01 Feb 2021 17:39)  POCT Blood Glucose.: 314 mg/dL (01 Feb 2021 12:01)  POCT Blood Glucose.: 172 mg/dL (01 Feb 2021 09:12)      Care Discussed with Consultants/Other Providers

## 2021-02-02 NOTE — PROGRESS NOTE ADULT - ASSESSMENT
89F with Afib (not on AC due to vaginal bleeding 2/2 polyps), chronic back pain 2/2 spinal stenosis,  DM2 on metformin, HTN, recurrent UTIs, chronic venous insufficiency, presents from [facility] for diarrhea, found to have COVID+ infection    #Acute hypoxic respiratory failure sec to COVID-19 Pneumonia - O2 documented to be 88% on RA.   Decadron Completed (leukocytosis likely related to decadron)  Remdesivir-5 day completed on 1/26. Pt required inc oxygen on 1/28 (upto 12L NC). Pt currently titrated down and on RA  Restarted redemsivir on 1/30-duration depends on clinical status.  Monitor renal/liver functions  D/C back to [facility] pending two negative COVID tests. (Possible Home hospice at [facility])       #Tooth pain  - orajel for pain  - will change diet to soft if patient allows      Bilateral chronic venous insufficiency  No DVT  Hold Lasix for now    Chronic Atrial Fibrillation, not on anticoagulation secondary to history of vaginal bleeding, HTN  Metoprolol    DM2, a1c 6.1  Continue with Lantus  Continue with Lispro  Completed decadron. FS should be trending downwards    HTN  Norvasc/Metoprolol    Hyponatremia  Mild, monitor for now  Palliative:  Call placed to son Jose to discuss possible home hospice.  Gal/Jose Kay, Both sons live together, - [phone]  daughter, Salma Kebede, [phone] (Monday-Friday During the days)  Will update family today (Spoke to Salma)

## 2021-02-02 NOTE — PROGRESS NOTE ADULT - SUBJECTIVE AND OBJECTIVE BOX
Patient is a 89y old  Female who presents with a chief complaint of Diarrhea in a bed bound patient, COVID positivity status (2021 21:18)    Events last 24 hours:   - Patient seen at bedside. Patient states that she is comfortable and thankful for the care she is receiving   - Patient on 2LNC w/O2 saturation of 96%    PAST MEDICAL & SURGICAL HISTORY:  Spinal stenosis of lumbar region  Hiatal hernia  Diabetes mellitus, type II  Atrial fibrillation  H/O: GI Bleed  Overactive Bladder  Uterine Polyp  Peripheral Neuropathy  Degenerative Joint Disease  Obesity  Hypothyroidism  Hypertension  S/P  section  S/P rotator cuff repair  right  S/P knee replacement, right    Medications:  remdesivir  IVPB 100 milliGRAM(s) IV Intermittent every 24 hours  amLODIPine   Tablet 10 milliGRAM(s) Oral daily  metoprolol succinate ER 25 milliGRAM(s) Oral daily  guaiFENesin  milliGRAM(s) Oral every 12 hours PRN  acetaminophen   Tablet .. 650 milliGRAM(s) Oral every 4 hours PRN  acetaminophen   Tablet .. 325 milliGRAM(s) Oral every 6 hours PRN  gabapentin 200 milliGRAM(s) Oral two times a day  melatonin 3 milliGRAM(s) Oral at bedtime  ondansetron   Disintegrating Tablet 4 milliGRAM(s) Oral every 6 hours PRN  enoxaparin Injectable 40 milliGRAM(s) SubCutaneous at bedtime  polyethylene glycol 3350 17 Gram(s) Oral daily  senna 2 Tablet(s) Oral at bedtime  dextrose 40% Gel 15 Gram(s) Oral once  dextrose 50% Injectable 25 Gram(s) IV Push once  dextrose 50% Injectable 12.5 Gram(s) IV Push once  dextrose 50% Injectable 25 Gram(s) IV Push once  glucagon  Injectable 1 milliGRAM(s) IntraMuscular once  insulin glargine Injectable (LANTUS) 15 Unit(s) SubCutaneous every morning  insulin lispro (ADMELOG) corrective regimen sliding scale   SubCutaneous three times a day before meals  insulin lispro (ADMELOG) corrective regimen sliding scale   SubCutaneous at bedtime  levothyroxine 75 MICROGram(s) Oral daily  ascorbic acid 500 milliGRAM(s) Oral daily  cholecalciferol 1000 Unit(s) Oral daily  cyanocobalamin 1000 MICROGram(s) Oral daily  dextrose 5%. 1000 milliLiter(s) IV Continuous <Continuous>  dextrose 5%. 1000 milliLiter(s) IV Continuous <Continuous>  ferrous    sulfate 325 milliGRAM(s) Oral daily  multivitamin 1 Tablet(s) Oral daily  benzocaine 10% Gel 1 Application(s) Mucosal four times a day PRN    ICU Vital Signs Last 24 Hrs  T(C): 36.3 (2021 17:57), Max: 36.7 (2021 10:00)  T(F): 97.4 (2021 17:57), Max: 98.1 (2021 10:00)  HR: 66 (2021 17:57) (66 - 70)  BP: 138/65 (2021 17:57) (123/70 - 138/65)  RR: 18 (2021 17:57) (18 - 18)  SpO2: 96% (2021 17:57) (95% - 96%)    I&O's Detail    2021 07:01  -  2021 07:00  --------------------------------------------------------  IN:  Total IN: 0 mL    OUT:    Voided (mL): 1 mL  Total OUT: 1 mL    Total NET: -1 mL    LABS:                        16.5   10.87 )-----------( 287      ( 2021 06:00 )             47.6     02-02    137  |  101  |  24<H>  ----------------------------<  145<H>  4.2   |  25  |  0.58    Ca    9.8      2021 06:00  Phos  2.4     02-02  Mg     1.7     02-02    TPro  6.9  /  Alb  2.9<L>  /  TBili  0.9  /  DBili  x   /  AST  15  /  ALT  21  /  AlkPhos  65  02-02    POCT Blood Glucose.: 155 mg/dL (2021 12:09)    COVID-19 PCR: Detected (2021 13:35)  COVID-19 PCR: Detected (2021 10:30)  COVID-19 PCR: Detected (2021 09:15)  COVID-19 PCR: Detected (2021 06:00)  COVID-19 PCR: Detected (2021 08:00)  COVID-19 PCR: Detected (2021 14:00)      Physical Examination:    General: No acute distress.      HEENT: Pupils equal, reactive to light.  Symmetric.    PULM: Clear to auscultation bilaterally, no significant sputum production    NECK: Supple, no lymphadenopathy, trachea midline    CVS: Regular rate and rhythm, no murmurs, rubs, or gallops    ABD: Soft, nondistended, nontender, normoactive bowel sounds, no masses    EXT: No edema, nontender    SKIN: Warm and well perfused, no rashes noted.    NEURO: Alert, oriented, interactive, nonfocal    RADIOLOGY:   < from: Xray Chest 1 View- PORTABLE-Routine (Xray Chest 1 View- PORTABLE-Routine .) (21 @ 16:44) >    EXAM:  XR CHEST PORTABLE ROUTINE 1V      PROCEDURE DATE:  2021        INTERPRETATION:  INDICATION: Shortness of breath. History of Covid disease.    PRIORS: 2021.    VIEWS: Portable AP radiography of the chest performed.    FINDINGS: Heart size cannot be quantitated on this portable evaluation. A large hiatal hernia is identified. No superior mediastinal widening is identified. Bilateral lung parenchymal airspace opacities are redemonstrated, without significant change. No pleural effusion. No pneumothorax. No mediastinal shift is noted. Postprocedure changes of the right shoulder region identified. Deformity of the proximal right humerus is unchanged.    IMPRESSION: As above.    MERCEDES LOWE MD; Attending Radiologist  This document has been electronically signed. 2021  5:00PM    < from: US Duplex Venous Lower Ext Ltd, Left (21 @ 15:27) >    EXAM:  US DPLX LWR EXT VEINS LTD LT      PROCEDURE DATE:  2021      INTERPRETATION:  CLINICAL INFORMATION: Left lower extremity swelling    COMPARISON: Lower extremity venous Doppler ultrasound 2017    TECHNIQUE: Duplex sonography of the LEFT LOWER extremity veins with color and spectral Doppler, with and without compression.    FINDINGS:    There is normal compressibility of the left common femoral, femoral and popliteal veins.  The contralateral common femoral vein is patent.  Doppler examination shows normal spontaneous and phasic flow.    No calf vein thrombosis is detected.    IMPRESSION:  No evidence of left lower extremity deep venous thrombosis.    NATE CARMONA MD; Attending Radiologist  This document has been electronically signed. 2021  3:30PM

## 2021-02-02 NOTE — PROGRESS NOTE ADULT - ASSESSMENT
Assessment:  Patient is a 89y old  Female who presents with a chief complaint of Diarrhea in a bed bound patient, COVID positivity status    Problem List:  1) Acute hypoxic respiratory failure 2/2 COVID-19 viral pneumonia  2) Chronic atrial fibrillation   4) B/L chronic venous insufficiency   5) DM2  6) HTN    Plan:  #Acute hypoxic respiratory failure 2/2 COVID-19 viral pneumonia  - Currently on 2LNC w/O2 saturation at 96%. Actively titrating oxygen requirements to >94% and to wean patient to room air   - Remdesivir and decadron therapy completed   - Aggressive chest PT and pulmonary toilet as tolerated   - Educated on benefits of self-proning   - Incentive spirometry use will be beneficial as well   - Mucinex as needed for cough     #Chronic atrial fibrillation   - C/w metoprolol   - Monitor signs for irregular rhythm  - Not on anticoagulation d/t history of vaginal bleeding      #B/L chronic venous insufficiency   - No DVT noted  - Would consider restarting patient on Lasix given signs of dehydration are not apparent when examined. Patient has been of Lasix since 01/22     #DM2  - Hgba1c 6.1  - C/w Lantus  - C/w ISS three times daily prior to meals and once before bedtime   - Monitor for signs of hypoglycemia   - Fingersticks   - If hypoglycemic, please follow protocol     #HTN  - C/w Norvasc  - C/w Metoprolol   - Monitor for signs and symptoms relate to hypotension. Chronic hypertensives tend to live at a higher baseline BP, would be cautious on lowering it too much.     #DVT prophylaxis   - Lovenox 40mg qd     Dispo:  - Discharge to [facility] pending two negative COVID-19 tests

## 2021-02-02 NOTE — PROGRESS NOTE ADULT - SUBJECTIVE AND OBJECTIVE BOX
Follow-up Pall Progress Note    Brendon Lau MD  [phone]    No new respiratory events overnight.  Clinically stable    Medications:  Vital Signs Last 24 Hrs  T(C): 36.7 (02 Feb 2021 10:00), Max: 36.7 (02 Feb 2021 10:00)  T(F): 98.1 (02 Feb 2021 10:00), Max: 98.1 (02 Feb 2021 10:00)  HR: 70 (02 Feb 2021 10:00) (69 - 70)  BP: 123/70 (02 Feb 2021 10:00) (123/70 - 147/90)  BP(mean): --  RR: 18 (02 Feb 2021 10:00) (16 - 18)  SpO2: 95% (02 Feb 2021 10:00) (95% - 99%)          02-01 @ 07:01  -  02-02 @ 07:00  --------------------------------------------------------  IN: 0 mL / OUT: 1 mL / NET: -1 mL        LABS:                        16.5   10.87 )-----------( 287      ( 02 Feb 2021 06:00 )             47.6     02-02    137  |  101  |  24<H>  ----------------------------<  145<H>  4.2   |  25  |  0.58    Ca    9.8      02 Feb 2021 06:00  Phos  2.4     02-02  Mg     1.7     02-02    TPro  6.9  /  Alb  2.9<L>  /  TBili  0.9  /  DBili  x   /  AST  15  /  ALT  21  /  AlkPhos  65  02-02        Procalcitonin, Serum: 0.04 ng/mL (02-02-21 @ 06:00)        CULTURES:        Physical Examination:      RADIOLOGY REVIEWED  CXR:    CT chest:    TTE:

## 2021-02-02 NOTE — PROGRESS NOTE ADULT - ASSESSMENT
89F with Afib (not on AC due to vaginal bleeding 2/2 polyps), chronic back pain 2/2 spinal stenosis,  DM2 on metformin, HTN, recurrent UTIs, chronic venous insufficiency, presents from [facility] for diarrhea, found to have COVID+ infection    #Acute hypoxic respiratory failure sec to COVID-19 Pneumonia - O2 documented to be 88% on RA.   Decadron Completed (leukocytosis likely related to decadron)  Remdesivir-5 day completed on 1/26. Pt required inc oxygen on 1/28 (upto 12L NC). Pt currently titrated down and on RA  Restarted redemsivir on 1/30-duration depends on clinical status.    Monitor renal/liver functions  D/C back to [facility] pending two negative COVID tests. (Possible Home hospice)   Will F/U    Bilateral chronic venous insufficiency  No DVT  Hold Lasix for now    Chronic Atrial Fibrillation, not on anticoagulation secondary to history of vaginal bleeding, HTN  Metoprolol    DM2, a1c 6.1  Continue with Lantus  Continue with Lispro  Completed decadron. FS should be trending downwards    HTN  Norvasc/Metoprolol    Hyponatremia  Mild, monitor for now    Gal/Jose Kay, Both sons live together, - [phone]  daughter, Salma Kebede, [phone](Monday-Friday During the days)  Will update family today 89F with Afib (not on AC due to vaginal bleeding 2/2 polyps), chronic back pain 2/2 spinal stenosis,  DM2 on metformin, HTN, recurrent UTIs, chronic venous insufficiency, presents from [facility] for diarrhea, found to have COVID+ infection    #Acute hypoxic respiratory failure sec to COVID-19 Pneumonia - O2 documented to be 88% on RA.   Decadron Completed (leukocytosis likely related to decadron)  Remdesivir-5 day completed on 1/26. Pt required inc oxygen on 1/28 (upto 12L NC). Pt currently titrated down and on RA  Restarted redemsivir on 1/30-duration depends on clinical status.  Monitor renal/liver functions  D/C back to [facility] pending two negative COVID tests. (Possible Home hospice at [facility])       #Tooth pain  - orajel for pain  - will change diet to soft if patient allows      Bilateral chronic venous insufficiency  No DVT  Hold Lasix for now    Chronic Atrial Fibrillation, not on anticoagulation secondary to history of vaginal bleeding, HTN  Metoprolol    DM2, a1c 6.1  Continue with Lantus  Continue with Lispro  Completed decadron. FS should be trending downwards    HTN  Norvasc/Metoprolol    Hyponatremia  Mild, monitor for now    Gal/Jose Kay, Both sons live together, - [phone]  daughter, Salma Kebede, [phone] (Monday-Friday During the days)  Will update family today (Spoke to Salma)

## 2021-02-03 LAB
GLUCOSE BLDC GLUCOMTR-MCNC: 111 MG/DL — HIGH (ref 70–99)
GLUCOSE BLDC GLUCOMTR-MCNC: 138 MG/DL — HIGH (ref 70–99)
GLUCOSE BLDC GLUCOMTR-MCNC: 161 MG/DL — HIGH (ref 70–99)
GLUCOSE BLDC GLUCOMTR-MCNC: 181 MG/DL — HIGH (ref 70–99)
GLUCOSE BLDC GLUCOMTR-MCNC: 217 MG/DL — HIGH (ref 70–99)
SARS-COV-2 RNA SPEC QL NAA+PROBE: DETECTED

## 2021-02-03 PROCEDURE — 99232 SBSQ HOSP IP/OBS MODERATE 35: CPT | Mod: CS

## 2021-02-03 RX ADMIN — Medication 1 TABLET(S): at 13:11

## 2021-02-03 RX ADMIN — INSULIN GLARGINE 15 UNIT(S): 100 INJECTION, SOLUTION SUBCUTANEOUS at 09:53

## 2021-02-03 RX ADMIN — Medication 650 MILLIGRAM(S): at 05:46

## 2021-02-03 RX ADMIN — AMLODIPINE BESYLATE 10 MILLIGRAM(S): 2.5 TABLET ORAL at 05:36

## 2021-02-03 RX ADMIN — Medication 325 MILLIGRAM(S): at 13:11

## 2021-02-03 RX ADMIN — Medication 25 MILLIGRAM(S): at 05:36

## 2021-02-03 RX ADMIN — Medication 500 MILLIGRAM(S): at 13:10

## 2021-02-03 RX ADMIN — GABAPENTIN 200 MILLIGRAM(S): 400 CAPSULE ORAL at 05:36

## 2021-02-03 RX ADMIN — Medication 1000 UNIT(S): at 13:10

## 2021-02-03 RX ADMIN — PREGABALIN 1000 MICROGRAM(S): 225 CAPSULE ORAL at 13:10

## 2021-02-03 RX ADMIN — GABAPENTIN 200 MILLIGRAM(S): 400 CAPSULE ORAL at 18:10

## 2021-02-03 RX ADMIN — Medication 3 MILLIGRAM(S): at 22:01

## 2021-02-03 RX ADMIN — Medication 2: at 13:05

## 2021-02-03 RX ADMIN — Medication 1 APPLICATION(S): at 05:37

## 2021-02-03 RX ADMIN — ENOXAPARIN SODIUM 40 MILLIGRAM(S): 100 INJECTION SUBCUTANEOUS at 22:01

## 2021-02-03 RX ADMIN — Medication 650 MILLIGRAM(S): at 23:15

## 2021-02-03 RX ADMIN — POLYETHYLENE GLYCOL 3350 17 GRAM(S): 17 POWDER, FOR SOLUTION ORAL at 13:10

## 2021-02-03 RX ADMIN — Medication 4: at 09:52

## 2021-02-03 RX ADMIN — Medication 75 MICROGRAM(S): at 05:36

## 2021-02-03 RX ADMIN — SENNA PLUS 2 TABLET(S): 8.6 TABLET ORAL at 22:01

## 2021-02-03 NOTE — PROGRESS NOTE ADULT - ASSESSMENT
Assessment:  Patient is a 89y old  Female who presents with a chief complaint of Diarrhea in a bed bound patient, COVID positivity status    Problem List:  1) Acute hypoxic respiratory failure 2/2 COVID-19 viral pneumonia  2) Chronic atrial fibrillation   4) B/L chronic venous insufficiency   5) DM2  6) HTN    Plan:  #Acute hypoxic respiratory failure 2/2 COVID-19 viral pneumonia  - Currently on no supplemental oxygen w/O2 saturation at 93%. Actively titrating oxygen requirements to >94% and to wean patient to room air   - Remdesivir and decadron therapy completed   - Aggressive chest PT and pulmonary toilet as tolerated   - Educated on benefits of self-proning   - Incentive spirometry use will be beneficial as well   - Mucinex as needed for cough     #Chronic atrial fibrillation   - C/w metoprolol   - Monitor signs for irregular rhythm  - Not on anticoagulation d/t history of vaginal bleeding      #B/L chronic venous insufficiency   - No DVT noted on duplex  - Lasix D/C'ed    #DM2  - Hgba1c 6.1  - C/w Lantus  - C/w ISS three times daily prior to meals and once before bedtime   - Monitor for signs of hypoglycemia   - Fingersticks   - If hypoglycemic, please follow protocol     #HTN  - C/w Norvasc  - C/w Metoprolol   - Monitor for signs and symptoms relate to hypotension. Chronic hypertensives tend to live at a higher baseline BP, would be cautious on lowering it too much.     #DVT prophylaxis   - Lovenox 40mg qd     Dispo:  - Discharge to [facility] pending two negative COVID-19 tests. Repeat COVID-19 test + 02/03/2021    Patient is DNR/DNI

## 2021-02-03 NOTE — PROGRESS NOTE ADULT - SUBJECTIVE AND OBJECTIVE BOX
Follow-up Pall Progress Note    Brendon Lau MD  [phone]    No new  events overnight.  No new complaints    Medications:  Vital Signs Last 24 Hrs  T(C): 36.8 (03 Feb 2021 08:00), Max: 36.8 (03 Feb 2021 05:33)  T(F): 98.2 (03 Feb 2021 08:00), Max: 98.2 (03 Feb 2021 05:33)  HR: 76 (03 Feb 2021 08:00) (66 - 78)  BP: 119/88 (03 Feb 2021 08:00) (119/88 - 145/84)  BP(mean): --  RR: 17 (03 Feb 2021 08:00) (16 - 18)  SpO2: 95% (03 Feb 2021 08:00) (95% - 96%)            LABS:                        16.5   10.87 )-----------( 287      ( 02 Feb 2021 06:00 )             47.6     02-02    137  |  101  |  24<H>  ----------------------------<  145<H>  4.2   |  25  |  0.58    Ca    9.8      02 Feb 2021 06:00  Phos  2.4     02-02  Mg     1.7     02-02    TPro  6.9  /  Alb  2.9<L>  /  TBili  0.9  /  DBili  x   /  AST  15  /  ALT  21  /  AlkPhos  65  02-02        Procalcitonin, Serum: 0.04 ng/mL (02-02-21 @ 06:00)        CULTURES:        Physical Examination:      RADIOLOGY REVIEWED  CXR:    CT chest:    TTE:

## 2021-02-03 NOTE — PROGRESS NOTE ADULT - SUBJECTIVE AND OBJECTIVE BOX
Patient is a 89y old  Female who presents with a chief complaint of Diarrhea in a bed bound patient, COVID positivity status (2021 23:38)    Events last 24 hours:   - Patient seen at bedside. Patient states that she is comfortable and denies any complaints   - Repeat COVID-19 test + 2021   - On room air w/O2 saturation 93%  - Patient desaturated last night, will monitor closely tonight     Review of Systems:  CONSTITUTIONAL: No fever, chills, or fatigue  EYES: No eye pain, visual disturbances, or discharge  ENMT:  No difficulty hearing, tinnitus, vertigo; No sinus or throat pain  NECK: No pain or stiffness  RESPIRATORY: No cough, wheezing, chills or hemoptysis; No shortness of breath  CARDIOVASCULAR: No chest pain, palpitations, dizziness, or leg swelling  GASTROINTESTINAL: No abdominal or epigastric pain. No nausea, vomiting, or hematemesis; No diarrhea or constipation. No melena or hematochezia.  GENITOURINARY: No dysuria, frequency, hematuria, or incontinence  NEUROLOGICAL: No headaches, memory loss, loss of strength, numbness, or tremors  SKIN: No itching, burning, rashes, or lesions   MUSCULOSKELETAL: No joint pain or swelling; No muscle, back, or extremity pain  PSYCHIATRIC: No depression, anxiety, mood swings, or difficulty sleeping    PAST MEDICAL & SURGICAL HISTORY:  Spinal stenosis of lumbar region  Hiatal hernia  Diabetes mellitus, type II  Atrial fibrillation  H/O: GI Bleed  Overactive Bladder  Uterine Polyp  Peripheral Neuropathy  Degenerative Joint Disease  Obesity  Hypothyroidism  Hypertension  S/P  section  S/P rotator cuff repair  right  S/P knee replacement, right    Medications:  amLODIPine   Tablet 10 milliGRAM(s) Oral daily  metoprolol succinate ER 25 milliGRAM(s) Oral daily  guaiFENesin  milliGRAM(s) Oral every 12 hours PRN  acetaminophen   Tablet .. 650 milliGRAM(s) Oral every 4 hours PRN  acetaminophen   Tablet .. 325 milliGRAM(s) Oral every 6 hours PRN  gabapentin 200 milliGRAM(s) Oral two times a day  melatonin 3 milliGRAM(s) Oral at bedtime  ondansetron   Disintegrating Tablet 4 milliGRAM(s) Oral every 6 hours PRN  enoxaparin Injectable 40 milliGRAM(s) SubCutaneous at bedtime  polyethylene glycol 3350 17 Gram(s) Oral daily  senna 2 Tablet(s) Oral at bedtime  dextrose 40% Gel 15 Gram(s) Oral once  dextrose 50% Injectable 25 Gram(s) IV Push once  dextrose 50% Injectable 12.5 Gram(s) IV Push once  dextrose 50% Injectable 25 Gram(s) IV Push once  glucagon  Injectable 1 milliGRAM(s) IntraMuscular once  insulin glargine Injectable (LANTUS) 15 Unit(s) SubCutaneous every morning  insulin lispro (ADMELOG) corrective regimen sliding scale   SubCutaneous three times a day before meals  insulin lispro (ADMELOG) corrective regimen sliding scale   SubCutaneous at bedtime  levothyroxine 75 MICROGram(s) Oral daily  ascorbic acid 500 milliGRAM(s) Oral daily  cholecalciferol 1000 Unit(s) Oral daily  cyanocobalamin 1000 MICROGram(s) Oral daily  dextrose 5%. 1000 milliLiter(s) IV Continuous <Continuous>  dextrose 5%. 1000 milliLiter(s) IV Continuous <Continuous>  ferrous    sulfate 325 milliGRAM(s) Oral daily  multivitamin 1 Tablet(s) Oral daily  benzocaine 10% Gel 1 Application(s) Mucosal four times a day PRN    ICU Vital Signs Last 24 Hrs  T(C): 36.7 (2021 22:20), Max: 36.8 (2021 05:33)  T(F): 98.1 (2021 22:20), Max: 98.2 (2021 05:33)  HR: 87 (2021 22:20) (76 - 87)  BP: 123/76 (2021 22:20) (119/88 - 145/84)  RR: 16 (2021 22:20) (16 - 18)  SpO2: 93% (2021 22:20) (93% - 96%)    LABS:                        16.5   10.87 )-----------( 287      ( 2021 06:00 )             47.6     02-02    137  |  101  |  24<H>  ----------------------------<  145<H>  4.2   |  25  |  0.58    Ca    9.8      2021 06:00  Phos  2.4     02-02  Mg     1.7     02-02    TPro  6.9  /  Alb  2.9<L>  /  TBili  0.9  /  DBili  x   /  AST  15  /  ALT  21  /  AlkPhos  65      POCT Blood Glucose.: 181 mg/dL (2021 22:14)    COVID-19 PCR: Detected (2021 10:00)  COVID-19 PCR: Detected (2021 13:35)  COVID-19 PCR: Detected (2021 10:30)  COVID-19 PCR: Detected (2021 09:15)  COVID-19 PCR: Detected (2021 06:00)  COVID-19 PCR: Detected (2021 08:00)  COVID-19 PCR: Detected (2021 14:00)    Physical Examination:    General: No acute distress.      HEENT: Pupils equal, reactive to light.  Symmetric.    PULM: Clear to auscultation bilaterally, no significant sputum production    NECK: Supple, no lymphadenopathy, trachea midline    CVS: Regular rate and rhythm, no murmurs, rubs, or gallops    ABD: Soft, nondistended, nontender, normoactive bowel sounds, no masses    EXT: No edema, nontender    SKIN: Warm and well perfused, no rashes noted.    NEURO: Alert, oriented, interactive, nonfocal    RADIOLOGY:     < from: Xray Chest 1 View- PORTABLE-Routine (Xray Chest 1 View- PORTABLE-Routine .) (21 @ 16:44) >    EXAM:  XR CHEST PORTABLE ROUTINE 1V      PROCEDURE DATE:  2021        INTERPRETATION:  INDICATION: Shortness of breath. History of Covid disease.    PRIORS: 2021.    VIEWS: Portable AP radiography of the chest performed.    FINDINGS: Heart size cannot be quantitated on this portable evaluation. A large hiatal hernia is identified. No superior mediastinal widening is identified. Bilateral lung parenchymal airspace opacities are redemonstrated, without significant change. No pleural effusion. No pneumothorax. No mediastinal shift is noted. Postprocedure changes of the right shoulder region identified. Deformity of the proximal right humerus is unchanged.    IMPRESSION: As above.    MERCEDES LOWE MD; Attending Radiologist  This document has been electronically signed. 2021  5:00PM    < from: US Duplex Venous Lower Ext Ltd, Left (21 @ 15:27) >    EXAM:  US DPLX LWR EXT VEINS LTD LT      PROCEDURE DATE:  2021      INTERPRETATION:  CLINICAL INFORMATION: Left lower extremity swelling    COMPARISON: Lower extremity venous Doppler ultrasound 2017    TECHNIQUE: Duplex sonography of the LEFT LOWER extremity veins with color and spectral Doppler, with and without compression.    FINDINGS:    There is normal compressibility of the left common femoral, femoral and popliteal veins.  The contralateral common femoral vein is patent.  Doppler examination shows normal spontaneous and phasic flow.    No calf vein thrombosis is detected.    IMPRESSION:  No evidence of left lower extremity deep venous thrombosis.    NATE CARMONA MD; Attending Radiologist  This document has been electronically signed. 2021  3:30PM

## 2021-02-03 NOTE — PROGRESS NOTE ADULT - ASSESSMENT
Palliative:  Reviewed hospice with son.  He is not interested in hospice at current time.  He is aware that he can get hospice involvement at [facility] if her clinical condition deteriorates after dc.  Time spent 30 minutes

## 2021-02-03 NOTE — PROGRESS NOTE ADULT - SUBJECTIVE AND OBJECTIVE BOX
HPI:  89 yo F with PMHx of Afib (not on AC due to vaginal bleeding 2/2 polyps), chronic back pain 2/2 spinal stenosis,  DM2 on metformin, HTN, recurrent UTIs, chronic venous insufficiency. The patient has been living at [facility]. She was sent to the [place] ER to evaluate for DVT due to left lower extremity edema. Pt reportedly has had decreased PO intake and diarrhea as well.     Patient reports that diarrhea started one week ago- it's been once a day,  but voluminous. She denies having any associated fevers. She says lower extremity swelling is chronic and "everyone at the [facility] has it." She is wheelchair bound at baseline. She is agitated that she was sent to the hospital because she feels totally fine. She does not believe she has COVID. She denies having any shortness of breath, cough, nasal congestion, sore throat, chest pain, palpitations, abdominal pain, nausea, or vomiting.     [facility] will not take pt back because of COVID positivity status and diarrhea, which makes it difficult to clean her since she is overall bed bound.    ER course:  Pt found to be covid +  LLE duplex performed and no e/o DVT.  (16 Jan 2021 16:50)      Today:  Patient Seen and Examined  No overnight events reported  Patient has no complaints this AM  More pleasant    acetaminophen   Tablet .. 650 milliGRAM(s) Oral every 4 hours PRN  acetaminophen   Tablet .. 325 milliGRAM(s) Oral every 6 hours PRN  amLODIPine   Tablet 10 milliGRAM(s) Oral daily  ascorbic acid 500 milliGRAM(s) Oral daily  benzocaine 10% Gel 1 Application(s) Mucosal four times a day PRN  cholecalciferol 1000 Unit(s) Oral daily  cyanocobalamin 1000 MICROGram(s) Oral daily  dextrose 40% Gel 15 Gram(s) Oral once  dextrose 5%. 1000 milliLiter(s) IV Continuous <Continuous>  dextrose 5%. 1000 milliLiter(s) IV Continuous <Continuous>  dextrose 50% Injectable 25 Gram(s) IV Push once  dextrose 50% Injectable 12.5 Gram(s) IV Push once  dextrose 50% Injectable 25 Gram(s) IV Push once  enoxaparin Injectable 40 milliGRAM(s) SubCutaneous at bedtime  ferrous    sulfate 325 milliGRAM(s) Oral daily  gabapentin 200 milliGRAM(s) Oral two times a day  glucagon  Injectable 1 milliGRAM(s) IntraMuscular once  guaiFENesin  milliGRAM(s) Oral every 12 hours PRN  insulin glargine Injectable (LANTUS) 15 Unit(s) SubCutaneous every morning  insulin lispro (ADMELOG) corrective regimen sliding scale   SubCutaneous three times a day before meals  insulin lispro (ADMELOG) corrective regimen sliding scale   SubCutaneous at bedtime  levothyroxine 75 MICROGram(s) Oral daily  melatonin 3 milliGRAM(s) Oral at bedtime  metoprolol succinate ER 25 milliGRAM(s) Oral daily  multivitamin 1 Tablet(s) Oral daily  ondansetron   Disintegrating Tablet 4 milliGRAM(s) Oral every 6 hours PRN  polyethylene glycol 3350 17 Gram(s) Oral daily  remdesivir  IVPB 100 milliGRAM(s) IV Intermittent every 24 hours  senna 2 Tablet(s) Oral at bedtime      T(C): 36.8 (02-03-21 @ 05:33), Max: 36.8 (02-03-21 @ 05:33)  HR: 78 (02-03-21 @ 05:33) (66 - 78)  BP: 145/84 (02-03-21 @ 05:33) (123/70 - 145/84)  RR: 16 (02-03-21 @ 05:33) (16 - 18)  SpO2: 96% (02-03-21 @ 05:33) (95% - 96%)  Wt(kg): --Vital Signs Last 24 Hrs  T(C): 36.8 (03 Feb 2021 05:33), Max: 36.8 (03 Feb 2021 05:33)  T(F): 98.2 (03 Feb 2021 05:33), Max: 98.2 (03 Feb 2021 05:33)  HR: 78 (03 Feb 2021 05:33) (66 - 78)  BP: 145/84 (03 Feb 2021 05:33) (123/70 - 145/84)  BP(mean): --  RR: 16 (03 Feb 2021 05:33) (16 - 18)  SpO2: 96% (03 Feb 2021 05:33) (95% - 96%)    PHYSICAL EXAM:  GENERAL: NAD  HENT:  Atraumatic, Normocephalic; No tonsillar erythema, exudates, ulcers, or enlargement; Moist mucous membranes  EYES: EOMI, PERRLA, conjunctiva and sclera clear; no lid-lag  NECK: Supple, No JVD, Normal thyroid  CHEST/LUNG: Clear to percussion bilaterally; No rales, rhonchi, wheezing, or rubs; normal respiratory effort, no intercostal retractions  HEART: Regular rate and rhythm; No murmurs, rubs, or gallops  ABDOMEN: Soft, Nontender, Nondistended; Bowel sounds present; No HSM  EXTREMITIES:  2+ Peripheral Pulses, No clubbing, cyanosis, or peripheral edema  SKIN: No rashes or lesions; normal temperature and turgor   PSYCH: Appropriate affect; Alert & Oriented x 2-3    LABS:                        16.5   10.87 )-----------( 287      ( 02 Feb 2021 06:00 )             47.6     02-02    137  |  101  |  24<H>  ----------------------------<  145<H>  4.2   |  25  |  0.58    Ca    9.8      02 Feb 2021 06:00  Phos  2.4     02-02  Mg     1.7     02-02    TPro  6.9  /  Alb  2.9<L>  /  TBili  0.9  /  DBili  x   /  AST  15  /  ALT  21  /  AlkPhos  65  02-02    POCT Blood Glucose.: 217 mg/dL (03 Feb 2021 09:32)  POCT Blood Glucose.: 138 mg/dL (03 Feb 2021 08:12)  POCT Blood Glucose.: 155 mg/dL (02 Feb 2021 12:09)    Consultant(s) Notes Reviewed:  [x ] YES  [ ] NO  Care Discussed with Consultants/Other Providers [ x] YES  [ ] NO

## 2021-02-03 NOTE — PROGRESS NOTE ADULT - ASSESSMENT
89F with Afib (not on AC due to vaginal bleeding 2/2 polyps), chronic back pain 2/2 spinal stenosis,  DM2 on metformin, HTN, recurrent UTIs, chronic venous insufficiency, presents from [facility] for diarrhea, found to have COVID+ infection    #Acute hypoxic respiratory failure sec to COVID-19 Pneumonia - O2 documented to be 88% on RA.   Decadron Completed (leukocytosis likely related to decadron)  Remdesivir-5 day completed on 1/26. Pt required inc oxygen on 1/28 (upto 12L NC). Pt currently titrated down and on RA  Restarted redemsivir on 1/30-duration depends on clinical status. (Will D/C today)  Monitor renal/liver functions  D/C back to [facility] pending two negative COVID tests. (Possible Home hospice at [facility])       #Tooth pain  - orajel for pain  - will change diet to soft if patient allows  - dentist consulted    Bilateral chronic venous insufficiency  No DVT  Hold Lasix for now    Chronic Atrial Fibrillation, not on anticoagulation secondary to history of vaginal bleeding, HTN  Metoprolol    DM2, a1c 6.1  Continue with Lantus  Continue with Lispro  Completed decadron. FS should be trending downwards    HTN  Norvasc/Metoprolol    Hyponatremia  Mild, monitor for now    Gal/Jose Kay, Both sons live together, - [phone]  daughter, Salma Kebede, [phone] (Monday-Friday During the days)  Will update family today     Dr. Malone,  Please advise last CT was 10/3/2017.  Per your LOS on 12/11/17 his treatment was cancelled and CT was to be scheduled at the next office visit, which is 12/18/17.  Would you like the CT CAP before the end of the year? However will be after his 12/18/17 appointment.  I was able to tenatively schedule the CT scan for 12/26/17 arrive at 0700am for scan at 0800.  Would you like to keep this CT time or extend it to 2018?  Thank you.    Additionally, Ricky called at 1500 to add alittle information. He states that he has had a little discomfort in his back at same location he had previously.     89F with Afib (not on AC due to vaginal bleeding 2/2 polyps), chronic back pain 2/2 spinal stenosis,  DM2 on metformin, HTN, recurrent UTIs, chronic venous insufficiency, presents from [facility] for diarrhea, found to have COVID+ infection    #Acute hypoxic respiratory failure sec to COVID-19 Pneumonia - O2 documented to be 88% on RA.   Decadron Completed (leukocytosis likely related to decadron)  Remdesivir-5 day completed on 1/26. Pt required inc oxygen on 1/28 (upto 12L NC). Pt currently titrated down and on RA  Restarted redemsivir on 1/30-. Clinically doing well so will D/C today)  Monitor renal/liver functions  D/C back to [facility] pending two negative COVID tests. (Possible Home hospice at [facility])     #Tooth pain  - orajel for pain  - will change diet to soft if patient allows  - dentist consulted    Bilateral chronic venous insufficiency  No DVT  Hold Lasix for now    Chronic Atrial Fibrillation, not on anticoagulation secondary to history of vaginal bleeding, HTN  Metoprolol    DM2, a1c 6.1  Continue with Lantus  Continue with Lispro  Completed decadron. FS should be trending downwards    HTN  Norvasc/Metoprolol    Hyponatremia  Mild, monitor for now    Gal/Jose Kay, Both sons live together, - [phone]  daughter, Salma Kebede, [phone] (Monday-Friday During the days)  Updated family today Spoke with Salma.

## 2021-02-04 LAB
GLUCOSE BLDC GLUCOMTR-MCNC: 148 MG/DL — HIGH (ref 70–99)
GLUCOSE BLDC GLUCOMTR-MCNC: 174 MG/DL — HIGH (ref 70–99)
GLUCOSE BLDC GLUCOMTR-MCNC: 175 MG/DL — HIGH (ref 70–99)
GLUCOSE BLDC GLUCOMTR-MCNC: 176 MG/DL — HIGH (ref 70–99)
SARS-COV-2 IGG SERPL QL IA: POSITIVE
SARS-COV-2 IGM SERPL IA-ACNC: 5.22 RATIO — HIGH

## 2021-02-04 PROCEDURE — 99232 SBSQ HOSP IP/OBS MODERATE 35: CPT | Mod: CS

## 2021-02-04 PROCEDURE — 99233 SBSQ HOSP IP/OBS HIGH 50: CPT

## 2021-02-04 RX ADMIN — PREGABALIN 1000 MICROGRAM(S): 225 CAPSULE ORAL at 12:15

## 2021-02-04 RX ADMIN — Medication 1000 UNIT(S): at 12:15

## 2021-02-04 RX ADMIN — Medication 500 MILLIGRAM(S): at 12:15

## 2021-02-04 RX ADMIN — GABAPENTIN 200 MILLIGRAM(S): 400 CAPSULE ORAL at 05:29

## 2021-02-04 RX ADMIN — Medication 2: at 17:12

## 2021-02-04 RX ADMIN — AMLODIPINE BESYLATE 10 MILLIGRAM(S): 2.5 TABLET ORAL at 05:29

## 2021-02-04 RX ADMIN — Medication 3 MILLIGRAM(S): at 21:45

## 2021-02-04 RX ADMIN — GABAPENTIN 200 MILLIGRAM(S): 400 CAPSULE ORAL at 17:12

## 2021-02-04 RX ADMIN — Medication 650 MILLIGRAM(S): at 11:42

## 2021-02-04 RX ADMIN — Medication 325 MILLIGRAM(S): at 12:15

## 2021-02-04 RX ADMIN — ENOXAPARIN SODIUM 40 MILLIGRAM(S): 100 INJECTION SUBCUTANEOUS at 21:46

## 2021-02-04 RX ADMIN — Medication 25 MILLIGRAM(S): at 05:29

## 2021-02-04 RX ADMIN — Medication 1 TABLET(S): at 12:15

## 2021-02-04 RX ADMIN — Medication 75 MICROGRAM(S): at 05:29

## 2021-02-04 RX ADMIN — SENNA PLUS 2 TABLET(S): 8.6 TABLET ORAL at 21:46

## 2021-02-04 RX ADMIN — POLYETHYLENE GLYCOL 3350 17 GRAM(S): 17 POWDER, FOR SOLUTION ORAL at 12:15

## 2021-02-04 RX ADMIN — Medication 2: at 12:11

## 2021-02-04 RX ADMIN — INSULIN GLARGINE 15 UNIT(S): 100 INJECTION, SOLUTION SUBCUTANEOUS at 09:16

## 2021-02-04 NOTE — PROGRESS NOTE ADULT - ASSESSMENT
89F with Afib (not on AC due to vaginal bleeding 2/2 polyps), chronic back pain 2/2 spinal stenosis,  DM2 on metformin, HTN, recurrent UTIs, chronic venous insufficiency, presents from [facility] for diarrhea, found to have COVID+ infection    #Acute hypoxic respiratory failure sec to COVID-19 Pneumonia - O2 documented to be 88% on RA.   Decadron Completed (leukocytosis likely related to decadron)  Remdesivir-5 day completed on 1/26. Pt required inc oxygen on 1/28 (upto 12L NC). Pt currently titrated down and on RA  Restarted redemsivir on 1/30-. Clinically doing well so will D/C today)  Monitor renal/liver functions  D/C back to [facility] pending two negative COVID tests. (Possible Home hospice at [facility])   Drawing labs every so often    #Tooth pain  - orajel for pain  - pt does not want soft diet  - dentist consulted    Bilateral chronic venous insufficiency  No DVT  Hold Lasix for now    Chronic Atrial Fibrillation, not on anticoagulation secondary to history of vaginal bleeding, HTN  Metoprolol    DM2, a1c 6.1  Continue with Lantus  Continue with Lispro  Completed decadron. FS should be trending downwards    HTN  Norvasc/Metoprolol    Hyponatremia  Mild, monitor for now    Gal/Jose Kay, Both sons live together, - [phone]  daughter, Salma Kebede, [phone] (Monday-Friday During the days)  Will update family today 89F with Afib (not on AC due to vaginal bleeding 2/2 polyps), chronic back pain 2/2 spinal stenosis,  DM2 on metformin, HTN, recurrent UTIs, chronic venous insufficiency, presents from [facility] for diarrhea, found to have COVID+ infection    #Acute hypoxic respiratory failure sec to COVID-19 Pneumonia - O2 documented to be 88% on RA.   Decadron Completed (leukocytosis likely related to decadron)  Remdesivir-5 day completed on 1/26. Pt required inc oxygen on 1/28 (upto 12L NC). Pt currently titrated down and on RA  Restarted redemsivir on 1/30-. Clinically doing well so will D/C today)  Monitor renal/liver functions  D/C back to [facility] pending two negative COVID tests. (Possible Home hospice at [facility])   Drawing labs every so often    #Tooth pain  - orajel for pain  - pt does not want soft diet  - dentist consulted    Bilateral chronic venous insufficiency  No DVT  Hold Lasix for now    Chronic Atrial Fibrillation, not on anticoagulation secondary to history of vaginal bleeding, HTN  Metoprolol    DM2, a1c 6.1  Continue with Lantus  Continue with Lispro  Completed decadron. FS should be trending downwards    HTN  Norvasc/Metoprolol    Hyponatremia  Mild, monitor for now    Gal/Jose Kay, Both sons live together, - [phone]  daughter, Salma Kebede, [phone] (Monday-Friday During the days)  Called family today 2/4. Left VM with Sons. Updated daughter Salma 89F with Afib (not on AC due to vaginal bleeding 2/2 polyps), chronic back pain 2/2 spinal stenosis,  DM2 on metformin, HTN, recurrent UTIs, chronic venous insufficiency, presents from [facility] for diarrhea, found to have COVID+ infection    #Acute hypoxic respiratory failure sec to COVID-19 Pneumonia - O2 documented to be 88% on RA.   Decadron Completed (leukocytosis likely related to decadron)  Remdesivir-5 day completed on 1/26. Pt required inc oxygen on 1/28 (upto 12L NC). Pt currently titrated down and on RA  Restarted redemsivir on 1/30-. Clinically doing well so discontinued 2/3  Monitor renal/liver functions  D/C back to [facility] pending two negative COVID tests. (Possible Home hospice at [facility])   Drawing labs every so often    #Tooth pain  - orajel for pain  - pt does not want soft diet  - dentist consulted    Bilateral chronic venous insufficiency  No DVT  Hold Lasix for now    Chronic Atrial Fibrillation, not on anticoagulation secondary to history of vaginal bleeding, HTN  Metoprolol    DM2, a1c 6.1  Continue with Lantus  Continue with Lispro  Completed decadron. FS should be trending downwards    HTN  Norvasc/Metoprolol    Hyponatremia  Mild, monitor for now    Gal/Jose Kay, Both sons live together, - [phone]  daughter, Salma Kebede, [phone] (Monday-Friday During the days)  Called family today 2/4. Left VM with Sons. Updated daughter Salma

## 2021-02-04 NOTE — CHART NOTE - NSCHARTNOTEFT_GEN_A_CORE
Nutrition Follow Up Note  Hospital Course (Per Electronic Medical Record):   Source: Medical Record [X]  Nursing Staff [X]     Diet: Regular     Patient is tolerating her diet , providing patient's food preferences , appetite varies 25-80% not receptive to po snacks or supplements . Patient still not forthcoming with additional food preferences, will provide as discussed  with family members      Current Weight: (2/3) 233/105.7kg , patient with ~5lb weight change x 9 days .     Pertinent Medications: MEDICATIONS  (STANDING):  amLODIPine   Tablet 10 milliGRAM(s) Oral daily  ascorbic acid 500 milliGRAM(s) Oral daily  cholecalciferol 1000 Unit(s) Oral daily  cyanocobalamin 1000 MICROGram(s) Oral daily  dextrose 40% Gel 15 Gram(s) Oral once  dextrose 5%. 1000 milliLiter(s) (50 mL/Hr) IV Continuous <Continuous>  dextrose 5%. 1000 milliLiter(s) (100 mL/Hr) IV Continuous <Continuous>  dextrose 50% Injectable 25 Gram(s) IV Push once  dextrose 50% Injectable 12.5 Gram(s) IV Push once  dextrose 50% Injectable 25 Gram(s) IV Push once  enoxaparin Injectable 40 milliGRAM(s) SubCutaneous at bedtime  ferrous    sulfate 325 milliGRAM(s) Oral daily  gabapentin 200 milliGRAM(s) Oral two times a day  glucagon  Injectable 1 milliGRAM(s) IntraMuscular once  insulin glargine Injectable (LANTUS) 15 Unit(s) SubCutaneous every morning  insulin lispro (ADMELOG) corrective regimen sliding scale   SubCutaneous three times a day before meals  insulin lispro (ADMELOG) corrective regimen sliding scale   SubCutaneous at bedtime  levothyroxine 75 MICROGram(s) Oral daily  melatonin 3 milliGRAM(s) Oral at bedtime  metoprolol succinate ER 25 milliGRAM(s) Oral daily  multivitamin 1 Tablet(s) Oral daily  polyethylene glycol 3350 17 Gram(s) Oral daily  senna 2 Tablet(s) Oral at bedtime    MEDICATIONS  (PRN):  acetaminophen   Tablet .. 650 milliGRAM(s) Oral every 4 hours PRN Temp greater or equal to 38C (100.4F), Moderate Pain (4 - 6)  acetaminophen   Tablet .. 325 milliGRAM(s) Oral every 6 hours PRN Mild Pain (1 - 3)  benzocaine 10% Gel 1 Application(s) Mucosal four times a day PRN discomfort  guaiFENesin  milliGRAM(s) Oral every 12 hours PRN Cough  ondansetron   Disintegrating Tablet 4 milliGRAM(s) Oral every 6 hours PRN Nausea and/or Vomiting      Pertinent Labs:  02-02 Na137 mmol/L Glu 145 mg/dL<H> K+ 4.2 mmol/L Cr  0.58 mg/dL BUN 24 mg/dL<H> 02-02 Phos 2.4 mg/dL<L> 02-02 Alb 2.9 g/dL<L>        Skin: intact     Edema: none    Last BM: fecal incontinence noted     Estimated Needs:   [X] No Change since Previous Assessment      Previous Nutrition Diagnosis: Severe Malnutrition     Nutrition Diagnosis is [X] Ongoing         New Nutrition Diagnosis: [X] Not Applicable    Interventions:   1. continue current diet     Monitoring & Evaluation: will monitor:  [X] Weights   [X] PO Intake   [X] Follow Up (Per Protocol)  [X] Tolerance to Diet Prescription       RD to follow as per Nutrition protocol  Corry Warren RDN

## 2021-02-04 NOTE — PROGRESS NOTE ADULT - SUBJECTIVE AND OBJECTIVE BOX
HPI:  89 yo F with PMHx of Afib (not on AC due to vaginal bleeding 2/2 polyps), chronic back pain 2/2 spinal stenosis,  DM2 on metformin, HTN, recurrent UTIs, chronic venous insufficiency. The patient has been living at [facility]. She was sent to the [place] ER to evaluate for DVT due to left lower extremity edema. Pt reportedly has had decreased PO intake and diarrhea as well.     Patient reports that diarrhea started one week ago- it's been once a day,  but voluminous. She denies having any associated fevers. She says lower extremity swelling is chronic and "everyone at the [facility] has it." She is wheelchair bound at baseline. She is agitated that she was sent to the hospital because she feels totally fine. She does not believe she has COVID. She denies having any shortness of breath, cough, nasal congestion, sore throat, chest pain, palpitations, abdominal pain, nausea, or vomiting.     [facility] will not take pt back because of COVID positivity status and diarrhea, which makes it difficult to clean her since she is overall bed bound.    ER course:  Pt found to be covid +  LLE duplex performed and no e/o DVT.  (16 Jan 2021 16:50)      Today:  Patient Seen and Examined  Desaturated overnight but O2 came up  Patient has no complaints this AM  Not on O2, O2 sat 93% while eating  Having some tooth pain - dental team consult already placed    acetaminophen   Tablet .. 650 milliGRAM(s) Oral every 4 hours PRN  acetaminophen   Tablet .. 325 milliGRAM(s) Oral every 6 hours PRN  amLODIPine   Tablet 10 milliGRAM(s) Oral daily  ascorbic acid 500 milliGRAM(s) Oral daily  benzocaine 10% Gel 1 Application(s) Mucosal four times a day PRN  cholecalciferol 1000 Unit(s) Oral daily  cyanocobalamin 1000 MICROGram(s) Oral daily  dextrose 40% Gel 15 Gram(s) Oral once  dextrose 5%. 1000 milliLiter(s) IV Continuous <Continuous>  dextrose 5%. 1000 milliLiter(s) IV Continuous <Continuous>  dextrose 50% Injectable 25 Gram(s) IV Push once  dextrose 50% Injectable 12.5 Gram(s) IV Push once  dextrose 50% Injectable 25 Gram(s) IV Push once  enoxaparin Injectable 40 milliGRAM(s) SubCutaneous at bedtime  ferrous    sulfate 325 milliGRAM(s) Oral daily  gabapentin 200 milliGRAM(s) Oral two times a day  glucagon  Injectable 1 milliGRAM(s) IntraMuscular once  guaiFENesin  milliGRAM(s) Oral every 12 hours PRN  insulin glargine Injectable (LANTUS) 15 Unit(s) SubCutaneous every morning  insulin lispro (ADMELOG) corrective regimen sliding scale   SubCutaneous three times a day before meals  insulin lispro (ADMELOG) corrective regimen sliding scale   SubCutaneous at bedtime  levothyroxine 75 MICROGram(s) Oral daily  melatonin 3 milliGRAM(s) Oral at bedtime  metoprolol succinate ER 25 milliGRAM(s) Oral daily  multivitamin 1 Tablet(s) Oral daily  ondansetron   Disintegrating Tablet 4 milliGRAM(s) Oral every 6 hours PRN  polyethylene glycol 3350 17 Gram(s) Oral daily  senna 2 Tablet(s) Oral at bedtime      T(C): 36.8 (02-04-21 @ 04:28), Max: 36.8 (02-04-21 @ 04:28)  HR: 81 (02-04-21 @ 04:28) (79 - 87)  BP: 128/67 (02-04-21 @ 04:28) (123/76 - 128/67)  RR: 18 (02-04-21 @ 04:28) (16 - 18)  SpO2: 95% (02-04-21 @ 04:28) (93% - 95%)  Wt(kg): --Vital Signs Last 24 Hrs  T(C): 36.8 (04 Feb 2021 04:28), Max: 36.8 (04 Feb 2021 04:28)  T(F): 98.3 (04 Feb 2021 04:28), Max: 98.3 (04 Feb 2021 04:28)  HR: 81 (04 Feb 2021 04:28) (79 - 87)  BP: 128/67 (04 Feb 2021 04:28) (123/76 - 128/67)  BP(mean): --  RR: 18 (04 Feb 2021 04:28) (16 - 18)  SpO2: 95% (04 Feb 2021 04:28) (93% - 95%)    PHYSICAL EXAM:  GENERAL: NAD  HENT:  Atraumatic, Normocephalic; No tonsillar erythema, exudates, ulcers, or enlargement; Moist mucous membranes  EYES: EOMI, PERRLA, conjunctiva and sclera clear; no lid-lag  NECK: Supple, No JVD, Normal thyroid  CHEST/LUNG: Clear to percussion bilaterally; No rales, rhonchi, wheezing, or rubs; normal respiratory effort, no intercostal retractions  HEART: Regular rate and rhythm; No murmurs, rubs, or gallops  ABDOMEN: Soft, Nontender, Nondistended; Bowel sounds present; No HSM  EXTREMITIES:  2+ Peripheral Pulses, No clubbing, cyanosis, or peripheral edema  SKIN: No rashes or lesions; normal temperature and turgor   PSYCH: Appropriate affect; Alert & Oriented x 2-3    LABS:      POCT Blood Glucose.: 148 mg/dL (04 Feb 2021 08:40)  POCT Blood Glucose.: 181 mg/dL (03 Feb 2021 22:14)  POCT Blood Glucose.: 111 mg/dL (03 Feb 2021 17:08)  POCT Blood Glucose.: 161 mg/dL (03 Feb 2021 12:41)        Consultant(s) Notes Reviewed:  [x ] YES  [ ] NO  Care Discussed with Consultants/Other Providers [ x] YES  [ ] NO

## 2021-02-04 NOTE — PROGRESS NOTE ADULT - ASSESSMENT
Assessment:  Patient is a 89y old  Female who presents with a chief complaint of Diarrhea in a bed bound patient, COVID positivity status    Problem List:  1) Acute hypoxic respiratory failure 2/2 COVID-19 viral pneumonia  2) Chronic atrial fibrillation   4) B/L chronic venous insufficiency   5) DM2  6) HTN    Plan:  #Acute hypoxic respiratory failure 2/2 COVID-19 viral pneumonia  - Currently on no supplemental oxygen w/O2 saturation at 98%. Actively titrating oxygen requirements to >94% and to wean patient to room air   - Remdesivir and decadron therapy completed   - Aggressive chest PT and pulmonary toilet as tolerated   - Educated on benefits of self-proning   - Incentive spirometry use will be beneficial as well   - Mucinex as needed for cough     #Chronic atrial fibrillation   - C/w metoprolol   - Monitor signs for irregular rhythm  - Not on anticoagulation d/t history of vaginal bleeding      #B/L chronic venous insufficiency   - No DVT noted on duplex  - Lasix held    #DM2  - Hgba1c 6.1  - C/w Lantus  - C/w ISS three times daily prior to meals and once before bedtime   - Monitor for signs of hypoglycemia   - Fingersticks   - If hypoglycemic, please follow protocol     #HTN  - C/w Norvasc  - C/w Metoprolol   - Monitor for signs and symptoms relate to hypotension. Chronic hypertensives tend to live at a higher baseline BP, would be cautious on lowering it too much.     #DVT prophylaxis   - Lovenox 40mg qd     Dispo:  - Discharge to [facility] pending two negative COVID-19 tests. Repeat COVID-19 test + 02/03/2021    Patient is DNR/DNI

## 2021-02-04 NOTE — PROGRESS NOTE ADULT - SUBJECTIVE AND OBJECTIVE BOX
Patient is a 89y old  Female who presents with a chief complaint of Diarrhea in a bed bound patient, COVID positivity status (2021 09:32)      Events last 24 hours:   - Patient seen at bedside. Patient states that she is comfortable and denies any complaints   - Repeat COVID-19 test + 2021   - On room air w/O2 saturation 98%  - Patient desaturates throughout day. Her O2 saturation will start going up w/o supplemental oxygen     Review of Systems:  CONSTITUTIONAL: No fever, chills, or fatigue  EYES: No eye pain, visual disturbances, or discharge  ENMT:  No difficulty hearing, tinnitus, vertigo; No sinus or throat pain  NECK: No pain or stiffness  RESPIRATORY: No cough, wheezing, chills or hemoptysis; No shortness of breath  CARDIOVASCULAR: No chest pain, palpitations, dizziness, or leg swelling  GASTROINTESTINAL: No abdominal or epigastric pain. No nausea, vomiting, or hematemesis; No diarrhea or constipation. No melena or hematochezia.  GENITOURINARY: No dysuria, frequency, hematuria, or incontinence  NEUROLOGICAL: No headaches, memory loss, loss of strength, numbness, or tremors  SKIN: No itching, burning, rashes, or lesions   MUSCULOSKELETAL: No joint pain or swelling; No muscle, back, or extremity pain  PSYCHIATRIC: No depression, anxiety, mood swings, or difficulty sleeping    PAST MEDICAL & SURGICAL HISTORY:  Spinal stenosis of lumbar region  Hiatal hernia  Diabetes mellitus, type II  Atrial fibrillation  H/O: GI Bleed  Overactive Bladder  Uterine Polyp  Peripheral Neuropathy  Degenerative Joint Disease  Obesity  Hypothyroidism  Hypertension  S/P  section  S/P rotator cuff repair  right  S/P knee replacement, right    Medications:  amLODIPine   Tablet 10 milliGRAM(s) Oral daily  metoprolol succinate ER 25 milliGRAM(s) Oral daily  guaiFENesin  milliGRAM(s) Oral every 12 hours PRN  acetaminophen   Tablet .. 650 milliGRAM(s) Oral every 4 hours PRN  acetaminophen   Tablet .. 325 milliGRAM(s) Oral every 6 hours PRN  gabapentin 200 milliGRAM(s) Oral two times a day  melatonin 3 milliGRAM(s) Oral at bedtime  ondansetron   Disintegrating Tablet 4 milliGRAM(s) Oral every 6 hours PRN  enoxaparin Injectable 40 milliGRAM(s) SubCutaneous at bedtime  polyethylene glycol 3350 17 Gram(s) Oral daily  senna 2 Tablet(s) Oral at bedtime  dextrose 40% Gel 15 Gram(s) Oral once  dextrose 50% Injectable 25 Gram(s) IV Push once  dextrose 50% Injectable 12.5 Gram(s) IV Push once  dextrose 50% Injectable 25 Gram(s) IV Push once  glucagon  Injectable 1 milliGRAM(s) IntraMuscular once  insulin glargine Injectable (LANTUS) 15 Unit(s) SubCutaneous every morning  insulin lispro (ADMELOG) corrective regimen sliding scale   SubCutaneous three times a day before meals  insulin lispro (ADMELOG) corrective regimen sliding scale   SubCutaneous at bedtime  levothyroxine 75 MICROGram(s) Oral daily  ascorbic acid 500 milliGRAM(s) Oral daily  cholecalciferol 1000 Unit(s) Oral daily  cyanocobalamin 1000 MICROGram(s) Oral daily  dextrose 5%. 1000 milliLiter(s) IV Continuous <Continuous>  dextrose 5%. 1000 milliLiter(s) IV Continuous <Continuous>  ferrous    sulfate 325 milliGRAM(s) Oral daily  multivitamin 1 Tablet(s) Oral daily  benzocaine 10% Gel 1 Application(s) Mucosal four times a day PRN    ICU Vital Signs Last 24 Hrs  T(C): 36.8 (2021 04:28), Max: 36.8 (2021 04:28)  T(F): 98.3 (2021 04:28), Max: 98.3 (2021 04:28)  HR: 88 (2021 10:15) (81 - 88)  BP: 122/74 (2021 10:15) (122/74 - 128/67)  RR: 16 (2021 10:15) (16 - 18)  SpO2: 98% (2021 10:15) (95% - 98%)    LABS:                        16.5   10.87 )-----------( 287      ( 2021 06:00 )             47.6     02-02    137  |  101  |  24<H>  ----------------------------<  145<H>  4.2   |  25  |  0.58    Ca    9.8      2021 06:00  Phos  2.4     02-02  Mg     1.7     -    TPro  6.9  /  Alb  2.9<L>  /  TBili  0.9  /  DBili  x   /  AST  15  /  ALT  21  /  AlkPhos  65      POCT Blood Glucose.: 181 mg/dL (2021 22:14)    COVID-19 PCR: Detected (2021 10:00)  COVID-19 PCR: Detected (2021 13:35)  COVID-19 PCR: Detected (2021 10:30)  COVID-19 PCR: Detected (2021 09:15)  COVID-19 PCR: Detected (2021 06:00)  COVID-19 PCR: Detected (2021 08:00)  COVID-19 PCR: Detected (2021 14:00)    Physical Examination:    General: No acute distress.      HEENT: Pupils equal, reactive to light.  Symmetric.    PULM: Clear to auscultation bilaterally, no significant sputum production    NECK: Supple, no lymphadenopathy, trachea midline    CVS: Regular rate and rhythm, no murmurs, rubs, or gallops    ABD: Soft, nondistended, nontender, normoactive bowel sounds, no masses    EXT: No edema, nontender    SKIN: Warm and well perfused, no rashes noted.    NEURO: Alert, oriented, interactive, nonfocal    RADIOLOGY:     < from: Xray Chest 1 View- PORTABLE-Routine (Xray Chest 1 View- PORTABLE-Routine .) (21 @ 16:44) >    EXAM:  XR CHEST PORTABLE ROUTINE 1V      PROCEDURE DATE:  2021        INTERPRETATION:  INDICATION: Shortness of breath. History of Covid disease.    PRIORS: 2021.    VIEWS: Portable AP radiography of the chest performed.    FINDINGS: Heart size cannot be quantitated on this portable evaluation. A large hiatal hernia is identified. No superior mediastinal widening is identified. Bilateral lung parenchymal airspace opacities are redemonstrated, without significant change. No pleural effusion. No pneumothorax. No mediastinal shift is noted. Postprocedure changes of the right shoulder region identified. Deformity of the proximal right humerus is unchanged.    IMPRESSION: As above.    MERCEDES LOWE MD; Attending Radiologist  This document has been electronically signed. 2021  5:00PM    < from: US Duplex Venous Lower Ext Ltd, Left (21 @ 15:27) >    EXAM:  US DPLX LWR EXT VEINS LTD LT      PROCEDURE DATE:  2021      INTERPRETATION:  CLINICAL INFORMATION: Left lower extremity swelling    COMPARISON: Lower extremity venous Doppler ultrasound 2017    TECHNIQUE: Duplex sonography of the LEFT LOWER extremity veins with color and spectral Doppler, with and without compression.    FINDINGS:    There is normal compressibility of the left common femoral, femoral and popliteal veins.  The contralateral common femoral vein is patent.  Doppler examination shows normal spontaneous and phasic flow.    No calf vein thrombosis is detected.    IMPRESSION:  No evidence of left lower extremity deep venous thrombosis.    NATE CARMONA MD; Attending Radiologist  This document has been electronically signed. 2021  3:30PM

## 2021-02-05 ENCOUNTER — TRANSCRIPTION ENCOUNTER (OUTPATIENT)
Age: 86
End: 2021-02-05

## 2021-02-05 LAB
ALBUMIN SERPL ELPH-MCNC: 2.7 G/DL — LOW (ref 3.3–5)
ALP SERPL-CCNC: 69 U/L — SIGNIFICANT CHANGE UP (ref 40–120)
ALT FLD-CCNC: 19 U/L — SIGNIFICANT CHANGE UP (ref 10–45)
ANION GAP SERPL CALC-SCNC: 9 MMOL/L — SIGNIFICANT CHANGE UP (ref 5–17)
AST SERPL-CCNC: 14 U/L — SIGNIFICANT CHANGE UP (ref 10–40)
BASOPHILS # BLD AUTO: 0.05 K/UL — SIGNIFICANT CHANGE UP (ref 0–0.2)
BASOPHILS NFR BLD AUTO: 0.5 % — SIGNIFICANT CHANGE UP (ref 0–2)
BILIRUB SERPL-MCNC: 1.2 MG/DL — SIGNIFICANT CHANGE UP (ref 0.2–1.2)
BUN SERPL-MCNC: 22 MG/DL — SIGNIFICANT CHANGE UP (ref 7–23)
CALCIUM SERPL-MCNC: 9.5 MG/DL — SIGNIFICANT CHANGE UP (ref 8.4–10.5)
CHLORIDE SERPL-SCNC: 102 MMOL/L — SIGNIFICANT CHANGE UP (ref 96–108)
CO2 SERPL-SCNC: 27 MMOL/L — SIGNIFICANT CHANGE UP (ref 22–31)
CREAT SERPL-MCNC: 0.65 MG/DL — SIGNIFICANT CHANGE UP (ref 0.5–1.3)
EOSINOPHIL # BLD AUTO: 0.22 K/UL — SIGNIFICANT CHANGE UP (ref 0–0.5)
EOSINOPHIL NFR BLD AUTO: 2.1 % — SIGNIFICANT CHANGE UP (ref 0–6)
GLUCOSE BLDC GLUCOMTR-MCNC: 132 MG/DL — HIGH (ref 70–99)
GLUCOSE BLDC GLUCOMTR-MCNC: 198 MG/DL — HIGH (ref 70–99)
GLUCOSE BLDC GLUCOMTR-MCNC: 258 MG/DL — HIGH (ref 70–99)
GLUCOSE BLDC GLUCOMTR-MCNC: 274 MG/DL — HIGH (ref 70–99)
GLUCOSE SERPL-MCNC: 150 MG/DL — HIGH (ref 70–99)
HCT VFR BLD CALC: 47.2 % — HIGH (ref 34.5–45)
HGB BLD-MCNC: 16.1 G/DL — HIGH (ref 11.5–15.5)
IMM GRANULOCYTES NFR BLD AUTO: 0.9 % — SIGNIFICANT CHANGE UP (ref 0–1.5)
LYMPHOCYTES # BLD AUTO: 1.69 K/UL — SIGNIFICANT CHANGE UP (ref 1–3.3)
LYMPHOCYTES # BLD AUTO: 16 % — SIGNIFICANT CHANGE UP (ref 13–44)
MCHC RBC-ENTMCNC: 32 PG — SIGNIFICANT CHANGE UP (ref 27–34)
MCHC RBC-ENTMCNC: 34.1 GM/DL — SIGNIFICANT CHANGE UP (ref 32–36)
MCV RBC AUTO: 93.8 FL — SIGNIFICANT CHANGE UP (ref 80–100)
MONOCYTES # BLD AUTO: 0.95 K/UL — HIGH (ref 0–0.9)
MONOCYTES NFR BLD AUTO: 9 % — SIGNIFICANT CHANGE UP (ref 2–14)
NEUTROPHILS # BLD AUTO: 7.57 K/UL — HIGH (ref 1.8–7.4)
NEUTROPHILS NFR BLD AUTO: 71.5 % — SIGNIFICANT CHANGE UP (ref 43–77)
NRBC # BLD: 0 /100 WBCS — SIGNIFICANT CHANGE UP (ref 0–0)
PLATELET # BLD AUTO: 195 K/UL — SIGNIFICANT CHANGE UP (ref 150–400)
POTASSIUM SERPL-MCNC: 4.2 MMOL/L — SIGNIFICANT CHANGE UP (ref 3.5–5.3)
POTASSIUM SERPL-SCNC: 4.2 MMOL/L — SIGNIFICANT CHANGE UP (ref 3.5–5.3)
PROT SERPL-MCNC: 6.5 G/DL — SIGNIFICANT CHANGE UP (ref 6–8.3)
RBC # BLD: 5.03 M/UL — SIGNIFICANT CHANGE UP (ref 3.8–5.2)
RBC # FLD: 13.1 % — SIGNIFICANT CHANGE UP (ref 10.3–14.5)
SARS-COV-2 IGG SERPL IA-ACNC: 7.4 RATIO — HIGH
SARS-COV-2 IGG SERPL QL IA: POSITIVE
SARS-COV-2 RNA SPEC QL NAA+PROBE: SIGNIFICANT CHANGE UP
SODIUM SERPL-SCNC: 138 MMOL/L — SIGNIFICANT CHANGE UP (ref 135–145)
WBC # BLD: 10.57 K/UL — HIGH (ref 3.8–10.5)
WBC # FLD AUTO: 10.57 K/UL — HIGH (ref 3.8–10.5)

## 2021-02-05 PROCEDURE — 99232 SBSQ HOSP IP/OBS MODERATE 35: CPT | Mod: CS

## 2021-02-05 RX ORDER — ASCORBIC ACID 60 MG
1 TABLET,CHEWABLE ORAL
Qty: 0 | Refills: 0 | DISCHARGE
Start: 2021-02-05

## 2021-02-05 RX ORDER — BENZOCAINE 10 %
1 GEL (GRAM) MUCOUS MEMBRANE
Qty: 0 | Refills: 0 | DISCHARGE
Start: 2021-02-05

## 2021-02-05 RX ORDER — POLYETHYLENE GLYCOL 3350 17 G/17G
17 POWDER, FOR SOLUTION ORAL
Qty: 0 | Refills: 0 | DISCHARGE
Start: 2021-02-05

## 2021-02-05 RX ORDER — AMLODIPINE BESYLATE 2.5 MG/1
1 TABLET ORAL
Qty: 0 | Refills: 0 | DISCHARGE
Start: 2021-02-05

## 2021-02-05 RX ORDER — SENNA PLUS 8.6 MG/1
2 TABLET ORAL
Qty: 0 | Refills: 0 | DISCHARGE
Start: 2021-02-05

## 2021-02-05 RX ADMIN — INSULIN GLARGINE 15 UNIT(S): 100 INJECTION, SOLUTION SUBCUTANEOUS at 13:17

## 2021-02-05 RX ADMIN — Medication 1 TABLET(S): at 13:21

## 2021-02-05 RX ADMIN — PREGABALIN 1000 MICROGRAM(S): 225 CAPSULE ORAL at 13:21

## 2021-02-05 RX ADMIN — AMLODIPINE BESYLATE 10 MILLIGRAM(S): 2.5 TABLET ORAL at 05:35

## 2021-02-05 RX ADMIN — Medication 6: at 13:29

## 2021-02-05 RX ADMIN — Medication 6: at 18:29

## 2021-02-05 RX ADMIN — Medication 25 MILLIGRAM(S): at 05:36

## 2021-02-05 RX ADMIN — POLYETHYLENE GLYCOL 3350 17 GRAM(S): 17 POWDER, FOR SOLUTION ORAL at 13:21

## 2021-02-05 RX ADMIN — SENNA PLUS 2 TABLET(S): 8.6 TABLET ORAL at 21:22

## 2021-02-05 RX ADMIN — Medication 325 MILLIGRAM(S): at 13:21

## 2021-02-05 RX ADMIN — Medication 75 MICROGRAM(S): at 05:35

## 2021-02-05 RX ADMIN — GABAPENTIN 200 MILLIGRAM(S): 400 CAPSULE ORAL at 18:29

## 2021-02-05 RX ADMIN — Medication 3 MILLIGRAM(S): at 21:21

## 2021-02-05 RX ADMIN — Medication 1000 UNIT(S): at 13:21

## 2021-02-05 RX ADMIN — GABAPENTIN 200 MILLIGRAM(S): 400 CAPSULE ORAL at 05:39

## 2021-02-05 RX ADMIN — ENOXAPARIN SODIUM 40 MILLIGRAM(S): 100 INJECTION SUBCUTANEOUS at 21:21

## 2021-02-05 RX ADMIN — Medication 500 MILLIGRAM(S): at 13:21

## 2021-02-05 NOTE — DISCHARGE NOTE PROVIDER - PROVIDER TOKENS
FREE:[LAST:[Dr. Hernandez],PHONE:[(   )    -],FAX:[(   )    -],ADDRESS:[[address] [place] [address]]]

## 2021-02-05 NOTE — PROGRESS NOTE ADULT - SUBJECTIVE AND OBJECTIVE BOX
Patient is a 89y old  Female who presents with a chief complaint of Diarrhea in a bed bound patient, COVID positivity status (2021 12:37)      INTERVAL HPI/OVERNIGHT EVENTS: Patient seen and examined at bedside. No overnight events. Admits to R tooth that is loose, associated with mild pain. States at baseline she is wheelchair/bedbound.   SpO2 appropriate on RA.     MEDICATIONS  (STANDING):  amLODIPine   Tablet 10 milliGRAM(s) Oral daily  ascorbic acid 500 milliGRAM(s) Oral daily  cholecalciferol 1000 Unit(s) Oral daily  cyanocobalamin 1000 MICROGram(s) Oral daily  dextrose 40% Gel 15 Gram(s) Oral once  dextrose 5%. 1000 milliLiter(s) (50 mL/Hr) IV Continuous <Continuous>  dextrose 5%. 1000 milliLiter(s) (100 mL/Hr) IV Continuous <Continuous>  dextrose 50% Injectable 25 Gram(s) IV Push once  dextrose 50% Injectable 12.5 Gram(s) IV Push once  dextrose 50% Injectable 25 Gram(s) IV Push once  enoxaparin Injectable 40 milliGRAM(s) SubCutaneous at bedtime  ferrous    sulfate 325 milliGRAM(s) Oral daily  gabapentin 200 milliGRAM(s) Oral two times a day  glucagon  Injectable 1 milliGRAM(s) IntraMuscular once  insulin glargine Injectable (LANTUS) 15 Unit(s) SubCutaneous every morning  insulin lispro (ADMELOG) corrective regimen sliding scale   SubCutaneous three times a day before meals  insulin lispro (ADMELOG) corrective regimen sliding scale   SubCutaneous at bedtime  levothyroxine 75 MICROGram(s) Oral daily  melatonin 3 milliGRAM(s) Oral at bedtime  metoprolol succinate ER 25 milliGRAM(s) Oral daily  multivitamin 1 Tablet(s) Oral daily  polyethylene glycol 3350 17 Gram(s) Oral daily  senna 2 Tablet(s) Oral at bedtime    MEDICATIONS  (PRN):  acetaminophen   Tablet .. 650 milliGRAM(s) Oral every 4 hours PRN Temp greater or equal to 38C (100.4F), Moderate Pain (4 - 6)  acetaminophen   Tablet .. 325 milliGRAM(s) Oral every 6 hours PRN Mild Pain (1 - 3)  benzocaine 10% Gel 1 Application(s) Mucosal four times a day PRN discomfort  guaiFENesin  milliGRAM(s) Oral every 12 hours PRN Cough  ondansetron   Disintegrating Tablet 4 milliGRAM(s) Oral every 6 hours PRN Nausea and/or Vomiting      Allergies    aspirin (Unknown)  penicillin (Unknown)    Intolerances        REVIEW OF SYSTEMS:  CONSTITUTIONAL: No fever or chills  HEENT:  No headache, no sore throat  RESPIRATORY: No cough, wheezing, or shortness of breath  CARDIOVASCULAR: No chest pain, palpitations  GASTROINTESTINAL: No abd pain, nausea, vomiting, or diarrhea  GENITOURINARY: incontinent at baseline   NEUROLOGICAL: no focal weakness   MUSCULOSKELETAL: no myalgias     Vital Signs Last 24 Hrs  T(C): 36.9 (2021 08:45), Max: 36.9 (2021 08:45)  T(F): 98.4 (2021 08:45), Max: 98.4 (2021 08:45)  HR: 99 (2021 08:45) (87 - 99)  BP: 163/67 (2021 08:45) (132/72 - 163/67)  BP(mean): --  RR: 18 (2021 08:45) (16 - 18)  SpO2: 99% (2021 08:45) (90% - 99%)    PHYSICAL EXAM:  GENERAL: elderly female in NAD  HEENT:  anicteric, dry mucous membranes  CHEST/LUNG: poor inspiratory effort, overall CTA b/l, no rales, wheezes, or rhonchi  HEART:  RRR, S1, S2  ABDOMEN:  BS+, soft, nontender, nondistended  EXTREMITIES: no edema, cyanosis, or calf tenderness  NERVOUS SYSTEM: answers questions and follows commands appropriately, A&Ox2-3 (able to state name, , location, states year is  [place] is president), unable to lift legs off bed (wheelchair bound, chronic)    LABS:                        16.1   10.57 )-----------( 195      ( 2021 09:05 )             47.2     02-05    138  |  102  |  22  ----------------------------<  150  4.2   |  27  |  0.65    Ca    9.5      2021 09:05    TPro  6.5  /  Alb  2.7  /  TBili  1.2  /  DBili  x   /  AST  14  /  ALT  19  /  AlkPhos  69          eGFR if Non African American: 78 mL/min/1.73M2 (21 @ 09:05)  eGFR if : 91 mL/min/1.73M2 (21 @ 09:05)                            POCT Blood Glucose.: 258 mg/dL (2021 11:43)  POCT Blood Glucose.: 132 mg/dL (2021 08:22)  POCT Blood Glucose.: 175 mg/dL (2021 21:53)  POCT Blood Glucose.: 176 mg/dL (2021 17:09)                RADIOLOGY & ADDITIONAL TESTS: Personally reviewed.     Consultant(s) Notes Reviewed:  [x] YES  [ ] NO

## 2021-02-05 NOTE — DISCHARGE NOTE PROVIDER - DETAILS OF MALNUTRITION DIAGNOSIS/DIAGNOSES
This patient has been assessed with a concern for Malnutrition and was treated during this hospitalization for the following Nutrition diagnosis/diagnoses:     -  01/27/2021: Severe protein-calorie malnutrition   This patient has been assessed with a concern for Malnutrition and was treated during this hospitalization for the following Nutrition diagnosis/diagnoses:     -  01/27/2021: Severe protein-calorie malnutrition    This patient has been assessed with a concern for Malnutrition and was treated during this hospitalization for the following Nutrition diagnosis/diagnoses:     -  01/27/2021: Severe protein-calorie malnutrition   This patient has been assessed with a concern for Malnutrition and was treated during this hospitalization for the following Nutrition diagnosis/diagnoses:     -  01/27/2021: Severe protein-calorie malnutrition    This patient has been assessed with a concern for Malnutrition and was treated during this hospitalization for the following Nutrition diagnosis/diagnoses:     -  01/27/2021: Severe protein-calorie malnutrition    This patient has been assessed with a concern for Malnutrition and was treated during this hospitalization for the following Nutrition diagnosis/diagnoses:     - 01/27/2021: Severe protein-calorie malnutrition

## 2021-02-05 NOTE — DISCHARGE NOTE PROVIDER - NSDCCPCAREPLAN_GEN_ALL_CORE_FT
PRINCIPAL DISCHARGE DIAGNOSIS  Diagnosis: COVID-19  Assessment and Plan of Treatment: You were admitted for respiratory failure due to COVID.   -You completed a course of steroids and Remdesivir.   -Your oxygen saturation improved during hospitalization  -Follow up with your primary care physician within the week      SECONDARY DISCHARGE DIAGNOSES  Diagnosis: Type 2 diabetes mellitus  Assessment and Plan of Treatment: Your A1C was 6.1  -Continue your home medication Metformin  -Follow up with your primary care physician within the week

## 2021-02-05 NOTE — DISCHARGE NOTE PROVIDER - NSDCMRMEDTOKEN_GEN_ALL_CORE_FT
amLODIPine 10 mg oral tablet: 1 tab(s) orally once a day  ascorbic acid 500 mg oral tablet: 1 tab(s) orally once a day  benzocaine 10% mucous membrane gel: 1 application mucous membrane 4 times a day, As needed, discomfort  Centrum Silver oral tablet: 1 tab(s) orally once a day  cyanocobalamin 1000 mcg oral tablet: 1 tab(s) orally once a day  gabapentin 100 mg oral capsule: 2 cap(s) orally 2 times a day  levothyroxine 75 mcg (0.075 mg) oral tablet: 1 tab(s) orally once a day  Melatonin 3 mg oral tablet: 1 tab(s) orally once (at bedtime)  metFORMIN 1000 mg oral tablet: 1 tab(s) orally 2 times a day  metoprolol succinate 25 mg oral tablet, extended release: 1 tab(s) orally once a day  polyethylene glycol 3350 oral powder for reconstitution: 17 gram(s) orally once a day  senna oral tablet: 2 tab(s) orally once a day (at bedtime)  Slow Release Iron (as elemental iron) 45 mg oral tablet, extended release: 1 tab(s) orally once a day  Tylenol 500 mg oral tablet: 500 milligram(s) orally 2 times a day, As Needed  Vitamin D3 1000 intl units oral tablet: 1 tab(s) orally once a day  ZyrTEC 10 mg oral tablet: 1 tab(s) orally once a day, As Needed

## 2021-02-05 NOTE — PROGRESS NOTE ADULT - ASSESSMENT
89F with Afib (not on AC due to vaginal bleeding 2/2 polyps), chronic back pain 2/2 spinal stenosis, Type 2 DM on metformin, HTN, recurrent UTIs, chronic venous insufficiency, presents from [facility] for diarrhea, found to have COVID+ infection, admitted for acute hypoxic respiratory failure 2/2 to viral PNA due to COVID 19. O2 noted to be 88% on RA.    #Acute hypoxic respiratory failure sec to COVID-19 Pneumonia - resolved  -Completed Decadron and Remdesivir  -Off supplemental O2 with appropriate Spo2  -Monitor O2 with continous pulse ox; monitor respiratory status closely  -Maintain strict isolation precautions, use proper PPE.  -Provide Acetaminophen 650 mg PO q6  -May use Albuterol inhaler   -Code Status:  DNR/DNI    #Chronic A fib  -Continue rate control with Metoprolol  -Not on AC due to hx of vaginal bleeding    #Type 2 DM  A1C 6.1  -Continue Lantus 15 units qAM, moderate dose sliding scale  -Hypoglycemia protocol, accu-cheks  -Blood glucose goal 100-180    #HTN  -Chronic, stable  -Continue Metoprolol, Amlodipine  -Monitor vitals    #Hypothyroidism  -Chronic, continue Synthroid    #Prophylactic Measure  DVT prophylaxis: Lovenox  Bowel regimen    Dispo: D/C back to [facility] when COVID PCR negative.        Gal/Jose Kay, Both sons live together, - [phone]  daughter, Salma Kebede, [phone] (Monday-Friday During the days)  Called family today 2/4. Left VM with Sons. Updated daughter Salma     89F with Afib (not on AC due to vaginal bleeding 2/2 polyps), chronic back pain 2/2 spinal stenosis, Type 2 DM on metformin, HTN, recurrent UTIs, chronic venous insufficiency, presents from [facility] for diarrhea, found to have COVID+ infection, admitted for acute hypoxic respiratory failure 2/2 to viral PNA due to COVID 19. O2 noted to be 88% on RA.    #Acute hypoxic respiratory failure sec to COVID-19 Pneumonia - resolved  -Completed Decadron and Remdesivir  -Off supplemental O2 with appropriate Spo2  -Monitor O2 with continous pulse ox; monitor respiratory status closely  -Maintain strict isolation precautions, use proper PPE.  -Provide Acetaminophen 650 mg PO q6  -May use Albuterol inhaler   -Code Status:  DNR/DNI    #Leukocytosis - downtrending  -Reactive, likely steroid induced  -Afebrile, nontoxic appearing. Observe off antibiotics    #Erythrocytosis  -Appears chronic, continue to monitor    #Chronic A fib  -Continue rate control with Metoprolol  -Not on AC due to hx of vaginal bleeding    #Type 2 DM  A1C 6.1  -Continue Lantus 15 units qAM, moderate dose sliding scale  -Hypoglycemia protocol, accu-cheks  -Blood glucose goal 100-180    #HTN  -Chronic, stable  -Continue Metoprolol, Amlodipine  -Monitor vitals    #Hypothyroidism  -Chronic, continue Synthroid    #Prophylactic Measure  DVT prophylaxis: Lovenox  Bowel regimen    Dispo: Patient accepted to [facility]. Unable to go to [facility] until COVID negative.   Son Jose GREY Did not  the phone.  [phone]  Called daughter, Salma Kebede, [phone], to update on discharge plans. She became very upset, yelling at the phone stating she does not want her mom to go "just anywhere and be dumped," states she wants patient to go to [facility]. I tried to explain that patient was not accepted and she became even more upset and stated she does not want to speak to me anymore. She will speak to her brother regarding other rehab facilities.

## 2021-02-05 NOTE — DISCHARGE NOTE PROVIDER - HOSPITAL COURSE
Hospital Course  89F with Afib (not on AC due to vaginal bleeding 2/2 polyps), chronic back pain 2/2 spinal stenosis, Type 2 DM on metformin, HTN, recurrent UTIs, chronic venous insufficiency, presents from [facility] for diarrhea, found to have COVID+ infection, admitted for acute hypoxic respiratory failure 2/2 to viral PNA due to COVID 19. O2 noted to be 88% on RA. Acute hypoxic respiratory failure sec to COVID-19 Pneumonia - resolved. Completed Decadron and Remdesivir. Off supplemental O2 with appropriate Spo2.     A1C 6.1 Blood glucose elevated exacerbated by steroid use. On Insulin sliding scale.     Patient from [facility], unable to go to [facility] as she is COVID positive. DC to [facility].     Discussed with daughter, Salma Kebede, aware and in agreement with above.     Code Status: DNR/DNI    Discharging Provider:  Kady Lam DO  Contact Info: Cell [phone]  Please call with any questions or concerns.    Time spent: 40 minutes.    Hospital Course  89F with Afib (not on AC due to vaginal bleeding 2/2 polyps), chronic back pain 2/2 spinal stenosis, Type 2 DM on metformin, HTN, recurrent UTIs, chronic venous insufficiency, presents from [facility] for diarrhea, found to have COVID+ infection, admitted for acute hypoxic respiratory failure 2/2 to viral PNA due to COVID 19. O2 noted to be 88% on RA. Acute hypoxic respiratory failure sec to COVID-19 Pneumonia - resolved. Completed Decadron and Remdesivir. Off supplemental O2 with appropriate Spo2.     A1C 6.1 Blood glucose elevated exacerbated by steroid use. On Insulin sliding scale.     Code Status: DNR/DNI    Discharging Provider:  Kady Lam DO  Contact Info: Cell [phone]  Please call with any questions or concerns.    Time spent: 40 minutes.

## 2021-02-06 LAB
ANION GAP SERPL CALC-SCNC: 9 MMOL/L — SIGNIFICANT CHANGE UP (ref 5–17)
BASOPHILS # BLD AUTO: 0.05 K/UL — SIGNIFICANT CHANGE UP (ref 0–0.2)
BASOPHILS NFR BLD AUTO: 0.5 % — SIGNIFICANT CHANGE UP (ref 0–2)
BUN SERPL-MCNC: 22 MG/DL — SIGNIFICANT CHANGE UP (ref 7–23)
CALCIUM SERPL-MCNC: 9.7 MG/DL — SIGNIFICANT CHANGE UP (ref 8.4–10.5)
CHLORIDE SERPL-SCNC: 103 MMOL/L — SIGNIFICANT CHANGE UP (ref 96–108)
CO2 SERPL-SCNC: 24 MMOL/L — SIGNIFICANT CHANGE UP (ref 22–31)
CREAT SERPL-MCNC: 0.6 MG/DL — SIGNIFICANT CHANGE UP (ref 0.5–1.3)
EOSINOPHIL # BLD AUTO: 0.22 K/UL — SIGNIFICANT CHANGE UP (ref 0–0.5)
EOSINOPHIL NFR BLD AUTO: 2 % — SIGNIFICANT CHANGE UP (ref 0–6)
GLUCOSE BLDC GLUCOMTR-MCNC: 136 MG/DL — HIGH (ref 70–99)
GLUCOSE BLDC GLUCOMTR-MCNC: 157 MG/DL — HIGH (ref 70–99)
GLUCOSE BLDC GLUCOMTR-MCNC: 203 MG/DL — HIGH (ref 70–99)
GLUCOSE SERPL-MCNC: 145 MG/DL — HIGH (ref 70–99)
HCT VFR BLD CALC: 46 % — HIGH (ref 34.5–45)
HGB BLD-MCNC: 15.1 G/DL — SIGNIFICANT CHANGE UP (ref 11.5–15.5)
IMM GRANULOCYTES NFR BLD AUTO: 1 % — SIGNIFICANT CHANGE UP (ref 0–1.5)
LYMPHOCYTES # BLD AUTO: 1.65 K/UL — SIGNIFICANT CHANGE UP (ref 1–3.3)
LYMPHOCYTES # BLD AUTO: 15 % — SIGNIFICANT CHANGE UP (ref 13–44)
MCHC RBC-ENTMCNC: 31.3 PG — SIGNIFICANT CHANGE UP (ref 27–34)
MCHC RBC-ENTMCNC: 32.8 GM/DL — SIGNIFICANT CHANGE UP (ref 32–36)
MCV RBC AUTO: 95.2 FL — SIGNIFICANT CHANGE UP (ref 80–100)
MONOCYTES # BLD AUTO: 1.13 K/UL — HIGH (ref 0–0.9)
MONOCYTES NFR BLD AUTO: 10.3 % — SIGNIFICANT CHANGE UP (ref 2–14)
NEUTROPHILS # BLD AUTO: 7.83 K/UL — HIGH (ref 1.8–7.4)
NEUTROPHILS NFR BLD AUTO: 71.2 % — SIGNIFICANT CHANGE UP (ref 43–77)
NRBC # BLD: 0 /100 WBCS — SIGNIFICANT CHANGE UP (ref 0–0)
PLATELET # BLD AUTO: 120 K/UL — LOW (ref 150–400)
POTASSIUM SERPL-MCNC: 4.5 MMOL/L — SIGNIFICANT CHANGE UP (ref 3.5–5.3)
POTASSIUM SERPL-SCNC: 4.5 MMOL/L — SIGNIFICANT CHANGE UP (ref 3.5–5.3)
RBC # BLD: 4.83 M/UL — SIGNIFICANT CHANGE UP (ref 3.8–5.2)
RBC # FLD: 13 % — SIGNIFICANT CHANGE UP (ref 10.3–14.5)
SARS-COV-2 RNA SPEC QL NAA+PROBE: SIGNIFICANT CHANGE UP
SODIUM SERPL-SCNC: 136 MMOL/L — SIGNIFICANT CHANGE UP (ref 135–145)
WBC # BLD: 10.99 K/UL — HIGH (ref 3.8–10.5)
WBC # FLD AUTO: 10.99 K/UL — HIGH (ref 3.8–10.5)

## 2021-02-06 PROCEDURE — 99232 SBSQ HOSP IP/OBS MODERATE 35: CPT | Mod: CS

## 2021-02-06 RX ORDER — LACTULOSE 10 G/15ML
15 SOLUTION ORAL ONCE
Refills: 0 | Status: COMPLETED | OUTPATIENT
Start: 2021-02-06 | End: 2021-02-06

## 2021-02-06 RX ADMIN — ENOXAPARIN SODIUM 40 MILLIGRAM(S): 100 INJECTION SUBCUTANEOUS at 21:56

## 2021-02-06 RX ADMIN — Medication 75 MICROGRAM(S): at 05:36

## 2021-02-06 RX ADMIN — Medication 500 MILLIGRAM(S): at 12:55

## 2021-02-06 RX ADMIN — Medication 325 MILLIGRAM(S): at 12:54

## 2021-02-06 RX ADMIN — Medication 4: at 17:13

## 2021-02-06 RX ADMIN — LACTULOSE 15 GRAM(S): 10 SOLUTION ORAL at 21:55

## 2021-02-06 RX ADMIN — Medication 25 MILLIGRAM(S): at 05:36

## 2021-02-06 RX ADMIN — GABAPENTIN 200 MILLIGRAM(S): 400 CAPSULE ORAL at 05:36

## 2021-02-06 RX ADMIN — PREGABALIN 1000 MICROGRAM(S): 225 CAPSULE ORAL at 12:55

## 2021-02-06 RX ADMIN — Medication 325 MILLIGRAM(S): at 16:04

## 2021-02-06 RX ADMIN — SENNA PLUS 2 TABLET(S): 8.6 TABLET ORAL at 21:56

## 2021-02-06 RX ADMIN — AMLODIPINE BESYLATE 10 MILLIGRAM(S): 2.5 TABLET ORAL at 05:36

## 2021-02-06 RX ADMIN — Medication 3 MILLIGRAM(S): at 21:57

## 2021-02-06 RX ADMIN — INSULIN GLARGINE 15 UNIT(S): 100 INJECTION, SOLUTION SUBCUTANEOUS at 09:26

## 2021-02-06 RX ADMIN — Medication 1000 UNIT(S): at 12:54

## 2021-02-06 RX ADMIN — GABAPENTIN 200 MILLIGRAM(S): 400 CAPSULE ORAL at 17:14

## 2021-02-06 RX ADMIN — POLYETHYLENE GLYCOL 3350 17 GRAM(S): 17 POWDER, FOR SOLUTION ORAL at 12:58

## 2021-02-06 RX ADMIN — Medication 2: at 12:55

## 2021-02-06 RX ADMIN — Medication 1 TABLET(S): at 12:54

## 2021-02-06 NOTE — PHYSICAL THERAPY INITIAL EVALUATION ADULT - IMPAIRMENTS FOUND, PT EVAL
aerobic capacity/endurance/gait, locomotion, and balance/muscle strength
aerobic capacity/endurance/gait, locomotion, and balance/muscle strength

## 2021-02-06 NOTE — PHYSICAL THERAPY INITIAL EVALUATION ADULT - DISCHARGE DISPOSITION, PT EVAL
home PT vs [facility] pending transfer progress with PT
Anticipate Home PT pending further mobility. Assist with all mobility and ADLs.

## 2021-02-06 NOTE — PHYSICAL THERAPY INITIAL EVALUATION ADULT - PHYSICAL ASSIST/NONPHYSICAL ASSIST: SIT/SUPINE, REHAB EVAL
verbal cues/nonverbal cues (demo/gestures)/1 person assist
technique, safety/verbal cues/nonverbal cues (demo/gestures)/1 person + 1 person to manage equipment

## 2021-02-06 NOTE — PROGRESS NOTE ADULT - ASSESSMENT
89F with Afib (not on AC due to vaginal bleeding 2/2 polyps), chronic back pain 2/2 spinal stenosis, Type 2 DM on metformin, HTN, recurrent UTIs, chronic venous insufficiency, presents from [facility] for diarrhea, found to have COVID+ infection, admitted for acute hypoxic respiratory failure 2/2 to viral PNA due to COVID 19. O2 noted to be 88% on RA.    #Acute hypoxic respiratory failure sec to COVID-19 Pneumonia - resolved  -Completed Decadron and Remdesivir  -Off supplemental O2 with appropriate Spo2  -Monitor O2 with continuos pulse ox; monitor respiratory status closely  -Maintain strict isolation precautions, use proper PPE.  -Provide Acetaminophen 650 mg PO q6  -May use Albuterol inhaler   -Code Status:  DNR/DNI  -COVID PCR negative on 2/5    #Leukocytosis - downtrending  -Reactive, likely steroid induced  -Afebrile, nontoxic appearing. Observe off antibiotics    #Erythrocytosis  -Appears chronic, continue to monitor    #Chronic A fib  -Continue rate control with Metoprolol  -Not on AC due to hx of vaginal bleeding    #Type 2 DM  A1C 6.1  -Continue Lantus 15 units qAM, moderate dose sliding scale  -Hypoglycemia protocol, accu-cheks  -Blood glucose goal 100-180    #HTN  -Chronic, stable  -Continue Metoprolol, Amlodipine  -Monitor vitals    #Hypothyroidism  -Chronic, continue Synthroid    #Prophylactic Measure  DVT prophylaxis: Lovenox  Bowel regimen 89F with Afib (not on AC due to vaginal bleeding 2/2 polyps), chronic back pain 2/2 spinal stenosis, Type 2 DM on metformin, HTN, recurrent UTIs, chronic venous insufficiency, presents from [facility] for diarrhea, found to have COVID+ infection, admitted for acute hypoxic respiratory failure 2/2 to viral PNA due to COVID 19. O2 noted to be 88% on RA.    #Acute hypoxic respiratory failure sec to COVID-19 Pneumonia - resolved  -Completed Decadron and Remdesivir  -Off supplemental O2 with appropriate Spo2  -Monitor O2 with continuos pulse ox; monitor respiratory status closely  -Maintain strict isolation precautions, use proper PPE.  -Provide Acetaminophen 650 mg PO q6  -May use Albuterol inhaler   -Code Status:  DNR/DNI  -COVID PCR negative on 2/5. Follow up repeat COVID PCR swab     #Leukocytosis   -Reactive, likely steroid induced  -Afebrile, nontoxic appearing. Observe off antibiotics    #Chronic A fib  -Continue rate control with Metoprolol  -Not on AC due to hx of vaginal bleeding    #Type 2 DM  A1C 6.1  -Continue Lantus 15 units qAM, moderate dose sliding scale  -Hypoglycemia protocol, accu-cheks  -Blood glucose goal 100-180    #HTN  -Chronic, stable  -Continue Metoprolol, Amlodipine  -Monitor vitals    #Hypothyroidism  -Chronic, continue Synthroid    #Prophylactic Measure  DVT prophylaxis: Lovenox  Bowel regimen    Dispo: ANISH planning either [facility] (awaiting 2 negative COVID swabs 24 hours apart) [facility]. COVID PCR negative 2/5.   89F with Afib (not on AC due to vaginal bleeding 2/2 polyps), chronic back pain 2/2 spinal stenosis, Type 2 DM on metformin, HTN, recurrent UTIs, chronic venous insufficiency, presents from [facility] for diarrhea, found to have COVID+ infection, admitted for acute hypoxic respiratory failure 2/2 to viral PNA due to COVID 19. O2 noted to be 88% on RA.    #Acute hypoxic respiratory failure sec to COVID-19 Pneumonia - resolved  -Completed Decadron and Remdesivir  -Off supplemental O2 with appropriate Spo2  -Monitor O2 with continuos pulse ox; monitor respiratory status closely  -Maintain strict isolation precautions, use proper PPE.  -Provide Acetaminophen 650 mg PO q6  -May use Albuterol inhaler   -Code Status:  DNR/DNI  -COVID PCR negative on 2/5. Follow up repeat COVID PCR swab     #Leukocytosis   -Reactive, likely steroid induced  -Afebrile, nontoxic appearing. Observe off antibiotics    #Chronic A fib  -Continue rate control with Metoprolol  -Not on AC due to hx of vaginal bleeding    #Type 2 DM  A1C 6.1  -Continue Lantus 15 units qAM, moderate dose sliding scale  -Hypoglycemia protocol, accu-cheks  -Blood glucose goal 100-180    #HTN  -Chronic, stable  -Continue Metoprolol, Amlodipine  -Monitor vitals    #Hypothyroidism  -Chronic, continue Synthroid    #Prophylactic Measure  DVT prophylaxis: Lovenox  Bowel regimen    Dispo: D/C planning to [facility].     Left message for son Gal/Jose Kay (live together) [phone] awaiting call back.

## 2021-02-06 NOTE — PHYSICAL THERAPY INITIAL EVALUATION ADULT - IMPAIRMENTS CONTRIBUTING IMPAIRED BED MOBILITY, REHAB EVAL
impaired balance/decreased strength
impaired balance/impaired coordination/impaired postural control/decreased strength

## 2021-02-06 NOTE — PHYSICAL THERAPY INITIAL EVALUATION ADULT - BED MOBILITY TRAINING, PT EVAL
GOAL: Pt will perform bed mobility w/ [place] in 2 week
In three to four treatments, patient will perform bed mobility with cg/ supervision

## 2021-02-06 NOTE — PHYSICAL THERAPY INITIAL EVALUATION ADULT - ADDITIONAL COMMENTS
Pt reports living at the [facility]. PTA pt transferred independently from bed<->w/c and required assist with ADLs from staff. Pt reports she does not ambulate or use RW.
Pt reports living at the [facility]. PTA pt transferred independently from bed<->w/c and required assist with ADLs from staff. Pt reports she does not ambulate or use RW.

## 2021-02-06 NOTE — PHYSICAL THERAPY INITIAL EVALUATION ADULT - TRANSFER TRAINING, PT EVAL
GOAL: Pt will perform bed<->chair transfers with supervision in 2 weeks
In three to four treatments, patient will perform bed to chair and sit to stand transfers with RW with min assist/ cg assist

## 2021-02-06 NOTE — PHYSICAL THERAPY INITIAL EVALUATION ADULT - PERTINENT HX OF CURRENT PROBLEM, REHAB EVAL
89 yo F with PMHx of Afib (not on AC due to vaginal bleeding 2/2 polyps), chronic back pain 2/2 spinal stenosis,  DM2 on metformin, HTN, recurrent UTIs, chronic venous insufficiency. Pt sent by [facility] to rule out left lower extremity DVT due to swelling and because of diarrhea. They will not take pt back because of diarrhea (difficult to clean because she is immobile) and because of COVID positivity. US (-)
89 yo patient sent by [facility] to rule out left lower extremity DVT due to swelling and because of diarrhea. H/o COVID+

## 2021-02-06 NOTE — PROGRESS NOTE ADULT - SUBJECTIVE AND OBJECTIVE BOX
Patient is a 89y old  Female who presents with a chief complaint of Diarrhea in a bed bound patient, COVID positivity status (2021 07:45)      INTERVAL HPI/OVERNIGHT EVENTS: Patient seen and examined at bedside. No overnight events. SpO2 94% on RA.  Offers no complaints. Having breakfast. Asking about discharge planning.     MEDICATIONS  (STANDING):  amLODIPine   Tablet 10 milliGRAM(s) Oral daily  ascorbic acid 500 milliGRAM(s) Oral daily  cholecalciferol 1000 Unit(s) Oral daily  cyanocobalamin 1000 MICROGram(s) Oral daily  dextrose 40% Gel 15 Gram(s) Oral once  dextrose 5%. 1000 milliLiter(s) (50 mL/Hr) IV Continuous <Continuous>  dextrose 5%. 1000 milliLiter(s) (100 mL/Hr) IV Continuous <Continuous>  dextrose 50% Injectable 25 Gram(s) IV Push once  dextrose 50% Injectable 12.5 Gram(s) IV Push once  dextrose 50% Injectable 25 Gram(s) IV Push once  enoxaparin Injectable 40 milliGRAM(s) SubCutaneous at bedtime  ferrous    sulfate 325 milliGRAM(s) Oral daily  gabapentin 200 milliGRAM(s) Oral two times a day  glucagon  Injectable 1 milliGRAM(s) IntraMuscular once  insulin glargine Injectable (LANTUS) 15 Unit(s) SubCutaneous every morning  insulin lispro (ADMELOG) corrective regimen sliding scale   SubCutaneous three times a day before meals  insulin lispro (ADMELOG) corrective regimen sliding scale   SubCutaneous at bedtime  levothyroxine 75 MICROGram(s) Oral daily  melatonin 3 milliGRAM(s) Oral at bedtime  metoprolol succinate ER 25 milliGRAM(s) Oral daily  multivitamin 1 Tablet(s) Oral daily  polyethylene glycol 3350 17 Gram(s) Oral daily  senna 2 Tablet(s) Oral at bedtime    MEDICATIONS  (PRN):  acetaminophen   Tablet .. 650 milliGRAM(s) Oral every 4 hours PRN Temp greater or equal to 38C (100.4F), Moderate Pain (4 - 6)  acetaminophen   Tablet .. 325 milliGRAM(s) Oral every 6 hours PRN Mild Pain (1 - 3)  benzocaine 10% Gel 1 Application(s) Mucosal four times a day PRN discomfort  guaiFENesin  milliGRAM(s) Oral every 12 hours PRN Cough  ondansetron   Disintegrating Tablet 4 milliGRAM(s) Oral every 6 hours PRN Nausea and/or Vomiting      Allergies    aspirin (Unknown)  penicillin (Unknown)    Intolerances        REVIEW OF SYSTEMS:  CONSTITUTIONAL: No fever or chills  HEENT:  No headache, no sore throat  RESPIRATORY: No cough, wheezing, or shortness of breath  CARDIOVASCULAR: No chest pain, palpitations  GASTROINTESTINAL: No abd pain, nausea, vomiting, or diarrhea  GENITOURINARY: No dysuria, frequency, or hematuria    Vital Signs Last 24 Hrs  T(C): 36.4 (2021 10:47), Max: 36.9 (2021 19:59)  T(F): 97.6 (2021 10:47), Max: 98.4 (2021 19:59)  HR: 86 (2021 10:47) (80 - 97)  BP: 143/66 (2021 10:47) (139/89 - 143/66)  BP(mean): --  RR: 17 (2021 10:47) (17 - 18)  SpO2: 95% (2021 10:47) (94% - 95%)    PHYSICAL EXAM:  GENERAL: elderly female in NAD  HEENT:  anicteric, dry mucous membranes  CHEST/LUNG: poor inspiratory effort, overall CTA b/l, no rales, wheezes, or rhonchi  HEART:  RRR, S1, S2  ABDOMEN:  BS+, soft, nontender, nondistended  EXTREMITIES: no edema, cyanosis, or calf tenderness  NERVOUS SYSTEM: answers questions and follows commands appropriately, A&Ox2-3 (able to state name, , location, states year is  Shonda is president), wheelchair bound    LABS:                        15.1   10.99 )-----------( 120      ( 2021 07:23 )             46.0     02-06    136  |  103  |  22  ----------------------------<  145  4.5   |  24  |  0.60    Ca    9.7      2021 07:23    TPro  6.5  /  Alb  2.7  /  TBili  1.2  /  DBili  x   /  AST  14  /  ALT  19  /  AlkPhos  69  02-05        eGFR if Non African American: 80 mL/min/1.73M2 (21 @ 07:23)  eGFR if : 93 mL/min/1.73M2 (21 @ 07:23)          POCT Blood Glucose.: 136 mg/dL (2021 07:52)  POCT Blood Glucose.: 198 mg/dL (2021 21:20)  POCT Blood Glucose.: 274 mg/dL (2021 18:11)                RADIOLOGY & ADDITIONAL TESTS: Personally reviewed.     Consultant(s) Notes Reviewed:  [x] YES  [ ] NO

## 2021-02-06 NOTE — PHYSICAL THERAPY INITIAL EVALUATION ADULT - GENERAL OBSERVATIONS, REHAB EVAL
Recvd semi supine requiring max encouragement to participate
Patient received supine in NAD, +telemetry, O2 NC, prima fit.
baseline

## 2021-02-07 ENCOUNTER — TRANSCRIPTION ENCOUNTER (OUTPATIENT)
Age: 86
End: 2021-02-07

## 2021-02-07 VITALS
SYSTOLIC BLOOD PRESSURE: 115 MMHG | HEART RATE: 85 BPM | DIASTOLIC BLOOD PRESSURE: 77 MMHG | OXYGEN SATURATION: 95 % | TEMPERATURE: 98 F | RESPIRATION RATE: 17 BRPM

## 2021-02-07 LAB — GLUCOSE BLDC GLUCOMTR-MCNC: 129 MG/DL — HIGH (ref 70–99)

## 2021-02-07 PROCEDURE — 99239 HOSP IP/OBS DSCHRG MGMT >30: CPT | Mod: CS

## 2021-02-07 RX ADMIN — GABAPENTIN 200 MILLIGRAM(S): 400 CAPSULE ORAL at 06:23

## 2021-02-07 RX ADMIN — AMLODIPINE BESYLATE 10 MILLIGRAM(S): 2.5 TABLET ORAL at 06:25

## 2021-02-07 RX ADMIN — Medication 75 MICROGRAM(S): at 06:25

## 2021-02-07 RX ADMIN — Medication 25 MILLIGRAM(S): at 07:13

## 2021-02-07 RX ADMIN — INSULIN GLARGINE 15 UNIT(S): 100 INJECTION, SOLUTION SUBCUTANEOUS at 08:00

## 2021-02-07 NOTE — PROGRESS NOTE ADULT - PROVIDER SPECIALTY LIST ADULT
Hospitalist
Internal Medicine
Hospitalist
Internal Medicine
Internal Medicine
Palliative Care
Hospitalist
Palliative Care
Hospitalist
Palliative Care
Palliative Care

## 2021-02-07 NOTE — PROGRESS NOTE ADULT - SUBJECTIVE AND OBJECTIVE BOX
Patient is a 89y old  Female who presents with a chief complaint of Diarrhea in a bed bound patient, COVID positivity status (07 Feb 2021 07:30)      INTERVAL HPI/OVERNIGHT EVENTS: Patient seen and examined at bedside. No overnight events.   D/C planning for this morning.  SpO2 95% on RA. Had BM.     MEDICATIONS  (STANDING):  amLODIPine   Tablet 10 milliGRAM(s) Oral daily  ascorbic acid 500 milliGRAM(s) Oral daily  cholecalciferol 1000 Unit(s) Oral daily  cyanocobalamin 1000 MICROGram(s) Oral daily  dextrose 40% Gel 15 Gram(s) Oral once  dextrose 5%. 1000 milliLiter(s) (50 mL/Hr) IV Continuous <Continuous>  dextrose 5%. 1000 milliLiter(s) (100 mL/Hr) IV Continuous <Continuous>  dextrose 50% Injectable 25 Gram(s) IV Push once  dextrose 50% Injectable 12.5 Gram(s) IV Push once  dextrose 50% Injectable 25 Gram(s) IV Push once  enoxaparin Injectable 40 milliGRAM(s) SubCutaneous at bedtime  ferrous    sulfate 325 milliGRAM(s) Oral daily  gabapentin 200 milliGRAM(s) Oral two times a day  glucagon  Injectable 1 milliGRAM(s) IntraMuscular once  insulin glargine Injectable (LANTUS) 15 Unit(s) SubCutaneous every morning  insulin lispro (ADMELOG) corrective regimen sliding scale   SubCutaneous three times a day before meals  insulin lispro (ADMELOG) corrective regimen sliding scale   SubCutaneous at bedtime  levothyroxine 75 MICROGram(s) Oral daily  melatonin 3 milliGRAM(s) Oral at bedtime  metoprolol succinate ER 25 milliGRAM(s) Oral daily  multivitamin 1 Tablet(s) Oral daily  polyethylene glycol 3350 17 Gram(s) Oral daily  senna 2 Tablet(s) Oral at bedtime    MEDICATIONS  (PRN):  acetaminophen   Tablet .. 650 milliGRAM(s) Oral every 4 hours PRN Temp greater or equal to 38C (100.4F), Moderate Pain (4 - 6)  acetaminophen   Tablet .. 325 milliGRAM(s) Oral every 6 hours PRN Mild Pain (1 - 3)  benzocaine 10% Gel 1 Application(s) Mucosal four times a day PRN discomfort  bisacodyl 10 milliGRAM(s) Oral once PRN Constipation  guaiFENesin  milliGRAM(s) Oral every 12 hours PRN Cough  ondansetron   Disintegrating Tablet 4 milliGRAM(s) Oral every 6 hours PRN Nausea and/or Vomiting      Allergies    aspirin (Unknown)  penicillin (Unknown)    Intolerances        REVIEW OF SYSTEMS:  CONSTITUTIONAL: No fever or chills  HEENT:  No headache, no sore throat  RESPIRATORY: No cough, wheezing, or shortness of breath  CARDIOVASCULAR: No chest pain, palpitations  GASTROINTESTINAL: No abd pain, nausea, vomiting, or diarrhea  GENITOURINARY: No dysuria, frequency, or hematuria    Vital Signs Last 24 Hrs  T(C): 36.6 (07 Feb 2021 08:07), Max: 36.6 (06 Feb 2021 21:29)  T(F): 97.8 (07 Feb 2021 08:07), Max: 97.8 (06 Feb 2021 21:29)  HR: 85 (07 Feb 2021 08:07) (85 - 103)  BP: 115/77 (07 Feb 2021 08:07) (115/77 - 140/79)  BP(mean): --  RR: 17 (07 Feb 2021 08:07) (17 - 18)  SpO2: 95% (07 Feb 2021 08:07) (95% - 95%)    PHYSICAL EXAM:  GENERAL: elderly female in NAD  HEENT:  anicteric, dry mucous membranes  CHEST/LUNG: poor inspiratory effort, overall CTA b/l, no rales, wheezes, or rhonchi  HEART:  RRR, S1, S2  ABDOMEN:  BS+, soft, nontender, nondistended  EXTREMITIES: no edema, cyanosis, or calf tenderness  NERVOUS SYSTEM: answers questions and follows commands appropriately    LABS:                        15.1   10.99 )-----------( 120      ( 06 Feb 2021 07:23 )             46.0     02-06    136  |  103  |  22  ----------------------------<  145  4.5   |  24  |  0.60    Ca    9.7      06 Feb 2021 07:23    TPro  6.5  /  Alb  2.7  /  TBili  1.2  /  DBili  x   /  AST  14  /  ALT  19  /  AlkPhos  69  02-05        eGFR if Non African American: 80 mL/min/1.73M2 (02-06-21 @ 07:23)  eGFR if : 93 mL/min/1.73M2 (02-06-21 @ 07:23)              POCT Blood Glucose.: 129 mg/dL (07 Feb 2021 07:45)  POCT Blood Glucose.: 157 mg/dL (06 Feb 2021 22:12)  POCT Blood Glucose.: 203 mg/dL (06 Feb 2021 17:11)          RADIOLOGY & ADDITIONAL TESTS: Personally reviewed.     Consultant(s) Notes Reviewed:  [x] YES  [ ] NO

## 2021-02-07 NOTE — PROGRESS NOTE ADULT - NUTRITIONAL ASSESSMENT
This patient has been assessed with a concern for Malnutrition and has been determined to have a diagnosis/diagnoses of Severe protein-calorie malnutrition.    This patient is being managed with:   Diet Regular-  Entered: Jan 27 2021  1:17PM    
This patient has been assessed with a concern for Malnutrition and has been determined to have a diagnosis/diagnoses of Severe protein-calorie malnutrition.    This patient is being managed with:   Diet Regular-  Entered: Jan 27 2021  1:17PM    Diet Regular-  Supplement Feeding Modality:  Oral  Ensure Enlive Cans or Servings Per Day:  1       Frequency:  Three Times a day  Entered: Jan 24 2021 11:06AM    The following pending diet order is being considered for treatment of Severe protein-calorie malnutrition:null
This patient has been assessed with a concern for Malnutrition and has been determined to have a diagnosis/diagnoses of Severe protein-calorie malnutrition.    This patient is being managed with:   Diet Regular-  Entered: Jan 27 2021  1:17PM    
This patient has been assessed with a concern for Malnutrition and has been determined to have a diagnosis/diagnoses of Severe protein-calorie malnutrition.    This patient is being managed with:   Diet Regular-  Entered: Jan 27 2021  1:17PM

## 2021-02-07 NOTE — DISCHARGE NOTE NURSING/CASE MANAGEMENT/SOCIAL WORK - PATIENT PORTAL LINK FT
You can access the FollowMyHealth Patient Portal offered by [facility] by registering at the following website: [email or web address]. By joining [email or web address]’s FollowMyHealth portal, you will also be able to view your health information using other applications (apps) compatible with our system.

## 2021-02-07 NOTE — PROGRESS NOTE ADULT - REASON FOR ADMISSION
Diarrhea in a bed bound patient, COVID positivity status

## 2021-02-07 NOTE — PROGRESS NOTE ADULT - ASSESSMENT
89F with Afib (not on AC due to vaginal bleeding 2/2 polyps), chronic back pain 2/2 spinal stenosis, Type 2 DM on metformin, HTN, recurrent UTIs, chronic venous insufficiency, presents from [facility] for diarrhea, found to have COVID+ infection, admitted for acute hypoxic respiratory failure 2/2 to viral PNA due to COVID 19. O2 noted to be 88% on RA on admission.     #Acute hypoxic respiratory failure sec to COVID-19 Pneumonia - resolved  -Completed Decadron and Remdesivir  -Off supplemental O2 with appropriate Spo2. SpO2 95% on RA.  -Monitor O2 with continuos pulse ox; monitor respiratory status closely  -Maintain strict isolation precautions, use proper PPE.  -Provide Acetaminophen 650 mg PO q6  -May use Albuterol inhaler   -Code Status:  DNR/DNI  -COVID PCR negative on 2/5, 2/6    #Leukocytosis   -Reactive, likely steroid induced  -Afebrile, nontoxic appearing. Observe off antibiotics    #Chronic A fib  -Continue rate control with Metoprolol  -Not on AC due to hx of vaginal bleeding    #Type 2 DM  A1C 6.1  -On Metformin at home  -Continue Lantus 15 units qAM, moderate dose sliding scale  -Hypoglycemia protocol, accu-cheks  -Blood glucose goal 100-180 - within goal    #HTN  -Chronic, stable  -Continue Metoprolol, Amlodipine  -Monitor vitals    #Hypothyroidism  -Chronic, continue Synthroid    #Prophylactic Measure  DVT prophylaxis: Lovenox  Bowel regimen    Dispo: D/C planning to [facility].

## 2021-02-18 ENCOUNTER — EMERGENCY (EMERGENCY)
Facility: HOSPITAL | Age: 86
LOS: 1 days | Discharge: NURSING, REHAB OR LONG-TERM CARE | End: 2021-02-18
Attending: EMERGENCY MEDICINE | Admitting: EMERGENCY MEDICINE
Payer: MEDICARE

## 2021-02-18 VITALS
SYSTOLIC BLOOD PRESSURE: 123 MMHG | OXYGEN SATURATION: 95 % | TEMPERATURE: 98 F | HEIGHT: 70 IN | RESPIRATION RATE: 15 BRPM | DIASTOLIC BLOOD PRESSURE: 83 MMHG | HEART RATE: 94 BPM | WEIGHT: 220.02 LBS

## 2021-02-18 VITALS
RESPIRATION RATE: 18 BRPM | OXYGEN SATURATION: 91 % | SYSTOLIC BLOOD PRESSURE: 123 MMHG | DIASTOLIC BLOOD PRESSURE: 66 MMHG | HEART RATE: 81 BPM | TEMPERATURE: 98 F

## 2021-02-18 DIAGNOSIS — F43.21 ADJUSTMENT DISORDER WITH DEPRESSED MOOD: ICD-10-CM

## 2021-02-18 DIAGNOSIS — Z98.89 OTHER SPECIFIED POSTPROCEDURAL STATES: Chronic | ICD-10-CM

## 2021-02-18 DIAGNOSIS — Z96.651 PRESENCE OF RIGHT ARTIFICIAL KNEE JOINT: Chronic | ICD-10-CM

## 2021-02-18 DIAGNOSIS — Z98.891 HISTORY OF UTERINE SCAR FROM PREVIOUS SURGERY: Chronic | ICD-10-CM

## 2021-02-18 LAB
ALBUMIN SERPL ELPH-MCNC: 3.1 G/DL — LOW (ref 3.3–5)
ALP SERPL-CCNC: 68 U/L — SIGNIFICANT CHANGE UP (ref 30–120)
ALT FLD-CCNC: 24 U/L DA — SIGNIFICANT CHANGE UP (ref 10–60)
ANION GAP SERPL CALC-SCNC: 8 MMOL/L — SIGNIFICANT CHANGE UP (ref 5–17)
APAP SERPL-MCNC: <1 UG/ML — LOW (ref 10–30)
AST SERPL-CCNC: 16 U/L — SIGNIFICANT CHANGE UP (ref 10–40)
BASOPHILS # BLD AUTO: 0.06 K/UL — SIGNIFICANT CHANGE UP (ref 0–0.2)
BASOPHILS NFR BLD AUTO: 0.8 % — SIGNIFICANT CHANGE UP (ref 0–2)
BILIRUB SERPL-MCNC: 0.5 MG/DL — SIGNIFICANT CHANGE UP (ref 0.2–1.2)
BUN SERPL-MCNC: 18 MG/DL — SIGNIFICANT CHANGE UP (ref 7–23)
CALCIUM SERPL-MCNC: 10 MG/DL — SIGNIFICANT CHANGE UP (ref 8.4–10.5)
CHLORIDE SERPL-SCNC: 100 MMOL/L — SIGNIFICANT CHANGE UP (ref 96–108)
CO2 SERPL-SCNC: 26 MMOL/L — SIGNIFICANT CHANGE UP (ref 22–31)
CREAT SERPL-MCNC: 0.8 MG/DL — SIGNIFICANT CHANGE UP (ref 0.5–1.3)
EOSINOPHIL # BLD AUTO: 0.33 K/UL — SIGNIFICANT CHANGE UP (ref 0–0.5)
EOSINOPHIL NFR BLD AUTO: 4.5 % — SIGNIFICANT CHANGE UP (ref 0–6)
ETHANOL SERPL-MCNC: <3 MG/DL — SIGNIFICANT CHANGE UP (ref 0–3)
GLUCOSE SERPL-MCNC: 134 MG/DL — HIGH (ref 70–99)
HCT VFR BLD CALC: 43.6 % — SIGNIFICANT CHANGE UP (ref 34.5–45)
HGB BLD-MCNC: 14.9 G/DL — SIGNIFICANT CHANGE UP (ref 11.5–15.5)
IMM GRANULOCYTES NFR BLD AUTO: 0.7 % — SIGNIFICANT CHANGE UP (ref 0–1.5)
LYMPHOCYTES # BLD AUTO: 1.35 K/UL — SIGNIFICANT CHANGE UP (ref 1–3.3)
LYMPHOCYTES # BLD AUTO: 18.5 % — SIGNIFICANT CHANGE UP (ref 13–44)
MCHC RBC-ENTMCNC: 32 PG — SIGNIFICANT CHANGE UP (ref 27–34)
MCHC RBC-ENTMCNC: 34.2 GM/DL — SIGNIFICANT CHANGE UP (ref 32–36)
MCV RBC AUTO: 93.6 FL — SIGNIFICANT CHANGE UP (ref 80–100)
MONOCYTES # BLD AUTO: 0.65 K/UL — SIGNIFICANT CHANGE UP (ref 0–0.9)
MONOCYTES NFR BLD AUTO: 8.9 % — SIGNIFICANT CHANGE UP (ref 2–14)
NEUTROPHILS # BLD AUTO: 4.86 K/UL — SIGNIFICANT CHANGE UP (ref 1.8–7.4)
NEUTROPHILS NFR BLD AUTO: 66.6 % — SIGNIFICANT CHANGE UP (ref 43–77)
NRBC # BLD: 0 /100 WBCS — SIGNIFICANT CHANGE UP (ref 0–0)
PLATELET # BLD AUTO: 370 K/UL — SIGNIFICANT CHANGE UP (ref 150–400)
POTASSIUM SERPL-MCNC: 3.6 MMOL/L — SIGNIFICANT CHANGE UP (ref 3.5–5.3)
POTASSIUM SERPL-SCNC: 3.6 MMOL/L — SIGNIFICANT CHANGE UP (ref 3.5–5.3)
PROT SERPL-MCNC: 7.2 G/DL — SIGNIFICANT CHANGE UP (ref 6–8.3)
RBC # BLD: 4.66 M/UL — SIGNIFICANT CHANGE UP (ref 3.8–5.2)
RBC # FLD: 13.8 % — SIGNIFICANT CHANGE UP (ref 10.3–14.5)
SALICYLATES SERPL-MCNC: 0.9 MG/DL — LOW (ref 2.8–20)
SARS-COV-2 RNA SPEC QL NAA+PROBE: SIGNIFICANT CHANGE UP
SODIUM SERPL-SCNC: 134 MMOL/L — LOW (ref 135–145)
WBC # BLD: 7.3 K/UL — SIGNIFICANT CHANGE UP (ref 3.8–10.5)
WBC # FLD AUTO: 7.3 K/UL — SIGNIFICANT CHANGE UP (ref 3.8–10.5)

## 2021-02-18 PROCEDURE — 36415 COLL VENOUS BLD VENIPUNCTURE: CPT

## 2021-02-18 PROCEDURE — 80053 COMPREHEN METABOLIC PANEL: CPT

## 2021-02-18 PROCEDURE — 90792 PSYCH DIAG EVAL W/MED SRVCS: CPT | Mod: 95

## 2021-02-18 PROCEDURE — U0003: CPT

## 2021-02-18 PROCEDURE — 86769 SARS-COV-2 COVID-19 ANTIBODY: CPT

## 2021-02-18 PROCEDURE — U0005: CPT

## 2021-02-18 PROCEDURE — 99285 EMERGENCY DEPT VISIT HI MDM: CPT

## 2021-02-18 PROCEDURE — 80307 DRUG TEST PRSMV CHEM ANLYZR: CPT

## 2021-02-18 PROCEDURE — 85025 COMPLETE CBC W/AUTO DIFF WBC: CPT

## 2021-02-18 RX ORDER — ACETAMINOPHEN 500 MG
500 TABLET ORAL
Qty: 0 | Refills: 0 | DISCHARGE

## 2021-02-18 RX ORDER — LANOLIN ALCOHOL/MO/W.PET/CERES
1 CREAM (GRAM) TOPICAL
Qty: 0 | Refills: 0 | DISCHARGE

## 2021-02-18 RX ORDER — CHOLECALCIFEROL (VITAMIN D3) 125 MCG
1 CAPSULE ORAL
Qty: 0 | Refills: 0 | DISCHARGE

## 2021-02-18 RX ORDER — METFORMIN HYDROCHLORIDE 850 MG/1
1 TABLET ORAL
Qty: 0 | Refills: 0 | DISCHARGE

## 2021-02-18 RX ORDER — CETIRIZINE HYDROCHLORIDE 10 MG/1
1 TABLET ORAL
Qty: 0 | Refills: 0 | DISCHARGE

## 2021-02-18 RX ORDER — MULTIVIT-MIN/FERROUS GLUCONATE 9 MG/15 ML
1 LIQUID (ML) ORAL
Qty: 0 | Refills: 0 | DISCHARGE

## 2021-02-18 RX ORDER — FERROUS SULFATE 325(65) MG
1 TABLET ORAL
Qty: 0 | Refills: 0 | DISCHARGE

## 2021-02-18 NOTE — ED BEHAVIORAL HEALTH ASSESSMENT NOTE - SUMMARY
Pt is a 89YO female, limited mobility - wheelchair bound, domiciled at [facility] for Rehab,  with three adult children and grandchildren, PMHx HTN, DM2, Afib, no formal psychiatric history, not currently on antidepressants or other psychotropic medications, no suicide attempts, no psych hospitalizations, no violence history, no substance use, no legal history, BIBA after pt had endorsed thoughts of not wanting to be alive. On interview, pt is bright, expressive, stating that she was "just venting" about feeling cooped up, missing her social life, missing her family, since being stuck in the rehab facility for past three weeks or so - she was transferred there from her usual living facility after she tested positive for covid about three weeks ago. Currently feeling back to baseline regarding physical symptoms. She denies depression or hopelessness, denies psych history. She is interested in starting therapy on a regular basis. Pt is cleared from emergency psychiatry perspective. Pt is a 89YO female, limited mobility - wheelchair bound, domiciled at [facility] for Rehab,  with three adult children and grandchildren, PMHx HTN, DM2, Afib, no formal psychiatric history, not currently on antidepressants or other psychotropic medications, no suicide attempts, no psych hospitalizations, no violence history, no substance use, no legal history, BIBA after pt had endorsed thoughts of not wanting to be alive. On interview, pt is bright, expressive, stating that she was "just venting" about feeling cooped up, missing her social life, missing her family, since being stuck in the rehab facility for past three weeks or so - she was transferred there from her usual living facility after she tested positive for covid about three weeks ago. Currently feeling back to baseline regarding physical symptoms. She denies depression or hopelessness, denies psych history. She is interested in starting therapy on a regular basis. Pt is cleared from emergency psychiatry perspective.    Collateral from patient's emergency contact/son Jose is reassuring as well that pt has no acute psych hx and inpatient psych admission would not be warranted at this time. Agrees to plan for discharge and following up with therapist/outpatient as able.

## 2021-02-18 NOTE — ED PROVIDER NOTE - PROGRESS NOTE DETAILS
Scribe IN for Dr. Cramer: 89 y/o female with PMH xof Afib, HTN, Hypothyroidism, DM2, Spinal stenosis, Hiatal hernia, Degenerative joint disease presents to the ED BIBEMS from [facility] c/o reported SI. Pt states she stated she wanted to die in [facility], which prompted being sent to ED. Pt acknowledges recent stressors, including recent COVID. Pt presently denies SI or HI. No other complaints at this time. PCP: Saúl Hernandez.  PHYSICAL - VSS, afebrile, NAD. PERRL, EOMI. Neck is supple. Heart S1 S2 RRR, Lungs CTA. Abd soft, non-tender. Extremities FROM intact. No edema. NEURO: Awake and alert, no focal deficits. Generalized weakness. PSYCH: Depressed, but not currently suicidal. Calm and cooperative.   IMPRESSION: Suicidal gesture, PLAN: Medical clearance, Telepsych evaluation. spoke with Demond will set up for telepsych

## 2021-02-18 NOTE — ED BEHAVIORAL HEALTH ASSESSMENT NOTE - NS ED BHA MSE GENERAL APPEARANCE
1. Â There is no acute cardiac pulmonary disease. 2. Meghan Puna is stable blunting of the right costophrenic angle which may be secondary to chronic pleural parenchymal thickening.     Ok for [employer] above let pt know
Noted thank you.  Infusion  notified ok to schedule pt
Well developed

## 2021-02-18 NOTE — ED BEHAVIORAL HEALTH ASSESSMENT NOTE - OTHER
other nursing home residents and staff defer Forensic databases (nysdoccslookup and [employer]), [facility], [facility], [facility], [facility] Medical, [facility] ED Visits, [facility], [facility], [facility], [facility], [facility], [facility] ED, [facility], [facility], [facility], [facility] residents at the rehab facility ext billing

## 2021-02-18 NOTE — ED ADULT NURSE REASSESSMENT NOTE - NS ED NURSE REASSESS COMMENT FT1
received report and assumed care of pt at change of shift. patient awake and alert. NAD noted. psych evaluation done earlier. pt to be reevaluated. cooperative with nursing procedures. spoke with pt's son Ortega Kay ([phone]) and pt's daughter; both updated on pt's status. awaiting disposition

## 2021-02-18 NOTE — ED BEHAVIORAL HEALTH NOTE - BEHAVIORAL HEALTH NOTE
PRE-HOSPITAL COURSE  ===================  SOURCE:  Second-hand information via EMR documentation and primary RN, Sharron.   DETAILS: Patient was [facility] from rehab center   ===================  ED COURSE:   SOURCE:  Second-hand information via EMR documentation and primary RNSharron.   ARRIVAL:  Patient was [facility]    BELONGINGS:  Clothing  BEHAVIOR: Complied with triage protocols –provided blood/urine, changed into a hospital gown, and allowed staff to wand/collect belongings without incident. Patient denies SI stated that there was a misunderstanding at the rehab center. Patient stated that she was frustrated due to current societal climate. Patient presents with a linear thought process and is A&OX3. No aggression or behavioral issues reported.   TREATMENT: No prn medications, restraints, security interventions or manual holds required.   VISITORS: Is unaccompanied by family or social supports.   ========================  FOR EACH COLLATERAL  ========================  NAME: Pat   NUMBER: [phone]  RELATIONSHIP:  at [facility]  RELIABILITY: limited   COMMENTS: Collateral stated that patient is a resident at the assisted living facility but was sent to a rehab 3 weeks ago due to testing positive for covid-19. Collateral is not able to provide name or number of rehab facility. She is not able to comment on events that led to patient presenting to the ED. Collateral denies psych hx/SI/SA/SIB.       COVID Exposure Screen- collateral (i.e. third-party, chart review, belongings, etc; include EMS and ED staff)     1.        *Has the patient had a COVID-19 test in the last 21 days?  ( X ) Yes   (  ) No   (  ) Unknown- Reason: ______  IF YES PROCEED TO QUESTION #2. IF NO OR UNKNOWN THEN PLEASE SKIP TO QUESTION #3.  2.        Date of test: __3 weeks ago______  3.        Do you know the result? (  ) Negative   (X  ) Positive   (  ) No result available  4.        *In the past 10 days, has the patient been around anyone with a positive COVID-19 test?*  ( X ) Yes   (  ) No   (  ) Unknown- Reason (e.g. collateral uncertain, refusing to answer, etc.):  ______  IF YES PROCEED TO QUESTION #5. IF NO or UNKNOWN, PLEASE SKIP TO QUESTION #10  5.        Was the patient within 6 feet of them for at least 15 minutes? (  ) Yes   (  ) No   (X  ) Unknown- Reason: ______   6.        Did the patient provide care for them? (  ) Yes   (  ) No   (  X) Unknown- Reason: ______   7.        Did the patient have direct physical contact with them (touched, hugged, or kissed them)? (  ) Yes   (  ) No    (  ) Unknown- Reason: ______   8.        Did the patient share eating or drinking utensils with them? (  ) Yes   (  ) No    (X  ) Unknown- Reason: ______   9.        Have they sneezed, coughed, or somehow got respiratory droplets on the patient? (  ) Yes   (  ) No    (X  ) Unknown- Reason: ______   10.     *Has the patient been out of [place] within the past 10 days?*  (  ) Yes   (  X) No   (  ) Unknown- Reason (e.g. collateral uncertain, sedated, refusing to answer, etc.): _______  IF YES PLEASE ANSWER THE FOLLOWING QUESTIONS:  11.     Which state/country has the patient been to? ______  12.     Were they there over 24 hours? (  ) Yes   (  ) No    (  ) Unknown- Reason: ______  13.     Date of return to [facility]: ______

## 2021-02-18 NOTE — ED ADULT NURSE NOTE - NSIMPLEMENTINTERV_GEN_ALL_ED
Implemented All Fall with Harm Risk Interventions:  [place] to call system. Call bell, personal items and telephone within reach. Instruct patient to call for assistance. Room bathroom lighting operational. Non-slip footwear when patient is off stretcher. Physically safe environment: no spills, clutter or unnecessary equipment. Stretcher in lowest position, wheels locked, appropriate side rails in place. Provide visual cue, wrist band, yellow gown, etc. Monitor gait and stability. Monitor for mental status changes and reorient to person, place, and time. Review medications for side effects contributing to fall risk. Reinforce activity limits and safety measures with patient and family. Provide visual clues: red socks.

## 2021-02-18 NOTE — ED PROVIDER NOTE - NSFOLLOWUPINSTRUCTIONS_ED_ALL_ED_FT
Depression in Older Adults    WHAT YOU NEED TO KNOW:    What do I need to know about depression in older adults? Depression is a condition that causes feelings of sadness or hopelessness that do not go away. The person may lose interest in things he or she used to enjoy. Depression is common in older adults, but it is not a normal part of aging. Treatment is very important and can help improve the person's daily life. You can help support the person by encouraging him or her to work with healthcare providers to manage depression.    What causes or increases the risk for depression in older adults? Depression may be caused by changes in brain chemicals that affect the person's mood. His or her risk for depression may be higher if he or she has any of the following:  •Stressful events such as the death of a loved one, correction, or the need to move into a care facility      •A family history of depression      •A chronic medical condition, such as heart disease or cancer      •Loss of physical strength or mobility      •Drug or alcohol abuse      What are the signs and symptoms of depression in older adults?   •Appetite changes, or weight gain or loss      •Trouble going to sleep or staying asleep, or sleeping too much      •Fatigue or lack of energy      •Feeling restless, irritable, or withdrawn      •Hallucinations or delusions      •Feeling worthless, hopeless, discouraged, or guilty      •Trouble concentrating, remembering things, doing daily tasks, or making decisions      •Statements about wanting to hurt or kill himself or herself      How is depression diagnosed? The person's healthcare provider will ask about symptoms and how long the person has had them. He or she will ask if the person has any family members with depression. The provider may ask you or someone close to the person to describe any symptoms if the person is not able. Tell the person's provider about any stressful events you know about in his or her life. He or she may ask about any other health conditions or medicines the person takes. The person may also need tests to rule out other conditions that can look like depression. Examples include dementia or Alzheimer disease.    How is depression treated?   •Therapy is often used together with medicine. Therapy is a way for the person to talk about his or her feelings and anything that may be causing depression. Therapy can be done alone or in a group. It may also be done with family members or a significant other.      •Antidepressant medicine may be given to relieve depression. The person may need to take this medicine for several weeks before he or she begins to feel better. It is important for healthcare providers to know about all medicines the person is taking. This will help providers know which medicines to recommend for the person. He or she may also need help setting up reminders to take the medicine each day.      What can I do to help the person manage depression?   •Call, visit, or send postcards to the person often. Check on him or her after the loss of a spouse, longtime friend, or pet. Holidays, birthdays, and anniversaries can be difficult for a person after a loss. The loss of a spouse can be painful and lonely for older adults who were  a long time.      •Help the person connect with others. Encourage him or her to become involved in the community. Some examples include tutoring a young student or volunteering at a local organization. The person may need help setting up a computer or creating an e-mail account to help him or her remain connected to others. You may also be able to help set up a visit for the person with his or her Spiritism or spiritual leader.      •Encourage the person to try new things. This can help the person find new interests or meet new people. It can also help prevent him or her from focusing on depression.      •Help the person get equipment that will increase his or her comfort and mobility. Examples are hearing aids, glasses, large print books, and walkers. These can help him or her enjoy activities and feel more independent.      •Encourage the person to continue taking medicine and going to therapy. Medicine and therapy can help improve his or her mental health.      •Help the person exercise safely. Exercise can lift his or her mood, increase energy, and make it easier to sleep. If possible, offer to exercise with the person. For example, you may want to schedule walks with the person. He or she may enjoy going to an event, such as an art exhibit or a museum. If the person is not able to walk, he or she may enjoy an exercise program done in a chair.  Chair Exercises for Seniors           •Encourage the person to seek help for drug or alcohol abuse, if needed. Drugs and alcohol can increase suicidal thoughts and make the person more likely to act on them.      Where can I go for more help if I think the person is considering suicide? The following are available at any time:  •National Suicide Prevention Lifeline: 1-479.768.3408 (8-859-367-TALK)      •Suicide Hotline: 1-454.420.4983 (7-114-BFRWHDG)      •For a list of international numbers: https://save.org/find-help/international-resources/      Call your local emergency number (911 in the [place]) if:   •You hear the person talk about harming himself, herself, or someone else.      •The person has done something on purpose to hurt himself or herself.      When should I call the person's therapist or doctor?   •You think the person's symptoms are not improving.      •You notice new signs, or the person tells you he or she is having new symptoms.      •You have questions or concerns about the person's condition or care.      CARE AGREEMENT:    The person has the right to help plan his or her care. You can help the person learn about depression and how it may be treated. Discuss treatment options with the person and healthcare providers so he or she can decide what care to receive. The person always has the right to refuse treatment        The patient had a psychiatric evaluation and she was found not to be a danger to herself

## 2021-02-18 NOTE — ED BEHAVIORAL HEALTH ASSESSMENT NOTE - REFERRAL / APPOINTMENT DETAILS
provided list of therapy resources/referrals provided list of therapy resources/referrals for psychotherapy

## 2021-02-18 NOTE — ED BEHAVIORAL HEALTH ASSESSMENT NOTE - AXIS III
HTN, DM2, Afib HTN, DM2, Afib, recently hospitalized for three weeks due to COVID and now in rehab facility for PT/reconditioning

## 2021-02-18 NOTE — ED BEHAVIORAL HEALTH ASSESSMENT NOTE - RISK ASSESSMENT
Low Acute Suicide Risk low acute risk and low chronic risk for suicide.   pt denies SI. no psych history. no depression/anxiety. no past psych admissions. no psych treatment in past. no suicide attempts in past. no substance use. no self harm in past or currently.

## 2021-02-18 NOTE — ED PROVIDER NOTE - CONSTITUTIONAL, MLM
normal... Well appearing, awake, alert, oriented to person, place, time/situation and in no apparent distress calm cooperative

## 2021-02-18 NOTE — ED ADULT NURSE NOTE - CHIEF COMPLAINT QUOTE
patient is brought in by EMS from [facility] for nursing and rehab, c/o SI and stated "I want to die" to doctor, tried to chock herself

## 2021-02-18 NOTE — ED ADULT NURSE NOTE - OBJECTIVE STATEMENT
Presents to ED from [facility]. Pt made a statement to staff that "I want to kill myself". Sent for Psy evaluation. Upon arrival to ED pt states everyone overreacted. Expresses frustration @ being cooped up, not seeing anyone, still having  pain from teeth being extracted last week, Covid.  Denies suicidal ideation at this time. O/E alert & oriented. Color fair. Skin warm & dry to touch. Lungs clear - diminished at bases. Pt inc of urine & stool. Cleaned & positioned. Placed on 1:1

## 2021-02-18 NOTE — ED PROVIDER NOTE - OBJECTIVE STATEMENT
Pt is a 89 yo female with pmhx of Afib, HTN, Hypothyroidism, DM2, Spinal stenosis, Hiatal hernia, Degenerative joint disease presents to the ED BIBEMS from [facility] c/o reported SI. Pt states she said it because she is frustrated due to covid pandemic and being cooped up and she also had her teeth removed so she was in pain. Pt denies any SI now and denies choking herself. Pt states everyone just over reacted. denies any psych history no admissions.     pcp gildardo diamond

## 2021-02-18 NOTE — ED CLERICAL - CLERICAL COMMENTS
called for transport back to [facility] at [place].  yanet picking up patient between [id number] and [id number].

## 2021-02-18 NOTE — ED BEHAVIORAL HEALTH ASSESSMENT NOTE - HPI (INCLUDE ILLNESS QUALITY, SEVERITY, DURATION, TIMING, CONTEXT, MODIFYING FACTORS, ASSOCIATED SIGNS AND SYMPTOMS)
Pt is a 89YO female, wheelchair bound,      COVID screening questions:  pt endorses positive covid test about three weeks ago  pt denies travel outside of [facility] in past 10 days  pt denies contact with anyone with covid in past 10 days Pt is a 89YO female, limited mobility - wheelchair bound, domiciled at [facility],  with three adult children and grandchildren, PMHx HTN, DM2, Afib, no formal psychiatric history, not currently on antidepressants or other psychotropic medications, no suicide attempts, no psych hospitalizations, no violence history, no substance use, no legal history, BIBA after pt had endorsed thoughts of not wanting to be alive.     Pt reports that she was COVID+ about three weeks ago and sent from her assisted living facility to [facility] at that time, currently feeling back to her usual state of health. States that she has been feeling "cooped up, locked up," yearning to see and be with her children and grandchildren, and made a comment to staff that she didn't want to live like this anymore, states it was "just venting about the way the world is now." She reflects on how much fun she had in her life, being known as "the funny one, always dancing, always laughing." She denies SI currently. Denies HI. Denies AVH or manic symptoms. Denies psych hospitalizations. Denies suicide attempts. Denies being on psychotropic medications in past. She reports that her doctors have tried to prescribed antidepressants in past but she never took them.     She denies alcohol use or smoking, denies other substance use.    She expresses interest in speaking to a therapist on a regular basis.     Spoke with supervisor at [facility] [phone].     COVID screening questions:  pt endorses positive covid test about three weeks ago  pt denies travel outside of [facility] in past 10 days  pt denies contact with anyone with covid in past 10 days Pt is a 89YO female, limited mobility - wheelchair bound, [facility],  with three adult children and grandchildren, PMHx HTN, DM2, Afib, no formal psychiatric history, not currently on antidepressants or other psychotropic medications, no suicide attempts, no psych hospitalizations, no violence history, no substance use, no legal history, BIBA after pt had endorsed thoughts of not wanting to be alive.     Pt reports that she was COVID+ about three weeks ago and [facility]. States that she has been feeling "cooped up, locked up," yearning to see and be with her children and grandchildren, and made a comment to staff that she didn't want to live like this anymore, states it was "just venting about the way the world is now." She reflects on how much fun she had in her life, being known as "the funny one, always dancing, always laughing." She denies SI currently. Denies HI. Denies AVH or manic symptoms. Denies psych hospitalizations. Denies suicide attempts. Denies being on psychotropic medications in past. She reports that her doctors have tried to prescribed antidepressants in past but she never took them.     She denies alcohol use or smoking, denies other substance use.    She expresses interest in speaking to a therapist on a regular basis.     Spoke with supervisor at [facility] [phone]. Concerned that pt had expressed she wanted to die. Asked to have her PCP Dr. Chun contacted before making dispo decision. Contacted Dr. Chun [phone] but unable to reach at this time.    Contacted pt's son/emergency contact Jose [phone]. He is primarily concerned that pt has been complaining and very unhappy about current living situation at rehab facility. Stella she had been making some progress with PT and getting her strength back. Pt was hospitalized for roughly three weeks for COVID symptoms/sequelae and transferred to [facility] for PT and reconditioning. No concerns about her harming herself or attempting to take her life. Describes her as an anxious person at baseline but no acute decompensations in past from psychiatric standpoint. Does not feel inpatient psych facility would be the right place for pt at this time and agrees with plan to discharge patient out of emergency room at this time.     COVID screening questions:  pt endorses positive covid test about four weeks ago  pt denies travel outside of [facility] in past 10 days  pt denies contact with anyone with covid in past 10 days

## 2021-02-18 NOTE — ED PROVIDER NOTE - PATIENT PORTAL LINK FT
You can access the FollowMyHealth Patient Portal offered by [facility] by registering at the following website: [email or web address]. By joining [employer]’s FollowMyHealth portal, you will also be able to view your health information using other applications (apps) compatible with our system. You can access the FollowMyHealth Patient Portal offered by [facility] by registering at the following website: [email or web address]. By joining [employer]’s FollowMyHealth portal, you will also be able to view your health information using other applications (apps) compatible with our system.

## 2021-02-18 NOTE — ED ADULT TRIAGE NOTE - CHIEF COMPLAINT QUOTE
patient is brought in by EMS from [facility] for nursing and rehab, c/o SI and stated "I want to die" to doctor, tried to chock herself

## 2021-02-19 LAB
SARS-COV-2 IGG SERPL QL IA: POSITIVE
SARS-COV-2 IGM SERPL IA-ACNC: 127 INDEX — HIGH

## 2021-04-08 NOTE — ED ADULT NURSE NOTE - COVID-19 RESULT DATE/TIME
31-Dec-2020 09:14 Xeljanz Counseling: I discussed with the patient the risks of Xeljanz therapy including increased risk of infection, liver issues, headache, diarrhea, or cold symptoms. Live vaccines should be avoided. They were instructed to call if they have any problems.

## 2021-06-03 PROCEDURE — 36000 PLACE NEEDLE IN VEIN: CPT

## 2021-06-03 PROCEDURE — 86140 C-REACTIVE PROTEIN: CPT

## 2021-06-03 PROCEDURE — 80076 HEPATIC FUNCTION PANEL: CPT

## 2021-06-03 PROCEDURE — 83735 ASSAY OF MAGNESIUM: CPT

## 2021-06-03 PROCEDURE — U0003: CPT

## 2021-06-03 PROCEDURE — 84145 PROCALCITONIN (PCT): CPT

## 2021-06-03 PROCEDURE — 80053 COMPREHEN METABOLIC PANEL: CPT

## 2021-06-03 PROCEDURE — 80048 BASIC METABOLIC PNL TOTAL CA: CPT

## 2021-06-03 PROCEDURE — 86769 SARS-COV-2 COVID-19 ANTIBODY: CPT

## 2021-06-03 PROCEDURE — 85379 FIBRIN DEGRADATION QUANT: CPT

## 2021-06-03 PROCEDURE — 93971 EXTREMITY STUDY: CPT

## 2021-06-03 PROCEDURE — 82962 GLUCOSE BLOOD TEST: CPT

## 2021-06-03 PROCEDURE — 82728 ASSAY OF FERRITIN: CPT

## 2021-06-03 PROCEDURE — 84100 ASSAY OF PHOSPHORUS: CPT

## 2021-06-03 PROCEDURE — 36415 COLL VENOUS BLD VENIPUNCTURE: CPT

## 2021-06-03 PROCEDURE — 71045 X-RAY EXAM CHEST 1 VIEW: CPT

## 2021-06-03 PROCEDURE — 85025 COMPLETE CBC W/AUTO DIFF WBC: CPT

## 2021-06-03 PROCEDURE — 99285 EMERGENCY DEPT VISIT HI MDM: CPT | Mod: 25

## 2021-06-03 PROCEDURE — U0005: CPT

## 2021-06-03 PROCEDURE — 83036 HEMOGLOBIN GLYCOSYLATED A1C: CPT

## 2021-07-23 NOTE — ED ADULT NURSE NOTE - TEMPLATE
Health Maintenance Due   Topic Date Due   • Pneumococcal Vaccine 0-64 (1 of 2 - PPSV23) Never done   • DTaP/Tdap/Td Vaccine (7 - Td or Tdap) 06/20/2021       Patient is due for the topics as listed above and wishes to proceed with them.          Abdominal Pain, N/V/D

## 2021-08-25 NOTE — PHYSICAL THERAPY INITIAL EVALUATION ADULT - CRITERIA FOR SKILLED THERAPEUTIC INTERVENTIONS
LOV: 05/10/2021  NOV: 09/14/2021    Last Thyroid lab 04/28/2021  
impairments found
impairments found

## 2021-09-07 NOTE — PROGRESS NOTE ADULT - PROBLEM SELECTOR PLAN 7
likely antibiotic induced vs viral syndrome  zofran prn  will monitor,   axr reviewed lots of stool  constipation: start bowel regimen
no
likely antibiotic induced vs viral syndrome  zofran prn  will moniter,   if persistant will obtain lfts, amylase lipase and axr
likely antibiotic induced vs viral syndrome  zofran prn, resolved    constipation: start bowel regimen, start lactulose
likely antibiotic induced vs viral syndrome  zofran prn, resolved    constipation: start bowel regimen, start lactulose

## 2021-09-23 NOTE — PATIENT PROFILE ADULT - NSASFALLNEEDSASSISTWITH_GEN_A_NUR
[HIV Infection] : no HIV [Exposure To Gonorrhea] : no gonorrhea [Chlamydial Infections] : no chlamydia [Syphilis] : no syphilis [Herpes Simplex] : no genital herpes [Human Papilloma Virus Infection] : no genital warts [Hepatitis, B Virus] : no Hepatitis B [Hepatitis, C Virus] : no Hepatitis C [Trichomoniasis] : no trichomoniasis walking/standing

## 2021-11-04 NOTE — PHYSICAL THERAPY INITIAL EVALUATION ADULT - PRECAUTIONS/LIMITATIONS, REHAB EVAL
Please bring in your opioid medication to every visit in their original bottle for a pill count.     
fall precautions
fall precautions

## 2022-03-05 NOTE — DIETITIAN INITIAL EVALUATION ADULT. - NUTRITION INTERVENTION
Meals and Snack
Mega: s/o by dr gomes to f/u labs. pt trop neg. over 2 weeks of cp. cxr clear. pt hemodynamically stable. low cardiac risk. pt admitted multiple times at [facility] for psych issues. will sent pt back to group home.

## 2022-05-27 NOTE — ED ADULT TRIAGE NOTE - NS ED TRIAGE AVPU SCALE
Patient is here  with spouse, Penny.    If any information or results need to be relayed from today's visit, the best way to contact the patient is via [phone] (mobile) - Patient gives verbal permission to leave a detailed voicemail at the number provided.       Alert-The patient is alert, awake and responds to voice. The patient is oriented to time, place, and person. The triage nurse is able to obtain subjective information.

## 2022-06-07 NOTE — ED ADULT NURSE NOTE - PAIN: RADIATION
Second attempt to schedule px, patient stated she will return phone call.      If call back please schedule a physical appt with [facility] [place], ok to use any 20 min slot burning on urination

## 2022-09-14 ENCOUNTER — NON-APPOINTMENT (OUTPATIENT)
Age: 87
End: 2022-09-14

## 2022-09-15 ENCOUNTER — NON-APPOINTMENT (OUTPATIENT)
Age: 87
End: 2022-09-15

## 2023-06-05 ENCOUNTER — EMERGENCY (EMERGENCY)
Facility: HOSPITAL | Age: 88
LOS: 1 days | Discharge: HOME | End: 2023-06-05
Attending: EMERGENCY MEDICINE | Admitting: EMERGENCY MEDICINE
Payer: MEDICARE

## 2023-06-05 VITALS
SYSTOLIC BLOOD PRESSURE: 112 MMHG | HEART RATE: 82 BPM | DIASTOLIC BLOOD PRESSURE: 84 MMHG | TEMPERATURE: 97 F | RESPIRATION RATE: 19 BRPM | WEIGHT: 192.9 LBS | OXYGEN SATURATION: 99 %

## 2023-06-05 VITALS
SYSTOLIC BLOOD PRESSURE: 129 MMHG | DIASTOLIC BLOOD PRESSURE: 87 MMHG | HEART RATE: 80 BPM | RESPIRATION RATE: 15 BRPM | OXYGEN SATURATION: 100 %

## 2023-06-05 DIAGNOSIS — Z98.89 OTHER SPECIFIED POSTPROCEDURAL STATES: Chronic | ICD-10-CM

## 2023-06-05 DIAGNOSIS — Z96.651 PRESENCE OF RIGHT ARTIFICIAL KNEE JOINT: Chronic | ICD-10-CM

## 2023-06-05 DIAGNOSIS — Z98.891 HISTORY OF UTERINE SCAR FROM PREVIOUS SURGERY: Chronic | ICD-10-CM

## 2023-06-05 LAB
ALBUMIN SERPL ELPH-MCNC: 3.3 G/DL — SIGNIFICANT CHANGE UP (ref 3.3–5)
ALP SERPL-CCNC: 74 U/L — SIGNIFICANT CHANGE UP (ref 40–120)
ALT FLD-CCNC: 16 U/L — SIGNIFICANT CHANGE UP (ref 10–45)
ANION GAP SERPL CALC-SCNC: 7 MMOL/L — SIGNIFICANT CHANGE UP (ref 5–17)
APPEARANCE UR: ABNORMAL
APTT BLD: 30.8 SEC — SIGNIFICANT CHANGE UP (ref 27.5–35.5)
AST SERPL-CCNC: 8 U/L — LOW (ref 10–40)
BACTERIA # UR AUTO: ABNORMAL /HPF
BASOPHILS # BLD AUTO: 0.05 K/UL — SIGNIFICANT CHANGE UP (ref 0–0.2)
BASOPHILS NFR BLD AUTO: 0.7 % — SIGNIFICANT CHANGE UP (ref 0–2)
BILIRUB SERPL-MCNC: 0.5 MG/DL — SIGNIFICANT CHANGE UP (ref 0.2–1.2)
BILIRUB UR-MCNC: NEGATIVE — SIGNIFICANT CHANGE UP
BUN SERPL-MCNC: 21 MG/DL — SIGNIFICANT CHANGE UP (ref 7–23)
CALCIUM SERPL-MCNC: 10.1 MG/DL — SIGNIFICANT CHANGE UP (ref 8.4–10.5)
CHLORIDE SERPL-SCNC: 102 MMOL/L — SIGNIFICANT CHANGE UP (ref 96–108)
CO2 SERPL-SCNC: 30 MMOL/L — SIGNIFICANT CHANGE UP (ref 22–31)
COLOR SPEC: YELLOW — SIGNIFICANT CHANGE UP
CREAT SERPL-MCNC: 0.69 MG/DL — SIGNIFICANT CHANGE UP (ref 0.5–1.3)
DIFF PNL FLD: ABNORMAL
EGFR: 81 ML/MIN/1.73M2 — SIGNIFICANT CHANGE UP
EOSINOPHIL # BLD AUTO: 0.21 K/UL — SIGNIFICANT CHANGE UP (ref 0–0.5)
EOSINOPHIL NFR BLD AUTO: 2.9 % — SIGNIFICANT CHANGE UP (ref 0–6)
EPI CELLS # UR: 0 — SIGNIFICANT CHANGE UP
GLUCOSE SERPL-MCNC: 114 MG/DL — HIGH (ref 70–99)
GLUCOSE UR QL: NEGATIVE MG/DL — SIGNIFICANT CHANGE UP
HCT VFR BLD CALC: 41.9 % — SIGNIFICANT CHANGE UP (ref 34.5–45)
HGB BLD-MCNC: 13.7 G/DL — SIGNIFICANT CHANGE UP (ref 11.5–15.5)
IMM GRANULOCYTES NFR BLD AUTO: 0.3 % — SIGNIFICANT CHANGE UP (ref 0–0.9)
INR BLD: 1.11 RATIO — SIGNIFICANT CHANGE UP (ref 0.88–1.16)
KETONES UR-MCNC: ABNORMAL MG/DL
LEUKOCYTE ESTERASE UR-ACNC: ABNORMAL
LYMPHOCYTES # BLD AUTO: 1.19 K/UL — SIGNIFICANT CHANGE UP (ref 1–3.3)
LYMPHOCYTES # BLD AUTO: 16.5 % — SIGNIFICANT CHANGE UP (ref 13–44)
MCHC RBC-ENTMCNC: 32.2 PG — SIGNIFICANT CHANGE UP (ref 27–34)
MCHC RBC-ENTMCNC: 32.7 GM/DL — SIGNIFICANT CHANGE UP (ref 32–36)
MCV RBC AUTO: 98.4 FL — SIGNIFICANT CHANGE UP (ref 80–100)
MONOCYTES # BLD AUTO: 0.43 K/UL — SIGNIFICANT CHANGE UP (ref 0–0.9)
MONOCYTES NFR BLD AUTO: 6 % — SIGNIFICANT CHANGE UP (ref 2–14)
NEUTROPHILS # BLD AUTO: 5.32 K/UL — SIGNIFICANT CHANGE UP (ref 1.8–7.4)
NEUTROPHILS NFR BLD AUTO: 73.6 % — SIGNIFICANT CHANGE UP (ref 43–77)
NITRITE UR-MCNC: POSITIVE
NRBC # BLD: 0 /100 WBCS — SIGNIFICANT CHANGE UP (ref 0–0)
PH UR: 5.5 — SIGNIFICANT CHANGE UP (ref 5–8)
PLATELET # BLD AUTO: 250 K/UL — SIGNIFICANT CHANGE UP (ref 150–400)
POTASSIUM SERPL-MCNC: 4.7 MMOL/L — SIGNIFICANT CHANGE UP (ref 3.5–5.3)
POTASSIUM SERPL-SCNC: 4.7 MMOL/L — SIGNIFICANT CHANGE UP (ref 3.5–5.3)
PROT SERPL-MCNC: 7.2 G/DL — SIGNIFICANT CHANGE UP (ref 6–8.3)
PROT UR-MCNC: 100 MG/DL
PROTHROM AB SERPL-ACNC: 12.9 SEC — SIGNIFICANT CHANGE UP (ref 10.5–13.4)
RAPID RVP RESULT: SIGNIFICANT CHANGE UP
RBC # BLD: 4.26 M/UL — SIGNIFICANT CHANGE UP (ref 3.8–5.2)
RBC # FLD: 13.6 % — SIGNIFICANT CHANGE UP (ref 10.3–14.5)
RBC CASTS # UR COMP ASSIST: >50 /HPF — HIGH (ref 0–4)
SARS-COV-2 RNA SPEC QL NAA+PROBE: SIGNIFICANT CHANGE UP
SODIUM SERPL-SCNC: 139 MMOL/L — SIGNIFICANT CHANGE UP (ref 135–145)
SP GR SPEC: 1.02 — SIGNIFICANT CHANGE UP (ref 1–1.03)
TROPONIN I, HIGH SENSITIVITY RESULT: 13 NG/L — SIGNIFICANT CHANGE UP
UROBILINOGEN FLD QL: 1 MG/DL — SIGNIFICANT CHANGE UP (ref 0.2–1)
WBC # BLD: 7.22 K/UL — SIGNIFICANT CHANGE UP (ref 3.8–10.5)
WBC # FLD AUTO: 7.22 K/UL — SIGNIFICANT CHANGE UP (ref 3.8–10.5)
WBC UR QL: >50 /HPF — HIGH (ref 0–5)

## 2023-06-05 PROCEDURE — 84484 ASSAY OF TROPONIN QUANT: CPT

## 2023-06-05 PROCEDURE — 99284 EMERGENCY DEPT VISIT MOD MDM: CPT | Mod: 25

## 2023-06-05 PROCEDURE — 71045 X-RAY EXAM CHEST 1 VIEW: CPT

## 2023-06-05 PROCEDURE — 70450 CT HEAD/BRAIN W/O DYE: CPT | Mod: MA

## 2023-06-05 PROCEDURE — 36415 COLL VENOUS BLD VENIPUNCTURE: CPT

## 2023-06-05 PROCEDURE — 85730 THROMBOPLASTIN TIME PARTIAL: CPT

## 2023-06-05 PROCEDURE — 71045 X-RAY EXAM CHEST 1 VIEW: CPT | Mod: 26

## 2023-06-05 PROCEDURE — 87186 SC STD MICRODIL/AGAR DIL: CPT

## 2023-06-05 PROCEDURE — 85610 PROTHROMBIN TIME: CPT

## 2023-06-05 PROCEDURE — 82962 GLUCOSE BLOOD TEST: CPT

## 2023-06-05 PROCEDURE — 96374 THER/PROPH/DIAG INJ IV PUSH: CPT

## 2023-06-05 PROCEDURE — 80053 COMPREHEN METABOLIC PANEL: CPT

## 2023-06-05 PROCEDURE — 70450 CT HEAD/BRAIN W/O DYE: CPT | Mod: 26,MA

## 2023-06-05 PROCEDURE — 85025 COMPLETE CBC W/AUTO DIFF WBC: CPT

## 2023-06-05 PROCEDURE — 81001 URINALYSIS AUTO W/SCOPE: CPT

## 2023-06-05 PROCEDURE — 0225U NFCT DS DNA&RNA 21 SARSCOV2: CPT

## 2023-06-05 PROCEDURE — 99285 EMERGENCY DEPT VISIT HI MDM: CPT | Mod: FS

## 2023-06-05 PROCEDURE — 87086 URINE CULTURE/COLONY COUNT: CPT

## 2023-06-05 RX ORDER — SODIUM CHLORIDE 9 MG/ML
500 INJECTION INTRAMUSCULAR; INTRAVENOUS; SUBCUTANEOUS ONCE
Refills: 0 | Status: COMPLETED | OUTPATIENT
Start: 2023-06-05 | End: 2023-06-05

## 2023-06-05 RX ADMIN — SODIUM CHLORIDE 1000 MILLILITER(S): 9 INJECTION INTRAMUSCULAR; INTRAVENOUS; SUBCUTANEOUS at 17:05

## 2023-06-05 RX ADMIN — SODIUM CHLORIDE 1000 MILLILITER(S): 9 INJECTION INTRAMUSCULAR; INTRAVENOUS; SUBCUTANEOUS at 16:02

## 2023-06-05 NOTE — ED PROVIDER NOTE - PHYSICAL EXAMINATION
General:     NAD  Eyes: PERRL  Head:     NC/AT, oral mucosa moist  Neck:     trachea midline  Lungs:     CTA b/l  CVS:     RRR  Abd:     soft, non tender, no distension  Ext:   moving all 4 extremities spontaneously   Neuro: AAOx0, confused

## 2023-06-05 NOTE — ED PROVIDER NOTE - PATIENT PORTAL LINK FT
You can access the FollowMyHealth Patient Portal offered by [facility] by registering at the following website: [email or web address]. By joining [facility]’s FollowMyHealth portal, you will also be able to view your health information using other applications (apps) compatible with our system.

## 2023-06-05 NOTE — ED ADULT TRIAGE NOTE - CHIEF COMPLAINT QUOTE
BIB EMS from [facility] for lethargy and AMS since 115pm. LKW was 1230. pt at baseline confused with dementia but alert. on arrival pt only responsive to pain. . Denies taking any blood thinners. pt with O2 sat 92% on EMs arrival, pt BIB EMS on 4LNC with O2 sat 99%.

## 2023-06-05 NOTE — ED PROVIDER NOTE - NSFOLLOWUPINSTRUCTIONS_ED_ALL_ED_FT
Urinary Tract Infection    A urinary tract infection (UTI) is an infection of any part of the urinary tract, which includes the kidneys, ureters, bladder, and urethra. Risk factors include ignoring your need to urinate, wiping back to front if female, being an uncircumcised male, and having diabetes or a weak immune system. Symptoms include frequent urination, pain or burning with urination, foul smelling urine, cloudy urine, pain in the lower abdomen, blood in the urine, and fever. If you were prescribed an antibiotic medicine, take it as told by your health care provider. Do not stop taking the antibiotic even if you start to feel better.    SEEK IMMEDIATE MEDICAL CARE IF YOU HAVE ANY OF THE FOLLOWING SYMPTOMS: severe back or abdominal pain, fever, inability to keep fluids or medicine down, dizziness/lightheadedness, or a change in mental status.       Please follow-up with your doctor(s) within the next 3 days, but see medical sooner if your symptoms persist or worsen.  Please call tomorrow for an appointment.    You were given a copy of your labs and/or imaging.  Please go-over these with your doctor(s).     If you have any worsening of symptoms or any other concerns please see your doctor or return to the ED immediately.    Please continue taking your home medications as directed.  Do not use alcohol when taking any medication (especially antibiotics, tylenol or other pain medication) unless you check with the doctor or pharmacist. Urinary Tract Infection    A urinary tract infection (UTI) is an infection of any part of the urinary tract, which includes the kidneys, ureters, bladder, and urethra. Risk factors include ignoring your need to urinate, wiping back to front if female, being an uncircumcised male, and having diabetes or a weak immune system. Symptoms include frequent urination, pain or burning with urination, foul smelling urine, cloudy urine, pain in the lower abdomen, blood in the urine, and fever. If you were prescribed an antibiotic medicine, take it as told by your health care provider. Do not stop taking the antibiotic even if you start to feel better.    SEEK IMMEDIATE MEDICAL CARE IF YOU HAVE ANY OF THE FOLLOWING SYMPTOMS: severe back or abdominal pain, fever, inability to keep fluids or medicine down, dizziness/lightheadedness, or a change in mental status.     Take levaquin 500mg daily for 7 days.    Please follow-up with your doctor(s) within the next 3 days, but see medical sooner if your symptoms persist or worsen.  Please call tomorrow for an appointment.    You were given a copy of your labs and/or imaging.  Please go-over these with your doctor(s).     If you have any worsening of symptoms or any other concerns please see your doctor or return to the ED immediately.    Please continue taking your home medications as directed.  Do not use alcohol when taking any medication (especially antibiotics, tylenol or other pain medication) unless you check with the doctor or pharmacist.

## 2023-06-05 NOTE — ED PROVIDER NOTE - OBJECTIVE STATEMENT
Pt is a 89 yo female with pmhx of Afib, HTN, Hypothyroidism, DM2, Spinal stenosis, Hiatal hernia, Degenerative joint disease presents to the ED BIBEMS from [facility] from [facility] for lethargy and AMS since 115pm. LKW was 1230. pt at baseline confused with dementia but alert. on arrival pt only responsive to pain. no blood thinners Pt is a 89 yo female with pmhx of Afib, HTN, Hypothyroidism, DM2, Spinal stenosis, Hiatal hernia, Degenerative joint disease presents to the ED [facility] from [facility] for lethargy and AMS since 115pm. LKW was 1230. pt at baseline confused with dementia but alert. on arrival pt only responsive to pain. no blood thinners

## 2023-06-05 NOTE — ED ADULT NURSE REASSESSMENT NOTE - NS ED NURSE REASSESS COMMENT FT1
pt arrived from [facility] with IV in left hand, 24 gauge, discussed with supervisor at Facility, fatmata Szymanski to leave IV in for transfer back to facility.

## 2023-06-05 NOTE — ED PROVIDER NOTE - ATTENDING APP SHARED VISIT CONTRIBUTION OF CARE
Juan M with [facility]. Pt is a 89 yo female with pmhx of Afib, HTN, Hypothyroidism, DM2, Spinal stenosis, Hiatal hernia, Degenerative joint disease [facility] leth[facility]y and AMS since 115pm. LKW was 1230. pt at baseline confused with dementia but alert. on arrival pt only responsive to pain. no blood thinners  labs unremarkable. ct head neg. +UA. improved with IVF  discussed with family at bedside and dr. layne. They have ability to give IV abx at the facility. Family would prefer that she goes back to facility. Agree    Discussed with family need to return to ED if symptoms don't continue to improve or recur or develops any new or worsening symptoms that are of concern.    I performed a face to face bedside interview with patient regarding history of present illness, review of symptoms and past medical history. I completed an independent physical exam.  I have discussed the patient's plan of care with Physician Assistant (PA). I agree with note as stated above, having amended the EMR as needed to reflect my findings.   This includes History of Present Illness, HIV, Past Medical/Surgical/Family/Social History, Allergies and Home Medications, Review of Systems, Physical Exam, and any Progress Notes during the time I functioned as the attending physician for this patient.

## 2023-06-05 NOTE — ED PROVIDER NOTE - CLINICAL SUMMARY MEDICAL DECISION MAKING FREE TEXT BOX
Pt is a 89 yo female with pmhx of Afib, HTN, Hypothyroidism, DM2, Spinal stenosis, Hiatal hernia, Degenerative joint disease presents to the ED [facility]IBEMS from [facility] from [facility] for lethargy and AMS since 115pm. LKW was 1230. pt at baseline confused with dementia but alert. on arrival pt only responsive to pain. no blood thinners Pt is a 89 yo female with pmhx of Afib, HTN, Hypothyroidism, DM2, Spinal stenosis, Hiatal hernia, Degenerative joint disease presents to the ED [facility] from [facility] from [facility] for lethargy and AMS since 115pm. LKW was 1230. pt at baseline confused with dementia but alert. on arrival pt only responsive to pain. no blood thinners  labs unremarkable. ct head neg. +UA. improved with IVF  discussed with family at bedside and dr. reyes. pt will received IV abx at the facility.  Discussed with family need to return to ED if symptoms don't continue to improve or recur or develops any new or worsening symptoms that are of concern. Pt is a 89 yo female with pmhx of Afib, HTN, Hypothyroidism, DM2, Spinal stenosis, Hiatal hernia, Degenerative joint disease presents to the ED [facility] from [facility] from [facility] for lethargy and AMS since 115pm. LKW was 1230. pt at baseline confused with dementia but alert. on arrival pt only responsive to pain. no blood thinners  labs unremarkable. ct head neg. +UA. improved with IVF  discussed with family at bedside and dr. layne. pt will received IV abx at the facility.  Discussed with family need to return to ED if symptoms don't continue to improve or recur or develops any new or worsening symptoms that are of concern. Pt is a 89 yo female with pmhx of Afib, HTN, Hypothyroidism, DM2, Spinal stenosis, Hiatal hernia, Degenerative joint disease [facility] and AMS since 115pm. LKW was 1230. pt at baseline confused with dementia but alert. on arrival pt only responsive to pain. no blood thinners  labs unremarkable. ct head neg. +UA. improved with IVF  discussed with family at bedside and dr. layne. pt will received IV abx at the facility.  Discussed with family need to return to ED if symptoms don't continue to improve or recur or develops any new or worsening symptoms that are of concern.

## 2023-06-05 NOTE — ED ADULT NURSE NOTE - NSFALLHARMRISKINTERV_ED_ALL_ED

## 2023-06-05 NOTE — ED ADULT NURSE NOTE - OBJECTIVE STATEMENT
89 yr old female BIBA from [facility] with lethargy and confusion since 115pm.  Last known well 1230PM. Pt is A&Ox 2.  Lethargic but arousable.  Pt's fingerstick upon arrival 111. Pt went directly to CT.  Pt arrived to ED with 18g in the RT AC and 24G to the left hand. Both IV's flushing well with +blood return. No acute resp distress noted. Resp even and unlabored. Abd soft, NT. Pt not following commands, unable to hold extremities in the air.  Skin warm and dry. 89 yr old female BIBA from [facility] with lethargy and confusion since 115pm.  Last known well 1230PM. Pt is A&Ox 2.  Lethargic but arousable.  Pt's fingerstick upon arrival 111. Pt went directly to CT.  Pt arrived to ED with 18g in the RT AC and 24G to the left hand. Both IV's flushing well with +blood return. No acute resp distress noted. Resp even and unlabored. Abd soft, NT. Pt not following commands, unable to hold extremities in the air.  Skin warm and dry. When family arrived they stated patient seems to be at baseline mental status.  States that the patient tends to get like this with UTI's. Pt straight cath"d and urine sent.

## 2023-06-05 NOTE — ED ADULT NURSE NOTE - CHIEF COMPLAINT QUOTE
BIB EMS from [facility] for lethargy and AMS since 115pm. LKW was 1230. pt at baseline confused with dementia but alert. on arrival pt only responsive to pain. . Denies taking any blood thinners. pt with O2 sat 92% on EMs arrival, pt BIB EMS on 4LNC with O2 sat 99%.

## 2023-06-09 NOTE — ED POST DISCHARGE NOTE - RESULT SUMMARY
Urine culture positive resistant to Levaquin that was sent earlier patient's PMD Dr. [place] layne how did he know that message left and reports faxed to his office

## 2023-06-11 ENCOUNTER — INPATIENT (INPATIENT)
Facility: HOSPITAL | Age: 88
LOS: 2 days | Discharge: NURSING, REHAB OR LONG-TERM CARE | DRG: 689 | End: 2023-06-14
Attending: INTERNAL MEDICINE | Admitting: INTERNAL MEDICINE
Payer: MEDICARE

## 2023-06-11 VITALS
WEIGHT: 199.08 LBS | SYSTOLIC BLOOD PRESSURE: 149 MMHG | OXYGEN SATURATION: 100 % | RESPIRATION RATE: 17 BRPM | DIASTOLIC BLOOD PRESSURE: 87 MMHG | TEMPERATURE: 98 F | HEART RATE: 93 BPM

## 2023-06-11 DIAGNOSIS — Z98.89 OTHER SPECIFIED POSTPROCEDURAL STATES: Chronic | ICD-10-CM

## 2023-06-11 DIAGNOSIS — N39.0 URINARY TRACT INFECTION, SITE NOT SPECIFIED: ICD-10-CM

## 2023-06-11 DIAGNOSIS — Z98.891 HISTORY OF UTERINE SCAR FROM PREVIOUS SURGERY: Chronic | ICD-10-CM

## 2023-06-11 DIAGNOSIS — Z96.651 PRESENCE OF RIGHT ARTIFICIAL KNEE JOINT: Chronic | ICD-10-CM

## 2023-06-11 LAB
ALBUMIN SERPL ELPH-MCNC: 3 G/DL — LOW (ref 3.3–5)
ALP SERPL-CCNC: 56 U/L — SIGNIFICANT CHANGE UP (ref 40–120)
ALT FLD-CCNC: 18 U/L — SIGNIFICANT CHANGE UP (ref 10–45)
ANION GAP SERPL CALC-SCNC: 8 MMOL/L — SIGNIFICANT CHANGE UP (ref 5–17)
APPEARANCE UR: ABNORMAL
APTT BLD: 26.1 SEC — LOW (ref 27.5–35.5)
AST SERPL-CCNC: 26 U/L — SIGNIFICANT CHANGE UP (ref 10–40)
BACTERIA # UR AUTO: ABNORMAL /HPF
BASOPHILS # BLD AUTO: 0.03 K/UL — SIGNIFICANT CHANGE UP (ref 0–0.2)
BASOPHILS NFR BLD AUTO: 0.4 % — SIGNIFICANT CHANGE UP (ref 0–2)
BILIRUB SERPL-MCNC: 0.5 MG/DL — SIGNIFICANT CHANGE UP (ref 0.2–1.2)
BILIRUB UR-MCNC: NEGATIVE — SIGNIFICANT CHANGE UP
BUN SERPL-MCNC: 17 MG/DL — SIGNIFICANT CHANGE UP (ref 7–23)
CALCIUM SERPL-MCNC: 9.7 MG/DL — SIGNIFICANT CHANGE UP (ref 8.4–10.5)
CHLORIDE SERPL-SCNC: 96 MMOL/L — SIGNIFICANT CHANGE UP (ref 96–108)
CO2 SERPL-SCNC: 24 MMOL/L — SIGNIFICANT CHANGE UP (ref 22–31)
COD CRY URNS QL: PRESENT
COLOR SPEC: YELLOW — SIGNIFICANT CHANGE UP
CREAT SERPL-MCNC: 0.64 MG/DL — SIGNIFICANT CHANGE UP (ref 0.5–1.3)
DIFF PNL FLD: ABNORMAL
EGFR: 83 ML/MIN/1.73M2 — SIGNIFICANT CHANGE UP
EOSINOPHIL # BLD AUTO: 0.01 K/UL — SIGNIFICANT CHANGE UP (ref 0–0.5)
EOSINOPHIL NFR BLD AUTO: 0.1 % — SIGNIFICANT CHANGE UP (ref 0–6)
GLUCOSE SERPL-MCNC: 147 MG/DL — HIGH (ref 70–99)
GLUCOSE UR QL: NEGATIVE MG/DL — SIGNIFICANT CHANGE UP
HCT VFR BLD CALC: 38 % — SIGNIFICANT CHANGE UP (ref 34.5–45)
HGB BLD-MCNC: 13 G/DL — SIGNIFICANT CHANGE UP (ref 11.5–15.5)
IMM GRANULOCYTES NFR BLD AUTO: 1.5 % — HIGH (ref 0–0.9)
INR BLD: 1.13 RATIO — SIGNIFICANT CHANGE UP (ref 0.88–1.16)
KETONES UR-MCNC: NEGATIVE MG/DL — SIGNIFICANT CHANGE UP
LACTATE SERPL-SCNC: 1.2 MMOL/L — SIGNIFICANT CHANGE UP (ref 0.7–2)
LEUKOCYTE ESTERASE UR-ACNC: ABNORMAL
LYMPHOCYTES # BLD AUTO: 0.43 K/UL — LOW (ref 1–3.3)
LYMPHOCYTES # BLD AUTO: 6.1 % — LOW (ref 13–44)
MCHC RBC-ENTMCNC: 32.3 PG — SIGNIFICANT CHANGE UP (ref 27–34)
MCHC RBC-ENTMCNC: 34.2 GM/DL — SIGNIFICANT CHANGE UP (ref 32–36)
MCV RBC AUTO: 94.3 FL — SIGNIFICANT CHANGE UP (ref 80–100)
MONOCYTES # BLD AUTO: 0.31 K/UL — SIGNIFICANT CHANGE UP (ref 0–0.9)
MONOCYTES NFR BLD AUTO: 4.4 % — SIGNIFICANT CHANGE UP (ref 2–14)
NEUTROPHILS # BLD AUTO: 6.21 K/UL — SIGNIFICANT CHANGE UP (ref 1.8–7.4)
NEUTROPHILS NFR BLD AUTO: 87.5 % — HIGH (ref 43–77)
NITRITE UR-MCNC: NEGATIVE — SIGNIFICANT CHANGE UP
NRBC # BLD: 0 /100 WBCS — SIGNIFICANT CHANGE UP (ref 0–0)
PH UR: 6 — SIGNIFICANT CHANGE UP (ref 5–8)
PLATELET # BLD AUTO: 197 K/UL — SIGNIFICANT CHANGE UP (ref 150–400)
POTASSIUM SERPL-MCNC: 4.3 MMOL/L — SIGNIFICANT CHANGE UP (ref 3.5–5.3)
POTASSIUM SERPL-SCNC: 4.3 MMOL/L — SIGNIFICANT CHANGE UP (ref 3.5–5.3)
PROT SERPL-MCNC: 7.2 G/DL — SIGNIFICANT CHANGE UP (ref 6–8.3)
PROT UR-MCNC: 100 MG/DL
PROTHROM AB SERPL-ACNC: 13.1 SEC — SIGNIFICANT CHANGE UP (ref 10.5–13.4)
RAPID RVP RESULT: SIGNIFICANT CHANGE UP
RBC # BLD: 4.03 M/UL — SIGNIFICANT CHANGE UP (ref 3.8–5.2)
RBC # FLD: 13.8 % — SIGNIFICANT CHANGE UP (ref 10.3–14.5)
RBC CASTS # UR COMP ASSIST: 5 /HPF — HIGH (ref 0–4)
SARS-COV-2 RNA SPEC QL NAA+PROBE: SIGNIFICANT CHANGE UP
SODIUM SERPL-SCNC: 128 MMOL/L — LOW (ref 135–145)
SP GR SPEC: 1.01 — SIGNIFICANT CHANGE UP (ref 1–1.03)
UROBILINOGEN FLD QL: 0.2 MG/DL — SIGNIFICANT CHANGE UP (ref 0.2–1)
WBC # BLD: 7.1 K/UL — SIGNIFICANT CHANGE UP (ref 3.8–10.5)
WBC # FLD AUTO: 7.1 K/UL — SIGNIFICANT CHANGE UP (ref 3.8–10.5)
WBC UR QL: 25 /HPF — HIGH (ref 0–5)

## 2023-06-11 PROCEDURE — 71045 X-RAY EXAM CHEST 1 VIEW: CPT | Mod: 26

## 2023-06-11 PROCEDURE — 99285 EMERGENCY DEPT VISIT HI MDM: CPT | Mod: FS

## 2023-06-11 PROCEDURE — 99223 1ST HOSP IP/OBS HIGH 75: CPT

## 2023-06-11 PROCEDURE — 93010 ELECTROCARDIOGRAM REPORT: CPT

## 2023-06-11 RX ORDER — ASCORBIC ACID 60 MG
1 TABLET,CHEWABLE ORAL
Qty: 0 | Refills: 0 | DISCHARGE

## 2023-06-11 RX ORDER — ACETAMINOPHEN 500 MG
650 TABLET ORAL EVERY 6 HOURS
Refills: 0 | Status: DISCONTINUED | OUTPATIENT
Start: 2023-06-11 | End: 2023-06-14

## 2023-06-11 RX ORDER — SOD,AMMONIUM,POTASSIUM LACTATE
1 CREAM (GRAM) TOPICAL
Qty: 0 | Refills: 0 | DISCHARGE

## 2023-06-11 RX ORDER — LORATADINE 10 MG/1
1 TABLET ORAL
Qty: 0 | Refills: 0 | DISCHARGE

## 2023-06-11 RX ORDER — SENNA PLUS 8.6 MG/1
1 TABLET ORAL AT BEDTIME
Refills: 0 | Status: DISCONTINUED | OUTPATIENT
Start: 2023-06-11 | End: 2023-06-14

## 2023-06-11 RX ORDER — NYSTATIN CREAM 100000 [USP'U]/G
1 CREAM TOPICAL
Refills: 0 | Status: DISCONTINUED | OUTPATIENT
Start: 2023-06-11 | End: 2023-06-14

## 2023-06-11 RX ORDER — FERROUS SULFATE 325(65) MG
325 TABLET ORAL DAILY
Refills: 0 | Status: DISCONTINUED | OUTPATIENT
Start: 2023-06-11 | End: 2023-06-14

## 2023-06-11 RX ORDER — CEFTRIAXONE 500 MG/1
1000 INJECTION, POWDER, FOR SOLUTION INTRAMUSCULAR; INTRAVENOUS EVERY 24 HOURS
Refills: 0 | Status: DISCONTINUED | OUTPATIENT
Start: 2023-06-12 | End: 2023-06-13

## 2023-06-11 RX ORDER — ESCITALOPRAM OXALATE 10 MG/1
10 TABLET, FILM COATED ORAL DAILY
Refills: 0 | Status: DISCONTINUED | OUTPATIENT
Start: 2023-06-11 | End: 2023-06-14

## 2023-06-11 RX ORDER — SODIUM CHLORIDE 9 MG/ML
2800 INJECTION INTRAMUSCULAR; INTRAVENOUS; SUBCUTANEOUS ONCE
Refills: 0 | Status: COMPLETED | OUTPATIENT
Start: 2023-06-11 | End: 2023-06-11

## 2023-06-11 RX ORDER — AMLODIPINE BESYLATE 2.5 MG/1
1 TABLET ORAL
Qty: 0 | Refills: 0 | DISCHARGE

## 2023-06-11 RX ORDER — NYSTATIN/TRIAMCINOLONE ACET
1 OINTMENT (GRAM) TOPICAL
Qty: 0 | Refills: 0 | DISCHARGE

## 2023-06-11 RX ORDER — CEFTRIAXONE 500 MG/1
1000 INJECTION, POWDER, FOR SOLUTION INTRAMUSCULAR; INTRAVENOUS ONCE
Refills: 0 | Status: COMPLETED | OUTPATIENT
Start: 2023-06-11 | End: 2023-06-11

## 2023-06-11 RX ORDER — FAMOTIDINE 10 MG/ML
20 INJECTION INTRAVENOUS DAILY
Refills: 0 | Status: DISCONTINUED | OUTPATIENT
Start: 2023-06-11 | End: 2023-06-14

## 2023-06-11 RX ORDER — LANOLIN ALCOHOL/MO/W.PET/CERES
3 CREAM (GRAM) TOPICAL AT BEDTIME
Refills: 0 | Status: DISCONTINUED | OUTPATIENT
Start: 2023-06-12 | End: 2023-06-14

## 2023-06-11 RX ORDER — BUPROPION HYDROCHLORIDE 150 MG/1
150 TABLET, EXTENDED RELEASE ORAL DAILY
Refills: 0 | Status: DISCONTINUED | OUTPATIENT
Start: 2023-06-11 | End: 2023-06-14

## 2023-06-11 RX ORDER — SOD SULF/SODIUM/NAHCO3/KCL/PEG
0 SOLUTION, RECONSTITUTED, ORAL ORAL
Qty: 0 | Refills: 0 | DISCHARGE

## 2023-06-11 RX ORDER — GABAPENTIN 400 MG/1
200 CAPSULE ORAL AT BEDTIME
Refills: 0 | Status: DISCONTINUED | OUTPATIENT
Start: 2023-06-11 | End: 2023-06-14

## 2023-06-11 RX ORDER — METOPROLOL TARTRATE 50 MG
25 TABLET ORAL DAILY
Refills: 0 | Status: DISCONTINUED | OUTPATIENT
Start: 2023-06-11 | End: 2023-06-14

## 2023-06-11 RX ORDER — ACETAMINOPHEN 500 MG
2 TABLET ORAL
Qty: 0 | Refills: 0 | DISCHARGE

## 2023-06-11 RX ORDER — LEVOTHYROXINE SODIUM 125 MCG
75 TABLET ORAL DAILY
Refills: 0 | Status: DISCONTINUED | OUTPATIENT
Start: 2023-06-12 | End: 2023-06-14

## 2023-06-11 RX ADMIN — SODIUM CHLORIDE 2800 MILLILITER(S): 9 INJECTION INTRAMUSCULAR; INTRAVENOUS; SUBCUTANEOUS at 17:51

## 2023-06-11 RX ADMIN — CEFTRIAXONE 1000 MILLIGRAM(S): 500 INJECTION, POWDER, FOR SOLUTION INTRAMUSCULAR; INTRAVENOUS at 20:03

## 2023-06-11 RX ADMIN — SODIUM CHLORIDE 2800 MILLILITER(S): 9 INJECTION INTRAMUSCULAR; INTRAVENOUS; SUBCUTANEOUS at 20:03

## 2023-06-11 RX ADMIN — CEFTRIAXONE 100 MILLIGRAM(S): 500 INJECTION, POWDER, FOR SOLUTION INTRAMUSCULAR; INTRAVENOUS at 17:51

## 2023-06-11 NOTE — ED ADULT NURSE NOTE - NSFALLHARMRISKINTERV_ED_ALL_ED
Assistance OOB with selected safe patient handling equipment if applicable/Assistance with ambulation/Communicate risk of Fall with Harm to all staff, patient, and family/Monitor gait and stability/Monitor for mental status changes and reorient to person, place, and time, as needed/Provide visual cue: red socks, yellow wristband, yellow gown, etc/Reinforce activity limits and safety measures with patient and family/Toileting schedule using arm’s reach rule for commode and bathroom/Use of alarms - bed, stretcher, chair and/or video monitoring/Bed in lowest position, wheels locked, appropriate side rails in place/Call bell, personal items and telephone in reach/Instruct patient to call for assistance before getting out of bed/chair/stretcher/Non-slip footwear applied when patient is off stretcher/[place] to call system/Physically safe environment - no spills, clutter or unnecessary equipment/Purposeful Proactive Rounding/Room/bathroom lighting operational, light cord in reach

## 2023-06-11 NOTE — ED PROVIDER NOTE - CLINICAL SUMMARY MEDICAL DECISION MAKING FREE TEXT BOX
89 yr old female with hx of hypothyroid , DM, HTN, a fib, recently seen in ED for UTI, pt was sent home with levaquin and then started on IV meropenem yesterday, presents today from [facility] due to increase lethargy as per daughter. 89 yr old female with hx of hypothyroid , DM, HTN, a fib, recently seen in ED for UTI, pt was sent home with levaquin and then started on IV meropenem yesterday, presents today from [facility] due to increase lethargy as per daughter.   clear lungs, abdomen soft non tender, aox 2   + fever in ED, sepsis labs, ivf, abx ordered   plan to admit to medicine

## 2023-06-11 NOTE — H&P ADULT - ASSESSMENT
89 yr old female with hx of hypothyroid , DM, HTN, a fib (not on AC), presents to the ED from SNF, because of increased lethargy noted since yesterday by daughter.  Pt was apparently in the ED 6 days ago, 06/05/23, for same issue, was diagnosed to have UTI.  She was d/ed back to SNF on Levaquin, w/c was switched to Bactrim based on urine culture results.  Pt was started on Meropenem yesterday because of lethargy, presumed to be due to UTI.  Daughter ludivina pt remains lethargic, in the ED, noted to have temp of 100.7. There was no report of cough, vomiting, diarrhea.     Encephalopathy, likely due to UTI, hyponatremia  Pt w/ DM, HTN, hypothyroid  Afib (not on AC), rate controlled    Admit  IV Ceftriaxone (based on recent urine c/s results - +E.coli)  Gentle IV hydration  FFup labs  Will get CT head, in view of lethargy (r/o CVA -pt has HTN, afib, DM) (CT head from 06/05/23 was neg)  Monitor FS, SSInsulin while in hosp  Cont other meds: Metoprolol, Levothyroxine, Gabapentin, Lexapro, Buspar, Wellbutrin 89 yr old female with hx of hypothyroid , DM, HTN, a fib (not on AC), presents to the ED from SNF, because of increased lethargy noted since yesterday by daughter.  Pt was apparently in the ED 6 days ago, 06/05/23, for same issue, was diagnosed to have UTI.  She was d/ed back to SNF on Levaquin, w/c was switched to Bactrim based on urine culture results.  Pt was started on Meropenem yesterday because of lethargy, presumed to be due to UTI.  Daughter sates pt remains lethargic, in the ED, noted to have temp of 100.7. There was no report of cough, vomiting, diarrhea.     Encephalopathy, likely due to UTI, hyponatremia  Pt w/ DM, HTN, hypothyroid  Afib (not on AC), rate controlled    Admit  IV Ceftriaxone (based on recent urine c/s results - +E.coli)  Gentle IV hydration  FFup labs  Will get CT head, in view of lethargy (r/o CVA -pt has HTN, afib, DM) (CT head from 06/05/23 was neg)  Monitor FS, SSInsulin while in hosp  Cont other meds: Metoprolol, Levothyroxine, Gabapentin, Lexapro, Buspar, Wellbutrin  If CT head neg, and pt remains lethargic (inspite of IV antibx, IVF), may also need to adjust psych meds  GI/DVT prophylaxis 89 yr old female with hx of hypothyroid , DM, HTN, a fib (not on AC), presents to the ED from SNF, because of increased lethargy noted since yesterday by daughter.  Pt was apparently in the ED 6 days ago, 06/05/23, for same issue, was diagnosed to have UTI.  She was d/ed back to SNF on Levaquin, w/c was switched to Bactrim based on urine culture results.  Pt was started on Meropenem yesterday because of lethargy, presumed to be due to UTI.  Daughter satrayna pt remains lethargic, in the ED, noted to have temp of 100.7. There was no report of cough, vomiting, diarrhea.     Encephalopathy, likely due to UTI, hyponatremia  Pt w/ DM, HTN, hypothyroid  Afib (not on AC), rate controlled    Admit  IV Ceftriaxone (based on recent urine c/s results - +E.coli)  Gentle IV hydration w/ NS  FFup labs, check TSH, B12  Will get CT head, in view of lethargy (r/o CVA -pt has HTN, afib, DM) (CT head from 06/05/23 was neg)  Monitor FS, SSInsulin while in hosp  Cont other meds: Metoprolol, Levothyroxine, Gabapentin, Lexapro, Buspar, Wellbutrin  If CT head neg, and pt remains lethargic (inspite of IV antibx, IVF), may also need to adjust psych meds  GI/DVT prophylaxis

## 2023-06-11 NOTE — H&P ADULT - NSHPPHYSICALEXAM_GEN_ALL_CORE
Vital Signs (24 Hrs):  T(C): 36.9 (06-11-23 @ 21:45), Max: 38.2 (06-11-23 @ 17:45)  HR: 88 (06-11-23 @ 21:45) (88 - 98)  BP: 125/67 (06-11-23 @ 21:45) (125/67 - 159/84)  RR: 17 (06-11-23 @ 20:08) (15 - 18)  SpO2: 97% (06-11-23 @ 21:45) (93% - 100%)

## 2023-06-11 NOTE — ED PROVIDER NOTE - CONSTITUTIONAL MENTATION
awake/alert/oriented to person, place, time/situation AOX2/awake/alert/oriented to person, place, time/situation

## 2023-06-11 NOTE — ED ADULT NURSE NOTE - OBJECTIVE STATEMENT
Pt. from [facility].  As per daughter pt. has had increased lethargy and was here 6 days ago for UTI.  Pt. arrived with D5 1/2 NS medlocked from EMS. Pt. arrived on 3L NC from EMS for hypoxia on EMS arrival.  Pt. febrile with 100.7 rectal temp ER provider aware.  Pt. denies any pain but complains of intermittent nausea.  Pt. Alert and oriented times 2 at this time which is baseline.

## 2023-06-11 NOTE — ED PROVIDER NOTE - OBJECTIVE STATEMENT
89 yr old female with hx of hypothyroid , DM, HTN, a fib, recently seen in ED for UTI, pt was sent home with levaquin and then started on IV meropenem yesterday, presents today from [facility] due to increase lethargy as per daughter.

## 2023-06-11 NOTE — H&P ADULT - HISTORY OF PRESENT ILLNESS
89 yr old female with hx of hypothyroid , DM, HTN, a fib, presents to the ED from SNF, because of increased lethargy noted today by daughter.  Pt was apparently in the ED a week ago,  recently seen in ED for UTI, pt was sent home with levaquin and then started on IV meropenem yesterday, presents today from [facility] due to increase lethargy as per daughter. 89 yr old female with hx of hypothyroid , DM, HTN, a fib, presents to the ED from SNF, because of increased lethargy noted since yesterday by daughter.  Pt was apparently in the ED 6 days ago, 06/05/23, for same issue, was diagnosed to have UTI.  She was d/ed back to SNF on Levaquin, w/c was switched to Bactrim based on urine culture results.  Pt was started on Meropenem yesterday because of lethargy, presumed to be due to UTI.  Daughter sates pt remains lethargic. There  89 yr old female with hx of hypothyroid , DM, HTN, a fib, presents to the ED from SNF, because of increased lethargy noted since yesterday by daughter.  Pt was apparently in the ED 6 days ago, 06/05/23, for same issue, was diagnosed to have UTI.  She was d/ed back to SNF on Levaquin, w/c was switched to Bactrim based on urine culture results.  Pt was started on Meropenem yesterday because of lethargy, presumed to be due to UTI.  Daughter sates pt remains lethargic, in the ED, noted to have temp of 100.7.  89 yr old female with hx of hypothyroid , DM, HTN, a fib (not on AC), presents to the ED from SNF, because of increased lethargy noted since yesterday by daughter.  Pt was apparently in the ED 6 days ago, 06/05/23, for same issue, was diagnosed to have UTI.  She was d/ed back to SNF on Levaquin, w/c was switched to Bactrim based on urine culture results.  Pt was started on Meropenem yesterday because of lethargy, presumed to be due to UTI.  Daughter ludivina pt remains lethargic, in the ED, noted to have temp of 100.7. There was no report of cough, vomiting, diarrhea.  89 yr old female with hx of hypothyroid , DM, HTN, a fib (not on AC), presents to the ED from SNF, because of increased lethargy noted since yesterday by daughter.  Pt was apparently in the ED 6 days ago, 06/05/23, for same issue, was diagnosed to have UTI.  She was d/ed back to SNF on Levaquin, w/c was switched to Bactrim based on urine culture results.  Pt was started on Meropenem yesterday because of lethargy, presumed to be due to UTI.  Daughter sates pt remains lethargic, in the ED, noted to have temp of 100.7. There was no report of cough, vomiting, diarrhea.   Pt is unable to provide hx, this was provided by daughter.

## 2023-06-11 NOTE — ED PROVIDER NOTE - ABNORMAL RHYTHM
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 Presented for DDRT on 12/7/2019 complicated by DGF requiring HD, due to hyperkalemia. Patient received Lasix 100 mg IV with minimal improvement in UO.  Recommend another 4 mg Bumex IV and monitor urine output. If urine output does not improve, will arrange for HD. Labs reviewed, no indication for urgent HD. Recommend to start sodium bicarbonate tablets 1300 mg BID.

## 2023-06-12 LAB
A1C WITH ESTIMATED AVERAGE GLUCOSE RESULT: 5.6 % — SIGNIFICANT CHANGE UP (ref 4–5.6)
ANION GAP SERPL CALC-SCNC: 7 MMOL/L — SIGNIFICANT CHANGE UP (ref 5–17)
BASOPHILS # BLD AUTO: 0.01 K/UL — SIGNIFICANT CHANGE UP (ref 0–0.2)
BASOPHILS NFR BLD AUTO: 0.2 % — SIGNIFICANT CHANGE UP (ref 0–2)
BUN SERPL-MCNC: 14 MG/DL — SIGNIFICANT CHANGE UP (ref 7–23)
CALCIUM SERPL-MCNC: 9.1 MG/DL — SIGNIFICANT CHANGE UP (ref 8.4–10.5)
CHLORIDE SERPL-SCNC: 100 MMOL/L — SIGNIFICANT CHANGE UP (ref 96–108)
CO2 SERPL-SCNC: 26 MMOL/L — SIGNIFICANT CHANGE UP (ref 22–31)
CREAT SERPL-MCNC: 0.61 MG/DL — SIGNIFICANT CHANGE UP (ref 0.5–1.3)
EGFR: 84 ML/MIN/1.73M2 — SIGNIFICANT CHANGE UP
EOSINOPHIL # BLD AUTO: 0.05 K/UL — SIGNIFICANT CHANGE UP (ref 0–0.5)
EOSINOPHIL NFR BLD AUTO: 1 % — SIGNIFICANT CHANGE UP (ref 0–6)
ESTIMATED AVERAGE GLUCOSE: 114 MG/DL — SIGNIFICANT CHANGE UP (ref 68–114)
GLUCOSE BLDC GLUCOMTR-MCNC: 117 MG/DL — HIGH (ref 70–99)
GLUCOSE BLDC GLUCOMTR-MCNC: 121 MG/DL — HIGH (ref 70–99)
GLUCOSE BLDC GLUCOMTR-MCNC: 131 MG/DL — HIGH (ref 70–99)
GLUCOSE SERPL-MCNC: 117 MG/DL — HIGH (ref 70–99)
HCT VFR BLD CALC: 35.8 % — SIGNIFICANT CHANGE UP (ref 34.5–45)
HGB BLD-MCNC: 11.9 G/DL — SIGNIFICANT CHANGE UP (ref 11.5–15.5)
IMM GRANULOCYTES NFR BLD AUTO: 1.5 % — HIGH (ref 0–0.9)
LYMPHOCYTES # BLD AUTO: 0.53 K/UL — LOW (ref 1–3.3)
LYMPHOCYTES # BLD AUTO: 11.1 % — LOW (ref 13–44)
MAGNESIUM SERPL-MCNC: 1.3 MG/DL — LOW (ref 1.6–2.6)
MCHC RBC-ENTMCNC: 32.1 PG — SIGNIFICANT CHANGE UP (ref 27–34)
MCHC RBC-ENTMCNC: 33.2 GM/DL — SIGNIFICANT CHANGE UP (ref 32–36)
MCV RBC AUTO: 96.5 FL — SIGNIFICANT CHANGE UP (ref 80–100)
MONOCYTES # BLD AUTO: 0.3 K/UL — SIGNIFICANT CHANGE UP (ref 0–0.9)
MONOCYTES NFR BLD AUTO: 6.3 % — SIGNIFICANT CHANGE UP (ref 2–14)
NEUTROPHILS # BLD AUTO: 3.81 K/UL — SIGNIFICANT CHANGE UP (ref 1.8–7.4)
NEUTROPHILS NFR BLD AUTO: 79.9 % — HIGH (ref 43–77)
NRBC # BLD: 0 /100 WBCS — SIGNIFICANT CHANGE UP (ref 0–0)
PLATELET # BLD AUTO: 161 K/UL — SIGNIFICANT CHANGE UP (ref 150–400)
POTASSIUM SERPL-MCNC: 3.7 MMOL/L — SIGNIFICANT CHANGE UP (ref 3.5–5.3)
POTASSIUM SERPL-SCNC: 3.7 MMOL/L — SIGNIFICANT CHANGE UP (ref 3.5–5.3)
RBC # BLD: 3.71 M/UL — LOW (ref 3.8–5.2)
RBC # FLD: 13.8 % — SIGNIFICANT CHANGE UP (ref 10.3–14.5)
SODIUM SERPL-SCNC: 133 MMOL/L — LOW (ref 135–145)
TSH SERPL-MCNC: 0.41 UIU/ML — SIGNIFICANT CHANGE UP (ref 0.36–3.74)
VIT B12 SERPL-MCNC: 330 PG/ML — SIGNIFICANT CHANGE UP (ref 232–1245)
WBC # BLD: 4.77 K/UL — SIGNIFICANT CHANGE UP (ref 3.8–10.5)
WBC # FLD AUTO: 4.77 K/UL — SIGNIFICANT CHANGE UP (ref 3.8–10.5)

## 2023-06-12 PROCEDURE — 99232 SBSQ HOSP IP/OBS MODERATE 35: CPT

## 2023-06-12 PROCEDURE — 70450 CT HEAD/BRAIN W/O DYE: CPT | Mod: 26

## 2023-06-12 RX ORDER — SODIUM CHLORIDE 9 MG/ML
1000 INJECTION, SOLUTION INTRAVENOUS
Refills: 0 | Status: DISCONTINUED | OUTPATIENT
Start: 2023-06-12 | End: 2023-06-14

## 2023-06-12 RX ORDER — DEXTROSE 50 % IN WATER 50 %
25 SYRINGE (ML) INTRAVENOUS ONCE
Refills: 0 | Status: DISCONTINUED | OUTPATIENT
Start: 2023-06-12 | End: 2023-06-14

## 2023-06-12 RX ORDER — SODIUM CHLORIDE 9 MG/ML
1000 INJECTION INTRAMUSCULAR; INTRAVENOUS; SUBCUTANEOUS
Refills: 0 | Status: COMPLETED | OUTPATIENT
Start: 2023-06-12 | End: 2023-06-12

## 2023-06-12 RX ORDER — INSULIN LISPRO 100/ML
VIAL (ML) SUBCUTANEOUS
Refills: 0 | Status: DISCONTINUED | OUTPATIENT
Start: 2023-06-12 | End: 2023-06-14

## 2023-06-12 RX ORDER — DEXTROSE 50 % IN WATER 50 %
12.5 SYRINGE (ML) INTRAVENOUS ONCE
Refills: 0 | Status: DISCONTINUED | OUTPATIENT
Start: 2023-06-12 | End: 2023-06-14

## 2023-06-12 RX ORDER — DEXTROSE 50 % IN WATER 50 %
15 SYRINGE (ML) INTRAVENOUS ONCE
Refills: 0 | Status: DISCONTINUED | OUTPATIENT
Start: 2023-06-12 | End: 2023-06-14

## 2023-06-12 RX ORDER — INSULIN LISPRO 100/ML
VIAL (ML) SUBCUTANEOUS AT BEDTIME
Refills: 0 | Status: DISCONTINUED | OUTPATIENT
Start: 2023-06-12 | End: 2023-06-14

## 2023-06-12 RX ORDER — ENOXAPARIN SODIUM 100 MG/ML
40 INJECTION SUBCUTANEOUS EVERY 24 HOURS
Refills: 0 | Status: DISCONTINUED | OUTPATIENT
Start: 2023-06-12 | End: 2023-06-14

## 2023-06-12 RX ORDER — GLUCAGON INJECTION, SOLUTION 0.5 MG/.1ML
1 INJECTION, SOLUTION SUBCUTANEOUS ONCE
Refills: 0 | Status: DISCONTINUED | OUTPATIENT
Start: 2023-06-12 | End: 2023-06-14

## 2023-06-12 RX ORDER — MAGNESIUM SULFATE 500 MG/ML
1 VIAL (ML) INJECTION ONCE
Refills: 0 | Status: COMPLETED | OUTPATIENT
Start: 2023-06-12 | End: 2023-06-13

## 2023-06-12 RX ADMIN — GABAPENTIN 200 MILLIGRAM(S): 400 CAPSULE ORAL at 00:33

## 2023-06-12 RX ADMIN — ENOXAPARIN SODIUM 40 MILLIGRAM(S): 100 INJECTION SUBCUTANEOUS at 06:03

## 2023-06-12 RX ADMIN — NYSTATIN CREAM 1 APPLICATION(S): 100000 CREAM TOPICAL at 14:48

## 2023-06-12 RX ADMIN — Medication 325 MILLIGRAM(S): at 17:56

## 2023-06-12 RX ADMIN — ESCITALOPRAM OXALATE 10 MILLIGRAM(S): 10 TABLET, FILM COATED ORAL at 17:56

## 2023-06-12 RX ADMIN — NYSTATIN CREAM 1 APPLICATION(S): 100000 CREAM TOPICAL at 17:56

## 2023-06-12 RX ADMIN — Medication 5 MILLIGRAM(S): at 17:56

## 2023-06-12 RX ADMIN — BUPROPION HYDROCHLORIDE 150 MILLIGRAM(S): 150 TABLET, EXTENDED RELEASE ORAL at 17:56

## 2023-06-12 RX ADMIN — Medication 5 MILLIGRAM(S): at 06:03

## 2023-06-12 RX ADMIN — FAMOTIDINE 20 MILLIGRAM(S): 10 INJECTION INTRAVENOUS at 17:56

## 2023-06-12 RX ADMIN — GABAPENTIN 200 MILLIGRAM(S): 400 CAPSULE ORAL at 22:24

## 2023-06-12 RX ADMIN — Medication 3 MILLIGRAM(S): at 22:24

## 2023-06-12 RX ADMIN — SENNA PLUS 1 TABLET(S): 8.6 TABLET ORAL at 22:24

## 2023-06-12 RX ADMIN — Medication 25 MILLIGRAM(S): at 06:03

## 2023-06-12 RX ADMIN — Medication 75 MICROGRAM(S): at 06:03

## 2023-06-12 RX ADMIN — SODIUM CHLORIDE 50 MILLILITER(S): 9 INJECTION INTRAMUSCULAR; INTRAVENOUS; SUBCUTANEOUS at 02:00

## 2023-06-12 RX ADMIN — CEFTRIAXONE 100 MILLIGRAM(S): 500 INJECTION, POWDER, FOR SOLUTION INTRAMUSCULAR; INTRAVENOUS at 14:50

## 2023-06-12 NOTE — PROGRESS NOTE ADULT - SUBJECTIVE AND OBJECTIVE BOX
CC: Patient is a 89y old  Female who presents with a chief complaint of lethargy (2023 20:29)      Interval History:  Patient seen and examined at bedside.  No overnight events  No complaints this morning    1-3 Elements + 1 ROS  Status of 3 chronic/inactive problems    ROS:  CONSTITUTIONAL: No fever, weight loss, or fatigue  RESPIRATORY: No cough, wheezing, chills or hemoptysis; No shortness of breath  CARDIOVASCULAR: No chest pain, palpitations, dizziness, or leg swelling  GASTROINTESTINAL: No abdominal or epigastric pain. No nausea, vomiting, or hematemesis; No diarrhea or constipation. No melena or hematochezia.  GENITOURINARY: No dysuria, frequency, hematuria, or incontinence  NEUROLOGICAL: No headaches, memory loss, loss of strength, numbness, or tremors    ALLERGIES:  aspirin (Unknown)  penicillin (Unknown)    MEDICATIONS  (STANDING):  buPROPion XL (24-Hour) 150 milliGRAM(s) Oral daily  busPIRone 5 milliGRAM(s) Oral two times a day  cefTRIAXone   IVPB 1000 milliGRAM(s) IV Intermittent every 24 hours  dextrose 5%. 1000 milliLiter(s) (100 mL/Hr) IV Continuous <Continuous>  dextrose 5%. 1000 milliLiter(s) (50 mL/Hr) IV Continuous <Continuous>  dextrose 50% Injectable 12.5 Gram(s) IV Push once  dextrose 50% Injectable 25 Gram(s) IV Push once  dextrose 50% Injectable 25 Gram(s) IV Push once  enoxaparin Injectable 40 milliGRAM(s) SubCutaneous every 24 hours  escitalopram 10 milliGRAM(s) Oral daily  famotidine    Tablet 20 milliGRAM(s) Oral daily  ferrous    sulfate 325 milliGRAM(s) Oral daily  gabapentin 200 milliGRAM(s) Oral at bedtime  glucagon  Injectable 1 milliGRAM(s) IntraMuscular once  insulin lispro (ADMELOG) corrective regimen sliding scale   SubCutaneous three times a day before meals  insulin lispro (ADMELOG) corrective regimen sliding scale   SubCutaneous at bedtime  levothyroxine 75 MICROGram(s) Oral daily  melatonin 3 milliGRAM(s) Oral at bedtime  metoprolol succinate ER 25 milliGRAM(s) Oral daily  nystatin Powder 1 Application(s) Topical two times a day  senna 1 Tablet(s) Oral at bedtime    MEDICATIONS  (PRN):  acetaminophen     Tablet .. 650 milliGRAM(s) Oral every 6 hours PRN Temp greater or equal to 38C (100.4F), Mild Pain (1 - 3)  dextrose Oral Gel 15 Gram(s) Oral once PRN Blood Glucose LESS THAN 70 milliGRAM(s)/deciliter    Vital Signs Last 24 Hrs  T(F): 98.2 (2023 06:51), Max: 100.7 (2023 17:45)  HR: 80 (2023 06:51) (80 - 98)  BP: 123/89 (2023 06:51) (123/89 - 159/84)  RR: 18 (2023 06:51) (15 - 18)  SpO2: 98% (2023 06:51) (93% - 100%)  I&O's Summary        PHYSICAL EXAM:  GENERAL: NAD  HENT:  Atraumatic, Normocephalic; No tonsillar erythema, exudates, or enlargement; Moist mucous membranes  EYES: EOMI, PERRLA, conjunctiva and sclera clear, no lid-lag; moist conjunctivae  NECK: Supple, No JVD, Normal thyroid, FROM of neck  NERVOUS SYSTEM:  CN II - XII intact; Sensation intact; follows commands  CHEST/LUNG: Clear to percussion bilaterally; No rales, rhonchi, wheezing, or rubs; normal respiratory effort, no intercostal retractions  HEART: Regular rate and rhythm; No murmurs, rubs, or gallops; No pitting edema  ABDOMEN: Soft, Nontender, Nondistended; Bowel sounds present; No HSM or masses  MUSCULOSKELETAL/EXTREMITIES:  2+ Peripheral Pulses, No clubbing or digital cyanosis; FROM of extremeties (pain, crepitation or contracture)  PSYCH: Appropriate affect, Alert & Oriented x 3, Good Memory; Good insight    LABS:                        13.0   7.10  )-----------( 197      ( 2023 17:36 )             38.0       06-11    128  |  96  |  17  ----------------------------<  147  4.3   |  24  |  0.64    Ca    9.7      2023 17:36    TPro  7.2  /  Alb  3.0  /  TBili  0.5  /  DBili  x   /  AST  26  /  ALT  18  /  AlkPhos  56  -       PT/INR - ( 2023 17:36 )   PT: 13.1 sec;   INR: 1.13 ratio         PTT - ( 2023 17:36 )  PTT:26.1 sec   Lactate, Blood: 1.2 mmol/L ( @ 17:36)                          Urinalysis Basic - ( 2023 17:56 )    Color: Yellow / Appearance: Turbid / S.014 / pH: x  Gluc: x / Ketone: Negative mg/dL  / Bili: Negative / Urobili: 0.2 mg/dL   Blood: x / Protein: 100 mg/dL / Nitrite: Negative   Leuk Esterase: Large / RBC: 5 /HPF / WBC 25 /HPF   Sq Epi: x / Non Sq Epi: x / Bacteria: Few /HPF        Culture - Urine (collected 2023 15:25)  Source: Clean Catch Clean Catch (Midstream)  Final Report (2023 10:30):    >100,000 CFU/ml Escherichia coli  Organism: Escherichia coli (2023 10:30)  Organism: Escherichia coli (2023 10:30)      Method Type: GONZALO      -  Amikacin: S <=16      -  Amoxicillin/Clavulanic Acid: S <=8/4      -  Ampicillin: S <=8 These ampicillin results predict results for amoxicillin      -  Ampicillin/Sulbactam: S <=4/2 Enterobacter, Klebsiella aerogenes, Citrobacter, and Serratia may develop resistance during prolonged therapy (3-4 days)      -  Aztreonam: S <=4      -  Cefazolin: S <=2 For uncomplicated UTI with K. pneumoniae, E. coli, or P. mirablis: GONZALO <=16 is sensitive and GONZALO >=32 is resistant. This also predicts results for oral agents cefaclor, cefdinir, cefpodoxime, cefprozil, cefuroxime axetil, cephalexin and locarbef for uncomplicated UTI. Note that some isolates may be susceptible to these agents while testing resistant to cefazolin.      -  Cefepime: S <=2      -  Cefoxitin: S <=8      -  Ceftriaxone: S <=1 Enterobacter, Klebsiella aerogenes, Citrobacter, and Serratia may develop resistance during prolonged therapy      -  Cefuroxime: S <=4      -  Ciprofloxacin: R >2      -  Ertapenem: S <=0.5      -  Gentamicin: S <=2      -  Imipenem: S <=1      -  Levofloxacin: R 4      -  Meropenem: S <=1      -  Nitrofurantoin: I 64 Should not be used to treat pyelonephritis      -  Piperacillin/Tazobactam: S <=8      -  Tobramycin: S <=2      -  Trimethoprim/Sulfamethoxazole: S <=0.5/9.5          Care Discussed with Consultants/Other Providers: Yes   CC: Patient is a 89y old  Female who presents with a chief complaint of lethargy (2023 20:29)      Interval History:  Patient seen and examined at bedside.  No overnight events  No complaints this morning. Responds but lethargic    ROS:  CONSTITUTIONAL: No fever, weight loss, or fatigue  RESPIRATORY: No cough, wheezing, chills or hemoptysis; No shortness of breath  CARDIOVASCULAR: No chest pain, palpitations, dizziness, or leg swelling    ALLERGIES:  aspirin (Unknown)  penicillin (Unknown)    MEDICATIONS  (STANDING):  buPROPion XL (24-Hour) 150 milliGRAM(s) Oral daily  busPIRone 5 milliGRAM(s) Oral two times a day  cefTRIAXone   IVPB 1000 milliGRAM(s) IV Intermittent every 24 hours  dextrose 5%. 1000 milliLiter(s) (100 mL/Hr) IV Continuous <Continuous>  dextrose 5%. 1000 milliLiter(s) (50 mL/Hr) IV Continuous <Continuous>  dextrose 50% Injectable 12.5 Gram(s) IV Push once  dextrose 50% Injectable 25 Gram(s) IV Push once  dextrose 50% Injectable 25 Gram(s) IV Push once  enoxaparin Injectable 40 milliGRAM(s) SubCutaneous every 24 hours  escitalopram 10 milliGRAM(s) Oral daily  famotidine    Tablet 20 milliGRAM(s) Oral daily  ferrous    sulfate 325 milliGRAM(s) Oral daily  gabapentin 200 milliGRAM(s) Oral at bedtime  glucagon  Injectable 1 milliGRAM(s) IntraMuscular once  insulin lispro (ADMELOG) corrective regimen sliding scale   SubCutaneous three times a day before meals  insulin lispro (ADMELOG) corrective regimen sliding scale   SubCutaneous at bedtime  levothyroxine 75 MICROGram(s) Oral daily  melatonin 3 milliGRAM(s) Oral at bedtime  metoprolol succinate ER 25 milliGRAM(s) Oral daily  nystatin Powder 1 Application(s) Topical two times a day  senna 1 Tablet(s) Oral at bedtime    MEDICATIONS  (PRN):  acetaminophen     Tablet .. 650 milliGRAM(s) Oral every 6 hours PRN Temp greater or equal to 38C (100.4F), Mild Pain (1 - 3)  dextrose Oral Gel 15 Gram(s) Oral once PRN Blood Glucose LESS THAN 70 milliGRAM(s)/deciliter    Vital Signs Last 24 Hrs  T(F): 98.2 (2023 06:51), Max: 100.7 (2023 17:45)  HR: 80 (2023 06:51) (80 - 98)  BP: 123/89 (2023 06:51) (123/89 - 159/84)  RR: 18 (2023 06:51) (15 - 18)  SpO2: 98% (2023 06:51) (93% - 100%)  I&O's Summary    PHYSICAL EXAM:  GENERAL: NAD  HENT:  Atraumatic, Normocephalic; No tonsillar erythema, exudates, or enlargement; Moist mucous membranes  EYES: EOMI, PERRLA, conjunctiva and sclera clear, no lid-lag; moist conjunctivae  NECK: Supple, No JVD, Normal thyroid, FROM of neck  NERVOUS SYSTEM:  CN II - XII intact; Sensation intact; follows commands  CHEST/LUNG: Clear to percussion bilaterally; No rales, rhonchi, wheezing, or rubs; normal respiratory effort, no intercostal retractions  HEART: Regular rate and rhythm; No murmurs, rubs, or gallops; No pitting edema  ABDOMEN: Soft, Nontender, Nondistended; Bowel sounds present; No HSM or masses  MUSCULOSKELETAL/EXTREMITIES:  2+ Peripheral Pulses, No clubbing or digital cyanosis  PSYCH: Appropriate affect, Alert     LABS:                        13.0   7.10  )-----------( 197      ( 2023 17:36 )             38.0       06-11    128  |  96  |  17  ----------------------------<  147  4.3   |  24  |  0.64    Ca    9.7      2023 17:36    TPro  7.2  /  Alb  3.0  /  TBili  0.5  /  DBili  x   /  AST  26  /  ALT  18  /  AlkPhos  56  06-11       PT/INR - ( 2023 17:36 )   PT: 13.1 sec;   INR: 1.13 ratio         PTT - ( 2023 17:36 )  PTT:26.1 sec   Lactate, Blood: 1.2 mmol/L (- @ 17:36)    Urinalysis Basic - ( 2023 17:56 )    Color: Yellow / Appearance: Turbid / S.014 / pH: x  Gluc: x / Ketone: Negative mg/dL  / Bili: Negative / Urobili: 0.2 mg/dL   Blood: x / Protein: 100 mg/dL / Nitrite: Negative   Leuk Esterase: Large / RBC: 5 /HPF / WBC 25 /HPF   Sq Epi: x / Non Sq Epi: x / Bacteria: Few /HPF    Culture - Urine (collected 2023 15:25)  Source: Clean Catch Clean Catch (Midstream)  Final Report (2023 10:30):    >100,000 CFU/ml Escherichia coli  Organism: Escherichia coli (2023 10:30)  Organism: Escherichia coli (2023 10:30)      Method Type: GONZALO      -  Amikacin: S <=16      -  Amoxicillin/Clavulanic Acid: S <=8/4      -  Ampicillin: S <=8 These ampicillin results predict results for amoxicillin      -  Ampicillin/Sulbactam: S <=4/2 Enterobacter, Klebsiella aerogenes, Citrobacter, and Serratia may develop resistance during prolonged therapy (3-4 days)      -  Aztreonam: S <=4      -  Cefazolin: S <=2 For uncomplicated UTI with K. pneumoniae, E. coli, or P. mirablis: GONZALO <=16 is sensitive and GONZALO >=32 is resistant. This also predicts results for oral agents cefaclor, cefdinir, cefpodoxime, cefprozil, cefuroxime axetil, cephalexin and locarbef for uncomplicated UTI. Note that some isolates may be susceptible to these agents while testing resistant to cefazolin.      -  Cefepime: S <=2      -  Cefoxitin: S <=8      -  Ceftriaxone: S <=1 Enterobacter, Klebsiella aerogenes, Citrobacter, and Serratia may develop resistance during prolonged therapy      -  Cefuroxime: S <=4      -  Ciprofloxacin: R >2      -  Ertapenem: S <=0.5      -  Gentamicin: S <=2      -  Imipenem: S <=1      -  Levofloxacin: R 4      -  Meropenem: S <=1      -  Nitrofurantoin: I 64 Should not be used to treat pyelonephritis      -  Piperacillin/Tazobactam: S <=8      -  Tobramycin: S <=2      -  Trimethoprim/Sulfamethoxazole: S <=0.5/9.5    Care Discussed with Consultants/Other Providers: Yes

## 2023-06-12 NOTE — PROGRESS NOTE ADULT - ASSESSMENT
89 yr old female with hx of hypothyroid , DM, HTN, a fib (not on AC), presents to the ED from SNF, because of increased lethargy noted since yesterday by daughter.  Pt was apparently in the ED 6 days ago, 06/05/23, for same issue, was diagnosed to have UTI.  She was d/ed back to SNF on Levaquin, w/c was switched to Bactrim based on urine culture results.  Pt was started on Meropenem yesterday because of lethargy, presumed to be due to UTI.  Daughter satrayna pt remains lethargic, in the ED, noted to have temp of 100.7. There was no report of cough, vomiting, diarrhea.     Encephalopathy, likely due to UTI, hyponatremia  Pt w/ DM, HTN, hypothyroid  Afib (not on AC), rate controlled    Admit  IV Ceftriaxone (based on recent urine c/s results - +E.coli)  Gentle IV hydration w/ NS  FFup labs, check TSH, B12  Will get CT head, in view of lethargy (r/o CVA -pt has HTN, afib, DM) (CT head from 06/05/23 was neg)  Monitor FS, SSInsulin while in hosp  Cont other meds: Metoprolol, Levothyroxine, Gabapentin, Lexapro, Buspar, Wellbutrin  If CT head neg, and pt remains lethargic (inspite of IV antibx, IVF), may also need to adjust psych meds  GI/DVT prophylaxis 89 yr old female with hx of hypothyroid , DM, HTN, a fib (not on AC), presents to the ED from SNF, because of increased lethargy noted since yesterday by daughter.  Pt was apparently in the ED 6 days ago, 06/05/23, for same issue, was diagnosed to have UTI.  She was d/ed back to SNF on Levaquin, w/c was switched to Bactrim based on urine culture results.  Pt was started on Meropenem yesterday because of lethargy, presumed to be due to UTI.  Daughter satrayna pt remains lethargic, in the ED, noted to have temp of 100.7. There was no report of cough, vomiting, diarrhea.     #Acute Metabolic Encephalopathy likely due to UTI vs, hyponatremia  - CT head is neg for acute pathology  - Continue IV Ceftriaxone. Patient was on Levaquin and bactrim but ceftriaxone is better in terms of sensitivities   - Gentle IV hydration w/ NS  - B12 and TSH pending  - Will discuss with family adjusting these medications    #Diabetes Mellitus II:  - A1c pending on 6/12  - Hold home metformin  - Hypoglycemia protocol and insulin sliding scale at pre-meal and at night  - Blood glucose goal 100-180  - Monitor BUN/Cr and electrolytes    #Chronic atrial fibrillation  - Not on AC  - Continue Metoprolol    #Hypothyroidism  - Continue Levothyroxine  - F/u TSH    #Anxiety and Depression  - Continue Gabapentin, Lexapro, Buspar, Wellbutrin  - Will discuss with family adjusting these medications    #GI/DVT prophylaxis with famotidine/lovenox 89 yr old female with hx of hypothyroid , DM, HTN, a fib (not on AC), presents to the ED from SNF, because of increased lethargy noted since yesterday by daughter.  Pt was apparently in the ED 6 days ago, 06/05/23, for same issue, was diagnosed to have UTI.  She was d/ed back to SNF on Levaquin, w/c was switched to Bactrim based on urine culture results.  Pt was started on Meropenem yesterday because of lethargy, presumed to be due to UTI.  Daughter satrayna pt remains lethargic, in the ED, noted to have temp of 100.7. There was no report of cough, vomiting, diarrhea.     #Acute Metabolic Encephalopathy likely due to UTI vs, hyponatremia  - CT head is neg for acute pathology  - Continue IV Ceftriaxone. Patient was on Levaquin and bactrim but ceftriaxone is better in terms of sensitivities   - Gentle IV hydration w/ NS  - B12 and TSH pending  - Will discuss with family adjusting these medications    #Diabetes Mellitus II:  - A1c pending on 6/12  - Hold home metformin  - Hypoglycemia protocol and insulin sliding scale at pre-meal and at night  - Blood glucose goal 100-180  - Monitor BUN/Cr and electrolytes    #Chronic atrial fibrillation  - Not on AC  - Continue Metoprolol    #Hypothyroidism  - Continue Levothyroxine  - F/u TSH    #Anxiety and Depression  - Continue Gabapentin, Lexapro, Buspar, Wellbutrin    #GI/DVT prophylaxis with famotidine/lovenox.    6/12: Spoke to daughter this morning at the bedside. Patient is better compared to the weekend where she was very lethargic. Patient did not eat over the weekend. At the bedside, patient wants to have toast and eggs. Will continue to monitor  Salma [phone]  Spoke to Jose as well but deferred to his sister since she was at the bedside today.

## 2023-06-12 NOTE — ED ADULT NURSE REASSESSMENT NOTE - NS ED NURSE REASSESS COMMENT FT1
Patient denies pain/complaints. Patient's daughter at bedside. Patient awaiting bed, no beds at this time. Patient ED-HOLD, placed on hospital bed for comfort. Finger stick 117 mg/dl. Dr. Torres aware. Continuous monitoring.

## 2023-06-12 NOTE — PATIENT PROFILE ADULT - FUNCTIONAL ASSESSMENT - BASIC MOBILITY 6.
1-calculated by average/Not able to assess (calculate score using [facility] averaging method)

## 2023-06-13 ENCOUNTER — TRANSCRIPTION ENCOUNTER (OUTPATIENT)
Age: 88
End: 2023-06-13

## 2023-06-13 LAB
ANION GAP SERPL CALC-SCNC: 9 MMOL/L — SIGNIFICANT CHANGE UP (ref 5–17)
BUN SERPL-MCNC: 12 MG/DL — SIGNIFICANT CHANGE UP (ref 7–23)
CALCIUM SERPL-MCNC: 9 MG/DL — SIGNIFICANT CHANGE UP (ref 8.4–10.5)
CHLORIDE SERPL-SCNC: 99 MMOL/L — SIGNIFICANT CHANGE UP (ref 96–108)
CO2 SERPL-SCNC: 24 MMOL/L — SIGNIFICANT CHANGE UP (ref 22–31)
CREAT SERPL-MCNC: 0.47 MG/DL — LOW (ref 0.5–1.3)
CULTURE RESULTS: SIGNIFICANT CHANGE UP
EGFR: 89 ML/MIN/1.73M2 — SIGNIFICANT CHANGE UP
GLUCOSE BLDC GLUCOMTR-MCNC: 115 MG/DL — HIGH (ref 70–99)
GLUCOSE BLDC GLUCOMTR-MCNC: 126 MG/DL — HIGH (ref 70–99)
GLUCOSE BLDC GLUCOMTR-MCNC: 146 MG/DL — HIGH (ref 70–99)
GLUCOSE SERPL-MCNC: 184 MG/DL — HIGH (ref 70–99)
MAGNESIUM SERPL-MCNC: 1.2 MG/DL — LOW (ref 1.6–2.6)
POTASSIUM SERPL-MCNC: 3.3 MMOL/L — LOW (ref 3.5–5.3)
POTASSIUM SERPL-SCNC: 3.3 MMOL/L — LOW (ref 3.5–5.3)
SODIUM SERPL-SCNC: 132 MMOL/L — LOW (ref 135–145)
SPECIMEN SOURCE: SIGNIFICANT CHANGE UP

## 2023-06-13 PROCEDURE — 99233 SBSQ HOSP IP/OBS HIGH 50: CPT

## 2023-06-13 RX ORDER — CEFTRIAXONE 500 MG/1
1000 INJECTION, POWDER, FOR SOLUTION INTRAMUSCULAR; INTRAVENOUS EVERY 24 HOURS
Refills: 0 | Status: DISCONTINUED | OUTPATIENT
Start: 2023-06-13 | End: 2023-06-14

## 2023-06-13 RX ORDER — METFORMIN HYDROCHLORIDE 850 MG/1
1 TABLET ORAL
Qty: 0 | Refills: 0 | DISCHARGE
Start: 2023-06-13

## 2023-06-13 RX ORDER — MAGNESIUM SULFATE 500 MG/ML
2 VIAL (ML) INJECTION ONCE
Refills: 0 | Status: COMPLETED | OUTPATIENT
Start: 2023-06-13 | End: 2023-06-13

## 2023-06-13 RX ORDER — FAMOTIDINE 10 MG/ML
1 INJECTION INTRAVENOUS
Qty: 0 | Refills: 0 | DISCHARGE
Start: 2023-06-13

## 2023-06-13 RX ORDER — POTASSIUM CHLORIDE 20 MEQ
40 PACKET (EA) ORAL ONCE
Refills: 0 | Status: DISCONTINUED | OUTPATIENT
Start: 2023-06-13 | End: 2023-06-14

## 2023-06-13 RX ORDER — METFORMIN HYDROCHLORIDE 850 MG/1
1 TABLET ORAL
Qty: 0 | Refills: 0 | DISCHARGE

## 2023-06-13 RX ADMIN — Medication 650 MILLIGRAM(S): at 21:05

## 2023-06-13 RX ADMIN — Medication 650 MILLIGRAM(S): at 20:35

## 2023-06-13 RX ADMIN — BUPROPION HYDROCHLORIDE 150 MILLIGRAM(S): 150 TABLET, EXTENDED RELEASE ORAL at 12:47

## 2023-06-13 RX ADMIN — Medication 325 MILLIGRAM(S): at 12:47

## 2023-06-13 RX ADMIN — Medication 25 MILLIGRAM(S): at 06:38

## 2023-06-13 RX ADMIN — Medication 25 GRAM(S): at 21:35

## 2023-06-13 RX ADMIN — SENNA PLUS 1 TABLET(S): 8.6 TABLET ORAL at 21:35

## 2023-06-13 RX ADMIN — NYSTATIN CREAM 1 APPLICATION(S): 100000 CREAM TOPICAL at 06:38

## 2023-06-13 RX ADMIN — Medication 75 MICROGRAM(S): at 06:39

## 2023-06-13 RX ADMIN — CEFTRIAXONE 100 MILLIGRAM(S): 500 INJECTION, POWDER, FOR SOLUTION INTRAMUSCULAR; INTRAVENOUS at 14:26

## 2023-06-13 RX ADMIN — ESCITALOPRAM OXALATE 10 MILLIGRAM(S): 10 TABLET, FILM COATED ORAL at 12:47

## 2023-06-13 RX ADMIN — Medication 100 GRAM(S): at 00:12

## 2023-06-13 RX ADMIN — FAMOTIDINE 20 MILLIGRAM(S): 10 INJECTION INTRAVENOUS at 12:47

## 2023-06-13 RX ADMIN — GABAPENTIN 200 MILLIGRAM(S): 400 CAPSULE ORAL at 21:35

## 2023-06-13 RX ADMIN — Medication 3 MILLIGRAM(S): at 21:35

## 2023-06-13 RX ADMIN — Medication 5 MILLIGRAM(S): at 06:39

## 2023-06-13 NOTE — DISCHARGE NOTE PROVIDER - NSDCFUADDINST_GEN_ALL_CORE_FT
Pt needs one more day of IV Rocephin 1gram, last day of antibiotics 6/15. Please schedule IV rocephin tomorrow at around 2pm.

## 2023-06-13 NOTE — PHYSICAL THERAPY INITIAL EVALUATION ADULT - ADDITIONAL COMMENTS
pt came from [facility], per CM pt was long term there, pt appears to be mainly reliable with some confusion noted, pt reporting at [facility] OOB to chair via fawad and being non ambulatory x several years, assist with all ADLs, participating in therapy "not very much"

## 2023-06-13 NOTE — DISCHARGE NOTE PROVIDER - HOSPITAL COURSE
89 yr old female with hx of hypothyroid , DM, HTN, a fib (not on AC), presents to the ED from SNF, because of increased lethargy noted since yesterday by daughter.  Pt was apparently in the ED 6 days ago, 06/05/23, for same issue, was diagnosed to have UTI.  She was d/ed back to SNF on Levaquin, w/c was switched to Bactrim based on urine culture results.  Pt was started on Meropenem yesterday because of lethargy, presumed to be due to UTI.  Daughter states  pt remains lethargic, in the ED, noted to have temp of 100.7. There was no report of cough, vomiting, diarrhea.     #Acute Metabolic Encephalopathy likely due to UTI vs, hyponatremia  - mental status much improved. pt awake, alert, and answering questions appropriately now  - CT head is neg for acute pathology  - pt responding well to IV Ceftriaxone, day 4/5 today. Patient was on Levaquin and bactrim but ceftriaxone is better in terms of sensitivities   -hyponatremia resolved     antibiotics: UTI. IV Rocephin 1g daily for one more day till 6/15    SNF provider: Dr. Chun- notified     Discharge provider: Vladimir Davis DO  [phone] please call with any questions

## 2023-06-13 NOTE — DISCHARGE NOTE NURSING/CASE MANAGEMENT/SOCIAL WORK - PATIENT PORTAL LINK FT
You can access the FollowMyHealth Patient Portal offered by [facility] by registering at the following website: [email or web address]. By joining [facility]’s FollowMyHealth portal, you will also be able to view your health information using other applications (apps) compatible with our system.

## 2023-06-13 NOTE — DISCHARGE NOTE PROVIDER - CARE PROVIDER_API CALL
London Chun  Internal Medicine  [address], [address]  [place] [address]  Phone: [phone]  Fax: [phone]  Follow Up Time:

## 2023-06-13 NOTE — PHARMACOTHERAPY INTERVENTION NOTE - COMMENTS
Modified penicillin allergy history to state that patient tolerated ceftriaxone during this admission.    Jordan Stern, PharmD, [facility]  Clinical Pharmacy Specialist, Infectious Diseases  Tele-Antimicrobial Stewardship Program (Tele-ASP)  Tele-ASP Phone: [phone]

## 2023-06-13 NOTE — PROGRESS NOTE ADULT - SUBJECTIVE AND OBJECTIVE BOX
Patient is a 89y old  Female who presents with a chief complaint of lethargy (2023 11:09)      Subjective and overnight events:  Patient seen and examined at bedside. pt reports no complaints. no fever, chills, sob, cp, abd pain, n/v.    ALLERGIES:  aspirin (Unknown)  penicillin (Unknown)    MEDICATIONS  (STANDING):  buPROPion XL (24-Hour) 150 milliGRAM(s) Oral daily  busPIRone 5 milliGRAM(s) Oral two times a day  dextrose 5%. 1000 milliLiter(s) (50 mL/Hr) IV Continuous <Continuous>  dextrose 5%. 1000 milliLiter(s) (100 mL/Hr) IV Continuous <Continuous>  dextrose 50% Injectable 25 Gram(s) IV Push once  dextrose 50% Injectable 25 Gram(s) IV Push once  dextrose 50% Injectable 12.5 Gram(s) IV Push once  enoxaparin Injectable 40 milliGRAM(s) SubCutaneous every 24 hours  escitalopram 10 milliGRAM(s) Oral daily  famotidine    Tablet 20 milliGRAM(s) Oral daily  ferrous    sulfate 325 milliGRAM(s) Oral daily  gabapentin 200 milliGRAM(s) Oral at bedtime  glucagon  Injectable 1 milliGRAM(s) IntraMuscular once  insulin lispro (ADMELOG) corrective regimen sliding scale   SubCutaneous three times a day before meals  insulin lispro (ADMELOG) corrective regimen sliding scale   SubCutaneous at bedtime  levothyroxine 75 MICROGram(s) Oral daily  melatonin 3 milliGRAM(s) Oral at bedtime  metoprolol succinate ER 25 milliGRAM(s) Oral daily  nystatin Powder 1 Application(s) Topical two times a day  senna 1 Tablet(s) Oral at bedtime    MEDICATIONS  (PRN):  acetaminophen     Tablet .. 650 milliGRAM(s) Oral every 6 hours PRN Temp greater or equal to 38C (100.4F), Mild Pain (1 - 3)  dextrose Oral Gel 15 Gram(s) Oral once PRN Blood Glucose LESS THAN 70 milliGRAM(s)/deciliter    Vital Signs Last 24 Hrs  T(F): 98 (2023 06:39), Max: 98.6 (2023 21:04)  HR: 76 (2023 06:39) (74 - 82)  BP: 126/80 (2023 06:39) (110/67 - 126/80)  RR: 18 (2023 06:39) (16 - 20)  SpO2: 95% (2023 06:39) (92% - 95%)  I&O's Summary    PHYSICAL EXAM:  General: NAD, Awake alert and answering questions   ENT: MMM  Neck: Supple, No JVD  Lungs: Clear to auscultation bilaterally  Cardio: RRR, S1/S2, No murmurs  Abdomen: Soft, Nontender, Nondistended; Bowel sounds present  Extremities: No calf tenderness, No pitting edema    LABS:                        11.9   4.77  )-----------( 161      ( 2023 08:01 )             35.8     06-13    132  |  99  |  12  ----------------------------<  184  3.3   |  24  |  0.47    Ca    9.0      2023 10:29  Mg     1.3     06-12    TPro  7.2  /  Alb  3.0  /  TBili  0.5  /  DBili  x   /  AST  26  /  ALT  18  /  AlkPhos  56  06-11      PT/INR - ( 2023 17:36 )   PT: 13.1 sec;   INR: 1.13 ratio         PTT - ( 2023 17:36 )  PTT:26.1 sec  Lactate, Blood: 1.2 mmol/L ( @ 17:36)          TSH 0.414   TSH with FT4 reflex --  Total T3 --              POCT Blood Glucose.: 131 mg/dL (2023 22:26)  POCT Blood Glucose.: 121 mg/dL (2023 16:53)      Urinalysis Basic - ( 2023 17:56 )    Color: Yellow / Appearance: Turbid / S.014 / pH: x  Gluc: x / Ketone: Negative mg/dL  / Bili: Negative / Urobili: 0.2 mg/dL   Blood: x / Protein: 100 mg/dL / Nitrite: Negative   Leuk Esterase: Large / RBC: 5 /HPF / WBC 25 /HPF   Sq Epi: x / Non Sq Epi: x / Bacteria: Few /HPF        Culture - Urine (collected 2023 17:56)  Source: Clean Catch Clean Catch (Midstream)  Final Report (2023 11:00):    <10,000 CFU/mL Normal Urogenital Kamilla    Culture - Blood (collected 2023 17:54)  Source: .Blood Blood-Peripheral  Preliminary Report (2023 01:03):    No growth to date.    Culture - Blood (collected 2023 17:36)  Source: .Blood Blood-Peripheral  Preliminary Report (2023 01:03):    No growth to date.          RADIOLOGY & ADDITIONAL TESTS:    Care Discussed with Consultants/Other Providers:

## 2023-06-13 NOTE — DISCHARGE NOTE PROVIDER - NSDCMRMEDTOKEN_GEN_ALL_CORE_FT
acetaminophen 325 mg oral tablet: 3 tab(s) orally every 4 hours  BuSpar 5 mg oral tablet: 1 orally 2 times a day  famotidine 20 mg oral tablet: 1 tab(s) orally once a day  Feosol 325 mg (65 mg elemental iron) oral tablet: 1 tab(s) orally once a day  gabapentin 100 mg oral capsule: 2 cap(s) orally once a day  levothyroxine 75 mcg (0.075 mg) oral tablet: 1 tab(s) orally once a day  Lexapro 10 mg oral tablet: 1 tab(s) orally once a day  Melatonin 3 mg oral tablet: 1 tab(s) orally once a day (at bedtime)  metFORMIN 500 mg oral tablet: 1 orally 2 times a day  Metoprolol Succinate ER 25 mg oral tablet, extended release: 1 tab(s) orally once a day  Mylanta Maximum Strength: 031es-416kg-21 mg/5ml oral suspension  Rocephin 1 g/50 mL intravenous solution: 1 intravenously once a day IV Rocephin 1gram daily for one more day. last day 6/15  Senna Lax 8.6 mg oral tablet: 1 tab(s) orally once a day (at bedtime)  Wellbutrin  mg/24 hours oral tablet, extended release: 1 orally once a day   BuSpar 5 mg oral tablet: 1 orally 2 times a day  cefTRIAXone 1 g/50 mL-iso-osmotic dextrose intravenous solution: 1 gram(s) intravenous once a day IV Rocephin 1gram daily for one more day. last day 6/15  famotidine 20 mg oral tablet: 1 tab(s) orally once a day  Feosol 325 mg (65 mg elemental iron) oral tablet: 1 tab(s) orally once a day  gabapentin 100 mg oral capsule: 2 cap(s) orally once a day  levothyroxine 75 mcg (0.075 mg) oral tablet: 1 tab(s) orally once a day  Lexapro 10 mg oral tablet: 1 tab(s) orally once a day  metFORMIN 500 mg oral tablet: 1 orally 2 times a day  Metoprolol Succinate ER 25 mg oral tablet, extended release: 1 tab(s) orally once a day  senna leaf extract oral tablet: 1 tab(s) orally once a day (at bedtime)  Tylenol 325 mg oral tablet: 2 tab(s) orally every 6 hours as needed for  mild pain  Wellbutrin  mg/24 hours oral tablet, extended release: 1 orally once a day

## 2023-06-13 NOTE — DISCHARGE NOTE PROVIDER - NSDCCPCAREPLAN_GEN_ALL_CORE_FT
PRINCIPAL DISCHARGE DIAGNOSIS  Diagnosis: UTI (urinary tract infection)  Assessment and Plan of Treatment: improved with IV Rocephin. patient stable to return to [facility].      SECONDARY DISCHARGE DIAGNOSES  Diagnosis: Lethargy  Assessment and Plan of Treatment: resolved.

## 2023-06-13 NOTE — PHYSICAL THERAPY INITIAL EVALUATION ADULT - PERTINENT HX OF CURRENT PROBLEM, REHAB EVAL
----- Message from Lilly Pemberton sent at 11/7/2017  9:05 AM CST -----  Regarding: medication  The patient is scheduled for his MRI on 11-29 per his wife Bisi he wants to try it without anesthesia but would like medication to help him relax. He uses the [facility] in [place]. Bisi gu ph#[phone]    pt is a 89 yr old female with hx of hypothyroid , DM, HTN, a fib (not on AC), presents to the ED from SNF, because of increased lethargy noted by daughter.  Pt in the ED 6/05/23, for same issue, was diagnosed to have UTI.  She was d/ed back to SNF on Levaquin, w/c was switched to Bactrim based on urine culture results.  Pt was started on Meropenem yesterday because of lethargy, presumed to be due to UTI.  Daughter sates pt remains lethargic, in the ED, noted to have temp of 100.7. admitted for Acute Metabolic Encephalopathy likely due to UTI vs, hyponatremia, CT head is neg for acute pathology

## 2023-06-13 NOTE — DISCHARGE NOTE NURSING/CASE MANAGEMENT/SOCIAL WORK - NSDCPEFALRISK_GEN_ALL_CORE
For information on Fall & Injury Prevention, visit: [email or web address] OR  [email or web address] OR  https://www.cdc.gov/steadi/patient.html

## 2023-06-13 NOTE — PROGRESS NOTE ADULT - ASSESSMENT
89 yr old female with hx of hypothyroid , DM, HTN, a fib (not on AC), presents to the ED from SNF, because of increased lethargy noted since yesterday by daughter.  Pt was apparently in the ED 6 days ago, 06/05/23, for same issue, was diagnosed to have UTI.  She was d/ed back to SNF on Levaquin, w/c was switched to Bactrim based on urine culture results.  Pt was started on Meropenem yesterday because of lethargy, presumed to be due to UTI.  Daughter ludivina pt remains lethargic, in the ED, noted to have temp of 100.7. There was no report of cough, vomiting, diarrhea.     #Acute Metabolic Encephalopathy likely due to UTI vs, hyponatremia  - mental status much improvd. pt awake, alert, and answering questions appropriately today  - CT head is neg for acute pathology  - Continue IV Ceftriaxone, day 3/5. Patient was on Levaquin and bactrim but ceftriaxone is better in terms of sensitivities   - Gentle IV hydration w/ NS  - B12 and TSH wnl    #Hyponatremia  -monitor    #Diabetes Mellitus II:  - A1c: 5.6  - Hold home metformin  - Hypoglycemia protocol and insulin sliding scale at pre-meal and at night  - Blood glucose goal 100-180  - Monitor BUN/Cr and electrolytes    #Chronic atrial fibrillation  - Not on AC  - Continue Metoprolol    #Hypothyroidism  - Continue Levothyroxine  - F/u TSH    #Anxiety and Depression  - Continue Gabapentin, Lexapro, Buspar, Wellbutrin    #GI/DVT prophylaxis with famotidine/lovenox.      updated pt's daughter Salma [phone] regarding plan of care    dispo: back to [facility] today

## 2023-06-13 NOTE — PHYSICAL THERAPY INITIAL EVALUATION ADULT - RANGE OF MOTION, PT EVAL
in 1-3 treatments patient will perform AAROM to prevent skin breakdown and decrease risk of contractures

## 2023-06-14 VITALS
TEMPERATURE: 98 F | SYSTOLIC BLOOD PRESSURE: 107 MMHG | RESPIRATION RATE: 19 BRPM | DIASTOLIC BLOOD PRESSURE: 73 MMHG | OXYGEN SATURATION: 96 % | HEART RATE: 69 BPM

## 2023-06-14 LAB
ANION GAP SERPL CALC-SCNC: 8 MMOL/L — SIGNIFICANT CHANGE UP (ref 5–17)
BASOPHILS # BLD AUTO: 0.06 K/UL — SIGNIFICANT CHANGE UP (ref 0–0.2)
BASOPHILS NFR BLD AUTO: 1 % — SIGNIFICANT CHANGE UP (ref 0–2)
BUN SERPL-MCNC: 13 MG/DL — SIGNIFICANT CHANGE UP (ref 7–23)
CALCIUM SERPL-MCNC: 9.2 MG/DL — SIGNIFICANT CHANGE UP (ref 8.4–10.5)
CHLORIDE SERPL-SCNC: 102 MMOL/L — SIGNIFICANT CHANGE UP (ref 96–108)
CO2 SERPL-SCNC: 25 MMOL/L — SIGNIFICANT CHANGE UP (ref 22–31)
CREAT SERPL-MCNC: 0.54 MG/DL — SIGNIFICANT CHANGE UP (ref 0.5–1.3)
EGFR: 86 ML/MIN/1.73M2 — SIGNIFICANT CHANGE UP
EOSINOPHIL # BLD AUTO: 0.12 K/UL — SIGNIFICANT CHANGE UP (ref 0–0.5)
EOSINOPHIL NFR BLD AUTO: 2 % — SIGNIFICANT CHANGE UP (ref 0–6)
GLUCOSE BLDC GLUCOMTR-MCNC: 129 MG/DL — HIGH (ref 70–99)
GLUCOSE BLDC GLUCOMTR-MCNC: 132 MG/DL — HIGH (ref 70–99)
GLUCOSE BLDC GLUCOMTR-MCNC: 137 MG/DL — HIGH (ref 70–99)
GLUCOSE SERPL-MCNC: 127 MG/DL — HIGH (ref 70–99)
HCT VFR BLD CALC: 36.6 % — SIGNIFICANT CHANGE UP (ref 34.5–45)
HGB BLD-MCNC: 12.3 G/DL — SIGNIFICANT CHANGE UP (ref 11.5–15.5)
LYMPHOCYTES # BLD AUTO: 2.52 K/UL — SIGNIFICANT CHANGE UP (ref 1–3.3)
LYMPHOCYTES # BLD AUTO: 42 % — SIGNIFICANT CHANGE UP (ref 13–44)
MAGNESIUM SERPL-MCNC: 1.6 MG/DL — SIGNIFICANT CHANGE UP (ref 1.6–2.6)
MANUAL SMEAR VERIFICATION: SIGNIFICANT CHANGE UP
MCHC RBC-ENTMCNC: 31.8 PG — SIGNIFICANT CHANGE UP (ref 27–34)
MCHC RBC-ENTMCNC: 33.6 GM/DL — SIGNIFICANT CHANGE UP (ref 32–36)
MCV RBC AUTO: 94.6 FL — SIGNIFICANT CHANGE UP (ref 80–100)
MONOCYTES # BLD AUTO: 0.42 K/UL — SIGNIFICANT CHANGE UP (ref 0–0.9)
MONOCYTES NFR BLD AUTO: 7 % — SIGNIFICANT CHANGE UP (ref 2–14)
NEUTROPHILS # BLD AUTO: 2.88 K/UL — SIGNIFICANT CHANGE UP (ref 1.8–7.4)
NEUTROPHILS NFR BLD AUTO: 48 % — SIGNIFICANT CHANGE UP (ref 43–77)
NRBC # BLD: 0 /100 — SIGNIFICANT CHANGE UP (ref 0–0)
OSMOLALITY SERPL: 283 MOSMOL/KG — SIGNIFICANT CHANGE UP (ref 280–301)
PHOSPHATE SERPL-MCNC: 2.7 MG/DL — SIGNIFICANT CHANGE UP (ref 2.5–4.5)
PLAT MORPH BLD: NORMAL — SIGNIFICANT CHANGE UP
PLATELET # BLD AUTO: 187 K/UL — SIGNIFICANT CHANGE UP (ref 150–400)
POTASSIUM SERPL-MCNC: 3.4 MMOL/L — LOW (ref 3.5–5.3)
POTASSIUM SERPL-SCNC: 3.4 MMOL/L — LOW (ref 3.5–5.3)
RBC # BLD: 3.87 M/UL — SIGNIFICANT CHANGE UP (ref 3.8–5.2)
RBC # FLD: 13.6 % — SIGNIFICANT CHANGE UP (ref 10.3–14.5)
RBC BLD AUTO: NORMAL — SIGNIFICANT CHANGE UP
SODIUM SERPL-SCNC: 135 MMOL/L — SIGNIFICANT CHANGE UP (ref 135–145)
WBC # BLD: 6 K/UL — SIGNIFICANT CHANGE UP (ref 3.8–10.5)
WBC # FLD AUTO: 6 K/UL — SIGNIFICANT CHANGE UP (ref 3.8–10.5)

## 2023-06-14 PROCEDURE — 87040 BLOOD CULTURE FOR BACTERIA: CPT

## 2023-06-14 PROCEDURE — 99285 EMERGENCY DEPT VISIT HI MDM: CPT | Mod: 25

## 2023-06-14 PROCEDURE — 99239 HOSP IP/OBS DSCHRG MGMT >30: CPT

## 2023-06-14 PROCEDURE — 85025 COMPLETE CBC W/AUTO DIFF WBC: CPT

## 2023-06-14 PROCEDURE — 85730 THROMBOPLASTIN TIME PARTIAL: CPT

## 2023-06-14 PROCEDURE — 85610 PROTHROMBIN TIME: CPT

## 2023-06-14 PROCEDURE — 82607 VITAMIN B-12: CPT

## 2023-06-14 PROCEDURE — 80048 BASIC METABOLIC PNL TOTAL CA: CPT

## 2023-06-14 PROCEDURE — 84100 ASSAY OF PHOSPHORUS: CPT

## 2023-06-14 PROCEDURE — 83930 ASSAY OF BLOOD OSMOLALITY: CPT

## 2023-06-14 PROCEDURE — 83036 HEMOGLOBIN GLYCOSYLATED A1C: CPT

## 2023-06-14 PROCEDURE — 93005 ELECTROCARDIOGRAM TRACING: CPT

## 2023-06-14 PROCEDURE — 83605 ASSAY OF LACTIC ACID: CPT

## 2023-06-14 PROCEDURE — 70450 CT HEAD/BRAIN W/O DYE: CPT

## 2023-06-14 PROCEDURE — 71045 X-RAY EXAM CHEST 1 VIEW: CPT

## 2023-06-14 PROCEDURE — 80053 COMPREHEN METABOLIC PANEL: CPT

## 2023-06-14 PROCEDURE — 84443 ASSAY THYROID STIM HORMONE: CPT

## 2023-06-14 PROCEDURE — 96365 THER/PROPH/DIAG IV INF INIT: CPT

## 2023-06-14 PROCEDURE — 82962 GLUCOSE BLOOD TEST: CPT

## 2023-06-14 PROCEDURE — 83735 ASSAY OF MAGNESIUM: CPT

## 2023-06-14 PROCEDURE — 97162 PT EVAL MOD COMPLEX 30 MIN: CPT

## 2023-06-14 PROCEDURE — 81001 URINALYSIS AUTO W/SCOPE: CPT

## 2023-06-14 PROCEDURE — 87086 URINE CULTURE/COLONY COUNT: CPT

## 2023-06-14 PROCEDURE — 0225U NFCT DS DNA&RNA 21 SARSCOV2: CPT

## 2023-06-14 PROCEDURE — 36415 COLL VENOUS BLD VENIPUNCTURE: CPT

## 2023-06-14 RX ORDER — ACETAMINOPHEN 500 MG
3 TABLET ORAL
Qty: 0 | Refills: 0 | DISCHARGE

## 2023-06-14 RX ORDER — LANOLIN ALCOHOL/MO/W.PET/CERES
1 CREAM (GRAM) TOPICAL
Qty: 0 | Refills: 0 | DISCHARGE

## 2023-06-14 RX ORDER — POTASSIUM CHLORIDE 20 MEQ
40 PACKET (EA) ORAL ONCE
Refills: 0 | Status: COMPLETED | OUTPATIENT
Start: 2023-06-14 | End: 2023-06-14

## 2023-06-14 RX ORDER — SENNA PLUS 8.6 MG/1
1 TABLET ORAL
Qty: 0 | Refills: 0 | DISCHARGE
Start: 2023-06-14

## 2023-06-14 RX ORDER — POTASSIUM CHLORIDE 20 MEQ
10 PACKET (EA) ORAL ONCE
Refills: 0 | Status: COMPLETED | OUTPATIENT
Start: 2023-06-14 | End: 2023-06-14

## 2023-06-14 RX ORDER — CEFTRIAXONE 500 MG/1
1000 INJECTION, POWDER, FOR SOLUTION INTRAMUSCULAR; INTRAVENOUS ONCE
Refills: 0 | Status: COMPLETED | OUTPATIENT
Start: 2023-06-14 | End: 2023-06-14

## 2023-06-14 RX ORDER — SODIUM CHLORIDE 9 MG/ML
1000 INJECTION INTRAMUSCULAR; INTRAVENOUS; SUBCUTANEOUS
Refills: 0 | Status: DISCONTINUED | OUTPATIENT
Start: 2023-06-14 | End: 2023-06-14

## 2023-06-14 RX ORDER — SENNA PLUS 8.6 MG/1
1 TABLET ORAL
Qty: 0 | Refills: 0 | DISCHARGE

## 2023-06-14 RX ADMIN — BUPROPION HYDROCHLORIDE 150 MILLIGRAM(S): 150 TABLET, EXTENDED RELEASE ORAL at 12:53

## 2023-06-14 RX ADMIN — Medication 325 MILLIGRAM(S): at 12:52

## 2023-06-14 RX ADMIN — CEFTRIAXONE 100 MILLIGRAM(S): 500 INJECTION, POWDER, FOR SOLUTION INTRAMUSCULAR; INTRAVENOUS at 14:05

## 2023-06-14 RX ADMIN — FAMOTIDINE 20 MILLIGRAM(S): 10 INJECTION INTRAVENOUS at 12:52

## 2023-06-14 RX ADMIN — NYSTATIN CREAM 1 APPLICATION(S): 100000 CREAM TOPICAL at 17:09

## 2023-06-14 RX ADMIN — NYSTATIN CREAM 1 APPLICATION(S): 100000 CREAM TOPICAL at 06:08

## 2023-06-14 RX ADMIN — Medication 5 MILLIGRAM(S): at 17:05

## 2023-06-14 RX ADMIN — Medication 100 MILLIEQUIVALENT(S): at 12:49

## 2023-06-14 RX ADMIN — ESCITALOPRAM OXALATE 10 MILLIGRAM(S): 10 TABLET, FILM COATED ORAL at 12:53

## 2023-06-14 RX ADMIN — Medication 75 MICROGRAM(S): at 06:08

## 2023-06-14 RX ADMIN — Medication 25 MILLIGRAM(S): at 06:08

## 2023-06-14 RX ADMIN — ENOXAPARIN SODIUM 40 MILLIGRAM(S): 100 INJECTION SUBCUTANEOUS at 06:08

## 2023-06-14 RX ADMIN — Medication 5 MILLIGRAM(S): at 06:08

## 2023-06-14 NOTE — PROGRESS NOTE ADULT - SUBJECTIVE AND OBJECTIVE BOX
Patient is a 89y old  Female who presents with a chief complaint of lethargy (2023 11:28)      Subjective and overnight events:  Patient seen and examined at bedside. no new complaints. no fever, chills, sob, cp, abd pain.     ALLERGIES:  aspirin (Unknown)  penicillin (Unknown)    MEDICATIONS  (STANDING):  buPROPion XL (24-Hour) 150 milliGRAM(s) Oral daily  busPIRone 5 milliGRAM(s) Oral two times a day  cefTRIAXone   IVPB 1000 milliGRAM(s) IV Intermittent every 24 hours  dextrose 5%. 1000 milliLiter(s) (50 mL/Hr) IV Continuous <Continuous>  dextrose 5%. 1000 milliLiter(s) (100 mL/Hr) IV Continuous <Continuous>  dextrose 50% Injectable 12.5 Gram(s) IV Push once  dextrose 50% Injectable 25 Gram(s) IV Push once  dextrose 50% Injectable 25 Gram(s) IV Push once  enoxaparin Injectable 40 milliGRAM(s) SubCutaneous every 24 hours  escitalopram 10 milliGRAM(s) Oral daily  famotidine    Tablet 20 milliGRAM(s) Oral daily  ferrous    sulfate 325 milliGRAM(s) Oral daily  gabapentin 200 milliGRAM(s) Oral at bedtime  glucagon  Injectable 1 milliGRAM(s) IntraMuscular once  insulin lispro (ADMELOG) corrective regimen sliding scale   SubCutaneous at bedtime  insulin lispro (ADMELOG) corrective regimen sliding scale   SubCutaneous three times a day before meals  levothyroxine 75 MICROGram(s) Oral daily  melatonin 3 milliGRAM(s) Oral at bedtime  metoprolol succinate ER 25 milliGRAM(s) Oral daily  nystatin Powder 1 Application(s) Topical two times a day  senna 1 Tablet(s) Oral at bedtime  sodium chloride 0.9%. 1000 milliLiter(s) (60 mL/Hr) IV Continuous <Continuous>    MEDICATIONS  (PRN):  acetaminophen     Tablet .. 650 milliGRAM(s) Oral every 6 hours PRN Temp greater or equal to 38C (100.4F), Mild Pain (1 - 3)  dextrose Oral Gel 15 Gram(s) Oral once PRN Blood Glucose LESS THAN 70 milliGRAM(s)/deciliter    Vital Signs Last 24 Hrs  T(F): 97.4 (2023 06:12), Max: 98.7 (2023 20:37)  HR: 71 (2023 06:12) (71 - 82)  BP: 121/77 (2023 06:12) (121/77 - 144/74)  RR: 18 (2023 06:12) (17 - 18)  SpO2: 94% (2023 06:12) (94% - 95%)  I&O's Summary    2023 07:01  -  2023 07:00  --------------------------------------------------------  IN: 0 mL / OUT: 750 mL / NET: -750 mL      PHYSICAL EXAM:  General: NAD, A/O x 3  ENT: MMM  Neck: Supple, No JVD  Lungs: Clear to auscultation bilaterally  Cardio: RRR, S1/S2, No murmurs  Abdomen: Soft, Nontender, Nondistended; Bowel sounds present  Extremities: No calf tenderness, No pitting edema    LABS:                        12.3   6.00  )-----------( 187      ( 2023 06:13 )             36.6     06-14    135  |  102  |  13  ----------------------------<  127  3.4   |  25  |  0.54    Ca    9.2      2023 06:13  Phos  2.7     06-14  Mg     1.6     06-14    TPro  7.2  /  Alb  3.0  /  TBili  0.5  /  DBili  x   /  AST  26  /  ALT  18  /  AlkPhos  56  06-11      PT/INR - ( 2023 17:36 )   PT: 13.1 sec;   INR: 1.13 ratio         PTT - ( 2023 17:36 )  PTT:26.1 sec  Lactate, Blood: 1.2 mmol/L ( @ 17:36)          TSH 0.414   TSH with FT4 reflex --  Total T3 --          POCT Blood Glucose.: 132 mg/dL (2023 12:47)  POCT Blood Glucose.: 137 mg/dL (2023 08:09)  POCT Blood Glucose.: 115 mg/dL (2023 21:39)  POCT Blood Glucose.: 146 mg/dL (2023 16:54)      Urinalysis Basic - ( 2023 17:56 )    Color: Yellow / Appearance: Turbid / S.014 / pH: x  Gluc: x / Ketone: Negative mg/dL  / Bili: Negative / Urobili: 0.2 mg/dL   Blood: x / Protein: 100 mg/dL / Nitrite: Negative   Leuk Esterase: Large / RBC: 5 /HPF / WBC 25 /HPF   Sq Epi: x / Non Sq Epi: x / Bacteria: Few /HPF        Culture - Urine (collected 2023 17:56)  Source: Clean Catch Clean Catch (Midstream)  Final Report (2023 11:00):    <10,000 CFU/mL Normal Urogenital Kamilla    Culture - Blood (collected 2023 17:54)  Source: .Blood Blood-Peripheral  Preliminary Report (2023 01:03):    No growth to date.    Culture - Blood (collected 2023 17:36)  Source: .Blood Blood-Peripheral  Preliminary Report (2023 01:03):    No growth to date.          RADIOLOGY & ADDITIONAL TESTS:    Care Discussed with Consultants/Other Providers:

## 2023-06-14 NOTE — PROGRESS NOTE ADULT - ASSESSMENT
89 yr old female with hx of hypothyroid , DM, HTN, a fib (not on AC), presents to the ED from SNF, because of increased lethargy noted since yesterday by daughter.  Pt was apparently in the ED 6 days ago, 06/05/23, for same issue, was diagnosed to have UTI.  She was d/ed back to SNF on Levaquin, w/c was switched to Bactrim based on urine culture results.  Pt was started on Meropenem yesterday because of lethargy, presumed to be due to UTI.  Daughter ludivina pt remains lethargic, in the ED, noted to have temp of 100.7. There was no report of cough, vomiting, diarrhea.     #Acute Metabolic Encephalopathy likely due to UTI vs, hyponatremia  - mental status much improved. pt awake, alert, and answering questions appropriately today  - CT head is neg for acute pathology  - Continue IV Ceftriaxone, day 4/5. Patient was on Levaquin and bactrim but ceftriaxone is better in terms of sensitivities   - Gentle IV hydration w/ NS  - B12 and TSH wnl    #Hyponatremia  -resolved    #Hypokalemia and hypomagneisum  - replete as need. monitor     #Diabetes Mellitus II:  - A1c: 5.6  - reduce home metformin to 500mg BID   - Hypoglycemia protocol and insulin sliding scale at pre-meal and at night  - Blood glucose goal 100-180  - Monitor BUN/Cr and electrolytes    #Chronic atrial fibrillation  - Not on AC  - Continue Metoprolol    #Hypothyroidism  - Continue Levothyroxine  - F/u TSH    #Anxiety and Depression  - Continue Gabapentin, Lexapro, Buspar, Wellbutrin    #GI/DVT prophylaxis with famotidine/lovenox.      updated pt's daughter Salma [phone] regarding plan of care    dispo: back to [facility] today

## 2023-06-15 RX ORDER — CEFTRIAXONE 500 MG/1
1 INJECTION, POWDER, FOR SOLUTION INTRAMUSCULAR; INTRAVENOUS
Qty: 0 | Refills: 0 | DISCHARGE
Start: 2023-06-15 | End: 2023-06-15

## 2023-07-17 NOTE — ED ADULT NURSE NOTE - CAS TRG GEN SKIN CONDITION
Discharge Instructions for Paracentesis    You had a procedure today called a paracentesis in which excess fluid in your abdomen, called ascites, was removed. An Interventional Radiologist used ultrasound to identify the largest pocket of fluid, numbed the area, and put a small catheter in your abdomen to drain as much fluid as possible. The procedure may have been done to take a sample of the fluid for diagnostic purposes or it may have been done to drain the excess fluid from your abdomen to make you feel more comfortable and breathe easier.       Ascites is buildup of excess fluid in the abdomen.       During and After the procedure  During the procedure, registered nurses and radiology technologists were present at all times to monitor your vital signs, maintain a sterile environment and make sure you were as comfortable as possible. Additionally, warm blankets were provided for your comfort. You were monitored closely after the procedure to make sure your vitals returned to baseline and that you were able to safely leave the facility in the care of your friends or family.     Home Care  You may shower the day of your paracentesis. When you are able to shower, do not scrub the procedure site but wash gently. After your shower, remove the wet bandage and pat the area dry. Reapply a bandage of your choice if you wish.     Take it easy and do not lift anything heavier than a gallon of milk for 24 hours after the procedure. Avoid strenuous physical activity for 24 hours.  You will have a small bandage over the puncture site. You may remove the bandage in 24 hours preferrably after you have showered.  Check the puncture site for the signs of infection listed below.    When to Call Your Doctor  Call your doctor right away if you have any of the following:  Dizziness or lightheadedness  Sudden or increased shortness of breath  Sudden chest pain  Fever of 100.4°F or higher, or chills  Increasing redness, tenderness, or  swelling at the procedure site     Follow-Up  If your fluid was sent to the lab, the results should be ready approximately 3-5 business days after the procedure. Make a follow-up appointment as directed by the physician who ordered the procedure. Please call the radiology nurse at [phone] if you have any questions regarding this procedure.     Thank you for using [facility] today. We hope you had a good experience with your healthcare needs.    Dry/Warm

## 2023-08-25 NOTE — ED ADULT TRIAGE NOTE - HEART RATE (BEATS/MIN)
[[facility] 
74

## 2023-09-18 NOTE — PROGRESS NOTE ADULT - SUBJECTIVE AND OBJECTIVE BOX
Child Life Progress Note    Name: Hilary Esparza  : 2008   Sex: female    Consult Method: Patient/Family request    Intro Statement: This Certified Child Life Specialist (CCLS) was called to provide support to Hilary, a 15 y.o. female and family.    Settings:  [facility]    Baseline Temperament: Easy and adaptable    Procedure:  Liver biopsy    Premedication Given - Yes    Coping Style and Considerations: Patient benefits from anticipatory guidance, stress ball, and low stimulation environment    Caregiver(s) Present: Mother and Father    Caregiver(s) Involvement: Present, Engaged, and Supportive    Outcome:   Patient has demonstrated developmentally appropriate reactions/responses to hospitalization. However, patient would benefit from psychological preparation and support for future healthcare encounters. Child life will continue to follow. Please call with any questions, concerns, or upcoming procedures.    XAVIER Phillips  Certified Child Life Specialist  Acute Pediatrics  [id number]     Time spent with the Patient: 1 hour          CC: f/u for UTI    Patient reports: she is weak, c/o chills, not as bad as yesterday.She has received fluid boluses, her lactate levels have been between 2.1-4    REVIEW OF SYSTEMS:  All other review of systems negative (Comprehensive ROS): incontinent of urine    Antimicrobials Day #  :day 2  cefTRIAXone   IVPB 1000 milliGRAM(s) IV Intermittent every 24 hours    Other Medications Reviewed    T(F): 97.9 (19 @ 12:47), Max: 101.5 (19 @ 17:58)  HR: 75 (19 @ 12:47)  BP: 105/60 (19 @ 12:48)  RR: 15 (19 @ 12:47)  SpO2: 93% (19 @ 12:47)  Wt(kg): --    PHYSICAL EXAM:  General: alert, no acute distress, chilled  Eyes:  anicteric, no conjunctival injection, no discharge  Oropharynx: no lesions or injection 	  Neck: supple, without adenopathy  Lungs: scattered rales  Heart: irregular rate and rhythm; no murmur, rubs or gallops  Abdomen: soft, nondistended, nontender, without mass or organomegaly  Skin: no lesions  Extremities: no clubbing, cyanosis, + edema  Neurologic: alert, oriented, moves all extremities    LAB RESULTS:                        11.8   5.96  )-----------( 147      ( 01 Aug 2019 07:00 )             35.3     08-    139  |  106  |  13  ----------------------------<  141<H>  3.5   |  24  |  0.69    Ca    8.4      01 Aug 2019 07:00    TPro  7.6  /  Alb  3.7  /  TBili  0.8  /  DBili  x   /  AST  24  /  ALT  16  /  AlkPhos  60      LIVER FUNCTIONS - ( 2019 12:45 )  Alb: 3.7 g/dL / Pro: 7.6 g/dL / ALK PHOS: 60 U/L / ALT: 16 U/L DA / AST: 24 U/L / GGT: x           Urinalysis Basic - ( 2019 13:40 )    Color: Yellow / Appearance: Slightly Turbid / S.015 / pH: x  Gluc: x / Ketone: Negative  / Bili: Negative / Urobili: Negative   Blood: x / Protein: 30 mg/dL / Nitrite: Negative   Leuk Esterase: Moderate / RBC: >50 /HPF / WBC >50 /HPF   Sq Epi: x / Non Sq Epi: Neg.-Few / Bacteria: Moderate /HPF      MICROBIOLOGY:  RECENT CULTURES:   @ 09:15 .Urine Catheterized     >100,000 CFU/ml Gram Negative Rods      Blood cultures are pending    RADIOLOGY REVIEWED:  < from: Xray Chest 1 View AP/PA (19 @ 13:48) >  IMPRESSION: No gross acute lung finding at this time. Intrathoracic   stomach again noted.    <

## 2023-09-24 NOTE — PROGRESS NOTE ADULT - ASSESSMENT
89F with Afib (not on AC due to vaginal bleeding 2/2 polyps), chronic back pain 2/2 spinal stenosis,  DM2 on metformin, HTN, recurrent UTIs, chronic venous insufficiency, presents from [facility] for diarrhea, found to have COVID+ infection    #Acute hypoxic respiratory failure sec to COVID-19 Pneumonia - O2 documented to be 88% on RA.   Decadron Completed (leukocytosis likely related to decadron)  Remdesivir-5 day completed on 1/26. Pt required inc oxygen on 1/28 (upto 12L NC). Pt currently titrated down and on RA  Restarted redemsivir on 1/30-duration depends on clinical status.    Monitor renal/liver functions  sending COVID swab today  D/C back to [facility] pending two negative COVID tests. (Possible Home hospice) Will F/U    Bilateral chronic venous insufficiency  No DVT  Hold Lasix for now    Chronic Atrial Fibrillation, not on anticoagulation secondary to history of vaginal bleeding, HTN  Metoprolol    DM2, a1c 6.1  Continue with Lantus  Continue with Lispro  Completed decadron. FS should be trending downwards    HTN  Norvasc/Metoprolol    Hyponatremia  Mild, monitor for now  Palliative:  Family considering home hospice.  Gal/Jose Kay, Both sons live together, - [phone]  daughter, Salma Kebede, [phone](Monday-Friday During the days)  Called Nini today Pt ambulatory to triage c/o high blood pressure. Pt states she has hx of htn and forgot to take her medications for a couple days bc she is moving. Pt state she took her medications this am. Pt c/o HA as well. Nad.

## 2023-09-26 NOTE — DISCHARGE NOTE PROVIDER - DISCHARGE DATE
Call rec'd from JESI Govea [facility] cardiology MD requesting pre hydration prior to CT scan.  Contacted SO on patients phone line aware to arrive to 2nd floor Day Surgery by 1130 on Friday, 9/29, IVF's will be given over 2 hours starting @ 1230, CT @ 1430.  SO verbalized understanding to this RN.   Per Jeferson no post hydration needed. TELMA Garza to enter [facility] orders for hydration.     To Int Med Seq [facility] non-clinical pool   13-Jun-2023 14-Jun-2023

## 2023-12-28 ENCOUNTER — INPATIENT (INPATIENT)
Facility: HOSPITAL | Age: 88
LOS: 7 days | Discharge: NURSING, REHAB OR LONG-TERM CARE | DRG: 535 | End: 2024-01-05
Attending: STUDENT IN AN ORGANIZED HEALTH CARE EDUCATION/TRAINING PROGRAM | Admitting: INTERNAL MEDICINE
Payer: MEDICARE

## 2023-12-28 VITALS
DIASTOLIC BLOOD PRESSURE: 94 MMHG | HEIGHT: 69 IN | HEART RATE: 76 BPM | TEMPERATURE: 97 F | RESPIRATION RATE: 21 BRPM | SYSTOLIC BLOOD PRESSURE: 178 MMHG | WEIGHT: 197.09 LBS | OXYGEN SATURATION: 95 %

## 2023-12-28 DIAGNOSIS — Z96.651 PRESENCE OF RIGHT ARTIFICIAL KNEE JOINT: Chronic | ICD-10-CM

## 2023-12-28 DIAGNOSIS — S72.009A FRACTURE OF UNSPECIFIED PART OF NECK OF UNSPECIFIED FEMUR, INITIAL ENCOUNTER FOR CLOSED FRACTURE: ICD-10-CM

## 2023-12-28 DIAGNOSIS — Z98.891 HISTORY OF UTERINE SCAR FROM PREVIOUS SURGERY: Chronic | ICD-10-CM

## 2023-12-28 DIAGNOSIS — Z98.89 OTHER SPECIFIED POSTPROCEDURAL STATES: Chronic | ICD-10-CM

## 2023-12-28 LAB
ALBUMIN SERPL ELPH-MCNC: 3 G/DL — LOW (ref 3.3–5)
ALBUMIN SERPL ELPH-MCNC: 3 G/DL — LOW (ref 3.3–5)
ALP SERPL-CCNC: 77 U/L — SIGNIFICANT CHANGE UP (ref 40–120)
ALP SERPL-CCNC: 77 U/L — SIGNIFICANT CHANGE UP (ref 40–120)
ALT FLD-CCNC: 20 U/L — SIGNIFICANT CHANGE UP (ref 10–45)
ALT FLD-CCNC: 20 U/L — SIGNIFICANT CHANGE UP (ref 10–45)
ANION GAP SERPL CALC-SCNC: 7 MMOL/L — SIGNIFICANT CHANGE UP (ref 5–17)
ANION GAP SERPL CALC-SCNC: 7 MMOL/L — SIGNIFICANT CHANGE UP (ref 5–17)
APTT BLD: 31.5 SEC — SIGNIFICANT CHANGE UP (ref 24.5–35.6)
APTT BLD: 31.5 SEC — SIGNIFICANT CHANGE UP (ref 24.5–35.6)
AST SERPL-CCNC: 17 U/L — SIGNIFICANT CHANGE UP (ref 10–40)
AST SERPL-CCNC: 17 U/L — SIGNIFICANT CHANGE UP (ref 10–40)
BASOPHILS # BLD AUTO: 0.06 K/UL — SIGNIFICANT CHANGE UP (ref 0–0.2)
BASOPHILS # BLD AUTO: 0.06 K/UL — SIGNIFICANT CHANGE UP (ref 0–0.2)
BASOPHILS NFR BLD AUTO: 0.5 % — SIGNIFICANT CHANGE UP (ref 0–2)
BASOPHILS NFR BLD AUTO: 0.5 % — SIGNIFICANT CHANGE UP (ref 0–2)
BILIRUB SERPL-MCNC: 0.6 MG/DL — SIGNIFICANT CHANGE UP (ref 0.2–1.2)
BILIRUB SERPL-MCNC: 0.6 MG/DL — SIGNIFICANT CHANGE UP (ref 0.2–1.2)
BLD GP AB SCN SERPL QL: SIGNIFICANT CHANGE UP
BLD GP AB SCN SERPL QL: SIGNIFICANT CHANGE UP
BUN SERPL-MCNC: 19 MG/DL — SIGNIFICANT CHANGE UP (ref 7–23)
BUN SERPL-MCNC: 19 MG/DL — SIGNIFICANT CHANGE UP (ref 7–23)
CALCIUM SERPL-MCNC: 10.3 MG/DL — SIGNIFICANT CHANGE UP (ref 8.4–10.5)
CALCIUM SERPL-MCNC: 10.3 MG/DL — SIGNIFICANT CHANGE UP (ref 8.4–10.5)
CHLORIDE SERPL-SCNC: 101 MMOL/L — SIGNIFICANT CHANGE UP (ref 96–108)
CHLORIDE SERPL-SCNC: 101 MMOL/L — SIGNIFICANT CHANGE UP (ref 96–108)
CO2 SERPL-SCNC: 28 MMOL/L — SIGNIFICANT CHANGE UP (ref 22–31)
CO2 SERPL-SCNC: 28 MMOL/L — SIGNIFICANT CHANGE UP (ref 22–31)
CREAT SERPL-MCNC: 0.71 MG/DL — SIGNIFICANT CHANGE UP (ref 0.5–1.3)
CREAT SERPL-MCNC: 0.71 MG/DL — SIGNIFICANT CHANGE UP (ref 0.5–1.3)
EGFR: 79 ML/MIN/1.73M2 — SIGNIFICANT CHANGE UP
EGFR: 79 ML/MIN/1.73M2 — SIGNIFICANT CHANGE UP
EOSINOPHIL # BLD AUTO: 0.16 K/UL — SIGNIFICANT CHANGE UP (ref 0–0.5)
EOSINOPHIL # BLD AUTO: 0.16 K/UL — SIGNIFICANT CHANGE UP (ref 0–0.5)
EOSINOPHIL NFR BLD AUTO: 1.3 % — SIGNIFICANT CHANGE UP (ref 0–6)
EOSINOPHIL NFR BLD AUTO: 1.3 % — SIGNIFICANT CHANGE UP (ref 0–6)
GLUCOSE SERPL-MCNC: 156 MG/DL — HIGH (ref 70–99)
GLUCOSE SERPL-MCNC: 156 MG/DL — HIGH (ref 70–99)
HCT VFR BLD CALC: 43.6 % — SIGNIFICANT CHANGE UP (ref 34.5–45)
HCT VFR BLD CALC: 43.6 % — SIGNIFICANT CHANGE UP (ref 34.5–45)
HGB BLD-MCNC: 14.7 G/DL — SIGNIFICANT CHANGE UP (ref 11.5–15.5)
HGB BLD-MCNC: 14.7 G/DL — SIGNIFICANT CHANGE UP (ref 11.5–15.5)
IMM GRANULOCYTES NFR BLD AUTO: 0.6 % — SIGNIFICANT CHANGE UP (ref 0–0.9)
IMM GRANULOCYTES NFR BLD AUTO: 0.6 % — SIGNIFICANT CHANGE UP (ref 0–0.9)
INR BLD: 1.06 RATIO — SIGNIFICANT CHANGE UP (ref 0.85–1.18)
INR BLD: 1.06 RATIO — SIGNIFICANT CHANGE UP (ref 0.85–1.18)
LYMPHOCYTES # BLD AUTO: 1.18 K/UL — SIGNIFICANT CHANGE UP (ref 1–3.3)
LYMPHOCYTES # BLD AUTO: 1.18 K/UL — SIGNIFICANT CHANGE UP (ref 1–3.3)
LYMPHOCYTES # BLD AUTO: 9.8 % — LOW (ref 13–44)
LYMPHOCYTES # BLD AUTO: 9.8 % — LOW (ref 13–44)
MCHC RBC-ENTMCNC: 31.8 PG — SIGNIFICANT CHANGE UP (ref 27–34)
MCHC RBC-ENTMCNC: 31.8 PG — SIGNIFICANT CHANGE UP (ref 27–34)
MCHC RBC-ENTMCNC: 33.7 GM/DL — SIGNIFICANT CHANGE UP (ref 32–36)
MCHC RBC-ENTMCNC: 33.7 GM/DL — SIGNIFICANT CHANGE UP (ref 32–36)
MCV RBC AUTO: 94.4 FL — SIGNIFICANT CHANGE UP (ref 80–100)
MCV RBC AUTO: 94.4 FL — SIGNIFICANT CHANGE UP (ref 80–100)
MONOCYTES # BLD AUTO: 0.59 K/UL — SIGNIFICANT CHANGE UP (ref 0–0.9)
MONOCYTES # BLD AUTO: 0.59 K/UL — SIGNIFICANT CHANGE UP (ref 0–0.9)
MONOCYTES NFR BLD AUTO: 4.9 % — SIGNIFICANT CHANGE UP (ref 2–14)
MONOCYTES NFR BLD AUTO: 4.9 % — SIGNIFICANT CHANGE UP (ref 2–14)
NEUTROPHILS # BLD AUTO: 9.97 K/UL — HIGH (ref 1.8–7.4)
NEUTROPHILS # BLD AUTO: 9.97 K/UL — HIGH (ref 1.8–7.4)
NEUTROPHILS NFR BLD AUTO: 82.9 % — HIGH (ref 43–77)
NEUTROPHILS NFR BLD AUTO: 82.9 % — HIGH (ref 43–77)
NRBC # BLD: 0 /100 WBCS — SIGNIFICANT CHANGE UP (ref 0–0)
NRBC # BLD: 0 /100 WBCS — SIGNIFICANT CHANGE UP (ref 0–0)
PLATELET # BLD AUTO: 266 K/UL — SIGNIFICANT CHANGE UP (ref 150–400)
PLATELET # BLD AUTO: 266 K/UL — SIGNIFICANT CHANGE UP (ref 150–400)
POTASSIUM SERPL-MCNC: 4 MMOL/L — SIGNIFICANT CHANGE UP (ref 3.5–5.3)
POTASSIUM SERPL-MCNC: 4 MMOL/L — SIGNIFICANT CHANGE UP (ref 3.5–5.3)
POTASSIUM SERPL-SCNC: 4 MMOL/L — SIGNIFICANT CHANGE UP (ref 3.5–5.3)
POTASSIUM SERPL-SCNC: 4 MMOL/L — SIGNIFICANT CHANGE UP (ref 3.5–5.3)
PROT SERPL-MCNC: 7.5 G/DL — SIGNIFICANT CHANGE UP (ref 6–8.3)
PROT SERPL-MCNC: 7.5 G/DL — SIGNIFICANT CHANGE UP (ref 6–8.3)
PROTHROM AB SERPL-ACNC: 12.1 SEC — SIGNIFICANT CHANGE UP (ref 9.5–13)
PROTHROM AB SERPL-ACNC: 12.1 SEC — SIGNIFICANT CHANGE UP (ref 9.5–13)
RBC # BLD: 4.62 M/UL — SIGNIFICANT CHANGE UP (ref 3.8–5.2)
RBC # BLD: 4.62 M/UL — SIGNIFICANT CHANGE UP (ref 3.8–5.2)
RBC # FLD: 14.6 % — HIGH (ref 10.3–14.5)
RBC # FLD: 14.6 % — HIGH (ref 10.3–14.5)
SODIUM SERPL-SCNC: 136 MMOL/L — SIGNIFICANT CHANGE UP (ref 135–145)
SODIUM SERPL-SCNC: 136 MMOL/L — SIGNIFICANT CHANGE UP (ref 135–145)
WBC # BLD: 12.03 K/UL — HIGH (ref 3.8–10.5)
WBC # BLD: 12.03 K/UL — HIGH (ref 3.8–10.5)
WBC # FLD AUTO: 12.03 K/UL — HIGH (ref 3.8–10.5)
WBC # FLD AUTO: 12.03 K/UL — HIGH (ref 3.8–10.5)

## 2023-12-28 PROCEDURE — 76376 3D RENDER W/INTRP POSTPROCES: CPT | Mod: 26

## 2023-12-28 PROCEDURE — 72192 CT PELVIS W/O DYE: CPT | Mod: 26,MA

## 2023-12-28 PROCEDURE — 73552 X-RAY EXAM OF FEMUR 2/>: CPT | Mod: 26,RT

## 2023-12-28 PROCEDURE — 72125 CT NECK SPINE W/O DYE: CPT | Mod: 26,QQ

## 2023-12-28 PROCEDURE — 71045 X-RAY EXAM CHEST 1 VIEW: CPT | Mod: 26

## 2023-12-28 PROCEDURE — 99223 1ST HOSP IP/OBS HIGH 75: CPT

## 2023-12-28 PROCEDURE — 73502 X-RAY EXAM HIP UNI 2-3 VIEWS: CPT | Mod: 26,RT

## 2023-12-28 PROCEDURE — 99285 EMERGENCY DEPT VISIT HI MDM: CPT

## 2023-12-28 PROCEDURE — 70450 CT HEAD/BRAIN W/O DYE: CPT | Mod: 26,MA

## 2023-12-28 RX ORDER — BUPROPION HYDROCHLORIDE 150 MG/1
150 TABLET, EXTENDED RELEASE ORAL DAILY
Refills: 0 | Status: DISCONTINUED | OUTPATIENT
Start: 2023-12-28 | End: 2024-01-05

## 2023-12-28 RX ORDER — FERROUS SULFATE 325(65) MG
325 TABLET ORAL DAILY
Refills: 0 | Status: DISCONTINUED | OUTPATIENT
Start: 2023-12-28 | End: 2023-12-31

## 2023-12-28 RX ORDER — GABAPENTIN 400 MG/1
2 CAPSULE ORAL
Qty: 0 | Refills: 0 | DISCHARGE

## 2023-12-28 RX ORDER — METOPROLOL TARTRATE 50 MG
1 TABLET ORAL
Qty: 0 | Refills: 0 | DISCHARGE

## 2023-12-28 RX ORDER — ACETAMINOPHEN 500 MG
975 TABLET ORAL ONCE
Refills: 0 | Status: COMPLETED | OUTPATIENT
Start: 2023-12-28 | End: 2023-12-28

## 2023-12-28 RX ORDER — BUPROPION HYDROCHLORIDE 150 MG/1
1 TABLET, EXTENDED RELEASE ORAL
Qty: 0 | Refills: 0 | DISCHARGE

## 2023-12-28 RX ORDER — MORPHINE SULFATE 50 MG/1
4 CAPSULE, EXTENDED RELEASE ORAL ONCE
Refills: 0 | Status: DISCONTINUED | OUTPATIENT
Start: 2023-12-28 | End: 2023-12-28

## 2023-12-28 RX ORDER — LEVOTHYROXINE SODIUM 125 MCG
75 TABLET ORAL DAILY
Refills: 0 | Status: DISCONTINUED | OUTPATIENT
Start: 2023-12-28 | End: 2024-01-05

## 2023-12-28 RX ORDER — LEVOTHYROXINE SODIUM 125 MCG
1 TABLET ORAL
Qty: 0 | Refills: 0 | DISCHARGE

## 2023-12-28 RX ORDER — ESCITALOPRAM OXALATE 10 MG/1
1 TABLET, FILM COATED ORAL
Qty: 0 | Refills: 0 | DISCHARGE

## 2023-12-28 RX ORDER — ACETAMINOPHEN 500 MG
2 TABLET ORAL
Qty: 0 | Refills: 0 | DISCHARGE

## 2023-12-28 RX ORDER — ESCITALOPRAM OXALATE 10 MG/1
10 TABLET, FILM COATED ORAL DAILY
Refills: 0 | Status: DISCONTINUED | OUTPATIENT
Start: 2023-12-28 | End: 2024-01-05

## 2023-12-28 RX ORDER — ONDANSETRON 8 MG/1
4 TABLET, FILM COATED ORAL EVERY 8 HOURS
Refills: 0 | Status: DISCONTINUED | OUTPATIENT
Start: 2023-12-28 | End: 2024-01-05

## 2023-12-28 RX ORDER — FAMOTIDINE 10 MG/ML
20 INJECTION INTRAVENOUS DAILY
Refills: 0 | Status: DISCONTINUED | OUTPATIENT
Start: 2023-12-28 | End: 2024-01-05

## 2023-12-28 RX ORDER — SENNA PLUS 8.6 MG/1
2 TABLET ORAL AT BEDTIME
Refills: 0 | Status: DISCONTINUED | OUTPATIENT
Start: 2023-12-28 | End: 2024-01-05

## 2023-12-28 RX ORDER — FERROUS SULFATE 325(65) MG
1 TABLET ORAL
Qty: 0 | Refills: 0 | DISCHARGE

## 2023-12-28 RX ORDER — ENOXAPARIN SODIUM 100 MG/ML
40 INJECTION SUBCUTANEOUS EVERY 24 HOURS
Refills: 0 | Status: DISCONTINUED | OUTPATIENT
Start: 2023-12-28 | End: 2024-01-05

## 2023-12-28 RX ORDER — SODIUM CHLORIDE 9 MG/ML
1000 INJECTION INTRAMUSCULAR; INTRAVENOUS; SUBCUTANEOUS
Refills: 0 | Status: DISCONTINUED | OUTPATIENT
Start: 2023-12-28 | End: 2023-12-29

## 2023-12-28 RX ORDER — MORPHINE SULFATE 50 MG/1
1 CAPSULE, EXTENDED RELEASE ORAL EVERY 4 HOURS
Refills: 0 | Status: DISCONTINUED | OUTPATIENT
Start: 2023-12-28 | End: 2023-12-29

## 2023-12-28 RX ORDER — LANOLIN ALCOHOL/MO/W.PET/CERES
3 CREAM (GRAM) TOPICAL AT BEDTIME
Refills: 0 | Status: DISCONTINUED | OUTPATIENT
Start: 2023-12-28 | End: 2023-12-29

## 2023-12-28 RX ORDER — POLYETHYLENE GLYCOL 3350 17 G/17G
17 POWDER, FOR SOLUTION ORAL DAILY
Refills: 0 | Status: DISCONTINUED | OUTPATIENT
Start: 2023-12-28 | End: 2024-01-05

## 2023-12-28 RX ORDER — METOPROLOL TARTRATE 50 MG
25 TABLET ORAL DAILY
Refills: 0 | Status: DISCONTINUED | OUTPATIENT
Start: 2023-12-28 | End: 2023-12-30

## 2023-12-28 RX ORDER — ACETAMINOPHEN 500 MG
650 TABLET ORAL EVERY 6 HOURS
Refills: 0 | Status: DISCONTINUED | OUTPATIENT
Start: 2023-12-28 | End: 2023-12-31

## 2023-12-28 RX ORDER — SENNA PLUS 8.6 MG/1
1 TABLET ORAL AT BEDTIME
Refills: 0 | Status: DISCONTINUED | OUTPATIENT
Start: 2023-12-28 | End: 2023-12-28

## 2023-12-28 RX ADMIN — SODIUM CHLORIDE 72 MILLILITER(S): 9 INJECTION INTRAMUSCULAR; INTRAVENOUS; SUBCUTANEOUS at 07:42

## 2023-12-28 RX ADMIN — Medication 650 MILLIGRAM(S): at 15:00

## 2023-12-28 RX ADMIN — ESCITALOPRAM OXALATE 10 MILLIGRAM(S): 10 TABLET, FILM COATED ORAL at 11:27

## 2023-12-28 RX ADMIN — BUPROPION HYDROCHLORIDE 150 MILLIGRAM(S): 150 TABLET, EXTENDED RELEASE ORAL at 11:27

## 2023-12-28 RX ADMIN — Medication 25 MILLIGRAM(S): at 10:48

## 2023-12-28 RX ADMIN — Medication 650 MILLIGRAM(S): at 13:38

## 2023-12-28 RX ADMIN — Medication 975 MILLIGRAM(S): at 05:43

## 2023-12-28 RX ADMIN — SENNA PLUS 2 TABLET(S): 8.6 TABLET ORAL at 22:19

## 2023-12-28 RX ADMIN — Medication 975 MILLIGRAM(S): at 05:13

## 2023-12-28 RX ADMIN — Medication 325 MILLIGRAM(S): at 13:40

## 2023-12-28 RX ADMIN — MORPHINE SULFATE 4 MILLIGRAM(S): 50 CAPSULE, EXTENDED RELEASE ORAL at 05:51

## 2023-12-28 RX ADMIN — Medication 75 MICROGRAM(S): at 10:48

## 2023-12-28 RX ADMIN — FAMOTIDINE 20 MILLIGRAM(S): 10 INJECTION INTRAVENOUS at 13:40

## 2023-12-28 NOTE — H&P ADULT - NSHPPHYSICALEXAM_GEN_ALL_CORE
T(C): 36.3 (12-28-23 @ 04:29), Max: 36.3 (12-28-23 @ 04:29)  HR: 76 (12-28-23 @ 04:29) (76 - 76)  BP: 178/94 (12-28-23 @ 04:29) (178/94 - 178/94)  RR: 21 (12-28-23 @ 04:29) (21 - 21)  SpO2: 95% (12-28-23 @ 04:29) (95% - 95%)  Wt(kg): --Vital Signs Last 24 Hrs  T(C): 36.3 (28 Dec 2023 04:29), Max: 36.3 (28 Dec 2023 04:29)  T(F): 97.3 (28 Dec 2023 04:29), Max: 97.3 (28 Dec 2023 04:29)  HR: 76 (28 Dec 2023 04:29) (76 - 76)  BP: 178/94 (28 Dec 2023 04:29) (178/94 - 178/94)  BP(mean): --  RR: 21 (28 Dec 2023 04:29) (21 - 21)  SpO2: 95% (28 Dec 2023 04:29) (95% - 95%)    Parameters below as of 28 Dec 2023 04:29  Patient On (Oxygen Delivery Method): room air        PHYSICAL EXAM:  GENERAL: NAD  HENT:  Atraumatic, Normocephalic; No tonsillar erythema, exudates, or enlargement; Moist mucous membranes;   EYES: EOMI, PERRLA, conjunctiva and sclera clear, no lid-lag  NECK: Supple, No JVD, Normal thyroid  CHEST/LUNG: Clear to percussion bilaterally; No rales, rhonchi, wheezing, or rubs; normal respiratory effort, no intercostal retractions; No pitting edema  HEART: Regular rate and rhythm; No murmurs, rubs, or gallops  ABDOMEN: Soft, Nontender, Nondistended; Bowel sounds present; No HSM  MUSCULOSKELETAL/EXTREMITIES:  ext rotated Rt LE, No edema or signs of external trauma   SKIN: No rashes or lesions; normal texture and temperature  PSYCH:  Alert & Oriented x 1

## 2023-12-28 NOTE — ED PROVIDER NOTE - CLINICAL SUMMARY MEDICAL DECISION MAKING FREE TEXT BOX
89 yr old female with hx of hypothyroid , DM, HTN, a fib (not on AC), presents to the ED from SNF for R hip pain, patient poor historian, no signs of external trauma.     R hip fracture   ortho consult  admit to medicine 89 yr old female with hx of hypothyroid , DM, HTN, a fib (not on AC), presents to the ED from SNF for R hip pain, patient poor historian, no signs of external trauma.     R hip fracture   ortho consult  admit to medicine  patient non-ambulatory at baseline - fawad lift at NH

## 2023-12-28 NOTE — PATIENT PROFILE ADULT - FUNCTIONAL ASSESSMENT - DAILY ACTIVITY ASSESSMENT TYPE
"Anesthesia Transfer of Care Note    Patient: Juana Jean    Procedure(s) Performed: Procedure(s) (LRB):   SECTION (N/A)    Patient location: Labor and Delivery    Anesthesia Type: CSE    Transport from OR: Transported from OR on room air with adequate spontaneous ventilation    Post pain: adequate analgesia    Post assessment: no apparent anesthetic complications and tolerated procedure well    Post vital signs: stable    Level of consciousness: awake, alert and oriented    Nausea/Vomiting: no nausea/vomiting    Complications: none    Transfer of care protocol was followedComments: Epidural catheter removed in OR-tip intact. To OB recovery. Pt in NAD. VSS. Report to RN per protocol.         Last vitals:   Visit Vitals  BP (!) 102/50   Pulse (!) 59   Temp 36.9 °C (98.5 °F) (Oral)   Resp 18   Ht 5' 4" (1.626 m)   Wt 90.7 kg (200 lb)   SpO2 100%   Breastfeeding? No   BMI 34.33 kg/m²     " Admission

## 2023-12-28 NOTE — H&P ADULT - HISTORY OF PRESENT ILLNESS
89 y/o female who is a resident of [facility] with PMH of Afib not on AC for frequent falls, hypothyroidism, anxiety/depression, DM, HTN  BIBA s/p fall. pt is poor historian and most ofthe hx obtained from ED staff, nursing home staff. pt fell on right side of her hip upon sort of rolled over in bed. gets around at facility with help of fawad. no head trauma/LOC reported. in ED pt noted to have externally rotated Rt LE. Pelvic xray with right hip fx. Dr Hadley from ortho contacted who recommended admission for pain management with official consult in AM  89 y/o female who is a resident of [facility] with PMH of Afib not on AC for frequent falls, hypothyroidism, anxiety/depression, DM, HTN  BIBA s/p fall. pt is poor historian and most ofthe hx obtained from ED staff, nursing home staff. pt fell on right side of her hip upon sort of rolled over in bed. gets around at facility with help of fawad. no head trauma/LOC reported. in ED pt noted to have externally rotated Rt LE. Pelvic xray with right hip fx. Dr Hadley from ortho contacted who recommended admission for pain management with official consult in AM

## 2023-12-28 NOTE — ED ADULT NURSE NOTE - NSFALLHARMRISKINTERV_ED_ALL_ED
Assistance OOB with selected safe patient handling equipment if applicable/Assistance with ambulation/Communicate risk of Fall with Harm to all staff, patient, and family/Monitor gait and stability/Provide patient with walking aids/Provide visual cue: red socks, yellow wristband, yellow gown, etc/Reinforce activity limits and safety measures with patient and family/Bed in lowest position, wheels locked, appropriate side rails in place/Call bell, personal items and telephone in reach/Instruct patient to call for assistance before getting out of bed/chair/stretcher/Non-slip footwear applied when patient is off stretcher/[place] to call system/Physically safe environment - no spills, clutter or unnecessary equipment/Purposeful Proactive Rounding/Room/bathroom lighting operational, light cord in reach Assistance OOB with selected safe patient handling equipment if applicable/Assistance with ambulation/Communicate risk of Fall with Harm to all staff, patient, and family/Monitor gait and stability/Provide patient with walking aids/Provide visual cue: red socks, yellow wristband, yellow gown, etc/Reinforce activity limits and safety measures with patient and family/Bed in lowest position, wheels locked, appropriate side rails in place/Call bell, personal items and telephone in reach/Instruct patient to call for assistance before getting out of bed/chair/stretcher/Non-slip footwear applied when patient is off stretcher/[place] to call system/Physically safe environment - no spills, clutter or unnecessary equipment/Purposeful Proactive Rounding/Room/bathroom lighting operational, light cord in reach

## 2023-12-28 NOTE — PROGRESS NOTE ADULT - SUBJECTIVE AND OBJECTIVE BOX
Patient is a 89y old  Female who presents with a chief complaint of s/p fall (28 Dec 2023 06:19)      Patient seen and examined at bedside. Admitted last night for R femoral neck fracture. At this time, patient without pain, denies chest pain, sob, abd pain or leg pain. She is able to state her name and daughter's name. Daughter also at bedside.     ALLERGIES:  aspirin (Unknown)  penicillin (Unknown)    MEDICATIONS  (STANDING):  buPROPion XL (24-Hour) . 150 milliGRAM(s) Oral daily  enoxaparin Injectable 40 milliGRAM(s) SubCutaneous every 24 hours  escitalopram 10 milliGRAM(s) Oral daily  famotidine    Tablet 20 milliGRAM(s) Oral daily  ferrous    sulfate 325 milliGRAM(s) Oral daily  levothyroxine 75 MICROGram(s) Oral daily  metoprolol succinate ER 25 milliGRAM(s) Oral daily  sodium chloride 0.9%. 1000 milliLiter(s) (72 mL/Hr) IV Continuous <Continuous>    MEDICATIONS  (PRN):  acetaminophen     Tablet .. 650 milliGRAM(s) Oral every 6 hours PRN Mild Pain (1 - 3)  aluminum hydroxide/magnesium hydroxide/simethicone Suspension 30 milliLiter(s) Oral every 4 hours PRN Dyspepsia  melatonin 3 milliGRAM(s) Oral at bedtime PRN Insomnia  morphine  - Injectable 1 milliGRAM(s) IV Push every 4 hours PRN Severe Pain (7 - 10)  ondansetron Injectable 4 milliGRAM(s) IV Push every 8 hours PRN Nausea and/or Vomiting  senna 1 Tablet(s) Oral at bedtime PRN Constipation    Vital Signs Last 24 Hrs  T(F): 97.4 (28 Dec 2023 11:39), Max: 97.5 (28 Dec 2023 09:15)  HR: 77 (28 Dec 2023 11:39) (76 - 81)  BP: 149/84 (28 Dec 2023 11:39) (110/71 - 178/94)  RR: 15 (28 Dec 2023 11:39) (15 - 21)  SpO2: 91% (28 Dec 2023 11:39) (91% - 96%)  I&O's Summary      PHYSICAL EXAM:  GENERAL: NAD, laying in bed  HEAD:  Atraumatic, Normocephalic  ENMT: dry mucous membranes, Supple, No JVD  CHEST/LUNG: Clear to auscultation bilaterally, good air entry, non-labored breathing  HEART: RRR; S1/S2, No murmur  ABDOMEN: Soft, Nontender, Nondistended; Bowel sounds present  EXTREMITIES: No calf tenderness, No cyanosis, No edema  SKIN: Warm, perfused  PSYCH: Normal mood, Normal affect  NERVOUS SYSTEM:  A/O x1-2, follows simple commands    LABS:                        14.7   12.03 )-----------( 266      ( 28 Dec 2023 05:40 )             43.6     12-28    136  |  101  |  19  ----------------------------<  156  4.0   |  28  |  0.71    Ca    10.3      28 Dec 2023 05:40    TPro  7.5  /  Alb  3.0  /  TBili  0.6  /  DBili  x   /  AST  17  /  ALT  20  /  AlkPhos  77  12-28      PT/INR - ( 28 Dec 2023 05:40 )   PT: 12.1 sec;   INR: 1.06 ratio         PTT - ( 28 Dec 2023 05:40 )  PTT:31.5 sec                          Urinalysis Basic - ( 28 Dec 2023 05:40 )    Color: x / Appearance: x / SG: x / pH: x  Gluc: 156 mg/dL / Ketone: x  / Bili: x / Urobili: x   Blood: x / Protein: x / Nitrite: x   Leuk Esterase: x / RBC: x / WBC x   Sq Epi: x / Non Sq Epi: x / Bacteria: x            RADIOLOGY & ADDITIONAL TESTS:    Care Discussed with Consultants/Other Providers:

## 2023-12-28 NOTE — PATIENT PROFILE ADULT - NURSING HOMES
[facility]

## 2023-12-28 NOTE — PROGRESS NOTE ADULT - SUBJECTIVE AND OBJECTIVE BOX
Patient is a 89y old  Female who presents with a chief complaint of s/p fall (28 Dec 2023 13:40)    BRIEF HOSPITAL COURSE:   89 year old F PMH Afib not on AC for frequent falls, hypothyroidism, anxiety/depression, DM, HTN  BIBA s/p fall found to have R femoral neck fracture.     Events last 24 hours:   -Pain controlled.   -Satting well on RA. Afebrile, hemodynamics stable.     PAST MEDICAL & SURGICAL HISTORY:  Hypertension  Hypothyroidism  Obesity  Degenerative Joint Disease  Peripheral Neuropathy  Uterine Polyp  Overactive Bladder  H/O: GI Bleed  Atrial fibrillation  Diabetes mellitus, type II  Hiatal hernia  Spinal stenosis of lumbar region  S/P knee replacement, right  S/P rotator cuff repair  right  S/P  section    Review of Systems:  CONSTITUTIONAL: No fever, chills, or fatigue  EYES: No eye pain, visual disturbances, or discharge  ENMT:  No difficulty hearing, tinnitus, vertigo; No sinus or throat pain  NECK: No pain or stiffness  RESPIRATORY: No cough, wheezing, chills or hemoptysis; No shortness of breath  CARDIOVASCULAR: No chest pain, palpitations, dizziness, or leg swelling  GASTROINTESTINAL: No abdominal or epigastric pain. No nausea, vomiting, or hematemesis; No diarrhea or constipation. No melena or hematochezia.  GENITOURINARY: No dysuria, frequency, hematuria, or incontinence  NEUROLOGICAL: No headaches, memory loss, loss of strength, numbness, or tremors  SKIN: No itching, burning, rashes, or lesions   MUSCULOSKELETAL: No joint pain or swelling; No muscle, back, or extremity pain  PSYCHIATRIC: No depression, anxiety, mood swings, or difficulty sleeping    Medications:  metoprolol succinate ER 25 milliGRAM(s) Oral daily  acetaminophen     Tablet .. 650 milliGRAM(s) Oral every 6 hours PRN  buPROPion XL (24-Hour) . 150 milliGRAM(s) Oral daily  escitalopram 10 milliGRAM(s) Oral daily  melatonin 3 milliGRAM(s) Oral at bedtime PRN  morphine  - Injectable 1 milliGRAM(s) IV Push every 4 hours PRN  ondansetron Injectable 4 milliGRAM(s) IV Push every 8 hours PRN  enoxaparin Injectable 40 milliGRAM(s) SubCutaneous every 24 hours  aluminum hydroxide/magnesium hydroxide/simethicone Suspension 30 milliLiter(s) Oral every 4 hours PRN  famotidine    Tablet 20 milliGRAM(s) Oral daily  polyethylene glycol 3350 17 Gram(s) Oral daily PRN  senna 2 Tablet(s) Oral at bedtime  levothyroxine 75 MICROGram(s) Oral daily  ferrous    sulfate 325 milliGRAM(s) Oral daily  sodium chloride 0.9%. 1000 milliLiter(s) IV Continuous <Continuous>    ICU Vital Signs Last 24 Hrs  T(C): 36.3 (28 Dec 2023 11:39), Max: 36.4 (28 Dec 2023 09:15)  T(F): 97.4 (28 Dec 2023 11:39), Max: 97.5 (28 Dec 2023 09:15)  HR: 77 (28 Dec 2023 11:39) (76 - 81)  BP: 149/84 (28 Dec 2023 11:39) (110/71 - 178/94)  BP(mean): --  ABP: --  ABP(mean): --  RR: 15 (28 Dec 2023 11:39) (15 - 21)  SpO2: 91% (28 Dec 2023 11:39) (91% - 96%)  O2 Parameters below as of 28 Dec 2023 11:39  Patient On (Oxygen Delivery Method): room air    I&O's Detail    LABS:                      14.7   12.03 )-----------( 266      ( 28 Dec 2023 05:40 )             43.6     12-  136  |  101  |  19  ----------------------------<  156<H>  4.0   |  28  |  0.71  Ca    10.3      28 Dec 2023 05:40  TPro  7.5  /  Alb  3.0<L>  /  TBili  0.6  /  DBili  x   /  AST  17  /  ALT  20  /  AlkPhos  77  12-28    CAPILLARY BLOOD GLUCOSE    PT/INR - ( 28 Dec 2023 05:40 )   PT: 12.1 sec;   INR: 1.06 ratio    PTT - ( 28 Dec 2023 05:40 )  PTT:31.5 sec    Urinalysis Basic - ( 28 Dec 2023 05:40 )  Color: x / Appearance: x / SG: x / pH: x  Gluc: 156 mg/dL / Ketone: x  / Bili: x / Urobili: x   Blood: x / Protein: x / Nitrite: x   Leuk Esterase: x / RBC: x / WBC x   Sq Epi: x / Non Sq Epi: x / Bacteria: x    CULTURES:    Physical Examination:  General: Awake, alert, nonfocal.  HEENT: PERRL.  NECK: Supple.   PULM: Clear to auscultation bilaterally.  CVS: s1/s2.  ABD: Soft, nondistended, nontender, normoactive bowel sounds.  EXT: No edema, nontender.  SKIN: Warm.    RADIOLOGY:   < from: Xray Hip w/ Pelvis 2 or 3 Views, Right (23 @ 05:47) >  ACC: [id number] EXAM:  XR CHEST PORTABLE URGENT 1V   ORDERED BY: ANETA SOLER   ACC: [id number] EXAM:  XR FEMUR 2 VIEWS RT   ORDERED BY: ANETA SOLER   ACC: [id number] EXAM:  XR HIP WITH PELV 2-3V RT   ORDERED BY: ANETA SOLER   PROCEDURE DATE:  2023    IMPRESSION: Stable chest findings with large hiatal hernia and increased   interstitial markings. Impacted subcapital fracture right hip. Right knee   replacement.    Time spent on this patient encounter, which includes documenting this note in the electronic medical record, was 55 minutes including assessing the presenting problems with associated risks, reviewing the medical record to prepare for the encounter, and meeting face to face with the patient to obtain additional history. I have also performed an appropriate physical exam, made interventions listed and ordered and interpreted appropriate diagnostic studies as documented. To improve communication and patient safety, I have coordinated care with the multidisciplinary team including the bedside nurse, appropriate attending of record and consultants as needed. This time is independent of any procedures performed.    Date of entry of this note is equal to the date of services rendered.  Patient is a 89y old  Female who presents with a chief complaint of s/p fall (28 Dec 2023 13:40)    BRIEF HOSPITAL COURSE:   89 year old F PMH Afib not on AC for frequent falls, hypothyroidism, anxiety/depression, DM, HTN  BIBA s/p fall found to have R femoral neck fracture.     Events last 24 hours:   -Pain controlled.   -Satting well on RA. Afebrile, hemodynamics stable.     PAST MEDICAL & SURGICAL HISTORY:  Hypertension  Hypothyroidism  Obesity  Degenerative Joint Disease  Peripheral Neuropathy  Uterine Polyp  Overactive Bladder  H/O: GI Bleed  Atrial fibrillation  Diabetes mellitus, type II  Hiatal hernia  Spinal stenosis of lumbar region  S/P knee replacement, right  S/P rotator cuff repair  right  S/P  section    Review of Systems:  CONSTITUTIONAL: No fever, chills, or fatigue  EYES: No eye pain, visual disturbances, or discharge  ENMT:  No difficulty hearing, tinnitus, vertigo; No sinus or throat pain  NECK: No pain or stiffness  RESPIRATORY: No cough, wheezing, chills or hemoptysis; No shortness of breath  CARDIOVASCULAR: No chest pain, palpitations, dizziness, or leg swelling  GASTROINTESTINAL: No abdominal or epigastric pain. No nausea, vomiting, or hematemesis; No diarrhea or constipation. No melena or hematochezia.  GENITOURINARY: No dysuria, frequency, hematuria, or incontinence  NEUROLOGICAL: No headaches, memory loss, loss of strength, numbness, or tremors  SKIN: No itching, burning, rashes, or lesions   MUSCULOSKELETAL: No joint pain or swelling; No muscle, back, or extremity pain  PSYCHIATRIC: No depression, anxiety, mood swings, or difficulty sleeping    Medications:  metoprolol succinate ER 25 milliGRAM(s) Oral daily  acetaminophen     Tablet .. 650 milliGRAM(s) Oral every 6 hours PRN  buPROPion XL (24-Hour) . 150 milliGRAM(s) Oral daily  escitalopram 10 milliGRAM(s) Oral daily  melatonin 3 milliGRAM(s) Oral at bedtime PRN  morphine  - Injectable 1 milliGRAM(s) IV Push every 4 hours PRN  ondansetron Injectable 4 milliGRAM(s) IV Push every 8 hours PRN  enoxaparin Injectable 40 milliGRAM(s) SubCutaneous every 24 hours  aluminum hydroxide/magnesium hydroxide/simethicone Suspension 30 milliLiter(s) Oral every 4 hours PRN  famotidine    Tablet 20 milliGRAM(s) Oral daily  polyethylene glycol 3350 17 Gram(s) Oral daily PRN  senna 2 Tablet(s) Oral at bedtime  levothyroxine 75 MICROGram(s) Oral daily  ferrous    sulfate 325 milliGRAM(s) Oral daily  sodium chloride 0.9%. 1000 milliLiter(s) IV Continuous <Continuous>    ICU Vital Signs Last 24 Hrs  T(C): 36.3 (28 Dec 2023 11:39), Max: 36.4 (28 Dec 2023 09:15)  T(F): 97.4 (28 Dec 2023 11:39), Max: 97.5 (28 Dec 2023 09:15)  HR: 77 (28 Dec 2023 11:39) (76 - 81)  BP: 149/84 (28 Dec 2023 11:39) (110/71 - 178/94)  BP(mean): --  ABP: --  ABP(mean): --  RR: 15 (28 Dec 2023 11:39) (15 - 21)  SpO2: 91% (28 Dec 2023 11:39) (91% - 96%)  O2 Parameters below as of 28 Dec 2023 11:39  Patient On (Oxygen Delivery Method): room air    I&O's Detail    LABS:                      14.7   12.03 )-----------( 266      ( 28 Dec 2023 05:40 )             43.6     12-  136  |  101  |  19  ----------------------------<  156<H>  4.0   |  28  |  0.71  Ca    10.3      28 Dec 2023 05:40  TPro  7.5  /  Alb  3.0<L>  /  TBili  0.6  /  DBili  x   /  AST  17  /  ALT  20  /  AlkPhos  77  12-28    CAPILLARY BLOOD GLUCOSE    PT/INR - ( 28 Dec 2023 05:40 )   PT: 12.1 sec;   INR: 1.06 ratio    PTT - ( 28 Dec 2023 05:40 )  PTT:31.5 sec    Urinalysis Basic - ( 28 Dec 2023 05:40 )  Color: x / Appearance: x / SG: x / pH: x  Gluc: 156 mg/dL / Ketone: x  / Bili: x / Urobili: x   Blood: x / Protein: x / Nitrite: x   Leuk Esterase: x / RBC: x / WBC x   Sq Epi: x / Non Sq Epi: x / Bacteria: x    CULTURES:    Physical Examination:  General: Awake, alert, nonfocal.  HEENT: PERRL.  NECK: Supple.   PULM: Clear to auscultation bilaterally.  CVS: s1/s2.  ABD: Soft, nondistended, nontender, normoactive bowel sounds.  EXT: No edema, nontender.  SKIN: Warm.    RADIOLOGY:   < from: Xray Hip w/ Pelvis 2 or 3 Views, Right (23 @ 05:47) >  ACC: [id number] EXAM:  XR CHEST PORTABLE URGENT 1V   ORDERED BY: ANETA SOLER   ACC: [id number] EXAM:  XR FEMUR 2 VIEWS RT   ORDERED BY: ANETA SOLER   ACC: [id number] EXAM:  XR HIP WITH PELV 2-3V RT   ORDERED BY: ANETA SOLER   PROCEDURE DATE:  2023    IMPRESSION: Stable chest findings with large hiatal hernia and increased   interstitial markings. Impacted subcapital fracture right hip. Right knee   replacement.    Time spent on this patient encounter, which includes documenting this note in the electronic medical record, was 55 minutes including assessing the presenting problems with associated risks, reviewing the medical record to prepare for the encounter, and meeting face to face with the patient to obtain additional history. I have also performed an appropriate physical exam, made interventions listed and ordered and interpreted appropriate diagnostic studies as documented. To improve communication and patient safety, I have coordinated care with the multidisciplinary team including the bedside nurse, appropriate attending of record and consultants as needed. This time is independent of any procedures performed.    Date of entry of this note is equal to the date of services rendered.

## 2023-12-28 NOTE — PATIENT PROFILE ADULT - PRO INTERPRETER NEED 2
English pt sts for the past 3 days  I feel like I have a fever. I have checked it but I feel warm and I am coughing up mucus

## 2023-12-28 NOTE — ED ADULT NURSE NOTE - OBJECTIVE STATEMENT
Pt presents to the ER from [facility] for rehab complaining of right left leg and hip pain. As per EMS, pt was found on floor leaning on her bed. Pt states that she does not remember how she got on the floor. A&OX3. Pt denies SOB, chest pain, nausea, vomiting, fever, dizziness or headache. Pt presents to the ER from [facility] for rehab complaining of right left leg and hip pain. As per EMS, pt was found on floor leaning on her bed. Pt states that she does not remember how she got on the floor. A&OX3. Pt denies SOB, chest pain, nausea, vomiting, fever, dizziness or headache.

## 2023-12-28 NOTE — ED ADULT NURSE NOTE - HOW OFTEN DO YOU HAVE A DRINK CONTAINING ALCOHOL?
Spoke to the pharmacist regarding Trulicity prescription and confirmed directions for use  Pharmacist states that directions were erased from the system  They will compete refill for the patient  LM on AM for Lawerence Eric and patient with update  Never

## 2023-12-28 NOTE — ED PROVIDER NOTE - PHYSICAL EXAMINATION
VITAL SIGNS: I have reviewed nursing notes and confirm.  CONSTITUTIONAL: well-appearing, non-toxic  SKIN: Warm dry, normal skin turgor  HEAD: NCAT  CARD: RRR, no murmurs, rubs or gallops  RESP: clear to ausculation b/l.  No rales, rhonchi, or wheezing.  ABD: soft, + BS, non-tender, non-distended, no rebound or guarding. No CVA tenderness  EXT: R hip ttp, neurovascularly intact

## 2023-12-28 NOTE — PATIENT PROFILE ADULT - FALL HARM RISK - HARM RISK INTERVENTIONS
Assistance with ambulation/Assistance OOB with selected safe patient handling equipment/Communicate Risk of Fall with Harm to all staff/Discuss with provider need for PT consult/Monitor gait and stability/Provide patient with walking aids - walker, cane, crutches/Reinforce activity limits and safety measures with patient and family/Tailored Fall Risk Interventions/Visual Cue: Yellow wristband and red socks/Bed in lowest position, wheels locked, appropriate side rails in place/Call bell, personal items and telephone in reach/Instruct patient to call for assistance before getting out of bed or chair/Non-slip footwear when patient is out of bed/[place] to call system/Physically safe environment - no spills, clutter or unnecessary equipment/Purposeful Proactive Rounding/Room/bathroom lighting operational, light cord in reach Assistance with ambulation/Assistance OOB with selected safe patient handling equipment/Communicate Risk of Fall with Harm to all staff/Discuss with provider need for PT consult/Monitor gait and stability/Provide patient with walking aids - walker, cane, crutches/Reinforce activity limits and safety measures with patient and family/Tailored Fall Risk Interventions/Visual Cue: Yellow wristband and red socks/Bed in lowest position, wheels locked, appropriate side rails in place/Call bell, personal items and telephone in reach/Instruct patient to call for assistance before getting out of bed or chair/Non-slip footwear when patient is out of bed/[place] to call system/Physically safe environment - no spills, clutter or unnecessary equipment/Purposeful Proactive Rounding/Room/bathroom lighting operational, light cord in reach

## 2023-12-28 NOTE — PATIENT PROFILE ADULT - LAST TOBACCO USE (DD-MM-YY)
Read call not for [facility] nurse in [place] for background. Discussed status with Dr Forman, she will order refill for the Procardia to cover 2 weeks and get him in for a physical with Dr Forman within this time frame. If the times available do not work for mom, could get Espen in on Monday to just check the blood pressure and set up the physical at a more convenient time when in. Once seen will then forward the BP results to Dr Corcoran for further refills. Will also forward this message to Serina with  [facility] site and have her connect with mom when Dr Caro's schedule is open for scheduling.     Tried to reach mom, left message to return my call before 5 or will try again later before leaving for the day.     Reached mom and informed of above. Offered appointment with Dr Forman. Also discussed if mom would like to ultimately switch her nephrologist to Dr Caro. Mom thinks she will stick with Dr Blank for now, has an appointment scheduled with her in [place] for 3/2.    28-Dec-1970

## 2023-12-28 NOTE — H&P ADULT - ASSESSMENT
89 y/o female s/p fall with Rt femoral neck fx:  -gets around with Erik lift at nursing facility  -not a surgical candidate  -admitted for pain management  -Dr Hadley from ortho made aware  -tylenol/morphine PRN for pain  -DVT ppx    Afib:  -C/w bb    hypothyroidism:  -C/w home synthroid    anxiety:  -C/w Wellbutrin/lexapro    DNR/I  GI ppx: famotidine  DVT ppx: Lovenox      89 y/o female s/p fall with Rt femoral neck fx:  -gets around with Erik lift at nursing facility  -not a surgical candidate  -admitted for pain management  -Dr Hadley from ortho made aware  -tylenol/morphine PRN for pain  -DVT ppx    Afib:  -C/w bb    hypothyroidism:  -C/w home synthroid    anxiety:  -C/w Wellbutrin/lexapro    DNR/I  GI ppx: famotidine  DVT ppx: Lovenox

## 2023-12-28 NOTE — H&P ADULT - NSHPLABSRESULTS_GEN_ALL_CORE
< from: CT 3D Reconstruct w/o Workstation (12.28.23 @ 05:45) >    Impacted right femoral neck fracture.    Prominent left > right distal ureter. Bladder wall thickening with   stranding. Recommend clinical correlation to assess urinary tract   infection    Prominent endometrium. Neoplasm cannot be excluded in a postmenopausal   patient. Nonemergent follow-up would be helpful for further evaluation.    < end of copied text >    < from: CT Cervical Spine No Cont (12.28.23 @ 05:44) >    IMPRESSION:    Head CT: No obvious acute intracranial hemorrhage or displaced skull   fracture. If clinically indicated, short-term follow-up or MRI may be   obtained for further evaluation.    Cervical spine CT: No obvious displaced fracture or traumatic   malalignment in the cervical spine. If there is clinical suspicion for   acute fracture or ligamentous/cord injury, MRI may be obtained for   further evaluation.    < end of copied text >

## 2023-12-28 NOTE — ED ADULT TRIAGE NOTE - CHIEF COMPLAINT QUOTE
[facility]  pt found on floor leaning on her bed pt states she does not remember how she got  there  upon arrival pt complain of dane in right leg and hip

## 2023-12-28 NOTE — CONSULT NOTE ADULT - SUBJECTIVE AND OBJECTIVE BOX
HPI:  89 y/[facility] female [facility] with PMH of Afib not on AC for frequent falls, hypothyroidism, anxiety/depression, DM, HTN  BIBA s/p fall. pt is poor historian and most o fthe hx obtained from ED staff, nursing home staff. pt fell on right side of her hip upon sort of rolled over in bed. gets around at facility with help of fawad. no head trauma/LOC reported. in ED pt noted to have externally rotated Rt LE. Pelvic xray with right hip fx. Dr Hadley from ortho contacted who recommended admission for pain management with official consult in AM  (28 Dec 2023 06:19)    Patient was admitted and w/u for right femoral neck fracture.    Orthopedics consulted this am  (Dr Rodrigo Hadley )  .    Patient was seen and examined by me .    Patient was lying in bed daughter by her side.    Patient had  just been medicated but responds to questions , does have dementia.   Patient denied pain at this time.   She is unable to move and does not allow her right leg to be moved.    Right leg is shortened and slightly rotated.    Daughter states that mother is not an ambulator.,    She resides as a resident at [facility] .    Dr Hadley has discussed patient s fracture with patient, her daughter and her son who is her HCP.   He has explained the risks and the benefits.   Despite the immobility of patient, stablilization will help with pain management and with personal care.   At this time , a right femoral neck non displaced fx would require 3 pins.   The patient s son this evening states that he is not ready to commit to surgery and he needed more time to make a decision.   I have given the patient a diet.   We will again address the surgical issue with the family but at this time , patient s family is leaning toward non operative management .     If family is still opting for conservative treatment , patient may be discharged back to [facility] when medically optimized.      Vital Signs Last 24 Hrs  T(C): 36.3 (28 Dec 2023 11:39), Max: 36.4 (28 Dec 2023 09:15)  T(F): 97.4 (28 Dec 2023 11:39), Max: 97.5 (28 Dec 2023 09:15)  HR: 77 (28 Dec 2023 11:39) (76 - 81)  BP: 149/84 (28 Dec 2023 11:39) (110/71 - 178/94)  RR: 15 (28 Dec 2023 11:39) (15 - 21)  SpO2: 91% (28 Dec 2023 11:39) (91% - 96%)    Parameters below as of 28 Dec 2023 11:39  Patient On (Oxygen Delivery Method): room air      PAST MEDICAL & SURGICAL HISTORY:  Hypertension  Hypothyroidism  Obesity  Degenerative Joint Disease  Peripheral Neuropathy  Uterine Polyp  Overactive Bladder  H/O: GI Bleed  Atrial fibrillation  Diabetes mellitus, type I  Hiatal hernia  Spinal stenosis of lumbar region  S/P knee replacement, right  S/P rotator cuff repair  right  S/P  section      MEDICATIONS  (STANDING):  buPROPion XL (24-Hour) . 150 milliGRAM(s) Oral daily  enoxaparin Injectable 40 milliGRAM(s) SubCutaneous every 24 hours  escitalopram 10 milliGRAM(s) Oral daily  famotidine    Tablet 20 milliGRAM(s) Oral daily  ferrous    sulfate 325 milliGRAM(s) Oral daily  levothyroxine 75 MICROGram(s) Oral daily  metoprolol succinate ER 25 milliGRAM(s) Oral daily  senna 2 Tablet(s) Oral at bedtime  sodium chloride 0.9%. 1000 milliLiter(s) (72 mL/Hr) IV Continuous <Continuous>    >    MEDICATIONS  (PRN):  acetaminophen     Tablet .. 650 milliGRAM(s) Oral every 6 hours PRN Mild Pain (1 - 3)  aluminum hydroxide/magnesium hydroxide/simethicone Suspension 30 milliLiter(s) Oral every 4 hours PRN Dyspepsia  melatonin 3 milliGRAM(s) Oral at bedtime PRN Insomnia  morphine  - Injectable 1 milliGRAM(s) IV Push every 4 hours PRN Severe Pain (7 - 10)  ondansetron Injectable 4 milliGRAM(s) IV Push every 8 hours PRN Nausea and/or Vomiting  polyethylene glycol 3350 17 Gram(s) Oral daily PRN Constipation        PE:  Alert and oriented X 2  Lungs:  CTA   Cor:  RR   Abd:  soft, non tender , +BS -BM, voiding   Ext:  Right leg shortened and externally rotated, unable to range or examine 2/2 pain   + neurovascular intact   left leg SCD                           14.7   12.03 )-----------( 266      ( 28 Dec 2023 05:40 )             43.6   12-    136  |  101  |  19  ----------------------------<  156<H>  4.0   |  28  |  0.71    Ca    10.3      28 Dec 2023 05:40    TPro  7.5  /  Alb  3.0<L>  /  TBili  0.6  /  DBili  x   /  AST  17  /  ALT  20  /  AlkPhos  77     HPI:  89 y/[facility] PMH of Afib not on AC for frequent falls, hypothyroidism, anxiety/depression, DM, HTN  BIBA s/p fall. pt is poor historian and most o fthe hx obtained from ED staff, nursing home staff. pt fell on right side of her hip upon sort of rolled over in bed. gets around at facility with help of fawad. no head trauma/LOC reported. in ED pt noted to have externally rotated Rt LE. Pelvic xray with right hip fx. Dr Hadley from ortho contacted who recommended admission for pain management with official consult in AM  (28 Dec 2023 06:19)    Patient was admitted and w/u for right femoral neck fracture.    Orthopedics consulted this am  (Dr Rodrigo Hadley )  .    Patient was seen and examined by me .    Patient was lying in bed daughter by her side.    Patient had  just been medicated but responds to questions , does have dementia.   Patient denied pain at this time.   She is unable to move and does not allow her right leg to be moved.    Right leg is shortened and slightly rotated.    Daughter states that mother is not an ambulator.,    She resides as a resident at [facility] .    Dr Hadley has discussed patient s fracture with patient, her daughter and her son who is her HCP.   He has explained the risks and the benefits.   Despite the immobility of patient, stablilization will help with pain management and with personal care.   At this time , a right femoral neck non displaced fx would require 3 pins.   The patient s son this evening states that he is not ready to commit to surgery and he needed more time to make a decision.   I have given the patient a diet.   We will again address the surgical issue with the family but at this time , patient s family is leaning toward non operative management .     If family is still opting for conservative treatment , patient may be discharged back to [facility] when medically optimized.      Vital Signs Last 24 Hrs  T(C): 36.3 (28 Dec 2023 11:39), Max: 36.4 (28 Dec 2023 09:15)  T(F): 97.4 (28 Dec 2023 11:39), Max: 97.5 (28 Dec 2023 09:15)  HR: 77 (28 Dec 2023 11:39) (76 - 81)  BP: 149/84 (28 Dec 2023 11:39) (110/71 - 178/94)  RR: 15 (28 Dec 2023 11:39) (15 - 21)  SpO2: 91% (28 Dec 2023 11:39) (91% - 96%)    Parameters below as of 28 Dec 2023 11:39  Patient On (Oxygen Delivery Method): room air      PAST MEDICAL & SURGICAL HISTORY:  Hypertension  Hypothyroidism  Obesity  Degenerative Joint Disease  Peripheral Neuropathy  Uterine Polyp  Overactive Bladder  H/O: GI Bleed  Atrial fibrillation  Diabetes mellitus, type I  Hiatal hernia  Spinal stenosis of lumbar region  S/P knee replacement, right  S/P rotator cuff repair  right  S/P  section      MEDICATIONS  (STANDING):  buPROPion XL (24-Hour) . 150 milliGRAM(s) Oral daily  enoxaparin Injectable 40 milliGRAM(s) SubCutaneous every 24 hours  escitalopram 10 milliGRAM(s) Oral daily  famotidine    Tablet 20 milliGRAM(s) Oral daily  ferrous    sulfate 325 milliGRAM(s) Oral daily  levothyroxine 75 MICROGram(s) Oral daily  metoprolol succinate ER 25 milliGRAM(s) Oral daily  senna 2 Tablet(s) Oral at bedtime  sodium chloride 0.9%. 1000 milliLiter(s) (72 mL/Hr) IV Continuous <Continuous>    >    MEDICATIONS  (PRN):  acetaminophen     Tablet .. 650 milliGRAM(s) Oral every 6 hours PRN Mild Pain (1 - 3)  aluminum hydroxide/magnesium hydroxide/simethicone Suspension 30 milliLiter(s) Oral every 4 hours PRN Dyspepsia  melatonin 3 milliGRAM(s) Oral at bedtime PRN Insomnia  morphine  - Injectable 1 milliGRAM(s) IV Push every 4 hours PRN Severe Pain (7 - 10)  ondansetron Injectable 4 milliGRAM(s) IV Push every 8 hours PRN Nausea and/or Vomiting  polyethylene glycol 3350 17 Gram(s) Oral daily PRN Constipation        PE:  Alert and oriented X 2  Lungs:  CTA   Cor:  RR   Abd:  soft, non tender , +BS -BM, voiding   Ext:  Right leg shortened and externally rotated, unable to range or examine 2/2 pain   + neurovascular intact   left leg SCD                           14.7   12.03 )-----------( 266      ( 28 Dec 2023 05:40 )             43.6   12-    136  |  101  |  19  ----------------------------<  156<H>  4.0   |  28  |  0.71    Ca    10.3      28 Dec 2023 05:40    TPro  7.5  /  Alb  3.0<L>  /  TBili  0.6  /  DBili  x   /  AST  17  /  ALT  20  /  AlkPhos  77

## 2023-12-28 NOTE — ED PROVIDER NOTE - OBJECTIVE STATEMENT
89 yr old female with hx of hypothyroid , DM, HTN, a fib (not on AC), presents to the ED from SNF for R hip pain, patient poor historian, no signs of external trauma.

## 2023-12-28 NOTE — ED PROVIDER NOTE - IV ALTEPLASE INCLUSION HIDDEN
show
Left LE Active Assistive ROM was WFL (within functional limits)/Right LE Active ROM was WFL   (within functional limits)

## 2023-12-28 NOTE — PROGRESS NOTE ADULT - ASSESSMENT
89 year old F PMH Afib not on AC for frequent falls, hypothyroidism, anxiety/depression, DM, HTN  BIBA s/p fall found to have R femoral neck fracture.     Plan:  NEURO: Optimize pain control - analgesia PRN with Tylenol, Morphine. Wellbutrin / Lexapro QD Melatonin for adequate sleep/wake cycle. Monitor mental status closely, avoid neurosuppresants. Serial neurologic assessments.   CV: Maintain goal MAP >65. Keep K~4 and Mg>2 for optimal arrhythmia suppression.  PULM: Satting well on RA will utilize supplemental O2 PRN to maintain goal SpO2 >88%. Incentive spirometry, Chest PT/Pulmonary toilet, HOB >30'. Albuterol PRN.   GI: Regular Diet. Pepcid QD.  RENAL: Renal function currently WNL. Trend lytes/Scr daily with BMP, Strict I's and O's, goal UOP 0.5 cc/kg/hr, renal dose meds and avoid nephrotoxins.  ENDO: Aggressive glycemic control to limit FS glucose to <180mg/dl. Synthroid for hx of hypothyroidism, keep Euthyroid.   ID: Afebrile, no leukocytosis. No concern for infectious process.   MSK: Family and Orthopedic team still discussing surgery vs. conservative management.   HEME: VTE ppx with Lovenox.     AM labs ordered.     GOC: DNR/DNI.

## 2023-12-28 NOTE — CONSULT NOTE ADULT - ASSESSMENT
89 y/o female who is a resident of [facility] with PMH of Afib not on AC for frequent falls, hypothyroidism, anxiety/depression, DM, HTN  BIBA s/p fall. pt is poor historian and most o fthe hx obtained from ED staff, nursing home staff. pt fell on right side of her hip upon sort of rolled over in bed. gets around at facility with help of fawad. no head trauma/LOC reported. in ED pt noted to have externally rotated Rt LE. Pelvic xray with right hip fx. Dr Hadley from ortho contacted.    Dr Hadley has discussed risks and benefits with patient, daughter and son (HCP).   He has recommended Right hip pinning for pain  management and stablilization..  He also discussed the decision for non -op in this patient who is a non ambulator.  The patient s family is stating that surgery is not their decision at this time and they are opting for conservative treatment.    The son did say that he may change his mind.    At this time i have discussed with Dr Hadley     Plan:   Bedrest             DVT prophylaxis             pain management             If conservative rx, may begin d/c planning back to  C   when medically optimized      89 y/o female who is a resident of [facility] with PMH of Afib not on AC for frequent falls, hypothyroidism, anxiety/depression, DM, HTN  BIBA s/p fall. pt is poor historian and most o fthe hx obtained from ED staff, nursing home staff. pt fell on right side of her hip upon sort of rolled over in bed. gets around at facility with help of fawad. no head trauma/LOC reported. in ED pt noted to have externally rotated Rt LE. Pelvic xray with right hip fx. Dr Hadley from ortho contacted.    Dr Hadley has discussed risks and benefits with patient, daughter and son (HCP).   He has recommended Right hip pinning for pain  management and stablilization..  He also discussed the decision for non -op in this patient who is a non ambulator.  The patient s family is stating that surgery is not their decision at this time and they are opting for conservative treatment.    The son did say that he may change his mind.    At this time i have discussed with Dr Hadley     Plan:   Bedrest             DVT prophylaxis             pain management             If conservative rx, may begin d/c planning back to  C   when medically optimized

## 2023-12-28 NOTE — PATIENT PROFILE ADULT - CAREGIVER ADDRESS
[address] [facility] [address] [facility]

## 2023-12-28 NOTE — PROGRESS NOTE ADULT - ASSESSMENT
89 year old F PMH Afib not on AC for frequent falls, hypothyroidism, anxiety/depression, DM, HTN  BIBA s/p fall found to have R femoral neck fracture    #femoral fracture  - patient at baseline requires fawad lift for transfers   - possibly not a good surgical candidate  - NPO for now in case needs surgical intervention  - continue IVF while NPO  - continue pain management and BM regimen  - ortho consulted, Dr. Hadley- discussed with surgical Verito PETERS    #afib  - not on AC  - continue metoprolol succinate 25mg daily    #hypothyroidism  - continue synthroid 75mcg    #anxiety  - continue Wellbutrin 150mg and lexapro 10mg    #DVT ppx  - continue lovenox for now (if not going to OR)    discussed with daughter Salma at bedside, all questions answered

## 2023-12-28 NOTE — PATIENT PROFILE ADULT - FUNCTIONAL ASSESSMENT - BASIC MOBILITY 6.
1-calculated by average/Not able to assess (calculate score using [facility] averaging method)  1-calculated by average/Not able to assess (calculate score using [facility] averaging method)

## 2023-12-29 LAB
ANION GAP SERPL CALC-SCNC: 8 MMOL/L — SIGNIFICANT CHANGE UP (ref 5–17)
ANION GAP SERPL CALC-SCNC: 8 MMOL/L — SIGNIFICANT CHANGE UP (ref 5–17)
APPEARANCE UR: ABNORMAL
APPEARANCE UR: ABNORMAL
BACTERIA # UR AUTO: NEGATIVE /HPF — SIGNIFICANT CHANGE UP
BACTERIA # UR AUTO: NEGATIVE /HPF — SIGNIFICANT CHANGE UP
BASOPHILS # BLD AUTO: 0.07 K/UL — SIGNIFICANT CHANGE UP (ref 0–0.2)
BASOPHILS # BLD AUTO: 0.07 K/UL — SIGNIFICANT CHANGE UP (ref 0–0.2)
BASOPHILS NFR BLD AUTO: 0.8 % — SIGNIFICANT CHANGE UP (ref 0–2)
BASOPHILS NFR BLD AUTO: 0.8 % — SIGNIFICANT CHANGE UP (ref 0–2)
BILIRUB UR-MCNC: NEGATIVE — SIGNIFICANT CHANGE UP
BILIRUB UR-MCNC: NEGATIVE — SIGNIFICANT CHANGE UP
BUN SERPL-MCNC: 17 MG/DL — SIGNIFICANT CHANGE UP (ref 7–23)
BUN SERPL-MCNC: 17 MG/DL — SIGNIFICANT CHANGE UP (ref 7–23)
CALCIUM SERPL-MCNC: 9.8 MG/DL — SIGNIFICANT CHANGE UP (ref 8.4–10.5)
CALCIUM SERPL-MCNC: 9.8 MG/DL — SIGNIFICANT CHANGE UP (ref 8.4–10.5)
CHLORIDE SERPL-SCNC: 102 MMOL/L — SIGNIFICANT CHANGE UP (ref 96–108)
CHLORIDE SERPL-SCNC: 102 MMOL/L — SIGNIFICANT CHANGE UP (ref 96–108)
CO2 SERPL-SCNC: 26 MMOL/L — SIGNIFICANT CHANGE UP (ref 22–31)
CO2 SERPL-SCNC: 26 MMOL/L — SIGNIFICANT CHANGE UP (ref 22–31)
COLOR SPEC: SIGNIFICANT CHANGE UP
COLOR SPEC: SIGNIFICANT CHANGE UP
CREAT SERPL-MCNC: 0.63 MG/DL — SIGNIFICANT CHANGE UP (ref 0.5–1.3)
CREAT SERPL-MCNC: 0.63 MG/DL — SIGNIFICANT CHANGE UP (ref 0.5–1.3)
DIFF PNL FLD: ABNORMAL
DIFF PNL FLD: ABNORMAL
EGFR: 83 ML/MIN/1.73M2 — SIGNIFICANT CHANGE UP
EGFR: 83 ML/MIN/1.73M2 — SIGNIFICANT CHANGE UP
EOSINOPHIL # BLD AUTO: 0.45 K/UL — SIGNIFICANT CHANGE UP (ref 0–0.5)
EOSINOPHIL # BLD AUTO: 0.45 K/UL — SIGNIFICANT CHANGE UP (ref 0–0.5)
EOSINOPHIL NFR BLD AUTO: 4.9 % — SIGNIFICANT CHANGE UP (ref 0–6)
EOSINOPHIL NFR BLD AUTO: 4.9 % — SIGNIFICANT CHANGE UP (ref 0–6)
EPI CELLS # UR: SIGNIFICANT CHANGE UP
EPI CELLS # UR: SIGNIFICANT CHANGE UP
GLUCOSE SERPL-MCNC: 144 MG/DL — HIGH (ref 70–99)
GLUCOSE SERPL-MCNC: 144 MG/DL — HIGH (ref 70–99)
GLUCOSE UR QL: NEGATIVE MG/DL — SIGNIFICANT CHANGE UP
GLUCOSE UR QL: NEGATIVE MG/DL — SIGNIFICANT CHANGE UP
HCT VFR BLD CALC: 38.4 % — SIGNIFICANT CHANGE UP (ref 34.5–45)
HCT VFR BLD CALC: 38.4 % — SIGNIFICANT CHANGE UP (ref 34.5–45)
HGB BLD-MCNC: 12.7 G/DL — SIGNIFICANT CHANGE UP (ref 11.5–15.5)
HGB BLD-MCNC: 12.7 G/DL — SIGNIFICANT CHANGE UP (ref 11.5–15.5)
IMM GRANULOCYTES NFR BLD AUTO: 0.5 % — SIGNIFICANT CHANGE UP (ref 0–0.9)
IMM GRANULOCYTES NFR BLD AUTO: 0.5 % — SIGNIFICANT CHANGE UP (ref 0–0.9)
KETONES UR-MCNC: NEGATIVE MG/DL — SIGNIFICANT CHANGE UP
KETONES UR-MCNC: NEGATIVE MG/DL — SIGNIFICANT CHANGE UP
LEUKOCYTE ESTERASE UR-ACNC: ABNORMAL
LEUKOCYTE ESTERASE UR-ACNC: ABNORMAL
LYMPHOCYTES # BLD AUTO: 0.9 K/UL — LOW (ref 1–3.3)
LYMPHOCYTES # BLD AUTO: 0.9 K/UL — LOW (ref 1–3.3)
LYMPHOCYTES # BLD AUTO: 9.7 % — LOW (ref 13–44)
LYMPHOCYTES # BLD AUTO: 9.7 % — LOW (ref 13–44)
MAGNESIUM SERPL-MCNC: 1.2 MG/DL — LOW (ref 1.6–2.6)
MAGNESIUM SERPL-MCNC: 1.2 MG/DL — LOW (ref 1.6–2.6)
MCHC RBC-ENTMCNC: 32.1 PG — SIGNIFICANT CHANGE UP (ref 27–34)
MCHC RBC-ENTMCNC: 32.1 PG — SIGNIFICANT CHANGE UP (ref 27–34)
MCHC RBC-ENTMCNC: 33.1 GM/DL — SIGNIFICANT CHANGE UP (ref 32–36)
MCHC RBC-ENTMCNC: 33.1 GM/DL — SIGNIFICANT CHANGE UP (ref 32–36)
MCV RBC AUTO: 97 FL — SIGNIFICANT CHANGE UP (ref 80–100)
MCV RBC AUTO: 97 FL — SIGNIFICANT CHANGE UP (ref 80–100)
MONOCYTES # BLD AUTO: 0.61 K/UL — SIGNIFICANT CHANGE UP (ref 0–0.9)
MONOCYTES # BLD AUTO: 0.61 K/UL — SIGNIFICANT CHANGE UP (ref 0–0.9)
MONOCYTES NFR BLD AUTO: 6.6 % — SIGNIFICANT CHANGE UP (ref 2–14)
MONOCYTES NFR BLD AUTO: 6.6 % — SIGNIFICANT CHANGE UP (ref 2–14)
NEUTROPHILS # BLD AUTO: 7.18 K/UL — SIGNIFICANT CHANGE UP (ref 1.8–7.4)
NEUTROPHILS # BLD AUTO: 7.18 K/UL — SIGNIFICANT CHANGE UP (ref 1.8–7.4)
NEUTROPHILS NFR BLD AUTO: 77.5 % — HIGH (ref 43–77)
NEUTROPHILS NFR BLD AUTO: 77.5 % — HIGH (ref 43–77)
NITRITE UR-MCNC: NEGATIVE — SIGNIFICANT CHANGE UP
NITRITE UR-MCNC: NEGATIVE — SIGNIFICANT CHANGE UP
NRBC # BLD: 0 /100 WBCS — SIGNIFICANT CHANGE UP (ref 0–0)
NRBC # BLD: 0 /100 WBCS — SIGNIFICANT CHANGE UP (ref 0–0)
PH UR: 5.5 — SIGNIFICANT CHANGE UP (ref 5–8)
PH UR: 5.5 — SIGNIFICANT CHANGE UP (ref 5–8)
PHOSPHATE SERPL-MCNC: 2.7 MG/DL — SIGNIFICANT CHANGE UP (ref 2.5–4.5)
PHOSPHATE SERPL-MCNC: 2.7 MG/DL — SIGNIFICANT CHANGE UP (ref 2.5–4.5)
PLATELET # BLD AUTO: 209 K/UL — SIGNIFICANT CHANGE UP (ref 150–400)
PLATELET # BLD AUTO: 209 K/UL — SIGNIFICANT CHANGE UP (ref 150–400)
POTASSIUM SERPL-MCNC: 3.8 MMOL/L — SIGNIFICANT CHANGE UP (ref 3.5–5.3)
POTASSIUM SERPL-MCNC: 3.8 MMOL/L — SIGNIFICANT CHANGE UP (ref 3.5–5.3)
POTASSIUM SERPL-SCNC: 3.8 MMOL/L — SIGNIFICANT CHANGE UP (ref 3.5–5.3)
POTASSIUM SERPL-SCNC: 3.8 MMOL/L — SIGNIFICANT CHANGE UP (ref 3.5–5.3)
PROT UR-MCNC: 100 MG/DL
PROT UR-MCNC: 100 MG/DL
RAPID RVP RESULT: SIGNIFICANT CHANGE UP
RAPID RVP RESULT: SIGNIFICANT CHANGE UP
RBC # BLD: 3.96 M/UL — SIGNIFICANT CHANGE UP (ref 3.8–5.2)
RBC # BLD: 3.96 M/UL — SIGNIFICANT CHANGE UP (ref 3.8–5.2)
RBC # FLD: 14.8 % — HIGH (ref 10.3–14.5)
RBC # FLD: 14.8 % — HIGH (ref 10.3–14.5)
RBC CASTS # UR COMP ASSIST: SIGNIFICANT CHANGE UP /HPF (ref 0–4)
RBC CASTS # UR COMP ASSIST: SIGNIFICANT CHANGE UP /HPF (ref 0–4)
SARS-COV-2 RNA SPEC QL NAA+PROBE: SIGNIFICANT CHANGE UP
SARS-COV-2 RNA SPEC QL NAA+PROBE: SIGNIFICANT CHANGE UP
SODIUM SERPL-SCNC: 136 MMOL/L — SIGNIFICANT CHANGE UP (ref 135–145)
SODIUM SERPL-SCNC: 136 MMOL/L — SIGNIFICANT CHANGE UP (ref 135–145)
SP GR SPEC: 1.03 — SIGNIFICANT CHANGE UP (ref 1–1.03)
SP GR SPEC: 1.03 — SIGNIFICANT CHANGE UP (ref 1–1.03)
UROBILINOGEN FLD QL: 1 MG/DL — SIGNIFICANT CHANGE UP (ref 0.2–1)
UROBILINOGEN FLD QL: 1 MG/DL — SIGNIFICANT CHANGE UP (ref 0.2–1)
WBC # BLD: 9.26 K/UL — SIGNIFICANT CHANGE UP (ref 3.8–10.5)
WBC # BLD: 9.26 K/UL — SIGNIFICANT CHANGE UP (ref 3.8–10.5)
WBC # FLD AUTO: 9.26 K/UL — SIGNIFICANT CHANGE UP (ref 3.8–10.5)
WBC # FLD AUTO: 9.26 K/UL — SIGNIFICANT CHANGE UP (ref 3.8–10.5)
WBC UR QL: SIGNIFICANT CHANGE UP /HPF (ref 0–5)
WBC UR QL: SIGNIFICANT CHANGE UP /HPF (ref 0–5)

## 2023-12-29 PROCEDURE — 70450 CT HEAD/BRAIN W/O DYE: CPT | Mod: 26

## 2023-12-29 PROCEDURE — 99233 SBSQ HOSP IP/OBS HIGH 50: CPT

## 2023-12-29 PROCEDURE — 71045 X-RAY EXAM CHEST 1 VIEW: CPT | Mod: 26

## 2023-12-29 RX ORDER — CEFTRIAXONE 500 MG/1
1000 INJECTION, POWDER, FOR SOLUTION INTRAMUSCULAR; INTRAVENOUS EVERY 24 HOURS
Refills: 0 | Status: COMPLETED | OUTPATIENT
Start: 2023-12-29 | End: 2023-12-31

## 2023-12-29 RX ORDER — HALOPERIDOL DECANOATE 100 MG/ML
0.25 INJECTION INTRAMUSCULAR ONCE
Refills: 0 | Status: COMPLETED | OUTPATIENT
Start: 2023-12-29 | End: 2023-12-29

## 2023-12-29 RX ORDER — LANOLIN ALCOHOL/MO/W.PET/CERES
3 CREAM (GRAM) TOPICAL AT BEDTIME
Refills: 0 | Status: DISCONTINUED | OUTPATIENT
Start: 2023-12-29 | End: 2023-12-29

## 2023-12-29 RX ORDER — LANOLIN ALCOHOL/MO/W.PET/CERES
6 CREAM (GRAM) TOPICAL AT BEDTIME
Refills: 0 | Status: DISCONTINUED | OUTPATIENT
Start: 2023-12-29 | End: 2024-01-05

## 2023-12-29 RX ORDER — DIPHENHYDRAMINE HCL 50 MG
25 CAPSULE ORAL ONCE
Refills: 0 | Status: COMPLETED | OUTPATIENT
Start: 2023-12-29 | End: 2023-12-29

## 2023-12-29 RX ORDER — HALOPERIDOL DECANOATE 100 MG/ML
1 INJECTION INTRAMUSCULAR ONCE
Refills: 0 | Status: COMPLETED | OUTPATIENT
Start: 2023-12-29 | End: 2023-12-29

## 2023-12-29 RX ORDER — SODIUM CHLORIDE 9 MG/ML
1000 INJECTION INTRAMUSCULAR; INTRAVENOUS; SUBCUTANEOUS
Refills: 0 | Status: DISCONTINUED | OUTPATIENT
Start: 2023-12-29 | End: 2023-12-30

## 2023-12-29 RX ADMIN — HALOPERIDOL DECANOATE 0.25 MILLIGRAM(S): 100 INJECTION INTRAMUSCULAR at 17:28

## 2023-12-29 RX ADMIN — ENOXAPARIN SODIUM 40 MILLIGRAM(S): 100 INJECTION SUBCUTANEOUS at 12:19

## 2023-12-29 RX ADMIN — HALOPERIDOL DECANOATE 0.25 MILLIGRAM(S): 100 INJECTION INTRAMUSCULAR at 19:30

## 2023-12-29 RX ADMIN — Medication 25 MILLIGRAM(S): at 20:07

## 2023-12-29 RX ADMIN — Medication 25 MILLIGRAM(S): at 05:44

## 2023-12-29 RX ADMIN — Medication 1 MILLIGRAM(S): at 20:07

## 2023-12-29 RX ADMIN — HALOPERIDOL DECANOATE 1 MILLIGRAM(S): 100 INJECTION INTRAMUSCULAR at 19:55

## 2023-12-29 RX ADMIN — Medication 75 MICROGRAM(S): at 05:44

## 2023-12-29 RX ADMIN — CEFTRIAXONE 100 MILLIGRAM(S): 500 INJECTION, POWDER, FOR SOLUTION INTRAMUSCULAR; INTRAVENOUS at 17:20

## 2023-12-29 NOTE — PROGRESS NOTE ADULT - SUBJECTIVE AND OBJECTIVE BOX
[facility] Medicine  Dr. Erma Arredondo  Progress Note    Patient is a 89y old  Female who presents with a chief complaint of s/p fall (28 Dec 2023 06:19)      Patient seen and examined at bedside. Denies pain. No chest pain, SOB, dysuria, urinary changes.     ALLERGIES:  aspirin (Unknown)  penicillin (Unknown)    MEDICATIONS  (STANDING):  buPROPion XL (24-Hour) . 150 milliGRAM(s) Oral daily  enoxaparin Injectable 40 milliGRAM(s) SubCutaneous every 24 hours  escitalopram 10 milliGRAM(s) Oral daily  famotidine    Tablet 20 milliGRAM(s) Oral daily  ferrous    sulfate 325 milliGRAM(s) Oral daily  levothyroxine 75 MICROGram(s) Oral daily  metoprolol succinate ER 25 milliGRAM(s) Oral daily  sodium chloride 0.9%. 1000 milliLiter(s) (72 mL/Hr) IV Continuous <Continuous>    MEDICATIONS  (PRN):  acetaminophen     Tablet .. 650 milliGRAM(s) Oral every 6 hours PRN Mild Pain (1 - 3)  aluminum hydroxide/magnesium hydroxide/simethicone Suspension 30 milliLiter(s) Oral every 4 hours PRN Dyspepsia  melatonin 3 milliGRAM(s) Oral at bedtime PRN Insomnia  morphine  - Injectable 1 milliGRAM(s) IV Push every 4 hours PRN Severe Pain (7 - 10)  ondansetron Injectable 4 milliGRAM(s) IV Push every 8 hours PRN Nausea and/or Vomiting  senna 1 Tablet(s) Oral at bedtime PRN Constipation    Vital Signs Last 24 Hrs  T(C): 36.3 (29 Dec 2023 05:51), Max: 36.3 (28 Dec 2023 11:39)  T(F): 97.3 (29 Dec 2023 05:51), Max: 97.4 (28 Dec 2023 11:39)  HR: 75 (29 Dec 2023 05:51) (74 - 81)  BP: 144/85 (29 Dec 2023 05:51) (112/70 - 149/84)  BP(mean): --  RR: 18 (29 Dec 2023 05:51) (15 - 18)  SpO2: 91% (29 Dec 2023 05:51) (88% - 92%)    Parameters below as of 29 Dec 2023 05:51  Patient On (Oxygen Delivery Method): room air        PHYSICAL EXAM:  GENERAL: NAD, laying in bed  HEAD:  Atraumatic, Normocephalic  ENMT: dry mucous membranes, Supple, No JVD  CHEST/LUNG: Clear to auscultation bilaterally, good air entry, non-labored breathing  HEART: RRR; S1/S2, No murmur  ABDOMEN: Soft, Nontender, Nondistended; Bowel sounds present  EXTREMITIES: No calf tenderness, No cyanosis, No edema  SKIN: Warm, perfused  PSYCH: Normal mood, Normal affect  NERVOUS SYSTEM:  A/O x1-2, follows simple commands    LABS:                                   12.7   9.26  )-----------( 209      ( 29 Dec 2023 07:12 )             38.4   12-29    136  |  102  |  17  ----------------------------<  144<H>  3.8   |  26  |  0.63    Ca    9.8      29 Dec 2023 07:12  Phos  2.7     12-29  Mg     1.2     12-29    TPro  7.5  /  Alb  3.0<L>  /  TBili  0.6  /  DBili  x   /  AST  17  /  ALT  20  /  AlkPhos  77  12-28          Urinalysis Basic - ( 28 Dec 2023 05:40 )    Color: x / Appearance: x / SG: x / pH: x  Gluc: 156 mg/dL / Ketone: x  / Bili: x / Urobili: x   Blood: x / Protein: x / Nitrite: x   Leuk Esterase: x / RBC: x / WBC x   Sq Epi: x / Non Sq Epi: x / Bacteria: x      RADIOLOGY & ADDITIONAL TESTS:    Care Discussed with Consultants/Other Providers: Surgery PA   [facility] Medicine  Dr. Erma Arredondo  Progress Note    Patient is a 89y old  Female who presents with a chief complaint of s/p fall (28 Dec 2023 06:19)      Patient seen and examined at bedside. Denies pain. No chest pain, SOB, dysuria, urinary changes.     ALLERGIES:  aspirin (Unknown)  penicillin (Unknown)    MEDICATIONS  (STANDING):  buPROPion XL (24-Hour) . 150 milliGRAM(s) Oral daily  enoxaparin Injectable 40 milliGRAM(s) SubCutaneous every 24 hours  escitalopram 10 milliGRAM(s) Oral daily  famotidine    Tablet 20 milliGRAM(s) Oral daily  ferrous    sulfate 325 milliGRAM(s) Oral daily  levothyroxine 75 MICROGram(s) Oral daily  metoprolol succinate ER 25 milliGRAM(s) Oral daily  sodium chloride 0.9%. 1000 milliLiter(s) (72 mL/Hr) IV Continuous <Continuous>    MEDICATIONS  (PRN):  acetaminophen     Tablet .. 650 milliGRAM(s) Oral every 6 hours PRN Mild Pain (1 - 3)  aluminum hydroxide/magnesium hydroxide/simethicone Suspension 30 milliLiter(s) Oral every 4 hours PRN Dyspepsia  melatonin 3 milliGRAM(s) Oral at bedtime PRN Insomnia  morphine  - Injectable 1 milliGRAM(s) IV Push every 4 hours PRN Severe Pain (7 - 10)  ondansetron Injectable 4 milliGRAM(s) IV Push every 8 hours PRN Nausea and/or Vomiting  senna 1 Tablet(s) Oral at bedtime PRN Constipation    Vital Signs Last 24 Hrs  T(C): 36.3 (29 Dec 2023 05:51), Max: 36.3 (28 Dec 2023 11:39)  T(F): 97.3 (29 Dec 2023 05:51), Max: 97.4 (28 Dec 2023 11:39)  HR: 75 (29 Dec 2023 05:51) (74 - 81)  BP: 144/85 (29 Dec 2023 05:51) (112/70 - 149/84)  BP(mean): --  RR: 18 (29 Dec 2023 05:51) (15 - 18)  SpO2: 91% (29 Dec 2023 05:51) (88% - 92%)    Parameters below as of 29 Dec 2023 05:51  Patient On (Oxygen Delivery Method): room air        PHYSICAL EXAM:  GENERAL: NAD, laying in bed  HEAD:  Atraumatic, Normocephalic  ENMT: dry mucous membranes, Supple, No JVD  CHEST/LUNG: Clear to auscultation bilaterally, good air entry, non-labored breathing  HEART: RRR; S1/S2, No murmur  ABDOMEN: Soft, Nontender, Nondistended; Bowel sounds present  EXTREMITIES: No calf tenderness, No cyanosis, No edema  SKIN: Warm, perfused  PSYCH: Normal mood, Normal affect  NERVOUS SYSTEM:  A/O x1-2, follows simple commands    LABS:                                   12.7   9.26  )-----------( 209      ( 29 Dec 2023 07:12 )             38.4   12-29    136  |  102  |  17  ----------------------------<  144<H>  3.8   |  26  |  0.63    Ca    9.8      29 Dec 2023 07:12  Phos  2.7     12-29  Mg     1.2     12-29    TPro  7.5  /  Alb  3.0<L>  /  TBili  0.6  /  DBili  x   /  AST  17  /  ALT  20  /  AlkPhos  77  12-28          Urinalysis Basic - ( 28 Dec 2023 05:40 )    Color: x / Appearance: x / SG: x / pH: x  Gluc: 156 mg/dL / Ketone: x  / Bili: x / Urobili: x   Blood: x / Protein: x / Nitrite: x   Leuk Esterase: x / RBC: x / WBC x   Sq Epi: x / Non Sq Epi: x / Bacteria: x      RADIOLOGY & ADDITIONAL TESTS:    Care Discussed with Consultants/Other Providers: Surgery PA   [facility] Medicine  Dr. Erma Arredondo  Progress Note    Patient is a 89y old  Female who presents with a chief complaint of s/p fall (28 Dec 2023 06:19)      Patient seen and examined at bedside. Denies pain. No chest pain, SOB, dysuria, urinary changes.     Addendum: called by RN in the afternoon that patient is altered. Patient noted to be 88% on RA, not as responsive as earlier. Family at bedside and report they have seen similar presentation when patient has had UTI in past. Son requesting second opinion from orthopedic surgeon. Son is leaning towards not pursuing surgery however now he is concerned about patient's mental status.     ALLERGIES:  aspirin (Unknown)  penicillin (Unknown)    MEDICATIONS  (STANDING):  buPROPion XL (24-Hour) . 150 milliGRAM(s) Oral daily  enoxaparin Injectable 40 milliGRAM(s) SubCutaneous every 24 hours  escitalopram 10 milliGRAM(s) Oral daily  famotidine    Tablet 20 milliGRAM(s) Oral daily  ferrous    sulfate 325 milliGRAM(s) Oral daily  levothyroxine 75 MICROGram(s) Oral daily  metoprolol succinate ER 25 milliGRAM(s) Oral daily  sodium chloride 0.9%. 1000 milliLiter(s) (72 mL/Hr) IV Continuous <Continuous>    MEDICATIONS  (PRN):  acetaminophen     Tablet .. 650 milliGRAM(s) Oral every 6 hours PRN Mild Pain (1 - 3)  aluminum hydroxide/magnesium hydroxide/simethicone Suspension 30 milliLiter(s) Oral every 4 hours PRN Dyspepsia  melatonin 3 milliGRAM(s) Oral at bedtime PRN Insomnia  morphine  - Injectable 1 milliGRAM(s) IV Push every 4 hours PRN Severe Pain (7 - 10)  ondansetron Injectable 4 milliGRAM(s) IV Push every 8 hours PRN Nausea and/or Vomiting  senna 1 Tablet(s) Oral at bedtime PRN Constipation    Vital Signs Last 24 Hrs  T(C): 36.3 (29 Dec 2023 05:51), Max: 36.3 (28 Dec 2023 11:39)  T(F): 97.3 (29 Dec 2023 05:51), Max: 97.4 (28 Dec 2023 11:39)  HR: 75 (29 Dec 2023 05:51) (74 - 81)  BP: 144/85 (29 Dec 2023 05:51) (112/70 - 149/84)  BP(mean): --  RR: 18 (29 Dec 2023 05:51) (15 - 18)  SpO2: 91% (29 Dec 2023 05:51) (88% - 92%)    Parameters below as of 29 Dec 2023 05:51  Patient On (Oxygen Delivery Method): room air        PHYSICAL EXAM:  GENERAL: NAD, laying in bed  HEAD:  Atraumatic, Normocephalic  ENMT: dry mucous membranes, Supple, No JVD  CHEST/LUNG: Clear to auscultation bilaterally, good air entry, non-labored breathing  HEART: RRR; S1/S2, No murmur  ABDOMEN: Soft, Nontender, Nondistended; Bowel sounds present  EXTREMITIES: No calf tenderness, No cyanosis, No edema  SKIN: Warm, perfused  PSYCH: Normal mood, Normal affect  NERVOUS SYSTEM:  A/O x1-2, follows simple commands    LABS:                                   12.7   9.26  )-----------( 209      ( 29 Dec 2023 07:12 )             38.4   12-29    136  |  102  |  17  ----------------------------<  144<H>  3.8   |  26  |  0.63    Ca    9.8      29 Dec 2023 07:12  Phos  2.7     12-29  Mg     1.2     12-29    TPro  7.5  /  Alb  3.0<L>  /  TBili  0.6  /  DBili  x   /  AST  17  /  ALT  20  /  AlkPhos  77  12-28          Urinalysis Basic - ( 28 Dec 2023 05:40 )    Color: x / Appearance: x / SG: x / pH: x  Gluc: 156 mg/dL / Ketone: x  / Bili: x / Urobili: x   Blood: x / Protein: x / Nitrite: x   Leuk Esterase: x / RBC: x / WBC x   Sq Epi: x / Non Sq Epi: x / Bacteria: x      RADIOLOGY & ADDITIONAL TESTS:    Care Discussed with Consultants/Other Providers: Surgery PA   [facility] Medicine  Dr. Erma Arredondo  Progress Note    Patient is a 89y old  Female who presents with a chief complaint of s/p fall (28 Dec 2023 06:19)      Patient seen and examined at bedside. Denies pain. No chest pain, SOB, dysuria, urinary changes.     Addendum: called by RN in the afternoon that patient is altered. Patient noted to be 88% on RA, not as responsive as earlier. Family at bedside and report they have seen similar presentation when patient has had UTI in past. Son requesting second opinion from orthopedic surgeon. Son is leaning towards not pursuing surgery however now he is concerned about patient's mental status.     ALLERGIES:  aspirin (Unknown)  penicillin (Unknown)    MEDICATIONS  (STANDING):  buPROPion XL (24-Hour) . 150 milliGRAM(s) Oral daily  enoxaparin Injectable 40 milliGRAM(s) SubCutaneous every 24 hours  escitalopram 10 milliGRAM(s) Oral daily  famotidine    Tablet 20 milliGRAM(s) Oral daily  ferrous    sulfate 325 milliGRAM(s) Oral daily  levothyroxine 75 MICROGram(s) Oral daily  metoprolol succinate ER 25 milliGRAM(s) Oral daily  sodium chloride 0.9%. 1000 milliLiter(s) (72 mL/Hr) IV Continuous <Continuous>    MEDICATIONS  (PRN):  acetaminophen     Tablet .. 650 milliGRAM(s) Oral every 6 hours PRN Mild Pain (1 - 3)  aluminum hydroxide/magnesium hydroxide/simethicone Suspension 30 milliLiter(s) Oral every 4 hours PRN Dyspepsia  melatonin 3 milliGRAM(s) Oral at bedtime PRN Insomnia  morphine  - Injectable 1 milliGRAM(s) IV Push every 4 hours PRN Severe Pain (7 - 10)  ondansetron Injectable 4 milliGRAM(s) IV Push every 8 hours PRN Nausea and/or Vomiting  senna 1 Tablet(s) Oral at bedtime PRN Constipation    Vital Signs Last 24 Hrs  T(C): 36.3 (29 Dec 2023 05:51), Max: 36.3 (28 Dec 2023 11:39)  T(F): 97.3 (29 Dec 2023 05:51), Max: 97.4 (28 Dec 2023 11:39)  HR: 75 (29 Dec 2023 05:51) (74 - 81)  BP: 144/85 (29 Dec 2023 05:51) (112/70 - 149/84)  BP(mean): --  RR: 18 (29 Dec 2023 05:51) (15 - 18)  SpO2: 91% (29 Dec 2023 05:51) (88% - 92%)    Parameters below as of 29 Dec 2023 05:51  Patient On (Oxygen Delivery Method): room air        PHYSICAL EXAM:  GENERAL: NAD, laying in bed  HEAD:  Atraumatic, Normocephalic  ENMT: dry mucous membranes, Supple, No JVD  CHEST/LUNG: Clear to auscultation bilaterally, good air entry, non-labored breathing  HEART: RRR; S1/S2, No murmur  ABDOMEN: Soft, Nontender, Nondistended; Bowel sounds present  EXTREMITIES: No calf tenderness, No cyanosis, No edema  SKIN: Warm, perfused  PSYCH: Normal mood, Normal affect  NERVOUS SYSTEM:  A/O x1-2, follows simple commands    LABS:                                   12.7   9.26  )-----------( 209      ( 29 Dec 2023 07:12 )             38.4   12-29    136  |  102  |  17  ----------------------------<  144<H>  3.8   |  26  |  0.63    Ca    9.8      29 Dec 2023 07:12  Phos  2.7     12-29  Mg     1.2     12-29    TPro  7.5  /  Alb  3.0<L>  /  TBili  0.6  /  DBili  x   /  AST  17  /  ALT  20  /  AlkPhos  77  12-28          Urinalysis Basic - ( 28 Dec 2023 05:40 )    Color: x / Appearance: x / SG: x / pH: x  Gluc: 156 mg/dL / Ketone: x  / Bili: x / Urobili: x   Blood: x / Protein: x / Nitrite: x   Leuk Esterase: x / RBC: x / WBC x   Sq Epi: x / Non Sq Epi: x / Bacteria: x      RADIOLOGY & ADDITIONAL TESTS:    Care Discussed with Consultants/Other Providers: Surgery PA

## 2023-12-29 NOTE — PROGRESS NOTE ADULT - ASSESSMENT
89 year old F PMH Afib not on AC for frequent falls, hypothyroidism, anxiety/depression, DM, HTN  BIBA s/p fall found to have R femoral neck fracture    #femoral fracture  - patient at baseline requires fawad lift for transfers   - Ortho recommends surgical intervention for pain control  -Discussions held between ortho and family, currently family declining surgical intervention   - patient currently denies pain  - continue pain management and BM regimen  - ortho consulted, Dr. Hadley- plan discussed with surgical PA personally today    #afib  - not on AC  - continue metoprolol succinate 25mg daily    #hypothyroidism  - continue synthroid 75mcg    #anxiety  - continue Wellbutrin 150mg and lexapro 10mg    #Pyuria  patient without any symptoms. no leukocytosis  monitor off antibiotics     #DVT ppx  - continue lovenox for now    discussed with son Jose plan discussed.  89 year old F PMH Afib not on AC for frequent falls, hypothyroidism, anxiety/depression, DM, HTN  BIBA s/p fall found to have R femoral neck fracture    #femoral fracture  - patient at baseline requires fawad lift for transfers   - Ortho recommends surgical intervention for pain control  -Discussions held between ortho and family, currently family declining surgical intervention   -Family is requesting second opinion - surgery PA notified. Awaiting second opinion.   - patient currently denies pain  - continue pain management and BM regimen  - ortho consulted, Dr. Hadley- plan discussed with surgical PA personally today    #Acute encephalopathy  Suspect toxic metabolic encephalopathy  urgent CTH performed - awaiting read  concern for UTI - started on ceftriaxone  f/u urine cx  Medications reviewed - did not receive any narcotic since last night    #Hypoxia  Repeat CXR ordered  Lungs are clear - no wheezing  family reports congestion - RVP ordered      #afib  - not on AC  - continue metoprolol succinate 25mg daily    #hypothyroidism  - continue synthroid 75mcg    #anxiety  - continue Wellbutrin 150mg and lexapro 10mg    #Pyuria  patient without any symptoms. no leukocytosis  UA without bacteria, wbc 20-25  Per family, presentation concerning for UTI. Pt with similar presentation previously  started on ceftriaxone  f/u urine cx    #DVT ppx  - continue lovenox for now    Plan discussed with son Jose at bedside   89 year old F PMH Afib not on AC for frequent falls, hypothyroidism, anxiety/depression, DM, HTN  BIBA s/p fall found to have R femoral neck fracture    #femoral fracture  - patient at baseline requires fawad lift for transfers   - Ortho recommends surgical intervention for pain control  -Discussions held between ortho and family, currently family declining surgical intervention   -Family is requesting second opinion - surgery PA notified. Awaiting second opinion.   - patient currently denies pain  - continue pain management and BM regimen  - ortho consulted, Dr. Hadley- plan discussed with surgical PA personally today    #Acute encephalopathy  Suspect toxic metabolic encephalopathy  urgent CTH performed - awaiting read  concern for UTI - started on ceftriaxone  f/u urine cx  Medications reviewed - did not receive any narcotic since last night    #Hypoxia  Repeat CXR performed - unchanged per my read, no overt consolidation or pulm edema   Lungs are clear - no wheezing  family reports congestion - RVP ordered  Hypoxia due to PUSHPA? desats while sleeping. AM VBG ordered       #afib  - not on AC  - continue metoprolol succinate 25mg daily    #hypothyroidism  - continue synthroid 75mcg    #anxiety  - continue Wellbutrin 150mg and lexapro 10mg    #Pyuria  patient without any symptoms. no leukocytosis  UA without bacteria, wbc 20-25  Per family, presentation concerning for UTI. Pt with similar presentation previously  started on ceftriaxone  f/u urine cx    #DVT ppx  - continue lovenox for now    Plan discussed with son Jose at bedside

## 2023-12-30 DIAGNOSIS — G30.9 ALZHEIMER'S DISEASE, UNSPECIFIED: ICD-10-CM

## 2023-12-30 LAB
ANION GAP SERPL CALC-SCNC: 14 MMOL/L — SIGNIFICANT CHANGE UP (ref 5–17)
ANION GAP SERPL CALC-SCNC: 14 MMOL/L — SIGNIFICANT CHANGE UP (ref 5–17)
BASE EXCESS BLDV CALC-SCNC: 0.1 MMOL/L — SIGNIFICANT CHANGE UP (ref -2–3)
BASE EXCESS BLDV CALC-SCNC: 0.1 MMOL/L — SIGNIFICANT CHANGE UP (ref -2–3)
BUN SERPL-MCNC: 13 MG/DL — SIGNIFICANT CHANGE UP (ref 7–23)
BUN SERPL-MCNC: 13 MG/DL — SIGNIFICANT CHANGE UP (ref 7–23)
CALCIUM SERPL-MCNC: 9.7 MG/DL — SIGNIFICANT CHANGE UP (ref 8.4–10.5)
CALCIUM SERPL-MCNC: 9.7 MG/DL — SIGNIFICANT CHANGE UP (ref 8.4–10.5)
CHLORIDE SERPL-SCNC: 102 MMOL/L — SIGNIFICANT CHANGE UP (ref 96–108)
CHLORIDE SERPL-SCNC: 102 MMOL/L — SIGNIFICANT CHANGE UP (ref 96–108)
CO2 BLDV-SCNC: 26 MMOL/L — SIGNIFICANT CHANGE UP (ref 22–26)
CO2 BLDV-SCNC: 26 MMOL/L — SIGNIFICANT CHANGE UP (ref 22–26)
CO2 SERPL-SCNC: 18 MMOL/L — LOW (ref 22–31)
CO2 SERPL-SCNC: 18 MMOL/L — LOW (ref 22–31)
CREAT SERPL-MCNC: 0.64 MG/DL — SIGNIFICANT CHANGE UP (ref 0.5–1.3)
CREAT SERPL-MCNC: 0.64 MG/DL — SIGNIFICANT CHANGE UP (ref 0.5–1.3)
EGFR: 82 ML/MIN/1.73M2 — SIGNIFICANT CHANGE UP
EGFR: 82 ML/MIN/1.73M2 — SIGNIFICANT CHANGE UP
GAS PNL BLDV: SIGNIFICANT CHANGE UP
GAS PNL BLDV: SIGNIFICANT CHANGE UP
GLUCOSE BLDC GLUCOMTR-MCNC: 143 MG/DL — HIGH (ref 70–99)
GLUCOSE BLDC GLUCOMTR-MCNC: 143 MG/DL — HIGH (ref 70–99)
GLUCOSE BLDC GLUCOMTR-MCNC: 154 MG/DL — HIGH (ref 70–99)
GLUCOSE BLDC GLUCOMTR-MCNC: 154 MG/DL — HIGH (ref 70–99)
GLUCOSE SERPL-MCNC: 168 MG/DL — HIGH (ref 70–99)
GLUCOSE SERPL-MCNC: 168 MG/DL — HIGH (ref 70–99)
HCO3 BLDV-SCNC: 25 MMOL/L — SIGNIFICANT CHANGE UP (ref 22–29)
HCO3 BLDV-SCNC: 25 MMOL/L — SIGNIFICANT CHANGE UP (ref 22–29)
HCT VFR BLD CALC: 37 % — SIGNIFICANT CHANGE UP (ref 34.5–45)
HCT VFR BLD CALC: 37 % — SIGNIFICANT CHANGE UP (ref 34.5–45)
HGB BLD-MCNC: 12.8 G/DL — SIGNIFICANT CHANGE UP (ref 11.5–15.5)
HGB BLD-MCNC: 12.8 G/DL — SIGNIFICANT CHANGE UP (ref 11.5–15.5)
MCHC RBC-ENTMCNC: 32.5 PG — SIGNIFICANT CHANGE UP (ref 27–34)
MCHC RBC-ENTMCNC: 32.5 PG — SIGNIFICANT CHANGE UP (ref 27–34)
MCHC RBC-ENTMCNC: 34.6 GM/DL — SIGNIFICANT CHANGE UP (ref 32–36)
MCHC RBC-ENTMCNC: 34.6 GM/DL — SIGNIFICANT CHANGE UP (ref 32–36)
MCV RBC AUTO: 93.9 FL — SIGNIFICANT CHANGE UP (ref 80–100)
MCV RBC AUTO: 93.9 FL — SIGNIFICANT CHANGE UP (ref 80–100)
NRBC # BLD: 0 /100 WBCS — SIGNIFICANT CHANGE UP (ref 0–0)
NRBC # BLD: 0 /100 WBCS — SIGNIFICANT CHANGE UP (ref 0–0)
PCO2 BLDV: 38 MMHG — LOW (ref 39–42)
PCO2 BLDV: 38 MMHG — LOW (ref 39–42)
PH BLDV: 7.42 — SIGNIFICANT CHANGE UP (ref 7.32–7.43)
PH BLDV: 7.42 — SIGNIFICANT CHANGE UP (ref 7.32–7.43)
PLATELET # BLD AUTO: 216 K/UL — SIGNIFICANT CHANGE UP (ref 150–400)
PLATELET # BLD AUTO: 216 K/UL — SIGNIFICANT CHANGE UP (ref 150–400)
PO2 BLDV: 76 MMHG — HIGH (ref 25–45)
PO2 BLDV: 76 MMHG — HIGH (ref 25–45)
POTASSIUM SERPL-MCNC: 3.9 MMOL/L — SIGNIFICANT CHANGE UP (ref 3.5–5.3)
POTASSIUM SERPL-MCNC: 3.9 MMOL/L — SIGNIFICANT CHANGE UP (ref 3.5–5.3)
POTASSIUM SERPL-SCNC: 3.9 MMOL/L — SIGNIFICANT CHANGE UP (ref 3.5–5.3)
POTASSIUM SERPL-SCNC: 3.9 MMOL/L — SIGNIFICANT CHANGE UP (ref 3.5–5.3)
RBC # BLD: 3.94 M/UL — SIGNIFICANT CHANGE UP (ref 3.8–5.2)
RBC # BLD: 3.94 M/UL — SIGNIFICANT CHANGE UP (ref 3.8–5.2)
RBC # FLD: 14.6 % — HIGH (ref 10.3–14.5)
RBC # FLD: 14.6 % — HIGH (ref 10.3–14.5)
SAO2 % BLDV: 95.9 % — HIGH (ref 67–88)
SAO2 % BLDV: 95.9 % — HIGH (ref 67–88)
SODIUM SERPL-SCNC: 134 MMOL/L — LOW (ref 135–145)
SODIUM SERPL-SCNC: 134 MMOL/L — LOW (ref 135–145)
WBC # BLD: 10.7 K/UL — HIGH (ref 3.8–10.5)
WBC # BLD: 10.7 K/UL — HIGH (ref 3.8–10.5)
WBC # FLD AUTO: 10.7 K/UL — HIGH (ref 3.8–10.5)
WBC # FLD AUTO: 10.7 K/UL — HIGH (ref 3.8–10.5)

## 2023-12-30 PROCEDURE — 99232 SBSQ HOSP IP/OBS MODERATE 35: CPT

## 2023-12-30 PROCEDURE — 99233 SBSQ HOSP IP/OBS HIGH 50: CPT

## 2023-12-30 RX ORDER — ACETAMINOPHEN 500 MG
1000 TABLET ORAL ONCE
Refills: 0 | Status: COMPLETED | OUTPATIENT
Start: 2023-12-30 | End: 2023-12-30

## 2023-12-30 RX ORDER — METOPROLOL TARTRATE 50 MG
25 TABLET ORAL ONCE
Refills: 0 | Status: DISCONTINUED | OUTPATIENT
Start: 2023-12-30 | End: 2023-12-30

## 2023-12-30 RX ORDER — OLANZAPINE 15 MG/1
2.5 TABLET, FILM COATED ORAL EVERY 6 HOURS
Refills: 0 | Status: DISCONTINUED | OUTPATIENT
Start: 2023-12-30 | End: 2024-01-05

## 2023-12-30 RX ORDER — LIDOCAINE 4 G/100G
1 CREAM TOPICAL EVERY 24 HOURS
Refills: 0 | Status: DISCONTINUED | OUTPATIENT
Start: 2023-12-30 | End: 2024-01-05

## 2023-12-30 RX ORDER — HALOPERIDOL DECANOATE 100 MG/ML
1 INJECTION INTRAMUSCULAR EVERY 6 HOURS
Refills: 0 | Status: DISCONTINUED | OUTPATIENT
Start: 2023-12-30 | End: 2023-12-30

## 2023-12-30 RX ORDER — METOPROLOL TARTRATE 50 MG
5 TABLET ORAL EVERY 8 HOURS
Refills: 0 | Status: DISCONTINUED | OUTPATIENT
Start: 2023-12-30 | End: 2024-01-01

## 2023-12-30 RX ORDER — SODIUM CHLORIDE 9 MG/ML
1000 INJECTION INTRAMUSCULAR; INTRAVENOUS; SUBCUTANEOUS
Refills: 0 | Status: DISCONTINUED | OUTPATIENT
Start: 2023-12-30 | End: 2024-01-01

## 2023-12-30 RX ORDER — OLANZAPINE 15 MG/1
2.5 TABLET, FILM COATED ORAL
Refills: 0 | Status: DISCONTINUED | OUTPATIENT
Start: 2023-12-30 | End: 2023-12-31

## 2023-12-30 RX ORDER — HALOPERIDOL DECANOATE 100 MG/ML
0.5 INJECTION INTRAMUSCULAR EVERY 6 HOURS
Refills: 0 | Status: DISCONTINUED | OUTPATIENT
Start: 2023-12-30 | End: 2023-12-30

## 2023-12-30 RX ORDER — METOPROLOL TARTRATE 50 MG
5 TABLET ORAL ONCE
Refills: 0 | Status: COMPLETED | OUTPATIENT
Start: 2023-12-30 | End: 2023-12-30

## 2023-12-30 RX ADMIN — Medication 400 MILLIGRAM(S): at 16:56

## 2023-12-30 RX ADMIN — OLANZAPINE 2.5 MILLIGRAM(S): 15 TABLET, FILM COATED ORAL at 17:36

## 2023-12-30 RX ADMIN — Medication 5 MILLIGRAM(S): at 13:36

## 2023-12-30 RX ADMIN — Medication 400 MILLIGRAM(S): at 10:04

## 2023-12-30 RX ADMIN — Medication 5 MILLIGRAM(S): at 06:21

## 2023-12-30 RX ADMIN — HALOPERIDOL DECANOATE 0.5 MILLIGRAM(S): 100 INJECTION INTRAMUSCULAR at 10:31

## 2023-12-30 RX ADMIN — HALOPERIDOL DECANOATE 1 MILLIGRAM(S): 100 INJECTION INTRAMUSCULAR at 14:32

## 2023-12-30 RX ADMIN — LIDOCAINE 1 PATCH: 4 CREAM TOPICAL at 12:48

## 2023-12-30 RX ADMIN — Medication 5 MILLIGRAM(S): at 22:46

## 2023-12-30 RX ADMIN — CEFTRIAXONE 100 MILLIGRAM(S): 500 INJECTION, POWDER, FOR SOLUTION INTRAMUSCULAR; INTRAVENOUS at 16:29

## 2023-12-30 RX ADMIN — LIDOCAINE 1 PATCH: 4 CREAM TOPICAL at 19:40

## 2023-12-30 NOTE — BH CONSULTATION LIAISON ASSESSMENT NOTE - OTHER
attempts to open her eyes and engage, but falls back to sleep.  When asked what she would like to eat, the patient responded "chocolate".  Pt states " I don't care", and thinks she will not get well.  peers

## 2023-12-30 NOTE — BH CONSULTATION LIAISON ASSESSMENT NOTE - OTHER PAST PSYCHIATRIC HISTORY (INCLUDE DETAILS REGARDING ONSET, COURSE OF ILLNESS, INPATIENT/OUTPATIENT TREATMENT)
Previous depression- treated with Buspar, Wellbutrin and Lexapro as per family this was helpful. Started at the SNF 2 years ago.   Diagnosed with non specific dementia in the past 6 months, no medications to address this issue.

## 2023-12-30 NOTE — BH CONSULTATION LIAISON ASSESSMENT NOTE - NSBHMSEIMPULSE_PSY_A_CORE
will attempt to strip off hospital gown, tries to slap at her son's hands when he rearranges her clothing./Impaired

## 2023-12-30 NOTE — BH CONSULTATION LIAISON ASSESSMENT NOTE - SUMMARY
Patient is a 89y old  Female who presents with a chief complaint of s/p fall (28 Dec 2023 06:19)Patient was admitted for right femoral neck fracture.    Psychiatry asked to see the patient due to agitation consisting of pulling out her IV lines, attempting to get out of bed, and disorientation.

## 2023-12-30 NOTE — BH CONSULTATION LIAISON ASSESSMENT NOTE - DESCRIPTION
Pt is , has a less than high school education.   Remote smoking history and social alcohol use history.

## 2023-12-30 NOTE — BH CONSULTATION LIAISON ASSESSMENT NOTE - HPI (INCLUDE ILLNESS QUALITY, SEVERITY, DURATION, TIMING, CONTEXT, MODIFYING FACTORS, ASSOCIATED SIGNS AND SYMPTOMS)
HPI:  89 y/o female who is a resident of [facility] with PMH of Afib not on AC for frequent falls, hypothyroidism, anxiety/depression, DM, HTN  BIBA s/p fall. pt is poor historian and most of the hx obtained from ED staff, nursing home staff. pt fell on right side of her hip upon sort of rolled over in bed. gets around at facility with help of Erik. no head trauma/LOC reported. in ED pt noted to have externally rotated Rt LE. Pelvic xray with right hip fx. Dr Hadley from ortho contacted who recommended admission for pain management with official consult in AM  (28 Dec 2023 06:19)    Psychiatry C/L Note: Asked to see this patient due to agitation, she has history of depression and had been well managed on Wellbutrin , Buspar and Lexapro.   Pt is delirious and has had a change in mental status, as per family she had stopped taking her oral meds for close to a week before admission , and had recently resumed them, today not taking medications by mouth. Pt had been agitated, tearing out her IV and taking off nasal cannula and disrobing. As per pt she feels restricted and had to be in soft wrist restraints. Pt will pick at her hospital gown and repeat "No, no no!", but otherwise shows waxing and waning sensorium on occasion oxygen drops to 88. Pt had gotten Haldol, Ativan and Benadryl over night and Haldol again today. As per family the depression started about 2 years ago, and the cognitive decline consisting mostly of recent memory, short term memory and recall primarily affected. Per family she recognizes them, is conversant about movies and "old time celebrities".  HPI:  89 y/o female who is a resident of [facility] facility with PMH of Afib not on AC for frequent falls, hypothyroidism, anxiety/depression, DM, HTN  BIBA s/p fall. pt is poor historian and most of the hx obtained from ED staff, nursing home staff. pt fell on right side of her hip upon sort of rolled over in bed. gets around at facility with help of Erik. no head trauma/LOC reported. in ED pt noted to have externally rotated Rt LE. Pelvic xray with right hip fx. Dr Hadley from ortho contacted who recommended admission for pain management with official consult in AM  (28 Dec 2023 06:19)    Psychiatry C/L Note: Asked to see this patient due to agitation, she has history of depression and had been well managed on Wellbutrin , Buspar and Lexapro.   Pt is delirious and has had a change in mental status, as per family she had stopped taking her oral meds for close to a week before admission , and had recently resumed them, today not taking medications by mouth. Pt had been agitated, tearing out her IV and taking off nasal cannula and disrobing. As per pt she feels restricted and had to be in soft wrist restraints. Pt will pick at her hospital gown and repeat "No, no no!", but otherwise shows waxing and waning sensorium on occasion oxygen drops to 88. Pt had gotten Haldol, Ativan and Benadryl over night and Haldol again today. As per family the depression started about 2 years ago, and the cognitive decline consisting mostly of recent memory, short term memory and recall primarily affected. Per family she recognizes them, is conversant about movies and "old time celebrities".

## 2023-12-30 NOTE — BH CONSULTATION LIAISON ASSESSMENT NOTE - NSBHATTESTBILLING_PSY_A_CORE
[id number] diagnostic evaluation with medical services [id number] diagnostic evaluation with medical services

## 2023-12-30 NOTE — BH CONSULTATION LIAISON ASSESSMENT NOTE - NSBHCONSULTRECOMMENDOTHER_PSY_A_CORE FT
Frequent reorientation.   Pain management as per treatment team.  Avoidance of anticholinergic medication.  Narcotics as clinically indicated.     When more reliably taking po- resume Wellbutrin Buspar and Lexapro, monitor sodium.   Can consider a Namenda trial for cognitive issues, if not taking po can consider Rivastigmine patch as an alternative after appropriate psychoeducation given to family regarding risks, benefits, and alternatives, including no treatment at all.

## 2023-12-30 NOTE — PROGRESS NOTE ADULT - ASSESSMENT
89 year old F PMH Afib not on AC for frequent falls, hypothyroidism, anxiety/depression, DM, HTN  BIBA s/p fall found to have R femoral neck fracture      #Delirium   Likely precipitated by hospitalization vs UTI vs pain 2/2 recent fracture  C/w treatment of UTI  IV tylenol ordered for possible underlying pain  Haldol PRN given safety concern (pt trying to get out of bed)  Psych evaluation requested for behavorial disturbance       #femoral fracture  - patient at baseline requires fawad lift for transfers   - Ortho recommends surgical intervention for pain control  -Discussions held between ortho and family, currently family declining surgical intervention   -Family is requesting second opinion - surgery PA notified. Awaiting second opinion.   - continue pain management and BM regimen  - ortho consulted, Dr. Hadley- plan discussed with surgical PA personally today    #Acute encephalopathy  Suspect toxic metabolic encephalopathy  urgent CTH performed - no acute findings but motion limited  concern for UTI - started on ceftriaxone  f/u urine cx      #Hypoxia  Repeat CXR performed - unchanged per my read, no overt consolidation or pulm edema   family reports congestion - RVP negative   Hypoxia due to PUSHPA? desats while sleeping.   Blood gas performed, not hypercapnic     #Afib  - not on AC  -unable to take PO, start IV metoprolol 5 TID, hold home PO toprol    #Hypothyroidism  - continue synthroid 75mcg    #anxiety  - continue Wellbutrin 150mg and lexapro 10mg    #Pyuria  patient without any symptoms. no leukocytosis  UA without bacteria, wbc 20-25  Per family, presentation concerning for UTI. Pt with similar presentation previously  started on ceftriaxone  f/u urine cx    #DVT ppx  - continue lovenox for now   89 year old F PMH Afib not on AC for frequent falls, hypothyroidism, anxiety/depression, DM, HTN  BIBA s/p fall found to have R femoral neck fracture      #Delirium   Likely precipitated by hospitalization vs UTI vs pain 2/2 recent fracture  C/w treatment of UTI  IV tylenol ordered for possible underlying pain  Haldol PRN given safety concern (pt trying to get out of bed)  Psych evaluation requested for behavorial disturbance       #femoral fracture  - patient at baseline requires fawad lift for transfers   - Ortho recommends surgical intervention for pain control  -Discussions held between ortho and family, currently family declining surgical intervention   -Family is requesting second opinion - surgery PA notified. Awaiting second opinion.   - continue pain management and BM regimen  - ortho consulted, Dr. Hadley- plan discussed with surgical PA personally today    #Acute encephalopathy  Suspect toxic metabolic encephalopathy  urgent CTH performed - no acute findings but motion limited  concern for UTI - started on ceftriaxone  f/u urine cx      #Hypoxia  Repeat CXR performed - unchanged per my read, no overt consolidation or pulm edema   family reports congestion - RVP negative   Hypoxia due to PUSHPA? desats while sleeping.   Blood gas performed, not hypercapnic     #Afib  - not on AC  -unable to take PO, start IV metoprolol 5 TID, hold home PO toprol    #Hypothyroidism  - continue synthroid 75mcg    #anxiety  - continue Wellbutrin 150mg and lexapro 10mg    #Pyuria  patient without any symptoms. no leukocytosis  UA without bacteria, wbc 20-25  Per family, presentation concerning for UTI. Pt with similar presentation previously  started on ceftriaxone  f/u urine cx    #DVT ppx  - continue lovenox for now    Plan discussed with daughter Salma at bedside.

## 2023-12-30 NOTE — BH CONSULTATION LIAISON ASSESSMENT NOTE - NSBHCHARTREVIEWVS_PSY_A_CORE FT
Vital Signs Last 24 Hrs  T(C): 37.3 (30 Dec 2023 11:31), Max: 37.4 (30 Dec 2023 04:29)  T(F): 99.1 (30 Dec 2023 11:31), Max: 99.3 (30 Dec 2023 04:29)  HR: 96 (30 Dec 2023 13:10) (70 - 96)  BP: 116/64 (30 Dec 2023 13:10) (116/64 - 132/81)  BP(mean): --  RR: 15 (30 Dec 2023 11:31) (15 - 16)  SpO2: 98% (30 Dec 2023 11:31) (96% - 98%)    Parameters below as of 30 Dec 2023 11:31  Patient On (Oxygen Delivery Method): nasal cannula  O2 Flow (L/min): 3

## 2023-12-30 NOTE — PROGRESS NOTE ADULT - SUBJECTIVE AND OBJECTIVE BOX
[facility]  Dr. Erma Arredondo  Progress Note    Patient is a 89y old  Female who presents with a chief complaint of s/p fall (28 Dec 2023 06:19)      Patient seen and examined at bedside. Overnight patient was very agitated, trying to get out of bed. SHe was given haldol, benadryl and ativan. This morning patient noted to be agitated again but redirectable. Opens eys and yells "leave me alone". Patient remains altered from baseline status.      ALLERGIES:  aspirin (Unknown)  penicillin (Unknown)    MEDICATIONS  (STANDING):  buPROPion XL (24-Hour) . 150 milliGRAM(s) Oral daily  enoxaparin Injectable 40 milliGRAM(s) SubCutaneous every 24 hours  escitalopram 10 milliGRAM(s) Oral daily  famotidine    Tablet 20 milliGRAM(s) Oral daily  ferrous    sulfate 325 milliGRAM(s) Oral daily  levothyroxine 75 MICROGram(s) Oral daily  metoprolol succinate ER 25 milliGRAM(s) Oral daily  sodium chloride 0.9%. 1000 milliLiter(s) (72 mL/Hr) IV Continuous <Continuous>    MEDICATIONS  (PRN):  acetaminophen     Tablet .. 650 milliGRAM(s) Oral every 6 hours PRN Mild Pain (1 - 3)  aluminum hydroxide/magnesium hydroxide/simethicone Suspension 30 milliLiter(s) Oral every 4 hours PRN Dyspepsia  melatonin 3 milliGRAM(s) Oral at bedtime PRN Insomnia  morphine  - Injectable 1 milliGRAM(s) IV Push every 4 hours PRN Severe Pain (7 - 10)  ondansetron Injectable 4 milliGRAM(s) IV Push every 8 hours PRN Nausea and/or Vomiting  senna 1 Tablet(s) Oral at bedtime PRN Constipation    Vital Signs Last 24 Hrs  T(C): 37.4 (30 Dec 2023 05:14), Max: 37.4 (30 Dec 2023 04:29)  T(F): 99.3 (30 Dec 2023 05:14), Max: 99.3 (30 Dec 2023 04:29)  HR: 70 (30 Dec 2023 05:14) (70 - 98)  BP: 130/82 (30 Dec 2023 05:14) (121/46 - 154/88)  BP(mean): --  RR: 16 (30 Dec 2023 05:14) (16 - 18)  SpO2: 98% (30 Dec 2023 05:14) (88% - 98%)    Parameters below as of 30 Dec 2023 05:14  Patient On (Oxygen Delivery Method): nasal cannula  O2 Flow (L/min): 3      PHYSICAL EXAM:  GENERAL: agitated, pulling on lines and 02  HEAD:  Atraumatic, Normocephalic  ENMT: Supple, No JVD  CHEST/LUNG: Clear to auscultation bilaterally, good air entry, non-labored breathing  HEART: RRR; S1/S2, No murmur  ABDOMEN: Soft, Nontender, Nondistended; Bowel sounds present  EXTREMITIES: No calf tenderness, No cyanosis, No edema  SKIN: Warm, perfused  PSYCH: Normal mood, Normal affect  NERVOUS SYSTEM:  A/O x1-2, follows simple commands    LABS:                                              12.7   9.26  )-----------( 209      ( 29 Dec 2023 07:12 )             38.4   12-30    134<L>  |  102  |  13  ----------------------------<  168<H>  3.9   |  18<L>  |  0.64    Ca    9.7      30 Dec 2023 07:40  Phos  2.7     12-29  Mg     1.2     12-29          Urinalysis Basic - ( 28 Dec 2023 05:40 )    Color: x / Appearance: x / SG: x / pH: x  Gluc: 156 mg/dL / Ketone: x  / Bili: x / Urobili: x   Blood: x / Protein: x / Nitrite: x   Leuk Esterase: x / RBC: x / WBC x   Sq Epi: x / Non Sq Epi: x / Bacteria: x      RADIOLOGY & ADDITIONAL TESTS:    Care Discussed with Consultants/Other Providers: Surgery PA   [facility]  Dr. Erma Arredondo  Progress Note    Patient is a 89y old  Female who presents with a chief complaint of s/p fall (28 Dec 2023 06:19)      Patient seen and examined at bedside. Overnight patient was very agitated, trying to get out of bed. SHe was given haldol, benadryl and ativan. This morning patient noted to be agitated again but redirectable. Opens eys and yells "leave me alone". Patient remains altered from baseline status.      ALLERGIES:  aspirin (Unknown)  penicillin (Unknown)    MEDICATIONS  (STANDING):  buPROPion XL (24-Hour) . 150 milliGRAM(s) Oral daily  enoxaparin Injectable 40 milliGRAM(s) SubCutaneous every 24 hours  escitalopram 10 milliGRAM(s) Oral daily  famotidine    Tablet 20 milliGRAM(s) Oral daily  ferrous    sulfate 325 milliGRAM(s) Oral daily  levothyroxine 75 MICROGram(s) Oral daily  metoprolol succinate ER 25 milliGRAM(s) Oral daily  sodium chloride 0.9%. 1000 milliLiter(s) (72 mL/Hr) IV Continuous <Continuous>    MEDICATIONS  (PRN):  acetaminophen     Tablet .. 650 milliGRAM(s) Oral every 6 hours PRN Mild Pain (1 - 3)  aluminum hydroxide/magnesium hydroxide/simethicone Suspension 30 milliLiter(s) Oral every 4 hours PRN Dyspepsia  melatonin 3 milliGRAM(s) Oral at bedtime PRN Insomnia  morphine  - Injectable 1 milliGRAM(s) IV Push every 4 hours PRN Severe Pain (7 - 10)  ondansetron Injectable 4 milliGRAM(s) IV Push every 8 hours PRN Nausea and/or Vomiting  senna 1 Tablet(s) Oral at bedtime PRN Constipation    Vital Signs Last 24 Hrs  T(C): 37.4 (30 Dec 2023 05:14), Max: 37.4 (30 Dec 2023 04:29)  T(F): 99.3 (30 Dec 2023 05:14), Max: 99.3 (30 Dec 2023 04:29)  HR: 70 (30 Dec 2023 05:14) (70 - 98)  BP: 130/82 (30 Dec 2023 05:14) (121/46 - 154/88)  BP(mean): --  RR: 16 (30 Dec 2023 05:14) (16 - 18)  SpO2: 98% (30 Dec 2023 05:14) (88% - 98%)    Parameters below as of 30 Dec 2023 05:14  Patient On (Oxygen Delivery Method): nasal cannula  O2 Flow (L/min): 3      PHYSICAL EXAM:  GENERAL: agitated, pulling on lines and 02  HEAD:  Atraumatic, Normocephalic  ENMT: Supple, No JVD  CHEST/LUNG: Clear to auscultation bilaterally, good air entry, non-labored breathing  HEART: RRR; S1/S2, No murmur  ABDOMEN: Soft, Nontender, Nondistended; Bowel sounds present  EXTREMITIES: No calf tenderness, No cyanosis, No edema  SKIN: Warm, perfused  PSYCH: Normal mood, Normal affect  NERVOUS SYSTEM:  A/O x1-2, follows simple commands    LABS:                                              12.7   9.26  )-----------( 209      ( 29 Dec 2023 07:12 )             38.4   12-30    134<L>  |  102  |  13  ----------------------------<  168<H>  3.9   |  18<L>  |  0.64    Ca    9.7      30 Dec 2023 07:40  Phos  2.7     12-29  Mg     1.2     12-29          Urinalysis Basic - ( 28 Dec 2023 05:40 )    Color: x / Appearance: x / SG: x / pH: x  Gluc: 156 mg/dL / Ketone: x  / Bili: x / Urobili: x   Blood: x / Protein: x / Nitrite: x   Leuk Esterase: x / RBC: x / WBC x   Sq Epi: x / Non Sq Epi: x / Bacteria: x      RADIOLOGY & ADDITIONAL TESTS:    Care Discussed with Consultants/Other Providers: Surgery PA   [Initial Evaluation] : an initial evaluation

## 2023-12-30 NOTE — BH CONSULTATION LIAISON ASSESSMENT NOTE - NSBHMSEGAIT_PSY_A_CORE
Per family pt is bed bound.   Feeds herself with both her hands and utensils./Abnormal gait / station

## 2023-12-30 NOTE — CONSULT NOTE ADULT - SUBJECTIVE AND OBJECTIVE BOX
Subjective and Objective:     HPI:  89 y/o female who is a [facility] with PMH of Afib not on AC for frequent falls, hypothyroidism, anxiety/depression, DM, HTN  BIBA s/p fall. pt is poor historian and most of the hx was initially obtained from ED staff, nursing home staff. pt fell on right side of her hip upon sort of rolled over in bed. gets around at facility with help of fawad. no head trauma/LOC reported. in ED pt noted to have externally rotated Rt LE. Pelvic xray with right hip fx. Dr Hadley from ortho contacted who recommended admission for pain management with official consult in AM  (28 Dec 2023 06:19).    Patient was admitted for right femoral neck fracture.   Orthopedics consulted that am  (Dr Rodrigo Hadley ).  Patient does have dementia.  Daughter states that mother is not an ambulator.  In addition, daughter notes that patient does not routinely sit OOB to a chair either. She resides as a resident at [facility] .  Dr Hadley discussed patient's fracture with patient, her daughter and her son who is her HCP.   He has explained the risks and the benefits.   Despite the immobility of patient, Dr. Hadley noted that surgical stablilization with 3 pins/screws would help with pain management and with personal care. Dr. Hadley also discussed risks and benefits of nonop treatment. The patient's son stated that he is not ready to commit to surgery and he needed more time to make a decision.  patient's family is leaning toward non operative management. Patient's family requested second opinion, for which I was consulted for to provide.     Vital Signs Last 24 Hrs  T(C): 36.3 (28 Dec 2023 11:39), Max: 36.4 (28 Dec 2023 09:15)  T(F): 97.4 (28 Dec 2023 11:39), Max: 97.5 (28 Dec 2023 09:15)  HR: 77 (28 Dec 2023 11:39) (76 - 81)  BP: 149/84 (28 Dec 2023 11:39) (110/71 - 178/94)  RR: 15 (28 Dec 2023 11:39) (15 - 21)  SpO2: 91% (28 Dec 2023 11:39) (91% - 96%)    Parameters below as of 28 Dec 2023 11:39  Patient On (Oxygen Delivery Method): room air      PAST MEDICAL & SURGICAL HISTORY:  Hypertension  Hypothyroidism  Obesity  Degenerative Joint Disease  Peripheral Neuropathy  Uterine Polyp  Overactive Bladder  H/O: GI Bleed  Atrial fibrillation  Diabetes mellitus, type I  Hiatal hernia  Spinal stenosis of lumbar region  S/P knee replacement, right  S/P rotator cuff repair  right  S/P  section      MEDICATIONS  (STANDING):  buPROPion XL (24-Hour) . 150 milliGRAM(s) Oral daily  enoxaparin Injectable 40 milliGRAM(s) SubCutaneous every 24 hours  escitalopram 10 milliGRAM(s) Oral daily  famotidine    Tablet 20 milliGRAM(s) Oral daily  ferrous    sulfate 325 milliGRAM(s) Oral daily  levothyroxine 75 MICROGram(s) Oral daily  metoprolol succinate ER 25 milliGRAM(s) Oral daily  senna 2 Tablet(s) Oral at bedtime  sodium chloride 0.9%. 1000 milliLiter(s) (72 mL/Hr) IV Continuous <Continuous>    >    MEDICATIONS  (PRN):  acetaminophen     Tablet .. 650 milliGRAM(s) Oral every 6 hours PRN Mild Pain (1 - 3)  aluminum hydroxide/magnesium hydroxide/simethicone Suspension 30 milliLiter(s) Oral every 4 hours PRN Dyspepsia  melatonin 3 milliGRAM(s) Oral at bedtime PRN Insomnia  morphine  - Injectable 1 milliGRAM(s) IV Push every 4 hours PRN Severe Pain (7 - 10)  ondansetron Injectable 4 milliGRAM(s) IV Push every 8 hours PRN Nausea and/or Vomiting  polyethylene glycol 3350 17 Gram(s) Oral daily PRN Constipation        PE:  Alert and oriented X 2  Lungs:  CTA   Cor:  RR   Abd:  soft, non tender , +BS -BM, voiding   Ext:  Right leg shortened and externally rotated. unable to range right hip or evaluate for stability  2/2 pain.  + neurovascular intact   left leg SCD       Assessment and Recommendation: 	    Patient was seen and examined by me today.  Patient was lying in bed with daughter by her side.  +dementia  Patient in no acute distress. Pain controlled with current pain meds.     I have reviewed xrays and CT scan right hip. Findings c/w nondisplaced/impacted right hip femoral neck fracture.   I have also discussed risks and benefits with patient and patient's daughter.    Given that patient is a non-ambulator who also does not routinely even sit out of bed to a chair, with a nondisplaced impacted right hip femoral neck fracture, whose pain is currently controlled with current pain meds, is 89 years old with multiple medical comorbidities (which certainly makes her high risk even for a right hip pinning surgical procedure), I believe that it is reasonable to proceed with nonoperative treatment for this patient.   Even if patient's fracture displaces (which I believe is unlikely), would still recommend nonop tx rather than a hemiarthroplasty surgery at that point in the future. Only reason to operate in the future for a displaced femoral neck fracture (compared to current nondisplaced hip fx) would still be for pain relief purposes only as patient would still remain a nonambulator.    Patient and patient's daughter understand and wish to proceed with continued nonoperative treatment.     Plan:   Bedrest / NWB RLE            DVT prophylaxis             pain management/pain control             d/c planning back to  C, when medically optimized            continued f/up evaluation and treatment by Dr. Hadley as per his discretion      Subjective and Objective:     HPI:  89 y/o female who is a resident [facility] with PMH of Afib not on AC for frequent falls, hypothyroidism, anxiety/depression, DM, HTN  BIBA s/p fall. pt is poor historian and most of the hx was initially obtained from ED staff, nursing home staff. pt fell on right side of her hip upon sort of rolled over in bed. gets around at facility with help of fawad. no head trauma/LOC reported. in ED pt noted to have externally rotated Rt LE. Pelvic xray with right hip fx. Dr Hadley from ortho contacted who recommended admission for pain management with official consult in AM  (28 Dec 2023 06:19).    Patient was admitted for right femoral neck fracture.   Orthopedics consulted that am  (Dr Rodrigo Hadley ).  Patient does have dementia.  Daughter states that mother is not an ambulator.  In addition, daughter notes that patient does not routinely sit OOB to a chair either. She resides as a resident at [facility] .  Dr Hadley discussed patient's fracture with patient, her daughter and her son who is her HCP.   He has explained the risks and the benefits.   Despite the immobility of patient, Dr. Hadley noted that surgical stablilization with 3 pins/screws would help with pain management and with personal care. Dr. Hadley also discussed risks and benefits of nonop treatment. The patient's son stated that he is not ready to commit to surgery and he needed more time to make a decision.  patient's family is leaning toward non operative management. Patient's family requested second opinion, for which I was consulted for to provide.     Vital Signs Last 24 Hrs  T(C): 36.3 (28 Dec 2023 11:39), Max: 36.4 (28 Dec 2023 09:15)  T(F): 97.4 (28 Dec 2023 11:39), Max: 97.5 (28 Dec 2023 09:15)  HR: 77 (28 Dec 2023 11:39) (76 - 81)  BP: 149/84 (28 Dec 2023 11:39) (110/71 - 178/94)  RR: 15 (28 Dec 2023 11:39) (15 - 21)  SpO2: 91% (28 Dec 2023 11:39) (91% - 96%)    Parameters below as of 28 Dec 2023 11:39  Patient On (Oxygen Delivery Method): room air      PAST MEDICAL & SURGICAL HISTORY:  Hypertension  Hypothyroidism  Obesity  Degenerative Joint Disease  Peripheral Neuropathy  Uterine Polyp  Overactive Bladder  H/O: GI Bleed  Atrial fibrillation  Diabetes mellitus, type I  Hiatal hernia  Spinal stenosis of lumbar region  S/P knee replacement, right  S/P rotator cuff repair  right  S/P  section      MEDICATIONS  (STANDING):  buPROPion XL (24-Hour) . 150 milliGRAM(s) Oral daily  enoxaparin Injectable 40 milliGRAM(s) SubCutaneous every 24 hours  escitalopram 10 milliGRAM(s) Oral daily  famotidine    Tablet 20 milliGRAM(s) Oral daily  ferrous    sulfate 325 milliGRAM(s) Oral daily  levothyroxine 75 MICROGram(s) Oral daily  metoprolol succinate ER 25 milliGRAM(s) Oral daily  senna 2 Tablet(s) Oral at bedtime  sodium chloride 0.9%. 1000 milliLiter(s) (72 mL/Hr) IV Continuous <Continuous>    >    MEDICATIONS  (PRN):  acetaminophen     Tablet .. 650 milliGRAM(s) Oral every 6 hours PRN Mild Pain (1 - 3)  aluminum hydroxide/magnesium hydroxide/simethicone Suspension 30 milliLiter(s) Oral every 4 hours PRN Dyspepsia  melatonin 3 milliGRAM(s) Oral at bedtime PRN Insomnia  morphine  - Injectable 1 milliGRAM(s) IV Push every 4 hours PRN Severe Pain (7 - 10)  ondansetron Injectable 4 milliGRAM(s) IV Push every 8 hours PRN Nausea and/or Vomiting  polyethylene glycol 3350 17 Gram(s) Oral daily PRN Constipation        PE:  Alert and oriented X 2  Lungs:  CTA   Cor:  RR   Abd:  soft, non tender , +BS -BM, voiding   Ext:  Right leg shortened and externally rotated. unable to range right hip or evaluate for stability  2/2 pain.  + neurovascular intact   left leg SCD       Assessment and Recommendation: 	    Patient was seen and examined by me today.  Patient was lying in bed with daughter by her side.  +dementia  Patient in no acute distress. Pain controlled with current pain meds.     I have reviewed xrays and CT scan right hip. Findings c/w nondisplaced/impacted right hip femoral neck fracture.   I have also discussed risks and benefits with patient and patient's daughter.    Given that patient is a non-ambulator who also does not routinely even sit out of bed to a chair, with a nondisplaced impacted right hip femoral neck fracture, whose pain is currently controlled with current pain meds, is 89 years old with multiple medical comorbidities (which certainly makes her high risk even for a right hip pinning surgical procedure), I believe that it is reasonable to proceed with nonoperative treatment for this patient.   Even if patient's fracture displaces (which I believe is unlikely), would still recommend nonop tx rather than a hemiarthroplasty surgery at that point in the future. Only reason to operate in the future for a displaced femoral neck fracture (compared to current nondisplaced hip fx) would still be for pain relief purposes only as patient would still remain a nonambulator.    Patient and patient's daughter understand and wish to proceed with continued nonoperative treatment.     Plan:   Bedrest / NWB RLE            DVT prophylaxis             pain management/pain control             d/c planning back to  C, when medically optimized            continued f/up evaluation and treatment by Dr. Hadley as per his discretion

## 2023-12-30 NOTE — BH CONSULTATION LIAISON ASSESSMENT NOTE - CURRENT MEDICATION
MEDICATIONS  (STANDING):  buPROPion XL (24-Hour) . 150 milliGRAM(s) Oral daily  cefTRIAXone   IVPB 1000 milliGRAM(s) IV Intermittent every 24 hours  enoxaparin Injectable 40 milliGRAM(s) SubCutaneous every 24 hours  escitalopram 10 milliGRAM(s) Oral daily  famotidine    Tablet 20 milliGRAM(s) Oral daily  ferrous    sulfate 325 milliGRAM(s) Oral daily  levothyroxine 75 MICROGram(s) Oral daily  lidocaine   4% Patch 1 Patch Transdermal every 24 hours  melatonin 6 milliGRAM(s) Oral at bedtime  metoprolol tartrate Injectable 5 milliGRAM(s) IV Push every 8 hours  OLANZapine Injectable 2.5 milliGRAM(s) IntraMuscular <User Schedule>  senna 2 Tablet(s) Oral at bedtime  sodium chloride 0.9%. 1000 milliLiter(s) (75 mL/Hr) IV Continuous <Continuous>    MEDICATIONS  (PRN):  acetaminophen     Tablet .. 650 milliGRAM(s) Oral every 6 hours PRN Mild Pain (1 - 3)  aluminum hydroxide/magnesium hydroxide/simethicone Suspension 30 milliLiter(s) Oral every 4 hours PRN Dyspepsia  OLANZapine Injectable 2.5 milliGRAM(s) IntraMuscular every 6 hours PRN agitation  ondansetron Injectable 4 milliGRAM(s) IV Push every 8 hours PRN Nausea and/or Vomiting  polyethylene glycol 3350 17 Gram(s) Oral daily PRN Constipation

## 2023-12-30 NOTE — BH CONSULTATION LIAISON ASSESSMENT NOTE - RISK ASSESSMENT
Pt low risk to hurt herself other than therapy /self care interventions that make her agitated, this would also be the risk to staff caring for her in terms of her scratching or attempting to get out of bed in her confusion.

## 2023-12-30 NOTE — BH CONSULTATION LIAISON ASSESSMENT NOTE - NSBHCHARTREVIEWLAB_PSY_A_CORE FT
12-30    134<L>  |  102  |  13  ----------------------------<  168<H>  3.9   |  18<L>  |  0.64    Ca    9.7      30 Dec 2023 07:40  Phos  2.7     12-29  Mg     1.2     12-29                          12.8   10.70 )-----------( 216      ( 30 Dec 2023 11:45 )             37.0

## 2023-12-31 LAB
A1C WITH ESTIMATED AVERAGE GLUCOSE RESULT: 5.7 % — HIGH (ref 4–5.6)
A1C WITH ESTIMATED AVERAGE GLUCOSE RESULT: 5.7 % — HIGH (ref 4–5.6)
ANION GAP SERPL CALC-SCNC: 12 MMOL/L — SIGNIFICANT CHANGE UP (ref 5–17)
ANION GAP SERPL CALC-SCNC: 12 MMOL/L — SIGNIFICANT CHANGE UP (ref 5–17)
BUN SERPL-MCNC: 12 MG/DL — SIGNIFICANT CHANGE UP (ref 7–23)
BUN SERPL-MCNC: 12 MG/DL — SIGNIFICANT CHANGE UP (ref 7–23)
CALCIUM SERPL-MCNC: 9.6 MG/DL — SIGNIFICANT CHANGE UP (ref 8.4–10.5)
CALCIUM SERPL-MCNC: 9.6 MG/DL — SIGNIFICANT CHANGE UP (ref 8.4–10.5)
CHLORIDE SERPL-SCNC: 104 MMOL/L — SIGNIFICANT CHANGE UP (ref 96–108)
CHLORIDE SERPL-SCNC: 104 MMOL/L — SIGNIFICANT CHANGE UP (ref 96–108)
CO2 SERPL-SCNC: 22 MMOL/L — SIGNIFICANT CHANGE UP (ref 22–31)
CO2 SERPL-SCNC: 22 MMOL/L — SIGNIFICANT CHANGE UP (ref 22–31)
CREAT SERPL-MCNC: 0.59 MG/DL — SIGNIFICANT CHANGE UP (ref 0.5–1.3)
CREAT SERPL-MCNC: 0.59 MG/DL — SIGNIFICANT CHANGE UP (ref 0.5–1.3)
EGFR: 84 ML/MIN/1.73M2 — SIGNIFICANT CHANGE UP
EGFR: 84 ML/MIN/1.73M2 — SIGNIFICANT CHANGE UP
ESTIMATED AVERAGE GLUCOSE: 117 MG/DL — HIGH (ref 68–114)
ESTIMATED AVERAGE GLUCOSE: 117 MG/DL — HIGH (ref 68–114)
GLUCOSE BLDC GLUCOMTR-MCNC: 113 MG/DL — HIGH (ref 70–99)
GLUCOSE BLDC GLUCOMTR-MCNC: 113 MG/DL — HIGH (ref 70–99)
GLUCOSE BLDC GLUCOMTR-MCNC: 114 MG/DL — HIGH (ref 70–99)
GLUCOSE BLDC GLUCOMTR-MCNC: 114 MG/DL — HIGH (ref 70–99)
GLUCOSE BLDC GLUCOMTR-MCNC: 150 MG/DL — HIGH (ref 70–99)
GLUCOSE BLDC GLUCOMTR-MCNC: 150 MG/DL — HIGH (ref 70–99)
GLUCOSE SERPL-MCNC: 99 MG/DL — SIGNIFICANT CHANGE UP (ref 70–99)
GLUCOSE SERPL-MCNC: 99 MG/DL — SIGNIFICANT CHANGE UP (ref 70–99)
HCT VFR BLD CALC: 36.9 % — SIGNIFICANT CHANGE UP (ref 34.5–45)
HCT VFR BLD CALC: 36.9 % — SIGNIFICANT CHANGE UP (ref 34.5–45)
HGB BLD-MCNC: 12 G/DL — SIGNIFICANT CHANGE UP (ref 11.5–15.5)
HGB BLD-MCNC: 12 G/DL — SIGNIFICANT CHANGE UP (ref 11.5–15.5)
MCHC RBC-ENTMCNC: 31.6 PG — SIGNIFICANT CHANGE UP (ref 27–34)
MCHC RBC-ENTMCNC: 31.6 PG — SIGNIFICANT CHANGE UP (ref 27–34)
MCHC RBC-ENTMCNC: 32.5 GM/DL — SIGNIFICANT CHANGE UP (ref 32–36)
MCHC RBC-ENTMCNC: 32.5 GM/DL — SIGNIFICANT CHANGE UP (ref 32–36)
MCV RBC AUTO: 97.1 FL — SIGNIFICANT CHANGE UP (ref 80–100)
MCV RBC AUTO: 97.1 FL — SIGNIFICANT CHANGE UP (ref 80–100)
NRBC # BLD: 0 /100 WBCS — SIGNIFICANT CHANGE UP (ref 0–0)
NRBC # BLD: 0 /100 WBCS — SIGNIFICANT CHANGE UP (ref 0–0)
PLATELET # BLD AUTO: 194 K/UL — SIGNIFICANT CHANGE UP (ref 150–400)
PLATELET # BLD AUTO: 194 K/UL — SIGNIFICANT CHANGE UP (ref 150–400)
POTASSIUM SERPL-MCNC: 3.4 MMOL/L — LOW (ref 3.5–5.3)
POTASSIUM SERPL-MCNC: 3.4 MMOL/L — LOW (ref 3.5–5.3)
POTASSIUM SERPL-SCNC: 3.4 MMOL/L — LOW (ref 3.5–5.3)
POTASSIUM SERPL-SCNC: 3.4 MMOL/L — LOW (ref 3.5–5.3)
RBC # BLD: 3.8 M/UL — SIGNIFICANT CHANGE UP (ref 3.8–5.2)
RBC # BLD: 3.8 M/UL — SIGNIFICANT CHANGE UP (ref 3.8–5.2)
RBC # FLD: 14.8 % — HIGH (ref 10.3–14.5)
RBC # FLD: 14.8 % — HIGH (ref 10.3–14.5)
SODIUM SERPL-SCNC: 138 MMOL/L — SIGNIFICANT CHANGE UP (ref 135–145)
SODIUM SERPL-SCNC: 138 MMOL/L — SIGNIFICANT CHANGE UP (ref 135–145)
WBC # BLD: 7.98 K/UL — SIGNIFICANT CHANGE UP (ref 3.8–10.5)
WBC # BLD: 7.98 K/UL — SIGNIFICANT CHANGE UP (ref 3.8–10.5)
WBC # FLD AUTO: 7.98 K/UL — SIGNIFICANT CHANGE UP (ref 3.8–10.5)
WBC # FLD AUTO: 7.98 K/UL — SIGNIFICANT CHANGE UP (ref 3.8–10.5)

## 2023-12-31 PROCEDURE — 99233 SBSQ HOSP IP/OBS HIGH 50: CPT

## 2023-12-31 RX ORDER — LIDOCAINE 4 G/100G
1 CREAM TOPICAL EVERY 24 HOURS
Refills: 0 | Status: DISCONTINUED | OUTPATIENT
Start: 2023-12-31 | End: 2024-01-05

## 2023-12-31 RX ORDER — OLANZAPINE 15 MG/1
5 TABLET, FILM COATED ORAL
Refills: 0 | Status: DISCONTINUED | OUTPATIENT
Start: 2023-12-31 | End: 2023-12-31

## 2023-12-31 RX ORDER — ACETAMINOPHEN 500 MG
975 TABLET ORAL EVERY 8 HOURS
Refills: 0 | Status: COMPLETED | OUTPATIENT
Start: 2023-12-31 | End: 2024-01-03

## 2023-12-31 RX ORDER — OLANZAPINE 15 MG/1
5 TABLET, FILM COATED ORAL
Refills: 0 | Status: DISCONTINUED | OUTPATIENT
Start: 2023-12-31 | End: 2024-01-05

## 2023-12-31 RX ORDER — SODIUM CHLORIDE 9 MG/ML
1000 INJECTION, SOLUTION INTRAVENOUS
Refills: 0 | Status: DISCONTINUED | OUTPATIENT
Start: 2023-12-31 | End: 2024-01-04

## 2023-12-31 RX ORDER — POTASSIUM CHLORIDE 20 MEQ
20 PACKET (EA) ORAL ONCE
Refills: 0 | Status: COMPLETED | OUTPATIENT
Start: 2023-12-31 | End: 2023-12-31

## 2023-12-31 RX ADMIN — LIDOCAINE 1 PATCH: 4 CREAM TOPICAL at 19:00

## 2023-12-31 RX ADMIN — Medication 5 MILLIGRAM(S): at 13:09

## 2023-12-31 RX ADMIN — ENOXAPARIN SODIUM 40 MILLIGRAM(S): 100 INJECTION SUBCUTANEOUS at 11:58

## 2023-12-31 RX ADMIN — Medication 5 MILLIGRAM(S): at 06:11

## 2023-12-31 RX ADMIN — OLANZAPINE 5 MILLIGRAM(S): 15 TABLET, FILM COATED ORAL at 21:34

## 2023-12-31 RX ADMIN — LIDOCAINE 1 PATCH: 4 CREAM TOPICAL at 23:00

## 2023-12-31 RX ADMIN — OLANZAPINE 2.5 MILLIGRAM(S): 15 TABLET, FILM COATED ORAL at 01:06

## 2023-12-31 RX ADMIN — CEFTRIAXONE 100 MILLIGRAM(S): 500 INJECTION, POWDER, FOR SOLUTION INTRAMUSCULAR; INTRAVENOUS at 18:35

## 2023-12-31 RX ADMIN — LIDOCAINE 1 PATCH: 4 CREAM TOPICAL at 11:37

## 2023-12-31 RX ADMIN — Medication 975 MILLIGRAM(S): at 13:06

## 2023-12-31 RX ADMIN — Medication 6 MILLIGRAM(S): at 21:34

## 2023-12-31 RX ADMIN — Medication 975 MILLIGRAM(S): at 22:33

## 2023-12-31 RX ADMIN — ESCITALOPRAM OXALATE 10 MILLIGRAM(S): 10 TABLET, FILM COATED ORAL at 11:36

## 2023-12-31 RX ADMIN — Medication 975 MILLIGRAM(S): at 21:33

## 2023-12-31 RX ADMIN — Medication 5 MILLIGRAM(S): at 21:34

## 2023-12-31 RX ADMIN — LIDOCAINE 1 PATCH: 4 CREAM TOPICAL at 00:48

## 2023-12-31 RX ADMIN — Medication 20 MILLIEQUIVALENT(S): at 11:39

## 2023-12-31 RX ADMIN — FAMOTIDINE 20 MILLIGRAM(S): 10 INJECTION INTRAVENOUS at 11:36

## 2023-12-31 RX ADMIN — SENNA PLUS 2 TABLET(S): 8.6 TABLET ORAL at 21:33

## 2023-12-31 RX ADMIN — BUPROPION HYDROCHLORIDE 150 MILLIGRAM(S): 150 TABLET, EXTENDED RELEASE ORAL at 11:36

## 2023-12-31 RX ADMIN — SODIUM CHLORIDE 75 MILLILITER(S): 9 INJECTION INTRAMUSCULAR; INTRAVENOUS; SUBCUTANEOUS at 06:11

## 2023-12-31 NOTE — PROGRESS NOTE ADULT - TIME BILLING
Time spent includes direct patient care  (interview and examination of patient), discussion with other providers, support staff and/or patient's family members, review of medical records, ordering diagnostic tests and analyzing results, and documentation.

## 2023-12-31 NOTE — SWALLOW BEDSIDE ASSESSMENT ADULT - COMMENTS
WBC: 7.98  Head CT 12/29: IMPRESSION: Motion limited study. No gross acute intracranial hemorrhage, mass effect, or shift of the   midline structures. 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    12/29 Chest XR: LUNGS: Prominent interstitial markings. Questionable developing right   upper lobe infiltrate.

## 2023-12-31 NOTE — PROGRESS NOTE ADULT - ASSESSMENT
89 year old F PMH Afib not on AC for frequent falls, hypothyroidism, anxiety/depression, DM, HTN  BIBA s/p fall found to have R femoral neck fracture course complicated by delirium       #Delirium   Likely precipitated by hospitalization vs UTI vs pain 2/2 recent fracture  C/w treatment of UTI with ceftriaxone day 3/3 today   ATC tylenol and lido patch for pain relief   zyprexa 5mg at 6PM for agitation  Psych evaluation appreciated for assistance in management of behavorial disturbance       #femoral fracture  - patient at baseline requires fawad lift for transfers   - Ortho recommends surgical intervention for pain control  -Discussions held between ortho and family, currently family declining surgical intervention   -Family is requesting second opinion - after second opinion - surgery is being deferred at this time  - continue pain management with atc tylenol and lido patch    #Acute encephalopathy  Improved today  Suspect toxic metabolic encephalopathy in setting of UTI  urgent CTH performed - no acute findings but motion limited  concern for UTI - started on ceftriaxone  f/u urine cx      #Hypoxia  Repeat CXR performed - some developing upper lobe opacities, no pulm edema  family reports congestion - RVP negative   Hypoxia due to PUSHPA? desats while sleeping.   Blood gas performed, not hypercapnic   Will plan for 5 day course of ceftriaxone to cover possible CAP PNA     #Afib  - not on AC  -toprol     #Hypothyroidism  - continue synthroid 75mcg    #anxiety  - continue Wellbutrin 150mg and lexapro 10mg    #Pyuria  patient without any symptoms. no leukocytosis  UA without bacteria, wbc 20-25  Per family, presentation concerning for UTI. Pt with similar presentation previously  started on ceftriaxone  f/u urine cx    #DVT ppx  - continue lovenox for now     89 year old F PMH Afib not on AC for frequent falls, hypothyroidism, anxiety/depression, DM, HTN  BIBA s/p fall found to have R femoral neck fracture course complicated by delirium       #Delirium   Likely precipitated by hospitalization vs UTI vs pain 2/2 recent fracture  C/w treatment of UTI with ceftriaxone day 3/3 today   ATC tylenol and lido patch for pain relief   zyprexa 5mg at 6PM for agitation  Psych evaluation appreciated for assistance in management of behavorial disturbance       #femoral fracture  - patient at baseline requires fawad lift for transfers   - Ortho recommends surgical intervention for pain control  -Discussions held between ortho and family, currently family declining surgical intervention   -Family is requesting second opinion - after second opinion - surgery is being deferred at this time  - continue pain management with atc tylenol and lido patch    #Acute encephalopathy  Improved today  Suspect toxic metabolic encephalopathy in setting of UTI  urgent CTH performed - no acute findings but motion limited  concern for UTI - started on ceftriaxone  f/u urine cx      #Hypoxia  Repeat CXR performed - some developing upper lobe opacities, no pulm edema  family reports congestion - RVP negative   Hypoxia due to PUSHPA? desats while sleeping.   Blood gas performed, not hypercapnic   Will plan for 5 day course of ceftriaxone to cover possible CAP PNA     #Afib  - not on AC  -toprol     #Hypothyroidism  - continue synthroid 75mcg    #anxiety  - continue Wellbutrin 150mg and lexapro 10mg    #Pyuria  patient without any symptoms. no leukocytosis  UA without bacteria, wbc 20-25  Per family, presentation concerning for UTI. Pt with similar presentation previously  started on ceftriaxone  f/u urine cx    #DVT ppx  - continue lovenox for now    Plan discussed with daughter at bedside

## 2023-12-31 NOTE — SWALLOW BEDSIDE ASSESSMENT ADULT - SWALLOW EVAL: DIAGNOSIS
Patient presenting with clinical signs of oropharyngeal dysphagia in setting of compromised respiratory status/weakness/lethargy. Received alert, c/o difficulty breathing despite SPO2 100%. 3.5 L O2 via nasal cannula in place. Accepted oral care. Oral phase for liquids was functional for oral grading to cup, extraction from straw, oral containment with hyolaryngeal elevation and excursion appreciated. Reflexive cough following single sips of thin liquids and straw sips of thin liquids indicative of likely aspiration. Tolerated puree with functional oral phase and no overt signs of aspiration. With minced consistency, patient with prolonged mastication and wet vocal volume following swallow.

## 2023-12-31 NOTE — SWALLOW BEDSIDE ASSESSMENT ADULT - SLP GENERAL OBSERVATIONS
Received in bed, 3.5L O2 via nasal cannula in place. Pt was confused, oriented to self only. Reported difficulty breathing but SPO2 >95% throughout encounter. Increased work of breathing with intake; however, returns to baseline.

## 2023-12-31 NOTE — SWALLOW BEDSIDE ASSESSMENT ADULT - SWALLOW EVAL: RECOMMENDED FEEDING/EATING TECHNIQUES
allow for swallow between intakes/crush medication (when feasible)/hard swallow w/ each bite or sip/oral hygiene

## 2023-12-31 NOTE — SWALLOW BEDSIDE ASSESSMENT ADULT - PHARYNGEAL PHASE
Wet vocal quality post oral intake Within functional limits Cough post oral intake/Delayed cough post oral intake

## 2023-12-31 NOTE — SWALLOW BEDSIDE ASSESSMENT ADULT - SLP PERTINENT HISTORY OF CURRENT PROBLEM
89 y/o female who is a resident of [facility] with PMH of Afib not on AC for frequent falls, hypothyroidism, anxiety/depression, DM, HTN  BIBA s/p fall. pt is poor historian and most ofthe hx obtained from ED staff, nursing home staff. pt fell on right side of her hip upon sort of rolled over in bed. gets around at facility with help of fawad. no head trauma/LOC reported. in ED pt noted to have externally rotated Rt LE. Pelvic xray with right hip fx. Dr Hadley from ortho contacted who recommended admission for pain management with official consult in AM 89 y/o female who is a resident of [facility] facility with PMH of Afib not on AC for frequent falls, hypothyroidism, anxiety/depression, DM, HTN  BIBA s/p fall. pt is poor historian and most ofthe hx obtained from ED staff, nursing home staff. pt fell on right side of her hip upon sort of rolled over in bed. gets around at facility with help of fawad. no head trauma/LOC reported. in ED pt noted to have externally rotated Rt LE. Pelvic xray with right hip fx. Dr Hadley from ortho contacted who recommended admission for pain management with official consult in AM

## 2023-12-31 NOTE — PROGRESS NOTE ADULT - SUBJECTIVE AND OBJECTIVE BOX
[facility] Medicine  Dr. Erma Arredondo  Progress Note    Patient is a 89y old  Female who presents with a chief complaint of s/p fall (28 Dec 2023 06:19)      Patient seen and examined at bedside. More alert today and less agitated. Reports back pain. Overall feels frustrated and not well. Reassurance provided. Patient was seen by SLP and pureed diet recommended. Patient assured that I expect improvement and hopefully diet can be changed to regular soon.      ALLERGIES:  aspirin (Unknown)  penicillin (Unknown)    MEDICATIONS  (STANDING):  buPROPion XL (24-Hour) . 150 milliGRAM(s) Oral daily  enoxaparin Injectable 40 milliGRAM(s) SubCutaneous every 24 hours  escitalopram 10 milliGRAM(s) Oral daily  famotidine    Tablet 20 milliGRAM(s) Oral daily  ferrous    sulfate 325 milliGRAM(s) Oral daily  levothyroxine 75 MICROGram(s) Oral daily  metoprolol succinate ER 25 milliGRAM(s) Oral daily  sodium chloride 0.9%. 1000 milliLiter(s) (72 mL/Hr) IV Continuous <Continuous>    MEDICATIONS  (PRN):  acetaminophen     Tablet .. 650 milliGRAM(s) Oral every 6 hours PRN Mild Pain (1 - 3)  aluminum hydroxide/magnesium hydroxide/simethicone Suspension 30 milliLiter(s) Oral every 4 hours PRN Dyspepsia  melatonin 3 milliGRAM(s) Oral at bedtime PRN Insomnia  morphine  - Injectable 1 milliGRAM(s) IV Push every 4 hours PRN Severe Pain (7 - 10)  ondansetron Injectable 4 milliGRAM(s) IV Push every 8 hours PRN Nausea and/or Vomiting  senna 1 Tablet(s) Oral at bedtime PRN Constipation    Vital Signs Last 24 Hrs  T(C): 36.7 (31 Dec 2023 06:03), Max: 37.3 (30 Dec 2023 11:31)  T(F): 98.1 (31 Dec 2023 06:03), Max: 99.1 (30 Dec 2023 11:31)  HR: 80 (31 Dec 2023 06:03) (63 - 96)  BP: 146/66 (31 Dec 2023 06:03) (112/64 - 146/66)  BP(mean): --  RR: 17 (31 Dec 2023 06:03) (15 - 17)  SpO2: 99% (31 Dec 2023 06:03) (96% - 99%)    Parameters below as of 31 Dec 2023 06:03  Patient On (Oxygen Delivery Method): nasal cannula  O2 Flow (L/min): 3      PHYSICAL EXAM:  GENERAL: agitated, pulling on lines and 02, alert and awake, AAox 1 to self today  HEAD:  Atraumatic, Normocephalic  ENMT: Supple, No JVD  CHEST/LUNG: Clear to auscultation bilaterally, good air entry, non-labored breathing  HEART: RRR; S1/S2, No murmur  ABDOMEN: Soft, Nontender, Nondistended; Bowel sounds present  EXTREMITIES: No calf tenderness, No cyanosis, No edema  SKIN: Warm, perfused  PSYCH: Normal mood, Normal affect  NERVOUS SYSTEM:  A/O x1, follows simple commands    LABS:                                              12.0   7.98  )-----------( 194      ( 31 Dec 2023 06:03 )             36.9   12-31    138  |  104  |  12  ----------------------------<  99  3.4<L>   |  22  |  0.59    Ca    9.6      31 Dec 2023 06:03      Urinalysis Basic - ( 28 Dec 2023 05:40 )    Color: x / Appearance: x / SG: x / pH: x  Gluc: 156 mg/dL / Ketone: x  / Bili: x / Urobili: x   Blood: x / Protein: x / Nitrite: x   Leuk Esterase: x / RBC: x / WBC x   Sq Epi: x / Non Sq Epi: x / Bacteria: x      RADIOLOGY & ADDITIONAL TESTS: < from: Xray Chest 1 View- PORTABLE-Urgent (Xray Chest 1 View- PORTABLE-Urgent .) (12.29.23 @ 15:34) >  Hernia at the left hemidiaphragm, unchanged.  HEART:  Enlarged.  LUNGS: Prominent interstitial markings. Questionable developing right   upper lobe infiltrate..  BONES: degenerative changes    < end of copied text >    Care Discussed with Consultants/Other Providers: Psych Dr. Nix   [facility] Medicine  Dr. Erma Arredondo  Progress Note    Patient is a 89y old  Female who presents with a chief complaint of s/p fall (28 Dec 2023 06:19)      Patient seen and examined at bedside. More alert today and less agitated. Reports back pain. Overall feels frustrated and not well. Reassurance provided. Patient was seen by SLP and pureed diet recommended. Patient assured that I expect improvement and hopefully diet can be changed to regular soon.      ALLERGIES:  aspirin (Unknown)  penicillin (Unknown)    MEDICATIONS  (STANDING):  buPROPion XL (24-Hour) . 150 milliGRAM(s) Oral daily  enoxaparin Injectable 40 milliGRAM(s) SubCutaneous every 24 hours  escitalopram 10 milliGRAM(s) Oral daily  famotidine    Tablet 20 milliGRAM(s) Oral daily  ferrous    sulfate 325 milliGRAM(s) Oral daily  levothyroxine 75 MICROGram(s) Oral daily  metoprolol succinate ER 25 milliGRAM(s) Oral daily  sodium chloride 0.9%. 1000 milliLiter(s) (72 mL/Hr) IV Continuous <Continuous>    MEDICATIONS  (PRN):  acetaminophen     Tablet .. 650 milliGRAM(s) Oral every 6 hours PRN Mild Pain (1 - 3)  aluminum hydroxide/magnesium hydroxide/simethicone Suspension 30 milliLiter(s) Oral every 4 hours PRN Dyspepsia  melatonin 3 milliGRAM(s) Oral at bedtime PRN Insomnia  morphine  - Injectable 1 milliGRAM(s) IV Push every 4 hours PRN Severe Pain (7 - 10)  ondansetron Injectable 4 milliGRAM(s) IV Push every 8 hours PRN Nausea and/or Vomiting  senna 1 Tablet(s) Oral at bedtime PRN Constipation    Vital Signs Last 24 Hrs  T(C): 36.7 (31 Dec 2023 06:03), Max: 37.3 (30 Dec 2023 11:31)  T(F): 98.1 (31 Dec 2023 06:03), Max: 99.1 (30 Dec 2023 11:31)  HR: 80 (31 Dec 2023 06:03) (63 - 96)  BP: 146/66 (31 Dec 2023 06:03) (112/64 - 146/66)  BP(mean): --  RR: 17 (31 Dec 2023 06:03) (15 - 17)  SpO2: 99% (31 Dec 2023 06:03) (96% - 99%)    Parameters below as of 31 Dec 2023 06:03  Patient On (Oxygen Delivery Method): nasal cannula  O2 Flow (L/min): 3      PHYSICAL EXAM:  GENERAL: agitated, pulling on lines and 02, alert and awake, AAox 1 to self today  HEAD:  Atraumatic, Normocephalic  ENMT: Supple, No JVD  CHEST/LUNG: Clear to auscultation bilaterally, good air entry, non-labored breathing  HEART: RRR; S1/S2, No murmur  ABDOMEN: Soft, Nontender, Nondistended; Bowel sounds present  EXTREMITIES: No calf tenderness, No cyanosis, No edema  SKIN: Warm, perfused  PSYCH: Normal mood, Normal affect  NERVOUS SYSTEM:  A/O x1, follows simple commands    LABS:                                              12.0   7.98  )-----------( 194      ( 31 Dec 2023 06:03 )             36.9   12-31    138  |  104  |  12  ----------------------------<  99  3.4<L>   |  22  |  0.59    Ca    9.6      31 Dec 2023 06:03      Urinalysis Basic - ( 28 Dec 2023 05:40 )    Color: x / Appearance: x / SG: x / pH: x  Gluc: 156 mg/dL / Ketone: x  / Bili: x / Urobili: x   Blood: x / Protein: x / Nitrite: x   Leuk Esterase: x / RBC: x / WBC x   Sq Epi: x / Non Sq Epi: x / Bacteria: x      RADIOLOGY & ADDITIONAL TESTS: < from: Xray Chest 1 View- PORTABLE-Urgent (Xray Chest 1 View- PORTABLE-Urgent .) (12.29.23 @ 15:34) >  Hernia at the left hemidiaphragm, unchanged.  HEART:  Enlarged.  LUNGS: Prominent interstitial markings. Questionable developing right   upper lobe infiltrate..  BONES: degenerative changes    < end of copied text >    Care Discussed with Consultants/Other Providers: Psych Dr. Nix

## 2024-01-01 LAB
-  AMOXICILLIN/CLAVULANIC ACID: SIGNIFICANT CHANGE UP
-  AMOXICILLIN/CLAVULANIC ACID: SIGNIFICANT CHANGE UP
-  AMPICILLIN/SULBACTAM: SIGNIFICANT CHANGE UP
-  AMPICILLIN/SULBACTAM: SIGNIFICANT CHANGE UP
-  AMPICILLIN: SIGNIFICANT CHANGE UP
-  AMPICILLIN: SIGNIFICANT CHANGE UP
-  AZTREONAM: SIGNIFICANT CHANGE UP
-  AZTREONAM: SIGNIFICANT CHANGE UP
-  CEFAZOLIN: SIGNIFICANT CHANGE UP
-  CEFAZOLIN: SIGNIFICANT CHANGE UP
-  CEFEPIME: SIGNIFICANT CHANGE UP
-  CEFEPIME: SIGNIFICANT CHANGE UP
-  CEFOXITIN: SIGNIFICANT CHANGE UP
-  CEFOXITIN: SIGNIFICANT CHANGE UP
-  CEFTRIAXONE: SIGNIFICANT CHANGE UP
-  CEFTRIAXONE: SIGNIFICANT CHANGE UP
-  CEFUROXIME: SIGNIFICANT CHANGE UP
-  CEFUROXIME: SIGNIFICANT CHANGE UP
-  CIPROFLOXACIN: SIGNIFICANT CHANGE UP
-  CIPROFLOXACIN: SIGNIFICANT CHANGE UP
-  ERTAPENEM: SIGNIFICANT CHANGE UP
-  ERTAPENEM: SIGNIFICANT CHANGE UP
-  GENTAMICIN: SIGNIFICANT CHANGE UP
-  GENTAMICIN: SIGNIFICANT CHANGE UP
-  IMIPENEM: SIGNIFICANT CHANGE UP
-  IMIPENEM: SIGNIFICANT CHANGE UP
-  LEVOFLOXACIN: SIGNIFICANT CHANGE UP
-  LEVOFLOXACIN: SIGNIFICANT CHANGE UP
-  MEROPENEM: SIGNIFICANT CHANGE UP
-  MEROPENEM: SIGNIFICANT CHANGE UP
-  NITROFURANTOIN: SIGNIFICANT CHANGE UP
-  NITROFURANTOIN: SIGNIFICANT CHANGE UP
-  PIPERACILLIN/TAZOBACTAM: SIGNIFICANT CHANGE UP
-  PIPERACILLIN/TAZOBACTAM: SIGNIFICANT CHANGE UP
-  TOBRAMYCIN: SIGNIFICANT CHANGE UP
-  TOBRAMYCIN: SIGNIFICANT CHANGE UP
-  TRIMETHOPRIM/SULFAMETHOXAZOLE: SIGNIFICANT CHANGE UP
-  TRIMETHOPRIM/SULFAMETHOXAZOLE: SIGNIFICANT CHANGE UP
ANION GAP SERPL CALC-SCNC: 8 MMOL/L — SIGNIFICANT CHANGE UP (ref 5–17)
ANION GAP SERPL CALC-SCNC: 8 MMOL/L — SIGNIFICANT CHANGE UP (ref 5–17)
BUN SERPL-MCNC: 7 MG/DL — SIGNIFICANT CHANGE UP (ref 7–23)
BUN SERPL-MCNC: 7 MG/DL — SIGNIFICANT CHANGE UP (ref 7–23)
CALCIUM SERPL-MCNC: 9.1 MG/DL — SIGNIFICANT CHANGE UP (ref 8.4–10.5)
CALCIUM SERPL-MCNC: 9.1 MG/DL — SIGNIFICANT CHANGE UP (ref 8.4–10.5)
CHLORIDE SERPL-SCNC: 107 MMOL/L — SIGNIFICANT CHANGE UP (ref 96–108)
CHLORIDE SERPL-SCNC: 107 MMOL/L — SIGNIFICANT CHANGE UP (ref 96–108)
CO2 SERPL-SCNC: 25 MMOL/L — SIGNIFICANT CHANGE UP (ref 22–31)
CO2 SERPL-SCNC: 25 MMOL/L — SIGNIFICANT CHANGE UP (ref 22–31)
CREAT SERPL-MCNC: 0.63 MG/DL — SIGNIFICANT CHANGE UP (ref 0.5–1.3)
CREAT SERPL-MCNC: 0.63 MG/DL — SIGNIFICANT CHANGE UP (ref 0.5–1.3)
CULTURE RESULTS: ABNORMAL
CULTURE RESULTS: ABNORMAL
EGFR: 83 ML/MIN/1.73M2 — SIGNIFICANT CHANGE UP
EGFR: 83 ML/MIN/1.73M2 — SIGNIFICANT CHANGE UP
GLUCOSE BLDC GLUCOMTR-MCNC: 117 MG/DL — HIGH (ref 70–99)
GLUCOSE BLDC GLUCOMTR-MCNC: 117 MG/DL — HIGH (ref 70–99)
GLUCOSE SERPL-MCNC: 137 MG/DL — HIGH (ref 70–99)
GLUCOSE SERPL-MCNC: 137 MG/DL — HIGH (ref 70–99)
HCT VFR BLD CALC: 34.9 % — SIGNIFICANT CHANGE UP (ref 34.5–45)
HCT VFR BLD CALC: 34.9 % — SIGNIFICANT CHANGE UP (ref 34.5–45)
HGB BLD-MCNC: 11.6 G/DL — SIGNIFICANT CHANGE UP (ref 11.5–15.5)
HGB BLD-MCNC: 11.6 G/DL — SIGNIFICANT CHANGE UP (ref 11.5–15.5)
MCHC RBC-ENTMCNC: 31.9 PG — SIGNIFICANT CHANGE UP (ref 27–34)
MCHC RBC-ENTMCNC: 31.9 PG — SIGNIFICANT CHANGE UP (ref 27–34)
MCHC RBC-ENTMCNC: 33.2 GM/DL — SIGNIFICANT CHANGE UP (ref 32–36)
MCHC RBC-ENTMCNC: 33.2 GM/DL — SIGNIFICANT CHANGE UP (ref 32–36)
MCV RBC AUTO: 95.9 FL — SIGNIFICANT CHANGE UP (ref 80–100)
MCV RBC AUTO: 95.9 FL — SIGNIFICANT CHANGE UP (ref 80–100)
METHOD TYPE: SIGNIFICANT CHANGE UP
METHOD TYPE: SIGNIFICANT CHANGE UP
NRBC # BLD: 0 /100 WBCS — SIGNIFICANT CHANGE UP (ref 0–0)
NRBC # BLD: 0 /100 WBCS — SIGNIFICANT CHANGE UP (ref 0–0)
ORGANISM # SPEC MICROSCOPIC CNT: ABNORMAL
ORGANISM # SPEC MICROSCOPIC CNT: ABNORMAL
ORGANISM # SPEC MICROSCOPIC CNT: SIGNIFICANT CHANGE UP
ORGANISM # SPEC MICROSCOPIC CNT: SIGNIFICANT CHANGE UP
PLATELET # BLD AUTO: 182 K/UL — SIGNIFICANT CHANGE UP (ref 150–400)
PLATELET # BLD AUTO: 182 K/UL — SIGNIFICANT CHANGE UP (ref 150–400)
POTASSIUM SERPL-MCNC: 3.7 MMOL/L — SIGNIFICANT CHANGE UP (ref 3.5–5.3)
POTASSIUM SERPL-MCNC: 3.7 MMOL/L — SIGNIFICANT CHANGE UP (ref 3.5–5.3)
POTASSIUM SERPL-SCNC: 3.7 MMOL/L — SIGNIFICANT CHANGE UP (ref 3.5–5.3)
POTASSIUM SERPL-SCNC: 3.7 MMOL/L — SIGNIFICANT CHANGE UP (ref 3.5–5.3)
RBC # BLD: 3.64 M/UL — LOW (ref 3.8–5.2)
RBC # BLD: 3.64 M/UL — LOW (ref 3.8–5.2)
RBC # FLD: 14.6 % — HIGH (ref 10.3–14.5)
RBC # FLD: 14.6 % — HIGH (ref 10.3–14.5)
SODIUM SERPL-SCNC: 140 MMOL/L — SIGNIFICANT CHANGE UP (ref 135–145)
SODIUM SERPL-SCNC: 140 MMOL/L — SIGNIFICANT CHANGE UP (ref 135–145)
SPECIMEN SOURCE: SIGNIFICANT CHANGE UP
SPECIMEN SOURCE: SIGNIFICANT CHANGE UP
WBC # BLD: 6.15 K/UL — SIGNIFICANT CHANGE UP (ref 3.8–10.5)
WBC # BLD: 6.15 K/UL — SIGNIFICANT CHANGE UP (ref 3.8–10.5)
WBC # FLD AUTO: 6.15 K/UL — SIGNIFICANT CHANGE UP (ref 3.8–10.5)
WBC # FLD AUTO: 6.15 K/UL — SIGNIFICANT CHANGE UP (ref 3.8–10.5)

## 2024-01-01 PROCEDURE — 71045 X-RAY EXAM CHEST 1 VIEW: CPT | Mod: 26

## 2024-01-01 PROCEDURE — 99232 SBSQ HOSP IP/OBS MODERATE 35: CPT

## 2024-01-01 RX ORDER — CEFTRIAXONE 500 MG/1
1000 INJECTION, POWDER, FOR SOLUTION INTRAMUSCULAR; INTRAVENOUS EVERY 24 HOURS
Refills: 0 | Status: COMPLETED | OUTPATIENT
Start: 2024-01-01 | End: 2024-01-02

## 2024-01-01 RX ORDER — FUROSEMIDE 40 MG
40 TABLET ORAL DAILY
Refills: 0 | Status: DISCONTINUED | OUTPATIENT
Start: 2024-01-02 | End: 2024-01-02

## 2024-01-01 RX ORDER — FUROSEMIDE 40 MG
20 TABLET ORAL ONCE
Refills: 0 | Status: COMPLETED | OUTPATIENT
Start: 2024-01-01 | End: 2024-01-01

## 2024-01-01 RX ORDER — METOPROLOL TARTRATE 50 MG
25 TABLET ORAL DAILY
Refills: 0 | Status: DISCONTINUED | OUTPATIENT
Start: 2024-01-02 | End: 2024-01-04

## 2024-01-01 RX ADMIN — BUPROPION HYDROCHLORIDE 150 MILLIGRAM(S): 150 TABLET, EXTENDED RELEASE ORAL at 11:13

## 2024-01-01 RX ADMIN — Medication 5 MILLIGRAM(S): at 14:02

## 2024-01-01 RX ADMIN — CEFTRIAXONE 100 MILLIGRAM(S): 500 INJECTION, POWDER, FOR SOLUTION INTRAMUSCULAR; INTRAVENOUS at 17:32

## 2024-01-01 RX ADMIN — Medication 975 MILLIGRAM(S): at 11:52

## 2024-01-01 RX ADMIN — ESCITALOPRAM OXALATE 10 MILLIGRAM(S): 10 TABLET, FILM COATED ORAL at 11:13

## 2024-01-01 RX ADMIN — Medication 20 MILLIGRAM(S): at 07:57

## 2024-01-01 RX ADMIN — LIDOCAINE 1 PATCH: 4 CREAM TOPICAL at 18:50

## 2024-01-01 RX ADMIN — LIDOCAINE 1 PATCH: 4 CREAM TOPICAL at 11:14

## 2024-01-01 RX ADMIN — Medication 20 MILLIGRAM(S): at 16:03

## 2024-01-01 RX ADMIN — Medication 6 MILLIGRAM(S): at 22:51

## 2024-01-01 RX ADMIN — SENNA PLUS 2 TABLET(S): 8.6 TABLET ORAL at 22:50

## 2024-01-01 RX ADMIN — OLANZAPINE 2.5 MILLIGRAM(S): 15 TABLET, FILM COATED ORAL at 05:13

## 2024-01-01 RX ADMIN — LIDOCAINE 1 PATCH: 4 CREAM TOPICAL at 23:15

## 2024-01-01 RX ADMIN — Medication 975 MILLIGRAM(S): at 12:50

## 2024-01-01 RX ADMIN — Medication 975 MILLIGRAM(S): at 22:51

## 2024-01-01 RX ADMIN — Medication 5 MILLIGRAM(S): at 05:12

## 2024-01-01 RX ADMIN — Medication 975 MILLIGRAM(S): at 23:21

## 2024-01-01 RX ADMIN — FAMOTIDINE 20 MILLIGRAM(S): 10 INJECTION INTRAVENOUS at 11:12

## 2024-01-01 RX ADMIN — OLANZAPINE 5 MILLIGRAM(S): 15 TABLET, FILM COATED ORAL at 17:33

## 2024-01-01 NOTE — PROGRESS NOTE ADULT - SUBJECTIVE AND OBJECTIVE BOX
Statement Selected Patient is a 88y old  Female who presents with a chief complaint of abdominal pain (30 Oct 2018 12:23)      INTERVAL HPI/OVERNIGHT EVENTS: noted, pt w ruq pain, npo      Vital Signs Last 24 Hrs  T(C): 37.1 (30 Oct 2018 10:54), Max: 37.2 (30 Oct 2018 04:43)  T(F): 98.7 (30 Oct 2018 10:54), Max: 99 (30 Oct 2018 04:43)  HR: 90 (30 Oct 2018 10:54) (80 - 96)  BP: 117/70 (30 Oct 2018 10:54) (112/71 - 133/84)  BP(mean): --  RR: 20 (30 Oct 2018 10:54) (20 - 20)  SpO2: 94% (30 Oct 2018 10:54) (93% - 96%)    acetaminophen   Tablet .. 650 milliGRAM(s) Oral every 6 hours PRN  cyanocobalamin 1000 MICROGram(s) Oral daily  dextrose 40% Gel 15 Gram(s) Oral once PRN  dextrose 5% + sodium chloride 0.9%. 1000 milliLiter(s) IV Continuous <Continuous>  dextrose 5%. 1000 milliLiter(s) IV Continuous <Continuous>  dextrose 50% Injectable 12.5 Gram(s) IV Push once  dextrose 50% Injectable 25 Gram(s) IV Push once  dextrose 50% Injectable 25 Gram(s) IV Push once  gabapentin 200 milliGRAM(s) Oral two times a day  glucagon  Injectable 1 milliGRAM(s) IntraMuscular once PRN  influenza   Vaccine 0.5 milliLiter(s) IntraMuscular once  insulin lispro (HumaLOG) corrective regimen sliding scale   SubCutaneous every 6 hours  levothyroxine 75 MICROGram(s) Oral daily  metoprolol tartrate 25 milliGRAM(s) Oral daily  multivitamin 1 Tablet(s) Oral daily  piperacillin/tazobactam IVPB. 3.375 Gram(s) IV Intermittent every 8 hours  sodium chloride 0.9%. 1000 milliLiter(s) IV Continuous <Continuous>      PHYSICAL EXAM:  GENERAL: NAD,   EYES: conjunctiva and sclera clear  ENMT: Moist mucous membranes  NECK: Supple, No JVD, Normal thyroid  NERVOUS SYSTEM:  Alert & Oriented X3,   CHEST/LUNG: Clear to auscultation bilaterally; No rales, rhonchi, wheezing, or rubs  HEART: Regular rate and rhythm; No murmurs, rubs, or gallops  ABDOMEN: Soft, ruq tenderness Nondistended; Bowel sounds present  EXTREMITIES:  2+ Peripheral Pulses, No clubbing, cyanosis, or edema  LYMPH: No lymphadenopathy noted  SKIN: No rashes or lesions    Consultant(s) Notes Reviewed:  [x ] YES  [ ] NO  Care Discussed with Consultants/Other Providers [ x] YES  [ ] NO    LABS:                        14.3   20.19 )-----------( 191      ( 30 Oct 2018 08:01 )             41.7     10-30    134<L>  |  97  |  14  ----------------------------<  151<H>  3.7   |  25  |  0.69    Ca    9.7      30 Oct 2018 06:42    TPro  7.9  /  Alb  4.2  /  TBili  0.6  /  DBili  x   /  AST  12  /  ALT  12  /  AlkPhos  74  10-28    PT/INR - ( 29 Oct 2018 09:29 )   PT: 13.3 sec;   INR: 1.17 ratio         PTT - ( 29 Oct 2018 09:29 )  PTT:26.8 sec  Urinalysis Basic - ( 28 Oct 2018 22:49 )    Color: Yellow / Appearance: Turbid / SG: >1.050 / pH: x  Gluc: x / Ketone: Trace  / Bili: Negative / Urobili: Negative   Blood: x / Protein: 100 mg/dL / Nitrite: Positive   Leuk Esterase: Large / RBC: 20 /hpf / WBC 1810 /hpf   Sq Epi: x / Non Sq Epi: 2 /hpf / Bacteria: Many      CAPILLARY BLOOD GLUCOSE      POCT Blood Glucose.: 134 mg/dL (30 Oct 2018 11:45)  POCT Blood Glucose.: 150 mg/dL (30 Oct 2018 08:15)  POCT Blood Glucose.: 142 mg/dL (30 Oct 2018 07:40)  POCT Blood Glucose.: 146 mg/dL (30 Oct 2018 01:46)  POCT Blood Glucose.: 168 mg/dL (30 Oct 2018 00:19)  POCT Blood Glucose.: 153 mg/dL (29 Oct 2018 18:51)        Urinalysis Basic - ( 28 Oct 2018 22:49 )    Color: Yellow / Appearance: Turbid / SG: >1.050 / pH: x  Gluc: x / Ketone: Trace  / Bili: Negative / Urobili: Negative   Blood: x / Protein: 100 mg/dL / Nitrite: Positive   Leuk Esterase: Large / RBC: 20 /hpf / WBC 1810 /hpf   Sq Epi: x / Non Sq Epi: 2 /hpf / Bacteria: Many        RADIOLOGY & ADDITIONAL TESTS:    Imaging Personally Reviewed:  [x ] YES  [ ] NO

## 2024-01-01 NOTE — DIETITIAN INITIAL EVALUATION ADULT - PERTINENT LABORATORY DATA
01-01    140  |  107  |  7   ----------------------------<  137<H>  3.7   |  25  |  0.63    Ca    9.1      01 Jan 2024 05:25    POCT Blood Glucose.: 150 mg/dL (12-31-23 @ 17:20)  A1C with Estimated Average Glucose Result: 5.7 % (12-31-23 @ 06:03)  A1C with Estimated Average Glucose Result: 5.6 % (06-12-23 @ 08:01)

## 2024-01-01 NOTE — DIETITIAN INITIAL EVALUATION ADULT - PERTINENT MEDS FT
MEDICATIONS  (STANDING):  acetaminophen     Tablet .. 975 milliGRAM(s) Oral every 8 hours  buPROPion XL (24-Hour) . 150 milliGRAM(s) Oral daily  cefTRIAXone   IVPB 1000 milliGRAM(s) IV Intermittent every 24 hours  dextrose 5%. 1000 milliLiter(s) (50 mL/Hr) IV Continuous <Continuous>  enoxaparin Injectable 40 milliGRAM(s) SubCutaneous every 24 hours  escitalopram 10 milliGRAM(s) Oral daily  famotidine    Tablet 20 milliGRAM(s) Oral daily  levothyroxine 75 MICROGram(s) Oral daily  lidocaine   4% Patch 1 Patch Transdermal every 24 hours  lidocaine   4% Patch 1 Patch Transdermal every 24 hours  melatonin 6 milliGRAM(s) Oral at bedtime  metoprolol tartrate Injectable 5 milliGRAM(s) IV Push every 8 hours  OLANZapine 5 milliGRAM(s) Oral <User Schedule>  senna 2 Tablet(s) Oral at bedtime    MEDICATIONS  (PRN):  aluminum hydroxide/magnesium hydroxide/simethicone Suspension 30 milliLiter(s) Oral every 4 hours PRN Dyspepsia  OLANZapine Injectable 2.5 milliGRAM(s) IntraMuscular every 6 hours PRN agitation  ondansetron Injectable 4 milliGRAM(s) IV Push every 8 hours PRN Nausea and/or Vomiting  polyethylene glycol 3350 17 Gram(s) Oral daily PRN Constipation

## 2024-01-01 NOTE — DIETITIAN INITIAL EVALUATION ADULT - OTHER INFO
89 y/o female who is a resident of [facility] with PMH of Afib not on AC for frequent falls, hypothyroidism, anxiety/depression, DM, HTN  BIBA s/p fall. pt is poor historian and most ofthe hx obtained from ED staff, nursing home staff. pt fell on right side of her hip upon sort of rolled over in bed. gets around at facility with help of fawad. no head trauma/LOC reported. in ED pt noted to have externally rotated Rt LE. Pelvic xray with right hip fx. Dr Hadley from ortho contacted who recommended admission for pain management with official consult in AM  89 y/o female who is a resident of [facility] with PMH of Afib not on AC for frequent falls, hypothyroidism, anxiety/depression, DM, HTN  BIBA s/p fall. pt is poor historian and most ofthe hx obtained from ED staff, nursing home staff. pt fell on right side of her hip upon sort of rolled over in bed. gets around at facility with help of fawad. no head trauma/LOC reported. in ED pt noted to have externally rotated Rt LE. Pelvic xray with right hip fx. Dr Hadley from ortho contacted who recommended admission for pain management with official consult in AM

## 2024-01-01 NOTE — DIETITIAN INITIAL EVALUATION ADULT - ORAL INTAKE PTA/DIET HISTORY
Per discussion with RN, pt consumed about 50% breakfast this morning. SLP following, current recommendation Pureed with mildly thick liquids. No overt physical signs of malnutrition. In setting of po intake <75%, recommend Glucerna 1x/day (provides 220 kcal, 10 g protein per serving).

## 2024-01-01 NOTE — PROGRESS NOTE ADULT - ASSESSMENT
89 year old F PMH Afib not on AC for frequent falls, hypothyroidism, anxiety/depression, DM, HTN  BIBA s/p fall found to have R femoral neck fracture course complicated by delirium.      #Delirium - resolving  Likely precipitated by hospitalization vs UTI vs pain 2/2 recent fracture  -mental status improved today. pt answering questions appropriately today  -C/w treatment of UTI and possible PNA, rocephin day 4/5  -urine cx reviewed, 50-99k E coli and 50-99k aerococcus, sensitivity reviewed,   -ATC tylenol and lido patch for pain relief   -cont Zyprexa at 6pm and 2.5mg q6 prn  -Psych evaluation appreciated for assistance in management of behavorial disturbance     #R femoral fracture  -patient at baseline requires fawad lift for transfers   -Ortho recommends surgical intervention for pain control  -Discussions held between ortho and family, currently family declining surgical intervention   -Family is requesting second opinion - after second opinion - surgery is being deferred at this time  -continue pain management with atc tylenol and lido patch    #acute hypoxia suspect possible PNA and mild decompensated diastolic chf   - pt continue to require 3-4L NC   - CXR shows prominent interstitial markings. questionable developing right upper lobe infiltrate  - repeat CXR   - check pro-bnp  - check echo  - IV lasix 40mg q24   - RVP negative     #Acute metabolic encephalopathy  Improved today  urgent CTH performed - no acute findings but motion limited    #Afib  -not on AC  -toprol     #Hypothyroidism  - continue synthroid 75mcg    #anxiety  - continue Wellbutrin 150mg and lexapro 10mg      #DVT ppx  - continue lovenox for now    Plan discussed with son, Gal, on the phone 1/1

## 2024-01-01 NOTE — PROGRESS NOTE ADULT - SUBJECTIVE AND OBJECTIVE BOX
Patient is a 89y old  Female who presents with a chief complaint of Fracture of unspecified part of neck of unspecified femur, initial encounter for closed fracture     (01 Jan 2024 11:59)      Subjective and overnight events:  Patient seen and examined at bedside. no complaints. no fever, chills, sob, cp, abd pain.     ALLERGIES:  aspirin (Unknown)  penicillin (Unknown)    MEDICATIONS  (STANDING):  acetaminophen     Tablet .. 975 milliGRAM(s) Oral every 8 hours  buPROPion XL (24-Hour) . 150 milliGRAM(s) Oral daily  cefTRIAXone   IVPB 1000 milliGRAM(s) IV Intermittent every 24 hours  dextrose 5%. 1000 milliLiter(s) (50 mL/Hr) IV Continuous <Continuous>  enoxaparin Injectable 40 milliGRAM(s) SubCutaneous every 24 hours  escitalopram 10 milliGRAM(s) Oral daily  famotidine    Tablet 20 milliGRAM(s) Oral daily  furosemide   Injectable 20 milliGRAM(s) IV Push once  levothyroxine 75 MICROGram(s) Oral daily  lidocaine   4% Patch 1 Patch Transdermal every 24 hours  lidocaine   4% Patch 1 Patch Transdermal every 24 hours  melatonin 6 milliGRAM(s) Oral at bedtime  OLANZapine 5 milliGRAM(s) Oral <User Schedule>  senna 2 Tablet(s) Oral at bedtime    MEDICATIONS  (PRN):  aluminum hydroxide/magnesium hydroxide/simethicone Suspension 30 milliLiter(s) Oral every 4 hours PRN Dyspepsia  OLANZapine Injectable 2.5 milliGRAM(s) IntraMuscular every 6 hours PRN agitation  ondansetron Injectable 4 milliGRAM(s) IV Push every 8 hours PRN Nausea and/or Vomiting  polyethylene glycol 3350 17 Gram(s) Oral daily PRN Constipation    Vital Signs Last 24 Hrs  T(F): 97.7 (01 Jan 2024 14:01), Max: 98.3 (01 Jan 2024 06:41)  HR: 81 (01 Jan 2024 14:01) (58 - 81)  BP: 142/67 (01 Jan 2024 14:01) (131/72 - 143/79)  RR: 20 (01 Jan 2024 14:01) (17 - 23)  SpO2: 96% (01 Jan 2024 14:01) (96% - 99%)  I&O's Summary    PHYSICAL EXAM:  General: NAD, A/O x 3  ENT: MMM  Neck: Supple, No JVD  Lungs: Clear to auscultation bilaterally  Cardio: RRR, S1/S2, No murmurs  Abdomen: Soft, Nontender, Nondistended; Bowel sounds present  Extremities: No calf tenderness, No pitting edema    LABS:                        11.6   6.15  )-----------( 182      ( 01 Jan 2024 05:25 )             34.9     01-01    140  |  107  |  7   ----------------------------<  137  3.7   |  25  |  0.63    Ca    9.1      01 Jan 2024 05:25                    08:15 - VBG - pH: 7.42  | pCO2: 38    | pO2: 76    | Lactate:                  POCT Blood Glucose.: 150 mg/dL (31 Dec 2023 17:20)      Urinalysis Basic - ( 01 Jan 2024 05:25 )    Color: x / Appearance: x / SG: x / pH: x  Gluc: 137 mg/dL / Ketone: x  / Bili: x / Urobili: x   Blood: x / Protein: x / Nitrite: x   Leuk Esterase: x / RBC: x / WBC x   Sq Epi: x / Non Sq Epi: x / Bacteria: x        Culture - Urine (collected 29 Dec 2023 16:47)  Source: Clean Catch Clean Catch (Midstream)  Final Report (01 Jan 2024 12:55):    50,000 - 99,000 CFU/mL Escherichia coli    50,000 - 99,000 CFU/mL Aerococcus species    Isolates of Aerococcus spp. are predictably susceptible to penicillin,    ampicillin, and vancomycin.    All isolates are resistant to sulfonamides.  Organism: Escherichia coli (01 Jan 2024 12:55)  Organism: Escherichia coli (01 Jan 2024 12:55)      Method Type: GONZALO      -  Amoxicillin/Clavulanic Acid: S <=8/4      -  Ampicillin: S <=8 These ampicillin results predict results for amoxicillin      -  Ampicillin/Sulbactam: S <=4/2      -  Aztreonam: S <=4      -  Cefazolin: S <=2 For uncomplicated UTI with K. pneumoniae, E. coli, or P. mirablis: GONZALO <=16 is sensitive and GONZALO >=32 is resistant. This also predicts results for oral agents cefaclor, cefdinir, cefpodoxime, cefprozil, cefuroxime axetil, cephalexin and locarbef for uncomplicated UTI. Note that some isolates may be susceptible to these agents while testing resistant to cefazolin.      -  Cefepime: S <=2      -  Cefoxitin: S <=8      -  Ceftriaxone: S <=1      -  Cefuroxime: S <=4      -  Ciprofloxacin: R >2      -  Ertapenem: S <=0.5      -  Gentamicin: S <=2      -  Imipenem: S <=1      -  Levofloxacin: R >4      -  Meropenem: S <=1      -  Nitrofurantoin: S <=32 Should not be used to treat pyelonephritis      -  Piperacillin/Tazobactam: S <=8      -  Tobramycin: S <=2      -  Trimethoprim/Sulfamethoxazole: S <=0.5/9.5          RADIOLOGY & ADDITIONAL TESTS:    Care Discussed with Consultants/Other Providers:

## 2024-01-02 LAB
MAGNESIUM SERPL-MCNC: 1.2 MG/DL — LOW (ref 1.6–2.6)
MAGNESIUM SERPL-MCNC: 1.2 MG/DL — LOW (ref 1.6–2.6)
NT-PROBNP SERPL-SCNC: 1797 PG/ML — HIGH (ref 0–300)
NT-PROBNP SERPL-SCNC: 1797 PG/ML — HIGH (ref 0–300)
PHOSPHATE SERPL-MCNC: 3.3 MG/DL — SIGNIFICANT CHANGE UP (ref 2.5–4.5)
PHOSPHATE SERPL-MCNC: 3.3 MG/DL — SIGNIFICANT CHANGE UP (ref 2.5–4.5)

## 2024-01-02 PROCEDURE — 93306 TTE W/DOPPLER COMPLETE: CPT | Mod: 26

## 2024-01-02 RX ORDER — MAGNESIUM SULFATE 500 MG/ML
2 VIAL (ML) INJECTION ONCE
Refills: 0 | Status: COMPLETED | OUTPATIENT
Start: 2024-01-02 | End: 2024-01-02

## 2024-01-02 RX ORDER — FUROSEMIDE 40 MG
20 TABLET ORAL ONCE
Refills: 0 | Status: COMPLETED | OUTPATIENT
Start: 2024-01-02 | End: 2024-01-02

## 2024-01-02 RX ADMIN — Medication 975 MILLIGRAM(S): at 22:01

## 2024-01-02 RX ADMIN — Medication 25 GRAM(S): at 12:37

## 2024-01-02 RX ADMIN — ESCITALOPRAM OXALATE 10 MILLIGRAM(S): 10 TABLET, FILM COATED ORAL at 11:18

## 2024-01-02 RX ADMIN — Medication 6 MILLIGRAM(S): at 22:01

## 2024-01-02 RX ADMIN — Medication 975 MILLIGRAM(S): at 14:26

## 2024-01-02 RX ADMIN — BUPROPION HYDROCHLORIDE 150 MILLIGRAM(S): 150 TABLET, EXTENDED RELEASE ORAL at 11:19

## 2024-01-02 RX ADMIN — Medication 75 MICROGRAM(S): at 05:58

## 2024-01-02 RX ADMIN — Medication 975 MILLIGRAM(S): at 05:58

## 2024-01-02 RX ADMIN — Medication 975 MILLIGRAM(S): at 13:26

## 2024-01-02 RX ADMIN — Medication 40 MILLIGRAM(S): at 05:56

## 2024-01-02 RX ADMIN — Medication 975 MILLIGRAM(S): at 06:28

## 2024-01-02 RX ADMIN — SENNA PLUS 2 TABLET(S): 8.6 TABLET ORAL at 22:01

## 2024-01-02 RX ADMIN — LIDOCAINE 1 PATCH: 4 CREAM TOPICAL at 11:19

## 2024-01-02 RX ADMIN — CEFTRIAXONE 100 MILLIGRAM(S): 500 INJECTION, POWDER, FOR SOLUTION INTRAMUSCULAR; INTRAVENOUS at 17:19

## 2024-01-02 RX ADMIN — FAMOTIDINE 20 MILLIGRAM(S): 10 INJECTION INTRAVENOUS at 11:19

## 2024-01-02 RX ADMIN — Medication 975 MILLIGRAM(S): at 23:01

## 2024-01-02 RX ADMIN — OLANZAPINE 5 MILLIGRAM(S): 15 TABLET, FILM COATED ORAL at 22:01

## 2024-01-02 RX ADMIN — LIDOCAINE 1 PATCH: 4 CREAM TOPICAL at 11:20

## 2024-01-02 NOTE — PROGRESS NOTE ADULT - ASSESSMENT
89 year old F PMH Afib not on AC for frequent falls, hypothyroidism, anxiety/depression, DM, HTN  BIBA s/p fall found to have R femoral neck fracture course complicated by delirium.      #Delirium - resolving  -Likely precipitated by hospitalization vs UTI vs pain 2/2 recent fracture  -C/w treatment of UTI and possible PNA, Rocephin day 5/5  -urine cx reviewed, 50-99k E coli and 50-99k aerococcus, sensitivity reviewed,   -ATC tylenol and lido patch for pain relief   -cont Zyprexa at 6pm and 2.5mg q6 prn  -Psych recs    #R femoral fracture  -patient at baseline requires fawad lift for transfers   -Ortho recommends surgical intervention for pain control  -Discussions held between ortho and family, currently family declining surgical intervention   -Family is requesting second opinion - after second opinion - surgery is being deferred at this time  -continue pain management with atc tylenol and lido patch    #acute hypoxia suspect possible PNA and mild decompensated diastolic chf   -improved s/p diuresis. now on room air.   - CXR shows prominent interstitial markings. questionable developing right upper lobe infiltrate  - repeat CXR: Persistent mild to moderate interstitial CHF.  - check pro-bnp  - Pt refused echo. Attempt to have family present   - RVP negative     #Acute metabolic encephalopathy  Improved today  urgent CTH performed - no acute findings but motion limited    #Afib  -not on AC  -toprol     #Hypothyroidism  - continue synthroid 75mcg    #anxiety  - continue Wellbutrin 150mg and lexapro 10mg      #DVT ppx  - continue lovenox for now    Will update family      89 year old F PMH Afib not on AC for frequent falls, hypothyroidism, anxiety/depression, DM, HTN  BIBA s/p fall found to have R femoral neck fracture course complicated by delirium.      #Delirium - resolving  -Likely precipitated by hospitalization vs infection vs pain 2/2 recent fracture  -C/w treatment of UTI and possible PNA, Rocephin day 5/5  -urine cx reviewed, 50-99k E coli and 50-99k aerococcus, sensitivity reviewed,   -ATC tylenol and lido patch for pain relief   -cont Zyprexa at 6pm and 2.5mg q6 prn  -Psych recs    #R femoral fracture  -patient at baseline requires fawad lift for transfers   -Ortho recommends surgical intervention for pain control  -Discussions held between ortho and family, currently family declining surgical intervention   -Family is requesting second opinion - after second opinion - surgery is being deferred at this time  -continue pain management with atc tylenol and lido patch    #acute hypoxia suspect possible PNA and 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 sating 95% on RA today  - improved s/p diuresis. now on room air.   - CXR shows prominent interstitial markings. questionable developing right upper lobe infiltrate  - repeat CXR: Persistent mild to moderate interstitial CHF.  - pro-bnp 1797  - Pt refused echo. Attempt to have family present   - echo from 08/2019: EF 47%, grade III DD. moderate TR. moderate AS  - RVP negative   - s/p IV lasix x2 doses. will monitor off lasix     #Acute metabolic encephalopathy  Improved today  urgent CTH performed - no acute findings but motion limited    #Afib  -not on AC  -toprol     #Hypothyroidism  - continue synthroid 75mcg    #anxiety  - continue Wellbutrin 150mg and lexapro 10mg      #DVT ppx  - continue lovenox for now    Will update family

## 2024-01-02 NOTE — PROGRESS NOTE ADULT - SUBJECTIVE AND OBJECTIVE BOX
Patient is a 89y old  Female who presents with a chief complaint of s/p fall       Patient seen and examined at bedside. no current complaints.     ALLERGIES:  aspirin (Unknown)  penicillin (Unknown)    MEDICATIONS  (STANDING):  acetaminophen     Tablet .. 975 milliGRAM(s) Oral every 8 hours  buPROPion XL (24-Hour) . 150 milliGRAM(s) Oral daily  cefTRIAXone   IVPB 1000 milliGRAM(s) IV Intermittent every 24 hours  dextrose 5%. 1000 milliLiter(s) (50 mL/Hr) IV Continuous <Continuous>  enoxaparin Injectable 40 milliGRAM(s) SubCutaneous every 24 hours  escitalopram 10 milliGRAM(s) Oral daily  famotidine    Tablet 20 milliGRAM(s) Oral daily  levothyroxine 75 MICROGram(s) Oral daily  lidocaine   4% Patch 1 Patch Transdermal every 24 hours  lidocaine   4% Patch 1 Patch Transdermal every 24 hours  melatonin 6 milliGRAM(s) Oral at bedtime  metoprolol succinate ER 25 milliGRAM(s) Oral daily  OLANZapine 5 milliGRAM(s) Oral <User Schedule>  senna 2 Tablet(s) Oral at bedtime    MEDICATIONS  (PRN):  aluminum hydroxide/magnesium hydroxide/simethicone Suspension 30 milliLiter(s) Oral every 4 hours PRN Dyspepsia  OLANZapine Injectable 2.5 milliGRAM(s) IntraMuscular every 6 hours PRN agitation  ondansetron Injectable 4 milliGRAM(s) IV Push every 8 hours PRN Nausea and/or Vomiting  polyethylene glycol 3350 17 Gram(s) Oral daily PRN Constipation    Vital Signs Last 24 Hrs  T(F): 97.4 (02 Jan 2024 05:00), Max: 97.7 (01 Jan 2024 14:01)  HR: 62 (02 Jan 2024 05:00) (62 - 81)  BP: 101/59 (02 Jan 2024 05:00) (101/59 - 142/67)  RR: 18 (02 Jan 2024 05:00) (17 - 20)  SpO2: 98% (02 Jan 2024 05:00) (93% - 98%)  I&O's Summary    01 Jan 2024 07:01  -  02 Jan 2024 07:00  --------------------------------------------------------  IN: 0 mL / OUT: 1100 mL / NET: -1100 mL    02 Jan 2024 07:01  -  02 Jan 2024 10:53  --------------------------------------------------------  IN: 0 mL / OUT: 1000 mL / NET: -1000 mL      PHYSICAL EXAM:  General: NAD, sleepy  ENT: MMM, no oral thrush   Neck: Supple, No JVD  Lungs: Clear to auscultation bilaterally, non labored breathing  Cardio: RRR, S1/S2, No murmurs  Abdomen: Soft, Nontender, Nondistended; Bowel sounds present  Extremities: No calf tenderness, No pitting edema    LABS:                        11.6   6.15  )-----------( 182      ( 01 Jan 2024 05:25 )             34.9     01-01    140  |  107  |  7   ----------------------------<  137  3.7   |  25  |  0.63    Ca    9.1      01 Jan 2024 05:25  Phos  3.3     01-02  Mg     1.2     01-02                              POCT Blood Glucose.: 117 mg/dL (01 Jan 2024 22:49)      Urinalysis Basic - ( 01 Jan 2024 05:25 )    Color: x / Appearance: x / SG: x / pH: x  Gluc: 137 mg/dL / Ketone: x  / Bili: x / Urobili: x   Blood: x / Protein: x / Nitrite: x   Leuk Esterase: x / RBC: x / WBC x   Sq Epi: x / Non Sq Epi: x / Bacteria: x        Culture - Urine (collected 29 Dec 2023 16:47)  Source: Clean Catch Clean Catch (Midstream)  Final Report (01 Jan 2024 12:55):    50,000 - 99,000 CFU/mL Escherichia coli    50,000 - 99,000 CFU/mL Aerococcus species    Isolates of Aerococcus spp. are predictably susceptible to penicillin,    ampicillin, and vancomycin.    All isolates are resistant to sulfonamides.  Organism: Escherichia coli (01 Jan 2024 12:55)  Organism: Escherichia coli (01 Jan 2024 12:55)      Method Type: GONZALO      -  Amoxicillin/Clavulanic Acid: S <=8/4      -  Ampicillin: S <=8 These ampicillin results predict results for amoxicillin      -  Ampicillin/Sulbactam: S <=4/2      -  Aztreonam: S <=4      -  Cefazolin: S <=2 For uncomplicated UTI with K. pneumoniae, E. coli, or P. mirablis: GONZALO <=16 is sensitive and GONZALO >=32 is resistant. This also predicts results for oral agents cefaclor, cefdinir, cefpodoxime, cefprozil, cefuroxime axetil, cephalexin and locarbef for uncomplicated UTI. Note that some isolates may be susceptible to these agents while testing resistant to cefazolin.      -  Cefepime: S <=2      -  Cefoxitin: S <=8      -  Ceftriaxone: S <=1      -  Cefuroxime: S <=4      -  Ciprofloxacin: R >2      -  Ertapenem: S <=0.5      -  Gentamicin: S <=2      -  Imipenem: S <=1      -  Levofloxacin: R >4      -  Meropenem: S <=1      -  Nitrofurantoin: S <=32 Should not be used to treat pyelonephritis      -  Piperacillin/Tazobactam: S <=8      -  Tobramycin: S <=2      -  Trimethoprim/Sulfamethoxazole: S <=0.5/9.5          RADIOLOGY & ADDITIONAL TESTS:  < from: Xray Chest 1 View- PORTABLE-Urgent (Xray Chest 1 View- PORTABLE-Urgent .) (01.01.24 @ 10:44) >  IMPRESSION: Persistent mild to moderate interstitial CHF.    --- End of Report ---            PETR JIMENEZ MD; Attending Radiologist  This document has been electronically signed. Jan 1 2024  2:39PM    < end of copied text >    Care Discussed with Consultants/Other Providers:

## 2024-01-03 LAB
ANION GAP SERPL CALC-SCNC: 6 MMOL/L — SIGNIFICANT CHANGE UP (ref 5–17)
ANION GAP SERPL CALC-SCNC: 6 MMOL/L — SIGNIFICANT CHANGE UP (ref 5–17)
BASOPHILS # BLD AUTO: 0.06 K/UL — SIGNIFICANT CHANGE UP (ref 0–0.2)
BASOPHILS # BLD AUTO: 0.06 K/UL — SIGNIFICANT CHANGE UP (ref 0–0.2)
BASOPHILS NFR BLD AUTO: 0.9 % — SIGNIFICANT CHANGE UP (ref 0–2)
BASOPHILS NFR BLD AUTO: 0.9 % — SIGNIFICANT CHANGE UP (ref 0–2)
BUN SERPL-MCNC: 10 MG/DL — SIGNIFICANT CHANGE UP (ref 7–23)
BUN SERPL-MCNC: 10 MG/DL — SIGNIFICANT CHANGE UP (ref 7–23)
CALCIUM SERPL-MCNC: 9.9 MG/DL — SIGNIFICANT CHANGE UP (ref 8.4–10.5)
CALCIUM SERPL-MCNC: 9.9 MG/DL — SIGNIFICANT CHANGE UP (ref 8.4–10.5)
CHLORIDE SERPL-SCNC: 102 MMOL/L — SIGNIFICANT CHANGE UP (ref 96–108)
CHLORIDE SERPL-SCNC: 102 MMOL/L — SIGNIFICANT CHANGE UP (ref 96–108)
CO2 SERPL-SCNC: 32 MMOL/L — HIGH (ref 22–31)
CO2 SERPL-SCNC: 32 MMOL/L — HIGH (ref 22–31)
CREAT SERPL-MCNC: 0.55 MG/DL — SIGNIFICANT CHANGE UP (ref 0.5–1.3)
CREAT SERPL-MCNC: 0.55 MG/DL — SIGNIFICANT CHANGE UP (ref 0.5–1.3)
EGFR: 86 ML/MIN/1.73M2 — SIGNIFICANT CHANGE UP
EGFR: 86 ML/MIN/1.73M2 — SIGNIFICANT CHANGE UP
EOSINOPHIL # BLD AUTO: 0.48 K/UL — SIGNIFICANT CHANGE UP (ref 0–0.5)
EOSINOPHIL # BLD AUTO: 0.48 K/UL — SIGNIFICANT CHANGE UP (ref 0–0.5)
EOSINOPHIL NFR BLD AUTO: 7.2 % — HIGH (ref 0–6)
EOSINOPHIL NFR BLD AUTO: 7.2 % — HIGH (ref 0–6)
GLUCOSE SERPL-MCNC: 124 MG/DL — HIGH (ref 70–99)
GLUCOSE SERPL-MCNC: 124 MG/DL — HIGH (ref 70–99)
HCT VFR BLD CALC: 36.9 % — SIGNIFICANT CHANGE UP (ref 34.5–45)
HCT VFR BLD CALC: 36.9 % — SIGNIFICANT CHANGE UP (ref 34.5–45)
HGB BLD-MCNC: 12.5 G/DL — SIGNIFICANT CHANGE UP (ref 11.5–15.5)
HGB BLD-MCNC: 12.5 G/DL — SIGNIFICANT CHANGE UP (ref 11.5–15.5)
IMM GRANULOCYTES NFR BLD AUTO: 0.4 % — SIGNIFICANT CHANGE UP (ref 0–0.9)
IMM GRANULOCYTES NFR BLD AUTO: 0.4 % — SIGNIFICANT CHANGE UP (ref 0–0.9)
LYMPHOCYTES # BLD AUTO: 1.1 K/UL — SIGNIFICANT CHANGE UP (ref 1–3.3)
LYMPHOCYTES # BLD AUTO: 1.1 K/UL — SIGNIFICANT CHANGE UP (ref 1–3.3)
LYMPHOCYTES # BLD AUTO: 16.4 % — SIGNIFICANT CHANGE UP (ref 13–44)
LYMPHOCYTES # BLD AUTO: 16.4 % — SIGNIFICANT CHANGE UP (ref 13–44)
MAGNESIUM SERPL-MCNC: 1.8 MG/DL — SIGNIFICANT CHANGE UP (ref 1.6–2.6)
MAGNESIUM SERPL-MCNC: 1.8 MG/DL — SIGNIFICANT CHANGE UP (ref 1.6–2.6)
MCHC RBC-ENTMCNC: 31.7 PG — SIGNIFICANT CHANGE UP (ref 27–34)
MCHC RBC-ENTMCNC: 31.7 PG — SIGNIFICANT CHANGE UP (ref 27–34)
MCHC RBC-ENTMCNC: 33.9 GM/DL — SIGNIFICANT CHANGE UP (ref 32–36)
MCHC RBC-ENTMCNC: 33.9 GM/DL — SIGNIFICANT CHANGE UP (ref 32–36)
MCV RBC AUTO: 93.7 FL — SIGNIFICANT CHANGE UP (ref 80–100)
MCV RBC AUTO: 93.7 FL — SIGNIFICANT CHANGE UP (ref 80–100)
MONOCYTES # BLD AUTO: 0.69 K/UL — SIGNIFICANT CHANGE UP (ref 0–0.9)
MONOCYTES # BLD AUTO: 0.69 K/UL — SIGNIFICANT CHANGE UP (ref 0–0.9)
MONOCYTES NFR BLD AUTO: 10.3 % — SIGNIFICANT CHANGE UP (ref 2–14)
MONOCYTES NFR BLD AUTO: 10.3 % — SIGNIFICANT CHANGE UP (ref 2–14)
NEUTROPHILS # BLD AUTO: 4.34 K/UL — SIGNIFICANT CHANGE UP (ref 1.8–7.4)
NEUTROPHILS # BLD AUTO: 4.34 K/UL — SIGNIFICANT CHANGE UP (ref 1.8–7.4)
NEUTROPHILS NFR BLD AUTO: 64.8 % — SIGNIFICANT CHANGE UP (ref 43–77)
NEUTROPHILS NFR BLD AUTO: 64.8 % — SIGNIFICANT CHANGE UP (ref 43–77)
NRBC # BLD: 0 /100 WBCS — SIGNIFICANT CHANGE UP (ref 0–0)
NRBC # BLD: 0 /100 WBCS — SIGNIFICANT CHANGE UP (ref 0–0)
PLATELET # BLD AUTO: 229 K/UL — SIGNIFICANT CHANGE UP (ref 150–400)
PLATELET # BLD AUTO: 229 K/UL — SIGNIFICANT CHANGE UP (ref 150–400)
POTASSIUM SERPL-MCNC: 3.4 MMOL/L — LOW (ref 3.5–5.3)
POTASSIUM SERPL-MCNC: 3.4 MMOL/L — LOW (ref 3.5–5.3)
POTASSIUM SERPL-SCNC: 3.4 MMOL/L — LOW (ref 3.5–5.3)
POTASSIUM SERPL-SCNC: 3.4 MMOL/L — LOW (ref 3.5–5.3)
RBC # BLD: 3.94 M/UL — SIGNIFICANT CHANGE UP (ref 3.8–5.2)
RBC # BLD: 3.94 M/UL — SIGNIFICANT CHANGE UP (ref 3.8–5.2)
RBC # FLD: 14.6 % — HIGH (ref 10.3–14.5)
RBC # FLD: 14.6 % — HIGH (ref 10.3–14.5)
SODIUM SERPL-SCNC: 140 MMOL/L — SIGNIFICANT CHANGE UP (ref 135–145)
SODIUM SERPL-SCNC: 140 MMOL/L — SIGNIFICANT CHANGE UP (ref 135–145)
WBC # BLD: 6.7 K/UL — SIGNIFICANT CHANGE UP (ref 3.8–10.5)
WBC # BLD: 6.7 K/UL — SIGNIFICANT CHANGE UP (ref 3.8–10.5)
WBC # FLD AUTO: 6.7 K/UL — SIGNIFICANT CHANGE UP (ref 3.8–10.5)
WBC # FLD AUTO: 6.7 K/UL — SIGNIFICANT CHANGE UP (ref 3.8–10.5)

## 2024-01-03 PROCEDURE — 99222 1ST HOSP IP/OBS MODERATE 55: CPT

## 2024-01-03 PROCEDURE — 71045 X-RAY EXAM CHEST 1 VIEW: CPT | Mod: 26

## 2024-01-03 PROCEDURE — 93010 ELECTROCARDIOGRAM REPORT: CPT

## 2024-01-03 RX ORDER — FUROSEMIDE 40 MG
40 TABLET ORAL ONCE
Refills: 0 | Status: COMPLETED | OUTPATIENT
Start: 2024-01-03 | End: 2024-01-03

## 2024-01-03 RX ORDER — POTASSIUM CHLORIDE 20 MEQ
10 PACKET (EA) ORAL
Refills: 0 | Status: COMPLETED | OUTPATIENT
Start: 2024-01-03 | End: 2024-01-03

## 2024-01-03 RX ADMIN — LIDOCAINE 1 PATCH: 4 CREAM TOPICAL at 19:04

## 2024-01-03 RX ADMIN — Medication 75 MICROGRAM(S): at 06:21

## 2024-01-03 RX ADMIN — Medication 100 MILLIEQUIVALENT(S): at 12:29

## 2024-01-03 RX ADMIN — LIDOCAINE 1 PATCH: 4 CREAM TOPICAL at 12:29

## 2024-01-03 RX ADMIN — Medication 25 MILLIGRAM(S): at 06:21

## 2024-01-03 RX ADMIN — Medication 6 MILLIGRAM(S): at 21:37

## 2024-01-03 RX ADMIN — SENNA PLUS 2 TABLET(S): 8.6 TABLET ORAL at 21:38

## 2024-01-03 RX ADMIN — Medication 975 MILLIGRAM(S): at 06:21

## 2024-01-03 RX ADMIN — LIDOCAINE 1 PATCH: 4 CREAM TOPICAL at 12:30

## 2024-01-03 RX ADMIN — FAMOTIDINE 20 MILLIGRAM(S): 10 INJECTION INTRAVENOUS at 12:28

## 2024-01-03 RX ADMIN — BUPROPION HYDROCHLORIDE 150 MILLIGRAM(S): 150 TABLET, EXTENDED RELEASE ORAL at 12:25

## 2024-01-03 RX ADMIN — OLANZAPINE 5 MILLIGRAM(S): 15 TABLET, FILM COATED ORAL at 19:00

## 2024-01-03 RX ADMIN — Medication 40 MILLIGRAM(S): at 15:16

## 2024-01-03 RX ADMIN — Medication 100 MILLIEQUIVALENT(S): at 14:00

## 2024-01-03 RX ADMIN — ENOXAPARIN SODIUM 40 MILLIGRAM(S): 100 INJECTION SUBCUTANEOUS at 12:31

## 2024-01-03 RX ADMIN — Medication 975 MILLIGRAM(S): at 06:51

## 2024-01-03 RX ADMIN — ESCITALOPRAM OXALATE 10 MILLIGRAM(S): 10 TABLET, FILM COATED ORAL at 12:27

## 2024-01-03 RX ADMIN — Medication 100 MILLIEQUIVALENT(S): at 15:55

## 2024-01-03 NOTE — PROGRESS NOTE ADULT - ASSESSMENT
89 year old F PMH Afib not on AC for frequent falls, hypothyroidism, anxiety/depression, DM, HTN  BIBA s/p fall found to have R femoral neck fracture course complicated by delirium.      #Delirium - resolving  -Likely precipitated by hospitalization vs infection vs pain 2/2 recent fracture  -Completed treatment for UTI and possible PNA with 5 days of Rocephin   -urine cx reviewed, 50-99k E coli and 50-99k aerococcus, sensitivity reviewed,   -ATC tylenol and lido patch for pain relief   -cont Zyprexa at 6pm and 2.5mg q6 prn  -Psych recs appreciated    #R femoral fracture  -patient at baseline requires fawad lift for transfers   -Ortho recommends surgical intervention for pain control  -Discussions held between ortho and family, currently family declining surgical intervention   -Family is requesting second opinion - after second opinion - surgery is being deferred at this time  -continue pain management with atc tylenol and lido patch    #acute hypoxia suspect possible PNA and 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 satting 95% on RA today  - improved s/p diuresis. now on room air.   - CXR shows prominent interstitial markings. questionable developing right upper lobe infiltrate  - repeat CXR: Persistent mild to moderate interstitial CHF.  - pro-bnp 1797  - Pt refused echo. Attempt to have family present   - echo from 08/2019: EF 47%, grade III DD. moderate TR. moderate AS  - RVP negative   - s/p IV lasix x2 doses. will monitor off lasix   - Cardiology consulted     #Acute metabolic encephalopathy  Improved today  urgent CTH performed - no acute findings but motion limited    #Afib  -not on AC  -toprol     #Hypokalemia  -replete and monitor    #Hypothyroidism  - continue synthroid 75mcg    #anxiety  - continue Wellbutrin 150mg and lexapro 10mg    #DVT ppx  - continue lovenox for now     family updated     89 year old F PMH Afib not on AC for frequent falls, hypothyroidism, anxiety/depression, DM, HTN  BIBA s/p fall found to have R femoral neck fracture course complicated by delirium.      #Delirium - resolving  -Likely precipitated by hospitalization vs infection vs pain 2/2 recent fracture  -Completed treatment for UTI and possible PNA with 5 days of Rocephin   -urine cx reviewed, 50-99k E coli and 50-99k aerococcus, sensitivity reviewed,   -ATC tylenol and lido patch for pain relief   -cont Zyprexa at 6pm and 2.5mg q6 prn  -Psych recs appreciated    #R femoral fracture  -patient at baseline requires fawad lift for transfers   -Ortho recommends surgical intervention for pain control  -Discussions held between ortho and family, currently family declining surgical intervention   -Family is requesting second opinion - after second opinion - surgery is being deferred at this time  -continue pain management with atc tylenol and lido patch    #acute hypoxia suspect possible PNA and 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 tapered to 2L NC, sating 97%  - improved s/p diuresis. now on room air.   - CXR shows prominent interstitial markings. questionable developing right upper lobe infiltrate  - repeat CXR: Persistent mild to moderate interstitial CHF.  - pro-bnp 1797  - echo 1/2/24: overall LLV function appears grossly normal. moderate enlarged RV, RA. mild to moderate TR.   - echo from 08/2019: EF 47%, grade III DD. moderate TR. moderate AS  - RVP negative   - s/p IV lasix x2 doses. per cardiology, one more dose of IV lasix today  - Cardiology consulted   - cont Toprol    #Acute metabolic encephalopathy  Improved today  urgent CTH performed - no acute findings but motion limited    #Afib  -not on AC  -toprol     #Hypokalemia  -replete and monitor    #Hypothyroidism  - continue synthroid 75mcg    #anxiety  - continue Wellbutrin 150mg and lexapro 10mg    #DVT ppx  - continue lovenox for now     family updated

## 2024-01-03 NOTE — CONSULT NOTE ADULT - SUBJECTIVE AND OBJECTIVE BOX
DAYSI SUKH  [id number]      HPI:  89 y/o female who is a resident of [facility] with PMH of Afib not on AC for frequent falls, hypothyroidism, anxiety/depression, DM, HTN  BIBA s/p fall. pt is poor historian and most ofthe hx obtained from ED staff, nursing home staff. pt fell on right side of her hip upon sort of rolled over in bed. gets around at facility with help of fawad. no head trauma/LOC reported. in ED pt noted to have externally rotated Rt LE. Pelvic xray with right hip fx. Dr Hadley from ortho contacted who recommended admission for pain management with official consult in AM          ALLERGIES:  aspirin (Unknown)  penicillin (Unknown)      PAST MEDICAL & SURGICAL HISTORY:  Hypertension      Hypothyroidism      Obesity      Degenerative Joint Disease      Peripheral Neuropathy      Uterine Polyp      Overactive Bladder      H/O: GI Bleed      Atrial fibrillation      Diabetes mellitus, type II      Hiatal hernia      Spinal stenosis of lumbar region      S/P knee replacement, right      S/P rotator cuff repair  right      S/P  section            CURRENT MEDICATIONS:  MEDICATIONS  (STANDING):  buPROPion XL (24-Hour) . 150 milliGRAM(s) Oral daily  dextrose 5%. 1000 milliLiter(s) (50 mL/Hr) IV Continuous <Continuous>  enoxaparin Injectable 40 milliGRAM(s) SubCutaneous every 24 hours  escitalopram 10 milliGRAM(s) Oral daily  famotidine    Tablet 20 milliGRAM(s) Oral daily  levothyroxine 75 MICROGram(s) Oral daily  lidocaine   4% Patch 1 Patch Transdermal every 24 hours  lidocaine   4% Patch 1 Patch Transdermal every 24 hours  melatonin 6 milliGRAM(s) Oral at bedtime  metoprolol succinate ER 25 milliGRAM(s) Oral daily  OLANZapine 5 milliGRAM(s) Oral <User Schedule>  potassium chloride  10 mEq/100 mL IVPB 10 milliEquivalent(s) IV Intermittent every 1 hour  senna 2 Tablet(s) Oral at bedtime    MEDICATIONS  (PRN):  aluminum hydroxide/magnesium hydroxide/simethicone Suspension 30 milliLiter(s) Oral every 4 hours PRN Dyspepsia  OLANZapine Injectable 2.5 milliGRAM(s) IntraMuscular every 6 hours PRN agitation  ondansetron Injectable 4 milliGRAM(s) IV Push every 8 hours PRN Nausea and/or Vomiting  polyethylene glycol 3350 17 Gram(s) Oral daily PRN Constipation      SOCIAL HISTORY:  denies eoth or tobacco use    FAMILY HISTORY:      ROS:  All 10 systems reviewed and positives noted in HPI    OBJECTIVE:    VITAL SIGNS:  Vital Signs Last 24 Hrs  T(C): 36.8 (2024 13:30), Max: 36.8 (2024 13:30)  T(F): 98.3 (2024 13:30), Max: 98.3 (2024 13:30)  HR: 96 (2024 13:30) (73 - 96)  BP: 114/77 (2024 13:30) (109/70 - 135/76)  BP(mean): --  RR: 16 (2024 13:30) (16 - 18)  SpO2: 97% (2024 13:30) (94% - 97%)    Parameters below as of 2024 13:30  Patient On (Oxygen Delivery Method): nasal cannula  O2 Flow (L/min): 2      PHYSICAL EXAM:  General: well appearing, no distress  HEENT: sclera anicteric  Neck: supple, no carotid bruits b/l  CVS: irregular  Chest: unlabored respirations, clear to auscultation anteriorly  Abdomen: non-distended  Extremities: mild l.e edema  Psych: normal affect      LABS:                        12.5   6.70  )-----------( 229      ( 2024 05:49 )             36.9     01-03    140  |  102  |  10  ----------------------------<  124<H>  3.4<L>   |  32<H>  |  0.55    Ca    9.9      2024 05:49  Phos  3.3     01-02  Mg     1.8     -03        EC2023 Afib      TTE: < from: TTE Echo Complete w/o Contrast w/ Doppler (24 @ 14:15) >   1. Technically difficult study.   2. Not all segments of the left ventricular endocardium are well   visualized. The overall LV function appears to be grossly normal. A   discrete regional wall motion abnormality, however, cannot be completely   ruled out.   3. Moderately enlarged right ventricle.   4. Moderately enlarged right atrium.   5. Normal left atrial size.   6. Mild thickening and calcification of the anterior and posterior   mitral valve leaflets.   7. Mild-moderate tricuspid regurgitation.   8. Sclerotic aortic valve with normal opening.    < end of copied text >       DAYSI SUKH  [id number]      HPI:  89 y/o female who is a resident of [facility] with PMH of Afib not on AC for frequent falls, hypothyroidism, anxiety/depression, DM, HTN  BIBA s/p fall. pt is poor historian and most ofthe hx obtained from ED staff, nursing home staff. pt fell on right side of her hip upon sort of rolled over in bed. gets around at facility with help of fawad. no head trauma/LOC reported. in ED pt noted to have externally rotated Rt LE. Pelvic xray with right hip fx. Dr Hadley from ortho contacted who recommended admission for pain management with official consult in AM          ALLERGIES:  aspirin (Unknown)  penicillin (Unknown)      PAST MEDICAL & SURGICAL HISTORY:  Hypertension      Hypothyroidism      Obesity      Degenerative Joint Disease      Peripheral Neuropathy      Uterine Polyp      Overactive Bladder      H/O: GI Bleed      Atrial fibrillation      Diabetes mellitus, type II      Hiatal hernia      Spinal stenosis of lumbar region      S/P knee replacement, right      S/P rotator cuff repair  right      S/P  section            CURRENT MEDICATIONS:  MEDICATIONS  (STANDING):  buPROPion XL (24-Hour) . 150 milliGRAM(s) Oral daily  dextrose 5%. 1000 milliLiter(s) (50 mL/Hr) IV Continuous <Continuous>  enoxaparin Injectable 40 milliGRAM(s) SubCutaneous every 24 hours  escitalopram 10 milliGRAM(s) Oral daily  famotidine    Tablet 20 milliGRAM(s) Oral daily  levothyroxine 75 MICROGram(s) Oral daily  lidocaine   4% Patch 1 Patch Transdermal every 24 hours  lidocaine   4% Patch 1 Patch Transdermal every 24 hours  melatonin 6 milliGRAM(s) Oral at bedtime  metoprolol succinate ER 25 milliGRAM(s) Oral daily  OLANZapine 5 milliGRAM(s) Oral <User Schedule>  potassium chloride  10 mEq/100 mL IVPB 10 milliEquivalent(s) IV Intermittent every 1 hour  senna 2 Tablet(s) Oral at bedtime    MEDICATIONS  (PRN):  aluminum hydroxide/magnesium hydroxide/simethicone Suspension 30 milliLiter(s) Oral every 4 hours PRN Dyspepsia  OLANZapine Injectable 2.5 milliGRAM(s) IntraMuscular every 6 hours PRN agitation  ondansetron Injectable 4 milliGRAM(s) IV Push every 8 hours PRN Nausea and/or Vomiting  polyethylene glycol 3350 17 Gram(s) Oral daily PRN Constipation      SOCIAL HISTORY:  denies eoth or tobacco use    FAMILY HISTORY:      ROS:  All 10 systems reviewed and positives noted in HPI    OBJECTIVE:    VITAL SIGNS:  Vital Signs Last 24 Hrs  T(C): 36.8 (2024 13:30), Max: 36.8 (2024 13:30)  T(F): 98.3 (2024 13:30), Max: 98.3 (2024 13:30)  HR: 96 (2024 13:30) (73 - 96)  BP: 114/77 (2024 13:30) (109/70 - 135/76)  BP(mean): --  RR: 16 (2024 13:30) (16 - 18)  SpO2: 97% (2024 13:30) (94% - 97%)    Parameters below as of 2024 13:30  Patient On (Oxygen Delivery Method): nasal cannula  O2 Flow (L/min): 2      PHYSICAL EXAM:  General: well appearing, no distress  HEENT: sclera anicteric  Neck: supple, no carotid bruits b/l  CVS: irregular  Chest: unlabored respirations, clear to auscultation anteriorly  Abdomen: non-distended  Extremities: mild l.e edema  Psych: normal affect      LABS:                        12.5   6.70  )-----------( 229      ( 2024 05:49 )             36.9     01-03    140  |  102  |  10  ----------------------------<  124<H>  3.4<L>   |  32<H>  |  0.55    Ca    9.9      2024 05:49  Phos  3.3     01-02  Mg     1.8     -03        EC2023 Afib      TTE: < from: TTE Echo Complete w/o Contrast w/ Doppler (24 @ 14:15) >   1. Technically difficult study.   2. Not all segments of the left ventricular endocardium are well   visualized. The overall LV function appears to be grossly normal. A   discrete regional wall motion abnormality, however, cannot be completely   ruled out.   3. Moderately enlarged right ventricle.   4. Moderately enlarged right atrium.   5. Normal left atrial size.   6. Mild thickening and calcification of the anterior and posterior   mitral valve leaflets.   7. Mild-moderate tricuspid regurgitation.   8. Sclerotic aortic valve with normal opening.    < end of copied text >

## 2024-01-03 NOTE — CONSULT NOTE ADULT - NS ATTEND AMEND GEN_ALL_CORE FT
Patient seen independently.    she appears comfortable and offers no complaints    she is in af heart rate approx 100    lungs are grossly clear      suggest    increase metoprolol to 50 mg daily    PLEASE GET EKG    Repeat cxr in am

## 2024-01-03 NOTE — PROGRESS NOTE ADULT - NS ATTEND AMEND GEN_ALL_CORE FT
Acute decompensated systolic and diastolic CHF  - s/p IV lasix x2. one more dose of IV lasix today per card  - tapered to 2L NC today, sating well  - CXR today reviewed by me. interstitial edema mildly improved  - repeat pro-bnp in AM

## 2024-01-03 NOTE — PROGRESS NOTE ADULT - SUBJECTIVE AND OBJECTIVE BOX
Patient is a 89y old Female who presents with a chief complaint of s/p fall (02 Jan 2024 10:52)      Patient seen and examined at bedside. No overnight events reported.     ALLERGIES:  aspirin (Unknown)  penicillin (Unknown)    MEDICATIONS  (STANDING):  buPROPion XL (24-Hour) . 150 milliGRAM(s) Oral daily  dextrose 5%. 1000 milliLiter(s) (50 mL/Hr) IV Continuous <Continuous>  enoxaparin Injectable 40 milliGRAM(s) SubCutaneous every 24 hours  escitalopram 10 milliGRAM(s) Oral daily  famotidine    Tablet 20 milliGRAM(s) Oral daily  levothyroxine 75 MICROGram(s) Oral daily  lidocaine   4% Patch 1 Patch Transdermal every 24 hours  lidocaine   4% Patch 1 Patch Transdermal every 24 hours  melatonin 6 milliGRAM(s) Oral at bedtime  metoprolol succinate ER 25 milliGRAM(s) Oral daily  OLANZapine 5 milliGRAM(s) Oral <User Schedule>  senna 2 Tablet(s) Oral at bedtime    MEDICATIONS  (PRN):  aluminum hydroxide/magnesium hydroxide/simethicone Suspension 30 milliLiter(s) Oral every 4 hours PRN Dyspepsia  OLANZapine Injectable 2.5 milliGRAM(s) IntraMuscular every 6 hours PRN agitation  ondansetron Injectable 4 milliGRAM(s) IV Push every 8 hours PRN Nausea and/or Vomiting  polyethylene glycol 3350 17 Gram(s) Oral daily PRN Constipation    Vital Signs Last 24 Hrs  T(F): 97.4 (03 Jan 2024 04:51), Max: 98 (03 Jan 2024 00:11)  HR: 73 (03 Jan 2024 04:51) (73 - 90)  BP: 135/76 (03 Jan 2024 04:51) (109/70 - 145/68)  RR: 18 (03 Jan 2024 04:51) (16 - 18)  SpO2: 97% (03 Jan 2024 04:51) (94% - 97%)  I&O's Summary    02 Jan 2024 07:01  -  03 Jan 2024 07:00  --------------------------------------------------------  IN: 0 mL / OUT: 1000 mL / NET: -1000 mL      PHYSICAL EXAM:  General: NAD, A/O x2   ENT: No gross hearing impairment, Moist mucous membranes, no thrush  Neck: Supple, No JVD  Lungs: Clear to auscultation bilaterally, good air entry, non-labored breathing  Cardio: RRR, S1/S2, No murmur  Abdomen: Soft, Nontender, Nondistended; Bowel sounds present  Extremities: No calf tenderness, No cyanosis, No pitting edema    LABS:                        12.5   6.70  )-----------( 229      ( 03 Jan 2024 05:49 )             36.9     01-03    140  |  102  |  10  ----------------------------<  124  3.4   |  32  |  0.55    Ca    9.9      03 Jan 2024 05:49  Phos  3.3     01-02  Mg     1.8     01-03                                        Urinalysis Basic - ( 03 Jan 2024 05:49 )    Color: x / Appearance: x / SG: x / pH: x  Gluc: 124 mg/dL / Ketone: x  / Bili: x / Urobili: x   Blood: x / Protein: x / Nitrite: x   Leuk Esterase: x / RBC: x / WBC x   Sq Epi: x / Non Sq Epi: x / Bacteria: x        Culture - Urine (collected 29 Dec 2023 16:47)  Source: Clean Catch Clean Catch (Midstream)  Final Report (01 Jan 2024 12:55):    50,000 - 99,000 CFU/mL Escherichia coli    50,000 - 99,000 CFU/mL Aerococcus species    Isolates of Aerococcus spp. are predictably susceptible to penicillin,    ampicillin, and vancomycin.    All isolates are resistant to sulfonamides.  Organism: Escherichia coli (01 Jan 2024 12:55)  Organism: Escherichia coli (01 Jan 2024 12:55)      Method Type: GONZALO      -  Amoxicillin/Clavulanic Acid: S <=8/4      -  Ampicillin: S <=8 These ampicillin results predict results for amoxicillin      -  Ampicillin/Sulbactam: S <=4/2      -  Aztreonam: S <=4      -  Cefazolin: S <=2 For uncomplicated UTI with K. pneumoniae, E. coli, or P. mirablis: GONZALO <=16 is sensitive and GONZALO >=32 is resistant. This also predicts results for oral agents cefaclor, cefdinir, cefpodoxime, cefprozil, cefuroxime axetil, cephalexin and locarbef for uncomplicated UTI. Note that some isolates may be susceptible to these agents while testing resistant to cefazolin.      -  Cefepime: S <=2      -  Cefoxitin: S <=8      -  Ceftriaxone: S <=1      -  Cefuroxime: S <=4      -  Ciprofloxacin: R >2      -  Ertapenem: S <=0.5      -  Gentamicin: S <=2      -  Imipenem: S <=1      -  Levofloxacin: R >4      -  Meropenem: S <=1      -  Nitrofurantoin: S <=32 Should not be used to treat pyelonephritis      -  Piperacillin/Tazobactam: S <=8      -  Tobramycin: S <=2      -  Trimethoprim/Sulfamethoxazole: S <=0.5/9.5        RADIOLOGY & ADDITIONAL TESTS:    Care Discussed with Consultants/Other Providers:

## 2024-01-03 NOTE — CONSULT NOTE ADULT - ASSESSMENT
89 year old F PMH Afib not on AC for frequent falls, hypothyroidism, anxiety/depression, DM, HTN  BIBA s/p fall found to have R femoral neck fracture course complicated by delirium.  Cardiology consulted for concern for chf exacerbation. hx Hfpef, pt denies cp or sob.     recommendations:  - pro bnp 1797  - tte difficult study, last study 8/2019 EF 47%, grade III DD. moderate TR. moderate AS  - xr with mild- mod CHF  - pt on 2l nc on exam, can continue diuresis, consider iv lasix x 1 today and rpt cxr and probnp. monitor kidney function and electrolytes    Liya HUNT-BC

## 2024-01-04 LAB
ANION GAP SERPL CALC-SCNC: 7 MMOL/L — SIGNIFICANT CHANGE UP (ref 5–17)
ANION GAP SERPL CALC-SCNC: 7 MMOL/L — SIGNIFICANT CHANGE UP (ref 5–17)
BUN SERPL-MCNC: 12 MG/DL — SIGNIFICANT CHANGE UP (ref 7–23)
BUN SERPL-MCNC: 12 MG/DL — SIGNIFICANT CHANGE UP (ref 7–23)
CALCIUM SERPL-MCNC: 9.8 MG/DL — SIGNIFICANT CHANGE UP (ref 8.4–10.5)
CALCIUM SERPL-MCNC: 9.8 MG/DL — SIGNIFICANT CHANGE UP (ref 8.4–10.5)
CHLORIDE SERPL-SCNC: 99 MMOL/L — SIGNIFICANT CHANGE UP (ref 96–108)
CHLORIDE SERPL-SCNC: 99 MMOL/L — SIGNIFICANT CHANGE UP (ref 96–108)
CO2 SERPL-SCNC: 27 MMOL/L — SIGNIFICANT CHANGE UP (ref 22–31)
CO2 SERPL-SCNC: 27 MMOL/L — SIGNIFICANT CHANGE UP (ref 22–31)
CREAT SERPL-MCNC: 0.59 MG/DL — SIGNIFICANT CHANGE UP (ref 0.5–1.3)
CREAT SERPL-MCNC: 0.59 MG/DL — SIGNIFICANT CHANGE UP (ref 0.5–1.3)
EGFR: 84 ML/MIN/1.73M2 — SIGNIFICANT CHANGE UP
EGFR: 84 ML/MIN/1.73M2 — SIGNIFICANT CHANGE UP
GLUCOSE BLDC GLUCOMTR-MCNC: 148 MG/DL — HIGH (ref 70–99)
GLUCOSE BLDC GLUCOMTR-MCNC: 148 MG/DL — HIGH (ref 70–99)
GLUCOSE SERPL-MCNC: 140 MG/DL — HIGH (ref 70–99)
GLUCOSE SERPL-MCNC: 140 MG/DL — HIGH (ref 70–99)
HCT VFR BLD CALC: 37.8 % — SIGNIFICANT CHANGE UP (ref 34.5–45)
HCT VFR BLD CALC: 37.8 % — SIGNIFICANT CHANGE UP (ref 34.5–45)
HGB BLD-MCNC: 12.4 G/DL — SIGNIFICANT CHANGE UP (ref 11.5–15.5)
HGB BLD-MCNC: 12.4 G/DL — SIGNIFICANT CHANGE UP (ref 11.5–15.5)
MCHC RBC-ENTMCNC: 31.6 PG — SIGNIFICANT CHANGE UP (ref 27–34)
MCHC RBC-ENTMCNC: 31.6 PG — SIGNIFICANT CHANGE UP (ref 27–34)
MCHC RBC-ENTMCNC: 32.8 GM/DL — SIGNIFICANT CHANGE UP (ref 32–36)
MCHC RBC-ENTMCNC: 32.8 GM/DL — SIGNIFICANT CHANGE UP (ref 32–36)
MCV RBC AUTO: 96.4 FL — SIGNIFICANT CHANGE UP (ref 80–100)
MCV RBC AUTO: 96.4 FL — SIGNIFICANT CHANGE UP (ref 80–100)
NRBC # BLD: 0 /100 WBCS — SIGNIFICANT CHANGE UP (ref 0–0)
NRBC # BLD: 0 /100 WBCS — SIGNIFICANT CHANGE UP (ref 0–0)
NT-PROBNP SERPL-SCNC: 719 PG/ML — HIGH (ref 0–300)
NT-PROBNP SERPL-SCNC: 719 PG/ML — HIGH (ref 0–300)
PLATELET # BLD AUTO: 202 K/UL — SIGNIFICANT CHANGE UP (ref 150–400)
PLATELET # BLD AUTO: 202 K/UL — SIGNIFICANT CHANGE UP (ref 150–400)
POTASSIUM SERPL-MCNC: 3.8 MMOL/L — SIGNIFICANT CHANGE UP (ref 3.5–5.3)
POTASSIUM SERPL-MCNC: 3.8 MMOL/L — SIGNIFICANT CHANGE UP (ref 3.5–5.3)
POTASSIUM SERPL-SCNC: 3.8 MMOL/L — SIGNIFICANT CHANGE UP (ref 3.5–5.3)
POTASSIUM SERPL-SCNC: 3.8 MMOL/L — SIGNIFICANT CHANGE UP (ref 3.5–5.3)
RBC # BLD: 3.92 M/UL — SIGNIFICANT CHANGE UP (ref 3.8–5.2)
RBC # BLD: 3.92 M/UL — SIGNIFICANT CHANGE UP (ref 3.8–5.2)
RBC # FLD: 14.6 % — HIGH (ref 10.3–14.5)
RBC # FLD: 14.6 % — HIGH (ref 10.3–14.5)
SODIUM SERPL-SCNC: 133 MMOL/L — LOW (ref 135–145)
SODIUM SERPL-SCNC: 133 MMOL/L — LOW (ref 135–145)
WBC # BLD: 6.36 K/UL — SIGNIFICANT CHANGE UP (ref 3.8–10.5)
WBC # BLD: 6.36 K/UL — SIGNIFICANT CHANGE UP (ref 3.8–10.5)
WBC # FLD AUTO: 6.36 K/UL — SIGNIFICANT CHANGE UP (ref 3.8–10.5)
WBC # FLD AUTO: 6.36 K/UL — SIGNIFICANT CHANGE UP (ref 3.8–10.5)

## 2024-01-04 PROCEDURE — 99497 ADVNCD CARE PLAN 30 MIN: CPT | Mod: 25

## 2024-01-04 PROCEDURE — 99232 SBSQ HOSP IP/OBS MODERATE 35: CPT

## 2024-01-04 PROCEDURE — 71045 X-RAY EXAM CHEST 1 VIEW: CPT | Mod: 26

## 2024-01-04 PROCEDURE — 99223 1ST HOSP IP/OBS HIGH 75: CPT

## 2024-01-04 RX ORDER — FUROSEMIDE 40 MG
40 TABLET ORAL
Refills: 0 | Status: DISCONTINUED | OUTPATIENT
Start: 2024-01-05 | End: 2024-01-05

## 2024-01-04 RX ORDER — METOPROLOL TARTRATE 50 MG
50 TABLET ORAL DAILY
Refills: 0 | Status: DISCONTINUED | OUTPATIENT
Start: 2024-01-04 | End: 2024-01-05

## 2024-01-04 RX ORDER — FUROSEMIDE 40 MG
40 TABLET ORAL ONCE
Refills: 0 | Status: COMPLETED | OUTPATIENT
Start: 2024-01-04 | End: 2024-01-04

## 2024-01-04 RX ADMIN — Medication 50 MILLIGRAM(S): at 13:03

## 2024-01-04 RX ADMIN — ENOXAPARIN SODIUM 40 MILLIGRAM(S): 100 INJECTION SUBCUTANEOUS at 12:12

## 2024-01-04 RX ADMIN — LIDOCAINE 1 PATCH: 4 CREAM TOPICAL at 06:43

## 2024-01-04 RX ADMIN — Medication 75 MICROGRAM(S): at 05:44

## 2024-01-04 RX ADMIN — FAMOTIDINE 20 MILLIGRAM(S): 10 INJECTION INTRAVENOUS at 12:12

## 2024-01-04 RX ADMIN — BUPROPION HYDROCHLORIDE 150 MILLIGRAM(S): 150 TABLET, EXTENDED RELEASE ORAL at 12:12

## 2024-01-04 RX ADMIN — ESCITALOPRAM OXALATE 10 MILLIGRAM(S): 10 TABLET, FILM COATED ORAL at 12:12

## 2024-01-04 RX ADMIN — OLANZAPINE 5 MILLIGRAM(S): 15 TABLET, FILM COATED ORAL at 17:55

## 2024-01-04 RX ADMIN — Medication 40 MILLIGRAM(S): at 14:56

## 2024-01-04 NOTE — CONSULT NOTE ADULT - TIME BILLING
Total time spent including the following  [X] Physical chart review and documentation   An extensive review of the physical chart, electronic health record, and documentation was conducted to obtain collateral information including but not limited to:  - Current inpatient records (ED, H&P, primary team, and consultants if applicable)  - Inpatient values/results (biomarkers, immunoassays, imaging, and microbiology results)  - Outpatient records  - Prior inpatient records (if available)  - Social work assessments  - Current or proposed treatment plans  - Pharmacotherapy review (including I-STOP if applicable)  []review of medical literature related to pathogenesis, disease/illness progress, treatment and/or prognosis  []care coordination  [X]discussion with the primary team - spoke to NP Yury Green  [X]discussion with floor staff - spoke with nurse  [X]discussion with consultant(s) - cardio  [X]discussion with the patient, surrogate decision maker, or family  [X]Physical Exam and/or review of systems   [X]Formulation of assesment and plan   []Evaluating for response to treatment and side effects of opioids or benzodiazepines

## 2024-01-04 NOTE — PROGRESS NOTE ADULT - SUBJECTIVE AND OBJECTIVE BOX
Patient is a 89y old  Female who presents with a chief complaint of s/p fall (04 Jan 2024 11:59)      Patient seen and examined at bedside. no overnight events. states she feels ok    ALLERGIES:  aspirin (Unknown)  penicillin (Unknown)    MEDICATIONS  (STANDING):  buPROPion XL (24-Hour) . 150 milliGRAM(s) Oral daily  enoxaparin Injectable 40 milliGRAM(s) SubCutaneous every 24 hours  escitalopram 10 milliGRAM(s) Oral daily  famotidine    Tablet 20 milliGRAM(s) Oral daily  levothyroxine 75 MICROGram(s) Oral daily  lidocaine   4% Patch 1 Patch Transdermal every 24 hours  lidocaine   4% Patch 1 Patch Transdermal every 24 hours  melatonin 6 milliGRAM(s) Oral at bedtime  metoprolol succinate ER 50 milliGRAM(s) Oral daily  OLANZapine 5 milliGRAM(s) Oral <User Schedule>  senna 2 Tablet(s) Oral at bedtime    MEDICATIONS  (PRN):  aluminum hydroxide/magnesium hydroxide/simethicone Suspension 30 milliLiter(s) Oral every 4 hours PRN Dyspepsia  OLANZapine Injectable 2.5 milliGRAM(s) IntraMuscular every 6 hours PRN agitation  ondansetron Injectable 4 milliGRAM(s) IV Push every 8 hours PRN Nausea and/or Vomiting  polyethylene glycol 3350 17 Gram(s) Oral daily PRN Constipation    Vital Signs Last 24 Hrs  T(F): 98.4 (04 Jan 2024 12:30), Max: 98.4 (04 Jan 2024 12:30)  HR: 80 (04 Jan 2024 12:30) (80 - 104)  BP: 121/73 (04 Jan 2024 12:30) (121/73 - 151/55)  RR: 16 (04 Jan 2024 12:30) (15 - 16)  SpO2: 94% (04 Jan 2024 12:30) (92% - 96%)  I&O's Summary    PHYSICAL EXAM:  General: NAD, chronically ill appearing, Alert  ENT: MMM, no oral thrush   Neck: Supple, No JVD  Lungs: Clear to auscultation bilaterally, non labored breathing  Cardio: RRR, S1/S2, No murmurs  Abdomen: Soft, Nontender, Nondistended; Bowel sounds present  Extremities: No calf tenderness, No pitting edema    LABS:                        12.4   6.36  )-----------( 202      ( 04 Jan 2024 07:08 )             37.8     01-04    133  |  99  |  12  ----------------------------<  140  3.8   |  27  |  0.59    Ca    9.8      04 Jan 2024 07:08  Phos  3.3     01-02  Mg     1.8     01-03                POCT Blood Glucose.: 148 mg/dL (04 Jan 2024 09:14)      Urinalysis Basic - ( 04 Jan 2024 07:08 )    Color: x / Appearance: x / SG: x / pH: x  Gluc: 140 mg/dL / Ketone: x  / Bili: x / Urobili: x   Blood: x / Protein: x / Nitrite: x   Leuk Esterase: x / RBC: x / WBC x   Sq Epi: x / Non Sq Epi: x / Bacteria: x        Culture - Urine (collected 29 Dec 2023 16:47)  Source: Clean Catch Clean Catch (Midstream)  Final Report (01 Jan 2024 12:55):    50,000 - 99,000 CFU/mL Escherichia coli    50,000 - 99,000 CFU/mL Aerococcus species    Isolates of Aerococcus spp. are predictably susceptible to penicillin,    ampicillin, and vancomycin.    All isolates are resistant to sulfonamides.  Organism: Escherichia coli (01 Jan 2024 12:55)  Organism: Escherichia coli (01 Jan 2024 12:55)      -  Levofloxacin: R >4      -  Tobramycin: S <=2      -  Nitrofurantoin: S <=32 Should not be used to treat pyelonephritis      -  Aztreonam: S <=4      -  Gentamicin: S <=2      -  Cefazolin: S <=2 For uncomplicated UTI with K. pneumoniae, E. coli, or P. mirablis: GONZALO <=16 is sensitive and GONZALO >=32 is resistant. This also predicts results for oral agents cefaclor, cefdinir, cefpodoxime, cefprozil, cefuroxime axetil, cephalexin and locarbef for uncomplicated UTI. Note that some isolates may be susceptible to these agents while testing resistant to cefazolin.      -  Cefepime: S <=2      -  Piperacillin/Tazobactam: S <=8      -  Ciprofloxacin: R >2      -  Imipenem: S <=1      -  Ceftriaxone: S <=1      -  Ampicillin: S <=8 These ampicillin results predict results for amoxicillin      Method Type: GONZALO      -  Meropenem: S <=1      -  Ampicillin/Sulbactam: S <=4/2      -  Cefoxitin: S <=8      -  Cefuroxime: S <=4      -  Amoxicillin/Clavulanic Acid: S <=8/4      -  Trimethoprim/Sulfamethoxazole: S <=0.5/9.5      -  Ertapenem: S <=0.5          RADIOLOGY & ADDITIONAL TESTS:  < from: Xray Chest 1 View- PORTABLE-Urgent (Xray Chest 1 View- PORTABLE-Urgent .) (01.01.24 @ 10:44) >    IMPRESSION: Persistent mild to moderate interstitial CHF.    --- End of Report ---            PETR JIMENEZ MD; Attending Radiologist  This document has been electronically signed. Jan 1 2024  2:39PM    < end of copied text >    Care Discussed with Consultants/Other Providers:   Palliative Dr. Ace

## 2024-01-04 NOTE — PROGRESS NOTE ADULT - ASSESSMENT
89 year old F PMH Afib not on AC for frequent falls, hypothyroidism, anxiety/depression, DM, HTN  BIBA s/p fall found to have R femoral neck fracture course complicated by delirium.      #Delirium - resolving  -Likely precipitated by hospitalization vs infection vs pain 2/2 recent fracture  -Completed treatment for UTI and possible PNA with 5 days of Rocephin   -urine cx reviewed, 50-99k E coli and 50-99k aerococcus, sensitivity reviewed,   -ATC tylenol and lido patch for pain relief   -cont Zyprexa at 6pm and 2.5mg q6 prn  -Psych recs appreciated    #R femoral fracture  -patient at baseline requires fawad lift for transfers   -Ortho recommends surgical intervention for pain control  -Discussions held between ortho and family, currently family declining surgical intervention   -Family is requesting second opinion - after second opinion - surgery is being deferred at this time  -continue pain management with atc tylenol and lido patch  -palliative consult     #acute hypoxia suspect possible PNA and 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 tapered to 2L NC, sating 97%  - improved s/p diuresis. now on room air.   -1/1 CXR: Persistent mild to moderate interstitial CHF. FU repeat   - pro-bnp 1797 --> 719  - echo 1/2/24: overall LLV function appears grossly normal. moderate enlarged RV, RA. mild to moderate TR.   - echo from 08/2019: EF 47%, grade III DD. moderate TR. moderate AS  - RVP negative   - IV lasix one more dose today, transition to 40mg PO BID tomorrow   - Cardiology recs  - cont Toprol    #Acute metabolic encephalopathy  Improved  urgent CTH performed - no acute findings but motion limited    #Afib  -not on AC  -toprol     #Hypokalemia  -repleted and monitor    #Hypothyroidism  - continue synthroid 75mcg    #anxiety  - continue Wellbutrin 150mg and lexapro 10mg    #DVT ppx  - continue lovenox for now    Son Jose beckford      89 year old F PMH Afib not on AC for frequent falls, hypothyroidism, anxiety/depression, DM, HTN  BIBA s/p fall found to have R femoral neck fracture course complicated by delirium.      #Delirium - resolving  -Likely precipitated by hospitalization vs infection vs pain 2/2 recent fracture  -Completed treatment for UTI and possible PNA with 5 days of Rocephin   -urine cx reviewed, 50-99k E coli and 50-99k aerococcus, sensitivity reviewed,   -ATC tylenol and lido patch for pain relief   -cont Zyprexa at 6pm and 2.5mg q6 prn  -Psych recs appreciated    #R femoral fracture  -patient at baseline requires fawad lift for transfers   -Ortho recommends surgical intervention for pain control  -Discussions held between ortho and family, currently family declining surgical intervention   -Family is requesting second opinion - after second opinion - surgery is being deferred at this time  -continue pain management with atc tylenol and lido patch  -palliative consult     #acute hypoxia suspect possible PNA and 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 tapered to 2L NC, sating 97%  - improved s/p diuresis. now on room air.   -1/1 CXR: Persistent mild to moderate interstitial CHF. FU repeat   - pro-bnp 1797 --> 719  - echo 1/2/24: overall LLV function appears grossly normal. moderate enlarged RV, RA. mild to moderate TR.   - echo from 08/2019: EF 47%, grade III DD. moderate TR. moderate AS  - RVP negative   - IV lasix one more dose today, transition to 40mg PO BID tomorrow   - Cardiology recs  - cont Toprol    #Acute metabolic encephalopathy  Improved  urgent CTH performed - no acute findings but motion limited    #Afib  -not on AC  -toprol     #Hypokalemia  -repleted and monitor    #Hypothyroidism  - continue synthroid 75mcg    #anxiety  - continue Wellbutrin 150mg and lexapro 10mg    #DVT ppx  - continue lovenox for now    Son Jose beckford

## 2024-01-04 NOTE — PROGRESS NOTE ADULT - ASSESSMENT
89 year old F PMH Afib not on AC for frequent falls, hypothyroidism, anxiety/depression, DM, HTN  BIBA s/p fall found to have R femoral neck fracture course complicated by delirium.  Cardiology consulted for concern for chf exacerbation. hx Hfpef, pt denies cp or sob.     recommendations:  - pro bnp 1797> 719  - tte difficult study, last study 8/2019 EF 47%, grade III DD. moderate TR. moderate AS  - xr with mild- mod CHF, repeat pending official read, however seems improved  - pt on 2l nc on exam, wean as tolerated and can consider po diuresis if xr improved on official read    Liya HUNT-BC

## 2024-01-04 NOTE — CONSULT NOTE ADULT - SUBJECTIVE AND OBJECTIVE BOX
HPI:  89 y/o female who is a resident of [facility] facility with PMH of Afib not on AC for frequent falls, hypothyroidism, anxiety/depression, DM, HTN  BIBA s/p fall. pt is poor historian and most of the hx obtained from ED staff, nursing home staff. pt fell on right side of her hip upon sort of rolled over in bed. gets around at facility with help of fawad. no head trauma/LOC reported. in ED pt noted to have externally rotated Rt LE. Pelvic xray with right hip fx. Dr Hadley from ortho contacted who recommended admission for pain management with official consult in AM  (28 Dec 2023 06:19)    Palliative care c/s for GOC. Pt seen and examined at bedside this morning. She states she gets R hip pain when she moves but denies any currently. Denied being depressed; states she is "okay". I offered talk therapy or having the psychiatrist come to see her and she declined.    PERTINENT PM/SXH:   Diabetes Mellitus    Hypertension    Anemia, Iron Deficiency    Hypothyroidism    Obesity    Degenerative Joint Disease    Peripheral Neuropathy    Uterine Polyp    Overactive Bladder    H/O: GI Bleed    Atrial fibrillation    Diabetes mellitus, type II    Hiatal hernia    Spinal stenosis of lumbar region    Vaginal bleeding      S/P knee replacement, right    S/P rotator cuff repair    S/P  section      FAMILY HISTORY:    Family Hx substance abuse [ ]yes [ ]no  ITEMS NOT CHECKED ARE NOT PRESENT    SOCIAL HISTORY:   Children - 2 sons, 1 daughter  Orthodoxy/Spirituality: Congregational  Substance hx:  no  Tobacco hx:  no  Alcohol hx: no  Living Situation: Lives at [facility]    ADVANCE DIRECTIVES:    DNR/MOLST - yes - DNR/DNI, no feeding tube  DECISION MAKER(s):   Surrogate(s)  - son Jose Kay      ; Salma Kebede  Name(s): Phone Number(s): [phone]           BASELINE (I)ADL(s) (prior to admission):  [place]:  Dependent    Allergies  aspirin (Unknown)  penicillin (Unknown)    Intolerances    MEDICATIONS  (STANDING):  buPROPion XL (24-Hour) . 150 milliGRAM(s) Oral daily  dextrose 5%. 1000 milliLiter(s) (50 mL/Hr) IV Continuous <Continuous>  enoxaparin Injectable 40 milliGRAM(s) SubCutaneous every 24 hours  escitalopram 10 milliGRAM(s) Oral daily  famotidine    Tablet 20 milliGRAM(s) Oral daily  levothyroxine 75 MICROGram(s) Oral daily  lidocaine   4% Patch 1 Patch Transdermal every 24 hours  lidocaine   4% Patch 1 Patch Transdermal every 24 hours  melatonin 6 milliGRAM(s) Oral at bedtime  metoprolol succinate ER 50 milliGRAM(s) Oral daily  OLANZapine 5 milliGRAM(s) Oral <User Schedule>  senna 2 Tablet(s) Oral at bedtime    MEDICATIONS  (PRN):  aluminum hydroxide/magnesium hydroxide/simethicone Suspension 30 milliLiter(s) Oral every 4 hours PRN Dyspepsia  OLANZapine Injectable 2.5 milliGRAM(s) IntraMuscular every 6 hours PRN agitation  ondansetron Injectable 4 milliGRAM(s) IV Push every 8 hours PRN Nausea and/or Vomiting  polyethylene glycol 3350 17 Gram(s) Oral daily PRN Constipation    PRESENT SYMPTOMS:     Pain: no  QOL impact -   Location -                    Aggravating factors -  Quality -  Radiation -  Timing-  Severity (0-10 scale):  Minimal acceptable level (0-10 scale):       Dyspnea:                         none  Anxiety:                           none  Depressed:                      none  Fatigue:                             none  Nausea:                           none  Loss of appetite:            none  Constipation:                 does not report      Other Symptoms:  [X ]All other review of systems negative       Chaplaincy Referral:  Deferred   Palliative Performance Status Version 2:      50   %    http://npcrc.org/files/news/palliative_performance_scale_ppsv2.pdf    PHYSICAL EXAM:  Vital Signs Last 24 Hrs  T(C): 36.7 (2024 05:04), Max: 36.8 (2024 13:30)  T(F): 98 (2024 05:04), Max: 98.3 (2024 13:30)  HR: 84 (2024 05:04) (84 - 104)  BP: 151/55 (2024 05:04) (114/77 - 151/55)  BP(mean): 87 (2024 05:04) (87 - 87)  RR: 15 (2024 05:04) (15 - 16)  SpO2: 96% (2024 05:04) (92% - 97%)    Parameters below as of 2024 05:04  Patient On (Oxygen Delivery Method): nasal cannula  O2 Flow (L/min): 2   I&O's Summary    GENERAL: disheveled elderly female laying in bed in NAD  HEENT: NC/AT, dry mucous membranes  PULMONARY: CTAB anteriorly, no murmur noted  CARDIOVASCULAR:  RRR, no murmur  GASTROINTESTINAL: soft, obese abd, nontender  Last BM:  documented  MUSCULOSKELETAL: +Weakness  +Bed/Wheelchair bound   NEUROLOGIC: No focal deficits  A&Ox3  SKIN: no rashes/ecchymosis noted      LABS:                        12.4   6.36  )-----------( 202      ( 2024 07:08 )             37.8       133<L>  |  99  |  12  ----------------------------<  140<H>  3.8   |  27  |  0.59    Ca    9.8      2024 07:08  Mg     1.8             Urinalysis Basic - ( 2024 07:08 )    Color: x / Appearance: x / SG: x / pH: x  Gluc: 140 mg/dL / Ketone: x  / Bili: x / Urobili: x   Blood: x / Protein: x / Nitrite: x   Leuk Esterase: x / RBC: x / WBC x   Sq Epi: x / Non Sq Epi: x / Bacteria: x      RADIOLOGY & ADDITIONAL STUDIES:    PROCEDURE DATE:  2024          INTERPRETATION:  AP chest on 2024 at 10:13 AM. Patient is   short of breath. Patient has a hip fracture.    Heart size difficult to assess. Elevated left hemidiaphragm again noted.    Again noted is increased interstitial pattern throughout all lung fields   similar to 2023 but increased from 2023. This   suggests interstitial CHF. Orthopedic anchors right humeral head again   noted.    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    --- End of Report ---    PROTEIN CALORIE MALNUTRITION PRESENT: [ ]mild [ ]moderate [ ]severe [ ]underweight [ ]morbid obesity  https://www.andeal.org/vault/2689/web/files/ONC/Table_Clinical%20Characteristics%20to%20Document%20Malnutrition-White%20JV%20et%20al%2020.pdf    Height (cm): 175.3 (23 @ 04:29)  Weight (kg): 89.4 (23 @ 04:29), 90.3 (23 @ 17:11), 87.498 (23 @ 14:21)  BMI (kg/m2): 29.1 (23 @ 04:29)    [ ]PPSV2 < or = to 30% [ ]significant weight loss  [ ]poor nutritional intake  [ ]anasarca[ ]Artificial Nutrition      Other REFERRALS:  [ ]Hospice  [ ]Child Life  [ ]Social Work  [ ]Case management [ ]Holistic Therapy  HPI:  89 y/o female who is a resident of [facility] facility with PMH of Afib not on AC for frequent falls, hypothyroidism, anxiety/depression, DM, HTN  BIBA s/p fall. pt is poor historian and most of the hx obtained from ED staff, nursing home staff. pt fell on right side of her hip upon sort of rolled over in bed. gets around at facility with help of fawad. no head trauma/LOC reported. in ED pt noted to have externally rotated Rt LE. Pelvic xray with right hip fx. Dr Hadley from ortho contacted who recommended admission for pain management with official consult in AM  (28 Dec 2023 06:19)    Palliative care c/s for GOC. Pt seen and examined at bedside this morning. She states she gets R hip pain when she moves but denies any currently. Denied being depressed; states she is "okay". I offered talk therapy or having the psychiatrist come to see her and she declined.    PERTINENT PM/SXH:   Diabetes Mellitus    Hypertension    Anemia, Iron Deficiency    Hypothyroidism    Obesity    Degenerative Joint Disease    Peripheral Neuropathy    Uterine Polyp    Overactive Bladder    H/O: GI Bleed    Atrial fibrillation    Diabetes mellitus, type II    Hiatal hernia    Spinal stenosis of lumbar region    Vaginal bleeding      S/P knee replacement, right    S/P rotator cuff repair    S/P  section      FAMILY HISTORY:    Family Hx substance abuse [ ]yes [ ]no  ITEMS NOT CHECKED ARE NOT PRESENT    SOCIAL HISTORY:   Children - 2 sons, 1 daughter  Sikh/Spirituality: Scientology  Substance hx:  no  Tobacco hx:  no  Alcohol hx: no  Living Situation: Lives at [facility]    ADVANCE DIRECTIVES:    DNR/MOLST - yes - DNR/DNI, no feeding tube  DECISION MAKER(s):   Surrogate(s)  - son Jose Kay      ; Salma Kebede  Name(s): Phone Number(s): [phone]           BASELINE (I)ADL(s) (prior to admission):  [place]:  Dependent    Allergies  aspirin (Unknown)  penicillin (Unknown)    Intolerances    MEDICATIONS  (STANDING):  buPROPion XL (24-Hour) . 150 milliGRAM(s) Oral daily  dextrose 5%. 1000 milliLiter(s) (50 mL/Hr) IV Continuous <Continuous>  enoxaparin Injectable 40 milliGRAM(s) SubCutaneous every 24 hours  escitalopram 10 milliGRAM(s) Oral daily  famotidine    Tablet 20 milliGRAM(s) Oral daily  levothyroxine 75 MICROGram(s) Oral daily  lidocaine   4% Patch 1 Patch Transdermal every 24 hours  lidocaine   4% Patch 1 Patch Transdermal every 24 hours  melatonin 6 milliGRAM(s) Oral at bedtime  metoprolol succinate ER 50 milliGRAM(s) Oral daily  OLANZapine 5 milliGRAM(s) Oral <User Schedule>  senna 2 Tablet(s) Oral at bedtime    MEDICATIONS  (PRN):  aluminum hydroxide/magnesium hydroxide/simethicone Suspension 30 milliLiter(s) Oral every 4 hours PRN Dyspepsia  OLANZapine Injectable 2.5 milliGRAM(s) IntraMuscular every 6 hours PRN agitation  ondansetron Injectable 4 milliGRAM(s) IV Push every 8 hours PRN Nausea and/or Vomiting  polyethylene glycol 3350 17 Gram(s) Oral daily PRN Constipation    PRESENT SYMPTOMS:     Pain: no  QOL impact -   Location -                    Aggravating factors -  Quality -  Radiation -  Timing-  Severity (0-10 scale):  Minimal acceptable level (0-10 scale):       Dyspnea:                         none  Anxiety:                           none  Depressed:                      none  Fatigue:                             none  Nausea:                           none  Loss of appetite:            none  Constipation:                 does not report      Other Symptoms:  [X ]All other review of systems negative       Chaplaincy Referral:  Deferred   Palliative Performance Status Version 2:      50   %    http://npcrc.org/files/news/palliative_performance_scale_ppsv2.pdf    PHYSICAL EXAM:  Vital Signs Last 24 Hrs  T(C): 36.7 (2024 05:04), Max: 36.8 (2024 13:30)  T(F): 98 (2024 05:04), Max: 98.3 (2024 13:30)  HR: 84 (2024 05:04) (84 - 104)  BP: 151/55 (2024 05:04) (114/77 - 151/55)  BP(mean): 87 (2024 05:04) (87 - 87)  RR: 15 (2024 05:04) (15 - 16)  SpO2: 96% (2024 05:04) (92% - 97%)    Parameters below as of 2024 05:04  Patient On (Oxygen Delivery Method): nasal cannula  O2 Flow (L/min): 2   I&O's Summary    GENERAL: disheveled elderly female laying in bed in NAD  HEENT: NC/AT, dry mucous membranes  PULMONARY: CTAB anteriorly, no murmur noted  CARDIOVASCULAR:  RRR, no murmur  GASTROINTESTINAL: soft, obese abd, nontender  Last BM:  documented  MUSCULOSKELETAL: +Weakness  +Bed/Wheelchair bound   NEUROLOGIC: No focal deficits  A&Ox3  SKIN: no rashes/ecchymosis noted      LABS:                        12.4   6.36  )-----------( 202      ( 2024 07:08 )             37.8       133<L>  |  99  |  12  ----------------------------<  140<H>  3.8   |  27  |  0.59    Ca    9.8      2024 07:08  Mg     1.8             Urinalysis Basic - ( 2024 07:08 )    Color: x / Appearance: x / SG: x / pH: x  Gluc: 140 mg/dL / Ketone: x  / Bili: x / Urobili: x   Blood: x / Protein: x / Nitrite: x   Leuk Esterase: x / RBC: x / WBC x   Sq Epi: x / Non Sq Epi: x / Bacteria: x      RADIOLOGY & ADDITIONAL STUDIES:    PROCEDURE DATE:  2024          INTERPRETATION:  AP chest on 2024 at 10:13 AM. Patient is   short of breath. Patient has a hip fracture.    Heart size difficult to assess. Elevated left hemidiaphragm again noted.    Again noted is increased interstitial pattern throughout all lung fields   similar to 2023 but increased from 2023. This   suggests interstitial CHF. Orthopedic anchors right humeral head again   noted.    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    --- End of Report ---    PROTEIN CALORIE MALNUTRITION PRESENT: [ ]mild [ ]moderate [ ]severe [ ]underweight [ ]morbid obesity  https://www.andeal.org/vault/8982/web/files/ONC/Table_Clinical%20Characteristics%20to%20Document%20Malnutrition-White%20JV%20et%20al%2020.pdf    Height (cm): 175.3 (23 @ 04:29)  Weight (kg): 89.4 (23 @ 04:29), 90.3 (23 @ 17:11), 87.498 (23 @ 14:21)  BMI (kg/m2): 29.1 (23 @ 04:29)    [ ]PPSV2 < or = to 30% [ ]significant weight loss  [ ]poor nutritional intake  [ ]anasarca[ ]Artificial Nutrition      Other REFERRALS:  [ ]Hospice  [ ]Child Life  [ ]Social Work  [ ]Case management [ ]Holistic Therapy

## 2024-01-04 NOTE — PROGRESS NOTE ADULT - NS ATTEND AMEND GEN_ALL_CORE FT
Patient seen independently.    she appears comfortable and offers no complaints    she is in af heart rate approx 100    lungs are grossly clear      suggest    increase metoprolol to 50 mg daily    PLEASE GET EKG    Repeat cxr in am Seen and d/w MYKEL Kothari NP.

## 2024-01-04 NOTE — CONSULT NOTE ADULT - CONVERSATION DETAILS
Spoke to the pt at bedside and discussed palliative care services and our supportive care team role. Discussed with the pt about returning to the hospital if needed in the future and she stated she would want to come back as necessary for tx. She was open to me calling her children for updates.    Called Jose but son Gal picked up. He asked for me to Call his sister Salma. I called Salma and introduced the palliative care team. We discussed the pt's current clinical status and she shared the pt has been at [facility] for ~3 years. Salma stated the pt has had frequent falls - about 5 in the last few years - and has been mainly bedbound. I discussed that the pt may return back to the hospital due to her bedbound state and chronic medical conditions. Salma asked about the differentiation of hospice and palliative care. I explained my role as a supportive care team to patients and families and building rapport early in fragile patients with multiple medical conditions. We discussed that the pt is not in a condition where she requires hospice at this Time. I shared my concern of her mother returning to the hospital and reassured her our team would follow at that Time.  Salma expressed her gratitude for our team's support.     This visit was provided via telehealth using audit only  technology. The patient's daughter Salma was located at home in [place]  , at the time of the visit.  The provider was located at [place], [place] at the time of the visit. The patient participated in the telehealth encounter.  Verbal consent for telehealth services was given. Spoke to the pt at bedside and discussed palliative care services and our supportive care team role. Discussed with the pt about returning to the hospital if needed in the future and she stated she would want to come back as necessary for tx. She was open to me calling her children for updates.    Called Jose but son Gal picked up. He asked for me to Call his sister Salma. I called Salma and introduced the palliative care team. We discussed the pt's current clinical status and she shared the pt has been at [facility] for ~3 years. Salma stated the pt has had frequent falls - about 5 in the last few years - and has been mainly bedbound. I discussed that the pt may return back to the hospital due to her bedbound state and chronic medical conditions. Salma asked about the differentiation of hospice and palliative care. I explained my role as a supportive care team to patients and families and building rapport early in fragile patients with multiple medical conditions. We discussed that the pt is not in a condition where she requires hospice at this Time. I shared my concern of her mother returning to the hospital and reassured her our team would follow at that Time.  Salma expressed her gratitude for our team's support.     This visit was provided via telehealth using audit only  technology. The patient's daughter Salma was located at home in [place]  , at the time of the visit.  The provider was located at [place], [place] at the time of the visit. The patient participated in the telehealth encounter.  Verbal consent for telehealth services was given.

## 2024-01-04 NOTE — PROGRESS NOTE ADULT - SUBJECTIVE AND OBJECTIVE BOX
[facility]  [id number]    Chief Complaint: s/p fall  Interval events: pt seen and examined, labs and chart reviewed. pt denies cardiopulmonary complaints. AF 70-80s bpm on tele    ALLERGIES:  aspirin (Unknown)  penicillin (Unknown)      PAST MEDICAL & SURGICAL HISTORY:  Hypertension      Hypothyroidism      Obesity      Degenerative Joint Disease      Peripheral Neuropathy      Uterine Polyp      Overactive Bladder      H/O: GI Bleed      Atrial fibrillation      Diabetes mellitus, type II      Hiatal hernia      Spinal stenosis of lumbar region      S/P knee replacement, right      S/P rotator cuff repair  right      S/P  section            CURRENT MEDICATIONS:  MEDICATIONS  (STANDING):  buPROPion XL (24-Hour) . 150 milliGRAM(s) Oral daily  dextrose 5%. 1000 milliLiter(s) (50 mL/Hr) IV Continuous <Continuous>  enoxaparin Injectable 40 milliGRAM(s) SubCutaneous every 24 hours  escitalopram 10 milliGRAM(s) Oral daily  famotidine    Tablet 20 milliGRAM(s) Oral daily  levothyroxine 75 MICROGram(s) Oral daily  lidocaine   4% Patch 1 Patch Transdermal every 24 hours  lidocaine   4% Patch 1 Patch Transdermal every 24 hours  melatonin 6 milliGRAM(s) Oral at bedtime  metoprolol succinate ER 25 milliGRAM(s) Oral daily  OLANZapine 5 milliGRAM(s) Oral <User Schedule>  potassium chloride  10 mEq/100 mL IVPB 10 milliEquivalent(s) IV Intermittent every 1 hour  senna 2 Tablet(s) Oral at bedtime    MEDICATIONS  (PRN):  aluminum hydroxide/magnesium hydroxide/simethicone Suspension 30 milliLiter(s) Oral every 4 hours PRN Dyspepsia  OLANZapine Injectable 2.5 milliGRAM(s) IntraMuscular every 6 hours PRN agitation  ondansetron Injectable 4 milliGRAM(s) IV Push every 8 hours PRN Nausea and/or Vomiting  polyethylene glycol 3350 17 Gram(s) Oral daily PRN Constipation      SOCIAL HISTORY:  denies eoth or tobacco use    FAMILY HISTORY:      ROS:  All 10 systems reviewed and positives noted in HPI    OBJECTIVE:    VITAL SIGNS:  Vital Signs Last 24 Hrs  T(C): 36.8 (2024 13:30), Max: 36.8 (2024 13:30)  T(F): 98.3 (2024 13:30), Max: 98.3 (2024 13:30)  HR: 96 (2024 13:30) (73 - 96)  BP: 114/77 (2024 13:30) (109/70 - 135/76)  BP(mean): --  RR: 16 (2024 13:30) (16 - 18)  SpO2: 97% (2024 13:30) (94% - 97%)    Parameters below as of 2024 13:30  Patient On (Oxygen Delivery Method): nasal cannula  O2 Flow (L/min): 2      PHYSICAL EXAM:  General: well appearing, no distress  HEENT: sclera anicteric  Neck: supple, no carotid bruits b/l  CVS: irregular  Chest: unlabored respirations, clear to auscultation anteriorly  Abdomen: non-distended  Extremities: mild l/e edema  Psych: normal affect      LABS:                        12.5   6.70  )-----------( 229      ( 2024 05:49 )             36.9     01-03    140  |  102  |  10  ----------------------------<  124<H>  3.4<L>   |  32<H>  |  0.55    Ca    9.9      2024 05:49  Phos  3.3     01-02  Mg     1.8     01-03        [id number] Afib  2024 Afib with pvcs      TTE: < from: TTE Echo Complete w/o Contrast w/ Doppler (24 @ 14:15) >   1. Technically difficult study.   2. Not all segments of the left ventricular endocardium are well   visualized. The overall LV function appears to be grossly normal. A   discrete regional wall motion abnormality, however, cannot be completely   ruled out.   3. Moderately enlarged right ventricle.   4. Moderately enlarged right atrium.   5. Normal left atrial size.   6. Mild thickening and calcification of the anterior and posterior   mitral valve leaflets.   7. Mild-moderate tricuspid regurgitation.   8. Sclerotic aortic valve with normal opening.    < end of copied text >       [facility]  [id number]    Chief Complaint: s/p fall  Interval events: pt seen and examined, labs and chart reviewed. pt denies cardiopulmonary complaints. AF 70-80s bpm on tele    ALLERGIES:  aspirin (Unknown)  penicillin (Unknown)      PAST MEDICAL & SURGICAL HISTORY:  Hypertension      Hypothyroidism      Obesity      Degenerative Joint Disease      Peripheral Neuropathy      Uterine Polyp      Overactive Bladder      H/O: GI Bleed      Atrial fibrillation      Diabetes mellitus, type II      Hiatal hernia      Spinal stenosis of lumbar region      S/P knee replacement, right      S/P rotator cuff repair  right      S/P  section            CURRENT MEDICATIONS:  MEDICATIONS  (STANDING):  buPROPion XL (24-Hour) . 150 milliGRAM(s) Oral daily  dextrose 5%. 1000 milliLiter(s) (50 mL/Hr) IV Continuous <Continuous>  enoxaparin Injectable 40 milliGRAM(s) SubCutaneous every 24 hours  escitalopram 10 milliGRAM(s) Oral daily  famotidine    Tablet 20 milliGRAM(s) Oral daily  levothyroxine 75 MICROGram(s) Oral daily  lidocaine   4% Patch 1 Patch Transdermal every 24 hours  lidocaine   4% Patch 1 Patch Transdermal every 24 hours  melatonin 6 milliGRAM(s) Oral at bedtime  metoprolol succinate ER 25 milliGRAM(s) Oral daily  OLANZapine 5 milliGRAM(s) Oral <User Schedule>  potassium chloride  10 mEq/100 mL IVPB 10 milliEquivalent(s) IV Intermittent every 1 hour  senna 2 Tablet(s) Oral at bedtime    MEDICATIONS  (PRN):  aluminum hydroxide/magnesium hydroxide/simethicone Suspension 30 milliLiter(s) Oral every 4 hours PRN Dyspepsia  OLANZapine Injectable 2.5 milliGRAM(s) IntraMuscular every 6 hours PRN agitation  ondansetron Injectable 4 milliGRAM(s) IV Push every 8 hours PRN Nausea and/or Vomiting  polyethylene glycol 3350 17 Gram(s) Oral daily PRN Constipation      SOCIAL HISTORY:  denies eoth or tobacco use    FAMILY HISTORY:      ROS:  All 10 systems reviewed and positives noted in HPI    OBJECTIVE:    VITAL SIGNS:  Vital Signs Last 24 Hrs  T(C): 36.8 (2024 13:30), Max: 36.8 (2024 13:30)  T(F): 98.3 (2024 13:30), Max: 98.3 (2024 13:30)  HR: 96 (2024 13:30) (73 - 96)  BP: 114/77 (2024 13:30) (109/70 - 135/76)  BP(mean): --  RR: 16 (2024 13:30) (16 - 18)  SpO2: 97% (2024 13:30) (94% - 97%)    Parameters below as of 2024 13:30  Patient On (Oxygen Delivery Method): nasal cannula  O2 Flow (L/min): 2      PHYSICAL EXAM:  General: well appearing, no distress  HEENT: sclera anicteric  Neck: supple, no carotid bruits b/l  CVS: irregular  Chest: unlabored respirations, clear to auscultation anteriorly  Abdomen: non-distended  Extremities: mild l/e edema  Psych: normal affect      LABS:                        12.5   6.70  )-----------( 229      ( 2024 05:49 )             36.9     01-03    140  |  102  |  10  ----------------------------<  124<H>  3.4<L>   |  32<H>  |  0.55    Ca    9.9      2024 05:49  Phos  3.3     01-02  Mg     1.8     01-03        [id number] Afib  2024 Afib with pvcs      TTE: < from: TTE Echo Complete w/o Contrast w/ Doppler (24 @ 14:15) >   1. Technically difficult study.   2. Not all segments of the left ventricular endocardium are well   visualized. The overall LV function appears to be grossly normal. A   discrete regional wall motion abnormality, however, cannot be completely   ruled out.   3. Moderately enlarged right ventricle.   4. Moderately enlarged right atrium.   5. Normal left atrial size.   6. Mild thickening and calcification of the anterior and posterior   mitral valve leaflets.   7. Mild-moderate tricuspid regurgitation.   8. Sclerotic aortic valve with normal opening.    < end of copied text >

## 2024-01-04 NOTE — CONSULT NOTE ADULT - ASSESSMENT
89 year old F PMH Afib not on AC for frequent falls, hypothyroidism, anxiety/depression, DM, HTN  BIBA s/p fall found to have R femoral neck fracture course complicated by delirium.  Palliative care c/s for [facility].    Debility  - PPS 50%  - bedbound, continue with good skin care    Delirium/agitation  - Seen by psych; on Zyprexa 6pm to help with sundowning  - pt mental status improved and stable currently    R Femoral fracture  - ortho consulted and rec surgical intervention, family declined; pain management with Tylenol PRN and lidocaine patch  - pt with hx frequent falls    Advanced care planning  - MOLST on chart with DNR/DNI  - pt with 2 sons and 1 daughter    Encounter for palliative care  - Introduced the palliative care team to pt at bedside, see [facility] note above.   - Pt not a candidate for hospice services and family wishing for pt to return to hospital as needed. Pt not appropriate for comfort measures at this Time. MOLST already in chart with DNR/DNI. Thank you for the consult. Palliative care team to sign off at this Time. 89 year old F PMH Afib not on AC for frequent falls, hypothyroidism, anxiety/depression, DM, HTN  BIBA s/p fall found to have R femoral neck fracture course complicated by delirium.  Palliative care c/s for [facility].    Debility  - PPS 50%  - bedbound, continue with good skin care    Delirium/agitation  - Seen by psych; on Zyprexa 6pm to help with sundowning  - pt mental status improved and stable currently    R Femoral fracture  - ortho consulted and rec surgical intervention, family declined; pain management with Tylenol PRN and lidocaine patch  - pt with hx frequent falls    Advanced care planning  - MOLST on chart with DNR/DNI  - pt with 2 sons and 1 daughter    Encounter for palliative care  - Introduced the palliative care team to pt at bedside, see [facility] note above.   - Pt not a candidate for hospice services and family wishing for pt to return to hospital as needed. Pt not appropriate for comfort measures at this Time. MOLST already in chart with DNR/DNI. Thank you for the consult. Palliative care team to sign off at this Time.

## 2024-01-05 ENCOUNTER — TRANSCRIPTION ENCOUNTER (OUTPATIENT)
Age: 89
End: 2024-01-05

## 2024-01-05 VITALS — DIASTOLIC BLOOD PRESSURE: 61 MMHG | SYSTOLIC BLOOD PRESSURE: 94 MMHG | HEART RATE: 77 BPM

## 2024-01-05 PROCEDURE — 73502 X-RAY EXAM HIP UNI 2-3 VIEWS: CPT

## 2024-01-05 PROCEDURE — 93306 TTE W/DOPPLER COMPLETE: CPT

## 2024-01-05 PROCEDURE — 86900 BLOOD TYPING SEROLOGIC ABO: CPT

## 2024-01-05 PROCEDURE — 85730 THROMBOPLASTIN TIME PARTIAL: CPT

## 2024-01-05 PROCEDURE — 96374 THER/PROPH/DIAG INJ IV PUSH: CPT

## 2024-01-05 PROCEDURE — 72192 CT PELVIS W/O DYE: CPT | Mod: MA

## 2024-01-05 PROCEDURE — 82803 BLOOD GASES ANY COMBINATION: CPT

## 2024-01-05 PROCEDURE — 83880 ASSAY OF NATRIURETIC PEPTIDE: CPT

## 2024-01-05 PROCEDURE — 85025 COMPLETE CBC W/AUTO DIFF WBC: CPT

## 2024-01-05 PROCEDURE — 71045 X-RAY EXAM CHEST 1 VIEW: CPT

## 2024-01-05 PROCEDURE — 83036 HEMOGLOBIN GLYCOSYLATED A1C: CPT

## 2024-01-05 PROCEDURE — 87186 SC STD MICRODIL/AGAR DIL: CPT

## 2024-01-05 PROCEDURE — 81001 URINALYSIS AUTO W/SCOPE: CPT

## 2024-01-05 PROCEDURE — 86850 RBC ANTIBODY SCREEN: CPT

## 2024-01-05 PROCEDURE — 93005 ELECTROCARDIOGRAM TRACING: CPT

## 2024-01-05 PROCEDURE — 86901 BLOOD TYPING SEROLOGIC RH(D): CPT

## 2024-01-05 PROCEDURE — 80053 COMPREHEN METABOLIC PANEL: CPT

## 2024-01-05 PROCEDURE — 92610 EVALUATE SWALLOWING FUNCTION: CPT

## 2024-01-05 PROCEDURE — 99239 HOSP IP/OBS DSCHRG MGMT >30: CPT

## 2024-01-05 PROCEDURE — 0225U NFCT DS DNA&RNA 21 SARSCOV2: CPT

## 2024-01-05 PROCEDURE — 73552 X-RAY EXAM OF FEMUR 2/>: CPT

## 2024-01-05 PROCEDURE — 92526 ORAL FUNCTION THERAPY: CPT

## 2024-01-05 PROCEDURE — 99231 SBSQ HOSP IP/OBS SF/LOW 25: CPT

## 2024-01-05 PROCEDURE — 36415 COLL VENOUS BLD VENIPUNCTURE: CPT

## 2024-01-05 PROCEDURE — 84100 ASSAY OF PHOSPHORUS: CPT

## 2024-01-05 PROCEDURE — 82962 GLUCOSE BLOOD TEST: CPT

## 2024-01-05 PROCEDURE — 76376 3D RENDER W/INTRP POSTPROCES: CPT

## 2024-01-05 PROCEDURE — 70450 CT HEAD/BRAIN W/O DYE: CPT | Mod: MA

## 2024-01-05 PROCEDURE — 85610 PROTHROMBIN TIME: CPT

## 2024-01-05 PROCEDURE — 72125 CT NECK SPINE W/O DYE: CPT | Mod: QQ

## 2024-01-05 PROCEDURE — 87086 URINE CULTURE/COLONY COUNT: CPT

## 2024-01-05 PROCEDURE — 83735 ASSAY OF MAGNESIUM: CPT

## 2024-01-05 PROCEDURE — 99285 EMERGENCY DEPT VISIT HI MDM: CPT | Mod: 25

## 2024-01-05 PROCEDURE — 80048 BASIC METABOLIC PNL TOTAL CA: CPT

## 2024-01-05 PROCEDURE — 85027 COMPLETE CBC AUTOMATED: CPT

## 2024-01-05 RX ORDER — FUROSEMIDE 40 MG
1 TABLET ORAL
Qty: 0 | Refills: 0 | DISCHARGE
Start: 2024-01-05

## 2024-01-05 RX ORDER — ENOXAPARIN SODIUM 100 MG/ML
40 INJECTION SUBCUTANEOUS
Qty: 30 | Refills: 0
Start: 2024-01-05 | End: 2024-02-03

## 2024-01-05 RX ORDER — POLYETHYLENE GLYCOL 3350 17 G/17G
17 POWDER, FOR SOLUTION ORAL
Qty: 0 | Refills: 0 | DISCHARGE
Start: 2024-01-05

## 2024-01-05 RX ADMIN — POLYETHYLENE GLYCOL 3350 17 GRAM(S): 17 POWDER, FOR SOLUTION ORAL at 11:50

## 2024-01-05 RX ADMIN — BUPROPION HYDROCHLORIDE 150 MILLIGRAM(S): 150 TABLET, EXTENDED RELEASE ORAL at 11:50

## 2024-01-05 RX ADMIN — ENOXAPARIN SODIUM 40 MILLIGRAM(S): 100 INJECTION SUBCUTANEOUS at 11:50

## 2024-01-05 RX ADMIN — FAMOTIDINE 20 MILLIGRAM(S): 10 INJECTION INTRAVENOUS at 11:50

## 2024-01-05 RX ADMIN — OLANZAPINE 5 MILLIGRAM(S): 15 TABLET, FILM COATED ORAL at 17:17

## 2024-01-05 RX ADMIN — ESCITALOPRAM OXALATE 10 MILLIGRAM(S): 10 TABLET, FILM COATED ORAL at 11:50

## 2024-01-05 NOTE — DISCHARGE NOTE NURSING/CASE MANAGEMENT/SOCIAL WORK - NSDCPEFALRISK_GEN_ALL_CORE
For information on Fall & Injury Prevention, visit: [email or web address] OR  [email or web address] OR  https://www.cdc.gov/steadi/patient.html For information on Fall & Injury Prevention, visit: [email or web address] OR  [email or web address] OR  https://www.cdc.gov/steadi/patient.html

## 2024-01-05 NOTE — PROGRESS NOTE ADULT - SUBJECTIVE AND OBJECTIVE BOX
[facility]  [id number]    Chief Complaint: s/p fall  Interval events: pt seen and examined, labs and chart reviewed. pt denies cardiopulmonary complaints. pt tolerating room air AF 50-60s bpm on tele    ALLERGIES:  aspirin (Unknown)  penicillin (Unknown)      PAST MEDICAL & SURGICAL HISTORY:  Hypertension      Hypothyroidism      Obesity      Degenerative Joint Disease      Peripheral Neuropathy      Uterine Polyp      Overactive Bladder      H/O: GI Bleed      Atrial fibrillation      Diabetes mellitus, type II      Hiatal hernia      Spinal stenosis of lumbar region      S/P knee replacement, right      S/P rotator cuff repair  right      S/P  section            CURRENT MEDICATIONS:  MEDICATIONS  (STANDING):  buPROPion XL (24-Hour) . 150 milliGRAM(s) Oral daily  dextrose 5%. 1000 milliLiter(s) (50 mL/Hr) IV Continuous <Continuous>  enoxaparin Injectable 40 milliGRAM(s) SubCutaneous every 24 hours  escitalopram 10 milliGRAM(s) Oral daily  famotidine    Tablet 20 milliGRAM(s) Oral daily  levothyroxine 75 MICROGram(s) Oral daily  lidocaine   4% Patch 1 Patch Transdermal every 24 hours  lidocaine   4% Patch 1 Patch Transdermal every 24 hours  melatonin 6 milliGRAM(s) Oral at bedtime  metoprolol succinate ER 25 milliGRAM(s) Oral daily  OLANZapine 5 milliGRAM(s) Oral <User Schedule>  potassium chloride  10 mEq/100 mL IVPB 10 milliEquivalent(s) IV Intermittent every 1 hour  senna 2 Tablet(s) Oral at bedtime    MEDICATIONS  (PRN):  aluminum hydroxide/magnesium hydroxide/simethicone Suspension 30 milliLiter(s) Oral every 4 hours PRN Dyspepsia  OLANZapine Injectable 2.5 milliGRAM(s) IntraMuscular every 6 hours PRN agitation  ondansetron Injectable 4 milliGRAM(s) IV Push every 8 hours PRN Nausea and/or Vomiting  polyethylene glycol 3350 17 Gram(s) Oral daily PRN Constipation      SOCIAL HISTORY:  denies eoth or tobacco use    FAMILY HISTORY:      ROS:  All 10 systems reviewed and positives noted in HPI    OBJECTIVE:    VITAL SIGNS:  Vital Signs Last 24 Hrs  T(C): 36.8 (2024 13:30), Max: 36.8 (2024 13:30)  T(F): 98.3 (2024 13:30), Max: 98.3 (2024 13:30)  HR: 96 (2024 13:30) (73 - 96)  BP: 114/77 (2024 13:30) (109/70 - 135/76)  BP(mean): --  RR: 16 (2024 13:30) (16 - 18)  SpO2: 97% (2024 13:30) (94% - 97%)    Parameters below as of 2024 13:30  Patient On (Oxygen Delivery Method): nasal cannula  O2 Flow (L/min): 2      PHYSICAL EXAM:  General: drowsy  HEENT: sclera anicteric  Neck: supple, no carotid bruits b/l  CVS: irregular  Chest: unlabored respirations, clear to auscultation anteriorly  Abdomen: non-distended  Extremities: mild l/e edema  Psych: normal affect      LABS:                        12.5   6.70  )-----------( 229      ( 2024 05:49 )             36.9     01-03    140  |  102  |  10  ----------------------------<  124<H>  3.4<L>   |  32<H>  |  0.55    Ca    9.9      2024 05:49  Phos  3.3     01-02  Mg     1.8     01-03        EC2023 Afib  2024 Afib with pvcs      TTE: < from: TTE Echo Complete w/o Contrast w/ Doppler (24 @ 14:15) >   1. Technically difficult study.   2. Not all segments of the left ventricular endocardium are well   visualized. The overall LV function appears to be grossly normal. A   discrete regional wall motion abnormality, however, cannot be completely   ruled out.   3. Moderately enlarged right ventricle.   4. Moderately enlarged right atrium.   5. Normal left atrial size.   6. Mild thickening and calcification of the anterior and posterior   mitral valve leaflets.   7. Mild-moderate tricuspid regurgitation.   8. Sclerotic aortic valve with normal opening.    < end of copied text >       [facility]  [id number]    Chief Complaint: s/p fall  Interval events: pt seen and examined, labs and chart reviewed. pt denies cardiopulmonary complaints. pt tolerating room air AF 50-60s bpm on tele    ALLERGIES:  aspirin (Unknown)  penicillin (Unknown)      PAST MEDICAL & SURGICAL HISTORY:  Hypertension      Hypothyroidism      Obesity      Degenerative Joint Disease      Peripheral Neuropathy      Uterine Polyp      Overactive Bladder      H/O: GI Bleed      Atrial fibrillation      Diabetes mellitus, type II      Hiatal hernia      Spinal stenosis of lumbar region      S/P knee replacement, right      S/P rotator cuff repair  right      S/P  section            CURRENT MEDICATIONS:  MEDICATIONS  (STANDING):  buPROPion XL (24-Hour) . 150 milliGRAM(s) Oral daily  dextrose 5%. 1000 milliLiter(s) (50 mL/Hr) IV Continuous <Continuous>  enoxaparin Injectable 40 milliGRAM(s) SubCutaneous every 24 hours  escitalopram 10 milliGRAM(s) Oral daily  famotidine    Tablet 20 milliGRAM(s) Oral daily  levothyroxine 75 MICROGram(s) Oral daily  lidocaine   4% Patch 1 Patch Transdermal every 24 hours  lidocaine   4% Patch 1 Patch Transdermal every 24 hours  melatonin 6 milliGRAM(s) Oral at bedtime  metoprolol succinate ER 25 milliGRAM(s) Oral daily  OLANZapine 5 milliGRAM(s) Oral <User Schedule>  potassium chloride  10 mEq/100 mL IVPB 10 milliEquivalent(s) IV Intermittent every 1 hour  senna 2 Tablet(s) Oral at bedtime    MEDICATIONS  (PRN):  aluminum hydroxide/magnesium hydroxide/simethicone Suspension 30 milliLiter(s) Oral every 4 hours PRN Dyspepsia  OLANZapine Injectable 2.5 milliGRAM(s) IntraMuscular every 6 hours PRN agitation  ondansetron Injectable 4 milliGRAM(s) IV Push every 8 hours PRN Nausea and/or Vomiting  polyethylene glycol 3350 17 Gram(s) Oral daily PRN Constipation      SOCIAL HISTORY:  denies eoth or tobacco use    FAMILY HISTORY:      ROS:  All 10 systems reviewed and positives noted in HPI    OBJECTIVE:    VITAL SIGNS:  Vital Signs Last 24 Hrs  T(C): 36.8 (2024 13:30), Max: 36.8 (2024 13:30)  T(F): 98.3 (2024 13:30), Max: 98.3 (2024 13:30)  HR: 96 (2024 13:30) (73 - 96)  BP: 114/77 (2024 13:30) (109/70 - 135/76)  BP(mean): --  RR: 16 (2024 13:30) (16 - 18)  SpO2: 97% (2024 13:30) (94% - 97%)    Parameters below as of 2024 13:30  Patient On (Oxygen Delivery Method): nasal cannula  O2 Flow (L/min): 2      PHYSICAL EXAM:  General: drowsy  HEENT: sclera anicteric  Neck: supple, no carotid bruits b/l  CVS: irregular  Chest: unlabored respirations, clear to auscultation anteriorly  Abdomen: non-distended  Extremities: mild l/e edema  Psych: normal affect      LABS:                        12.5   6.70  )-----------( 229      ( 2024 05:49 )             36.9     01-03    140  |  102  |  10  ----------------------------<  124<H>  3.4<L>   |  32<H>  |  0.55    Ca    9.9      2024 05:49  Phos  3.3     01-02  Mg     1.8     01-03        EC2023 Afib  2024 Afib with pvcs      TTE: < from: TTE Echo Complete w/o Contrast w/ Doppler (24 @ 14:15) >   1. Technically difficult study.   2. Not all segments of the left ventricular endocardium are well   visualized. The overall LV function appears to be grossly normal. A   discrete regional wall motion abnormality, however, cannot be completely   ruled out.   3. Moderately enlarged right ventricle.   4. Moderately enlarged right atrium.   5. Normal left atrial size.   6. Mild thickening and calcification of the anterior and posterior   mitral valve leaflets.   7. Mild-moderate tricuspid regurgitation.   8. Sclerotic aortic valve with normal opening.    < end of copied text >

## 2024-01-05 NOTE — DISCHARGE NOTE PROVIDER - CARE PROVIDER_API CALL
London Chun  Internal Medicine  [address] [facility]  [place] [address]  Phone: [phone]  Fax: [phone]  Follow Up Time:    London Chun  Internal Medicine  [address] [facility] [facility] [address]  [place] [address]  Phone: [phone]  Fax: [phone]  Follow Up Time:

## 2024-01-05 NOTE — DISCHARGE NOTE NURSING/CASE MANAGEMENT/SOCIAL WORK - PATIENT PORTAL LINK FT
You can access the FollowMyHealth Patient Portal offered by [facility] by registering at the following website: [email or web address]. By joining [employer]’s FollowMyHealth portal, you will also be able to view your health information using other applications (apps) compatible with our system. You can access the FollowMyHealth Patient Portal offered by [facility] by registering at the following website: [email or web address]. By joining [employer]’s FollowMyHealth portal, you will also be able to view your health information using other applications (apps) compatible with our system.

## 2024-01-05 NOTE — PROGRESS NOTE ADULT - ASSESSMENT
89 year old F PMH Afib not on AC for frequent falls, hypothyroidism, anxiety/depression, DM, HTN  BIBA s/p fall found to have R femoral neck fracture course complicated by delirium.  Cardiology consulted for concern for chf exacerbation. hx Hfpef, pt denies cp or sob.     recommendations:  - pro bnp improved  - tte difficult study, last study 8/2019 EF 47%, grade III DD. moderate TR. moderate AS  - xr with mild- mod CHF, repeat pending official read, however seems improved  - pt now on room air, agree with po diuresis, monitor electrolytes and kidney function    Cardiology will sign off, please reconsult as needed    Liya HUNT-BC

## 2024-01-05 NOTE — DISCHARGE NOTE PROVIDER - NSDCCPCAREPLAN_GEN_ALL_CORE_FT
PRINCIPAL DISCHARGE DIAGNOSIS  Diagnosis: Hip fracture  Assessment and Plan of Treatment: You were found to have right femur fracture. You were evaluated by Orthopedics.   Surgery was not performed due to family request and bed-bound status

## 2024-01-05 NOTE — DISCHARGE NOTE PROVIDER - NSDCMRMEDTOKEN_GEN_ALL_CORE_FT
BuSpar 5 mg oral tablet: 1 orally 2 times a day  famotidine 20 mg oral tablet: 1 tab(s) orally once a day  Feosol 325 mg (65 mg elemental iron) oral tablet: 1 tab(s) orally once a day  furosemide 40 mg oral tablet: 1 tab(s) orally 2 times a day  gabapentin 100 mg oral capsule: 2 cap(s) orally once a day  levothyroxine 75 mcg (0.075 mg) oral tablet: 1 tab(s) orally once a day  Lexapro 10 mg oral tablet: 1 tab(s) orally once a day  metFORMIN 500 mg oral tablet: 1 orally 2 times a day  Metoprolol Succinate ER 25 mg oral tablet, extended release: 1 tab(s) orally once a day  polyethylene glycol 3350 oral powder for reconstitution: 17 gram(s) orally once a day As needed Constipation  senna leaf extract oral tablet: 1 tab(s) orally once a day (at bedtime)  Tylenol 325 mg oral tablet: 2 tab(s) orally every 6 hours as needed for  mild pain  Wellbutrin  mg/24 hours oral tablet, extended release: 1 orally once a day

## 2024-01-05 NOTE — PROGRESS NOTE ADULT - PROVIDER SPECIALTY LIST ADULT
Critical Care
Hospitalist
Cardiology
Cardiology
Critical Care
Hospitalist

## 2024-01-16 NOTE — PATIENT PROFILE ADULT - DO YOU FEEL LIKE HURTING YOURSELF OR OTHERS?
Post-menopausal vaginal health    Women, both menopausal and perimenopausal, experience vaginal dryness and/or atrophy. Using moisturizing lubricants can promote blood flow to the skin of the vulva and vagina and encourages more skin flexibility and thickness.    There are two components which are important for reducing vaginal dryness. The first involves moisturizing the outer genital skin called the vulva. The second involves moisturizing and healing the vaginal surface itself.    Choosing Personal Lubricants    Lubricants applied directly to the skin and vagina are a good solution for vaginal dryness. Two main features should be considered when making your choice:    · Add moisture to the skin and/or    · Seal in moisture    To moisturize, look for water-based ingredients, because water equals moisture. Other ingredients (called humectants) hold water at the surface of the skin. Sealing lubricants hold water in place and don’t allow it to dry away. Avoid lubricants with parabens.     Moisturizing ingredients:    · Aloe Vera    · Hyaluronic Acid    · Hydroxyethylcellulose or plant cellulose    · Carrageenan    Sealing ingredients that do not add moisture:    · Dimethicone    · Dimethiconol    · Vitamin E (use in combination with other ingredients)    · Coconut oil/butter (use in combination with moisturizing ingredients only)    Recommended Brands:    · Sliquid Organics Silk, green label (moisturizes and seals)    · Pink Indulgence (moisturizes and seals)    · Oasis silk (moisturizes and seals)    · One Move Silicone (silicone, seals only)    · Uberlube (silicone, seals only)    Avoid:    · Glycerin    · Mineral oil    · Olive oil    · Jojoba oil    · Beeswax or Paraffin    · Petrolatum/Petroleum ointment    Step 1- External Vulva Massage and Moisturizing    This involves massaging the entire vulva (both sets of lips, the opening of the vagina and the area between the vagina and anus) to bring blood to the skin  and encourage skin health.    · First apply about a quarter-sized dab of a moisturizing lubricant and smooth it lightly around so everything is slippery.    · Then use your finger tips (two or three) to press into the skin and then let up the pressure (called press-and-release massage), working your way around the outer lips of your vulva (where the hair grows). It might help to think of a clock face and work your way from 12:00 around and back to 12:00 again.    · When you are finished massaging your outer lips, massage your inner lips (where hair doesn’t grow). Add more moisturizing lubricant as needed.    · Gently pinch the inner lips between your thumb and fingertips with a gentle squeezing motion. This should not hurt. Your goal is to move the blood out and allow it to return.    · Work your way all around the inner lips on both sides and up to the clitoris. Press and release on the clitoris and clitoral nunez also.    · Next massage the area around your vaginal opening using press-and-release all around the opening from 12:00 to 12:00 again.    The last area you should focus on is your perineum, the skin between your vaginal opening and anus. If you’re having pain with penetration, it is especially important to include this part    · Apply about a quarter-size portion of lubricant to your thumb and index finger into your vaginal opening.    · Press down into the skin of your lower vaginal wall (toward your anus) with your thumb while gently squeezing the tissue of the perineum with your index finger.    · Allow the muscles underneath the skin to relax and gently sweep your lubricated thumb from side to side. You are not trying to stretch the skin but rather help it become more flexible.    After 4-6 weeks using the vaginal moisturizing and massage, most can resume or begin vaginal penetration with comfort.    When engaging in intimate play involving vaginal penetration (intercourse, finger play or toys):    ·  We recommend using a silicone-based lubricant as your lubricant rather than the moisturizing lubricant you use for moisturizing and massaging.    · Silicone lubricants will stay slippery much longer and will help protect your skin from painful tearing.    · You may need to apply more than one time during this time.     Recommended silicone brands include:    · FeMani Smooth Touch (silicone)    · Uberlube (silicone)    · Good Clean Love    ·  Astroglide Natural or Yes Baby.  Other Sexual Health Tips    · Enjoy at least one orgasm per week. Orgasms bring blood flow to your pelvis which helps keep your tissues strong and thick and your pelvic muscles strong and flexible.    · Eat a healthy diet. The whole food Mediterranean diet focuses on antioxidant-rich, colorful food. It includes lots of fruits, vegetables, garlic, whole grains, nuts, beans, fish and poultry, but no high fructose corn syrup sweetened products. Chocolate, coffee and tea also contain sex-healthy antioxidants. Don’t forget to drink water.    · Exercise to a sweat 30 minutes a day, six days a week. Exercise is the best thing you can do for you overall health and sexual health. Studies show that women who exercise until they sweat just before being sexual active experience higher levels of sexual arousal than those who don’t. Prime your pump and get moving.    · Stop Smoking. Also avoid second -hand smoke as well as places where people have smoked. Smoke contains chemicals that directly damage your blood vessels. Damaged blood vessels weaken your skin and lengthen the time it takes to heal from injury. So avoiding smoke in all forms is a major step towaRrds protecting your sexual health.    · Floss your teeth. How simple! Reducing inflammation in your gums cuts down on whole body inflammation. Body inflammation has a direct negative link to healthy sexual arousal and flossing your teeth daily is one of the easiest ways to keep your sexual self in  tip-top shape.    no

## 2024-03-07 NOTE — ED ADULT NURSE NOTE - SUICIDE SCREENING DEPRESSION
Pediatric Well Child Exam: 3 Years of age    Chief Complaint   Patient presents with    Well Child     3 year well child-doing well    Well Child       SUBJECTIVE:  Trevin Walker is a 3 year old male who presents for a well child exam.  Patient presents with Mother, father, and siblings.     CONCERNS RAISED TODAY: none    SLEEP PATTERN:  Hours / Night: [id number].    NAPS: Hours / Day: Some days does a nap and sometimes not. More active days will get more tired out. 2 hours when he does nap    NUTRITION/GI:  Appetite: Good.  Milk: Doesn't really drink milk, but eats yogurt and cheese  Food:   Eats fruits/vegetables: [x]  YES     []  NO   Eats meat: [x]  YES     []  NO   Water Supply at Home: [employer] reverse osmosis filtered water  Stools: every other day poops. Is currently potty training and is going good when he is prompted    /HOMECARE:     :  []  YES     [x]  NO   Home with mom    FAMILY/HOME ENVIRONMENT:  Changes in home/work environment: No  Parents working outside the home: mother and father  Toxic Exposure:  Tobacco Use: Never             DEVELOPMENT:  [x]  YES    []  NO      []  UNKNOWN    Goes up/down stairs, alternating feet?  [x]  YES    []  NO      []  UNKNOWN    Toilet trained during day?  [x]  YES    []  NO      []  UNKNOWN    Scribbles/may copy [place]?  [x]  YES    []  NO      []  UNKNOWN    Eats independently?  [x]  YES    []  NO      []  UNKNOWN    Helps with dressing?  [x]  YES    []  NO      []  UNKNOWN    Understands big/small, more/less?  [x]  YES    []  NO      []  UNKNOWN    Knows own age/gender?  [x]  YES    []  NO      []  UNKNOWN    Knows basic colors?  [x]  YES    []  NO      []  UNKNOWN    3 word sentences 75% intelligible?  [x]  YES    []  NO      []  UNKNOWN    Uses basic pronouns correctly?  [x]  YES    []  NO      []  UNKNOWN    Uses plurals?  [x]  YES    []  NO      []  UNKNOWN    Asks to be read to?  [x]  YES    []  NO      []  UNKNOWN    Shows  imagination?    OBJECTIVE:  PAST HISTORIES:  Allergies, Medications, Medical history, Surgical history, Family history reviewed and updated.  IMMUNIZATION STATUS: Not up to date.  Needed immunizations include: Hep B, IPV, Hib, Pneumo, DTaP, Influenza, COVID, MMR, Varicella, Hep A.    IMMUNIZATION REACTIONS: None  VARICELLA STATUS: non-immune    RECENT HEALTH EVENTS:  Illnesses: None.  Hospitalizations: None.  Injuries or Accidents: None.    REVIEW OF SYSTEMS:    All systems reviewed and negative except as documented in \"Concerns raised\".    PHYSICAL EXAM:   VITAL SIGNS: Visit Vitals  Pulse 86   Temp 97.5 °F (36.4 °C) (Temporal)   Ht 3' 1\" (0.94 m)   Wt 15 kg (33 lb 1.6 oz)   SpO2 99%   BMI 17.00 kg/m²    65 %ile (Z= 0.39) based on CDC (Boys, 2-20 Years) weight-for-age data using vitals from 3/7/2024.  GENERAL:  Well appearing male child.  Alert and active.  SKIN:  Warm, normal turgor.  No cyanosis.  No rash.  HEAD:  Normocephalic, atraumatic.    EYES:  Conjunctivae appear normal, non-injected, nonicteric, positive red reflex.  NOSE:  Appears normal without drainage.  EARS:  Normal external auditory canals. Tympanic membraness are transparent with normal landmarks.  THROAT:  Moist mucous membranes without lesions.  NECK:  Supple, no lymphadenopathy or masses.  HEART:  Regular rate and rythm.  Normal S1, S2.  No murmurs, rubs, gallops.   LUNGS:  Clear to auscultation.  No wheezes, rales, rhonchi.  Normal work effort with breathing.  ABDOMEN:  Bowel sounds present. Soft, non tender.  No hepatomegaly.  No splenomegaly.  No masses.  GENITOURINARY: Ben stage 1. Bilateral testes without masses or hernia.  Rectum/anus patent.  EXTREMITIES: Normal bilateral range of motion of upper and lower extremities. Peripheral pulses 2/4. Equal femoral pulses.  BACK: Spine straight.   NEUROLOGIC:  Normal muscle tone and symmetrical strength. Symmetrical facial motor function.     Assessment:  3 year old male well child, meeting  developmental milestones, with appropriate growth charts.    Plan:  All parental concerns and questions discussed.  Anticipatory guidance provided, handout/s given Safety: Car, bicycle, fire, sharp objects, falls, water; Development; Diet; Discipline; Analgesics/antipyretics; Sun exposure; Dental care  Parents decline vaccines    Follow up: Return in about 1 year (around 3/7/2025) for Well Child Visit.    Shara Castillo MD, MPH  [facility]      Negative

## 2024-03-31 NOTE — INPATIENT CERTIFICATION FOR MEDICARE PATIENTS - PHYSICIAN CONCUR
I concur with the Admission Order and I certify that services are provided in accordance with Section 42 CFR § 412.3 Psych/Behavioral

## 2024-04-25 NOTE — ED ADULT TRIAGE NOTE - PAIN: PRESENCE, MLM
, this is a previous pt of Chas but you saw him October for an acute visit and he is scheduled with you on May 10. He is asking if you can send a short refill of DULoxetine (CYMBALTA) 60 MG extended release capsule.    If appropriate, please send to,  [id number] [address] - P [phone] - F [phone]      
Pt called to check the status of this refill. He was reminded that medications can take up to 72 business for a response  
denies pain/discomfort

## 2024-05-01 NOTE — ED ADULT NURSE NOTE - ISAR SCREEN YN
I reviewed History, PE, A/P and medical record.  Services provided in outpatient department of a teaching hospital/facility, I was immediately available.  I agree with resident. Care provided was reasonable and necessary.   I evaluated the patient with resident at time of visit. Treated with doxy 100mg BID x 14 days due to PCN allergy for syphilis in 6/2022 by [facility] urgent care   Yes

## 2024-05-30 NOTE — PHYSICAL THERAPY INITIAL EVALUATION ADULT - AMBULATION SKILLS, REHAB EVAL
Call to Arielle Mann that procedure was scheduled for 6/10/24 and that [facility] should call her a few days before for the pre op call and after 3:00 PM the business day before with the arrival time. Instructed Arielle to hold ibuprofen for 24 hours, Celebrex, Mobic, and naprosyn for 4 days and any aspirin containing products, CoQ 10, or fish oil for 7 days. Instructed to call office back if any questions. Arielle verbalized understanding.    Electronically signed by GI ROJAS RN on 5/30/2024 at 11:02 AM   unable to perform

## 2024-07-18 NOTE — BH CONSULTATION LIAISON ASSESSMENT NOTE - NSBHCONSULTWRKUPYES_PSY_A_CORE
4y11m with no medical history presenting with 2 months of R eye swelling and 2 weeks of recurring fever (105 today at 1PM). The eye swelling usually begins in the morning and goes away throughout the day, but the current swelling stayed since yesterday morning. Endorses pruritis and slight discharge. Denies headache, n/v, chills, SOB, CP, diarrhea.    Patient recently had a postauricular ringworm infection, currently being treated with a topical azole, and self-resolved Coxsackie disease with oral lesions (seen by PCP 2 weeks ago).    Impression: Conjunctivitis Most likely Allergic    I, Seb Hobbs, performed the initial face to face bedside interview with this patient regarding history of present illness, review of symptoms and relevant past medical, social and family history.  I completed an independent physical examination.  I was the initial provider who evaluated this patient. I have signed out the follow up of any pending tests (i.e. labs, radiological studies) to the resident   I have communicated the patient’s plan of care and disposition with the resident EKG

## 2024-08-15 NOTE — ED ADULT NURSE NOTE - CAS EDP DISCH TYPE
I agree with the Care Coordinator'Bert Macdonald MD Orthopedic Surgery [phone]    12/5/2024 9:20 AM Rob Luna MD Family Medicine [phone]            
Home

## 2024-09-17 NOTE — DISCHARGE NOTE PROVIDER - REASON FOR ADMISSION
Size Of Lesion In Cm (Optional): 0 Introduction Text (Please End With A Colon): The following procedure was deferred: Detail Level: Detailed Procedure To Be Performed At Next Visit: Cryotherapy s/p fall

## 2024-10-15 NOTE — ED ADULT NURSE NOTE - CCCP TRG CHIEF CMPLNT
Subjective   Patient ID: Edna Dawson is a 59 y.o. female who presents for No chief complaint on file..  HPI  Age at menopause 57  H/o LS  She is  not a candidate for systemic estrogen; d/t h/o DVT x3  I prescribed her oxybutynin 5 mg that I later increased to 10mg    Previously tried to stop the oxybutynin but had a worsening of VMS  No AE  Happy with the vaginal estrogen that she applies 3 times/week  Clobetasol 2-3 times/week for LS, states it is well managed      Review of Systems    Objective   Physical Exam    Assessment/Plan   Diagnoses and all orders for this visit:  Menopause  -     oxybutynin (Ditropan) 5 mg tablet; Take 1 tablet (5 mg) by mouth 2 times a day.  Vaginal atrophy  -     estradiol (Estrace) 0.01 % (0.1 mg/gram) vaginal cream; Apply pea sized amount  to vaginal opening every Monday, Wednesday and Friday evening           MILLER Ramos-CNP 10/15/24 8:57 AM   
urinary/bladder trouble

## 2024-12-10 NOTE — ED BEHAVIORAL HEALTH ASSESSMENT NOTE - EYE CONTACT
I also added a folate level and TSH to the CBC.    Chronic alcohol use can affect the way the liver functions thereby affecting nutrients that are involved with the red cells.    Vitamin or nutrient deficiencies can also affect.     Recommend avoid alcohol for the next week early.  Recheck CBC next week to trend orders placed.  MAT   Good/Fair

## 2024-12-21 NOTE — H&P ADULT - ASSESSMENT
This patient is an 87yoF with PMH of afib, htn, T2DM, hypothyroidism, neuropathy, recurrent UTIs who presents to ED from assisted living facility for complaint of abdominal pain. History obtained from previous documentation from ED and surgical team because patient declined to speak to me, yelling that she was angry that she did not have a bed yet. Epigastric abdominal pain started after patient ate spicy Chinese food and ice cream. Pain was constant and radiated upwards. Pain was described as dullness, with intermittent sharpness, and severe. She denied any fever, chills, N/V, diarrhea or constipation. Pt was passing gas.     In the ED T 98.2, HR 97, /87, RR 20, SpO2 93% RA   Patient was given acetaminophen 1g, zosyn 3.375g IV x1, 2L NS. Pt also given famotidine 20mg IVP, morphine 4mg IVP, zofran 4mg IVP.    Patient declined to provide review of systems, family history, social history, and declined physical exam because she is angry that she does not have a bed yet. 21-Dec-2024 22:00 This patient is an 87yoF with PMH of afib, htn, T2DM, hypothyroidism, neuropathy, recurrent UTIs who presents to ED from assisted living facility for complaint of abdominal pain, found to have acute cholecystitis, declining surgical intervention.

## 2025-01-11 ENCOUNTER — INPATIENT (INPATIENT)
Facility: HOSPITAL | Age: 89
LOS: 3 days | Discharge: NURSING, REHAB OR LONG-TERM CARE | DRG: 179 | End: 2025-01-15
Attending: HOSPITALIST | Admitting: STUDENT IN AN ORGANIZED HEALTH CARE EDUCATION/TRAINING PROGRAM
Payer: MEDICARE

## 2025-01-11 VITALS
SYSTOLIC BLOOD PRESSURE: 103 MMHG | RESPIRATION RATE: 25 BRPM | OXYGEN SATURATION: 96 % | TEMPERATURE: 97 F | WEIGHT: 190.04 LBS | HEART RATE: 130 BPM | DIASTOLIC BLOOD PRESSURE: 73 MMHG

## 2025-01-11 DIAGNOSIS — Z98.89 OTHER SPECIFIED POSTPROCEDURAL STATES: Chronic | ICD-10-CM

## 2025-01-11 DIAGNOSIS — U07.1 COVID-19: ICD-10-CM

## 2025-01-11 DIAGNOSIS — Z96.651 PRESENCE OF RIGHT ARTIFICIAL KNEE JOINT: Chronic | ICD-10-CM

## 2025-01-11 DIAGNOSIS — Z98.891 HISTORY OF UTERINE SCAR FROM PREVIOUS SURGERY: Chronic | ICD-10-CM

## 2025-01-11 LAB
ALBUMIN SERPL ELPH-MCNC: 3.1 G/DL — LOW (ref 3.3–5)
ALP SERPL-CCNC: 83 U/L — SIGNIFICANT CHANGE UP (ref 40–120)
ALT FLD-CCNC: 16 U/L — SIGNIFICANT CHANGE UP (ref 10–45)
ANION GAP SERPL CALC-SCNC: 8 MMOL/L — SIGNIFICANT CHANGE UP (ref 5–17)
APPEARANCE UR: ABNORMAL
APTT BLD: 29.6 SEC — SIGNIFICANT CHANGE UP (ref 24.5–35.6)
AST SERPL-CCNC: 13 U/L — SIGNIFICANT CHANGE UP (ref 10–40)
BACTERIA # UR AUTO: NEGATIVE /HPF — SIGNIFICANT CHANGE UP
BASOPHILS # BLD AUTO: 0.08 K/UL — SIGNIFICANT CHANGE UP (ref 0–0.2)
BASOPHILS NFR BLD AUTO: 0.8 % — SIGNIFICANT CHANGE UP (ref 0–2)
BILIRUB SERPL-MCNC: 0.7 MG/DL — SIGNIFICANT CHANGE UP (ref 0.2–1.2)
BILIRUB UR-MCNC: NEGATIVE — SIGNIFICANT CHANGE UP
BUN SERPL-MCNC: 10 MG/DL — SIGNIFICANT CHANGE UP (ref 7–23)
CALCIUM SERPL-MCNC: 9.9 MG/DL — SIGNIFICANT CHANGE UP (ref 8.4–10.5)
CHLORIDE SERPL-SCNC: 98 MMOL/L — SIGNIFICANT CHANGE UP (ref 96–108)
CO2 SERPL-SCNC: 28 MMOL/L — SIGNIFICANT CHANGE UP (ref 22–31)
COLOR SPEC: YELLOW — SIGNIFICANT CHANGE UP
CREAT SERPL-MCNC: 0.8 MG/DL — SIGNIFICANT CHANGE UP (ref 0.5–1.3)
DIFF PNL FLD: ABNORMAL
EGFR: 68 ML/MIN/1.73M2 — SIGNIFICANT CHANGE UP
EOSINOPHIL # BLD AUTO: 0.17 K/UL — SIGNIFICANT CHANGE UP (ref 0–0.5)
EOSINOPHIL NFR BLD AUTO: 1.8 % — SIGNIFICANT CHANGE UP (ref 0–6)
EPI CELLS # UR: SIGNIFICANT CHANGE UP
FLUAV AG NPH QL: SIGNIFICANT CHANGE UP
FLUBV AG NPH QL: SIGNIFICANT CHANGE UP
GLUCOSE BLDC GLUCOMTR-MCNC: 190 MG/DL — HIGH (ref 70–99)
GLUCOSE SERPL-MCNC: 140 MG/DL — HIGH (ref 70–99)
GLUCOSE UR QL: NEGATIVE MG/DL — SIGNIFICANT CHANGE UP
HCT VFR BLD CALC: 39.2 % — SIGNIFICANT CHANGE UP (ref 34.5–45)
HGB BLD-MCNC: 13.4 G/DL — SIGNIFICANT CHANGE UP (ref 11.5–15.5)
IMM GRANULOCYTES NFR BLD AUTO: 0.6 % — SIGNIFICANT CHANGE UP (ref 0–0.9)
INR BLD: 1.1 RATIO — SIGNIFICANT CHANGE UP (ref 0.85–1.16)
KETONES UR-MCNC: NEGATIVE MG/DL — SIGNIFICANT CHANGE UP
LACTATE SERPL-SCNC: 0.8 MMOL/L — SIGNIFICANT CHANGE UP (ref 0.7–2)
LEUKOCYTE ESTERASE UR-ACNC: ABNORMAL
LIDOCAIN IGE QN: 14 U/L — LOW (ref 16–77)
LYMPHOCYTES # BLD AUTO: 1.19 K/UL — SIGNIFICANT CHANGE UP (ref 1–3.3)
LYMPHOCYTES # BLD AUTO: 12.3 % — LOW (ref 13–44)
MCHC RBC-ENTMCNC: 32.8 PG — SIGNIFICANT CHANGE UP (ref 27–34)
MCHC RBC-ENTMCNC: 34.2 G/DL — SIGNIFICANT CHANGE UP (ref 32–36)
MCV RBC AUTO: 96.1 FL — SIGNIFICANT CHANGE UP (ref 80–100)
MONOCYTES # BLD AUTO: 0.84 K/UL — SIGNIFICANT CHANGE UP (ref 0–0.9)
MONOCYTES NFR BLD AUTO: 8.7 % — SIGNIFICANT CHANGE UP (ref 2–14)
NEUTROPHILS # BLD AUTO: 7.32 K/UL — SIGNIFICANT CHANGE UP (ref 1.8–7.4)
NEUTROPHILS NFR BLD AUTO: 75.8 % — SIGNIFICANT CHANGE UP (ref 43–77)
NITRITE UR-MCNC: NEGATIVE — SIGNIFICANT CHANGE UP
NRBC # BLD: 0 /100 WBCS — SIGNIFICANT CHANGE UP (ref 0–0)
NT-PROBNP SERPL-SCNC: 2120 PG/ML — HIGH (ref 0–300)
PH UR: 6.5 — SIGNIFICANT CHANGE UP (ref 5–8)
PLATELET # BLD AUTO: 291 K/UL — SIGNIFICANT CHANGE UP (ref 150–400)
POTASSIUM SERPL-MCNC: 4.1 MMOL/L — SIGNIFICANT CHANGE UP (ref 3.5–5.3)
POTASSIUM SERPL-SCNC: 4.1 MMOL/L — SIGNIFICANT CHANGE UP (ref 3.5–5.3)
PROT SERPL-MCNC: 7.6 G/DL — SIGNIFICANT CHANGE UP (ref 6–8.3)
PROT UR-MCNC: 30 MG/DL
PROTHROM AB SERPL-ACNC: 13 SEC — SIGNIFICANT CHANGE UP (ref 9.9–13.4)
RBC # BLD: 4.08 M/UL — SIGNIFICANT CHANGE UP (ref 3.8–5.2)
RBC # FLD: 13.4 % — SIGNIFICANT CHANGE UP (ref 10.3–14.5)
RBC CASTS # UR COMP ASSIST: 5 /HPF — HIGH (ref 0–4)
RSV RNA NPH QL NAA+NON-PROBE: SIGNIFICANT CHANGE UP
SARS-COV-2 RNA SPEC QL NAA+PROBE: DETECTED
SODIUM SERPL-SCNC: 134 MMOL/L — LOW (ref 135–145)
SP GR SPEC: 1.01 — SIGNIFICANT CHANGE UP (ref 1–1.03)
TROPONIN I, HIGH SENSITIVITY RESULT: 18.4 NG/L — SIGNIFICANT CHANGE UP
UROBILINOGEN FLD QL: 0.2 MG/DL — SIGNIFICANT CHANGE UP (ref 0.2–1)
WBC # BLD: 9.66 K/UL — SIGNIFICANT CHANGE UP (ref 3.8–10.5)
WBC # FLD AUTO: 9.66 K/UL — SIGNIFICANT CHANGE UP (ref 3.8–10.5)
WBC UR QL: 30 /HPF — HIGH (ref 0–5)

## 2025-01-11 PROCEDURE — 99285 EMERGENCY DEPT VISIT HI MDM: CPT

## 2025-01-11 PROCEDURE — 99223 1ST HOSP IP/OBS HIGH 75: CPT | Mod: GC

## 2025-01-11 PROCEDURE — 70450 CT HEAD/BRAIN W/O DYE: CPT | Mod: 26

## 2025-01-11 PROCEDURE — 71045 X-RAY EXAM CHEST 1 VIEW: CPT | Mod: 26

## 2025-01-11 PROCEDURE — 93010 ELECTROCARDIOGRAM REPORT: CPT

## 2025-01-11 RX ORDER — SODIUM CHLORIDE 9 MG/ML
1000 INJECTION, SOLUTION INTRAVENOUS
Refills: 0 | Status: DISCONTINUED | OUTPATIENT
Start: 2025-01-11 | End: 2025-01-15

## 2025-01-11 RX ORDER — DEXTROSE MONOHYDRATE 25 G/50ML
15 INJECTION, SOLUTION INTRAVENOUS ONCE
Refills: 0 | Status: DISCONTINUED | OUTPATIENT
Start: 2025-01-11 | End: 2025-01-15

## 2025-01-11 RX ORDER — INSULIN LISPRO 100/ML
VIAL (ML) SUBCUTANEOUS AT BEDTIME
Refills: 0 | Status: DISCONTINUED | OUTPATIENT
Start: 2025-01-11 | End: 2025-01-15

## 2025-01-11 RX ORDER — INSULIN LISPRO 100/ML
VIAL (ML) SUBCUTANEOUS
Refills: 0 | Status: DISCONTINUED | OUTPATIENT
Start: 2025-01-11 | End: 2025-01-15

## 2025-01-11 RX ORDER — FAMOTIDINE 20 MG/1
20 TABLET, FILM COATED ORAL DAILY
Refills: 0 | Status: DISCONTINUED | OUTPATIENT
Start: 2025-01-11 | End: 2025-01-13

## 2025-01-11 RX ORDER — REMDESIVIR 5 MG/ML
INJECTION INTRAVENOUS
Refills: 0 | Status: DISCONTINUED | OUTPATIENT
Start: 2025-01-11 | End: 2025-01-15

## 2025-01-11 RX ORDER — FUROSEMIDE 20 MG
1 TABLET ORAL
Refills: 0 | DISCHARGE

## 2025-01-11 RX ORDER — VANCOMYCIN HYDROCHLORIDE 5 G/100ML
1500 INJECTION, POWDER, LYOPHILIZED, FOR SOLUTION INTRAVENOUS ONCE
Refills: 0 | Status: COMPLETED | OUTPATIENT
Start: 2025-01-11 | End: 2025-01-11

## 2025-01-11 RX ORDER — GINKGO BILOBA 40 MG
3 CAPSULE ORAL AT BEDTIME
Refills: 0 | Status: DISCONTINUED | OUTPATIENT
Start: 2025-01-11 | End: 2025-01-15

## 2025-01-11 RX ORDER — DEXTROSE MONOHYDRATE 25 G/50ML
25 INJECTION, SOLUTION INTRAVENOUS ONCE
Refills: 0 | Status: DISCONTINUED | OUTPATIENT
Start: 2025-01-11 | End: 2025-01-15

## 2025-01-11 RX ORDER — GABAPENTIN 300 MG/1
200 CAPSULE ORAL DAILY
Refills: 0 | Status: DISCONTINUED | OUTPATIENT
Start: 2025-01-11 | End: 2025-01-15

## 2025-01-11 RX ORDER — LEVOTHYROXINE SODIUM 175 UG/1
55 TABLET ORAL AT BEDTIME
Refills: 0 | Status: DISCONTINUED | OUTPATIENT
Start: 2025-01-11 | End: 2025-01-15

## 2025-01-11 RX ORDER — BUSPIRONE HYDROCHLORIDE 15 MG/1
5 TABLET ORAL
Refills: 0 | Status: DISCONTINUED | OUTPATIENT
Start: 2025-01-12 | End: 2025-01-15

## 2025-01-11 RX ORDER — MEROPENEM 1 G/20ML
1000 INJECTION, POWDER, FOR SOLUTION INTRAVENOUS EVERY 8 HOURS
Refills: 0 | Status: DISCONTINUED | OUTPATIENT
Start: 2025-01-11 | End: 2025-01-12

## 2025-01-11 RX ORDER — METOPROLOL TARTRATE 50 MG
25 TABLET ORAL DAILY
Refills: 0 | Status: DISCONTINUED | OUTPATIENT
Start: 2025-01-11 | End: 2025-01-15

## 2025-01-11 RX ORDER — SENNOSIDES 8.6 MG/1
1 TABLET, FILM COATED ORAL AT BEDTIME
Refills: 0 | Status: DISCONTINUED | OUTPATIENT
Start: 2025-01-11 | End: 2025-01-15

## 2025-01-11 RX ORDER — FUROSEMIDE 20 MG
20 TABLET ORAL DAILY
Refills: 0 | Status: DISCONTINUED | OUTPATIENT
Start: 2025-01-11 | End: 2025-01-11

## 2025-01-11 RX ORDER — GLUCAGON INJECTION, SOLUTION 0.5 MG/.1ML
1 INJECTION, SOLUTION SUBCUTANEOUS ONCE
Refills: 0 | Status: DISCONTINUED | OUTPATIENT
Start: 2025-01-11 | End: 2025-01-15

## 2025-01-11 RX ORDER — CEFTRIAXONE SODIUM 1 G/1
1000 INJECTION, POWDER, FOR SOLUTION INTRAMUSCULAR; INTRAVENOUS ONCE
Refills: 0 | Status: COMPLETED | OUTPATIENT
Start: 2025-01-11 | End: 2025-01-11

## 2025-01-11 RX ORDER — BUSPIRONE HYDROCHLORIDE 15 MG/1
1 TABLET ORAL
Refills: 0 | DISCHARGE

## 2025-01-11 RX ORDER — ESCITALOPRAM OXALATE 10 MG/1
1.5 TABLET ORAL
Refills: 0 | DISCHARGE

## 2025-01-11 RX ORDER — SODIUM CHLORIDE 9 MG/ML
1000 INJECTION, SOLUTION INTRAMUSCULAR; INTRAVENOUS; SUBCUTANEOUS ONCE
Refills: 0 | Status: COMPLETED | OUTPATIENT
Start: 2025-01-11 | End: 2025-01-11

## 2025-01-11 RX ORDER — SODIUM CHLORIDE 9 MG/ML
1000 INJECTION, SOLUTION INTRAMUSCULAR; INTRAVENOUS; SUBCUTANEOUS
Refills: 0 | Status: DISCONTINUED | OUTPATIENT
Start: 2025-01-11 | End: 2025-01-15

## 2025-01-11 RX ORDER — REMDESIVIR 5 MG/ML
100 INJECTION INTRAVENOUS EVERY 24 HOURS
Refills: 0 | Status: DISCONTINUED | OUTPATIENT
Start: 2025-01-12 | End: 2025-01-15

## 2025-01-11 RX ORDER — REMDESIVIR 5 MG/ML
200 INJECTION INTRAVENOUS EVERY 24 HOURS
Refills: 0 | Status: COMPLETED | OUTPATIENT
Start: 2025-01-11 | End: 2025-01-11

## 2025-01-11 RX ORDER — VANCOMYCIN HYDROCHLORIDE 5 G/100ML
1500 INJECTION, POWDER, LYOPHILIZED, FOR SOLUTION INTRAVENOUS EVERY 24 HOURS
Refills: 0 | Status: DISCONTINUED | OUTPATIENT
Start: 2025-01-12 | End: 2025-01-12

## 2025-01-11 RX ORDER — DEXAMETHASONE SODIUM PHOSPHATE 4 MG/ML
6 VIAL (ML) INJECTION DAILY
Refills: 0 | Status: DISCONTINUED | OUTPATIENT
Start: 2025-01-11 | End: 2025-01-14

## 2025-01-11 RX ORDER — ACETAMINOPHEN 80 MG/.8ML
650 SOLUTION/ DROPS ORAL EVERY 6 HOURS
Refills: 0 | Status: DISCONTINUED | OUTPATIENT
Start: 2025-01-11 | End: 2025-01-14

## 2025-01-11 RX ORDER — BUPROPION HYDROCHLORIDE 150 MG/1
150 TABLET, EXTENDED RELEASE ORAL DAILY
Refills: 0 | Status: DISCONTINUED | OUTPATIENT
Start: 2025-01-11 | End: 2025-01-15

## 2025-01-11 RX ORDER — GINKGO BILOBA 40 MG
1 CAPSULE ORAL
Refills: 0 | DISCHARGE

## 2025-01-11 RX ORDER — POLYETHYLENE GLYCOL 3350 17 G/DOSE
17 POWDER (GRAM) ORAL DAILY
Refills: 0 | Status: DISCONTINUED | OUTPATIENT
Start: 2025-01-11 | End: 2025-01-15

## 2025-01-11 RX ORDER — ESCITALOPRAM OXALATE 10 MG/1
15 TABLET ORAL DAILY
Refills: 0 | Status: DISCONTINUED | OUTPATIENT
Start: 2025-01-11 | End: 2025-01-15

## 2025-01-11 RX ORDER — LEVOTHYROXINE SODIUM 175 UG/1
75 TABLET ORAL DAILY
Refills: 0 | Status: DISCONTINUED | OUTPATIENT
Start: 2025-01-11 | End: 2025-01-11

## 2025-01-11 RX ORDER — DEXTROSE MONOHYDRATE 25 G/50ML
12.5 INJECTION, SOLUTION INTRAVENOUS ONCE
Refills: 0 | Status: DISCONTINUED | OUTPATIENT
Start: 2025-01-11 | End: 2025-01-15

## 2025-01-11 RX ADMIN — VANCOMYCIN HYDROCHLORIDE 300 MILLIGRAM(S): 5 INJECTION, POWDER, LYOPHILIZED, FOR SOLUTION INTRAVENOUS at 21:21

## 2025-01-11 RX ADMIN — SODIUM CHLORIDE 1000 MILLILITER(S): 9 INJECTION, SOLUTION INTRAMUSCULAR; INTRAVENOUS; SUBCUTANEOUS at 19:36

## 2025-01-11 RX ADMIN — SODIUM CHLORIDE 50 MILLILITER(S): 9 INJECTION, SOLUTION INTRAMUSCULAR; INTRAVENOUS; SUBCUTANEOUS at 20:11

## 2025-01-11 RX ADMIN — MEROPENEM 1000 MILLIGRAM(S): 1 INJECTION, POWDER, FOR SOLUTION INTRAVENOUS at 20:42

## 2025-01-11 RX ADMIN — MEROPENEM 100 MILLIGRAM(S): 1 INJECTION, POWDER, FOR SOLUTION INTRAVENOUS at 20:12

## 2025-01-11 RX ADMIN — REMDESIVIR 200 MILLIGRAM(S): 5 INJECTION INTRAVENOUS at 21:16

## 2025-01-11 RX ADMIN — REMDESIVIR 200 MILLIGRAM(S): 5 INJECTION INTRAVENOUS at 20:46

## 2025-01-11 RX ADMIN — CEFTRIAXONE SODIUM 1000 MILLIGRAM(S): 1 INJECTION, POWDER, FOR SOLUTION INTRAMUSCULAR; INTRAVENOUS at 18:32

## 2025-01-11 RX ADMIN — CEFTRIAXONE SODIUM 100 MILLIGRAM(S): 1 INJECTION, POWDER, FOR SOLUTION INTRAMUSCULAR; INTRAVENOUS at 18:02

## 2025-01-11 RX ADMIN — Medication 6 MILLIGRAM(S): at 20:11

## 2025-01-11 RX ADMIN — SODIUM CHLORIDE 1000 MILLILITER(S): 9 INJECTION, SOLUTION INTRAMUSCULAR; INTRAVENOUS; SUBCUTANEOUS at 20:17

## 2025-01-11 NOTE — PATIENT PROFILE ADULT - FALL HARM RISK - HARM RISK INTERVENTIONS

## 2025-01-11 NOTE — ED PROVIDER NOTE - CLINICAL SUMMARY MEDICAL DECISION MAKING FREE TEXT BOX
Received signout 1915 from Dr. Orellana.  Patient presented from nursing home with altered mental status with fever of 101.3, tachycardia, and hypoxia, requiring 5 L nasal cannula to maintain SpO2 95%.  COVID-positive here.  Will require admission.  Patient is pending CT head and urinalysis.  Patient reportedly presents frequently like this due to UTI.  Patient reportedly completed meropenem 3 weeks ago for UTI. dr spain

## 2025-01-11 NOTE — H&P ADULT - HISTORY OF PRESENT ILLNESS
89F h/o HFpEF, Afib not on AC d/t fall/bleeding risk, hypothyroidism, DM, HTN, anxiety/depression, bedbound, presents from NH for lethargy and decreased responsiveness for 1d. History obtained from patient's sons d/t patient's altered mental status. Pt has h/o frequent UTIs and had recent treatment for UTI with meropenem. Per son pt normally conversant AOx3 at baseline, however at NH was noted to be lethargic and unable to maintain o2 sat, requiring 5L NC, prompting transfer to ED. Denies fever, cough, SOB, vomiting, diarrhea.    In the ED, pt febrile T 101.3, , /73, RR 25, sat 96% on 5L NC. Labs significant for COVID positive, UA +leuk est, mod blood, BNP 2120.  Pt was given 1L NS bolus and ceftriaxone.

## 2025-01-11 NOTE — ED PROVIDER NOTE - OBJECTIVE STATEMENT
89-year-old female with altered mental status since yesterday as per the sons.  as per the patient baseline mental status is AAO x 3, is able to hold conversation, however ,  patient is lethargic, brought into the ED.  Denies fever, cough, shortness of breath,  vomiting diarrhea.  Son states that patient usually gets UTI and present like this. Patient had UTI sensitive to ertapenem, and meropenem, completed meropenem 3 days course about 2 weeks ago.

## 2025-01-11 NOTE — ED ADULT NURSE NOTE - OBJECTIVE STATEMENT
Patient presents to ED with altered mental status since yesterday as per the sons.  as per the patient baseline mental status is AAO x 3, is able to hold conversation, however ,  patient is lethargic, brought into the ED.  Denies fever, cough, shortness of breath,  vomiting diarrhea.

## 2025-01-11 NOTE — H&P ADULT - NSHPLABSRESULTS_GEN_ALL_CORE
LABS:                        13.4   9.66  )-----------( 291      ( 2025 18:00 )             39.2         134[L]  |  98  |  10  ----------------------------<  140[H]  4.1   |  28  |  0.80    Ca    9.9      2025 18:00    TPro  7.6  /  Alb  3.1[L]  /  TBili  0.7  /  DBili  x   /  AST  13  /  ALT  16  /  AlkPhos  83      PT/INR - ( 2025 18:00 )   PT: 13.0 sec;   INR: 1.10 ratio         PTT - ( 2025 18:00 )  PTT:29.6 sec  Urinalysis Basic - ( 2025 19:10 )    Color: Yellow / Appearance: Cloudy / S.013 / pH: x  Gluc: x / Ketone: Negative mg/dL  / Bili: Negative / Urobili: 0.2 mg/dL   Blood: x / Protein: 30 mg/dL / Nitrite: Negative   Leuk Esterase: Large / RBC: 5 /HPF / WBC 30 /HPF   Sq Epi: x / Non Sq Epi: x / Bacteria: Negative /HPF       CAPILLARY BLOOD GLUCOSE            Urinalysis Basic - ( 2025 19:10 )    Color: Yellow / Appearance: Cloudy / S.013 / pH: x  Gluc: x / Ketone: Negative mg/dL  / Bili: Negative / Urobili: 0.2 mg/dL   Blood: x / Protein: 30 mg/dL / Nitrite: Negative   Leuk Esterase: Large / RBC: 5 /HPF / WBC 30 /HPF   Sq Epi: x / Non Sq Epi: x / Bacteria: Negative /HPF        RADIOLOGY & ADDITIONAL TESTS:  < from: CT Head No Cont (25 @ 20:38) >    FINDINGS: There is no obvious acute intracranial hemorrhage, mass effect   or midline shift. Nonspecific moderate periventricular and subcortical   white matter hypodensities likely represent microvascular ischemic   changes. Small hypodensities are again noted at the left cerebellum and   right occipital region and may be related to prior infarcts. There is   stable cerebral volume loss with ventricular dilatation. Partially empty   sella.    There is no depressed skull fracture. There is sinus mucosal thickening.   Visualized tympanomastoid region is unremarkable.    IMPRESSION:    No obvious acute intracranial hemorrhage or mass effect. If clinically   indicated, short-term follow-up or MRI may be obtained for further   evaluation.    < end of copied text >            Consultant(s) Notes Reviewed:  [x ] YES  [ ] NO  Care Discussed with Consultants/Other Providers [ x] YES  [ ] NO  Imaging Personally Reviewed:  [ ] YES  [ ] NO

## 2025-01-11 NOTE — ED ADULT NURSE NOTE - NSFALLHARMRISKINTERV_ED_ALL_ED

## 2025-01-11 NOTE — ED ADULT TRIAGE NOTE - ARRIVAL INFO ADDITIONAL COMMENTS
Patient presents to the ED by EMS from [facility] for decreased responsiveness for a few days per family. Patient has had frequent UTIs and just completed antibiotics, now with increased lethargy, altered mental status. Son states she presents like this with UTI infections. DNRDNI noted on chart. Allergies revised per family.

## 2025-01-11 NOTE — H&P ADULT - ATTENDING COMMENTS
#Severe sepsis 2/2 covid pna and UTI with AMS  #Acute hypoxic respiratory failure requiring 6L NC  #hyponatremic  - meropenem  - vancomycin  - remdesivir  - dexamethasone  - blood culture, urine culture  - MRSA swab  - NS 50cc/hr for 8 hours  - I&Os  - ID consult    #HFpEF  - echocardiogram  - telemetry

## 2025-01-11 NOTE — H&P ADULT - ASSESSMENT
89F h/o HFpEF, Afib not on AC d/t fall/bleeding risk, hypothyroidism, DM, HTN, anxiety/depression, bedbound, presents from NH for lethargy and decreased responsiveness for 1d admitted for severe sepsis 2/2 UTI/COVID.    #Severe sepsis 2/2 UTI, COVID+ PNA  #Acute hypoxic respiratory failure requiring 6L NC  #Metabolic encephalopathy  - admit to tele  - tachycardia, fever, AMS  - UA positive  - given ceftriaxone in ED  - IV NS@50cc/h for 8h  - start remdesevir, dexamethasone  - start meropenem, vancomycin  - CTH sinus mucosal thickening, no acute ICH/mass effect, possible microvascular ischemic changes  - BCx x2, UCx, MRSA/MSSA PCR  - ID consult    #HFpEF  #Afib  - proBNP 2120  - tele  - TTE ordered  - monitor I&Os  - not on AC due to bleeding risk  - c/w home metoprolol, furosemide    #Ecchymosis, leg  - ordered doppler US    #DM  - hold home metformin  - ISS  - accuchecks, hypoglycemia protocol    #Hypothyroidism  #Anxiety/depression  - c/w home levothyroxine, lexapro, buspar, wellbutrin    #DNR/DNI  - confirmed MOLST with son at bedside    Discussed with Dr. Menezes 89F h/o HFpEF, Afib not on AC d/t fall/bleeding risk, hypothyroidism, DM, HTN, anxiety/depression, bedbound, presents from NH for lethargy and decreased responsiveness for 1d admitted for severe sepsis 2/2 UTI/COVID.    #Severe sepsis 2/2 UTI, COVID+ PNA  #Acute hypoxic respiratory failure requiring 6L NC  #Metabolic encephalopathy  - admit to tele  - tachycardia, fever, AMS  - UA positive  - given ceftriaxone in ED  - IV NS@50cc/h for 8h  - start remdesevir, dexamethasone  - start meropenem, vancomycin  - CTH sinus mucosal thickening, no acute ICH/mass effect, possible microvascular ischemic changes  - BCx x2, UCx, MRSA/MSSA PCR  - ID consult    #HFpEF  #Afib  - proBNP 2120  - tele  - TTE ordered  - monitor I&Os  - not on AC due to bleeding risk  - c/w home metoprolol, furosemide    #Ecchymosis, leg  - ordered doppler US    #DM  - hold home metformin  - ISS  - accuchecks, hypoglycemia protocol    #Hypothyroidism  #Anxiety/depression  - c/w home levothyroxine, lexapro, buspar, wellbutrin    #VTE ppx  - not on AC d/t bleeding risk  - SCDs    #DNR/DNI  - confirmed MOLST with son at bedside    Discussed with Dr. Menezes 89F h/o HFpEF, Afib not on AC d/t fall/bleeding risk, hypothyroidism, DM, HTN, anxiety/depression, bedbound, presents from NH for lethargy and decreased responsiveness for 1d admitted for severe sepsis 2/2 UTI/COVID.    #Severe sepsis 2/2 UTI, COVID+ PNA  #Acute hypoxic respiratory failure requiring 6L NC  #Metabolic encephalopathy  - admit to tele  - tachycardia, fever, AMS  - UA positive  - given ceftriaxone in ED  - IV NS@50cc/h for 8h  - start remdesevir, dexamethasone  - start meropenem, vancomycin  - CTH sinus mucosal thickening, no acute ICH/mass effect, possible microvascular ischemic changes  - BCx x2, UCx, MRSA/MSSA PCR  - ID consult    #HFpEF  #Afib  - proBNP 2120  - tele  - TTE ordered  - monitor I&Os  - not on AC due to bleeding risk  - hold home lasix, resume as tolerated  - c/w home metoprolol    #Ecchymosis, leg  - ordered doppler US    #DM  - hold home metformin  - ISS  - accuchecks, hypoglycemia protocol    #Hypothyroidism  #Anxiety/depression  - c/w home levothyroxine, lexapro, buspar, wellbutrin    #VTE ppx  - not on AC d/t bleeding risk  - SCDs    #DNR/DNI  - confirmed MOLST with son at bedside    Discussed with Dr. Menezes
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
## 2025-01-11 NOTE — H&P ADULT - NSHPPHYSICALEXAM_GEN_ALL_CORE
T(C): 36.7 (01-11-25 @ 20:15), Max: 38.5 (01-11-25 @ 18:23)  HR: 86 (01-11-25 @ 19:54) (86 - 130)  BP: 136/70 (01-11-25 @ 19:54) (103/73 - 136/70)  RR: 20 (01-11-25 @ 19:54) (20 - 25)  SpO2: 97% (01-11-25 @ 19:54) (96% - 97%)    CONSTITUTIONAL: lethargic  EYES: EOMI, No conjunctival or scleral injection, nonicteric  ENMT: Oral mucosa with moist membranes  NECK: Supple, no LAD/tenderness  RESP: No respiratory distress, no use of accessory muscles; CTA b/l, no WRR  CV: irregular rhythm, +S1S2, no MRG; no peripheral edema  GI: Soft, NT, ND, no rebound, no guarding; no palpable masses; no hepatosplenomegaly; no hernia palpated  MSK: No digital clubbing or cyanosis  SKIN: resolving ecchymosis with surrounding yellow-green discoloration of right posterior calf without palpable edema/tenderness, No rashes or ulcers noted  NEURO/PSYCH: AOx0, lethargic

## 2025-01-12 LAB
A1C WITH ESTIMATED AVERAGE GLUCOSE RESULT: 5.8 % — HIGH (ref 4–5.6)
ALBUMIN SERPL ELPH-MCNC: 2.7 G/DL — LOW (ref 3.3–5)
ALP SERPL-CCNC: 77 U/L — SIGNIFICANT CHANGE UP (ref 40–120)
ALT FLD-CCNC: 12 U/L — SIGNIFICANT CHANGE UP (ref 10–45)
ANION GAP SERPL CALC-SCNC: 9 MMOL/L — SIGNIFICANT CHANGE UP (ref 5–17)
AST SERPL-CCNC: 12 U/L — SIGNIFICANT CHANGE UP (ref 10–40)
BASOPHILS # BLD AUTO: 0.04 K/UL — SIGNIFICANT CHANGE UP (ref 0–0.2)
BASOPHILS NFR BLD AUTO: 0.6 % — SIGNIFICANT CHANGE UP (ref 0–2)
BILIRUB SERPL-MCNC: 0.4 MG/DL — SIGNIFICANT CHANGE UP (ref 0.2–1.2)
BUN SERPL-MCNC: 10 MG/DL — SIGNIFICANT CHANGE UP (ref 7–23)
CALCIUM SERPL-MCNC: 9.4 MG/DL — SIGNIFICANT CHANGE UP (ref 8.4–10.5)
CHLORIDE SERPL-SCNC: 100 MMOL/L — SIGNIFICANT CHANGE UP (ref 96–108)
CO2 SERPL-SCNC: 26 MMOL/L — SIGNIFICANT CHANGE UP (ref 22–31)
CREAT SERPL-MCNC: 0.78 MG/DL — SIGNIFICANT CHANGE UP (ref 0.5–1.3)
EGFR: 71 ML/MIN/1.73M2 — SIGNIFICANT CHANGE UP
EOSINOPHIL # BLD AUTO: 0 K/UL — SIGNIFICANT CHANGE UP (ref 0–0.5)
EOSINOPHIL NFR BLD AUTO: 0 % — SIGNIFICANT CHANGE UP (ref 0–6)
ESTIMATED AVERAGE GLUCOSE: 120 MG/DL — HIGH (ref 68–114)
GLUCOSE BLDC GLUCOMTR-MCNC: 120 MG/DL — HIGH (ref 70–99)
GLUCOSE BLDC GLUCOMTR-MCNC: 133 MG/DL — HIGH (ref 70–99)
GLUCOSE BLDC GLUCOMTR-MCNC: 142 MG/DL — HIGH (ref 70–99)
GLUCOSE BLDC GLUCOMTR-MCNC: 163 MG/DL — HIGH (ref 70–99)
GLUCOSE SERPL-MCNC: 149 MG/DL — HIGH (ref 70–99)
HCT VFR BLD CALC: 35.4 % — SIGNIFICANT CHANGE UP (ref 34.5–45)
HGB BLD-MCNC: 12 G/DL — SIGNIFICANT CHANGE UP (ref 11.5–15.5)
IMM GRANULOCYTES NFR BLD AUTO: 0.7 % — SIGNIFICANT CHANGE UP (ref 0–0.9)
LYMPHOCYTES # BLD AUTO: 0.58 K/UL — LOW (ref 1–3.3)
LYMPHOCYTES # BLD AUTO: 8.6 % — LOW (ref 13–44)
MAGNESIUM SERPL-MCNC: 1.3 MG/DL — LOW (ref 1.6–2.6)
MCHC RBC-ENTMCNC: 33 PG — SIGNIFICANT CHANGE UP (ref 27–34)
MCHC RBC-ENTMCNC: 33.9 G/DL — SIGNIFICANT CHANGE UP (ref 32–36)
MCV RBC AUTO: 97.3 FL — SIGNIFICANT CHANGE UP (ref 80–100)
MONOCYTES # BLD AUTO: 0.31 K/UL — SIGNIFICANT CHANGE UP (ref 0–0.9)
MONOCYTES NFR BLD AUTO: 4.6 % — SIGNIFICANT CHANGE UP (ref 2–14)
MRSA PCR RESULT.: DETECTED
NEUTROPHILS # BLD AUTO: 5.79 K/UL — SIGNIFICANT CHANGE UP (ref 1.8–7.4)
NEUTROPHILS NFR BLD AUTO: 85.5 % — HIGH (ref 43–77)
NRBC # BLD: 0 /100 WBCS — SIGNIFICANT CHANGE UP (ref 0–0)
PHOSPHATE SERPL-MCNC: 3 MG/DL — SIGNIFICANT CHANGE UP (ref 2.5–4.5)
PLATELET # BLD AUTO: 246 K/UL — SIGNIFICANT CHANGE UP (ref 150–400)
POTASSIUM SERPL-MCNC: 4.1 MMOL/L — SIGNIFICANT CHANGE UP (ref 3.5–5.3)
POTASSIUM SERPL-SCNC: 4.1 MMOL/L — SIGNIFICANT CHANGE UP (ref 3.5–5.3)
PROT SERPL-MCNC: 7.1 G/DL — SIGNIFICANT CHANGE UP (ref 6–8.3)
RBC # BLD: 3.64 M/UL — LOW (ref 3.8–5.2)
RBC # FLD: 13.4 % — SIGNIFICANT CHANGE UP (ref 10.3–14.5)
S AUREUS DNA NOSE QL NAA+PROBE: DETECTED
SODIUM SERPL-SCNC: 135 MMOL/L — SIGNIFICANT CHANGE UP (ref 135–145)
WBC # BLD: 6.77 K/UL — SIGNIFICANT CHANGE UP (ref 3.8–10.5)
WBC # FLD AUTO: 6.77 K/UL — SIGNIFICANT CHANGE UP (ref 3.8–10.5)

## 2025-01-12 PROCEDURE — 99233 SBSQ HOSP IP/OBS HIGH 50: CPT

## 2025-01-12 PROCEDURE — 93306 TTE W/DOPPLER COMPLETE: CPT | Mod: 26

## 2025-01-12 PROCEDURE — 93971 EXTREMITY STUDY: CPT | Mod: 26,RT

## 2025-01-12 RX ORDER — MUPIROCIN 2 %
1 OINTMENT (GRAM) TOPICAL
Refills: 0 | Status: DISCONTINUED | OUTPATIENT
Start: 2025-01-12 | End: 2025-01-15

## 2025-01-12 RX ORDER — CALAMINE
1 LOTION (ML) TOPICAL THREE TIMES A DAY
Refills: 0 | Status: DISCONTINUED | OUTPATIENT
Start: 2025-01-12 | End: 2025-01-15

## 2025-01-12 RX ORDER — MAGNESIUM SULFATE 500 MG/ML
1 INJECTION, SOLUTION INTRAMUSCULAR; INTRAVENOUS ONCE
Refills: 0 | Status: COMPLETED | OUTPATIENT
Start: 2025-01-12 | End: 2025-01-12

## 2025-01-12 RX ORDER — CHLORHEXIDINE GLUCONATE 1.2 MG/ML
1 RINSE ORAL
Refills: 0 | Status: DISCONTINUED | OUTPATIENT
Start: 2025-01-12 | End: 2025-01-15

## 2025-01-12 RX ADMIN — MEROPENEM 100 MILLIGRAM(S): 1 INJECTION, POWDER, FOR SOLUTION INTRAVENOUS at 05:50

## 2025-01-12 RX ADMIN — SENNOSIDES 1 TABLET(S): 8.6 TABLET, FILM COATED ORAL at 21:48

## 2025-01-12 RX ADMIN — ESCITALOPRAM OXALATE 15 MILLIGRAM(S): 10 TABLET ORAL at 11:35

## 2025-01-12 RX ADMIN — BUSPIRONE HYDROCHLORIDE 5 MILLIGRAM(S): 15 TABLET ORAL at 05:53

## 2025-01-12 RX ADMIN — FAMOTIDINE 20 MILLIGRAM(S): 20 TABLET, FILM COATED ORAL at 11:35

## 2025-01-12 RX ADMIN — Medication 2: at 11:56

## 2025-01-12 RX ADMIN — GABAPENTIN 200 MILLIGRAM(S): 300 CAPSULE ORAL at 11:36

## 2025-01-12 RX ADMIN — LEVOTHYROXINE SODIUM 55 MICROGRAM(S): 175 TABLET ORAL at 21:47

## 2025-01-12 RX ADMIN — Medication 25 MILLIGRAM(S): at 05:53

## 2025-01-12 RX ADMIN — BUPROPION HYDROCHLORIDE 150 MILLIGRAM(S): 150 TABLET, EXTENDED RELEASE ORAL at 11:34

## 2025-01-12 RX ADMIN — Medication 1 APPLICATION(S): at 21:47

## 2025-01-12 RX ADMIN — REMDESIVIR 200 MILLIGRAM(S): 5 INJECTION INTRAVENOUS at 20:10

## 2025-01-12 RX ADMIN — MAGNESIUM SULFATE 100 GRAM(S): 500 INJECTION, SOLUTION INTRAMUSCULAR; INTRAVENOUS at 11:35

## 2025-01-12 NOTE — DIETITIAN INITIAL EVALUATION ADULT - ORAL INTAKE PTA/DIET HISTORY
Pt confused, limited info obtained. Diet hx obtained from paper chart. PTA, pt was at [facility], was on a regular consistency, no added salt, no concentrated sugars, diet with thin liquids. Diet was supplemented w/ LPS 30 ml BID.

## 2025-01-12 NOTE — PROGRESS NOTE ADULT - SUBJECTIVE AND OBJECTIVE BOX
Patient is a 89y old  Female who presents with a chief complaint of lethargy, sepsis     Patient seen and examined at bedside. sleepy, no overnight or current complaints.     ALLERGIES:  penicillin (Unknown)  Bactrim (Drowsiness)  latex (Other)  aspirin (Unknown)  Macrobid (Other)    MEDICATIONS  (STANDING):  buPROPion XL (24-Hour) . 150 milliGRAM(s) Oral daily  busPIRone 5 milliGRAM(s) Oral <User Schedule>  dexAMETHasone  Injectable 6 milliGRAM(s) IV Push daily  dextrose 5%. 1000 milliLiter(s) (50 mL/Hr) IV Continuous <Continuous>  dextrose 5%. 1000 milliLiter(s) (100 mL/Hr) IV Continuous <Continuous>  dextrose 50% Injectable 25 Gram(s) IV Push once  dextrose 50% Injectable 12.5 Gram(s) IV Push once  dextrose 50% Injectable 25 Gram(s) IV Push once  escitalopram 15 milliGRAM(s) Oral daily  famotidine    Tablet 20 milliGRAM(s) Oral daily  gabapentin 200 milliGRAM(s) Oral daily  glucagon  Injectable 1 milliGRAM(s) IntraMuscular once  insulin lispro (ADMELOG) corrective regimen sliding scale   SubCutaneous three times a day before meals  insulin lispro (ADMELOG) corrective regimen sliding scale   SubCutaneous at bedtime  levothyroxine Injectable 55 MICROGram(s) IV Push at bedtime  metoprolol succinate ER 25 milliGRAM(s) Oral daily  remdesivir  IVPB   IV Intermittent   remdesivir  IVPB 100 milliGRAM(s) IV Intermittent every 24 hours  senna 1 Tablet(s) Oral at bedtime  sodium chloride 0.9%. 1000 milliLiter(s) (50 mL/Hr) IV Continuous <Continuous>    MEDICATIONS  (PRN):  acetaminophen     Tablet .. 650 milliGRAM(s) Oral every 6 hours PRN Mild Pain (1 - 3)  dextrose Oral Gel 15 Gram(s) Oral once PRN Blood Glucose LESS THAN 70 milliGRAM(s)/deciliter  melatonin 3 milliGRAM(s) Oral at bedtime PRN Sleep  polyethylene glycol 3350 17 Gram(s) Oral daily PRN Constipation    Vital Signs Last 24 Hrs  T(F): 97.4 (12 Jan 2025 04:39), Max: 101.3 (11 Jan 2025 18:23)  HR: 79 (12 Jan 2025 04:39) (77 - 130)  BP: 119/80 (12 Jan 2025 04:39) (103/73 - 136/70)  RR: 17 (12 Jan 2025 04:39) (17 - 25)  SpO2: 97% (12 Jan 2025 04:39) (96% - 98%)  I&O's Summary    11 Jan 2025 07:01  -  12 Jan 2025 07:00  --------------------------------------------------------  IN: 450 mL / OUT: 150 mL / NET: 300 mL      PHYSICAL EXAM:  General: NAD, lethargic/sleepy  ENT: MMM, no oral thrush   Neck: Supple, No JVD  Lungs: Clear to auscultation bilaterally, fair, effort, non labored breathing  Cardio: RRR, S1/S2,  Abdomen: Soft, Nontender, Nondistended; Bowel sounds present  Extremities: No calf tenderness, No pitting edema    LABS:                        12.0   6.77  )-----------( 246      ( 12 Jan 2025 06:55 )             35.4     01-12    135  |  100  |  10  ----------------------------<  149  4.1   |  26  |  0.78    Ca    9.4      12 Jan 2025 06:55  Phos  3.0     01-12  Mg     1.3     01-12    TPro  7.1  /  Alb  2.7  /  TBili  0.4  /  DBili  x   /  AST  12  /  ALT  12  /  AlkPhos  77  01-12      PT/INR - ( 11 Jan 2025 18:00 )   PT: 13.0 sec;   INR: 1.10 ratio         PTT - ( 11 Jan 2025 18:00 )  PTT:29.6 sec  Lactate, Blood: 0.8 mmol/L (01-11 @ 18:00)    CARDIAC MARKERS ( 11 Jan 2025 18:00 )  x     / 18.4 ng/L / x     / x     / x                POCT Blood Glucose.: 163 mg/dL (12 Jan 2025 11:47)  POCT Blood Glucose.: 142 mg/dL (12 Jan 2025 08:27)  POCT Blood Glucose.: 190 mg/dL (11 Jan 2025 22:16)      Urinalysis Basic - ( 12 Jan 2025 06:55 )    Color: x / Appearance: x / SG: x / pH: x  Gluc: 149 mg/dL / Ketone: x  / Bili: x / Urobili: x   Blood: x / Protein: x / Nitrite: x   Leuk Esterase: x / RBC: x / WBC x   Sq Epi: x / Non Sq Epi: x / Bacteria: x            RADIOLOGY & ADDITIONAL TESTS:  < from: US Duplex Venous Lower Ext Ltd, Right (01.12.25 @ 10:23) >  IMPRESSION:  No evidence of right lower extremity deep venous thrombosis.            --- End of Report ---            SONIA AVITIA MD; Attending Radiologist  This document has been electronically signed. Jan 12 2025 10:31AM    < end of copied text >    Care Discussed with Consultants/Other Providers:    Patient is a 89y old  Female who presents with a chief complaint of lethargy, Sepsis    Patient seen and examined at bedside. sleepy, no overnight or current complaints.     ALLERGIES:  penicillin (Unknown)  Bactrim (Drowsiness)  latex (Other)  aspirin (Unknown)  Macrobid (Other)    MEDICATIONS  (STANDING):  buPROPion XL (24-Hour) . 150 milliGRAM(s) Oral daily  busPIRone 5 milliGRAM(s) Oral <User Schedule>  dexAMETHasone  Injectable 6 milliGRAM(s) IV Push daily  dextrose 5%. 1000 milliLiter(s) (50 mL/Hr) IV Continuous <Continuous>  dextrose 5%. 1000 milliLiter(s) (100 mL/Hr) IV Continuous <Continuous>  dextrose 50% Injectable 25 Gram(s) IV Push once  dextrose 50% Injectable 12.5 Gram(s) IV Push once  dextrose 50% Injectable 25 Gram(s) IV Push once  escitalopram 15 milliGRAM(s) Oral daily  famotidine    Tablet 20 milliGRAM(s) Oral daily  gabapentin 200 milliGRAM(s) Oral daily  glucagon  Injectable 1 milliGRAM(s) IntraMuscular once  insulin lispro (ADMELOG) corrective regimen sliding scale   SubCutaneous three times a day before meals  insulin lispro (ADMELOG) corrective regimen sliding scale   SubCutaneous at bedtime  levothyroxine Injectable 55 MICROGram(s) IV Push at bedtime  metoprolol succinate ER 25 milliGRAM(s) Oral daily  remdesivir  IVPB   IV Intermittent   remdesivir  IVPB 100 milliGRAM(s) IV Intermittent every 24 hours  senna 1 Tablet(s) Oral at bedtime  sodium chloride 0.9%. 1000 milliLiter(s) (50 mL/Hr) IV Continuous <Continuous>    MEDICATIONS  (PRN):  acetaminophen     Tablet .. 650 milliGRAM(s) Oral every 6 hours PRN Mild Pain (1 - 3)  dextrose Oral Gel 15 Gram(s) Oral once PRN Blood Glucose LESS THAN 70 milliGRAM(s)/deciliter  melatonin 3 milliGRAM(s) Oral at bedtime PRN Sleep  polyethylene glycol 3350 17 Gram(s) Oral daily PRN Constipation    Vital Signs Last 24 Hrs  T(F): 97.4 (12 Jan 2025 04:39), Max: 101.3 (11 Jan 2025 18:23)  HR: 79 (12 Jan 2025 04:39) (77 - 130)  BP: 119/80 (12 Jan 2025 04:39) (103/73 - 136/70)  RR: 17 (12 Jan 2025 04:39) (17 - 25)  SpO2: 97% (12 Jan 2025 04:39) (96% - 98%)  I&O's Summary    11 Jan 2025 07:01  -  12 Jan 2025 07:00  --------------------------------------------------------  IN: 450 mL / OUT: 150 mL / NET: 300 mL      PHYSICAL EXAM:  General: NAD, lethargic/sleepy  ENT: MMM, no oral thrush   Neck: Supple, No JVD  Lungs: Clear to auscultation bilaterally, fair, effort, non labored breathing  Cardio: RRR, S1/S2,  Abdomen: Soft, Nontender, Nondistended; Bowel sounds present  Extremities: No calf tenderness, No pitting edema    LABS:                        12.0   6.77  )-----------( 246      ( 12 Jan 2025 06:55 )             35.4     01-12    135  |  100  |  10  ----------------------------<  149  4.1   |  26  |  0.78    Ca    9.4      12 Jan 2025 06:55  Phos  3.0     01-12  Mg     1.3     01-12    TPro  7.1  /  Alb  2.7  /  TBili  0.4  /  DBili  x   /  AST  12  /  ALT  12  /  AlkPhos  77  01-12      PT/INR - ( 11 Jan 2025 18:00 )   PT: 13.0 sec;   INR: 1.10 ratio         PTT - ( 11 Jan 2025 18:00 )  PTT:29.6 sec  Lactate, Blood: 0.8 mmol/L (01-11 @ 18:00)    CARDIAC MARKERS ( 11 Jan 2025 18:00 )  x     / 18.4 ng/L / x     / x     / x        POCT Blood Glucose.: 163 mg/dL (12 Jan 2025 11:47)  POCT Blood Glucose.: 142 mg/dL (12 Jan 2025 08:27)  POCT Blood Glucose.: 190 mg/dL (11 Jan 2025 22:16)    Urinalysis Basic - ( 12 Jan 2025 06:55 )  Color: x / Appearance: x / SG: x / pH: x  Gluc: 149 mg/dL / Ketone: x  / Bili: x / Urobili: x   Blood: x / Protein: x / Nitrite: x   Leuk Esterase: x / RBC: x / WBC x   Sq Epi: x / Non Sq Epi: x / Bacteria: x    RADIOLOGY & ADDITIONAL TESTS:  < from: US Duplex Venous Lower Ext Ltd, Right (01.12.25 @ 10:23) >  IMPRESSION:  No evidence of right lower extremity deep venous thrombosis.

## 2025-01-12 NOTE — SWALLOW BEDSIDE ASSESSMENT ADULT - SLP GENERAL OBSERVATIONS
Received in bed on 2L O2 via nasal cannula, SPO2 was 98% throughout, HR 70s. Followed most simple commands for purpose of OME, requires repetition. Oriented to self and knows she is in the hospital but doesn't know why. Reported month as December.

## 2025-01-12 NOTE — SWALLOW BEDSIDE ASSESSMENT ADULT - SLP PERTINENT HISTORY OF CURRENT PROBLEM
89F h/o HFpEF, Afib not on AC d/t fall/bleeding risk, hypothyroidism, DM, HTN, anxiety/depression, bedbound, presents from NH for lethargy and decreased responsiveness for 1d. History obtained from patient's sons d/t patient's altered mental status. Pt has h/o frequent UTIs and had recent treatment for UTI with meropenem. Per son pt normally conversant AOx3 at baseline, however at NH was noted to be lethargic and unable to maintain o2 sat, requiring 5L NC, prompting transfer to ED. Denies fever, cough, SOB, vomiting, diarrhea.

## 2025-01-12 NOTE — DIETITIAN INITIAL EVALUATION ADULT - ENTER FROM (CAL/KG)
Pt stopped oin today to let us know that when dhe saw Dr Duke that she was told that she did not need a referral to her pain management, turns out she does they will not see her without one please advise referral to. GUIDO barrientos    25

## 2025-01-12 NOTE — DIETITIAN INITIAL EVALUATION ADULT - PERTINENT LABORATORY DATA
01-12    135  |  100  |  10  ----------------------------<  149[H]  4.1   |  26  |  0.78    Ca    9.4      12 Jan 2025 06:55  Phos  3.0     01-12  Mg     1.3     01-12    TPro  7.1  /  Alb  2.7[L]  /  TBili  0.4  /  DBili  x   /  AST  12  /  ALT  12  /  AlkPhos  77  01-12  POCT Blood Glucose.: 163 mg/dL (01-12-25 @ 11:47)  A1C with Estimated Average Glucose Result: 5.8 % (01-12-25 @ 06:55)

## 2025-01-12 NOTE — DIETITIAN INITIAL EVALUATION ADULT - PERTINENT MEDS FT
MEDICATIONS  (STANDING):  buPROPion XL (24-Hour) . 150 milliGRAM(s) Oral daily  busPIRone 5 milliGRAM(s) Oral <User Schedule>  dexAMETHasone  Injectable 6 milliGRAM(s) IV Push daily  dextrose 5%. 1000 milliLiter(s) (50 mL/Hr) IV Continuous <Continuous>  dextrose 5%. 1000 milliLiter(s) (100 mL/Hr) IV Continuous <Continuous>  dextrose 50% Injectable 25 Gram(s) IV Push once  dextrose 50% Injectable 12.5 Gram(s) IV Push once  dextrose 50% Injectable 25 Gram(s) IV Push once  escitalopram 15 milliGRAM(s) Oral daily  famotidine    Tablet 20 milliGRAM(s) Oral daily  gabapentin 200 milliGRAM(s) Oral daily  glucagon  Injectable 1 milliGRAM(s) IntraMuscular once  insulin lispro (ADMELOG) corrective regimen sliding scale   SubCutaneous three times a day before meals  insulin lispro (ADMELOG) corrective regimen sliding scale   SubCutaneous at bedtime  levothyroxine Injectable 55 MICROGram(s) IV Push at bedtime  metoprolol succinate ER 25 milliGRAM(s) Oral daily  remdesivir  IVPB   IV Intermittent   remdesivir  IVPB 100 milliGRAM(s) IV Intermittent every 24 hours  senna 1 Tablet(s) Oral at bedtime  sodium chloride 0.9%. 1000 milliLiter(s) (50 mL/Hr) IV Continuous <Continuous>    MEDICATIONS  (PRN):  acetaminophen     Tablet .. 650 milliGRAM(s) Oral every 6 hours PRN Mild Pain (1 - 3)  dextrose Oral Gel 15 Gram(s) Oral once PRN Blood Glucose LESS THAN 70 milliGRAM(s)/deciliter  melatonin 3 milliGRAM(s) Oral at bedtime PRN Sleep  polyethylene glycol 3350 17 Gram(s) Oral daily PRN Constipation

## 2025-01-12 NOTE — SWALLOW BEDSIDE ASSESSMENT ADULT - COMMENTS
WBC: 6.77  1/11 Head CT: IMPRESSION: No obvious acute intracranial hemorrhage or mass effect. If clinically   indicated, short-term follow-up or MRI may be obtained for further   evaluation.  1/11 Chest XR: Findings/  impression:    Support devices: None.    Cardiac/mediastinum/hilum: No significant change    Skeleton/soft tissues: No significant change.    Lung parenchyma/Pleura: There is no evidence of focal consolidation,   pleural effusion or pneumothorax.    Known to this service from January 2024 with recommendations for soft and bite sized and thin liquids.

## 2025-01-12 NOTE — SWALLOW BEDSIDE ASSESSMENT ADULT - SWALLOW EVAL: DIAGNOSIS
Pt presents with fatigue/letheragy impacting overall swallow function. Pt also with minimal dentition; however, tolerates a regular diet at baseline. She presents with fading alertness throughout assessment and during PO intake as well as confusion likely due to acute illness. Mastication is mildly prolonged with prolonged bolus formation and oral clearance. Perseverative self feeding. Tolerated thin liquids via cup sips and consecutive straw sips without overt signs of aspiration. Recommend soft and bite sized diet and thin liquids with 1:1 assistance at this time due to confusion and lethargy.

## 2025-01-12 NOTE — PROGRESS NOTE ADULT - ASSESSMENT
89F h/o HFpEF, Afib not on AC d/t fall/bleeding risk, hypothyroidism, DM, HTN, anxiety/depression, bedbound, presents from NH for lethargy and decreased responsiveness for 1d admitted for severe sepsis 2/2 UTI/COVID.    #Severe sepsis 2/2 UTI, COVID+ PNA  #Acute hypoxic respiratory failure 2/2 COVID  #Metabolic encephalopathy  - tachycardia, fever, AMS  - UA positive. Previously UC with Ecoli and Aerococcus. Treated at [facility]  - given ceftriaxone in ED  - Continue Remdesevir, dexamethasone  - Received initial dosing meropenem, vancomycin. No longer needed will DC.   - CTH sinus mucosal thickening, no acute ICH/mass effect, possible microvascular ischemic changes  - BCx x2, UCx, MRSA/MSSA PCR  - ID consult  -Speech and swallow consult. Soft and bite sized diet     #HFpEF  #Afib  - proBNP 2120  - tele  - TTE ordered  - monitor I&Os  - not on AC due to bleeding risk  - hold home lasix, resume as tolerated  - c/w home metoprolol    #Ecchymosis, leg  - US LE negative    #DM  - hold home metformin  - ISS  - accuchecks, hypoglycemia protocol    #Hypothyroidism  #Anxiety/depression  - c/w home levothyroxine, lexapro, buspar, wellbutrin    #VTE ppx  - not on AC d/t bleeding risk  - SCDs    #DNR/DNI  -MOLST confirmed with son on admission     89F h/o HFpEF, Afib not on AC d/t fall/bleeding risk, hypothyroidism, DM, HTN, anxiety/depression, bedbound, presents from NH for lethargy and decreased responsiveness for 1d admitted for severe sepsis 2/2 UTI/COVID.    #Severe sepsis 2/2 UTI, COVID+ PNA  #Acute hypoxic respiratory failure 2/2 COVID  #Metabolic encephalopathy  - tachycardia, fever, AMS  - UA positive. Previously UC with Ecoli and Aerococcus. Treated at [facility]  - given ceftriaxone in ED  - Continue Remdesevir, dexamethasone  - Received initial dosing meropenem, vancomycin. No longer needed will DC. F/U Urine Cx  - CTH sinus mucosal thickening, no acute ICH/mass effect, possible microvascular ischemic changes  - BCx x2, UCx, MRSA/MSSA PCR  - ID consult  -Speech and swallow consult. Soft and bite sized diet   -Currently on 2L at 97%    #Chronic HFpEF - not in exacerbation  #Chonic Afib  - proBNP 2120  - tele  - TTE ordered  - monitor I&Os  - not on AC due to bleeding risk  - hold home lasix, resume as tolerated  - c/w home metoprolol    #Ecchymosis, leg  - US LE negative    #DM  - hold home metformin  - ISS  - accuchecks, hypoglycemia protocol    #Hypothyroidism  #Anxiety/depression  - c/w home levothyroxine, lexapro, buspar, wellbutrin    #VTE ppx  - not on AC d/t bleeding risk  - SCDs    #DNR/DNI  -MOLST confirmed with son on admission    Updated son Gal about the plan of care. 1/12

## 2025-01-12 NOTE — DIETITIAN INITIAL EVALUATION ADULT - OTHER INFO
89F h/o HFpEF, Afib not on AC d/t fall/bleeding risk, hypothyroidism, DM, HTN, anxiety/depression, bedbound, presents from NH for lethargy and decreased responsiveness for 1d admitted for severe sepsis 2/2 UTI/COVID.    Pt seen by SLP today who recommended soft and bite-sized, thin liquids. Pt tolerating diet, needs total assistance with feeding + requires ample time to complete meal. No digest issues reported at this time. Pt has hx of DM but hbA1c is 5.8%. History of HTN noted as well; however, given pt's advanced age and current COVID infection, suggest liberalized diet at this time until PO intake adequacy has been determined. Pt noted w/ stage I pressure injury on sacrum. Suggest Ensure Plus High Protein (provides 350 kcal, 20 g protein/serving) to supplement current diet.

## 2025-01-13 LAB
ANION GAP SERPL CALC-SCNC: 11 MMOL/L — SIGNIFICANT CHANGE UP (ref 5–17)
BUN SERPL-MCNC: 12 MG/DL — SIGNIFICANT CHANGE UP (ref 7–23)
CALCIUM SERPL-MCNC: 9.7 MG/DL — SIGNIFICANT CHANGE UP (ref 8.4–10.5)
CHLORIDE SERPL-SCNC: 100 MMOL/L — SIGNIFICANT CHANGE UP (ref 96–108)
CO2 SERPL-SCNC: 22 MMOL/L — SIGNIFICANT CHANGE UP (ref 22–31)
CREAT SERPL-MCNC: 0.53 MG/DL — SIGNIFICANT CHANGE UP (ref 0.5–1.3)
EGFR: 86 ML/MIN/1.73M2 — SIGNIFICANT CHANGE UP
GLUCOSE BLDC GLUCOMTR-MCNC: 113 MG/DL — HIGH (ref 70–99)
GLUCOSE BLDC GLUCOMTR-MCNC: 156 MG/DL — HIGH (ref 70–99)
GLUCOSE BLDC GLUCOMTR-MCNC: 157 MG/DL — HIGH (ref 70–99)
GLUCOSE BLDC GLUCOMTR-MCNC: 167 MG/DL — HIGH (ref 70–99)
GLUCOSE SERPL-MCNC: 141 MG/DL — HIGH (ref 70–99)
HCT VFR BLD CALC: 41.1 % — SIGNIFICANT CHANGE UP (ref 34.5–45)
HGB BLD-MCNC: 13.4 G/DL — SIGNIFICANT CHANGE UP (ref 11.5–15.5)
MAGNESIUM SERPL-MCNC: 1.5 MG/DL — LOW (ref 1.6–2.6)
MCHC RBC-ENTMCNC: 32.6 G/DL — SIGNIFICANT CHANGE UP (ref 32–36)
MCHC RBC-ENTMCNC: 32.8 PG — SIGNIFICANT CHANGE UP (ref 27–34)
MCV RBC AUTO: 100.7 FL — HIGH (ref 80–100)
NRBC # BLD: 0 /100 WBCS — SIGNIFICANT CHANGE UP (ref 0–0)
PLATELET # BLD AUTO: 147 K/UL — LOW (ref 150–400)
POTASSIUM SERPL-MCNC: 4.2 MMOL/L — SIGNIFICANT CHANGE UP (ref 3.5–5.3)
POTASSIUM SERPL-SCNC: 4.2 MMOL/L — SIGNIFICANT CHANGE UP (ref 3.5–5.3)
RBC # BLD: 4.08 M/UL — SIGNIFICANT CHANGE UP (ref 3.8–5.2)
RBC # FLD: 13.9 % — SIGNIFICANT CHANGE UP (ref 10.3–14.5)
SODIUM SERPL-SCNC: 133 MMOL/L — LOW (ref 135–145)
WBC # BLD: 7.48 K/UL — SIGNIFICANT CHANGE UP (ref 3.8–10.5)
WBC # FLD AUTO: 7.48 K/UL — SIGNIFICANT CHANGE UP (ref 3.8–10.5)

## 2025-01-13 PROCEDURE — 99232 SBSQ HOSP IP/OBS MODERATE 35: CPT

## 2025-01-13 PROCEDURE — 73502 X-RAY EXAM HIP UNI 2-3 VIEWS: CPT | Mod: 26,RT

## 2025-01-13 PROCEDURE — 73552 X-RAY EXAM OF FEMUR 2/>: CPT | Mod: 26,RT

## 2025-01-13 RX ORDER — MAGNESIUM SULFATE 500 MG/ML
1 INJECTION, SOLUTION INTRAMUSCULAR; INTRAVENOUS ONCE
Refills: 0 | Status: COMPLETED | OUTPATIENT
Start: 2025-01-13 | End: 2025-01-13

## 2025-01-13 RX ADMIN — Medication 1 APPLICATION(S): at 06:08

## 2025-01-13 RX ADMIN — LEVOTHYROXINE SODIUM 55 MICROGRAM(S): 175 TABLET ORAL at 22:17

## 2025-01-13 RX ADMIN — Medication 6 MILLIGRAM(S): at 06:01

## 2025-01-13 RX ADMIN — MAGNESIUM SULFATE 100 GRAM(S): 500 INJECTION, SOLUTION INTRAMUSCULAR; INTRAVENOUS at 14:20

## 2025-01-13 RX ADMIN — Medication 1 APPLICATION(S): at 18:00

## 2025-01-13 RX ADMIN — Medication 2: at 12:33

## 2025-01-13 RX ADMIN — CHLORHEXIDINE GLUCONATE 1 APPLICATION(S): 1.2 RINSE ORAL at 06:08

## 2025-01-13 RX ADMIN — Medication 2: at 08:10

## 2025-01-13 RX ADMIN — Medication 25 MILLIGRAM(S): at 06:01

## 2025-01-13 RX ADMIN — Medication 1 APPLICATION(S): at 06:02

## 2025-01-13 RX ADMIN — REMDESIVIR 200 MILLIGRAM(S): 5 INJECTION INTRAVENOUS at 18:20

## 2025-01-13 RX ADMIN — Medication 2: at 17:24

## 2025-01-13 RX ADMIN — BUPROPION HYDROCHLORIDE 150 MILLIGRAM(S): 150 TABLET, EXTENDED RELEASE ORAL at 14:20

## 2025-01-13 RX ADMIN — ESCITALOPRAM OXALATE 15 MILLIGRAM(S): 10 TABLET ORAL at 14:20

## 2025-01-13 RX ADMIN — GABAPENTIN 200 MILLIGRAM(S): 300 CAPSULE ORAL at 14:20

## 2025-01-13 RX ADMIN — BUSPIRONE HYDROCHLORIDE 5 MILLIGRAM(S): 15 TABLET ORAL at 06:01

## 2025-01-13 NOTE — PROGRESS NOTE ADULT - ASSESSMENT
89F h/o HFpEF, Afib not on AC d/t fall/bleeding risk, hypothyroidism, DM, HTN, anxiety/depression, bedbound, presents from NH for lethargy and decreased responsiveness for 1d admitted for severe sepsis 2/2 UTI/COVID.    #Severe sepsis 2/2 UTI, COVID+ PNA  #Acute hypoxic respiratory failure 2/2 COVID  #Metabolic encephalopathy  - tachycardia, fever, AMS  - UA positive. Previously UC with Ecoli and Aerococcus. Treated at [facility]  - given ceftriaxone in ED  - Continue Remdesevir, dexamethasone  - Received initial dosing meropenem, vancomycin, later DC'd .   -UC with GNR  - CTH sinus mucosal thickening, no acute ICH/mass effect, possible microvascular ischemic changes  - BCx x2, UCx, MRSA/MSSA PCR  -Speech and swallow consult. Soft and bite sized diet   -transitioned off supplemental O2, maintain spO2 > 94%    #Chronic HFpEF - not in exacerbation  #Chonic Afib  - proBNP 2120  - tele  -tte: EF 60 to 65 %.  - monitor I&Os  - not on AC due to bleeding risk  - hold home lasix, resume as tolerated  - c/w home metoprolol    #Ecchymosis, leg  - US LE negative    #Hypomagnesia  -replete  -monitor     #Right leg pain  -X ray with Increased shortening of the right femoral neck, chronic in the absence of focal clinical findings.  -Tylenol as needed for pain       #DM  - hold home metformin  - ISS  - accuchecks, hypoglycemia protocol    #Hypothyroidism  #Anxiety/depression  - c/w home levothyroxine, lexapro, buspar, wellbutrin    #VTE ppx  - not on AC d/t bleeding risk  - SCDs    #DNR/DNI  -MOLST confirmed with son on admission    Family updated      89F h/o HFpEF, Afib not on AC d/t fall/bleeding risk, hypothyroidism, DM, HTN, anxiety/depression, bedbound, presents from NH for lethargy and decreased responsiveness for 1d admitted for severe sepsis 2/2 UTI/COVID.    #Severe sepsis 2/2 UTI, COVID+ PNA   #Acute hypoxic respiratory failure 2/2 COVID - resolving  #Metabolic encephalopathy - resolving  - tachycardia, fever, AMS  - UA positive. Previously UC with Ecoli and Aerococcus. Treated at [facility]  - given ceftriaxone in ED  - Continue Remdesevir, dexamethasone  - Received initial dosing meropenem, vancomycin, later DC'd .   -UC with GNR  - CTH sinus mucosal thickening, no acute ICH/mass effect, possible microvascular ischemic changes  - BCx x2, UCx, MRSA/MSSA PCR  -Speech and swallow consult. Soft and bite sized diet   -transitioned off supplemental O2, maintain spO2 > 94%    #Chronic HFpEF - not in exacerbation  #Chonic Afib  - proBNP 2120  - tele  -tte: EF 60 to 65 %.  - monitor I&Os  - not on AC due to bleeding risk  - hold home lasix, resume as tolerated  - c/w home metoprolol    #Ecchymosis, leg  - US LE negative    #Hypomagnesia  -replete  -monitor     #Right leg pain  -X ray with Increased shortening of the right femoral neck, chronic in the absence of focal clinical findings.  -Tylenol as needed for pain       #DM  - hold home metformin  - ISS  - accuchecks, hypoglycemia protocol    #Hypothyroidism  #Anxiety/depression  - c/w home levothyroxine, lexapro, buspar, wellbutrin    #VTE ppx  - not on AC d/t bleeding risk  - SCDs    #DNR/DNI  -MOLST confirmed with son on admission    Family updated and aware of possible D/C tomorrow if O2 sat WNL on RA

## 2025-01-13 NOTE — PROGRESS NOTE ADULT - SUBJECTIVE AND OBJECTIVE BOX
Patient is a 89y old  Female who presents with a chief complaint of Infection due to severe acute respiratory syndrome coronavirus 2 (SARS-CoV-2)     (12 Jan 2025 12:22)      Patient seen and examined at bedside. no overnight events or current complaints.     ALLERGIES:  penicillin (Unknown)  Bactrim (Drowsiness)  latex (Other)  aspirin (Unknown)  Macrobid (Other)    MEDICATIONS  (STANDING):  buPROPion XL (24-Hour) . 150 milliGRAM(s) Oral daily  busPIRone 5 milliGRAM(s) Oral <User Schedule>  chlorhexidine 2% Cloths 1 Application(s) Topical <User Schedule>  dexAMETHasone  Injectable 6 milliGRAM(s) IV Push daily  dextrose 5%. 1000 milliLiter(s) (100 mL/Hr) IV Continuous <Continuous>  dextrose 5%. 1000 milliLiter(s) (50 mL/Hr) IV Continuous <Continuous>  dextrose 50% Injectable 25 Gram(s) IV Push once  dextrose 50% Injectable 12.5 Gram(s) IV Push once  dextrose 50% Injectable 25 Gram(s) IV Push once  escitalopram 15 milliGRAM(s) Oral daily  gabapentin 200 milliGRAM(s) Oral daily  glucagon  Injectable 1 milliGRAM(s) IntraMuscular once  insulin lispro (ADMELOG) corrective regimen sliding scale   SubCutaneous three times a day before meals  insulin lispro (ADMELOG) corrective regimen sliding scale   SubCutaneous at bedtime  levothyroxine Injectable 55 MICROGram(s) IV Push at bedtime  metoprolol succinate ER 25 milliGRAM(s) Oral daily  mupirocin 2% Nasal 1 Application(s) Both Nostrils two times a day  remdesivir  IVPB   IV Intermittent   remdesivir  IVPB 100 milliGRAM(s) IV Intermittent every 24 hours  senna 1 Tablet(s) Oral at bedtime  sodium chloride 0.9%. 1000 milliLiter(s) (50 mL/Hr) IV Continuous <Continuous>    MEDICATIONS  (PRN):  acetaminophen     Tablet .. 650 milliGRAM(s) Oral every 6 hours PRN Mild Pain (1 - 3)  calamine/zinc oxide Lotion 1 Application(s) Topical three times a day PRN Itching  dextrose Oral Gel 15 Gram(s) Oral once PRN Blood Glucose LESS THAN 70 milliGRAM(s)/deciliter  melatonin 3 milliGRAM(s) Oral at bedtime PRN Sleep  polyethylene glycol 3350 17 Gram(s) Oral daily PRN Constipation    Vital Signs Last 24 Hrs  T(F): 98.7 (13 Jan 2025 05:45), Max: 98.9 (12 Jan 2025 19:11)  HR: 70 (13 Jan 2025 05:45) (70 - 87)  BP: 115/75 (13 Jan 2025 05:45) (115/75 - 134/69)  RR: 18 (13 Jan 2025 05:45) (18 - 18)  SpO2: 96% (13 Jan 2025 05:45) (96% - 96%)  I&O's Summary    12 Jan 2025 07:01  -  13 Jan 2025 07:00  --------------------------------------------------------  IN: 175 mL / OUT: 550 mL / NET: -375 mL    13 Jan 2025 07:01  -  13 Jan 2025 14:22  --------------------------------------------------------  IN: 200 mL / OUT: 0 mL / NET: 200 mL      PHYSICAL EXAM:  General: NAD, A/O x 3  ENT: MMM, no oral thrush   Neck: Supple, No JVD  Lungs: Clear to auscultation bilaterally, non labored breathing  Cardio: RRR, S1/S2, No murmurs  Abdomen: Soft, Nontender, Nondistended; Bowel sounds present  Extremities: No calf tenderness, No pitting edema    LABS:                        13.4   7.48  )-----------( 147      ( 13 Jan 2025 07:56 )             41.1     01-13    133  |  100  |  12  ----------------------------<  141  4.2   |  22  |  0.53    Ca    9.7      13 Jan 2025 07:56  Phos  3.0     01-12  Mg     1.5     01-13    TPro  7.1  /  Alb  2.7  /  TBili  0.4  /  DBili  x   /  AST  12  /  ALT  12  /  AlkPhos  77  01-12      PT/INR - ( 11 Jan 2025 18:00 )   PT: 13.0 sec;   INR: 1.10 ratio         PTT - ( 11 Jan 2025 18:00 )  PTT:29.6 sec  Lactate, Blood: 0.8 mmol/L (01-11 @ 18:00)    CARDIAC MARKERS ( 11 Jan 2025 18:00 )  x     / 18.4 ng/L / x     / x     / x                        POCT Blood Glucose.: 167 mg/dL (13 Jan 2025 12:12)  POCT Blood Glucose.: 157 mg/dL (13 Jan 2025 08:10)  POCT Blood Glucose.: 133 mg/dL (12 Jan 2025 20:52)  POCT Blood Glucose.: 120 mg/dL (12 Jan 2025 16:39)      Urinalysis Basic - ( 13 Jan 2025 07:56 )    Color: x / Appearance: x / SG: x / pH: x  Gluc: 141 mg/dL / Ketone: x  / Bili: x / Urobili: x   Blood: x / Protein: x / Nitrite: x   Leuk Esterase: x / RBC: x / WBC x   Sq Epi: x / Non Sq Epi: x / Bacteria: x        Culture - Urine (collected 11 Jan 2025 19:10)  Source: Clean Catch Clean Catch (Midstream)  Preliminary Report (13 Jan 2025 08:18):    50,000 - 99,000 CFU/mL Gram Negative Rods    Culture - Blood (collected 11 Jan 2025 18:00)  Source: .Blood BLOOD  Preliminary Report (13 Jan 2025 02:01):    No growth at 24 hours    Culture - Blood (collected 11 Jan 2025 17:55)  Source: .Blood BLOOD  Preliminary Report (13 Jan 2025 02:01):    No growth at 24 hours          RADIOLOGY & ADDITIONAL TESTS:    Care Discussed with Consultants/Other Providers:    Patient is a 89y old  Female who presents with a chief complaint of Infection due to severe acute respiratory syndrome coronavirus 2 (SARS-CoV-2)     (12 Jan 2025 12:22)      Patient seen and examined at bedside. no overnight events or current complaints.     ALLERGIES:  penicillin (Unknown)  Bactrim (Drowsiness)  latex (Other)  aspirin (Unknown)  Macrobid (Other)    MEDICATIONS  (STANDING):  buPROPion XL (24-Hour) . 150 milliGRAM(s) Oral daily  busPIRone 5 milliGRAM(s) Oral <User Schedule>  chlorhexidine 2% Cloths 1 Application(s) Topical <User Schedule>  dexAMETHasone  Injectable 6 milliGRAM(s) IV Push daily  dextrose 5%. 1000 milliLiter(s) (100 mL/Hr) IV Continuous <Continuous>  dextrose 5%. 1000 milliLiter(s) (50 mL/Hr) IV Continuous <Continuous>  dextrose 50% Injectable 25 Gram(s) IV Push once  dextrose 50% Injectable 12.5 Gram(s) IV Push once  dextrose 50% Injectable 25 Gram(s) IV Push once  escitalopram 15 milliGRAM(s) Oral daily  gabapentin 200 milliGRAM(s) Oral daily  glucagon  Injectable 1 milliGRAM(s) IntraMuscular once  insulin lispro (ADMELOG) corrective regimen sliding scale   SubCutaneous three times a day before meals  insulin lispro (ADMELOG) corrective regimen sliding scale   SubCutaneous at bedtime  levothyroxine Injectable 55 MICROGram(s) IV Push at bedtime  metoprolol succinate ER 25 milliGRAM(s) Oral daily  mupirocin 2% Nasal 1 Application(s) Both Nostrils two times a day  remdesivir  IVPB   IV Intermittent   remdesivir  IVPB 100 milliGRAM(s) IV Intermittent every 24 hours  senna 1 Tablet(s) Oral at bedtime  sodium chloride 0.9%. 1000 milliLiter(s) (50 mL/Hr) IV Continuous <Continuous>    MEDICATIONS  (PRN):  acetaminophen     Tablet .. 650 milliGRAM(s) Oral every 6 hours PRN Mild Pain (1 - 3)  calamine/zinc oxide Lotion 1 Application(s) Topical three times a day PRN Itching  dextrose Oral Gel 15 Gram(s) Oral once PRN Blood Glucose LESS THAN 70 milliGRAM(s)/deciliter  melatonin 3 milliGRAM(s) Oral at bedtime PRN Sleep  polyethylene glycol 3350 17 Gram(s) Oral daily PRN Constipation    Vital Signs Last 24 Hrs  T(F): 98.7 (13 Jan 2025 05:45), Max: 98.9 (12 Jan 2025 19:11)  HR: 70 (13 Jan 2025 05:45) (70 - 87)  BP: 115/75 (13 Jan 2025 05:45) (115/75 - 134/69)  RR: 18 (13 Jan 2025 05:45) (18 - 18)  SpO2: 96% (13 Jan 2025 05:45) (96% - 96%)  I&O's Summary    12 Jan 2025 07:01  -  13 Jan 2025 07:00  --------------------------------------------------------  IN: 175 mL / OUT: 550 mL / NET: -375 mL    13 Jan 2025 07:01  -  13 Jan 2025 14:22  --------------------------------------------------------  IN: 200 mL / OUT: 0 mL / NET: 200 mL      PHYSICAL EXAM:  General: NAD, AOx1, sleepy  ENT: MMM, no oral thrush   Neck: Supple, No JVD  Lungs: Clear to auscultation bilaterally, fair, effort, non labored breathing  Cardio: RRR, S1/S2,  Abdomen: Soft, Nontender, Nondistended; Bowel sounds present  Extremities: No calf tenderness, No pitting edema    LABS:                        13.4   7.48  )-----------( 147      ( 13 Jan 2025 07:56 )             41.1     01-13    133  |  100  |  12  ----------------------------<  141  4.2   |  22  |  0.53    Ca    9.7      13 Jan 2025 07:56  Phos  3.0     01-12  Mg     1.5     01-13    TPro  7.1  /  Alb  2.7  /  TBili  0.4  /  DBili  x   /  AST  12  /  ALT  12  /  AlkPhos  77  01-12      PT/INR - ( 11 Jan 2025 18:00 )   PT: 13.0 sec;   INR: 1.10 ratio         PTT - ( 11 Jan 2025 18:00 )  PTT:29.6 sec  Lactate, Blood: 0.8 mmol/L (01-11 @ 18:00)    CARDIAC MARKERS ( 11 Jan 2025 18:00 )  x     / 18.4 ng/L / x     / x     / x                        POCT Blood Glucose.: 167 mg/dL (13 Jan 2025 12:12)  POCT Blood Glucose.: 157 mg/dL (13 Jan 2025 08:10)  POCT Blood Glucose.: 133 mg/dL (12 Jan 2025 20:52)  POCT Blood Glucose.: 120 mg/dL (12 Jan 2025 16:39)      Urinalysis Basic - ( 13 Jan 2025 07:56 )    Color: x / Appearance: x / SG: x / pH: x  Gluc: 141 mg/dL / Ketone: x  / Bili: x / Urobili: x   Blood: x / Protein: x / Nitrite: x   Leuk Esterase: x / RBC: x / WBC x   Sq Epi: x / Non Sq Epi: x / Bacteria: x        Culture - Urine (collected 11 Jan 2025 19:10)  Source: Clean Catch Clean Catch (Midstream)  Preliminary Report (13 Jan 2025 08:18):    50,000 - 99,000 CFU/mL Gram Negative Rods    Culture - Blood (collected 11 Jan 2025 18:00)  Source: .Blood BLOOD  Preliminary Report (13 Jan 2025 02:01):    No growth at 24 hours    Culture - Blood (collected 11 Jan 2025 17:55)  Source: .Blood BLOOD  Preliminary Report (13 Jan 2025 02:01):    No growth at 24 hours          RADIOLOGY & ADDITIONAL TESTS:  < from: TTE Echo Complete w/o Contrast w/ Doppler (01.12.25 @ 11:37) >    CONCLUSIONS:     1. Left ventricular cavity is normal in size. Left ventricular wall   thickness is mildly increased. Left ventricular systolic function is   normal with an ejectionfraction visually estimated at 60 to 65 %.   2. Unable to assess left ventricular diastolic function due to   insufficient data.   3. Based on visual assessment, the right ventricle appears mildly   enlarged. mildly reduced right ventricular systolicfunction. Tricuspid   annular plane systolic excursion (TAPSE) is 1.4 cm (normal >=1.7 cm).   4. Left atrium is moderately dilated.   5. The right atrium is moderately dilated.   6. No pericardial effusion seen.   7. Structurally normal tricuspid valve with normal leaflet excursion.   Moderate tricuspid regurgitation.   8. Estimated pulmonary artery systolic pressure is 46 mmHg.   9. Technically difficult image quality.  10. The inferior vena cava is normal in size measuring 1.70 cm in   diameter, (normal <2.1cm) with abnormal inspiratory collapse (abnormal   <50%) consistent with mildly elevated right atrial pressure (  8, range 5-10mmHg).  11. There is mild thickening of the aortic valve leaflets.    < end of copied text >      < from: Xray Hip 2-3 Views, Right (01.13.25 @ 11:16) >    IMPRESSION:    Deformity of the right proximal femur with increased shortening of the   right femoral neck. Recommend correlation with focal clinical findings   and follow-up as clinically indicated    --- End of Report ---            MALIHA CABRAL DO; Attending Radiologist  This document has been electronically signed. Jan 13 2025 12:27PM    < end of copied text >    Care Discussed with Consultants/Other Providers:

## 2025-01-13 NOTE — PROGRESS NOTE ADULT - NS ATTEND AMEND GEN_ALL_CORE FT
Pt on remdesevir and decadron. She is awake and having conversation this AM. Stopping other Abx. Titrating down to RA as tolerated. F/U U Cx
Pt is doing well and has minimal complaints. Continue remdesevir for 1 more day. Possible D/C in AM if O2 remains well on RA

## 2025-01-14 LAB
-  AMPICILLIN/SULBACTAM: SIGNIFICANT CHANGE UP
-  AMPICILLIN: SIGNIFICANT CHANGE UP
-  AZTREONAM: SIGNIFICANT CHANGE UP
-  CEFAZOLIN: SIGNIFICANT CHANGE UP
-  CEFEPIME: SIGNIFICANT CHANGE UP
-  CEFTRIAXONE: SIGNIFICANT CHANGE UP
-  CEFUROXIME: SIGNIFICANT CHANGE UP
-  CIPROFLOXACIN: SIGNIFICANT CHANGE UP
-  ERTAPENEM: SIGNIFICANT CHANGE UP
-  GENTAMICIN: SIGNIFICANT CHANGE UP
-  LEVOFLOXACIN: SIGNIFICANT CHANGE UP
-  MEROPENEM: SIGNIFICANT CHANGE UP
-  NITROFURANTOIN: SIGNIFICANT CHANGE UP
-  PIPERACILLIN/TAZOBACTAM: SIGNIFICANT CHANGE UP
-  TOBRAMYCIN: SIGNIFICANT CHANGE UP
-  TRIMETHOPRIM/SULFAMETHOXAZOLE: SIGNIFICANT CHANGE UP
ANION GAP SERPL CALC-SCNC: 7 MMOL/L — SIGNIFICANT CHANGE UP (ref 5–17)
ANION GAP SERPL CALC-SCNC: 9 MMOL/L — SIGNIFICANT CHANGE UP (ref 5–17)
BUN SERPL-MCNC: 12 MG/DL — SIGNIFICANT CHANGE UP (ref 7–23)
BUN SERPL-MCNC: 13 MG/DL — SIGNIFICANT CHANGE UP (ref 7–23)
CALCIUM SERPL-MCNC: 9.6 MG/DL — SIGNIFICANT CHANGE UP (ref 8.4–10.5)
CALCIUM SERPL-MCNC: 9.6 MG/DL — SIGNIFICANT CHANGE UP (ref 8.4–10.5)
CHLORIDE SERPL-SCNC: 98 MMOL/L — SIGNIFICANT CHANGE UP (ref 96–108)
CHLORIDE SERPL-SCNC: 99 MMOL/L — SIGNIFICANT CHANGE UP (ref 96–108)
CO2 SERPL-SCNC: 27 MMOL/L — SIGNIFICANT CHANGE UP (ref 22–31)
CO2 SERPL-SCNC: 27 MMOL/L — SIGNIFICANT CHANGE UP (ref 22–31)
CREAT SERPL-MCNC: 0.61 MG/DL — SIGNIFICANT CHANGE UP (ref 0.5–1.3)
CREAT SERPL-MCNC: 0.61 MG/DL — SIGNIFICANT CHANGE UP (ref 0.5–1.3)
CULTURE RESULTS: ABNORMAL
EGFR: 83 ML/MIN/1.73M2 — SIGNIFICANT CHANGE UP
EGFR: 83 ML/MIN/1.73M2 — SIGNIFICANT CHANGE UP
GLUCOSE BLDC GLUCOMTR-MCNC: 131 MG/DL — HIGH (ref 70–99)
GLUCOSE BLDC GLUCOMTR-MCNC: 146 MG/DL — HIGH (ref 70–99)
GLUCOSE BLDC GLUCOMTR-MCNC: 157 MG/DL — HIGH (ref 70–99)
GLUCOSE BLDC GLUCOMTR-MCNC: 201 MG/DL — HIGH (ref 70–99)
GLUCOSE SERPL-MCNC: 131 MG/DL — HIGH (ref 70–99)
GLUCOSE SERPL-MCNC: 160 MG/DL — HIGH (ref 70–99)
HCT VFR BLD CALC: 35.3 % — SIGNIFICANT CHANGE UP (ref 34.5–45)
HGB BLD-MCNC: 12.3 G/DL — SIGNIFICANT CHANGE UP (ref 11.5–15.5)
MAGNESIUM SERPL-MCNC: 1.7 MG/DL — SIGNIFICANT CHANGE UP (ref 1.6–2.6)
MCHC RBC-ENTMCNC: 33 PG — SIGNIFICANT CHANGE UP (ref 27–34)
MCHC RBC-ENTMCNC: 34.8 G/DL — SIGNIFICANT CHANGE UP (ref 32–36)
MCV RBC AUTO: 94.6 FL — SIGNIFICANT CHANGE UP (ref 80–100)
METHOD TYPE: SIGNIFICANT CHANGE UP
NRBC # BLD: 0 /100 WBCS — SIGNIFICANT CHANGE UP (ref 0–0)
ORGANISM # SPEC MICROSCOPIC CNT: ABNORMAL
ORGANISM # SPEC MICROSCOPIC CNT: SIGNIFICANT CHANGE UP
PLATELET # BLD AUTO: 262 K/UL — SIGNIFICANT CHANGE UP (ref 150–400)
POTASSIUM SERPL-MCNC: 3.7 MMOL/L — SIGNIFICANT CHANGE UP (ref 3.5–5.3)
POTASSIUM SERPL-MCNC: 3.9 MMOL/L — SIGNIFICANT CHANGE UP (ref 3.5–5.3)
POTASSIUM SERPL-SCNC: 3.7 MMOL/L — SIGNIFICANT CHANGE UP (ref 3.5–5.3)
POTASSIUM SERPL-SCNC: 3.9 MMOL/L — SIGNIFICANT CHANGE UP (ref 3.5–5.3)
RBC # BLD: 3.73 M/UL — LOW (ref 3.8–5.2)
RBC # FLD: 13.4 % — SIGNIFICANT CHANGE UP (ref 10.3–14.5)
SODIUM SERPL-SCNC: 132 MMOL/L — LOW (ref 135–145)
SODIUM SERPL-SCNC: 135 MMOL/L — SIGNIFICANT CHANGE UP (ref 135–145)
SPECIMEN SOURCE: SIGNIFICANT CHANGE UP
WBC # BLD: 6.9 K/UL — SIGNIFICANT CHANGE UP (ref 3.8–10.5)
WBC # FLD AUTO: 6.9 K/UL — SIGNIFICANT CHANGE UP (ref 3.8–10.5)

## 2025-01-14 PROCEDURE — 99232 SBSQ HOSP IP/OBS MODERATE 35: CPT

## 2025-01-14 RX ORDER — ACETAMINOPHEN 80 MG/.8ML
650 SOLUTION/ DROPS ORAL EVERY 8 HOURS
Refills: 0 | Status: DISCONTINUED | OUTPATIENT
Start: 2025-01-14 | End: 2025-01-15

## 2025-01-14 RX ADMIN — CHLORHEXIDINE GLUCONATE 1 APPLICATION(S): 1.2 RINSE ORAL at 06:37

## 2025-01-14 RX ADMIN — GABAPENTIN 200 MILLIGRAM(S): 300 CAPSULE ORAL at 12:09

## 2025-01-14 RX ADMIN — Medication 1 APPLICATION(S): at 06:27

## 2025-01-14 RX ADMIN — Medication 4: at 12:08

## 2025-01-14 RX ADMIN — REMDESIVIR 200 MILLIGRAM(S): 5 INJECTION INTRAVENOUS at 23:18

## 2025-01-14 RX ADMIN — BUPROPION HYDROCHLORIDE 150 MILLIGRAM(S): 150 TABLET, EXTENDED RELEASE ORAL at 12:09

## 2025-01-14 RX ADMIN — SENNOSIDES 1 TABLET(S): 8.6 TABLET, FILM COATED ORAL at 21:22

## 2025-01-14 RX ADMIN — Medication 25 MILLIGRAM(S): at 06:20

## 2025-01-14 RX ADMIN — ACETAMINOPHEN 650 MILLIGRAM(S): 80 SOLUTION/ DROPS ORAL at 21:16

## 2025-01-14 RX ADMIN — LEVOTHYROXINE SODIUM 55 MICROGRAM(S): 175 TABLET ORAL at 21:27

## 2025-01-14 RX ADMIN — ESCITALOPRAM OXALATE 15 MILLIGRAM(S): 10 TABLET ORAL at 12:09

## 2025-01-14 RX ADMIN — ACETAMINOPHEN 650 MILLIGRAM(S): 80 SOLUTION/ DROPS ORAL at 22:02

## 2025-01-14 RX ADMIN — Medication 1 APPLICATION(S): at 21:25

## 2025-01-14 RX ADMIN — Medication 2: at 17:23

## 2025-01-14 RX ADMIN — BUSPIRONE HYDROCHLORIDE 5 MILLIGRAM(S): 15 TABLET ORAL at 06:21

## 2025-01-14 RX ADMIN — Medication 6 MILLIGRAM(S): at 06:21

## 2025-01-14 NOTE — PROGRESS NOTE ADULT - ASSESSMENT
89F h/o HFpEF, Afib not on AC d/t fall/bleeding risk, hypothyroidism, DM, HTN, anxiety/depression, bedbound, presents from NH for lethargy and decreased responsiveness for 1d admitted for severe sepsis 2/2 UTI/COVID.    #Severe sepsis 2/2 UTI, COVID+ PNA   #Acute hypoxic respiratory failure 2/2 COVID - resolving  #Metabolic encephalopathy - resolving  - tachycardia, fever, AMS  - UA positive. Previously UC with Ecoli and Aerococcus. Treated at [facility]  - given ceftriaxone in ED  - Continue Remdesevir, and D/C dexamethasone since Off O2  - Received initial dosing meropenem, vancomycin, later DC'd .   -UC with GNR but < 100k with recent treatment  - CTH sinus mucosal thickening, no acute ICH/mass effect, possible microvascular ischemic changes  - BCx x2, UCx,   -MRSA/MSSA PCR + and on mupirocin   -Speech and swallow consulted and diet advanced to regular  -transitioned off supplemental O2, maintain spO2 > 94%    #Chronic HFpEF - not in exacerbation  #Chonic Afib  - proBNP 2120  - tele discontinued  -tte: EF 60 to 65 %.  - monitor I&Os  - not on AC due to bleeding risk  - hold home lasix, resume as tolerated  - c/w home metoprolol    #Ecchymosis, leg  - US LE negative  - XR right hip and leg is neg for anything acute    #Hypomagnesia  -repleted  -monitor     #Right leg pain  -X ray with Increased shortening of the right femoral neck, chronic in the absence of focal clinical findings.  -Tylenol ATC    #DM  - hold home metformin  - ISS  - accuchecks, hypoglycemia protocol    #Hypothyroidism  #Anxiety/depression  - c/w home levothyroxine, lexapro, buspar, wellbutrin    #VTE ppx  - not on AC d/t bleeding risk  - SCDs    #DNR/DNI  -MOLST confirmed with son on admission    Family updated and aware of D/C tomorrow in AM

## 2025-01-14 NOTE — PHYSICAL THERAPY INITIAL EVALUATION ADULT - LEVEL OF INDEPENDENCE: SIT/SUPINE, REHAB EVAL
Device check with OV 1 year  Device check 26 weeks  
minimum assist (75% patients effort)
min assist x 2/maximum assist (25% patients effort)

## 2025-01-14 NOTE — PROGRESS NOTE ADULT - SUBJECTIVE AND OBJECTIVE BOX
CC: Patient is a 89y old  Female who presents with a chief complaint of lethargy, sepsis (13 Jan 2025 13:40)      Interval History:  Patient seen and examined at bedside.  No overnight events  Just states she isn't feeling well    ALLERGIES:  penicillin (Unknown)  Bactrim (Drowsiness)  latex (Other)  aspirin (Unknown)  Macrobid (Other)    MEDICATIONS  (STANDING):  acetaminophen     Tablet .. 650 milliGRAM(s) Oral every 8 hours  buPROPion XL (24-Hour) . 150 milliGRAM(s) Oral daily  busPIRone 5 milliGRAM(s) Oral <User Schedule>  chlorhexidine 2% Cloths 1 Application(s) Topical <User Schedule>  dextrose 5%. 1000 milliLiter(s) (100 mL/Hr) IV Continuous <Continuous>  dextrose 5%. 1000 milliLiter(s) (50 mL/Hr) IV Continuous <Continuous>  dextrose 50% Injectable 25 Gram(s) IV Push once  dextrose 50% Injectable 12.5 Gram(s) IV Push once  dextrose 50% Injectable 25 Gram(s) IV Push once  escitalopram 15 milliGRAM(s) Oral daily  gabapentin 200 milliGRAM(s) Oral daily  glucagon  Injectable 1 milliGRAM(s) IntraMuscular once  insulin lispro (ADMELOG) corrective regimen sliding scale   SubCutaneous three times a day before meals  insulin lispro (ADMELOG) corrective regimen sliding scale   SubCutaneous at bedtime  levothyroxine Injectable 55 MICROGram(s) IV Push at bedtime  metoprolol succinate ER 25 milliGRAM(s) Oral daily  mupirocin 2% Nasal 1 Application(s) Both Nostrils two times a day  remdesivir  IVPB   IV Intermittent   remdesivir  IVPB 100 milliGRAM(s) IV Intermittent every 24 hours  senna 1 Tablet(s) Oral at bedtime  sodium chloride 0.9%. 1000 milliLiter(s) (50 mL/Hr) IV Continuous <Continuous>    MEDICATIONS  (PRN):  calamine/zinc oxide Lotion 1 Application(s) Topical three times a day PRN Itching  dextrose Oral Gel 15 Gram(s) Oral once PRN Blood Glucose LESS THAN 70 milliGRAM(s)/deciliter  melatonin 3 milliGRAM(s) Oral at bedtime PRN Sleep  polyethylene glycol 3350 17 Gram(s) Oral daily PRN Constipation    Vital Signs Last 24 Hrs  T(F): 97.8 (14 Jan 2025 05:11), Max: 99 (13 Jan 2025 19:56)  HR: 81 (14 Jan 2025 05:11) (70 - 81)  BP: 125/71 (14 Jan 2025 05:11) (119/66 - 159/88)  RR: 18 (14 Jan 2025 05:11) (16 - 18)  SpO2: 97% (14 Jan 2025 05:11) (94% - 97%)  I&O's Summary    13 Jan 2025 07:01  -  14 Jan 2025 07:00  --------------------------------------------------------  IN: 650 mL / OUT: 600 mL / NET: 50 mL    14 Jan 2025 07:01  -  14 Jan 2025 12:18  --------------------------------------------------------  IN: 300 mL / OUT: 0 mL / NET: 300 mL      BMI (kg/m2): 30 (01-11-25 @ 23:00)    PHYSICAL EXAM:  GENERAL: NAD  NERVOUS SYSTEM:  CN II - XII intact; Sensation intact; follows commands  CHEST/LUNG: Clear to percussion bilaterally; No rales, rhonchi, wheezing, or rubs; normal respiratory effort, no intercostal retractions  HEART: Regular rate and rhythm; No murmurs, rubs, or gallops; No pitting edema  ABDOMEN: Soft, Nontender, Nondistended; Bowel sounds present; No HSM or masses  PSYCH: Appropriate affect, Alert & awake    LABS:                        13.4   7.48  )-----------( 147      ( 13 Jan 2025 07:56 )             41.1       01-14    135  |  99  |  12  ----------------------------<  131  3.7   |  27  |  0.61    Ca    9.6      14 Jan 2025 07:16  Phos  3.0     01-12  Mg     1.7     01-14    TPro  7.1  /  Alb  2.7  /  TBili  0.4  /  DBili  x   /  AST  12  /  ALT  12  /  AlkPhos  77  01-12       PT/INR - ( 11 Jan 2025 18:00 )   PT: 13.0 sec;   INR: 1.10 ratio         PTT - ( 11 Jan 2025 18:00 )  PTT:29.6 sec   Lactate, Blood: 0.8 mmol/L (01-11 @ 18:00)    CARDIAC MARKERS ( 11 Jan 2025 18:00 )  x     / 18.4 ng/L / x     / x     / x        POCT Blood Glucose.: 201 mg/dL (14 Jan 2025 11:33)  POCT Blood Glucose.: 146 mg/dL (14 Jan 2025 08:05)  POCT Blood Glucose.: 113 mg/dL (13 Jan 2025 21:34)  POCT Blood Glucose.: 156 mg/dL (13 Jan 2025 16:37)      Urinalysis Basic - ( 14 Jan 2025 07:16 )  Color: x / Appearance: x / SG: x / pH: x  Gluc: 131 mg/dL / Ketone: x  / Bili: x / Urobili: x   Blood: x / Protein: x / Nitrite: x   Leuk Esterase: x / RBC: x / WBC x   Sq Epi: x / Non Sq Epi: x / Bacteria: x    Culture - Urine (collected 11 Jan 2025 19:10)  Source: Clean Catch Clean Catch (Midstream)  Preliminary Report (13 Jan 2025 22:09):    50,000 - 99,000 CFU/mL Proteus mirabilis    Culture - Blood (collected 11 Jan 2025 18:00)  Source: .Blood BLOOD  Preliminary Report (14 Jan 2025 02:01):    No growth at 48 Hours    Culture - Blood (collected 11 Jan 2025 17:55)  Source: .Blood BLOOD  Preliminary Report (14 Jan 2025 02:01):    No growth at 48 Hours    Care Discussed with Consultants/Other Providers: Yes

## 2025-01-15 ENCOUNTER — TRANSCRIPTION ENCOUNTER (OUTPATIENT)
Age: 89
End: 2025-01-15

## 2025-01-15 VITALS
SYSTOLIC BLOOD PRESSURE: 149 MMHG | DIASTOLIC BLOOD PRESSURE: 88 MMHG | RESPIRATION RATE: 16 BRPM | TEMPERATURE: 98 F | HEART RATE: 79 BPM | OXYGEN SATURATION: 95 %

## 2025-01-15 LAB
GLUCOSE BLDC GLUCOMTR-MCNC: 146 MG/DL — HIGH (ref 70–99)
GLUCOSE BLDC GLUCOMTR-MCNC: 161 MG/DL — HIGH (ref 70–99)

## 2025-01-15 PROCEDURE — 36415 COLL VENOUS BLD VENIPUNCTURE: CPT

## 2025-01-15 PROCEDURE — 73502 X-RAY EXAM HIP UNI 2-3 VIEWS: CPT

## 2025-01-15 PROCEDURE — 96365 THER/PROPH/DIAG IV INF INIT: CPT

## 2025-01-15 PROCEDURE — 80048 BASIC METABOLIC PNL TOTAL CA: CPT

## 2025-01-15 PROCEDURE — 87637 SARSCOV2&INF A&B&RSV AMP PRB: CPT

## 2025-01-15 PROCEDURE — 87186 SC STD MICRODIL/AGAR DIL: CPT

## 2025-01-15 PROCEDURE — 80053 COMPREHEN METABOLIC PANEL: CPT

## 2025-01-15 PROCEDURE — 85027 COMPLETE CBC AUTOMATED: CPT

## 2025-01-15 PROCEDURE — 73552 X-RAY EXAM OF FEMUR 2/>: CPT

## 2025-01-15 PROCEDURE — 99239 HOSP IP/OBS DSCHRG MGMT >30: CPT

## 2025-01-15 PROCEDURE — 70450 CT HEAD/BRAIN W/O DYE: CPT | Mod: MC

## 2025-01-15 PROCEDURE — 93306 TTE W/DOPPLER COMPLETE: CPT

## 2025-01-15 PROCEDURE — 93971 EXTREMITY STUDY: CPT

## 2025-01-15 PROCEDURE — 92610 EVALUATE SWALLOWING FUNCTION: CPT

## 2025-01-15 PROCEDURE — 96361 HYDRATE IV INFUSION ADD-ON: CPT

## 2025-01-15 PROCEDURE — 71045 X-RAY EXAM CHEST 1 VIEW: CPT

## 2025-01-15 PROCEDURE — 83605 ASSAY OF LACTIC ACID: CPT

## 2025-01-15 PROCEDURE — 93005 ELECTROCARDIOGRAM TRACING: CPT

## 2025-01-15 PROCEDURE — 87077 CULTURE AEROBIC IDENTIFY: CPT

## 2025-01-15 PROCEDURE — 84484 ASSAY OF TROPONIN QUANT: CPT

## 2025-01-15 PROCEDURE — 83690 ASSAY OF LIPASE: CPT

## 2025-01-15 PROCEDURE — 92526 ORAL FUNCTION THERAPY: CPT

## 2025-01-15 PROCEDURE — 99285 EMERGENCY DEPT VISIT HI MDM: CPT

## 2025-01-15 PROCEDURE — 81001 URINALYSIS AUTO W/SCOPE: CPT

## 2025-01-15 PROCEDURE — 83735 ASSAY OF MAGNESIUM: CPT

## 2025-01-15 PROCEDURE — 83880 ASSAY OF NATRIURETIC PEPTIDE: CPT

## 2025-01-15 PROCEDURE — 85610 PROTHROMBIN TIME: CPT

## 2025-01-15 PROCEDURE — 85025 COMPLETE CBC W/AUTO DIFF WBC: CPT

## 2025-01-15 PROCEDURE — 82962 GLUCOSE BLOOD TEST: CPT

## 2025-01-15 PROCEDURE — 85730 THROMBOPLASTIN TIME PARTIAL: CPT

## 2025-01-15 PROCEDURE — 96367 TX/PROPH/DG ADDL SEQ IV INF: CPT

## 2025-01-15 RX ORDER — FOSFOMYCIN TROMETHAMINE 3 G/1
3 POWDER ORAL ONCE
Refills: 0 | Status: COMPLETED | OUTPATIENT
Start: 2025-01-15 | End: 2025-01-15

## 2025-01-15 RX ORDER — CALAMINE
1 LOTION (ML) TOPICAL
Qty: 0 | Refills: 0 | DISCHARGE
Start: 2025-01-15

## 2025-01-15 RX ORDER — MUPIROCIN 2 %
1 OINTMENT (GRAM) TOPICAL
Qty: 1 | Refills: 0
Start: 2025-01-15 | End: 2025-01-15

## 2025-01-15 RX ADMIN — ACETAMINOPHEN 650 MILLIGRAM(S): 80 SOLUTION/ DROPS ORAL at 06:39

## 2025-01-15 RX ADMIN — GABAPENTIN 200 MILLIGRAM(S): 300 CAPSULE ORAL at 12:18

## 2025-01-15 RX ADMIN — ACETAMINOPHEN 650 MILLIGRAM(S): 80 SOLUTION/ DROPS ORAL at 15:23

## 2025-01-15 RX ADMIN — BUPROPION HYDROCHLORIDE 150 MILLIGRAM(S): 150 TABLET, EXTENDED RELEASE ORAL at 12:19

## 2025-01-15 RX ADMIN — Medication 25 MILLIGRAM(S): at 06:39

## 2025-01-15 RX ADMIN — Medication 2: at 12:18

## 2025-01-15 RX ADMIN — ESCITALOPRAM OXALATE 15 MILLIGRAM(S): 10 TABLET ORAL at 12:18

## 2025-01-15 RX ADMIN — FOSFOMYCIN TROMETHAMINE 3 GRAM(S): 3 POWDER ORAL at 10:36

## 2025-01-15 RX ADMIN — BUSPIRONE HYDROCHLORIDE 5 MILLIGRAM(S): 15 TABLET ORAL at 06:39

## 2025-01-15 RX ADMIN — Medication 1 APPLICATION(S): at 06:44

## 2025-01-15 RX ADMIN — ACETAMINOPHEN 650 MILLIGRAM(S): 80 SOLUTION/ DROPS ORAL at 14:48

## 2025-01-15 RX ADMIN — CHLORHEXIDINE GLUCONATE 1 APPLICATION(S): 1.2 RINSE ORAL at 06:46

## 2025-01-15 NOTE — DISCHARGE NOTE PROVIDER - NSDCCPCAREPLAN_GEN_ALL_CORE_FT
PRINCIPAL DISCHARGE DIAGNOSIS  Diagnosis: Pneumonia due to COVID-19 virus  Assessment and Plan of Treatment: You came to the hospital due to change in your mental status  You were diagnosed with COVID-19  You were treated with remdesevir and decadron. You were given meropenem x 2 dose and fosfomycin x1 for esbl proteus in the urine which was weakly positive.  You will need to follow up with your primary care physician for further management.  Discharging Provider:  Maty Christian MD  Contact Info: [phone] - Please call with any questions or concerns.

## 2025-01-15 NOTE — DISCHARGE NOTE NURSING/CASE MANAGEMENT/SOCIAL WORK - PATIENT PORTAL LINK FT
You can access the FollowMyHealth Patient Portal offered by [facility] by registering at the following website: [email or web address]. By joining [facility]’s FollowMyHealth portal, you will also be able to view your health information using other applications (apps) compatible with our system.

## 2025-01-15 NOTE — DISCHARGE NOTE PROVIDER - NSDCMRMEDTOKEN_GEN_ALL_CORE_FT
BuSpar 5 mg oral tablet: 1 tab(s) orally once a day  calamine topical lotion: 1 Apply topically to affected area 3 times a day As needed Itching  gabapentin 100 mg oral capsule: 2 cap(s) orally once a day  Lasix 20 mg oral tablet: 1 tab(s) orally once a day  levothyroxine 75 mcg (0.075 mg) oral tablet: 1 tab(s) orally once a day  Lexapro 10 mg oral tablet: 1.5 tab(s) orally once a day  melatonin 3 mg oral tablet: 1 tab(s) orally once a day (at bedtime)  metFORMIN 500 mg oral tablet: 1 orally 2 times a day  Metoprolol Succinate ER 25 mg oral tablet, extended release: 1 tab(s) orally once a day  polyethylene glycol 3350 oral powder for reconstitution: 17 gram(s) orally once a day As needed Constipation  senna leaf extract oral tablet: 1 tab(s) orally once a day (at bedtime)  Tylenol 325 mg oral tablet: 2 tab(s) orally every 6 hours as needed for  mild pain  Wellbutrin  mg/24 hours oral tablet, extended release: 1 orally once a day

## 2025-01-15 NOTE — DISCHARGE NOTE NURSING/CASE MANAGEMENT/SOCIAL WORK - FINANCIAL ASSISTANCE
[facility] provides services at a reduced cost to those who are determined to be eligible through [facility]’s financial assistance program. Information regarding [facility]’s financial assistance program can be found by going to [email or web address] or by calling [phone].

## 2025-01-15 NOTE — DISCHARGE NOTE PROVIDER - ATTENDING DISCHARGE PHYSICAL EXAMINATION:
GENERAL: NAD  CHEST/LUNG: Clear to percussion bilaterally; No rales, rhonchi, wheezing, or rubs; normal respiratory effort, no intercostal retractions; No pitting edema  HEART: Regular rate and rhythm; No murmurs, rubs, or gallops  PSYCH: Appropriate affect, Alert & awake

## 2025-01-15 NOTE — DISCHARGE NOTE PROVIDER - HOSPITAL COURSE
HPI:  89F h/o HFpEF, Afib not on AC d/t fall/bleeding risk, hypothyroidism, DM, HTN, anxiety/depression, bedbound, presents from NH for lethargy and decreased responsiveness for 1d. History obtained from patient's sons d/t patient's altered mental status. Pt has h/o frequent UTIs and had recent treatment for UTI with meropenem. Per son pt normally conversant AOx3 at baseline, however at NH was noted to be lethargic and unable to maintain o2 sat, requiring 5L NC, prompting transfer to ED. Denies fever, cough, SOB, vomiting, diarrhea.    In the ED, pt febrile T 101.3, , /73, RR 25, sat 96% on 5L NC. Labs significant for COVID positive, UA +leuk est, mod blood, BNP 2120.  Pt was given 1L NS bolus and ceftriaxone. (11 Jan 2025 20:52)    Patient was started on meropenem, remdesevir, and decadron. Meropenem was stopped and patient completed remdesivir and decadron. She is more awake and able to eat. She was also given 1 dose of fosfomycin for weakly positive esbl proteus < 90K. She is stable to return to her facility    GOC: DNR/DNI    Discharging Provider:  Maty Christian MD  Contact Info: Cell [phone], email- [email or web address]    Outpatient Provider: Dr. Adiel brown

## 2025-01-17 NOTE — H&P ADULT - NSTOBACCOSCREENHP_GEN_A_NCS
No
Quality 130: Documentation Of Current Medications In The Medical Record: Current Medications Documented
Detail Level: Detailed
Quality 431: Preventive Care And Screening: Unhealthy Alcohol Use - Screening: Patient not identified as an unhealthy alcohol user when screened for unhealthy alcohol use using a systematic screening method
Quality 226: Preventive Care And Screening: Tobacco Use: Screening And Cessation Intervention: Patient screened for tobacco use and is an ex/non-smoker

## 2025-02-10 NOTE — BH CONSULTATION LIAISON ASSESSMENT NOTE - NSBHCHARTREVIEWINVESTIGATE_PSY_A_CORE FT
Is This A New Presentation, Or A Follow-Up?: Rash < from: CT Head No Cont (12.29.23 @ 14:16) >    ACC: [id number] EXAM:  CT BRAIN   ORDERED BY:  SOM BRODERICK     PROCEDURE DATE:  12/29/2023          INTERPRETATION:  .    CLINICAL INFORMATION: Altered mental status. Hypoxia.    TECHNIQUE: Multiple axial CT images of the head were obtained without  contrast. Sagittal and coronal reconstructed images were acquired from   the source data.    COMPARISON: Prior CT examination of the head from 12/28/2023.    FINDINGS: Examination is motion limited.    There is no acute intracranial hemorrhage, mass effect, shift of the   midline structures, herniation, extra-axial fluid collection, or   hydrocephalus.    There is diffuse cerebral volume loss with prominence of the sulci,   fissures, and cisternal spaces which is normal for the patient's age.   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    The paranasal sinuses and tympanomastoid cavities are clear. The   calvarium is intact. The imaged orbits are unremarkable.    IMPRESSION: Motion limited study.    No gross acute intracranial hemorrhage, mass effect, or shift of the   midline structures.    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    --- End of Report ---            KARAN CASH MD; Attending Radiologist  This document has been electronically signed. Dec 29 2023  4:28PM    < end of copied text >     < from: CT Head No Cont (12.29.23 @ 14:16) >    ACC: [id number] EXAM:  CT BRAIN   ORDERED BY:  SOM BRODERICK     PROCEDURE DATE:  12/29/2023          INTERPRETATION:  .    CLINICAL INFORMATION: Altered mental status. Hypoxia.    TECHNIQUE: Multiple axial CT images of the head were obtained without  contrast. Sagittal and coronal reconstructed images were acquired from   the source data.    COMPARISON: Prior CT examination of the head from 12/28/2023.    FINDINGS: Examination is motion limited.    There is no acute intracranial hemorrhage, mass effect, shift of the   midline structures, herniation, extra-axial fluid collection, or   hydrocephalus.    There is diffuse cerebral volume loss with prominence of the sulci,   fissures, and cisternal spaces which is normal for the patient's age.   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    The paranasal sinuses and tympanomastoid cavities are clear. The   calvarium is intact. The imaged orbits are unremarkable.    IMPRESSION: Motion limited study.    No gross acute intracranial hemorrhage, mass effect, or shift of the   midline structures.    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    --- End of Report ---            KARAN CASH MD; Attending Radiologist  This document has been electronically signed. Dec 29 2023  4:28PM    < end of copied text >

## 2025-02-24 NOTE — ED ADULT NURSE NOTE - ISAR SCORE
When having chips and dip- try to pair with veg, also measure out the dip and chips   -minimize cooking oils    If choosing starch pick whole grain/complex carbs and keep to 1/2 cup: oatmeal, brown rice, quinoa, whole wheat pasta, potatoes, sweet potato, chickpea noodles, red lentil noodles  Measure all portions: creamers, sugars, cooking oils/butters, condiments, dressings, etc and log calories    -Try flavored carbonated water instead of soda     Try doing strength training 3x/week   
3

## 2025-03-06 NOTE — ED BEHAVIORAL HEALTH ASSESSMENT NOTE - DETAILS
in [facility] fair minus denies suicide attempts n/a spoke with supervisor at [facility] [phone] spoke with supervisor at [facility] [phone] and pt's emergency contact/son Jose [phone]

## 2025-03-21 NOTE — ED PROVIDER NOTE - NSDCPRINTRESULTS_ED_ALL_ED
I gave patient lab results.Patient would like to repeat her Triglycerides at [facility] because she's concerned about this being high.When patient had the flu recently there. were no sweets in her diet.She is still taking Atorvastin. Patient requests all Lab and Radiology Results on their Discharge Instructions